# Patient Record
Sex: FEMALE | Race: WHITE | NOT HISPANIC OR LATINO | Employment: FULL TIME | URBAN - METROPOLITAN AREA
[De-identification: names, ages, dates, MRNs, and addresses within clinical notes are randomized per-mention and may not be internally consistent; named-entity substitution may affect disease eponyms.]

---

## 2017-01-26 ENCOUNTER — PRE VISIT (OUTPATIENT)
Dept: MATERNAL FETAL MEDICINE | Facility: CLINIC | Age: 37
End: 2017-01-26

## 2017-02-01 ENCOUNTER — OFFICE VISIT (OUTPATIENT)
Dept: MATERNAL FETAL MEDICINE | Facility: CLINIC | Age: 37
End: 2017-02-01
Attending: FAMILY MEDICINE
Payer: COMMERCIAL

## 2017-02-01 DIAGNOSIS — Z31.69 ENCOUNTER FOR PRECONCEPTION CONSULTATION: ICD-10-CM

## 2017-02-01 DIAGNOSIS — I63.50 CEREBRAL ARTERY OCCLUSION WITH CEREBRAL INFARCTION (H): Primary | ICD-10-CM

## 2017-02-01 NOTE — PROGRESS NOTES
Dear Dr. Chakraborty,  Thank you for your request for a Maternal Fetal Medicine consultation on your patient Ms. Foster regarding her history of a cerebrovascular accident.     HPI: Ms. Foster is a  36 year old  here for preconception counseling. She has a history notable for a history of CVA with patent foramen ovale and atrial septal aneurysm. At 35 weeks she began having intermittent severe headaches that were initially diagnosed as migraines. One headache brought on neurologic symptoms (weakness, numbness on left, and difficulty with speech). She was admitted to [facility] and diagnosed with an MCA CVA. She was placed on anticoagulation on unfractionated heparin, discharged, and again presented 2 weeks later with additional symptoms. MRI revealed new CVA suggesting a second event. She was delivered at that time via c/s due to concern for valsalva and anticoagulation planning. Workup revealed possible Protein S deficiency, MTHFR homozygous mutation, and PFO. Ultimately diagnosed with cryptogenic CVA. Bilateral LE dopplers were negative for DVT. She continued Lovenox for 12 weeks postpartum and then transitioned to ASA. Since then, she has continued follow up with neurology and cardiology. In 2016 she underwent PFO closure. An echo in 2016 showed complete closure of ASD, bubble study negative, LV EF nl 55-60% and right ventricle function and structure normal. She was continued on plavix for six months after PFO closure and then transitioned to ASA. Per cardiology note, Dr. Lu recommended full strength double strength aspirin however the patient is currently taking 81 mg daily. The patient and her  are here today to discuss if she is at high risk of repeat CVA or VTE, pregnancy and delivery management on anticoagulation, and anticoagulation indications in pregnancy.    OB History:  Obstetric History       T1      TAB1   SAB0   E0   M0   L1       # Outcome Date GA Lbr Angel/  "Weight Sex Delivery Anes PTL Lv   2 TAB      TAB      1 Term               Obstetric Comments   SAB at age 18    section  due to concern for valsalva. Underwent induction of labor due to second CVA. Initially planned for VAVD to limit valsalva. Anesthesia refused epidural placement given anticoagulation, plan was made for  section and IOL was then stopped.    GYN History: Denies abnormal PAPs or STDs  Past Medical History:   Patient Active Problem List    Diagnosis Date Noted     MTHFR mutation (H) 2016     Priority: Medium      cardiology consultation notes--  \"2.  Homozygous MTHFR.  If folate levels fall, this predisposes her to hypercoagulable state from an elevated homocysteine.  She remains on supplemental therapy ordered by Neurology\"       Social anxiety disorder 10/04/2016     Priority: Medium     Sx's started in ~-, propranolol used to help prior to speaking, interviews, stressful situations.  - worsening generalized anxiety with work, social situations, propranolol not helpful.   Starting zoloft 50mg/d.       Migraine with aura and without status migrainosus, not intractable 2015     Priority: Medium     HA's transitioned to migraines with aura/neuro sx's at beginning of pregnancy in .  Worsening migraines in 3rd trimester, with CVA at ~36 wks (seen at [facility]).  Return of sx's at ~38wks, so induced at [facility], c/sx.         Pre-conception counseling 2016     See 16 consultation from [facility].  Reviewed h/o CVA at 36 wks in setting of PFO and atrial septal aneurysm, with induction at ~38wks due to second CVA.  Reviewed hypercoagulation w/u- with no signs of thrombophilia.  Reviewed recent testing including positive homozygous MTHFR testing (from )- does not indicated high-risk thrombophilia.  Would leave decision to close PFO to cardiology and neurology.  Thought pregnancy is NOT contraindicated- thought risk of another CVA is " low.  Recommended repeat ECHO to evaluate for atrial enlargment, and recommended lovenox as soon as she is confirmed to have an IUP (confirm not ectopic).       Elevated lipoprotein A level 2016 cardiology consultation notes--  3.  Elevated Lp(a) which increases her cardiovascular risks over lifetime.  Currently, her LDL is controlled at 108, HDL 71.  She does have some heart disease in her family as I outlined above.  In the future, consideration could be given to statin therapy, but certainly not until she completes her pregnancies.  She hopes to have another child in the future post-closure.           PFO with atrial septal aneurysm 2015     Seen on 5/2/15 GODWIN bubble study ([facility]) s/p CVA during pregnancy, no thrombus.  First put on heparin, then switched to lovenox.  Lovenox stopped 13 wks post-partum- switched to asa.  Post PFO/ASD closure 16 with 30mm Amplatzer device  Was having palpitations s/p closure for first few weeks- toprol XL helped, took for about a month.         Protein S deficiency (H) 2015     Low protein S found s/p CVA during pregnancy       History of  section 2015     Problem list name updated by automated process. Provider to review       Cerebral artery occlusion with cerebral infarction 2015     CVA x 2 during pregnancy in ~5/15 (1st ~36 wks gestation), in setting of worsening migraines, PFO, and low protein S.    Initially inpt x 7 days at [facility], sent home on heparin, return of CVA sx's 7 days later, induced and delivered (at 38wks), on lovenox BID x 13 wks PP, then asa.    Thought to be cryptogenic CVA's.  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  8/13/15 [facility] consultation - rec further studies.  MRI/MRA and prolonged cardiac study.  If neg for a.fib, could make a case to close the PFO.    No arrhythmias identified on an event recorder.  A GODWIN showed a mobile septum measuring 2.3 x 2.3cm with left  to right shunting and a positive bubble study.    --PFO closure completed on 16.  PFO Closure: No complications.  30mm Amplatz Cribiform occluder device.  -Plavix 75 mg daily for at least 6 months  -Asprin 81 mg daily         Moderate recurrent major depression (H) 2013     Wellbutrin XL 150mg/d - very helpful.  Will likely stop in -.  In better space, planning on trying to get pregnant starting in 10/13.  Has stopped the Vit D in summer- would restart in .       IBS (irritable bowel syndrome) 2012     W/u through [facility], colonoscopy and endoscopy in .  Celiac labs negative.  Sx's better with very low dairy diet.       Lumbar herniated disc 2012     L4-L5, seeing work comp (injury 11).  Back pain and radiation to R buttock.  On restriction for work- 6hrs/day max, minimized lifting/carrying.  [facility] steroid injections at [facility].  Helping some.  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       Hirsutism 2012     Seeing Leonora Lilly.  Has taken spironolactone & OCP on/off, off for most of ', '.  Sx's worsened off med.  Will stop in anticipation of trying to get pregnant.       Insomnia 2012     On/off issues, uses trazodone periodically       CARDIOVASCULAR SCREENING; LDL GOAL LESS THAN 160 10/31/2010     Vitamin D deficiency 2009     Takes Vit D 7086-3387 units/day.        Past Surgical History:   - Nasal septum repair  - Knee arthroscopy  - PFO repair  -  section      Meds:   - B Comple  - 81 mg ASA  - Vit D  - Fish oil  - Magnesium      All:   NKDA    Family History: Denies family history of MI, Stroke or blood clots.    Impression:   At today s visit we discussed with Ms. Foster the implications of her prior history of a CVA in pregnancy. We discussed that this was a cryptogenic event, possibly due to the presence of the PFO, given workup for DVT was negative. The  recommendation for patients with a history of two or more episodes of VTE who are not receiving long term anticoagulation therapy is typically prophylactic OR therapeutic heparin. This recommendation is not dependent on the presence of a thrombophilia. It is very likely that the presence of the PFO was the nidus for the CVA. Now status post PFO closure, she is at extremely low risk for a repeat event. We discussed homozygosity for MTHFR gene is a common cause of hyperhomocysteinemia, which is a very weak risk factor of venous thromboembolism. However, MTHFR mutations by themselves do not appear to convey an increased risks for VTE in either pregnant or nonpregnant women. Although in the past Lovenox was used in patient with various coagulopathies in an effort to decrease pregnancy loss, it has since been disproven to be of benefit. Additionally, protein S deficiency workup during pregnancy and while on anticoagulants is not valid. We recommend repeat protein S testing and an an initial consult with hematologist Dr. Chow to address the issue of anticoagulation dosing during pregnancy.     We also discussed management of pregnancy while on anticoagulation, including timing of delivery via scheduled repeat  section or induction of labor to facilitate anesthesia at least 12 hours after anticoagulation. We also discussed fetal growth ultrasounds every four weeks after 20 weeks for patients on anticoagulation.    Recommendations:   1) Consult with Dr. Chow, hematology for anticoagulation planning, indications and dosing  2) Obtain repeat protein S levels prior to pregnancy  3) Consider obtaining hemocysteine level  4) If homocysteine level elevated, begin vitamin B6 25 mg TID and Vitamin B 12 100 mg daily.   5) Level 2 ultrasound at 18-20 weeks, growth scans every four weeks if on anticoagulation in pregnancy  6) Genetics consult recommended for AMA, patient will consider      Thank you very much for your  request for a Maternal Fetal Medicine consultation.  Please contact the [facility] for with any further questions or concerns.  An outpatient consultation was performed beyond the interpretation of the ultrasound.   A total of 15 minutes of face-to-face time was spent in counseling and coordination of care. A copy of this consultation will be sent to your office.    I served as a scribe for Dr. Julien for this encounter.    Maria C Tobar MD  OB GYN PGY-2    This note, as scribed, accurately reflects the examination, my impressions and plan as discussed with the patient.    Ramesh Julien MD  Maternal-Fetal Medicine

## 2017-02-10 ENCOUNTER — OFFICE VISIT (OUTPATIENT)
Dept: BEHAVIORAL HEALTH | Facility: CLINIC | Age: 37
End: 2017-02-10
Attending: FAMILY MEDICINE
Payer: COMMERCIAL

## 2017-02-10 ENCOUNTER — MYC MEDICAL ADVICE (OUTPATIENT)
Dept: FAMILY MEDICINE | Facility: CLINIC | Age: 37
End: 2017-02-10

## 2017-02-10 DIAGNOSIS — F41.1 ANXIETY STATE: Primary | ICD-10-CM

## 2017-02-10 DIAGNOSIS — F40.10 SOCIAL ANXIETY DISORDER: ICD-10-CM

## 2017-02-10 DIAGNOSIS — F41.0 PANIC ATTACK: ICD-10-CM

## 2017-02-10 DIAGNOSIS — F41.9 ANXIETY: Primary | ICD-10-CM

## 2017-02-10 PROCEDURE — 90791 PSYCH DIAGNOSTIC EVALUATION: CPT | Performed by: COUNSELOR

## 2017-02-10 RX ORDER — LORAZEPAM 1 MG/1
TABLET ORAL
Qty: 8 TABLET | Refills: 0 | Status: SHIPPED | OUTPATIENT
Start: 2017-02-10 | End: 2017-04-05

## 2017-02-10 RX ORDER — SERTRALINE HYDROCHLORIDE 100 MG/1
100 TABLET, FILM COATED ORAL DAILY
Qty: 30 TABLET | Refills: 1 | Status: SHIPPED | OUTPATIENT
Start: 2017-02-10 | End: 2017-04-05

## 2017-02-10 ASSESSMENT — ANXIETY QUESTIONNAIRES
7. FEELING AFRAID AS IF SOMETHING AWFUL MIGHT HAPPEN: NOT AT ALL
5. BEING SO RESTLESS THAT IT IS HARD TO SIT STILL: NOT AT ALL
1. FEELING NERVOUS, ANXIOUS, OR ON EDGE: MORE THAN HALF THE DAYS
6. BECOMING EASILY ANNOYED OR IRRITABLE: SEVERAL DAYS
GAD7 TOTAL SCORE: 8
2. NOT BEING ABLE TO STOP OR CONTROL WORRYING: MORE THAN HALF THE DAYS
3. WORRYING TOO MUCH ABOUT DIFFERENT THINGS: MORE THAN HALF THE DAYS

## 2017-02-10 ASSESSMENT — PATIENT HEALTH QUESTIONNAIRE - PHQ9: 5. POOR APPETITE OR OVEREATING: SEVERAL DAYS

## 2017-02-10 NOTE — TELEPHONE ENCOUNTER
[employer] message sent to pt since not reachable by phone.  Rx faxed to [facility].  Bernadine LOWRY RN

## 2017-02-10 NOTE — TELEPHONE ENCOUNTER
CW,  Please see below [employer] messages.  Pt is scheduled to see you 2/24/2017.  Looks like you last Rx'd Alprazolam in 2009?  Do you want to increase pt's Zoloft prior to visit?  Please advise.  Bernadine LOWRY RN

## 2017-02-10 NOTE — TELEPHONE ENCOUNTER
Tried to call pt but it went to v-mail.    Looks like AYLA was done at therapy visit today- still 8- same as at her 12/16 visit when zoloft 50mg/d was started.  Likely needs a higher dose of zoloft.  If no significant se's at the 50mg/d dose, would rec increasing to 100mg/day.      In terms of the ativan, that's a harder issue without an appt.  Wouldn't want to take it if she's going to be driving in a few hrs, and suspect that would be the case for meetings?  I can do a rx for ~#8 of the ativan for rare use, but no more unless we talk fully about it at her next appointment.  And would only take if she won't need to be driving.  Ativan is 1mg- so can start with 1/2 tab to see if that works.    Can you let her know?  Will send in the higher dose of zoloft and bring the ativan rx to triage.  Would have her upcoming appt.  Thanks!  CW

## 2017-02-10 NOTE — Clinical Note
Hi Dr. Reyes,  I started seeing Chloe for mental health counseling and have attached her diagnostic assessment. I have diagnosed her with Unspecified Anxiety Disorder. Thank you for the referral, and please feel free to contact me with any questions/concerns!  Penelope
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
## 2017-02-11 ASSESSMENT — PATIENT HEALTH QUESTIONNAIRE - PHQ9: SUM OF ALL RESPONSES TO PHQ QUESTIONS 1-9: 0

## 2017-02-11 ASSESSMENT — ANXIETY QUESTIONNAIRES: GAD7 TOTAL SCORE: 8

## 2017-03-07 ENCOUNTER — TELEPHONE (OUTPATIENT)
Dept: FAMILY MEDICINE | Facility: CLINIC | Age: 37
End: 2017-03-07

## 2017-03-07 NOTE — TELEPHONE ENCOUNTER
Reason for call:  Patient reporting a symptom    Symptom or request: rash and swelling on pt's face    Duration (how long have symptoms been present): this am    Have you been treated for this before? No    Additional comments: pt was having breakfast this am  (Nothing out of ordinary) and all of a sudden developed rash and swelling  On her face; this never happened and pt took Benedryl;  Pt wants to speak to a nurse    Phone Number patient can be reached at:  Home number on file [phone] (home)    Best Time:      Can we leave a detailed message on this number:  YES    Call taken on 3/7/2017 at 9:30 AM by Lara Campos

## 2017-03-07 NOTE — TELEPHONE ENCOUNTER
Her theory sounds possibly, especially if she usually takes her vitamins at night, and just took it this morning.   Agree with your counseling.  No other thoughts at this point, other than cont to just take it at night.  Thanks!  CW

## 2017-03-07 NOTE — TELEPHONE ENCOUNTER
"CW - FYI  Patient called.  States after she ate breakfast today her face started tingling, felt like it was burning and was bright red.  Took some Benadryl and it's gone now.  Denied any problems breathing, swallowing, swelling on tongue, lips    States she did some research.  Thinks she experienced a Niacin flush.  Took her Vitamins this am once of which has a lot of Niacin in it.  (usually takes at night) and then proceeded to have raines for breakfast.  \"I read online that this has happened to other people\"    She has an appointment with you next week and states she will talk to you about this then.  I did tell her to call back is this happens again to call clinic.   If swelling tongue, lips, problems breathing/swallowing call 911/go to ER.   Anything you want me to tell patient at this time.  Thanks, Ondina Parr RN    "

## 2017-03-10 ENCOUNTER — OFFICE VISIT (OUTPATIENT)
Dept: BEHAVIORAL HEALTH | Facility: CLINIC | Age: 37
End: 2017-03-10
Payer: COMMERCIAL

## 2017-03-10 DIAGNOSIS — F41.9 ANXIETY: Primary | ICD-10-CM

## 2017-03-10 PROCEDURE — 90834 PSYTX W PT 45 MINUTES: CPT | Performed by: COUNSELOR

## 2017-03-16 DIAGNOSIS — F40.10 SOCIAL ANXIETY DISORDER: ICD-10-CM

## 2017-03-16 DIAGNOSIS — F41.1 ANXIETY STATE: ICD-10-CM

## 2017-03-16 NOTE — TELEPHONE ENCOUNTER
Denied  Refill request too early; Rx sent 2/10/2017 for 2 month supply  Bernadine LOWRY RN    Pending Prescriptions:                       Disp   Refills    sertraline (ZOLOFT) 50 MG tablet [Pharmac*30 tab*0            Sig: TAKE ONE-HALF TABLET BY MOUTH DAILY FOR THE FIRST           WEEK THEN INCREASE TO ONE TABLET BY MOUTH DAILY.         Last Written Prescription Date: 2/10/2017  Last Fill Quantity: 30; # refills: 1  Last Office Visit with [facility], [facility] or [facility] prescribing provider:     Next 5 appointments (look out 90 days)     Mar 24, 2017  2:30 PM CDT   Return Visit with Carly Gershone, LPC   [facility] ([facility])    [facility] [address]   [phone]            Apr 04, 2017  4:00 PM CDT   Return Visit with Carly Gershone, LPC   [facility] ([facility])    [address] [place] [id number]   [phone]            Apr 05, 2017  7:45 AM CDT   MyChart Long with Izabel Reyes MD   [facility] ([facility])    [address] [place] [address] [facility] [address]   [phone]            Apr 18, 2017  4:00 PM CDT   Return Visit with Carly Gershone, LPC   [facility] ([facility])    [address] [place] [id number]   [phone]            May 08, 2017  3:30 PM CDT   Return Visit with Carly Gershone, LPC   [facility] ([facility])    [facility] [address]   [phone]                   Last PHQ-9 score on record=   PHQ-9 SCORE 2/10/2017   Total Score -   Total Score 0       Lab Results   Component Value Date    AST 17 12/29/2016     Lab Results   Component Value Date    ALT 25 12/29/2016

## 2017-03-24 ENCOUNTER — OFFICE VISIT (OUTPATIENT)
Dept: BEHAVIORAL HEALTH | Facility: CLINIC | Age: 37
End: 2017-03-24
Payer: COMMERCIAL

## 2017-03-24 DIAGNOSIS — F41.9 ANXIETY: Primary | ICD-10-CM

## 2017-03-24 PROCEDURE — 90834 PSYTX W PT 45 MINUTES: CPT | Performed by: COUNSELOR

## 2017-03-28 NOTE — PROGRESS NOTES
"                                             Progress Note    Client Name: Chloe Foster  Date: 3/10/17         Service Type: Individual      Session Start Time: 2:30pm  Session End Time: 3:20pm      Session Length: 50 min     Session #: 2     Attendees: Client attended alone    Treatment Plan Last Reviewed: preliminary at intake  PHQ-9 / AYLA-7 : See updates in EPIC     DATA      Progress Since Last Session (Related to Symptoms / Goals / Homework):   Symptoms: Stable    Homework: NA      Episode of Care Goals: Satisfactory progress - ACTION (Actively working towards change); Intervened by reinforcing change plan / affirming steps taken     Current / Ongoing Stressors and Concerns:   Client reported work-related stress, specifically in her relationship with her boss. 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 Reported she feels anxious when thinking about interactions with boss, and in meetings for work.     Treatment Objective(s) Addressed in This Session:   Problem-solve stressors effectively.  Learn coping strategies for anxiety.     Intervention:   Suggested client use sensory items to help calm her. Suggested putting together a \"kit\" she can keep with her in her purse, desk, etc.  Discussed with client the question of, \"What is the worst thing that could happen?\" regarding interactions with boss.  Validated concerns/feelings.        ASSESSMENT: Current Emotional / Mental Status (status of significant symptoms):   Risk status (Self / Other harm or suicidal ideation)   Client denies current fears or concerns for personal safety.   Client denies current or recent suicidal ideation or behaviors.   Client denies current or recent homicidal ideation or behaviors.   Client denies current or recent self injurious behavior or ideation.   Client denies other safety concerns.   A safety and risk management plan has not " been developed at this time, however client was given the after-hours number / 911 should there be a change in any of these risk factors.     Appearance:   Appropriate    Eye Contact:   Good    Psychomotor Behavior: Normal    Attitude:   Cooperative    Orientation:   All   Speech    Rate / Production: Normal     Volume:  Normal    Mood:    Anxious    Affect:    Appropriate    Thought Content:  Rumination    Thought Form:  Coherent  Logical    Insight:    Good      Medication Review:   No changes to current psychiatric medication(s)     Medication Compliance:   Yes     Changes in Health Issues:   None reported     Chemical Use Review:   Substance Use: Chemical use reviewed, no active concerns identified      Tobacco Use: No current tobacco use.       Collateral Reports Completed:   Not Applicable    PLAN: (Client Tasks / Therapist Tasks / Other)  Continue to meet with client every-other week. Follow-up appts scheduled. Continue discussion of stressors and coping. Continue to monitor mood and thought process.        Carly Gershone, LPC

## 2017-04-05 ENCOUNTER — OFFICE VISIT (OUTPATIENT)
Dept: FAMILY MEDICINE | Facility: CLINIC | Age: 37
End: 2017-04-05
Payer: COMMERCIAL

## 2017-04-05 ENCOUNTER — TELEPHONE (OUTPATIENT)
Dept: FAMILY MEDICINE | Facility: CLINIC | Age: 37
End: 2017-04-05

## 2017-04-05 VITALS
RESPIRATION RATE: 16 BRPM | TEMPERATURE: 97.1 F | SYSTOLIC BLOOD PRESSURE: 123 MMHG | OXYGEN SATURATION: 98 % | HEART RATE: 60 BPM | HEIGHT: 67 IN | DIASTOLIC BLOOD PRESSURE: 78 MMHG | WEIGHT: 159 LBS | BODY MASS INDEX: 24.96 KG/M2

## 2017-04-05 DIAGNOSIS — D68.59 PROTEIN S DEFICIENCY (H): ICD-10-CM

## 2017-04-05 DIAGNOSIS — F33.1 MODERATE RECURRENT MAJOR DEPRESSION (H): ICD-10-CM

## 2017-04-05 DIAGNOSIS — Z15.89 MTHFR MUTATION: ICD-10-CM

## 2017-04-05 DIAGNOSIS — F41.0 PANIC ATTACK: ICD-10-CM

## 2017-04-05 DIAGNOSIS — F41.1 ANXIETY STATE: ICD-10-CM

## 2017-04-05 DIAGNOSIS — I63.50 CEREBRAL ARTERY OCCLUSION WITH CEREBRAL INFARCTION (H): ICD-10-CM

## 2017-04-05 DIAGNOSIS — Z31.69 PRE-CONCEPTION COUNSELING: Primary | ICD-10-CM

## 2017-04-05 DIAGNOSIS — F40.10 SOCIAL ANXIETY DISORDER: Primary | ICD-10-CM

## 2017-04-05 DIAGNOSIS — Z31.69 PRE-CONCEPTION COUNSELING: ICD-10-CM

## 2017-04-05 LAB
HCYS SERPL-SCNC: ABNORMAL UMOL/L (ref 4–12)
Lab: NORMAL
PROT S FREE AG ACT/NOR PPP IA: 62 % (ref 55–125)
PROT S FREE AG ACT/NOR PPP IA: NORMAL % (ref 55–125)

## 2017-04-05 PROCEDURE — 83090 ASSAY OF HOMOCYSTEINE: CPT | Performed by: FAMILY MEDICINE

## 2017-04-05 PROCEDURE — 36415 COLL VENOUS BLD VENIPUNCTURE: CPT | Performed by: FAMILY MEDICINE

## 2017-04-05 PROCEDURE — 85306 CLOT INHIBIT PROT S FREE: CPT | Performed by: FAMILY MEDICINE

## 2017-04-05 PROCEDURE — 99214 OFFICE O/P EST MOD 30 MIN: CPT | Performed by: FAMILY MEDICINE

## 2017-04-05 RX ORDER — LORAZEPAM 1 MG/1
TABLET ORAL
Qty: 8 TABLET | Refills: 0 | Status: SHIPPED | OUTPATIENT
Start: 2017-04-05 | End: 2017-07-11

## 2017-04-05 RX ORDER — SERTRALINE HYDROCHLORIDE 100 MG/1
100 TABLET, FILM COATED ORAL DAILY
Qty: 30 TABLET | Refills: 1 | Status: SHIPPED | OUTPATIENT
Start: 2017-04-05 | End: 2017-06-19

## 2017-04-05 ASSESSMENT — ANXIETY QUESTIONNAIRES
5. BEING SO RESTLESS THAT IT IS HARD TO SIT STILL: NOT AT ALL
6. BECOMING EASILY ANNOYED OR IRRITABLE: SEVERAL DAYS
IF YOU CHECKED OFF ANY PROBLEMS ON THIS QUESTIONNAIRE, HOW DIFFICULT HAVE THESE PROBLEMS MADE IT FOR YOU TO DO YOUR WORK, TAKE CARE OF THINGS AT HOME, OR GET ALONG WITH OTHER PEOPLE: NOT DIFFICULT AT ALL
1. FEELING NERVOUS, ANXIOUS, OR ON EDGE: MORE THAN HALF THE DAYS
3. WORRYING TOO MUCH ABOUT DIFFERENT THINGS: MORE THAN HALF THE DAYS
GAD7 TOTAL SCORE: 7
2. NOT BEING ABLE TO STOP OR CONTROL WORRYING: SEVERAL DAYS
7. FEELING AFRAID AS IF SOMETHING AWFUL MIGHT HAPPEN: NOT AT ALL

## 2017-04-05 ASSESSMENT — PATIENT HEALTH QUESTIONNAIRE - PHQ9: 5. POOR APPETITE OR OVEREATING: SEVERAL DAYS

## 2017-04-05 NOTE — NURSING NOTE
"Chief Complaint   Patient presents with     Anxiety     /78  Pulse 60  Temp 97.1  F (36.2  C) (Oral)  Resp 16  Ht 5' 7\" (1.702 m)  Wt 159 lb (72.1 kg)  LMP 03/15/2017  SpO2 98%  BMI 24.9 kg/m2 Estimated body mass index is 24.9 kg/(m^2) as calculated from the following:    Height as of this encounter: 5' 7\" (1.702 m).    Weight as of this encounter: 159 lb (72.1 kg).  bp completed using cuff size: regular       Health Maintenance addressed:  NONE    n/a    Abigail Willson MA     "

## 2017-04-05 NOTE — TELEPHONE ENCOUNTER
Reason for Call: Request for an order or referral:    Order or referral being requested: lab    Date needed: as soon as possible    Has the patient been seen by the PCP for this problem? YES    Additional comments: sol needs to change lab from Protein S total free to Protein S free. Please advise          Phone number Patient can be reached at:  Other phone number:  [phone]    Best Time:  any    Can we leave a detailed message on this number?  YES    Call taken on 4/5/2017 at 3:00 PM by Chris Chan

## 2017-04-05 NOTE — MR AVS SNAPSHOT
After Visit Summary   4/5/2017    Chloe Foster    MRN: [id number]           Patient Information     Date Of Birth          1980        Visit Information        Provider Department      4/5/2017 7:45 AM Izabel Reyes MD [facility]        Today's Diagnoses     Social anxiety disorder    -  1    Pre-conception counseling        Cerebral artery occlusion with cerebral infarction        Moderate recurrent major depression (H)        Anxiety state        Panic attack           Follow-ups after your visit        Your next 10 appointments already scheduled     Apr 18, 2017  4:00 PM CDT   Return Visit with Carly Gershone, LPC   [facility] ([address] [id number] [phone]            May 08, 2017  3:30 PM CDT   Return Visit with Carly Gershone, LPC   [facility] ([facility])    [facility] [id number] [phone]            May 22, 2017  3:30 PM CDT   Return Visit with Carly Gershone, LPC   [facility] ([facility])    [facility] [id number] [phone]              Who to contact     If you have questions or need follow up information about today's clinic visit or your schedule please contact [facility] directly at [phone].  Normal or non-critical lab and imaging results will be communicated to you by MyChart, letter or phone within 4 business days after the clinic has received the results. If you do not hear from us within 7 days, please contact the clinic through MyChart or phone. If you have a critical or abnormal lab result, we will notify you by phone as soon as possible.  Submit refill requests through [employer] or call your pharmacy and they will forward the refill request to us. Please allow 3 business days for your refill to be completed.          Additional Information About Your Visit       "  MyChart Information     [facility] gives you secure access to your electronic health record. If you see a primary care provider, you can also send messages to your care team and make appointments. If you have questions, please call your primary care clinic.  If you do not have a primary care provider, please call [phone] and they will assist you.        Care EveryWhere ID     This is your Care EveryWhere ID. This could be used by other organizations to access your [place] medical records  [id number]        Your Vitals Were     Pulse Temperature Respirations Height Last Period Pulse Oximetry    60 97.1  F (36.2  C) (Oral) 16 5' 7\" (1.702 m) 03/15/2017 98%    BMI (Body Mass Index)                   24.9 kg/m2            Blood Pressure from Last 3 Encounters:   04/05/17 123/78   12/21/16 108/70   07/28/16 120/70    Weight from Last 3 Encounters:   04/05/17 159 lb (72.1 kg)   12/21/16 161 lb 9.6 oz (73.3 kg)   07/28/16 163 lb 6.4 oz (74.1 kg)              We Performed the Following     Homocysteine     Protein S Antigen Total and Free          Where to get your medicines      These medications were sent to [facility] [address] [facility] [address] [facility] [address], [facility] [address]     Phone:  [phone]     sertraline 100 MG tablet         Some of these will need a paper prescription and others can be bought over the counter.  Ask your nurse if you have questions.     Bring a paper prescription for each of these medications     LORazepam 1 MG tablet          Primary Care Provider Office Phone # Fax #    Izabel Reyes [phone]       [facility] [address] [address]        Thank you!     Thank you for choosing [facility]  for your care. Our goal is always to provide you with excellent care. Hearing back from our patients is one way we can continue to improve our services. " Please take a few minutes to complete the written survey that you may receive in the mail after your visit with us. Thank you!             Your Updated Medication List - Protect others around you: Learn how to safely use, store and throw away your medicines at www.disposemymeds.org.          This list is accurate as of: 4/5/17  8:34 AM.  Always use your most recent med list.                   Brand Name Dispense Instructions for use    aspirin EC 81 MG EC tablet      Take 81 mg by mouth       FISH OIL PO      Take 1 capsule by mouth daily       LORazepam 1 MG tablet    ATIVAN    8 tablet    Use 1/2-1 tab 30 minutes prior to procedure or for panic symptoms       magnesium oxide 400 MG tablet    MAG-OX     Take 400 mg by mouth every evening       MULTIVITAMIN GUMMIES WOMENS PO      Take 2 chew tab by mouth every evening       NONFORMULARY      Take 1 tablet by mouth every evening OTC: B-Right Vitamin (Optimized B Complex)       propranolol 10 MG tablet    INDERAL    20 tablet    Take 1 tablet by mouth. Take as needed prior to interviews, stressful events       sertraline 100 MG tablet    ZOLOFT    30 tablet    Take 1 tablet (100 mg) by mouth daily (take 1/2 tab the first week, then full tab)       STATIN NOT PRESCRIBED (INTENTIONAL)     0 each    Statin not prescribed intentionally due to Woman of childbearing age not actively taking birth control       VITAMIN D (CHOLECALCIFEROL) PO      Take 2,000 Units by mouth every evening (takes 2 x 100 unit capsule)

## 2017-04-05 NOTE — TELEPHONE ENCOUNTER
That sounds great- wanted to order the usual lab, so thanks for their feedback.  Ordered from her visit note so the lab results would be connected to that visit note.  Can you call and let them know as well?  Will cancel lab order from this encounter.  Thanks!  CW

## 2017-04-05 NOTE — PROGRESS NOTES
SUBJECTIVE:                                                    Chloe Foster is a 36 year old female who presents to clinic today for the following health issues:      Anxiety Follow-Up    Status since last visit: No change    Other associated symptoms:None    Complicating factors:   Significant life event: No   Current substance abuse: None  Depression symptoms: No  AYLA-7 SCORE 12/21/2016 2/10/2017 4/5/2017   Total Score 8 8 7        GAD7       Amount of exercise or physical activity: 2-3 days/week for an average of 30-45 minutes    Problems taking medications regularly: No    Medication side effects: none    Diet: regular (no restrictions)    Zoloft- increased to 100mg ~2/10/16.  Alternative between the 50mg/100mg for awhile.  At first at the 100mg, felt dizzy. Alternative between 50/100mg for a bit.  Now back at 100mg daily for a couple weeks, and doing well at the dose.    Anxiety sx's--    Alprazolam- took 1/2 tab the night before she has a big day of meetings (1-2x/wk, 1/2 tab or 1 at night- has 1 tab of the #8 from 2 days), and feels a lot better the next day.  Thinks she may sleep better or more relax more.  Has made it through 5-6 meetings that went pretty well.  Could be the zoloft or the xanax?  Has still had a couple episodes of shaking/flushing.  Trying to take care of herself before those days, the xanax, not having wine the evening.  Exercise- not as much as she needs.  Bought elliptical, but hits her head on ceiling.  May start biking her son to work in the summer- couple miles.  Active with son otherwise.  [employer] class 2x/wk.    Seeing Penelope, therapist.  Helping through mind-racing about work, good things there.  Helping here.  Rec EMDR- [place].    Headspace meditation.      Preconception counseling--  May start trying in late-summer, early fall.  Blood test- perinatologist rec hematologist to check her levels when she's not pregnant.      Patient Active Problem List   Diagnosis      Vitamin D deficiency     CARDIOVASCULAR SCREENING; LDL GOAL LESS THAN 160     Lumbar herniated disc     Hirsutism     Insomnia     IBS (irritable bowel syndrome)     Moderate recurrent major depression (H)     Cerebral artery occlusion with cerebral infarction     History of  section     PFO with atrial septal aneurysm     Protein S deficiency (H)     Migraine with aura and without status migrainosus, not intractable     Pre-conception counseling     Elevated lipoprotein A level     Social anxiety disorder     MTHFR mutation (H)      Current Outpatient Prescriptions   Medication Sig Dispense Refill     sertraline (ZOLOFT) 100 MG tablet Take 1 tablet (100 mg) by mouth daily (take 1/2 tab the first week, then full tab) 30 tablet 1     LORazepam (ATIVAN) 1 MG tablet Use 1/2-1 tab 30 minutes prior to procedure or for panic symptoms 8 tablet 0     aspirin EC 81 MG EC tablet Take 81 mg by mouth       STATIN NOT PRESCRIBED, INTENTIONAL, Statin not prescribed intentionally due to Woman of childbearing age not actively taking birth control 0 each 0     propranolol (INDERAL) 10 MG tablet Take 1 tablet by mouth. Take as needed prior to interviews, stressful events 20 tablet 1     VITAMIN D, CHOLECALCIFEROL, PO Take 2,000 Units by mouth every evening (takes 2 x 100 unit capsule)       Multiple Vitamins-Minerals (MULTIVITAMIN GUMMIES WOMENS PO) Take 2 chew tab by mouth every evening       NONFORMULARY Take 1 tablet by mouth every evening OTC: B-Right Vitamin (Optimized B Complex)       magnesium oxide (MAG-OX) 400 MG tablet Take 400 mg by mouth every evening        Omega-3 Fatty Acids (FISH OIL PO) Take 1 capsule by mouth daily        Allergies   Allergen Reactions     Nkda [No Known Drug Allergies]      Recent Labs   Lab Test  16   0924  16   1940   16   1110  11/23/15   0820 07/23/15   04/29/15   2300  13   1101   09   1232   LDL  126*   --    --    --   108*  126   < >   --    --    < >    "--    HDL  86   --    --    --   71  76   < >   --    --    < >   --    TRIG  61   --    --    --   53  85   < >   --    --    < >   --    ALT  25   --    --    --   23   --    --   26   --    --   29   CR  0.70   --    --   0.58  0.62   --    --   0.60   --    < >  0.83   GFRESTIMATED  >90  Non  GFR Calc     --    --   >90  Non  GFR Calc    >90  Non  GFR Calc     --    --   >90  Non  GFR Calc     --    < >  81   GFRESTBLACK  >90   GFR Calc     --    --   >90   GFR Calc    >90   GFR Calc     --    --   >90   GFR Calc     --    < >  >90   POTASSIUM  4.1  3.9   < >  3.9  4.0   --    --   3.5   --    < >  4.8   TSH   --    --    --    --    --    --    --    --   0.44   --   0.49    < > = values in this interval not displayed.      BP Readings from Last 3 Encounters:   04/05/17 123/78   12/21/16 108/70   07/28/16 120/70    Wt Readings from Last 3 Encounters:   04/05/17 159 lb (72.1 kg)   12/21/16 161 lb 9.6 oz (73.3 kg)   07/28/16 163 lb 6.4 oz (74.1 kg)                  Labs reviewed in EPIC    Reviewed and updated as needed this visit by clinical staff  Tobacco  Allergies  Meds  Problems  Med Hx  Surg Hx  Fam Hx  Soc Hx        Reviewed and updated as needed this visit by Provider  Allergies  Meds  Problems         ROS:  Constitutional, HEENT, cardiovascular, pulmonary, GI, , musculoskeletal, neuro, skin, endocrine and psych systems are negative, except as otherwise noted.    OBJECTIVE:                                                    /78  Pulse 60  Temp 97.1  F (36.2  C) (Oral)  Resp 16  Ht 5' 7\" (1.702 m)  Wt 159 lb (72.1 kg)  LMP 03/15/2017  SpO2 98%  BMI 24.9 kg/m2  Body mass index is 24.9 kg/(m^2).  GENERAL: healthy, alert and no distress  EYES: Eyes grossly normal to inspection, PERRL and conjunctivae and sclerae normal  HENT: ear canals and TM's normal, nose " and mouth without ulcers or lesions  NECK: no adenopathy, no asymmetry, masses, or scars and thyroid normal to palpation  RESP: lungs clear to auscultation - no rales, rhonchi or wheezes  BREAST: normal without masses, tenderness or nipple discharge and no palpable axillary masses or adenopathy  CV: regular rate and rhythm, normal S1 S2, no S3 or S4, no murmur, click or rub, no peripheral edema and peripheral pulses strong  ABDOMEN: soft, nontender, no hepatosplenomegaly, no masses and bowel sounds normal  MS: no gross musculoskeletal defects noted, no edema  SKIN: no suspicious lesions or rashes  NEURO: Normal strength and tone, mentation intact and speech normal  Psych: full range affect, normal speech and grooming, judgement and insight intact     Diagnostic Test Results:  Results for orders placed or performed in visit on 04/05/17   Protein S Antigen Total and Free   Result Value Ref Range    Protein S Antigen Total and Free       Canceled, Test credited  DR NJ PRADHAN,4/05/17,1530,BL,SEE FREE PROTEIN S RESULT     Homocysteine   Result Value Ref Range    Homocysteine umol/L (L) 4.0 - 12.0 umol/L     <4.0  A low plasma homocysteine level likely rules out genetic defects in homocysteine   metabolism.     Protein S Antigen Free   Result Value Ref Range    Protein S Free Canceled, Test credited   Duplicate request   55 - 125 %   Protein S Antigen Free   Result Value Ref Range    Protein S Free 62 55 - 125 %        ASSESSMENT/PLAN:                                                        ICD-10-CM    1. Social anxiety disorder F40.10 sertraline (ZOLOFT) 100 MG tablet     LORazepam (ATIVAN) 1 MG tablet     Protein S Antigen Free   2. Pre-conception counseling Z31.69 Protein S Antigen Total and Free     Homocysteine     Protein S Antigen Free   3. Cerebral artery occlusion with cerebral infarction I63.50 Protein S Antigen Total and Free     Homocysteine     Protein S Antigen Free   4. Moderate recurrent major  depression (H) F33.1    5. Anxiety state F41.1 sertraline (ZOLOFT) 100 MG tablet     LORazepam (ATIVAN) 1 MG tablet   6. Panic attack F41.0 LORazepam (ATIVAN) 1 MG tablet   7. Protein S deficiency (H) D68.59 Protein S Antigen Total and Free     Homocysteine     Protein S Antigen Free   8. MTHFR mutation (H) E72.12 Homocysteine     Anxiety- difficult to get handle on sx's, but sounds like she's doing better.  Zoloft just at 100mg/d (from 50mg/d, then alternating doses) consistently for a couple weeks, though, so may continue to improve for next month.  Feels like the ativan night prior to meetings is helpful.  Rec keeping it at 1/2 tab those nights, and seeing if it's needed after another month on the zoloft.  Cautioned of dependence on benzo's.  Risks and benefits of medication(s) including potential side effects reviewed with patient.  Questions answered.   RTC in 2 months for f/u.    Pre-conception counseling, h/o CVA with last pregnancy-   Reviewed [facility] recs.  Pt will f/u with hematology- Dr. Casarez.  Will get Protein S and homocysteine levels today to have prior to consultation.  Plans to go off zoloft for pregnancy, off ativan if trying.    Izabel Reyes MD  [facility]

## 2017-04-05 NOTE — TELEPHONE ENCOUNTER
CW,  [facility] lab calling.  Orders placed to day for this pt for Protein S total and free.  Per lab, this is very costly for patients  They typically don't do this lab unless specifically wanted/needed by provider  Sol at the lab said Protein S antigen free is the test they usually do  Is this ok with you instead?  Order pended if you approve  Bernadine LOWRY RN

## 2017-04-06 ASSESSMENT — PATIENT HEALTH QUESTIONNAIRE - PHQ9: SUM OF ALL RESPONSES TO PHQ QUESTIONS 1-9: 0

## 2017-04-06 ASSESSMENT — ANXIETY QUESTIONNAIRES: GAD7 TOTAL SCORE: 7

## 2017-04-12 NOTE — PROGRESS NOTES
"                                               Progress Note    Client Name: Chloe Foster  Date: 3/24/17         Service Type: Individual      Session Start Time: 2:30pm  Session End Time: 3:20pm      Session Length: 50 min     Session #: 2     Attendees: Client attended alone    Treatment Plan Last Reviewed: preliminary at intake  PHQ-9 / AYLA-7 : See updates in EPIC     DATA      Progress Since Last Session (Related to Symptoms / Goals / Homework):   Symptoms: Stable    Homework: NA      Episode of Care Goals: Satisfactory progress - ACTION (Actively working towards change); Intervened by reinforcing change plan / affirming steps taken     Current / Ongoing Stressors and Concerns:   Client reported it was helpful to think, \"What's the worst thing that could happen?\" in interactions with her boss. Reported some ongoing stress related to work, particularly when she is about to attend meetings. Reported that for the most part, if her  is with her, she does not feel anxious. Reported he was with her when she had her first stroke. Said she noticed more anxiety after the strokes.     Treatment Objective(s) Addressed in This Session:   Problem-solve stressors effectively.  Learn coping strategies for anxiety.     Intervention:   Reinforced working toward changing thought patterns.  Discussed that she might feel comforted when  is with her because he was there when she had a stroke.  Validated concerns/feelings.        ASSESSMENT: Current Emotional / Mental Status (status of significant symptoms):   Risk status (Self / Other harm or suicidal ideation)   Client denies current fears or concerns for personal safety.   Client denies current or recent suicidal ideation or behaviors.   Client denies current or recent homicidal ideation or behaviors.   Client denies current or recent self injurious behavior or ideation.   Client denies other safety concerns.   A safety and risk management plan has not been " developed at this time, however client was given the after-hours number / 911 should there be a change in any of these risk factors.     Appearance:   Appropriate    Eye Contact:   Good    Psychomotor Behavior: Normal    Attitude:   Cooperative    Orientation:   All   Speech    Rate / Production: Normal     Volume:  Normal    Mood:    Anxious    Affect:    Appropriate    Thought Content:  Rumination    Thought Form:  Coherent  Logical    Insight:    Good      Medication Review:   No changes to current psychiatric medication(s)     Medication Compliance:   Yes     Changes in Health Issues:   None reported     Chemical Use Review:   Substance Use: Chemical use reviewed, no active concerns identified      Tobacco Use: No current tobacco use.       Collateral Reports Completed:   Not Applicable    PLAN: (Client Tasks / Therapist Tasks / Other)  Continue to meet with client every-other week. Follow-up appts scheduled. Continue discussion of stressors and coping. Continue to monitor mood and thought process.        Carly Gershone, HARI

## 2017-04-12 NOTE — PROGRESS NOTES
Here are your lab results from your recent visit...  -Your Protein S levels are in the normal range.  -Your homocysteine level is a bit low.    Please bring these results in with you to the hematology appointment to discuss.    Ahmet Orta MD

## 2017-04-18 ENCOUNTER — OFFICE VISIT (OUTPATIENT)
Dept: BEHAVIORAL HEALTH | Facility: CLINIC | Age: 37
End: 2017-04-18
Payer: COMMERCIAL

## 2017-04-18 DIAGNOSIS — F41.9 ANXIETY: Primary | ICD-10-CM

## 2017-04-18 PROCEDURE — 90834 PSYTX W PT 45 MINUTES: CPT | Performed by: COUNSELOR

## 2017-05-04 NOTE — PROGRESS NOTES
Progress Note    Client Name: Chloe Foster  Date: 4/18/17         Service Type: Individual      Session Start Time: 4pm  Session End Time: 4:45pm      Session Length: 45 min     Session #: 3     Attendees: Client attended alone    Treatment Plan Last Reviewed: preliminary at intake  PHQ-9 / AYLA-7 : See updates in EPIC     DATA      Progress Since Last Session (Related to Symptoms / Goals / Homework):   Symptoms: Stable    Homework: NA      Episode of Care Goals: Satisfactory progress - ACTION (Actively working towards change); Intervened by reinforcing change plan / affirming steps taken     Current / Ongoing Stressors and Concerns:   Client reported she continues to feel stress that she thinks may be related to past events (social anxiety). Reported she and  have been going to couple's therapy, which has reportedly been positive. Reported ongoing work-related stress, particularly in relationship with boss.     Treatment Objective(s) Addressed in This Session:   Problem-solve stressors effectively.  Learn coping strategies for anxiety.     Intervention:   Recommended EMDR. Client said she will likely make an appointment for this.  Encouraged self-care behavioral strategies.  Validated concerns/feelings.        ASSESSMENT: Current Emotional / Mental Status (status of significant symptoms):   Risk status (Self / Other harm or suicidal ideation)   Client denies current fears or concerns for personal safety.   Client denies current or recent suicidal ideation or behaviors.   Client denies current or recent homicidal ideation or behaviors.   Client denies current or recent self injurious behavior or ideation.   Client denies other safety concerns.   A safety and risk management plan has not been developed at this time, however client was given the after-hours number / 911 should there be a change in any of these risk factors.     Appearance:   Appropriate     Eye Contact:   Good    Psychomotor Behavior: Normal    Attitude:   Cooperative    Orientation:   All   Speech    Rate / Production: Normal     Volume:  Normal    Mood:    Anxious    Affect:    Appropriate    Thought Content:  Rumination    Thought Form:  Coherent  Logical    Insight:    Good      Medication Review:   No changes to current psychiatric medication(s)     Medication Compliance:   Yes     Changes in Health Issues:   None reported     Chemical Use Review:   Substance Use: Chemical use reviewed, no active concerns identified      Tobacco Use: No current tobacco use.       Collateral Reports Completed:   Not Applicable    PLAN: (Client Tasks / Therapist Tasks / Other)  Continue to meet with client every-other week. Follow-up appts scheduled. Continue discussion of stressors and coping. Continue to monitor mood and thought process.        Carly Gershone, LPC

## 2017-06-15 ENCOUNTER — OFFICE VISIT (OUTPATIENT)
Dept: BEHAVIORAL HEALTH | Facility: CLINIC | Age: 37
End: 2017-06-15
Payer: COMMERCIAL

## 2017-06-15 DIAGNOSIS — F41.9 ANXIETY: Primary | ICD-10-CM

## 2017-06-15 PROCEDURE — 90834 PSYTX W PT 45 MINUTES: CPT | Performed by: COUNSELOR

## 2017-06-15 ASSESSMENT — ANXIETY QUESTIONNAIRES
GAD7 TOTAL SCORE: 5
2. NOT BEING ABLE TO STOP OR CONTROL WORRYING: NOT AT ALL
1. FEELING NERVOUS, ANXIOUS, OR ON EDGE: SEVERAL DAYS
7. FEELING AFRAID AS IF SOMETHING AWFUL MIGHT HAPPEN: SEVERAL DAYS
6. BECOMING EASILY ANNOYED OR IRRITABLE: SEVERAL DAYS
3. WORRYING TOO MUCH ABOUT DIFFERENT THINGS: SEVERAL DAYS
5. BEING SO RESTLESS THAT IT IS HARD TO SIT STILL: NOT AT ALL

## 2017-06-15 ASSESSMENT — PATIENT HEALTH QUESTIONNAIRE - PHQ9: 5. POOR APPETITE OR OVEREATING: SEVERAL DAYS

## 2017-06-16 ASSESSMENT — ANXIETY QUESTIONNAIRES: GAD7 TOTAL SCORE: 5

## 2017-06-16 ASSESSMENT — PATIENT HEALTH QUESTIONNAIRE - PHQ9: SUM OF ALL RESPONSES TO PHQ QUESTIONS 1-9: 1

## 2017-06-19 ENCOUNTER — MYC MEDICAL ADVICE (OUTPATIENT)
Dept: FAMILY MEDICINE | Facility: CLINIC | Age: 37
End: 2017-06-19

## 2017-06-19 DIAGNOSIS — F40.10 SOCIAL ANXIETY DISORDER: ICD-10-CM

## 2017-06-19 DIAGNOSIS — F41.1 ANXIETY STATE: ICD-10-CM

## 2017-06-19 RX ORDER — SERTRALINE HYDROCHLORIDE 100 MG/1
100 TABLET, FILM COATED ORAL DAILY
Qty: 30 TABLET | Refills: 0 | Status: SHIPPED | OUTPATIENT
Start: 2017-06-19 | End: 2017-07-11

## 2017-06-26 ENCOUNTER — OFFICE VISIT (OUTPATIENT)
Dept: HEMATOLOGY | Facility: CLINIC | Age: 37
End: 2017-06-26
Attending: GENETIC COUNSELOR, MS
Payer: COMMERCIAL

## 2017-06-26 ENCOUNTER — OFFICE VISIT (OUTPATIENT)
Dept: HEMATOLOGY | Facility: CLINIC | Age: 37
End: 2017-06-26
Attending: INTERNAL MEDICINE
Payer: COMMERCIAL

## 2017-06-26 VITALS
SYSTOLIC BLOOD PRESSURE: 129 MMHG | WEIGHT: 161.5 LBS | DIASTOLIC BLOOD PRESSURE: 85 MMHG | HEART RATE: 77 BPM | HEIGHT: 67 IN | BODY MASS INDEX: 25.35 KG/M2 | TEMPERATURE: 98.2 F | OXYGEN SATURATION: 97 %

## 2017-06-26 DIAGNOSIS — D68.59 PROTEIN S DEFICIENCY AFFECTING PREGNANCY (H): ICD-10-CM

## 2017-06-26 DIAGNOSIS — O99.119 PROTEIN S DEFICIENCY AFFECTING PREGNANCY (H): ICD-10-CM

## 2017-06-26 DIAGNOSIS — I63.50 CEREBRAL ARTERY OCCLUSION WITH CEREBRAL INFARCTION (H): Primary | ICD-10-CM

## 2017-06-26 DIAGNOSIS — Z86.73 HISTORY OF STROKE: Primary | ICD-10-CM

## 2017-06-26 DIAGNOSIS — Z87.74 STATUS POST PERCUTANEOUS PATENT FORAMEN OVALE CLOSURE: ICD-10-CM

## 2017-06-26 PROCEDURE — 99205 OFFICE O/P NEW HI 60 MIN: CPT | Performed by: INTERNAL MEDICINE

## 2017-06-26 PROCEDURE — 99212 OFFICE O/P EST SF 10 MIN: CPT

## 2017-06-26 ASSESSMENT — PAIN SCALES - GENERAL: PAINLEVEL: NO PAIN (0)

## 2017-06-26 NOTE — MR AVS SNAPSHOT
After Visit Summary   6/26/2017    Chloe Foster    MRN: [id number]           Patient Information     Date Of Birth          1980        Visit Information        Provider Department      6/26/2017 2:00 PM Latia Page GC  [facility]        Today's Diagnoses     Cerebral artery occlusion with cerebral infarction    -  1       Follow-ups after your visit        Your next 10 appointments already scheduled     Jul 11, 2017  8:15 AM CDT   MyChart Long with Izabel Reyes MD   [facility] ([facility])    [address] [place] [id number]   [phone]            Aug 08, 2017  3:00 PM CDT   Return Visit with Carly Gershone, LPC   [facility] ([address] [id number] [phone]            Aug 22, 2017  3:00 PM CDT   Return Visit with Carly Gershone, LPC   [facility] ([facility])    [address] [place] [id number] [phone]            Sep 07, 2017  3:30 PM CDT   Return Visit with Carly Gershone, LPC   [facility] ([address] [id number] [phone]              Who to contact     If you have questions or need follow up information about today's clinic visit or your schedule please contact [facility] directly at [phone].  Normal or non-critical lab and imaging results will be communicated to you by MyChart, letter or phone within 4 business days after the clinic has received the results. If you do not hear from us within 7 days, please contact the clinic through MyChart or phone. If you have a critical or abnormal lab result, we will notify you by phone as soon as possible.  Submit refill requests through [employer] or call your pharmacy and they will forward the refill request to us. Please allow 3 business days for your refill to be  completed.          Additional Information About Your Visit        [employer] Information     [employer] gives you secure access to your electronic health record. If you see a primary care provider, you can also send messages to your care team and make appointments. If you have questions, please call your primary care clinic.  If you do not have a primary care provider, please call [phone] and they will assist you.        Care EveryWhere ID     This is your Care EveryWhere ID. This could be used by other organizations to access your [place] medical records  [id number]         Blood Pressure from Last 3 Encounters:   06/26/17 129/85   04/05/17 123/78   12/21/16 108/70    Weight from Last 3 Encounters:   06/26/17 73.3 kg (161 lb 8 oz)   04/05/17 72.1 kg (159 lb)   12/21/16 73.3 kg (161 lb 9.6 oz)              Today, you had the following     No orders found for display       Primary Care Provider Office Phone # Fax #    Izabel Reyes [phone]       [facility] [address] [address]        Equal Access to Services     [facility]: Hadii aad ku hadasho Soomaali, waaxda luqadaha, qaybta kaalmada adeegyada, paulie yangin hayshannonn adeareli marquez . So [facility] [phone].    ATENCIÓN: Si habla español, tiene a rankin disposición servicios gratuitos de asistencia lingüística. Llame al [phone].    We comply with applicable federal civil rights laws and [place] laws. We do not discriminate on the basis of race, color, national origin, age, disability sex, sexual orientation or gender identity.            Thank you!     Thank you for choosing [facility]  for your care. Our goal is always to provide you with excellent care. Hearing back from our patients is one way we can continue to improve our services. Please take a few minutes to complete the written survey that you may receive in the mail after your visit with us. Thank you!              Your Updated Medication List - Protect others around you: Learn how to safely use, store and throw away your medicines at www.disposemymeds.org.          This list is accurate as of: 6/26/17  4:02 PM.  Always use your most recent med list.                   Brand Name Dispense Instructions for use Diagnosis    aspirin EC 81 MG EC tablet      Take 81 mg by mouth        FISH OIL PO      Take 1 capsule by mouth daily        LORazepam 1 MG tablet    ATIVAN    8 tablet    Use 1/2-1 tab 30 minutes prior to procedure or for panic symptoms    Anxiety state, Social anxiety disorder, Panic attack       magnesium oxide 400 MG tablet    MAG-OX     Take 400 mg by mouth every evening        MULTIVITAMIN GUMMIES WOMENS PO      Take 2 chew tab by mouth every evening        NONFORMULARY      Take 1 tablet by mouth every evening OTC: B-Right Vitamin (Optimized B Complex)        propranolol 10 MG tablet    INDERAL    20 tablet    Take 1 tablet by mouth. Take as needed prior to interviews, stressful events    Social anxiety disorder       sertraline 100 MG tablet    ZOLOFT    30 tablet    Take 1 tablet (100 mg) by mouth daily    Anxiety state, Social anxiety disorder       STATIN NOT PRESCRIBED (INTENTIONAL)     0 each    Statin not prescribed intentionally due to Woman of childbearing age not actively taking birth control    Cerebral artery occlusion with cerebral infarction (H)       VITAMIN D (CHOLECALCIFEROL) PO      Take 1,000 Units by mouth every evening (takes 2 x 100 unit capsule)

## 2017-06-26 NOTE — PATIENT INSTRUCTIONS
We want you to call us when you are pregnant.  We will prescribe your Lovenox shots - this will be at 40 mg once daily.    We would like to see you back when you are 30-32 weeks along in your pregnancy to discuss the detains around the delivery.Call [phone] to schedule this.    We anticipate that you will take your last dose of Lovenox 24 hours prior to your scheduled delivery.    We will recommend that you are restarted on your Lovenox the day after delivery and continue for 6 weeks after delivery.    Demetria Bill, RN - Nurse Clinician - [facility] - [phone]

## 2017-06-26 NOTE — LETTER
2017      RE: Chloe Foster  [address] [facility] [address]         [place] [facility] CLOTTING DISORDERS  Outpatient Clinic Visit    Chloe Mejia is a 36-year-old woman referred for consultation regarding a previous history of stroke during pregnancy and low protein S level.  Her history is outlined in detail in Dr. Devon Julien's Maternal Fetal Medicine consultation dated 2017.  In summary, in the spring of 2015 she presented at 36 weeks of gestation with headaches that were initially felt to be migranous.  One episode brought on neurologic symptoms including weakness, focal numbness and speech difficulty.  She was admitted to [facility] and diagnosed with a middle cerebral artery territory stroke.  She was place on unfractionated heparin and discharged to home.  She presented about 2 weeks later with additional symptoms and MRI scan revealed an area of new stroke.  At that time, she was delivered via  section.  Subsequent workup revealed a low protein S level.  This was drawn on 2015.  Her free protein S level was 29% (normal range ).  Protein S activity level was 22% (normal ).  Additional thrombophilia testing at that time included testing for antiphospholipid antibodies (including lupus anticoagulant, cardiolipin, and beta-2 glycoprotein antibodies, all of which were negative).  She was not found to have the factor V Leiden or prothrombin gene mutation.  I do not see documentation of protein C or antithrombin levels.  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  Echocardiogram revealed a patent foramen ovale.  Following delivery, she was treated with enoxaparin for 12 weeks postpartum and then placed on aspirin.  In 2016 after consultation with Cardiology and Neurology elsewhere, she underwent PFO closure.  A followup echo in 2016 showed  complete closure with a negative bubble study.  She was continued on Plavix for 6 months after PFO closure and then transitioned back to aspirin.  Other than these stroke events, she has no other personal history of venous or arterial thrombosis.  She is here today for consultation, as she and her  are contemplating another attempt at pregnancy in the near future.    She has had repeat protein S levels checked more recently and they have been in the low normal range.  Please see further details below.  In addition, the missing parts of her hypercoaguable workup have also been completed and are discussed further below.   Family history was obtained by our genetics counselor and the pedigree is scanned into the electronic medical record.  In summary, there is no family history of venous or arterial thrombosis.    PHYSICAL EXAMINATION:  She appears healthy.  A detailed exam was not performed today.   LABORATORY DATA:  No new labs were obtained here today.  We reviewed her previous testing which is referenced above.  In addition, followup testing performed in our system in 01/2016 showed normal antithrombin and protein C levels.  Her free protein S at that time was in the low normal range at 65% (normal ).  This was repeated again in 04/2017 and again a similar value was found at 62%.  Homocysteine level at that time was not elevated.  When previously tested in 2016, it was also normal.  Note that she was apparently not taking B vitamin supplements at the time that this testing was performed.     ASSESSMENT AND PLAN:   1.  History of stroke occurring during late third trimester of pregnancy in 05/2015.   2.  Patent foramen ovale, status post closure in 04/2016.   3.  Low protein S level documented in the third trimester of pregnancy in 05/2015.  Subsequent protein S levels are in the low normal range.   I spent a total of 60 minutes today with Chloe, all of which was in counseling and coordination of  care.  She has a history of stroke which occurred during the third trimester of pregnancy in 2015 and at that time she was found to have a patent foramen ovale.  Presumably this was a paradoxical embolus, although lower extremity Dopplers were negative for DVT.  I explained to her that this is not surprising, as these clots are typically very small and evidence of them would not typically be detected by standard ultrasound.  She does not have any risk factors for arterial stroke nor was she found to have any significant atherosclerosis and so it is much more likely that this was indeed a paradoxical embolus from a small venous clot.  In that regard, this occurred during pregnancy which is a time of increased risk for venous thrombosis.  In addition, at that time her protein S level was quite low.  While protein S levels are known to decrease during pregnancy, the magnitude of her decreased protein S level is a bit more than we would normally expect.  Now that we see that her baseline level is in the low normal range, this is perhaps not unexpected.  Thus, in summary, I believe her previous stroke can be attributed to a small venous clot with paradoxical embolus through the PFO.  This issue has been adequately addressed by closure of the PFO as outlined above.    The real question here is whether or not she is at enough increased risk of clotting during subsequent pregnancy to warrant some sort of anticoagulant therapy.  In that regard, we discussed that the presence of her MTHFR gene mutation is really insignificant.  Although this can predispose to elevated homocysteine levels which are a marker of increased risk for thrombosis, there is no proof that lowering homocysteine levels does anything to reduce the risk of clotting.  In addition, her homocysteine level does not appear to be elevated.  Thus, there is no clear indication for B vitamin supplementation from that perspective.    The primary issue is whether or  not her low normal protein S level at baseline, with subsequent documented decrease into the much lower range during pregnancy, would warrant prophylactic-intensity anticoagulation.  I explained that there are no good data in this regard.  Although it is expected that protein S levels decrease during pregnancy, there are no good or large studies defining the degree to which levels go down and at what level the risk of clotting increases above the average population risk.  Thus, there is no way to say for certain that she needs prophylactic-intensity anticoagulation to decrease her risk of clotting, but given that her protein S level at baseline is in the low normal range and she has been documented to have a level that is significantly in the deficient range during pregnancy, it is not unreasonable to offer her prophylactic-intensity anticoagulation during pregnancy.  I emphasized to her that we have no way of knowing for sure that she really needs this, but if she wants to do everything possible and reasonable to prevent venous thromboembolism during pregnancy, that could be achieved with prophylactic-intensity enoxaparin.  There is very little bleeding risk associated with this intensity of anticoagulation in the setting of pregnancy and the transient risk of heparin-induced thrombocytopenia in the OB population is also extremely small.  Thus, in the absence of any way to know for sure, I think it is reasonable to assume that her risk is at least above average and thus justification for recommending prophylactic-intensity anticoagulation during pregnancy can be made.    We discussed this in some detail and she appeared to have a very clear understanding of all the things we reviewed including the uncertainties here, and the inability to know for sure whether or not this recommended treatment is necessary.  She is in favor of proceeding in that direction and I would support that as well.  Thus, if she becomes  pregnant, once that is confirmed, we would recommend initiation of enoxaparin 40 mg subcutaneously once daily.  This should continue throughout pregnancy and for 6 weeks postpartum.  If she becomes pregnant again, she would plan for a repeat  delivery and so management of anticoagulation around that would be fairly straightforward.  We would, however, like to see her back at about 30-32 weeks of gestation to discuss that in more detail.     Ricco Casarez MD  Associate Professor of Medicine  Division of Hematology, Oncology, and Transplantation  Director, [facility]

## 2017-06-26 NOTE — NURSING NOTE
Chief Complaint   Patient presents with     Consult     Clotter   Pt roomed, vitals, meds, and allergies reviewed with pt. Pt ready for provider.  Wu Schmitz, CMA

## 2017-06-26 NOTE — PROGRESS NOTES
[facility]  Outpatient Clinic Visit    Chloe Mejia is a 36-year-old woman referred for consultation regarding a previous history of stroke during pregnancy and low protein S level.  Her history is outlined in detail in Dr. Devon Julien's Maternal Fetal Medicine consultation dated 2017.  In summary, in the spring of 2015 she presented at 36 weeks of gestation with headaches that were initially felt to be migranous.  One episode brought on neurologic symptoms including weakness, focal numbness and speech difficulty.  She was admitted to [facility] and diagnosed with a middle cerebral artery territory stroke.  She was place on unfractionated heparin and discharged to home.  She presented about 2 weeks later with additional symptoms and MRI scan revealed an area of new stroke.  At that time, she was delivered via  section.  Subsequent workup revealed a low protein S level.  This was drawn on 2015.  Her free protein S level was 29% (normal range ).  Protein S activity level was 22% (normal ).  Additional thrombophilia testing at that time included testing for antiphospholipid antibodies (including lupus anticoagulant, cardiolipin, and beta-2 glycoprotein antibodies, all of which were negative).  She was not found to have the factor V Leiden or prothrombin gene mutation.  I do not see documentation of protein C or antithrombin levels.  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  Echocardiogram revealed a patent foramen ovale.  Following delivery, she was treated with enoxaparin for 12 weeks postpartum and then placed on aspirin.  In 2016 after consultation with Cardiology and Neurology elsewhere, she underwent PFO closure.  A followup echo in 2016 showed complete closure with a negative bubble study.  She was continued on Plavix for 6 months after PFO  closure and then transitioned back to aspirin.  Other than these stroke events, she has no other personal history of venous or arterial thrombosis.  She is here today for consultation, as she and her  are contemplating another attempt at pregnancy in the near future.    She has had repeat protein S levels checked more recently and they have been in the low normal range.  Please see further details below.  In addition, the missing parts of her hypercoaguable workup have also been completed and are discussed further below.   Family history was obtained by our genetics counselor and the pedigree is scanned into the electronic medical record.  In summary, there is no family history of venous or arterial thrombosis.    PHYSICAL EXAMINATION:  She appears healthy.  A detailed exam was not performed today.   LABORATORY DATA:  No new labs were obtained here today.  We reviewed her previous testing which is referenced above.  In addition, followup testing performed in our system in 01/2016 showed normal antithrombin and protein C levels.  Her free protein S at that time was in the low normal range at 65% (normal ).  This was repeated again in 04/2017 and again a similar value was found at 62%.  Homocysteine level at that time was not elevated.  When previously tested in 2016, it was also normal.  Note that she was apparently not taking B vitamin supplements at the time that this testing was performed.     ASSESSMENT AND PLAN:   1.  History of stroke occurring during late third trimester of pregnancy in 05/2015.   2.  Patent foramen ovale, status post closure in 04/2016.   3.  Low protein S level documented in the third trimester of pregnancy in 05/2015.  Subsequent protein S levels are in the low normal range.   I spent a total of 60 minutes today with Chloe, all of which was in counseling and coordination of care.  She has a history of stroke which occurred during the third trimester of pregnancy in 2015 and  at that time she was found to have a patent foramen ovale.  Presumably this was a paradoxical embolus, although lower extremity Dopplers were negative for DVT.  I explained to her that this is not surprising, as these clots are typically very small and evidence of them would not typically be detected by standard ultrasound.  She does not have any risk factors for arterial stroke nor was she found to have any significant atherosclerosis and so it is much more likely that this was indeed a paradoxical embolus from a small venous clot.  In that regard, this occurred during pregnancy which is a time of increased risk for venous thrombosis.  In addition, at that time her protein S level was quite low.  While protein S levels are known to decrease during pregnancy, the magnitude of her decreased protein S level is a bit more than we would normally expect.  Now that we see that her baseline level is in the low normal range, this is perhaps not unexpected.  Thus, in summary, I believe her previous stroke can be attributed to a small venous clot with paradoxical embolus through the PFO.  This issue has been adequately addressed by closure of the PFO as outlined above.    The real question here is whether or not she is at enough increased risk of clotting during subsequent pregnancy to warrant some sort of anticoagulant therapy.  In that regard, we discussed that the presence of her MTHFR gene mutation is really insignificant.  Although this can predispose to elevated homocysteine levels which are a marker of increased risk for thrombosis, there is no proof that lowering homocysteine levels does anything to reduce the risk of clotting.  In addition, her homocysteine level does not appear to be elevated.  Thus, there is no clear indication for B vitamin supplementation from that perspective.    The primary issue is whether or not her low normal protein S level at baseline, with subsequent documented decrease into the much  lower range during pregnancy, would warrant prophylactic-intensity anticoagulation.  I explained that there are no good data in this regard.  Although it is expected that protein S levels decrease during pregnancy, there are no good or large studies defining the degree to which levels go down and at what level the risk of clotting increases above the average population risk.  Thus, there is no way to say for certain that she needs prophylactic-intensity anticoagulation to decrease her risk of clotting, but given that her protein S level at baseline is in the low normal range and she has been documented to have a level that is significantly in the deficient range during pregnancy, it is not unreasonable to offer her prophylactic-intensity anticoagulation during pregnancy.  I emphasized to her that we have no way of knowing for sure that she really needs this, but if she wants to do everything possible and reasonable to prevent venous thromboembolism during pregnancy, that could be achieved with prophylactic-intensity enoxaparin.  There is very little bleeding risk associated with this intensity of anticoagulation in the setting of pregnancy and the transient risk of heparin-induced thrombocytopenia in the OB population is also extremely small.  Thus, in the absence of any way to know for sure, I think it is reasonable to assume that her risk is at least above average and thus justification for recommending prophylactic-intensity anticoagulation during pregnancy can be made.    We discussed this in some detail and she appeared to have a very clear understanding of all the things we reviewed including the uncertainties here, and the inability to know for sure whether or not this recommended treatment is necessary.  She is in favor of proceeding in that direction and I would support that as well.  Thus, if she becomes pregnant, once that is confirmed, we would recommend initiation of enoxaparin 40 mg subcutaneously once  daily.  This should continue throughout pregnancy and for 6 weeks postpartum.  If she becomes pregnant again, she would plan for a repeat  delivery and so management of anticoagulation around that would be fairly straightforward.  We would, however, like to see her back at about 30-32 weeks of gestation to discuss that in more detail.     Ricco Casarez MD  Associate Professor of Medicine  Division of Hematology, Oncology, and Transplantation  Director, [facility]

## 2017-06-26 NOTE — PROGRESS NOTES
2017    Presenting Information: Chloe Foster was seen for an initial evaluation at [facility] today. I met with her per the request of Dr. Ricco Casarez to obtain a family history.    Personal History:   Briefly,  Chloe is a 36 year old woman with history of CVA during pregnancy at 36 weeks and again at 38 weeks.  She had a PFO during her pregnancy; PFO closure was completed in 2016. She also states that her doctors were concerned about Protein S deficiency, and is wondering if testing for this can be repeated. Please see Dr. Casarez's note for additional details regarding her personal history.     Family History: A three generation pedigree, specific to clotting was obtained today and scanned into the EMR. The following information is significant:     One half sister and one half brother who have not had clotting.    No clotting in either parent.     Paternal grandfather  in his 60s-70s from a brain aneurysm which Chloe remembers may have been alcohol-induced; he was an alcoholic.     Maternal aunt  of heart issues due to diabetes in her 40s.     The family history is otherwise negative for clotting and stroke.    Maternal ancestry is Puerto [place] and [place].  Paternal ancestry is Nepalese, Slovak, and .   There is no reported consanguinity.    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 She had previously discussed this with Dr. Julien. We reviewed that this is a very common polymorphism in the general population. Testing for MTHFR polymorphisms is no longer recommended by the [employer] (ACMG).  ACMG does recommend folic acid for all women of childbearing age, regardless of MTHFR status, to reduce the risk of neural tube defects. The appropriate amount of folic acid should be discussed with her physicians.  She is wondering if she should still be taking vitamin B12; we discussed that she should  ask Dr. Casarez about this. She had no further questions about MTHFR.     Plan:   1. A three generation pedigree was obtained and scanned into the EMR.  2. Chloe will meet with Dr. Casarez today.  Additional questions or concerns were denied.    Approximate Time Spent in Consultation: 10 minutes.     Latia Page MS, [facility]  Certified Genetic Counselor  P. [phone]  F. [phone]    CC: No Letter

## 2017-06-26 NOTE — LETTER
2017       RE: Chloe Foster  [address] [address]     Dear Colleague,    Thank you for referring your patient, Chloe Foster, to the [facility] at [facility]. Please see a copy of my visit note below.      [facility]  Outpatient Clinic Visit    Chloe Mejia is a 36-year-old woman referred for consultation regarding a previous history of stroke during pregnancy and low protein S level.  Her history is outlined in detail in Dr. Devon Julien's Maternal Fetal Medicine consultation dated 2017.  In summary, in the spring of 2015 she presented at 36 weeks of gestation with headaches that were initially felt to be migranous.  One episode brought on neurologic symptoms including weakness, focal numbness and speech difficulty.  She was admitted to [facility] and diagnosed with a middle cerebral artery territory stroke.  She was place on unfractionated heparin and discharged to home.  She presented about 2 weeks later with additional symptoms and MRI scan revealed an area of new stroke.  At that time, she was delivered via  section.  Subsequent workup revealed a low protein S level.  This was drawn on 2015.  Her free protein S level was 29% (normal range ).  Protein S activity level was 22% (normal ).  Additional thrombophilia testing at that time included testing for antiphospholipid antibodies (including lupus anticoagulant, cardiolipin, and beta-2 glycoprotein antibodies, all of which were negative).  She was not found to have the factor V Leiden or prothrombin gene mutation.  I do not see documentation of protein C or antithrombin levels.  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  Echocardiogram revealed a patent foramen ovale.  Following delivery, she was  treated with enoxaparin for 12 weeks postpartum and then placed on aspirin.  In 04/2016 after consultation with Cardiology and Neurology elsewhere, she underwent PFO closure.  A followup echo in 07/2016 showed complete closure with a negative bubble study.  She was continued on Plavix for 6 months after PFO closure and then transitioned back to aspirin.  Other than these stroke events, she has no other personal history of venous or arterial thrombosis.  She is here today for consultation, as she and her  are contemplating another attempt at pregnancy in the near future.    She has had repeat protein S levels checked more recently and they have been in the low normal range.  Please see further details below.  In addition, the missing parts of her hypercoaguable workup have also been completed and are discussed further below.   Family history was obtained by our genetics counselor and the pedigree is scanned into the electronic medical record.  In summary, there is no family history of venous or arterial thrombosis.    PHYSICAL EXAMINATION:  She appears healthy.  A detailed exam was not performed today.   LABORATORY DATA:  No new labs were obtained here today.  We reviewed her previous testing which is referenced above.  In addition, followup testing performed in our system in 01/2016 showed normal antithrombin and protein C levels.  Her free protein S at that time was in the low normal range at 65% (normal ).  This was repeated again in 04/2017 and again a similar value was found at 62%.  Homocysteine level at that time was not elevated.  When previously tested in 2016, it was also normal.  Note that she was apparently not taking B vitamin supplements at the time that this testing was performed.     ASSESSMENT AND PLAN:   1.  History of stroke occurring during late third trimester of pregnancy in 05/2015.   2.  Patent foramen ovale, status post closure in 04/2016.   3.  Low protein S level documented in  the third trimester of pregnancy in 05/2015.  Subsequent protein S levels are in the low normal range.   I spent a total of 60 minutes today with Chloe, all of which was in counseling and coordination of care.  She has a history of stroke which occurred during the third trimester of pregnancy in 2015 and at that time she was found to have a patent foramen ovale.  Presumably this was a paradoxical embolus, although lower extremity Dopplers were negative for DVT.  I explained to her that this is not surprising, as these clots are typically very small and evidence of them would not typically be detected by standard ultrasound.  She does not have any risk factors for arterial stroke nor was she found to have any significant atherosclerosis and so it is much more likely that this was indeed a paradoxical embolus from a small venous clot.  In that regard, this occurred during pregnancy which is a time of increased risk for venous thrombosis.  In addition, at that time her protein S level was quite low.  While protein S levels are known to decrease during pregnancy, the magnitude of her decreased protein S level is a bit more than we would normally expect.  Now that we see that her baseline level is in the low normal range, this is perhaps not unexpected.  Thus, in summary, I believe her previous stroke can be attributed to a small venous clot with paradoxical embolus through the PFO.  This issue has been adequately addressed by closure of the PFO as outlined above.    The real question here is whether or not she is at enough increased risk of clotting during subsequent pregnancy to warrant some sort of anticoagulant therapy.  In that regard, we discussed that the presence of her MTHFR gene mutation is really insignificant.  Although this can predispose to elevated homocysteine levels which are a marker of increased risk for thrombosis, there is no proof that lowering homocysteine levels does anything to reduce the risk  of clotting.  In addition, her homocysteine level does not appear to be elevated.  Thus, there is no clear indication for B vitamin supplementation from that perspective.    The primary issue is whether or not her low normal protein S level at baseline, with subsequent documented decrease into the much lower range during pregnancy, would warrant prophylactic-intensity anticoagulation.  I explained that there are no good data in this regard.  Although it is expected that protein S levels decrease during pregnancy, there are no good or large studies defining the degree to which levels go down and at what level the risk of clotting increases above the average population risk.  Thus, there is no way to say for certain that she needs prophylactic-intensity anticoagulation to decrease her risk of clotting, but given that her protein S level at baseline is in the low normal range and she has been documented to have a level that is significantly in the deficient range during pregnancy, it is not unreasonable to offer her prophylactic-intensity anticoagulation during pregnancy.  I emphasized to her that we have no way of knowing for sure that she really needs this, but if she wants to do everything possible and reasonable to prevent venous thromboembolism during pregnancy, that could be achieved with prophylactic-intensity enoxaparin.  There is very little bleeding risk associated with this intensity of anticoagulation in the setting of pregnancy and the transient risk of heparin-induced thrombocytopenia in the OB population is also extremely small.  Thus, in the absence of any way to know for sure, I think it is reasonable to assume that her risk is at least above average and thus justification for recommending prophylactic-intensity anticoagulation during pregnancy can be made.    We discussed this in some detail and she appeared to have a very clear understanding of all the things we reviewed including the uncertainties  here, and the inability to know for sure whether or not this recommended treatment is necessary.  She is in favor of proceeding in that direction and I would support that as well.  Thus, if she becomes pregnant, once that is confirmed, we would recommend initiation of enoxaparin 40 mg subcutaneously once daily.  This should continue throughout pregnancy and for 6 weeks postpartum.  If she becomes pregnant again, she would plan for a repeat  delivery and so management of anticoagulation around that would be fairly straightforward.  We would, however, like to see her back at about 30-32 weeks of gestation to discuss that in more detail.     Ricco Casarez MD  Associate Professor of Medicine  Division of Hematology, Oncology, and Transplantation  Director, [facility]

## 2017-06-26 NOTE — MR AVS SNAPSHOT
After Visit Summary   6/26/2017    Chloe Foster    MRN: [id number]           Patient Information     Date Of Birth          1980        Visit Information        Provider Department      6/26/2017 2:30 PM Ricco Casarez MD [facility]        Care Instructions    We want you to call us when you are pregnant.  We will prescribe your Lovenox shots - this will be at 40 mg once daily.    We would like to see you back when you are 30-32 weeks along in your pregnancy to discuss the detains around the delivery.Call [phone] to schedule this.    We anticipate that you will take your last dose of Lovenox 24 hours prior to your scheduled delivery.    We will recommend that you are restarted on your Lovenox the day after delivery and continue for 6 weeks after delivery.    Demetria Bill RN - Nurse Clinician - [facility] - [phone]            Follow-ups after your visit        Your next 10 appointments already scheduled     Jul 11, 2017  8:15 AM CDT   MyChart Long with Izabel Reyes MD   [facility] ([address] [address]   [phone]            Aug 08, 2017  3:00 PM CDT   Return Visit with Carly Gershone, LPC   [facility] ([address] [address] [phone]            Aug 22, 2017  3:00 PM CDT   Return Visit with Carly Gershone, LPC   [facility] ([address] [address] [phone]            Sep 07, 2017  3:30 PM CDT   Return Visit with Carly Gershone, LPC   [facility] ([address] [address] [phone]              Who to contact     If you have questions or need follow up information about today's clinic visit or your schedule please contact [facility]  "[facility] directly at [phone].  Normal or non-critical lab and imaging results will be communicated to you by MyChart, letter or phone within 4 business days after the clinic has received the results. If you do not hear from us within 7 days, please contact the clinic through [employer] or phone. If you have a critical or abnormal lab result, we will notify you by phone as soon as possible.  Submit refill requests through [employer] or call your pharmacy and they will forward the refill request to us. Please allow 3 business days for your refill to be completed.          Additional Information About Your Visit        [employer] Information     [employer] gives you secure access to your electronic health record. If you see a primary care provider, you can also send messages to your care team and make appointments. If you have questions, please call your primary care clinic.  If you do not have a primary care provider, please call [phone] and they will assist you.        Care EveryWhere ID     This is your Care EveryWhere ID. This could be used by other organizations to access your [facility] medical records  [id number]        Your Vitals Were     Pulse Temperature Height Pulse Oximetry BMI (Body Mass Index)       77 98.2  F (36.8  C) (Oral) 1.702 m (5' 7\") 97% 25.29 kg/m2        Blood Pressure from Last 3 Encounters:   06/26/17 129/85   04/05/17 123/78   12/21/16 108/70    Weight from Last 3 Encounters:   06/26/17 73.3 kg (161 lb 8 oz)   04/05/17 72.1 kg (159 lb)   12/21/16 73.3 kg (161 lb 9.6 oz)              Today, you had the following     No orders found for display       Primary Care Provider Office Phone # Fax #    Izabel Reyes [phone]       [facility] [address] [address]        Equal Access to Services     ANAIS ARORA : Heather leigh Soabhishek, waaxda luqadaha, qaybta kaalmada ezekiel, paulie louis. So [facility] " [phone].    ATENCIÓN: Si rohini pablo, tiene a rankin disposición servicios gratuitos de asistencia lingüística. Ayde barry [phone].    We comply with applicable federal civil rights laws and [place] laws. We do not discriminate on the basis of race, color, national origin, age, disability sex, sexual orientation or gender identity.            Thank you!     Thank you for choosing [facility]  for your care. Our goal is always to provide you with excellent care. Hearing back from our patients is one way we can continue to improve our services. Please take a few minutes to complete the written survey that you may receive in the mail after your visit with us. Thank you!             Your Updated Medication List - Protect others around you: Learn how to safely use, store and throw away your medicines at www.disposemymeds.org.          This list is accurate as of: 6/26/17  3:33 PM.  Always use your most recent med list.                   Brand Name Dispense Instructions for use Diagnosis    aspirin EC 81 MG EC tablet      Take 81 mg by mouth        FISH OIL PO      Take 1 capsule by mouth daily        LORazepam 1 MG tablet    ATIVAN    8 tablet    Use 1/2-1 tab 30 minutes prior to procedure or for panic symptoms    Anxiety state, Social anxiety disorder, Panic attack       magnesium oxide 400 MG tablet    MAG-OX     Take 400 mg by mouth every evening        MULTIVITAMIN GUMMIES WOMENS PO      Take 2 chew tab by mouth every evening        NONFORMULARY      Take 1 tablet by mouth every evening OTC: B-Right Vitamin (Optimized B Complex)        propranolol 10 MG tablet    INDERAL    20 tablet    Take 1 tablet by mouth. Take as needed prior to interviews, stressful events    Social anxiety disorder       sertraline 100 MG tablet    ZOLOFT    30 tablet    Take 1 tablet (100 mg) by mouth daily    Anxiety state, Social anxiety disorder       STATIN NOT PRESCRIBED (INTENTIONAL)     0 each    Statin not  prescribed intentionally due to Woman of childbearing age not actively taking birth control    Cerebral artery occlusion with cerebral infarction (H)       VITAMIN D (CHOLECALCIFEROL) PO      Take 1,000 Units by mouth every evening (takes 2 x 100 unit capsule)

## 2017-06-29 NOTE — NURSING NOTE
Teaching Flowsheet   Relevant Diagnosis: history of stroke x 2 during pregnancy in presence of PFO, PFO has since been fixed, contemplating another pregnancy  Teaching Topic: Basics of thrombosis, treatment options and introduction to the [facility]     Person(s) involved in teaching:   Patient     Motivation Level:  Asks Questions: Yes    Eager to Learn: Yes Cooperative: Yes  Receptive (willing/able to accept information): Yes     Patient demonstrates understanding of the following:  Reason for the appointment, diagnosis and treatment plan: Yes  Knowledge of proper use of medications and conditions for which they are ordered (with special attention to potential side effects or drug interactions): Yes  Which situations necessitate calling provider and whom to contact: Yes, she should call once she is pregnant and we will start her on Lovenox injections.  We will then plan to see herin clinic when she is 30-32 weeks along in her pregnancy.      Proper use and care of   (medical equip, care aids, etc.): NA  Nutritional needs and diet plan: NA  Pain management techniques: NA  Wound Care: NA  How and/when to access community resources: Yes    Educated patient about the signs, symptoms of and risk factors for venous thrombosis (VTE) and provided an educational  book mayo, which reviews these facts. And includes the following web links  for further information:  www.stoptheclot.org, www.clotconnect.org.    Patient educated about benefits and potential complications of anticoagulation.       Discussed the differences between arterial and venous thrombosis with the patient and the divergent medications used for each.    Patient verbalized understanding of the above information.  Reviewed and discussed the patient instructions point by point and patient verbalized understanding.They deny further questions at this time.    Copy of the After Visit Summary (AVS) given to patient for future  reference. Patient given the contact card for the [facility] and has the appropriate numbers to call with any questions.      Demetria Bill RN - Nurse Clinician - [facility] - [phone]

## 2017-06-30 NOTE — PROGRESS NOTES
"                                                   Progress Note    Client Name: Clhoe Foster  Date: 6/15/17         Service Type: Individual      Session Start Time: 3:35pm  Session End Time: 4:20pm      Session Length: 45 min     Session #: 4     Attendees: Client attended alone    Treatment Plan Last Reviewed: preliminary at intake  PHQ-9 / AYLA-7 : See updates in EPIC     DATA      Progress Since Last Session (Related to Symptoms / Goals / Homework):   Symptoms: Stable    Homework: NA      Episode of Care Goals: Satisfactory progress - ACTION (Actively working towards change); Intervened by reinforcing change plan / affirming steps taken     Current / Ongoing Stressors and Concerns:   Client reported her parents are getting a divorce, mostly due to father's drinking behavior. Discussed relationship dynamics between her parents, and between herself and her parents. Discussed that she feels \"worried\" about her father, but is trying to maintain healthy boundaries with him. Said she would consider going to [employer] meetings, and said her mother has been attending them \"for years\". Reported she is still interested in EMDR, but the recommended provider was not taking referrals when she called to schedule.     Treatment Objective(s) Addressed in This Session:   Problem-solve stressors effectively.  Learn coping strategies for anxiety.     Intervention:   Gave another EMDR resource.  Encouraged self-care behavioral strategies.  Suggested attending [employer] meetings.  Validated concerns/feelings.        ASSESSMENT: Current Emotional / Mental Status (status of significant symptoms):   Risk status (Self / Other harm or suicidal ideation)   Client denies current fears or concerns for personal safety.   Client denies current or recent suicidal ideation or behaviors.   Client denies current or recent homicidal ideation or behaviors.   Client denies current or recent self injurious behavior or ideation.   Client denies " other safety concerns.   A safety and risk management plan has not been developed at this time, however client was given the after-hours number / 911 should there be a change in any of these risk factors.     Appearance:   Appropriate    Eye Contact:   Good    Psychomotor Behavior: Normal    Attitude:   Cooperative    Orientation:   All   Speech    Rate / Production: Normal     Volume:  Normal    Mood:    Anxious    Affect:    Appropriate    Thought Content:  Rumination    Thought Form:  Coherent  Logical    Insight:    Good      Medication Review:   No changes to current psychiatric medication(s)     Medication Compliance:   Yes     Changes in Health Issues:   None reported     Chemical Use Review:   Substance Use: Chemical use reviewed, no active concerns identified      Tobacco Use: No current tobacco use.       Collateral Reports Completed:   Not Applicable    PLAN: (Client Tasks / Therapist Tasks / Other)  Continue to meet with client every-other week. Follow-up appts scheduled. Continue discussion of stressors and coping. Continue to monitor mood and thought process.        Carly Gershone, [facility]

## 2017-07-11 ENCOUNTER — OFFICE VISIT (OUTPATIENT)
Dept: FAMILY MEDICINE | Facility: CLINIC | Age: 37
End: 2017-07-11
Payer: COMMERCIAL

## 2017-07-11 VITALS
DIASTOLIC BLOOD PRESSURE: 83 MMHG | OXYGEN SATURATION: 97 % | SYSTOLIC BLOOD PRESSURE: 110 MMHG | HEIGHT: 67 IN | TEMPERATURE: 97.2 F | HEART RATE: 78 BPM | WEIGHT: 158.9 LBS | BODY MASS INDEX: 24.94 KG/M2

## 2017-07-11 DIAGNOSIS — I63.50 CEREBRAL ARTERY OCCLUSION WITH CEREBRAL INFARCTION (H): ICD-10-CM

## 2017-07-11 DIAGNOSIS — Q21.12 PFO WITH ATRIAL SEPTAL ANEURYSM: ICD-10-CM

## 2017-07-11 DIAGNOSIS — Z31.69 PRE-CONCEPTION COUNSELING: ICD-10-CM

## 2017-07-11 DIAGNOSIS — N93.8 DUB (DYSFUNCTIONAL UTERINE BLEEDING): ICD-10-CM

## 2017-07-11 DIAGNOSIS — L68.0 HIRSUTISM: ICD-10-CM

## 2017-07-11 DIAGNOSIS — F40.10 SOCIAL ANXIETY DISORDER: ICD-10-CM

## 2017-07-11 DIAGNOSIS — F41.1 ANXIETY STATE: Primary | ICD-10-CM

## 2017-07-11 DIAGNOSIS — F41.0 PANIC ATTACK: ICD-10-CM

## 2017-07-11 DIAGNOSIS — I25.3 PFO WITH ATRIAL SEPTAL ANEURYSM: ICD-10-CM

## 2017-07-11 DIAGNOSIS — Z15.89 MTHFR MUTATION: ICD-10-CM

## 2017-07-11 DIAGNOSIS — G43.109 MIGRAINE WITH AURA AND WITHOUT STATUS MIGRAINOSUS, NOT INTRACTABLE: ICD-10-CM

## 2017-07-11 DIAGNOSIS — F33.1 MODERATE RECURRENT MAJOR DEPRESSION (H): ICD-10-CM

## 2017-07-11 PROCEDURE — 99215 OFFICE O/P EST HI 40 MIN: CPT | Performed by: FAMILY MEDICINE

## 2017-07-11 RX ORDER — PROPRANOLOL HYDROCHLORIDE 10 MG/1
TABLET ORAL
Qty: 20 TABLET | Refills: 1 | Status: SHIPPED | OUTPATIENT
Start: 2017-07-11 | End: 2019-04-17

## 2017-07-11 RX ORDER — SERTRALINE HYDROCHLORIDE 100 MG/1
100 TABLET, FILM COATED ORAL DAILY
Qty: 90 TABLET | Refills: 1 | Status: SHIPPED | OUTPATIENT
Start: 2017-07-11 | End: 2017-10-27

## 2017-07-11 RX ORDER — LORAZEPAM 1 MG/1
TABLET ORAL
Qty: 8 TABLET | Refills: 0 | Status: SHIPPED | OUTPATIENT
Start: 2017-07-11 | End: 2017-10-27

## 2017-07-11 ASSESSMENT — ANXIETY QUESTIONNAIRES
6. BECOMING EASILY ANNOYED OR IRRITABLE: SEVERAL DAYS
IF YOU CHECKED OFF ANY PROBLEMS ON THIS QUESTIONNAIRE, HOW DIFFICULT HAVE THESE PROBLEMS MADE IT FOR YOU TO DO YOUR WORK, TAKE CARE OF THINGS AT HOME, OR GET ALONG WITH OTHER PEOPLE: SOMEWHAT DIFFICULT
5. BEING SO RESTLESS THAT IT IS HARD TO SIT STILL: NOT AT ALL
2. NOT BEING ABLE TO STOP OR CONTROL WORRYING: NOT AT ALL
1. FEELING NERVOUS, ANXIOUS, OR ON EDGE: SEVERAL DAYS
GAD7 TOTAL SCORE: 3
3. WORRYING TOO MUCH ABOUT DIFFERENT THINGS: NOT AT ALL
7. FEELING AFRAID AS IF SOMETHING AWFUL MIGHT HAPPEN: NOT AT ALL

## 2017-07-11 ASSESSMENT — PATIENT HEALTH QUESTIONNAIRE - PHQ9: 5. POOR APPETITE OR OVEREATING: SEVERAL DAYS

## 2017-07-11 NOTE — MR AVS SNAPSHOT
After Visit Summary   7/11/2017    Chloe Foster    MRN: [id number]           Patient Information     Date Of Birth          1980        Visit Information        Provider Department      7/11/2017 8:15 AM Izabel Reyes MD [facility]        Today's Diagnoses     DUB (dysfunctional uterine bleeding) - shortened periods    -  1    Anxiety state        Social anxiety disorder        Panic attack        PFO with atrial septal aneurysm        Migraine with aura and without status migrainosus, not intractable        Cerebral artery occlusion with cerebral infarction           Follow-ups after your visit        Additional Services     NEUROLOGY ADULT REFERRAL       Your provider has referred you for the following:   Consult at [facility]: Neurology Clinic - [place] [phone]   [email or web address]  - Migraines with stroke during last pregnancy (now with PFO)   FHN: [facility] - [place] [phone]   [email or web address]    Please be aware that coverage of these services is subject to the terms and limitations of your health insurance plan.  Call member services at your health plan with any benefit or coverage questions.      Please bring the following with you to your appointment:    (1) Any X-Rays, CTs or MRIs which have been performed.  Contact the facility where they were done to arrange for  prior to your scheduled appointment.    (2) List of current medications  (3) This referral request   (4) Any documents/labs given to you for this referral                  Your next 10 appointments already scheduled     Aug 08, 2017  3:00 PM CDT   Return Visit with Carly Gershone, LPC   [facility] ([facility]    [address] [id number] [phone]            Aug 22, 2017  3:00 PM CDT   Return Visit with Carly Gershone, LPC   [facility]  "[place] ([facility])    [address] [id number] [phone]            Sep 07, 2017  3:30 PM CDT   Return Visit with Carly Gershone, LPC   [facility]    [address] [id number] [phone]              Future tests that were ordered for you today     Open Future Orders        Priority Expected Expires Ordered    US Pelvic Complete w Transvaginal Routine  7/11/2018 7/11/2017            Who to contact     If you have questions or need follow up information about today's clinic visit or your schedule please contact [facility] directly at [phone].  Normal or non-critical lab and imaging results will be communicated to you by MyChart, letter or phone within 4 business days after the clinic has received the results. If you do not hear from us within 7 days, please contact the clinic through [employer] or phone. If you have a critical or abnormal lab result, we will notify you by phone as soon as possible.  Submit refill requests through [employer] or call your pharmacy and they will forward the refill request to us. Please allow 3 business days for your refill to be completed.          Additional Information About Your Visit        [employer] Information     [employer] gives you secure access to your electronic health record. If you see a primary care provider, you can also send messages to your care team and make appointments. If you have questions, please call your primary care clinic.  If you do not have a primary care provider, please call [phone] and they will assist you.        Care EveryWhere ID     This is your Care EveryWhere ID. This could be used by other organizations to access your [place] medical records  [id number]        Your Vitals Were     Pulse Temperature Height Last Period Pulse Oximetry BMI (Body Mass Index)    78 97.2  F (36.2  C) (Oral) 5' 7\" (1.702 m) 07/03/2017 97% 24.89 kg/m2       Blood Pressure " from Last 3 Encounters:   07/11/17 110/83   06/26/17 129/85   04/05/17 123/78    Weight from Last 3 Encounters:   07/11/17 158 lb 14.4 oz (72.1 kg)   06/26/17 161 lb 8 oz (73.3 kg)   04/05/17 159 lb (72.1 kg)              We Performed the Following     NEUROLOGY ADULT REFERRAL          Today's Medication Changes          These changes are accurate as of: 7/11/17  9:08 AM.  If you have any questions, ask your nurse or doctor.               These medicines have changed or have updated prescriptions.        Dose/Directions    LORazepam 1 MG tablet   Commonly known as:  ATIVAN   This may have changed:  additional instructions   Used for:  Anxiety state, Social anxiety disorder, Panic attack   Changed by:  Izabel Reyes MD        Use 1/2-1 tab at night prior to meetings or for panic symptoms   Quantity:  8 tablet   Refills:  0            Where to get your medicines      These medications were sent to [facility] [address] [place] [address] [address] [facility] [address], [facility] [address]     Phone:  [phone]     propranolol 10 MG tablet    sertraline 100 MG tablet         Some of these will need a paper prescription and others can be bought over the counter.  Ask your nurse if you have questions.     Bring a paper prescription for each of these medications     LORazepam 1 MG tablet                Primary Care Provider Office Phone # Fax #    Izabel Reyes [phone]       [facility] [address] [place] [address]        Equal Access to Services     [facility] : Hadii aad ku hadasho Soomaali, waaxda luqadaha, qaybta kaalmada adeegyada, paulie blunt haykiran marquez . So [facility] [phone].    ATENCIÓN: Si habla español, tiene a rankin disposición servicios gratuitos de asistencia lingüística. Llame al [phone].    We comply with applicable federal civil rights laws and [place] laws. We do not  discriminate on the basis of race, color, national origin, age, disability sex, sexual orientation or gender identity.            Thank you!     Thank you for choosing [facility]  for your care. Our goal is always to provide you with excellent care. Hearing back from our patients is one way we can continue to improve our services. Please take a few minutes to complete the written survey that you may receive in the mail after your visit with us. Thank you!             Your Updated Medication List - Protect others around you: Learn how to safely use, store and throw away your medicines at www.disposemymeds.org.          This list is accurate as of: 7/11/17  9:08 AM.  Always use your most recent med list.                   Brand Name Dispense Instructions for use Diagnosis    aspirin EC 81 MG EC tablet      Take 81 mg by mouth        FISH OIL PO      Take 1 capsule by mouth daily        LORazepam 1 MG tablet    ATIVAN    8 tablet    Use 1/2-1 tab at night prior to meetings or for panic symptoms    Anxiety state, Social anxiety disorder, Panic attack       magnesium oxide 400 MG tablet    MAG-OX     Take 400 mg by mouth every evening        MULTIVITAMIN GUMMIES WOMENS PO      Take 2 chew tab by mouth every evening        NONFORMULARY      Take 1 tablet by mouth every evening OTC: B-Right Vitamin (Optimized B Complex)        propranolol 10 MG tablet    INDERAL    20 tablet    Take 1 tablet by mouth. Take as needed prior to interviews, stressful events    Social anxiety disorder       sertraline 100 MG tablet    ZOLOFT    90 tablet    Take 1 tablet (100 mg) by mouth daily    Anxiety state, Social anxiety disorder       STATIN NOT PRESCRIBED (INTENTIONAL)     0 each    Statin not prescribed intentionally due to Woman of childbearing age not actively taking birth control    Cerebral artery occlusion with cerebral infarction (H)       VITAMIN D (CHOLECALCIFEROL) PO      Take 1,000 Units by mouth every evening  (takes 2 x 100 unit capsule)

## 2017-07-11 NOTE — PROGRESS NOTES
SUBJECTIVE:                                                    Chloe Foster is a 36 year old female who presents to clinic today for the following health issues:    Anxiety Follow-Up    Status since last visit: Improved mood has elevated due to medication     Other associated symptoms:still some issues here and there but more infrequent     Complicating factors:   Significant life event: No   Current substance abuse: None  Depression symptoms: No  AYLA-7 SCORE 4/5/2017 6/15/2017 7/11/2017   Total Score 7 5 3     GAD7      Amount of exercise or physical activity: 2-3 days/week for an average of 30-45 minutes    Problems taking medications regularly: No    Medication side effects: none    Diet: regular (no restrictions)    Anxiety- doing well on zoloft- 100mg/d. Increased dose to 100mg/d in ~3/17.     Thinks it's working well, though it's summer (when she usually feels better), has been more productive.  Had been a slump before taking higher dose.  Still has some return of sx's, like yesterday- was shaky when talking with her boss- her old sx's, not much sleep.    With the increased dose- feels more even, and mood is a little better.    Anxiety is better, but still there, 3-4/10 - down from 7/10.    Ativan- 1/2 of the tab the night prior, or panicking the night prior to a meeting, helps her sleep.  Max 1/2 tab - 2-5 times a month.  Still has ~3 tabs from the #8 in 4/17.  Otherwise worries too much about being tired- wants to be mentally clear.  If she would take a trazodone or tylenol PM- gets groggy the next day.      Periods- Periods shortening over the past year, son is now ~2 yrs.  Periods seem  to be getting shorter -from ~q28 day cycle- shortening over the past year to close to ~q23 day cycles.  Seems like the cycle is getting a day or two shorter - seems like most months.  Cramping has been worse, bleeding longer and heavier.  5 days when it used to be 3 days.   5/?/17  6/10/17, 7/3/16.  Also starting  spotting a couple days prior to first day of period.  Just on daily asa for anticoagulation now.    Pt is s/p CVA during last pregnancy (after atypical migraines), but she is now s/p PFO closure, so now just on asa for anticoagulation (until she gets pregnant).  She states 'they got the green light' to try trying- she's thinking maybe this fall.    Reviewed consult notes from [facility] pre-conception consultation in 2/17 with Dr. Julien and Hematology f/u 6/17 with Dr. Casarez.  [facility] recs- rec checking Protein S levels and homocysteine levels.  Rec f/u with hematology.  Rec level 2 u/s at 18-20 wks, and growth scans q4wks if on anticoagulation during pregnancy.  Rec genetics consultation for AMA pregnancy.  Heme recs- thought paradoxical clot through her PFO was most likely reason for her CVA.  Thought that the MTHFR gene mutation is insignificant.  Due to her low/normal homocysteine level, he did not think she needed to be on Vit B supplementation.  Noted very low Protein S during her pregnancy (more lowered than normal during pregnancy), and that she now has a low/normal Protein S, thought that it would not be unreasonable to offer her prophylactic intensity anticoagulation during pregnancy, though it is difficult to say it is indicated.  After discussion, rec prophylactic enoxaparin 40mg SQ once daily once she becomes pregnant, continuing throughout pregnancy and for 6 wks post-partum.  Rec rtc at ~30-32 wks of pregnancy to discuss planning around likely c/sx.      She notes that she still thinks the incident stemmed from her migraines-   Never had migraines with aura before... Started having those horrible sx's during her later pregnancy.  She [facility]- 'not a fan'.  Wanted her to do paleo diet, more concerned about 'geriatric pregnancy'.        Problem list and histories reviewed & adjusted, as indicated.  Additional history: as documented    Patient Active Problem List     "Diagnosis Date Noted     PFO with atrial septal aneurysm 05/26/2015     Priority: High     Seen on 5/2/15 GODWIN bubble study ([facility]) s/p CVA during pregnancy, no thrombus.  First put on heparin, then switched to lovenox.  Lovenox stopped 13 wks post-partum- switched to asa.  Post PFO/ASD closure 4/21/16 with 30mm Amplatzer device  Was having palpitations s/p closure for first few weeks- toprol XL helped, took for about a month.         Cerebral artery occlusion with cerebral infarction 05/03/2015     Priority: High     CVA x 2 during pregnancy in ~5/15 (1st ~36 wks gestation), in setting of worsening migraines, PFO, and low protein S.    Initially inpt x 7 days at [facility], sent home on heparin, return of CVA sx's 7 days later, induced and delivered (at 38wks), on lovenox BID x 13 wks PP, then asa.    Thought to be cryptogenic CVA's.  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  8/13/15 [facility] consultation - rec further studies.  MRI/MRA and prolonged cardiac study.  If neg for a.fib, could make a case to close the PFO.    No arrhythmias identified on an event recorder.  A GODWIN showed a mobile septum measuring 2.3 x 2.3cm with left to right shunting and a positive bubble study.    --PFO closure completed on 4/21/16.  PFO Closure: No complications.  30mm Amplatz Cribiform occluder device.  -Plavix 75 mg daily for at least 6 months  -Asprin 81 mg daily         MTHFR mutation (H) 11/30/2016     Priority: Medium     4/16 cardiology consultation notes--  \"2.  Homozygous MTHFR.  If folate levels fall, this predisposes her to hypercoagulable state from an elevated homocysteine.  She remains on supplemental therapy ordered by Neurology\"       Social anxiety disorder 10/04/2016     Priority: Medium     Sx's started in ~'05-06, propranolol used to help prior to speaking, interviews, stressful situations.  12/16- worsening generalized anxiety with work, social situations, propranolol not helpful. "   Starting zoloft 50mg/d.  Increased to 100mg in 3/16.  Xanax 1/2 dose prior to work meetings.       Pre-conception counseling 2016     Priority: Medium     See 16 consultation from [facility].  Reviewed h/o CVA at 36 wks in setting of PFO and atrial septal aneurysm, with induction at ~38wks due to second CVA.  Reviewed hypercoagulation w/u- with no signs of thrombophilia.  Reviewed recent testing including positive homozygous MTHFR testing (from )- does not indicated high-risk thrombophilia.  Would leave decision to close PFO to cardiology and neurology.  Thought pregnancy is NOT contraindicated- thought risk of another CVA is low.  Recommended repeat ECHO to evaluate for atrial enlargment, and recommended lovenox as soon as she is confirmed to have an IUP (confirm not ectopic).       Elevated lipoprotein A level 2016     Priority: Medium      cardiology consultation notes--  3.  Elevated Lp(a) which increases her cardiovascular risks over lifetime.  Currently, her LDL is controlled at 108, HDL 71.  She does have some heart disease in her family as I outlined above.  In the future, consideration could be given to statin therapy, but certainly not until she completes her pregnancies.  She hopes to have another child in the future post-closure.           Migraine with aura and without status migrainosus, not intractable 2015     Priority: Medium     HA's transitioned to migraines with aura/neuro sx's at beginning of pregnancy in '14.  Worsening migraines in 3rd trimester, with CVA at ~36 wks (seen at [facility]).  Return of sx's at ~38wks, so induced at [facility], c/sx.         Protein S deficiency (H) 2015     Priority: Medium     Low protein S found s/p CVA during pregnancy       History of  section 2015     Priority: Medium     Problem list name updated by automated process. Provider to review       Moderate recurrent major depression (H) 2013     Priority:  Medium     Wellbutrin XL 150mg/d - very helpful.  Will likely stop in -.  In better space, planning on trying to get pregnant starting in 10/13.  Has stopped the Vit D in summer- would restart in .       IBS (irritable bowel syndrome) 2012     Priority: Medium     W/u through [facility], colonoscopy and endoscopy in .  Celiac labs negative.  Sx's better with very low dairy diet.       Lumbar herniated disc 2012     Priority: Medium     L4-L5, seeing work comp (injury 11).  Back pain and radiation to R buttock.  On restriction for work- 6hrs/day max, minimized lifting/carrying.  Two steroid injections at [facility].  Helping some.  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       Hirsutism 2012     Priority: Medium     Seeing Leonora Lilly.  Has taken spironolactone & OCP on/off, off for most of , .  Sx's worsened off med.  Will stop in anticipation of trying to get pregnant.       Insomnia 2012     Priority: Medium     On/off issues, uses trazodone periodically       CARDIOVASCULAR SCREENING; LDL GOAL LESS THAN 160 10/31/2010     Priority: Medium     Vitamin D deficiency 2009     Priority: Medium     Takes Vit D 6049-8568 units/day.         Patient Active Problem List   Diagnosis     Vitamin D deficiency     CARDIOVASCULAR SCREENING; LDL GOAL LESS THAN 160     Lumbar herniated disc     Hirsutism     Insomnia     IBS (irritable bowel syndrome)     Moderate recurrent major depression (H)     Cerebral artery occlusion with cerebral infarction     History of  section     PFO with atrial septal aneurysm     Protein S deficiency (H)     Migraine with aura and without status migrainosus, not intractable     Pre-conception counseling     Elevated lipoprotein A level     Social anxiety disorder     MTHFR mutation (H)     Past Surgical History:   Procedure Laterality Date     ARTHROSCOPY KNEE RT/LT       Rt knee     HC REPAIR OF NASAL SEPTUM         Social History   Substance Use Topics     Smoking status: Former Smoker     Packs/day: 0.30     Years: 14.00     Quit date: 2014     Smokeless tobacco: Former User     Quit date: 2014     Alcohol use 0.0 oz/week     0 Standard drinks or equivalent per week      Comment: occ.     Family History   Problem Relation Age of Onset     CANCER Father      throat     Hypertension Father      Allergies Father      DIABETES Maternal Grandmother      on insulin     C.A.D. Maternal Grandmother      triple bypass in her 60s     DIABETES Paternal Grandmother      C.A.D. Maternal Grandfather      unsure type     Neurologic Disorder Paternal Grandfather       of brain aneurysm in early 60s     DIABETES Maternal Aunt       in her early 40s of DM complications, type 2         Current Outpatient Prescriptions   Medication Sig Dispense Refill     sertraline (ZOLOFT) 100 MG tablet Take 1 tablet (100 mg) by mouth daily 90 tablet 1     LORazepam (ATIVAN) 1 MG tablet Use 1/2-1 tab at night prior to meetings or for panic symptoms 8 tablet 0     propranolol (INDERAL) 10 MG tablet Take 1 tablet by mouth. Take as needed prior to interviews, stressful events 20 tablet 1     aspirin EC 81 MG EC tablet Take 81 mg by mouth       VITAMIN D, CHOLECALCIFEROL, PO Take 1,000 Units by mouth every evening (takes 2 x 100 unit capsule)        Multiple Vitamins-Minerals (MULTIVITAMIN GUMMIES WOMENS PO) Take 2 chew tab by mouth every evening       magnesium oxide (MAG-OX) 400 MG tablet Take 400 mg by mouth every evening        Omega-3 Fatty Acids (FISH OIL PO) Take 1 capsule by mouth daily        STATIN NOT PRESCRIBED, INTENTIONAL, Statin not prescribed intentionally due to Woman of childbearing age not actively taking birth control 0 each 0     NONFORMULARY Take 1 tablet by mouth every evening OTC: B-Right Vitamin (Optimized B Complex)       Allergies   Allergen Reactions     Nkda [No  "Known Drug Allergies]      Recent Labs   Lab Test  12/29/16   0924  04/21/16   1940   01/25/16   1110  11/23/15   0820 07/23/15   04/29/15   2300  03/29/13   1101   LDL  126*   --    --    --   108*  126   < >   --    --    HDL  86   --    --    --   71  76   < >   --    --    TRIG  61   --    --    --   53  85   < >   --    --    ALT  25   --    --    --   23   --    --   26   --    CR  0.70   --    --   0.58  0.62   --    --   0.60   --    GFRESTIMATED  >90  Non  GFR Calc     --    --   >90  Non  GFR Calc    >90  Non  GFR Calc     --    --   >90  Non  GFR Calc     --    GFRESTBLACK  >90   GFR Calc     --    --   >90   GFR Calc    >90   GFR Calc     --    --   >90   GFR Calc     --    POTASSIUM  4.1  3.9   < >  3.9  4.0   --    --   3.5   --    TSH   --    --    --    --    --    --    --    --   0.44    < > = values in this interval not displayed.      BP Readings from Last 3 Encounters:   07/11/17 110/83   06/26/17 129/85   04/05/17 123/78    Wt Readings from Last 3 Encounters:   07/11/17 158 lb 14.4 oz (72.1 kg)   06/26/17 161 lb 8 oz (73.3 kg)   04/05/17 159 lb (72.1 kg)                  Labs reviewed in EPIC    Reviewed and updated as needed this visit by clinical staff  Tobacco  Allergies  Meds  Problems  Med Hx  Surg Hx  Fam Hx  Soc Hx        Reviewed and updated as needed this visit by Provider  Allergies  Meds  Problems         ROS:  Constitutional, HEENT, cardiovascular, pulmonary, gi and gu systems are negative, except as otherwise noted.    OBJECTIVE:     /83  Pulse 78  Temp 97.2  F (36.2  C) (Oral)  Ht 5' 7\" (1.702 m)  Wt 158 lb 14.4 oz (72.1 kg)  LMP 07/03/2017  SpO2 97%  BMI 24.89 kg/m2  Body mass index is 24.89 kg/(m^2).  GENERAL APPEARANCE: healthy, alert and no distress     EYES: PERRL, sclera clear     HENT: nose and mouth without ulcers or " lesions     NECK: no adenopathy, no asymmetry, masses, or scars and thyroid normal to palpation     RESP: lungs clear to auscultation - no rales, rhonchi or wheezes     CV: regular rates and rhythm, normal S1 S2, no S3 or S4 and no murmur, click or rub      Abdomen: soft, nontender, no HSM or masses and bowel sounds normal     Ext: warm, dry, no edema      Psych: full range affect, normal speech and grooming, judgement and insight intact     Diagnostic Test Results:  none     ASSESSMENT/PLAN:       ICD-10-CM    1. Anxiety state F41.1 sertraline (ZOLOFT) 100 MG tablet     LORazepam (ATIVAN) 1 MG tablet   2. Social anxiety disorder F40.10 sertraline (ZOLOFT) 100 MG tablet     LORazepam (ATIVAN) 1 MG tablet     propranolol (INDERAL) 10 MG tablet   3. Panic attack F41.0 LORazepam (ATIVAN) 1 MG tablet   4. DUB (dysfunctional uterine bleeding) - shortened periods N93.8 US Pelvic Complete w Transvaginal   5. PFO with atrial septal aneurysm Q21.1 NEUROLOGY ADULT REFERRAL   6. Cerebral artery occlusion with cerebral infarction I63.50 NEUROLOGY ADULT REFERRAL   7. Migraine with aura and without status migrainosus, not intractable G43.109 NEUROLOGY ADULT REFERRAL     Anxiety/social anxiety/panic attacks-- doing pretty well on the zoloft 100mg/d (though partially better due to summertime effects).  No se's.  She does not want to be on it during pregnancy, at least the later part, but okay to wait until she gets pregnant to work on weaning down.  Risks and benefits of medication(s) including potential side effects reviewed with patient.  Questions answered.     Panic- taking 1/2 tab ativan usually the nights before a big meeting which really helps her settle down and allows her to get a decent night's sleep prior to these events.  Has used ~#5 since #8 given 3 months ago. Works better than tylenol-pm and trazodone because it doesn't give her am drowsiness like they did.  Okay to continue using these rarely, but should stop as  soon as she gets pregnant. Pt agrees with plan.  Risks and benefits of medication(s) including potential side effects reviewed with patient.  Questions answered.     Menstrual cycle changes-  Period cycles shortening, and flow/cramping increasing, worsening each month.  Will check u/s and have her f/u with ob/gyn afterwards.    H/o CVA during last pregnancy, and now s/p PFO closure.    Appreciate MFM and hematology consultation.  Pt has been given pt the green light to try and get pregnant again- likely will start this Fall.  Currently on asa, but will start on lovenox 40mg SQ daily once pregnant through hematology. Planning on c/sx, and close MFM f/u ultrasounds q4wks at and after 18-20 wk u/s as she'll be on anticoagulation.  Will need to follow with Ob/gyn during pregnancy- pt trying to decide whether to centralize care at [facility] or [place] location- [facility] is closer, but [place] has more intensive options (which she is hoping not to need this time).  May be a good question for MFM and/or at her upcoming OB appt.    H/o migraines- pt concerned that the new migraines with aura during last pregnancy were what set off the CVA.  She would like to see another neurologist to see if there are more things she can do to prevent migraines this time around.  Referral/recs given.     F/u in 6 months, or for pregnancy confirmation- sooner if any concerns.    Izabel Reyes MD  [facility]    Spent greater than 50% of 40 minutes in face to face time counseling patient regarding issues as noted above.

## 2017-07-11 NOTE — NURSING NOTE
"Chief Complaint   Patient presents with     Anxiety     follow up     /83  Pulse 78  Temp 97.2  F (36.2  C) (Oral)  Ht 5' 7\" (1.702 m)  Wt 158 lb 14.4 oz (72.1 kg)  LMP 07/03/2017  SpO2 97%  BMI 24.89 kg/m2 Estimated body mass index is 24.89 kg/(m^2) as calculated from the following:    Height as of this encounter: 5' 7\" (1.702 m).    Weight as of this encounter: 158 lb 14.4 oz (72.1 kg).  BP completed using cuff size: regular       Health Maintenance due pending provider review:  GAD7    PHQ/ACT/AYLA--Gave pt questionnare      Asya Evans CMA  "

## 2017-07-12 ASSESSMENT — ANXIETY QUESTIONNAIRES: GAD7 TOTAL SCORE: 3

## 2017-08-08 ENCOUNTER — OFFICE VISIT (OUTPATIENT)
Dept: BEHAVIORAL HEALTH | Facility: CLINIC | Age: 37
End: 2017-08-08
Payer: COMMERCIAL

## 2017-08-08 DIAGNOSIS — F41.9 ANXIETY: Primary | ICD-10-CM

## 2017-08-08 PROCEDURE — 90834 PSYTX W PT 45 MINUTES: CPT | Performed by: COUNSELOR

## 2017-08-08 ASSESSMENT — ANXIETY QUESTIONNAIRES
5. BEING SO RESTLESS THAT IT IS HARD TO SIT STILL: NOT AT ALL
GAD7 TOTAL SCORE: 5
2. NOT BEING ABLE TO STOP OR CONTROL WORRYING: NOT AT ALL
6. BECOMING EASILY ANNOYED OR IRRITABLE: SEVERAL DAYS
1. FEELING NERVOUS, ANXIOUS, OR ON EDGE: SEVERAL DAYS
7. FEELING AFRAID AS IF SOMETHING AWFUL MIGHT HAPPEN: SEVERAL DAYS
3. WORRYING TOO MUCH ABOUT DIFFERENT THINGS: SEVERAL DAYS

## 2017-08-08 ASSESSMENT — PATIENT HEALTH QUESTIONNAIRE - PHQ9
SUM OF ALL RESPONSES TO PHQ QUESTIONS 1-9: 0
5. POOR APPETITE OR OVEREATING: SEVERAL DAYS

## 2017-08-09 ASSESSMENT — ANXIETY QUESTIONNAIRES: GAD7 TOTAL SCORE: 5

## 2017-08-10 ENCOUNTER — RADIANT APPOINTMENT (OUTPATIENT)
Dept: ULTRASOUND IMAGING | Facility: CLINIC | Age: 37
End: 2017-08-10
Attending: FAMILY MEDICINE
Payer: COMMERCIAL

## 2017-08-10 DIAGNOSIS — N93.8 DUB (DYSFUNCTIONAL UTERINE BLEEDING): ICD-10-CM

## 2017-08-10 PROCEDURE — 76830 TRANSVAGINAL US NON-OB: CPT | Performed by: OBSTETRICS & GYNECOLOGY

## 2017-08-11 NOTE — PROGRESS NOTES
"                                                     Progress Note    Client Name: Chloe Foster  Date: 8/8/17         Service Type: Individual      Session Start Time: 3:45pm  Session End Time: 4:35pm      Session Length: 50 min     Session #: 5     Attendees: Client attended alone    Treatment Plan Last Reviewed: preliminary at intake  PHQ-9 / AYLA-7 : See updates in EPIC     DATA      Progress Since Last Session (Related to Symptoms / Goals / Homework):   Symptoms: Stable    Homework: NA      Episode of Care Goals: Satisfactory progress - ACTION (Actively working towards change); Intervened by reinforcing change plan / affirming steps taken     Current / Ongoing Stressors and Concerns:   Client reported she found out her mother had been having an affair, which is part of what prompted her parent's recent separation. Said she has been \"processing\" this, and discussed that it has \"made things kind of awkward\" between herself and her mother. Said she was able to have a conversation with her father regarding boundaries, as he was reportedly calling frequently to discuss his feelings and talk through details of the separation. Reported her father is in treatment for alcoholism currently. Reported some work-related stress, \"but it's mostly fine\".      Treatment Objective(s) Addressed in This Session:   Problem-solve stressors effectively.  Learn coping strategies for anxiety.     Intervention:   Encouraged ongoing self-care strategies.  Reinforced healthy boundaries with parents. Discussed possible way client might approach mother to discuss feelings.  Validated concerns/feelings.        ASSESSMENT: Current Emotional / Mental Status (status of significant symptoms):   Risk status (Self / Other harm or suicidal ideation)   Client denies current fears or concerns for personal safety.   Client denies current or recent suicidal ideation or behaviors.   Client denies current or recent homicidal ideation or " behaviors.   Client denies current or recent self injurious behavior or ideation.   Client denies other safety concerns.   A safety and risk management plan has not been developed at this time, however client was given the after-hours number / 911 should there be a change in any of these risk factors.     Appearance:   Appropriate    Eye Contact:   Good    Psychomotor Behavior: Normal    Attitude:   Cooperative    Orientation:   All   Speech    Rate / Production: Normal     Volume:  Normal    Mood:    Anxious    Affect:    Appropriate    Thought Content:  Rumination    Thought Form:  Coherent  Logical    Insight:    Good      Medication Review:   No changes to current psychiatric medication(s)     Medication Compliance:   Yes     Changes in Health Issues:   None reported     Chemical Use Review:   Substance Use: Chemical use reviewed, no active concerns identified      Tobacco Use: No current tobacco use.       Collateral Reports Completed:   Not Applicable    PLAN: (Client Tasks / Therapist Tasks / Other)  Continue to meet with client every-other week. Follow-up appts scheduled. Continue discussion of stressors and coping. Continue to monitor mood and thought process.        Carly Gershone, [facility]

## 2017-09-07 ENCOUNTER — OFFICE VISIT (OUTPATIENT)
Dept: BEHAVIORAL HEALTH | Facility: CLINIC | Age: 37
End: 2017-09-07
Payer: COMMERCIAL

## 2017-09-07 DIAGNOSIS — F41.9 ANXIETY: Primary | ICD-10-CM

## 2017-09-07 PROCEDURE — 90834 PSYTX W PT 45 MINUTES: CPT | Performed by: COUNSELOR

## 2017-09-07 ASSESSMENT — ANXIETY QUESTIONNAIRES
1. FEELING NERVOUS, ANXIOUS, OR ON EDGE: SEVERAL DAYS
3. WORRYING TOO MUCH ABOUT DIFFERENT THINGS: SEVERAL DAYS
2. NOT BEING ABLE TO STOP OR CONTROL WORRYING: NOT AT ALL
6. BECOMING EASILY ANNOYED OR IRRITABLE: NOT AT ALL
7. FEELING AFRAID AS IF SOMETHING AWFUL MIGHT HAPPEN: NOT AT ALL
GAD7 TOTAL SCORE: 3
5. BEING SO RESTLESS THAT IT IS HARD TO SIT STILL: NOT AT ALL

## 2017-09-07 ASSESSMENT — PATIENT HEALTH QUESTIONNAIRE - PHQ9
5. POOR APPETITE OR OVEREATING: SEVERAL DAYS
SUM OF ALL RESPONSES TO PHQ QUESTIONS 1-9: 1

## 2017-09-08 ASSESSMENT — ANXIETY QUESTIONNAIRES: GAD7 TOTAL SCORE: 3

## 2017-09-08 NOTE — PROGRESS NOTES
"                                                       Progress Note    Client Name: Chloe Foster  Date: 9/7/17         Service Type: Individual      Session Start Time: 3:30pm  Session End Time: 4:20pm      Session Length: 50 min     Session #: 6     Attendees: Client attended alone    Treatment Plan Last Reviewed: preliminary at intake  PHQ-9 / AYLA-7 : See updates in EPIC     DATA      Progress Since Last Session (Related to Symptoms / Goals / Homework):   Symptoms: Stable    Homework: NA      Episode of Care Goals: Satisfactory progress - ACTION (Actively working towards change); Intervened by reinforcing change plan / affirming steps taken     Current / Ongoing Stressors and Concerns:   Client reported that her father is drinking again. Expressed \"anger\" about this. Reported she \"should go to [place]\", but said she does not have time. Reported she has been able to set some boundaries with her parents. Reported work-related stress and feeling like she is \"always running around\". 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 Discussed some stress in her marriage, particularly around household tasks. Said she feels like she does \"the bulk of the work\" in the home.     Treatment Objective(s) Addressed in This Session:   Problem-solve stressors effectively.  Learn coping strategies for anxiety.     Intervention:   Encouraged ongoing self-care strategies.  Reinforced healthy boundaries with parents.  Discussed possible ways client might approach  with concerns.  Validated concerns/feelings.        ASSESSMENT: Current Emotional / Mental Status (status of significant symptoms):   Risk status (Self / Other harm or suicidal ideation)   Client denies current fears or concerns for personal safety.   Client denies current or recent suicidal ideation or behaviors.   Client denies current or recent homicidal ideation or behaviors.   Client denies current or recent self " injurious behavior or ideation.   Client denies other safety concerns.   A safety and risk management plan has not been developed at this time, however client was given the after-hours number / 911 should there be a change in any of these risk factors.     Appearance:   Appropriate    Eye Contact:   Good    Psychomotor Behavior: Normal    Attitude:   Cooperative    Orientation:   All   Speech    Rate / Production: Normal     Volume:  Normal    Mood:    Anxious    Affect:    Appropriate    Thought Content:  Rumination    Thought Form:  Coherent  Logical    Insight:    Good      Medication Review:   No changes to current psychiatric medication(s)     Medication Compliance:   Yes     Changes in Health Issues:   None reported     Chemical Use Review:   Substance Use: Chemical use reviewed, no active concerns identified      Tobacco Use: No current tobacco use.       Collateral Reports Completed:   Not Applicable    PLAN: (Client Tasks / Therapist Tasks / Other)  Continue to meet with client every-other week. Follow-up appts scheduled. Continue discussion of stressors and coping. Continue to monitor mood and thought process.        Carly Gershone, [facility]

## 2017-09-26 ENCOUNTER — OFFICE VISIT (OUTPATIENT)
Dept: OBGYN | Facility: CLINIC | Age: 37
End: 2017-09-26
Payer: COMMERCIAL

## 2017-09-26 VITALS — DIASTOLIC BLOOD PRESSURE: 79 MMHG | TEMPERATURE: 97.5 F | SYSTOLIC BLOOD PRESSURE: 118 MMHG | HEART RATE: 89 BPM

## 2017-09-26 DIAGNOSIS — Z31.69 PRE-CONCEPTION COUNSELING: ICD-10-CM

## 2017-09-26 DIAGNOSIS — L68.0 HIRSUTISM: ICD-10-CM

## 2017-09-26 DIAGNOSIS — I25.3 PFO WITH ATRIAL SEPTAL ANEURYSM: ICD-10-CM

## 2017-09-26 DIAGNOSIS — N92.6 IRREGULAR MENSES: Primary | ICD-10-CM

## 2017-09-26 DIAGNOSIS — Q21.12 PFO WITH ATRIAL SEPTAL ANEURYSM: ICD-10-CM

## 2017-09-26 DIAGNOSIS — I63.50 CEREBRAL ARTERY OCCLUSION WITH CEREBRAL INFARCTION (H): ICD-10-CM

## 2017-09-26 PROCEDURE — 99214 OFFICE O/P EST MOD 30 MIN: CPT | Performed by: OBSTETRICS & GYNECOLOGY

## 2017-09-26 NOTE — MR AVS SNAPSHOT
After Visit Summary   9/26/2017    Chloe Foster    MRN: [id number]           Patient Information     Date Of Birth          1980        Visit Information        Provider Department      9/26/2017 4:00 PM Leslye Schmitz MD [facility]        Today's Diagnoses     Irregular menses    -  1    Pre-conception counseling        Hirsutism        Cerebral artery occlusion with cerebral infarction        PFO with atrial septal aneurysm           Follow-ups after your visit        Your next 10 appointments already scheduled     Oct 19, 2017  3:30 PM CDT   Return Visit with Carly Gershone, [facility] ([facility])    [address] [facility] [address] [phone]            Oct 27, 2017 10:00 AM CDT   MyCbob Brooks with Izabel Reyes MD   [facility] ([facility])    [address]  [facility] [id number] [phone]            Nov 08, 2017  3:30 PM CST   Return Visit with Carly Gershone, [facility] ([facility])    [address]  [facility] [id number] [phone]              Who to contact     If you have questions or need follow up information about today's clinic visit or your schedule please contact [facility] directly at [phone].  Normal or non-critical lab and imaging results will be communicated to you by MyChart, letter or phone within 4 business days after the clinic has received the results. If you do not hear from us within 7 days, please contact the clinic through MyChart or phone. If you have a critical or abnormal lab result, we will notify you by phone as soon as possible.  Submit refill requests through [employer] or call your pharmacy and they will forward the refill request to us. Please allow 3 business days for your refill to be completed.          Additional Information About Your Visit        MyChart  Information     [employer] gives you secure access to your electronic health record. If you see a primary care provider, you can also send messages to your care team and make appointments. If you have questions, please call your primary care clinic.  If you do not have a primary care provider, please call [phone] and they will assist you.        Care EveryWhere ID     This is your Care EveryWhere ID. This could be used by other organizations to access your [place] medical records  [id number]        Your Vitals Were     Pulse Temperature                89 97.5  F (36.4  C) (Oral)           Blood Pressure from Last 3 Encounters:   09/26/17 118/79   08/10/17 123/79   07/11/17 110/83    Weight from Last 3 Encounters:   08/10/17 159 lb (72.1 kg)   07/11/17 158 lb 14.4 oz (72.1 kg)   06/26/17 161 lb 8 oz (73.3 kg)               Primary Care Provider Office Phone # Fax #    Izabel Reyes [phone] [address] [address]        Equal Access to Services     SYDNEE [facility] : Hadii aad ku hadasho Soomaali, waaxda luqadaha, qaybta kaalmada adeareliyada, paulie marquez . So [facility] [phone].    ATENCIÓN: Si habla español, tiene a rankin disposición servicios gratuitos de asistencia lingüística. [facility] [phone].    We comply with applicable federal civil rights laws and [place] laws. We do not discriminate on the basis of race, color, national origin, age, disability, sex, sexual orientation, or gender identity.            Thank you!     Thank you for choosing [facility]  for your care. Our goal is always to provide you with excellent care. Hearing back from our patients is one way we can continue to improve our services. Please take a few minutes to complete the written survey that you may receive in the mail after your visit with us. Thank you!             Your Updated Medication List - Protect others around you: Learn how  to safely use, store and throw away your medicines at www.disposemymeds.org.          This list is accurate as of: 9/26/17 11:59 PM.  Always use your most recent med list.                   Brand Name Dispense Instructions for use Diagnosis    aspirin EC 81 MG EC tablet      Take 81 mg by mouth        FISH OIL PO      Take 1 capsule by mouth daily        LORazepam 1 MG tablet    ATIVAN    8 tablet    Use 1/2-1 tab at night prior to meetings or for panic symptoms    Anxiety state, Social anxiety disorder, Panic attack       magnesium oxide 400 MG tablet    MAG-OX     Take 400 mg by mouth every evening        MULTIVITAMIN GUMMIES WOMENS PO      Take 2 chew tab by mouth every evening        NONFORMULARY      Take 1 tablet by mouth every evening OTC: B-Right Vitamin (Optimized B Complex)        propranolol 10 MG tablet    INDERAL    20 tablet    Take 1 tablet by mouth. Take as needed prior to interviews, stressful events    Social anxiety disorder       sertraline 100 MG tablet    ZOLOFT    90 tablet    Take 1 tablet (100 mg) by mouth daily    Anxiety state, Social anxiety disorder       STATIN NOT PRESCRIBED (INTENTIONAL)     0 each    Statin not prescribed intentionally due to Woman of childbearing age not actively taking birth control    Cerebral artery occlusion with cerebral infarction (H)       VITAMIN D (CHOLECALCIFEROL) PO      Take 1,000 Units by mouth every evening (takes 2 x 100 unit capsule)

## 2017-09-26 NOTE — Clinical Note
Richar Leo, I saw Chloe today and ordered a bunch of labs. Is there anything you want done at the same time? She'll probably come in for them soon because they're for day 3 of her period.  (I haven't written my note yet).  Leslye

## 2017-09-26 NOTE — NURSING NOTE
"Chief Complaint   Patient presents with     Fertility       Initial /79  Pulse 89  Temp 97.5  F (36.4  C) (Oral) Estimated body mass index is 24.9 kg/(m^2) as calculated from the following:    Height as of 17: 5' 7\" (1.702 m).    Weight as of 8/10/17: 159 lb (72.1 kg).  BP completed using cuff size: regular        The following HM Due: NONE      The following patient reported/Care Every where data was sent to:  P ABSTRACT QUALITY INITIATIVES [50439]  na     n/a             "

## 2017-09-26 NOTE — PROGRESS NOTES
"CC/HPI:  37 year old  presents for discussion of pre-conception consult.  Complicated pregnancy history in .   Previously healthy.   Atypical migraines in third trimester.   Led to diagnosis of MCA CVA x 2. Felt to be cryptogenic after positive bubble study revealing patent foramen ovale/atrial septal aneurysm. .  Was delivered by  out of concern for valsalva exacerbating clinical picture.   2016 had PFO closure and is felt to be \"low risk\" for recurrence of stroke.   2016 echo showed negative bubble study and normal EF.     Hematology (Dr. Casarez) in 2017 states ok for pregnancy. lovenox when pregnant and RTC at 30-32 weeks. Homozygote for MTHFR and Protein S levels unexpectedly low for pregnancy.     Periods getting shorter, down to 24-25 days. Now back to normal.  17   Proven paternity.   Anxiety well controlled on zoloft. Occasional ativan.     Review Of Systems  Skin: negative  Eyes: negative  Ears/Nose/Throat: negative  Respiratory: No shortness of breath, dyspnea on exertion, cough, or hemoptysis  Cardiovascular: negative  Gastrointestinal: negative  Genitourinary: negative  Musculoskeletal: negative  Neurologic: negative  Psychiatric: negative  Hematologic/Lymphatic/Immunologic: negative  Endocrine: negative    Past Medical History:   Diagnosis Date     Acute stress reaction     propranolol helps prior to interviews, stressful situations     Allergic rhinitis, cause unspecified     no meds except prn flonase, tests generally positive, decided against shots     Anxiety state, unspecified     citalopram started in , stopped after couple wks, felt sluggish/tired     CVA (cerebral vascular accident) (H)     May 1, May 14th 2015     Depressive disorder, not elsewhere classified     dx, but not sure this is correct, weaned off wellbutrin (-, -), restarted 3/08     Elevated lipoprotein A level      Hirsutism     saw endo, inconclusive, started on spironolactone " (helped a little), stopped in , elevated DHEA- sees new endo      PFO (patent foramen ovale)     Post PFO/ASD closure with 30mm Amplatzer device 16     Urinary tract infection, site not specified     recurrent, start nitrofur in , 6mo txt       Past Surgical History:   Procedure Laterality Date     ARTHROSCOPY KNEE RT/LT      Rt knee     HC REPAIR OF NASAL SEPTUM       REPAIR PATENT FORAMEN OVALE  2016       Family History   Problem Relation Age of Onset     CANCER Father      throat     Hypertension Father      Allergies Father      DIABETES Maternal Grandmother      on insulin     C.A.D. Maternal Grandmother      triple bypass in her 60s     DIABETES Paternal Grandmother      C.A.D. Maternal Grandfather      unsure type     Neurologic Disorder Paternal Grandfather       of brain aneurysm in early 60s     DIABETES Maternal Aunt       in her early 40s of DM complications, type 2       Social History     Social History     Marital status:      Spouse name: N/A     Number of children: 0     Years of education: 15     Occupational History           [employer]     Social History Main Topics     Smoking status: Former Smoker     Packs/day: 0.30     Years: 14.00     Quit date: 2014     Smokeless tobacco: Former User     Quit date: 2014     Alcohol use 0.0 oz/week     0 Standard drinks or equivalent per week      Comment: occ.     Drug use: No     Sexual activity: Yes     Partners: Male     Other Topics Concern     Caffeine Concern No     1 cup coffee per day     Sleep Concern No     Stress Concern No     Weight Concern No     Special Diet No     Exercise Yes     4-5 days week     Parent/Sibling W/ Cabg, Mi Or Angioplasty Before 65f 55m? No     Social History Narrative    Social Documentation:        Balanced Diet: YES    Calcium intake: 3 per day    Caffeine: 0 per day    Exercise:  type of activity ellipitcal, pilates;  5 times per week     Sunscreen: Yes    Seatbelts:  Yes    Self Breast Exam:  Yes    Physical/Emotional/Sexual Abuse: No    Do you feel safe in your environment? Yes        Cholesterol screen up to date: Yes- checking today 07/23/09, non fasting    Eye Exam up to date: Yes    Dental Exam up to date: Yes    Pap smear up to date: Yes: checking today 07/23/09, last was 04/08 NIL    Mammogram up to date: Does Not Apply    Dexa Scan up to date: Does Not Apply    Colonoscopy up to date: Does Not Apply    Immunizations up to date: Yes, had one a month ago.    Glucose screen if over 40:  No        Julieth Matthews MA    07/23/09         Current Outpatient Prescriptions:      sertraline (ZOLOFT) 100 MG tablet, Take 1 tablet (100 mg) by mouth daily, Disp: 90 tablet, Rfl: 1     LORazepam (ATIVAN) 1 MG tablet, Use 1/2-1 tab at night prior to meetings or for panic symptoms, Disp: 8 tablet, Rfl: 0     propranolol (INDERAL) 10 MG tablet, Take 1 tablet by mouth. Take as needed prior to interviews, stressful events, Disp: 20 tablet, Rfl: 1     aspirin EC 81 MG EC tablet, Take 81 mg by mouth, Disp: , Rfl:      STATIN NOT PRESCRIBED, INTENTIONAL,, Statin not prescribed intentionally due to Woman of childbearing age not actively taking birth control, Disp: 0 each, Rfl: 0     VITAMIN D, CHOLECALCIFEROL, PO, Take 1,000 Units by mouth every evening (takes 2 x 100 unit capsule) , Disp: , Rfl:      Multiple Vitamins-Minerals (MULTIVITAMIN GUMMIES WOMENS PO), Take 2 chew tab by mouth every evening, Disp: , Rfl:      NONFORMULARY, Take 1 tablet by mouth every evening OTC: B-Right Vitamin (Optimized B Complex), Disp: , Rfl:      magnesium oxide (MAG-OX) 400 MG tablet, Take 400 mg by mouth every evening , Disp: , Rfl:      Omega-3 Fatty Acids (FISH OIL PO), Take 1 capsule by mouth daily , Disp: , Rfl:     Allergies   Allergen Reactions     Nkda [No Known Drug Allergies]        Exam:  Vitals:    09/26/17 1552   BP: 118/79   Pulse: 89   Temp: 97.5  F (36.4  C)  "  TempSrc: Oral     Exam:  Constitutional: healthy, alert and no distress      David Luzmaria WIGGINS on 8/10/2017 12:57 PM       Gynecological Ultrasound Report  Pelvic U/S - Transvaginal  [facility]  Referring Provider: Izabel Reyes MD  Sonographer:  Luzmaria Sebastian RDMS  Indication: DUB  LMP: Patient's last menstrual period was 2017.  History:   Gynecological Ultrasonography:   Uterus: anteverted  Size: 7.1 x 4.9 x 3.7cm      Endometrium: Thickness total 9.0 mm      Right Ovary: 2.5 x 2.6 x 2.8 cm   Left Ovary: 2.0 x 2.3 x 3.1 cm  Cul de Sac/Pouch of Omkar: no free fluid       Impression:  Normal pelvic ultrasound.   Jazmín Villalpando MD                      A/P:  37 year old  with irregular menses.   Complicated pregnancy history.   Discussed recommendations from previous providers. See below. Updates in italics.  Discussed shortening periods. Could be incidental or could reflect diminished ovarian reserve. Discussed limitations of ovarian reserve testing.   H/o PCOS symptoms.   Discussed risks of pregnancy at \"advanced maternal age\" for pregnancy loss, aneuploidy, and comorbid conditions.   If normal lab testing, recommend trying to conceive for 6 months. Can have referral to [facility] at any time.       M recommendations:  1) Consult with Dr. Chow, hematology for anticoagulation planning, indications and dosing (prophylactic lovenox when pregnant, RTC at 30-32 weeks)  2) Obtain repeat protein S levels prior to pregnancy (normal)  3) Consider obtaining hemocysteine level (low)   4) If homocysteine level elevated, begin vitamin B6 25 mg TID and Vitamin B 12 100 mg daily. (NA)  5) Level 2 ultrasound at 18-20 weeks, growth scans every four weeks if on anticoagulation in pregnancy  6) Genetics consult recommended for AMA, patient will consider      Greater than 50% of this 25 minute appointment was spent in counseling on above issues.    "

## 2017-09-30 ENCOUNTER — APPOINTMENT (OUTPATIENT)
Dept: LAB | Facility: CLINIC | Age: 37
End: 2017-09-30
Attending: OBSTETRICS & GYNECOLOGY
Payer: COMMERCIAL

## 2017-09-30 DIAGNOSIS — N92.6 IRREGULAR MENSES: ICD-10-CM

## 2017-09-30 LAB
ESTRADIOL SERPL-MCNC: 36 PG/ML
FSH SERPL-ACNC: 5.2 IU/L
PROLACTIN SERPL-MCNC: 7 UG/L (ref 3–27)
TSH SERPL DL<=0.005 MIU/L-ACNC: 0.66 MU/L (ref 0.4–4)

## 2017-09-30 PROCEDURE — 83001 ASSAY OF GONADOTROPIN (FSH): CPT | Performed by: OBSTETRICS & GYNECOLOGY

## 2017-09-30 PROCEDURE — 82670 ASSAY OF TOTAL ESTRADIOL: CPT | Performed by: OBSTETRICS & GYNECOLOGY

## 2017-09-30 PROCEDURE — 84146 ASSAY OF PROLACTIN: CPT | Performed by: OBSTETRICS & GYNECOLOGY

## 2017-09-30 PROCEDURE — 84443 ASSAY THYROID STIM HORMONE: CPT | Performed by: OBSTETRICS & GYNECOLOGY

## 2017-09-30 PROCEDURE — 82627 DEHYDROEPIANDROSTERONE: CPT | Performed by: OBSTETRICS & GYNECOLOGY

## 2017-09-30 PROCEDURE — 84403 ASSAY OF TOTAL TESTOSTERONE: CPT | Performed by: OBSTETRICS & GYNECOLOGY

## 2017-09-30 PROCEDURE — 83520 IMMUNOASSAY QUANT NOS NONAB: CPT | Performed by: OBSTETRICS & GYNECOLOGY

## 2017-09-30 PROCEDURE — 36415 COLL VENOUS BLD VENIPUNCTURE: CPT | Performed by: OBSTETRICS & GYNECOLOGY

## 2017-09-30 PROCEDURE — 84270 ASSAY OF SEX HORMONE GLOBUL: CPT | Performed by: OBSTETRICS & GYNECOLOGY

## 2017-10-02 LAB — DHEA-S SERPL-MCNC: 126 UG/DL (ref 35–430)

## 2017-10-03 LAB
MIS SERPL-MCNC: 2.54 NG/ML (ref 0.18–11.71)
SHBG SERPL-SCNC: 81 NMOL/L (ref 30–135)
TESTOST FREE SERPL-MCNC: 0.09 NG/DL (ref 0.13–0.92)
TESTOST SERPL-MCNC: 11 NG/DL (ref 8–60)

## 2017-10-19 ENCOUNTER — OFFICE VISIT (OUTPATIENT)
Dept: BEHAVIORAL HEALTH | Facility: CLINIC | Age: 37
End: 2017-10-19
Payer: COMMERCIAL

## 2017-10-19 DIAGNOSIS — F41.9 ANXIETY: Primary | ICD-10-CM

## 2017-10-19 PROCEDURE — 90834 PSYTX W PT 45 MINUTES: CPT | Performed by: COUNSELOR

## 2017-10-25 NOTE — PROGRESS NOTES
"                                                         Progress Note    Client Name: Chloe Foster  Date: 10/19/17         Service Type: Individual      Session Start Time: 3:30pm  Session End Time: 4:20pm      Session Length: 50 min     Session #: 7     Attendees: Client attended alone    Treatment Plan Last Reviewed: preliminary at intake  PHQ-9 / AYLA-7 : See updates in EPIC     DATA      Progress Since Last Session (Related to Symptoms / Goals / Homework):   Symptoms: Stable    Homework: NA      Episode of Care Goals: Satisfactory progress - ACTION (Actively working towards change); Intervened by reinforcing change plan / affirming steps taken     Current / Ongoing Stressors and Concerns:   Client discussed that she thinks boundaries with her parents have been better-maintained lately. Reported she continues to feel anger/frustration toward them, but this is \"better\" than last session. Discussed work-related stress. Discussed stress because she and her  are doing some work on their home/yard. 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     Treatment Objective(s) Addressed in This Session:   Problem-solve stressors effectively.  Learn coping strategies for anxiety.     Intervention:   Encouraged ongoing self-care strategies.  Reinforced healthy boundaries with parents.  Validated concerns/feelings.        ASSESSMENT: Current Emotional / Mental Status (status of significant symptoms):   Risk status (Self / Other harm or suicidal ideation)   Client denies current fears or concerns for personal safety.   Client denies current or recent suicidal ideation or behaviors.   Client denies current or recent homicidal ideation or behaviors.   Client denies current or recent self injurious behavior or ideation.   Client denies other safety concerns.   A safety and risk management plan has not been developed at this time, however client was given the " "after-hours number / 911 should there be a change in any of these risk factors.     Appearance:   Appropriate    Eye Contact:   Good    Psychomotor Behavior: Normal    Attitude:   Cooperative    Orientation:   All   Speech    Rate / Production: Normal     Volume:  Normal    Mood:    \"pretty good\"   Affect:    Appropriate    Thought Content:  Rumination    Thought Form:  Coherent  Logical    Insight:    Good      Medication Review:   No changes to current psychiatric medication(s)     Medication Compliance:   Yes     Changes in Health Issues:   None reported     Chemical Use Review:   Substance Use: Chemical use reviewed, no active concerns identified      Tobacco Use: No current tobacco use.       Collateral Reports Completed:   Not Applicable    PLAN: (Client Tasks / Therapist Tasks / Other)  Continue to meet with client every-other week. Follow-up appts scheduled. Continue discussion of stressors and coping. Continue to monitor mood and thought process.        Carly Gershone, [facility]                                                      "

## 2017-10-27 ENCOUNTER — OFFICE VISIT (OUTPATIENT)
Dept: FAMILY MEDICINE | Facility: CLINIC | Age: 37
End: 2017-10-27
Payer: COMMERCIAL

## 2017-10-27 VITALS
HEIGHT: 67 IN | OXYGEN SATURATION: 100 % | SYSTOLIC BLOOD PRESSURE: 118 MMHG | RESPIRATION RATE: 17 BRPM | WEIGHT: 158 LBS | DIASTOLIC BLOOD PRESSURE: 68 MMHG | TEMPERATURE: 97.8 F | BODY MASS INDEX: 24.8 KG/M2 | HEART RATE: 90 BPM

## 2017-10-27 DIAGNOSIS — F41.0 PANIC ATTACK: ICD-10-CM

## 2017-10-27 DIAGNOSIS — F33.1 MODERATE RECURRENT MAJOR DEPRESSION (H): ICD-10-CM

## 2017-10-27 DIAGNOSIS — Z31.69 PRE-CONCEPTION COUNSELING: ICD-10-CM

## 2017-10-27 DIAGNOSIS — F40.10 SOCIAL ANXIETY DISORDER: ICD-10-CM

## 2017-10-27 DIAGNOSIS — F41.1 ANXIETY STATE: Primary | ICD-10-CM

## 2017-10-27 DIAGNOSIS — D68.59 PROTEIN S DEFICIENCY (H): ICD-10-CM

## 2017-10-27 DIAGNOSIS — Z15.89 MTHFR MUTATION: ICD-10-CM

## 2017-10-27 PROCEDURE — 99214 OFFICE O/P EST MOD 30 MIN: CPT | Performed by: FAMILY MEDICINE

## 2017-10-27 RX ORDER — LORAZEPAM 1 MG/1
TABLET ORAL
Qty: 8 TABLET | Refills: 0 | Status: SHIPPED | OUTPATIENT
Start: 2017-10-27 | End: 2017-12-22

## 2017-10-27 RX ORDER — SERTRALINE HYDROCHLORIDE 100 MG/1
100 TABLET, FILM COATED ORAL DAILY
Qty: 90 TABLET | Refills: 1 | Status: SHIPPED | OUTPATIENT
Start: 2017-10-27 | End: 2018-05-15

## 2017-10-27 ASSESSMENT — ANXIETY QUESTIONNAIRES
IF YOU CHECKED OFF ANY PROBLEMS ON THIS QUESTIONNAIRE, HOW DIFFICULT HAVE THESE PROBLEMS MADE IT FOR YOU TO DO YOUR WORK, TAKE CARE OF THINGS AT HOME, OR GET ALONG WITH OTHER PEOPLE: SOMEWHAT DIFFICULT
1. FEELING NERVOUS, ANXIOUS, OR ON EDGE: SEVERAL DAYS
6. BECOMING EASILY ANNOYED OR IRRITABLE: NOT AT ALL
2. NOT BEING ABLE TO STOP OR CONTROL WORRYING: NOT AT ALL
3. WORRYING TOO MUCH ABOUT DIFFERENT THINGS: NOT AT ALL
7. FEELING AFRAID AS IF SOMETHING AWFUL MIGHT HAPPEN: NOT AT ALL
GAD7 TOTAL SCORE: 2
5. BEING SO RESTLESS THAT IT IS HARD TO SIT STILL: NOT AT ALL

## 2017-10-27 ASSESSMENT — PATIENT HEALTH QUESTIONNAIRE - PHQ9
SUM OF ALL RESPONSES TO PHQ QUESTIONS 1-9: 3
5. POOR APPETITE OR OVEREATING: SEVERAL DAYS

## 2017-10-27 NOTE — MR AVS SNAPSHOT
After Visit Summary   10/27/2017    Chloe Foster    MRN: [id number]           Patient Information     Date Of Birth          1980        Visit Information        Provider Department      10/27/2017 10:00 AM Izabel Reyes MD [facility]        Today's Diagnoses     Pre-conception counseling    -  1    Anxiety state        Social anxiety disorder        Panic attack          Care Instructions    Insomnia-  Melatonin- 1mg to 10mg- usual dose 3-5mg at night.  -or-  Unisom tablets (1/4-1 tab at bedtime)- lower dose if groggy in am.          Follow-ups after your visit        Your next 10 appointments already scheduled     Nov 13, 2017  8:00 AM CST   Return Visit with Carly Gershone, [facility] ([address] [phone]            Dec 21, 2017  2:30 PM CST   Return Visit with Carly Gershone, [facility] ([address] [phone]              Who to contact     If you have questions or need follow up information about today's clinic visit or your schedule please contact [facility] directly at [phone].  Normal or non-critical lab and imaging results will be communicated to you by Claim[employer]thart, letter or phone within 4 business days after the clinic has received the results. If you do not hear from us within 7 days, please contact the clinic through [employer] or phone. If you have a critical or abnormal lab result, we will notify you by phone as soon as possible.  Submit refill requests through [employer] or call your pharmacy and they will forward the refill request to us. Please allow 3 business days for your refill to be completed.          Additional Information About Your Visit        [employer] Information     [employer] gives you secure access to your electronic health record. If you see a primary care  "provider, you can also send messages to your care team and make appointments. If you have questions, please call your primary care clinic.  If you do not have a primary care provider, please call [phone] and they will assist you.        Care EveryWhere ID     This is your Care EveryWhere ID. This could be used by other organizations to access your [place] medical records  [id number]        Your Vitals Were     Pulse Temperature Respirations Height Last Period Pulse Oximetry    90 97.8  F (36.6  C) (Oral) 17 5' 7\" (1.702 m) 10/25/2017 (Exact Date) 100%    Breastfeeding? BMI (Body Mass Index)                No 24.75 kg/m2           Blood Pressure from Last 3 Encounters:   10/27/17 118/68   09/26/17 118/79   08/10/17 123/79    Weight from Last 3 Encounters:   10/27/17 158 lb (71.7 kg)   08/10/17 159 lb (72.1 kg)   07/11/17 158 lb 14.4 oz (72.1 kg)              Today, you had the following     No orders found for display         Where to get your medicines      These medications were sent to [facility] [address] [place] [address] [address] AT [facility] [address], [facility] [address]     Phone:  [phone]     sertraline 100 MG tablet         Some of these will need a paper prescription and others can be bought over the counter.  Ask your nurse if you have questions.     Bring a paper prescription for each of these medications     LORazepam 1 MG tablet          Primary Care Provider Office Phone # Fax #    Izabel Reyes [phone] [address] [address]        Equal Access to Services     ANAIS ARORA AH: Heather Torres, elba leyva, [place] maciasaldillan falcon, paulie louis. So [facility] [phone].    ATENCIÓN: Si habla español, tiene a rankin disposición servicios gratuitos de asistencia lingüística. Llame al [phone].    We comply with applicable federal civil rights " laws and [place] laws. We do not discriminate on the basis of race, color, national origin, age, disability, sex, sexual orientation, or gender identity.            Thank you!     Thank you for choosing [facility]  for your care. Our goal is always to provide you with excellent care. Hearing back from our patients is one way we can continue to improve our services. Please take a few minutes to complete the written survey that you may receive in the mail after your visit with us. Thank you!             Your Updated Medication List - Protect others around you: Learn how to safely use, store and throw away your medicines at www.disposemymeds.org.          This list is accurate as of: 10/27/17 10:43 AM.  Always use your most recent med list.                   Brand Name Dispense Instructions for use Diagnosis    aspirin EC 81 MG EC tablet      Take 81 mg by mouth        FISH OIL PO      Take 1 capsule by mouth daily        LORazepam 1 MG tablet    ATIVAN    8 tablet    Use 1/2-1 tab at night prior to meetings or for panic symptoms    Anxiety state, Social anxiety disorder, Panic attack       magnesium oxide 400 MG tablet    MAG-OX     Take 400 mg by mouth every evening        MULTIVITAMIN GUMMIES WOMENS PO      Take 2 chew tab by mouth every evening        NONFORMULARY      Take 1 tablet by mouth every evening OTC: B-Right Vitamin (Optimized B Complex)        propranolol 10 MG tablet    INDERAL    20 tablet    Take 1 tablet by mouth. Take as needed prior to interviews, stressful events    Social anxiety disorder       sertraline 100 MG tablet    ZOLOFT    90 tablet    Take 1 tablet (100 mg) by mouth daily    Anxiety state, Social anxiety disorder       STATIN NOT PRESCRIBED (INTENTIONAL)     0 each    Statin not prescribed intentionally due to Woman of childbearing age not actively taking birth control    Cerebral artery occlusion with cerebral infarction (H)       VITAMIN D (CHOLECALCIFEROL) PO      Take  1,000 Units by mouth every evening (takes 2 x 100 unit capsule)

## 2017-10-27 NOTE — PATIENT INSTRUCTIONS
Insomnia-  Melatonin- 1mg to 10mg- usual dose 3-5mg at night.  -or-  Unisom tablets (1/4-1 tab at bedtime)- lower dose if groggy in am.

## 2017-10-27 NOTE — NURSING NOTE
"Chief Complaint   Patient presents with     RECHECK     Depression/Anxiety Follow Up       Initial /68  Pulse 90  Temp 97.8  F (36.6  C) (Oral)  Resp 17  Ht 5' 7\" (1.702 m)  Wt 158 lb (71.7 kg)  LMP 10/25/2017 (Exact Date)  SpO2 100%  Breastfeeding? No  BMI 24.75 kg/m2 Estimated body mass index is 24.75 kg/(m^2) as calculated from the following:    Height as of this encounter: 5' 7\" (1.702 m).    Weight as of this encounter: 158 lb (71.7 kg).  Medication Reconciliation: complete    Health Maintenance Due Pending Provider Review:  NONE    n/a    Gemini Encarnacion MA  [facility]  "

## 2017-10-27 NOTE — PROGRESS NOTES
SUBJECTIVE:   Chloe Foster is a 37 year old female who presents to clinic today for the following health issues:      Depression and Anxiety Follow-Up    Status since last visit: No change    Other associated symptoms:None    Complicating factors: work stressors    Significant life event: No     Current substance abuse: None    PHQ-9 Score and MyChart F/U Questions 8/8/2017 9/7/2017 10/27/2017   Total Score 0 1 3   Q9: Suicide Ideation Not at all Not at all Not at all     AYLA-7 SCORE 8/8/2017 9/7/2017 10/27/2017   Total Score 5 3 2       PHQ-9  English  PHQ-9   Any Language  GAD7  Suicide Assessment Five-step Evaluation and Treatment (SAFE-T)      Amount of exercise or physical activity: 2-3 days/week for an average of 45-60 minutes    Problems taking medications regularly: No    Medication side effects: none    Diet: regular (no restrictions)    Doing well with the zoloft 100mg/d and rare ativan.    Wondering about MDD/anxiety meds- worried about being on it for pregnancy.  Knows the sertraline helps- especially helps her sleep, though still not good.  Takes xanax night before meetings, and #8 lasts x 2-3 months.  Having problems sleeping, sleeps x 3 hrs, and then wakes almost hourly.  Feels she would not have these sx's if she didn't have her current job- very stressful. Not sure what she'll do with it in the long term, but continuing there for the short term.    F/u fertility/periods  Did see Dr. Schmitz (ob/gyn)- did pelvic u/s and blood work, and everything came back normal.  Basic fertility testing looked good.   Can start to try getting pregnant.  Not avoiding, but not trying hard.      Problem list and histories reviewed & adjusted, as indicated.  Additional history: as documented    Patient Active Problem List   Diagnosis     Vitamin D deficiency     CARDIOVASCULAR SCREENING; LDL GOAL LESS THAN 160     Lumbar herniated disc     Hirsutism     Insomnia     IBS (irritable bowel syndrome)     Moderate  recurrent major depression (H)     Cerebral artery occlusion with cerebral infarction     History of  section     PFO with atrial septal aneurysm     Protein S deficiency (H)     Migraine with aura and without status migrainosus, not intractable     Pre-conception counseling     Elevated lipoprotein A level     Social anxiety disorder     MTHFR mutation (H)      Current Outpatient Prescriptions   Medication Sig Dispense Refill     LORazepam (ATIVAN) 1 MG tablet Use 1/2-1 tab at night prior to meetings or for panic symptoms 8 tablet 0     sertraline (ZOLOFT) 100 MG tablet Take 1 tablet (100 mg) by mouth daily 90 tablet 1     propranolol (INDERAL) 10 MG tablet Take 1 tablet by mouth. Take as needed prior to interviews, stressful events 20 tablet 1     aspirin EC 81 MG EC tablet Take 81 mg by mouth       STATIN NOT PRESCRIBED, INTENTIONAL, Statin not prescribed intentionally due to Woman of childbearing age not actively taking birth control 0 each 0     VITAMIN D, CHOLECALCIFEROL, PO Take 1,000 Units by mouth every evening (takes 2 x 100 unit capsule)        Multiple Vitamins-Minerals (MULTIVITAMIN GUMMIES WOMENS PO) Take 2 chew tab by mouth every evening       NONFORMULARY Take 1 tablet by mouth every evening OTC: B-Right Vitamin (Optimized B Complex)       magnesium oxide (MAG-OX) 400 MG tablet Take 400 mg by mouth every evening        Omega-3 Fatty Acids (FISH OIL PO) Take 1 capsule by mouth daily        [DISCONTINUED] sertraline (ZOLOFT) 100 MG tablet Take 1 tablet (100 mg) by mouth daily 90 tablet 1     Allergies   Allergen Reactions     Nkda [No Known Drug Allergies]      Recent Labs   Lab Test  17   1643  16   0924  16   1940   16   1110  11/23/15   0820 07/23/15   04/29/15   2300  13   1101   LDL   --   126*   --    --    --   108*  126   < >   --    --    HDL   --   86   --    --    --   71  76   < >   --    --    TRIG   --   61   --    --    --   53  85   < >   --    --   "  ALT   --   25   --    --    --   23   --    --   26   --    CR   --   0.70   --    --   0.58  0.62   --    --   0.60   --    GFRESTIMATED   --   >90  Non  GFR Calc     --    --   >90  Non  GFR Calc    >90  Non  GFR Calc     --    --   >90  Non  GFR Calc     --    GFRESTBLACK   --   >90   GFR Calc     --    --   >90   GFR Calc    >90   GFR Calc     --    --   >90   GFR Calc     --    POTASSIUM   --   4.1  3.9   < >  3.9  4.0   --    --   3.5   --    TSH  0.66   --    --    --    --    --    --    --    --   0.44    < > = values in this interval not displayed.      BP Readings from Last 3 Encounters:   10/27/17 118/68   09/26/17 118/79   08/10/17 123/79    Wt Readings from Last 3 Encounters:   10/27/17 158 lb (71.7 kg)   08/10/17 159 lb (72.1 kg)   07/11/17 158 lb 14.4 oz (72.1 kg)            Labs reviewed in EPIC    Reviewed and updated as needed this visit by clinical staffTobacco  Allergies  Meds  Problems  Med Hx  Surg Hx  Fam Hx       Reviewed and updated as needed this visit by Provider  Allergies  Meds  Problems         ROS:  Constitutional, HEENT, cardiovascular, pulmonary, gi and gu systems are negative, except as otherwise noted.      OBJECTIVE:   /68  Pulse 90  Temp 97.8  F (36.6  C) (Oral)  Resp 17  Ht 5' 7\" (1.702 m)  Wt 158 lb (71.7 kg)  LMP 10/25/2017 (Exact Date)  SpO2 100%  Breastfeeding? No  BMI 24.75 kg/m2  Body mass index is 24.75 kg/(m^2).  GENERAL APPEARANCE: healthy, alert and no distress     EYES: sclera clear, EOMI     RESP: lungs clear to auscultation - no rales, rhonchi or wheezes     CV: regular rates and rhythm, normal S1 S2, no S3 or S4 and no murmur, click or rub      Ext: warm, dry, no edema       Psych: full range affect, normal speech and grooming, judgement and insight intact       ASSESSMENT/PLAN:       ICD-10-CM    1. Anxiety " state F41.1 LORazepam (ATIVAN) 1 MG tablet     sertraline (ZOLOFT) 100 MG tablet   2. Moderate recurrent major depression (H) F33.1    3. Social anxiety disorder F40.10 LORazepam (ATIVAN) 1 MG tablet     sertraline (ZOLOFT) 100 MG tablet   4. Panic attack F41.0 LORazepam (ATIVAN) 1 MG tablet   5. Pre-conception counseling Z31.69    6. Protein S deficiency (H) D68.59    7. MTHFR mutation (H) E72.12      MDD/Anxiety/Insomnia-  Sx's mostly related to work stressors- feels she would do fine without those stressors.  May look at change in future, but not in near future.  Will cont on zoloft for now, but she will likely want to try going off when she gets pregnant.  Worried about her anxiety if so, as she knows she can't use the ativan if she's pregnant, but committed to trying.  Feels most important thing is her sleep, which is not great now, and would likely worsen off zoloft. Discussed options during pregnancy.  Rec melatonin or unisom trials now to see if they help.  Wouldn't try trazodone leading into trying to get pregnant.  Risks and benefits of medication(s) including potential side effects reviewed with patient.  Questions answered.   If she gets pregnant, okay to to TE appt if she'd like to try weaning down on zoloft prior to confirmation appt.    Pregnancy complications-   Has had multiple specialists review her case, who feel it is safe for her to get pregnant again (s/p PFO closure, and is currently on asa).  Will continue until she gets pregnant and then call [facility] for direction.  If she has migraine/aura sx's while pregnant, will go to ER for rapid evaluation/consultation given CVA issues with last pregnancy.    Plans to come in with her son for flu shot.    F/u - will likely come in for physical in the next few months.  F/u q6mo for MDD/Anxiety.  F/u when pregnant.    Izabel Reyes MD  [facility]

## 2017-10-28 ASSESSMENT — ANXIETY QUESTIONNAIRES: GAD7 TOTAL SCORE: 2

## 2017-12-01 ENCOUNTER — APPOINTMENT (OUTPATIENT)
Dept: MRI IMAGING | Facility: CLINIC | Age: 37
End: 2017-12-01
Attending: PHYSICIAN ASSISTANT
Payer: COMMERCIAL

## 2017-12-01 ENCOUNTER — HOSPITAL ENCOUNTER (EMERGENCY)
Facility: CLINIC | Age: 37
Discharge: HOME | End: 2017-12-01
Attending: EMERGENCY MEDICINE | Admitting: EMERGENCY MEDICINE
Payer: COMMERCIAL

## 2017-12-01 VITALS
OXYGEN SATURATION: 95 % | HEIGHT: 67 IN | SYSTOLIC BLOOD PRESSURE: 129 MMHG | HEART RATE: 84 BPM | DIASTOLIC BLOOD PRESSURE: 85 MMHG | WEIGHT: 156 LBS | BODY MASS INDEX: 24.48 KG/M2 | TEMPERATURE: 98.2 F | RESPIRATION RATE: 18 BRPM

## 2017-12-01 DIAGNOSIS — R20.2 PARESTHESIA: ICD-10-CM

## 2017-12-01 DIAGNOSIS — R51.9 NONINTRACTABLE HEADACHE, UNSPECIFIED CHRONICITY PATTERN, UNSPECIFIED HEADACHE TYPE: ICD-10-CM

## 2017-12-01 LAB
ANION GAP SERPL CALCULATED.3IONS-SCNC: 5 MMOL/L (ref 3–14)
BASOPHILS # BLD AUTO: 0 10E9/L (ref 0–0.2)
BASOPHILS NFR BLD AUTO: 0.8 %
BUN SERPL-MCNC: 17 MG/DL (ref 7–30)
CALCIUM SERPL-MCNC: 9 MG/DL (ref 8.5–10.1)
CHLORIDE SERPL-SCNC: 106 MMOL/L (ref 94–109)
CO2 SERPL-SCNC: 29 MMOL/L (ref 20–32)
CREAT SERPL-MCNC: 0.73 MG/DL (ref 0.52–1.04)
DIFFERENTIAL METHOD BLD: NORMAL
EOSINOPHIL # BLD AUTO: 0 10E9/L (ref 0–0.7)
EOSINOPHIL NFR BLD AUTO: 0.8 %
ERYTHROCYTE [DISTWIDTH] IN BLOOD BY AUTOMATED COUNT: 13.5 % (ref 10–15)
GFR SERPL CREATININE-BSD FRML MDRD: 89 ML/MIN/1.7M2
GLUCOSE SERPL-MCNC: 95 MG/DL (ref 70–99)
HCG SERPL QL: NEGATIVE
HCT VFR BLD AUTO: 41 % (ref 35–47)
HGB BLD-MCNC: 13.6 G/DL (ref 11.7–15.7)
IMM GRANULOCYTES # BLD: 0 10E9/L (ref 0–0.4)
IMM GRANULOCYTES NFR BLD: 0 %
INTERPRETATION ECG - MUSE: NORMAL
LYMPHOCYTES # BLD AUTO: 2.3 10E9/L (ref 0.8–5.3)
LYMPHOCYTES NFR BLD AUTO: 45.7 %
MCH RBC QN AUTO: 29.7 PG (ref 26.5–33)
MCHC RBC AUTO-ENTMCNC: 33.2 G/DL (ref 31.5–36.5)
MCV RBC AUTO: 90 FL (ref 78–100)
MONOCYTES # BLD AUTO: 0.6 10E9/L (ref 0–1.3)
MONOCYTES NFR BLD AUTO: 11.9 %
NEUTROPHILS # BLD AUTO: 2.1 10E9/L (ref 1.6–8.3)
NEUTROPHILS NFR BLD AUTO: 40.8 %
NRBC # BLD AUTO: 0 10*3/UL
NRBC BLD AUTO-RTO: 0 /100
PLATELET # BLD AUTO: 293 10E9/L (ref 150–450)
POTASSIUM SERPL-SCNC: 4.2 MMOL/L (ref 3.4–5.3)
RBC # BLD AUTO: 4.58 10E12/L (ref 3.8–5.2)
SODIUM SERPL-SCNC: 140 MMOL/L (ref 133–144)
VIT B12 SERPL-MCNC: 868 PG/ML (ref 193–986)
WBC # BLD AUTO: 5.1 10E9/L (ref 4–11)

## 2017-12-01 PROCEDURE — 96376 TX/PRO/DX INJ SAME DRUG ADON: CPT

## 2017-12-01 PROCEDURE — 25000132 ZZH RX MED GY IP 250 OP 250 PS 637: Performed by: PHYSICIAN ASSISTANT

## 2017-12-01 PROCEDURE — 99285 EMERGENCY DEPT VISIT HI MDM: CPT | Mod: 25

## 2017-12-01 PROCEDURE — 25000128 H RX IP 250 OP 636: Performed by: PHYSICIAN ASSISTANT

## 2017-12-01 PROCEDURE — 84703 CHORIONIC GONADOTROPIN ASSAY: CPT | Performed by: PHYSICIAN ASSISTANT

## 2017-12-01 PROCEDURE — 25000128 H RX IP 250 OP 636: Performed by: EMERGENCY MEDICINE

## 2017-12-01 PROCEDURE — 80048 BASIC METABOLIC PNL TOTAL CA: CPT | Performed by: EMERGENCY MEDICINE

## 2017-12-01 PROCEDURE — 70553 MRI BRAIN STEM W/O & W/DYE: CPT

## 2017-12-01 PROCEDURE — 93005 ELECTROCARDIOGRAM TRACING: CPT

## 2017-12-01 PROCEDURE — 96361 HYDRATE IV INFUSION ADD-ON: CPT

## 2017-12-01 PROCEDURE — 82607 VITAMIN B-12: CPT | Performed by: EMERGENCY MEDICINE

## 2017-12-01 PROCEDURE — 96374 THER/PROPH/DIAG INJ IV PUSH: CPT | Mod: 59

## 2017-12-01 PROCEDURE — 84703 CHORIONIC GONADOTROPIN ASSAY: CPT | Performed by: EMERGENCY MEDICINE

## 2017-12-01 PROCEDURE — 85025 COMPLETE CBC W/AUTO DIFF WBC: CPT | Performed by: EMERGENCY MEDICINE

## 2017-12-01 PROCEDURE — 96375 TX/PRO/DX INJ NEW DRUG ADDON: CPT

## 2017-12-01 PROCEDURE — A9585 GADOBUTROL INJECTION: HCPCS | Performed by: EMERGENCY MEDICINE

## 2017-12-01 RX ORDER — GADOBUTROL 604.72 MG/ML
7 INJECTION INTRAVENOUS ONCE
Status: COMPLETED | OUTPATIENT
Start: 2017-12-01 | End: 2017-12-01

## 2017-12-01 RX ORDER — KETOROLAC TROMETHAMINE 30 MG/ML
30 INJECTION, SOLUTION INTRAMUSCULAR; INTRAVENOUS ONCE
Status: COMPLETED | OUTPATIENT
Start: 2017-12-01 | End: 2017-12-01

## 2017-12-01 RX ORDER — LORAZEPAM 2 MG/ML
0.5 INJECTION INTRAMUSCULAR ONCE
Status: COMPLETED | OUTPATIENT
Start: 2017-12-01 | End: 2017-12-01

## 2017-12-01 RX ORDER — ACETAMINOPHEN 500 MG
500 TABLET ORAL ONCE
Status: COMPLETED | OUTPATIENT
Start: 2017-12-01 | End: 2017-12-01

## 2017-12-01 RX ADMIN — LORAZEPAM 0.5 MG: 2 INJECTION INTRAMUSCULAR; INTRAVENOUS at 11:49

## 2017-12-01 RX ADMIN — SODIUM CHLORIDE 1000 ML: 9 INJECTION, SOLUTION INTRAVENOUS at 12:51

## 2017-12-01 RX ADMIN — GADOBUTROL 7 ML: 604.72 INJECTION INTRAVENOUS at 12:08

## 2017-12-01 RX ADMIN — KETOROLAC TROMETHAMINE 30 MG: 30 INJECTION, SOLUTION INTRAMUSCULAR at 12:51

## 2017-12-01 RX ADMIN — LORAZEPAM 0.5 MG: 2 INJECTION INTRAMUSCULAR at 11:04

## 2017-12-01 RX ADMIN — ACETAMINOPHEN 500 MG: 500 TABLET, FILM COATED ORAL at 11:04

## 2017-12-01 ASSESSMENT — ENCOUNTER SYMPTOMS
HEADACHES: 1
CHILLS: 0
NUMBNESS: 1
SHORTNESS OF BREATH: 0
FEVER: 0

## 2017-12-01 NOTE — ED AVS SNAPSHOT
Emergency Department    [address] [facility] [address]    Phone:  [phone]    Fax:  [phone]                                       Chloe Foster   MRN: [id number]    Department:   Emergency Department   Date of Visit:  12/1/2017           After Visit Summary Signature Page     I have received my discharge instructions, and my questions have been answered. I have discussed any challenges I see with this plan with the nurse or doctor.    ..........................................................................................................................................  Patient/Patient Representative Signature      ..........................................................................................................................................  Patient Representative Print Name and Relationship to Patient    ..................................................               ................................................  Date                                            Time    ..........................................................................................................................................  Reviewed by Signature/Title    ...................................................              ..............................................  Date                                                            Time

## 2017-12-01 NOTE — ED NOTES
Bed: ED13  Expected date:   Expected time:   Means of arrival:   Comments:  426 38 yo F Facial numbness

## 2017-12-01 NOTE — ED AVS SNAPSHOT
Emergency Department    [address] [facility] [address]    Phone:  [phone]    Fax:  [phone]                                       Chloe Foster   MRN: [id number]    Department:   Emergency Department   Date of Visit:  12/1/2017           Patient Information     Date Of Birth          1980        Your diagnoses for this visit were:     Nonintractable headache, unspecified chronicity pattern, unspecified headache type     Paresthesia        You were seen by Nicolas Gonzalez MD.      Follow-up Information     Follow up with Jennifer Gonzáles MD In 3 days.    Specialty:  Neurology    Why:  recheck    Contact information:    [facility]  [address] [address] [phone]          Follow up with  Emergency Department.    Specialty:  EMERGENCY MEDICINE    Why:  As needed, If symptoms worsen    Contact information:    [address] [facility] [address] [phone]        Discharge Instructions       Discharge Instructions  Headache    You were seen today for a headache. Headaches may be caused by many different things such as muscle tension, sinus inflammation, anxiety and stress, having too little sleep, too much alcohol, some medical conditions or injury. You may have a migraine, which is caused by changes in the blood vessels in your head.  At this time your provider does not find that your headache is a sign of anything dangerous or life-threatening.  However, sometimes the signs of serious illness do not show up right away.      Generally, every Emergency Department visit should have a follow-up clinic visit with either a primary or a specialty clinic/provider. Please follow-up as instructed by your emergency provider today.    Return to the Emergency Department if:    You get a new fever of 100.4 F or higher.    Your headache gets much worse.    You get a stiff neck with your headache.    You get a new headache  that is significantly different or worse than headaches you have had before.    You are vomiting (throwing up) and cannot keep food or water down.    You have blurry or double vision or other problems with your eyes.    You have a new weakness on one side of your body.    You have difficulty with balance which is new.    You or your family thinks you are confused.    You have a seizure.    What can I do to help myself?    Pain medications - You may take a pain medication such as Tylenol  (acetaminophen), Advil , Motrin  (ibuprofen) or Aleve  (naproxen).    Take a pain reliever as soon as you notice symptoms.  Starting medications as soon as you start to have symptoms may lessen the amount of pain you have.    Relaxing in a quiet, dark room may help.    Get enough sleep and eat meals regularly.    You may need to watch for certain foods or other things which may trigger your headaches.  Keeping a journal of your headaches and possible triggers may help you and your primary provider to identify things which you should avoid which may be causing your headaches.  If you were given a prescription for medicine here today, be sure to read all of the information (including the package insert) that comes with your prescription.  This will include important information about the medicine, its side effects, and any warnings that you need to know about.  The pharmacist who fills the prescription can provide more information and answer questions you may have about the medicine.  If you have questions or concerns that the pharmacist cannot address, please call or return to the Emergency Department.   Remember that you can always come back to the Emergency Department if you are not able to see your regular provider in the amount of time listed above, if you get any new symptoms, or if there is anything that worries you.      Future Appointments        Provider Department Dept Phone Center    12/5/2017 1:00 PM John Pérez  MD [facility] [phone] [facility]    12/21/2017 2:30 PM Carly Gershone, [facility] [phone] [facility]      24 Hour Appointment Hotline       To make an appointment at any [facility], call [phone] ([phone]). If you don't have a family doctor or clinic, we will help you find one. [facility] are conveniently located to serve the needs of you and your family.             Review of your medicines      Our records show that you are taking the medicines listed below. If these are incorrect, please call your family doctor or clinic.        Dose / Directions Last dose taken    aspirin EC 81 MG EC tablet   Dose:  81 mg        Take 81 mg by mouth   Refills:  0        FISH OIL PO   Dose:  1 capsule        Take 1 capsule by mouth daily   Refills:  0        LORazepam 1 MG tablet   Commonly known as:  ATIVAN   Quantity:  8 tablet        Use 1/2-1 tab at night prior to meetings or for panic symptoms   Refills:  0        magnesium oxide 400 MG tablet   Commonly known as:  MAG-OX   Dose:  400 mg        Take 400 mg by mouth every evening   Refills:  0        MULTIVITAMIN GUMMIES WOMENS PO   Dose:  2 chew tab        Take 2 chew tab by mouth every evening   Refills:  0        NONFORMULARY   Dose:  1 tablet        Take 1 tablet by mouth every evening OTC: B-Right Vitamin (Optimized B Complex)   Refills:  0        propranolol 10 MG tablet   Commonly known as:  INDERAL   Quantity:  20 tablet        Take 1 tablet by mouth. Take as needed prior to interviews, stressful events   Refills:  1        sertraline 100 MG tablet   Commonly known as:  ZOLOFT   Dose:  100 mg   Quantity:  90 tablet        Take 1 tablet (100 mg) by mouth daily   Refills:  1        STATIN NOT PRESCRIBED (INTENTIONAL)   Quantity:  0 each        Statin not prescribed intentionally due to Woman of childbearing age not actively taking birth control   Refills:  0        VITAMIN D (CHOLECALCIFEROL) PO    Dose:  1000 Units        Take 1,000 Units by mouth every evening (takes 2 x 100 unit capsule)   Refills:  0                Procedures and tests performed during your visit     Basic metabolic panel    CBC with platelets differential    EKG 12 lead    HCG qualitative    MR Brain w/o & w Contrast    Vitamin B12      Orders Needing Specimen Collection     None      Pending Results     Date and Time Order Name Status Description    12/1/2017 1023 MR Brain w/o & w Contrast Preliminary     12/1/2017 1000 Vitamin B12 In process             Pending Culture Results     No orders found from 11/29/2017 to 12/2/2017.            Pending Results Instructions     If you had any lab results that were not finalized at the time of your Discharge, you can call the ED Lab Result RN at [phone]. You will be contacted by this team for any positive Lab results or changes in treatment. The nurses are available 7 days a week from 10A to 6:30P.  You can leave a message 24 hours per day and they will return your call.        Test Results From Your Hospital Stay        12/1/2017 10:37 AM      Component Results     Component Value Ref Range & Units Status    WBC 5.1 4.0 - 11.0 10e9/L Final    RBC Count 4.58 3.8 - 5.2 10e12/L Final    Hemoglobin 13.6 11.7 - 15.7 g/dL Final    Hematocrit 41.0 35.0 - 47.0 % Final    MCV 90 78 - 100 fl Final    MCH 29.7 26.5 - 33.0 pg Final    MCHC 33.2 31.5 - 36.5 g/dL Final    RDW 13.5 10.0 - 15.0 % Final    Platelet Count 293 150 - 450 10e9/L Final    Diff Method Automated Method  Final    % Neutrophils 40.8 % Final    % Lymphocytes 45.7 % Final    % Monocytes 11.9 % Final    % Eosinophils 0.8 % Final    % Basophils 0.8 % Final    % Immature Granulocytes 0.0 % Final    Nucleated RBCs 0 0 /100 Final    Absolute Neutrophil 2.1 1.6 - 8.3 10e9/L Final    Absolute Lymphocytes 2.3 0.8 - 5.3 10e9/L Final    Absolute Monocytes 0.6 0.0 - 1.3 10e9/L Final    Absolute Eosinophils 0.0 0.0 - 0.7 10e9/L Final     Absolute Basophils 0.0 0.0 - 0.2 10e9/L Final    Abs Immature Granulocytes 0.0 0 - 0.4 10e9/L Final    Absolute Nucleated RBC 0.0  Final         12/1/2017 10:59 AM      Component Results     Component Value Ref Range & Units Status    Sodium 140 133 - 144 mmol/L Final    Potassium 4.2 3.4 - 5.3 mmol/L Final    Chloride 106 94 - 109 mmol/L Final    Carbon Dioxide 29 20 - 32 mmol/L Final    Anion Gap 5 3 - 14 mmol/L Final    Glucose 95 70 - 99 mg/dL Final    Urea Nitrogen 17 7 - 30 mg/dL Final    Creatinine 0.73 0.52 - 1.04 mg/dL Final    GFR Estimate 89 >60 mL/min/1.7m2 Final    Non  GFR Calc    GFR Estimate If Black >90 >60 mL/min/1.7m2 Final    African American GFR Calc    Calcium 9.0 8.5 - 10.1 mg/dL Final         12/1/2017 12:36 PM      Narrative     MRI BRAIN WITHOUT AND WITH CONTRAST  12/1/2017 12:07 PM    HISTORY: Left-sided headache and lip numbness, history of CVA x2.     TECHNIQUE: Multiplanar, multisequence MRI of the brain without and  with 7 mL Gadavist.    COMPARISON: 1/25/2016    FINDINGS: Diffusion-weighted images are normal. There is no evidence  for intracranial hemorrhage or acute infarct. Ventricles and  subarachnoid spaces are normal. There is a small area of cortical  gliosis in the anterior right insular cortex which is very subtle on  today's study but unchanged from the prior study. Brain parenchyma is  otherwise normal except for a few tiny nonspecific white matter  changes which are also stable. Postcontrast images do not show any  abnormal areas of enhancement or any focal mass lesions.        Impression     IMPRESSION: No change from prior exam. There is no evidence for  intracranial hemorrhage, acute infarct, or any focal mass lesions.         12/1/2017 10:53 AM      Component Results     Component Value Ref Range & Units Status    HCG Qualitative Serum Negative NEG^Negative Final    This test is for screening purposes.  Results should be interpreted along with   the  clinical picture.  Confirmation testing is available if warranted by   ordering [id number], HCG Quantitative Pregnancy.           12/1/2017 11:06 AM                Clinical Quality Measure: Blood Pressure Screening     Your blood pressure was checked while you were in the emergency department today. The last reading we obtained was  BP: 129/85 . Please read the guidelines below about what these numbers mean and what you should do about them.  If your systolic blood pressure (the top number) is less than 120 and your diastolic blood pressure (the bottom number) is less than 80, then your blood pressure is normal. There is nothing more that you need to do about it.  If your systolic blood pressure (the top number) is 120-139 or your diastolic blood pressure (the bottom number) is 80-89, your blood pressure may be higher than it should be. You should have your blood pressure rechecked within a year by a primary care provider.  If your systolic blood pressure (the top number) is 140 or greater or your diastolic blood pressure (the bottom number) is 90 or greater, you may have high blood pressure. High blood pressure is treatable, but if left untreated over time it can put you at risk for heart attack, stroke, or kidney failure. You should have your blood pressure rechecked by a primary care provider within the next 4 weeks.  If your provider in the emergency department today gave you specific instructions to follow-up with your doctor or provider even sooner than that, you should follow that instruction and not wait for up to 4 weeks for your follow-up visit.        Thank you for choosing [facility]       Thank you for choosing [facility] for your care. Our goal is always to provide you with excellent care. Hearing back from our patients is one way we can continue to improve our services. Please take a few minutes to complete the written survey that you may receive in the mail after you visit with us. Thank you!        [facility]  Information     myThings gives you secure access to your electronic health record. If you see a primary care provider, you can also send messages to your care team and make appointments. If you have questions, please call your primary care clinic.  If you do not have a primary care provider, please call [phone] and they will assist you.        Care EveryWhere ID     This is your Care EveryWhere ID. This could be used by other organizations to access your [facility] medical records  [id number]        Equal Access to Services     ANASI ARORA : Hadii eliane allredo Soabhishek, waaxda luqadaha, qaybta kaalmada adeegyalucio, paulie marquez . So [facility] [phone].    ATENCIÓN: Si habla español, tiene a rankin disposición servicios gratuitos de asistencia lingüística. Llame al [phone].    We comply with applicable federal civil rights laws and [place] laws. We do not discriminate on the basis of race, color, national origin, age, disability, sex, sexual orientation, or gender identity.            After Visit Summary       This is your record. Keep this with you and show to your community pharmacist(s) and doctor(s) at your next visit.

## 2017-12-01 NOTE — ED PROVIDER NOTES
Emergency Department Attending Supervision Note  12/1/2017  1:31 PM      I evaluated this patient in conjunction with Kasie Spears.      Briefly, patient presents with numbness and some lightheadedness.    Exam is normal without focal deficit. She does have a history of prior stroke with left sided symptoms at that time. Work-up ensued as above which is normal. Patient given reassurance and headache as she's starting to develop a headache. She's had imaging of her vessels in the past, I feel that we did not need to do a repeat today.     Diagnosis  Paresthesia     Nicolas Gonzalez MD   I, Dipika Lezama, am serving as a scribe at 1:33 PM on 12/1/2017 to document services personally performed by Nicolas Gonzalez MD based on my observations and the provider's statements to me.       Nicolas Gonzalez MD  12/07/17 0728

## 2017-12-01 NOTE — ED PROVIDER NOTES
"  History     Chief Complaint:  Numbness     HPI   Chloe Foster is a 37 year old female with a medical history of CVA and PFO who presents with numbness. Patient arrived via EMS. Patient has a history of CVA in 2015 when she was pregnant with her first born. At the time, she presented with migraine with aura, facial drooping, and numbness on the left side of her body. She also notes having a PF closure in 2016. Patient reports having some intermittent numbness for the last week. She noticed at a spot of numbness her lips this morning around 5:15 AM. A while later, the numbness spread throughout her entire mouth which raised concern and prompted EMS call . Patient also notes developing a mild left sided headache en route. Patient denies chest pain, shortness of breath, fevers, and chills.    Allergies:  No known drug allergies      Medications:    Zoloft  Aspirin  Multivitamin  Mag-ox  Fish oil    Past Medical History:    Allergic rhinitis  Anxiety  CVA  Depressive disorder  Hirsutism  PFO  Vitamin D deficiency     Past Surgical History:    Arthroscopy Knee  HC repair of nasal septum  Repair patent foramen ovale     Family History:    Allergies  Cancer  Hypertension     Social History:  Smoking status: Quit smoker 3 years ago.  Alcohol use: Yes    and toddler at bedside.    PCP: Izabel Reyes     Review of Systems   Constitutional: Negative for chills and fever.   Respiratory: Negative for shortness of breath.    Cardiovascular: Negative for chest pain.   Neurological: Positive for numbness and headaches.   All other systems reviewed and are negative.    Physical Exam     Patient Vitals for the past 24 hrs:   BP Temp Temp src Pulse Resp SpO2 Height Weight   12/01/17 0958 129/85 98.2  F (36.8  C) Oral 84 16 95 % 1.702 m (5' 7\") 70.8 kg (156 lb)        General: Alert, interactive in no distress.   Head:  Scalp is atraumatic.  Eyes:  EOM intact. The pupils are equal, round, and reactive to light. " No scleral icterus. No nystagmus.   ENT:                                      Ears:  The external ears are normal.  Nose:  The external nose is normal.  Throat:  The oropharynx is normal. Mucus membranes are moist.                 Neck:  Normal range of motion. There is no rigidity. Trachea midline.  CV:  Regular rate and rhythm. No murmur.   Resp:  Breath sounds are clear bilaterally. Non-labored, no retractions or accessory muscle use.  GI:  Abdomen is soft, no distension, no tenderness.   MS:  Normal strength in all 4 extremities,   Skin:  Warm and dry, No rash or lesions noted.  Neuro:  Strength 5/5 in upper and lower extremities. Normal finger-to-nose. Normal heel-to-shin. Sensation grossly intact. CN 3-12 intact. GCS: 15. Speech normal.   Psych:  Awake. Alert and orientedx3. Appropriate interactions.   Lymph: No anterior or posterior cervical lymphadenopathy noted.        Emergency Department Course   ECG (10:59:37):  Rate 76 bpm.   QT/QTc 380/427.   P-R-T axes 28 43 39.   Normal sinus rhythm. Normal ECG. Agree with computer interpretation. When compared with ECG dated 4/22/16. Interpreted at 1112 by Nicolas Gonzalez MD.     Imaging:  MR Brain w/o & w Contrast   Preliminary Result   IMPRESSION: No change from prior exam. There is no evidence for   intracranial hemorrhage, acute infarct, or any focal mass lesions.         All imaging results were discussed with the patient and spouse who voiced understanding of the findings.    Laboratory:  Labs Ordered and Resulted from Time of ED Arrival Up to the Time of Departure from the ED   CBC WITH PLATELETS DIFFERENTIAL   BASIC METABOLIC PANEL   HCG QUALITATIVE   VITAMIN B12   lab work overall unremarkable.      Interventions:  Medications   LORazepam (ATIVAN) injection 0.5 mg (0.5 mg Intravenous Given 12/1/17 1104)   acetaminophen (TYLENOL) tablet 500 mg (500 mg Oral Given 12/1/17 1104)   LORazepam (ATIVAN) injection 0.5 mg (0.5 mg Intravenous Given 12/1/17 1149)    gadobutrol (GADAVIST) injection 7 mL (7 mLs Intravenous Given 12/1/17 1208)   sodium chloride (PF) 0.9% PF flush 10 mL (10 mLs Intravenous Given 12/1/17 1208)   0.9% sodium chloride BOLUS (1,000 mLs Intravenous New Bag 12/1/17 1251)   ketorolac (TORADOL) injection 30 mg (30 mg Intravenous Given 12/1/17 1251)        Emergency Department Course:  Past medical records, nursing notes, and vitals reviewed.  I performed an exam of the patient and obtained history, as documented above.  Blood drawn and   IV inserted   The patient was sent for a head MRI while in the emergency department, findings above.   My supervising provider, Dr. Gonzalez, also interviewed and examined the patient at bedside.   12:40 PM: Rechecked the patient and updated her on findings thus far. Awaiting MRI results. Headache is 4/10. Will give Toradol and bolus of normal saline.     I rechecked the patient and updated her on MRI results. Headache has almost completely resolved. No further mouth tingling.  Findings and plan explained to the patient and spouse. Patient discharged home with instructions regarding supportive care, medications, and reasons to return. The importance of close follow-up was reviewed.        Impression & Plan      Medical Decision Making:  Past medical history of CVA ×2 during pregnancy approximately two years ago, presents with mouth numbness and mild left-sided headache.  The patient reports a week of intermittent left-sided lip numbness that spread to her entire mouth prompting her to call the ambulance.  Differential diagnosis was broad including intracranial bleed, ischemia, and mass.  Also included electrolyte abnormalities, Bell's palsy, migraine, medication side effect, and anxiety. With a history of CVA and this new symptom of numbness a MRI brain was completed and I was negative for acute abnormalities. There is a small area of cortical gloiosis in the anterior right insular cortex, which is unchanged from prior  study. She had an MRA in the past and I do not feel this is needed at this time, though neurology may feel this is warranted. There was no electrolyte derangement.  There was no evidence of facial droop to suggest Bell's palsy, although this cannot be completely ruled out at this time as it could be an early presentation.  I discussed this with the patient and the importance of close follow-up.  The patient's headache completely resolved after Toradol and normal saline. She has no further mouth paresthesias here in the ED. Patient wanted referral to a new neurologist, one provided and recommended close  follow up early next week. Continue daily baby aspirin. The patient and her  agreed with the plan and return precautions discussed including worsening headache, vision changes, or any new/concerning symptoms. Continue daily baby aspirin.       Diagnosis:    ICD-10-CM    1. Nonintractable headache, unspecified chronicity pattern, unspecified headache type R51    2. Paresthesia R20.2         Disposition:   Discharge to home    12/1/2017   Kasie Spears PA-C  Supervising provider, Nicolas Gonzalez MD Luell, Amy Jolene, PA-C  12/01/17 1417       Kasie Spears PA-C  12/01/17 1420

## 2017-12-22 ENCOUNTER — OFFICE VISIT (OUTPATIENT)
Dept: FAMILY MEDICINE | Facility: CLINIC | Age: 37
End: 2017-12-22
Payer: COMMERCIAL

## 2017-12-22 VITALS
BODY MASS INDEX: 25.11 KG/M2 | TEMPERATURE: 98 F | HEIGHT: 67 IN | HEART RATE: 80 BPM | WEIGHT: 160 LBS | DIASTOLIC BLOOD PRESSURE: 74 MMHG | RESPIRATION RATE: 14 BRPM | SYSTOLIC BLOOD PRESSURE: 120 MMHG | OXYGEN SATURATION: 97 %

## 2017-12-22 DIAGNOSIS — Z13.6 CARDIOVASCULAR SCREENING; LDL GOAL LESS THAN 160: ICD-10-CM

## 2017-12-22 DIAGNOSIS — I63.50 CEREBRAL ARTERY OCCLUSION WITH CEREBRAL INFARCTION (H): ICD-10-CM

## 2017-12-22 DIAGNOSIS — G43.109 MIGRAINE WITH AURA AND WITHOUT STATUS MIGRAINOSUS, NOT INTRACTABLE: Primary | ICD-10-CM

## 2017-12-22 DIAGNOSIS — F41.1 ANXIETY STATE: ICD-10-CM

## 2017-12-22 DIAGNOSIS — F41.0 PANIC ATTACK: ICD-10-CM

## 2017-12-22 DIAGNOSIS — F40.10 SOCIAL ANXIETY DISORDER: ICD-10-CM

## 2017-12-22 PROCEDURE — 99214 OFFICE O/P EST MOD 30 MIN: CPT | Performed by: FAMILY MEDICINE

## 2017-12-22 RX ORDER — LORAZEPAM 1 MG/1
TABLET ORAL
Qty: 12 TABLET | Refills: 0 | Status: SHIPPED | OUTPATIENT
Start: 2018-01-22 | End: 2018-05-01

## 2017-12-22 NOTE — PROGRESS NOTES
SUBJECTIVE:   Chloe Foster is a 37 year old female who presents to clinic today for the following health issues:      ED/UC Followup:    Facility:  [facility]  Date of visit: 12-1-2017  Reason for visit: headache-similar symptoms to 2 previous strokes  Current Status: feeling better- no symptoms since that day     Sx's on 12/1/17-   In morning, woke up, having some numbness in her left lip.  These sx's have happened occasionally since CVA's in '15, mostly happen if she's really tired.    Then sx's progressed- started getting numbness inside of left upper lip, mouth and tongue started getting numb.  She was working from home, alone with her son, so called to have  come home as she was afraid sx's might progress again.  Had feeling of rush, like she might pass out, so called 911.  She felt confused when they got there, bit of a hard time finding words.  Was able to get into her own clothes and get son ready to go to hospital.    At ER, numbness, confusion and lightheadedness had mostly resolved, just didn't feel as sharp as she usually does.  At ER, started getting a headache.  Headache sx's, 6/10, thinks bilateral, vice-like, no nausea, no light/sound sensitivity.    With previous CVA's, she had left side numbness (L arm/leg and face) and left drooping, hard time talking- happened with both CVAs.      Prior to the CVA's, she got an aura, then horrible migraine for a couple days (aura/migraines).  Few days after that was when she had the stroke (L side sx's)- few hrs after appt here.  Sx's only happened at the end of her pregnancy.  After pregnancy, intermittent rare, left face/fingers/arm, healing.  That resolved several months after delivery.    No sx's for ~1 1/2 yrs until now, except if really tired, twitch on left side.  Had one migraine 1/16, six months after delivery.  Since then, PFO was closed (4/21/16).  Has been following loosely with Neuro- Dr. Larkin, [facility].  Would be  interested in a second opinion.  Last appt was quite rushed.        Anxiety- using ativan ~#4 tabs/month.  Uses 1/2 tab, night before she has a lot of meetings/presentations.  Occasionally uses 1 tab.  Puts her mind at rest, able to sleep, wakes up relaxed and better able to get through the day.  Works really well for her. No se's.        Problem list and histories reviewed & adjusted, as indicated.  Additional history: as documented    Patient Active Problem List   Diagnosis     Vitamin D deficiency     CARDIOVASCULAR SCREENING; LDL GOAL LESS THAN 160     Lumbar herniated disc     Hirsutism     Insomnia     IBS (irritable bowel syndrome)     Moderate recurrent major depression (H)     Cerebral artery occlusion with cerebral infarction     History of  section     PFO with atrial septal aneurysm     Protein S deficiency (H)     Migraine with aura and without status migrainosus, not intractable     Pre-conception counseling     Elevated lipoprotein A level     Social anxiety disorder     MTHFR mutation (H)        Current Outpatient Prescriptions   Medication Sig Dispense Refill     [START ON 2018] LORazepam (ATIVAN) 1 MG tablet Use 1/2-1 tab at night prior to meetings or for panic symptoms 12 tablet 0     sertraline (ZOLOFT) 100 MG tablet Take 1 tablet (100 mg) by mouth daily 90 tablet 1     propranolol (INDERAL) 10 MG tablet Take 1 tablet by mouth. Take as needed prior to interviews, stressful events 20 tablet 1     aspirin EC 81 MG EC tablet Take 81 mg by mouth       STATIN NOT PRESCRIBED, INTENTIONAL, Statin not prescribed intentionally due to Woman of childbearing age not actively taking birth control 0 each 0     VITAMIN D, CHOLECALCIFEROL, PO Take 1,000 Units by mouth every evening (takes 2 x 100 unit capsule)        Multiple Vitamins-Minerals (MULTIVITAMIN GUMMIES WOMENS PO) Take 2 chew tab by mouth every evening       NONFORMULARY Take 1 tablet by mouth every evening OTC: B-Right Vitamin  "(Optimized B Complex)       magnesium oxide (MAG-OX) 400 MG tablet Take 400 mg by mouth every evening        Omega-3 Fatty Acids (FISH OIL PO) Take 1 capsule by mouth daily        Allergies   Allergen Reactions     Nkda [No Known Drug Allergies]      Recent Labs   Lab Test  12/01/17   1000  09/30/17   1643  12/29/16   0924   11/23/15   0820 07/23/15   04/29/15   2300  03/29/13   1101   LDL   --    --   126*   --   108*  126   < >   --    --    HDL   --    --   86   --   71  76   < >   --    --    TRIG   --    --   61   --   53  85   < >   --    --    ALT   --    --   25   --   23   --    --   26   --    CR  0.73   --   0.70   < >  0.62   --    --   0.60   --    GFRESTIMATED  89   --   >90  Non  GFR Calc     < >  >90  Non  GFR Calc     --    --   >90  Non  GFR Calc     --    GFRESTBLACK  >90   --   >90   GFR Calc     < >  >90   GFR Calc     --    --   >90   GFR Calc     --    POTASSIUM  4.2   --   4.1   < >  4.0   --    --   3.5   --    TSH   --   0.66   --    --    --    --    --    --   0.44    < > = values in this interval not displayed.      BP Readings from Last 3 Encounters:   12/22/17 120/74   12/01/17 129/85   10/27/17 118/68    Wt Readings from Last 3 Encounters:   12/22/17 160 lb (72.6 kg)   12/01/17 156 lb (70.8 kg)   10/27/17 158 lb (71.7 kg)              Labs reviewed in EPIC    Reviewed and updated as needed this visit by clinical staff  Tobacco  Allergies  Meds  Problems  Med Hx  Surg Hx  Fam Hx  Soc Hx        Reviewed and updated as needed this visit by Provider  Allergies  Meds  Problems          ROS:  Constitutional, HEENT, cardiovascular, pulmonary, gi and gu systems are negative, except as otherwise noted.      OBJECTIVE:   /74  Pulse 80  Temp 98  F (36.7  C) (Oral)  Resp 14  Ht 5' 7\" (1.702 m)  Wt 160 lb (72.6 kg)  SpO2 97%  Breastfeeding? No  BMI 25.06 kg/m2  Body mass index is " 25.06 kg/(m^2).  GENERAL APPEARANCE: healthy, alert and no distress     EYES: PERRL, sclera clear     HENT: nose and mouth without ulcers or lesions     NECK: no adenopathy, no asymmetry, masses, or scars and thyroid normal to palpation     RESP: lungs clear to auscultation - no rales, rhonchi or wheezes     CV: regular rates and rhythm, normal S1 S2, no S3 or S4 and no murmur, click or rub      Abdomen: soft, nontender, no HSM or masses and bowel sounds normal     Ext: warm, dry, no edema      Psych: full range affect, normal speech and grooming, judgement and insight intact      Neuro: CN 2-12 grossly nl, fundi WNL bilaterally, UE and LE strength 5/5, nl sensation and DTR's.  Normal gait.       ASSESSMENT/PLAN:       ICD-10-CM    1. Migraine with aura and without status migrainosus, not intractable G43.109 DEPRESSION ACTION PLAN (DAP)     MIGRAINE ACTION PLAN     NEUROLOGY ADULT REFERRAL   2. Cerebral artery occlusion with cerebral infarction I63.50 NEUROLOGY ADULT REFERRAL   3. CARDIOVASCULAR SCREENING; LDL GOAL LESS THAN 160 Z13.6 Lipid panel reflex to direct LDL Fasting   4. Anxiety state F41.1 LORazepam (ATIVAN) 1 MG tablet   5. Social anxiety disorder F40.10 LORazepam (ATIVAN) 1 MG tablet   6. Panic attack F41.0      Recent episode of L sided numbness/tingling, in setting of h/o CVA x 2 in '15-   Pt with another episode of L sided numbness/tingling which mostly resolved by the time she got to ER in the past week.  Did get headache afterwards, though sounded more tension vs migraine headache.  Pt had similar sx's prior to CVA's during last pregnancy in '15.  Now with closed PFO since those CVA's.  Pt has followed with cardiology and neurology since that time, and has consulted with M.  This is her first significant return of sx's since delivery in '15.  Pt is interested in new neurologist, and will send her to [facility] to allow collaboration between specialists (cardiology and MFM through [facility]  systems).  She is not currently trying to conceive, but is interested in trying in the semi-near future.    Anxiety- pt has continued to use ativan occasionally.  Works best to take night prior to stressful meetings/presentations- allows her to sleep and get up more rested and calm for the day.  Discussed will come up with plan of #4 tabs/mo max (#12 max for [id number]).  Pt agrees with plan, and she will stop ativan completely when trying to get pregnant.  Controlled medication agreement reviewed and signed today, problem list completed    Izabel Reyes MD  [facility]

## 2017-12-22 NOTE — MR AVS SNAPSHOT
After Visit Summary   12/22/2017    Chloe Foster    MRN: [id number]           Patient Information     Date Of Birth          1980        Visit Information        Provider Department      12/22/2017 10:00 AM Izabel Reyes MD [facility]        Today's Diagnoses     Cerebral artery occlusion with cerebral infarction    -  1    Migraine with aura and without status migrainosus, not intractable        CARDIOVASCULAR SCREENING; LDL GOAL LESS THAN 160        Anxiety state        Social anxiety disorder        Panic attack           Follow-ups after your visit        Additional Services     NEUROLOGY ADULT REFERRAL       Your provider has referred you for the following:   Consult at [facility]: Neurology Clinic - [place] [phone]   [email or web address]  Stroke/Endovascular    Please be aware that coverage of these services is subject to the terms and limitations of your health insurance plan.  Call member services at your health plan with any benefit or coverage questions.      Please bring the following with you to your appointment:    (1) Any X-Rays, CTs or MRIs which have been performed.  Contact the facility where they were done to arrange for  prior to your scheduled appointment.    (2) List of current medications  (3) This referral request   (4) Any documents/labs given to you for this referral                  Your next 10 appointments already scheduled     Feb 07, 2018  2:30 PM CST   Return Visit with Carly Gershone, [facility]    [address] [address] [phone]              Future tests that were ordered for you today     Open Future Orders        Priority Expected Expires Ordered    Lipid panel reflex to direct LDL Fasting Routine  12/22/2018 12/22/2017            Who to contact     If you have questions or need follow up information about today's  "clinic visit or your schedule please contact [facility] directly at [phone].  Normal or non-critical lab and imaging results will be communicated to you by MyChart, letter or phone within 4 business days after the clinic has received the results. If you do not hear from us within 7 days, please contact the clinic through [facility] [employer] or phone. If you have a critical or abnormal lab result, we will notify you by phone as soon as possible.  Submit refill requests through [employer] or call your pharmacy and they will forward the refill request to us. Please allow 3 business days for your refill to be completed.          Additional Information About Your Visit        [employer] Information     [employer] gives you secure access to your electronic health record. If you see a primary care provider, you can also send messages to your care team and make appointments. If you have questions, please call your primary care clinic.  If you do not have a primary care provider, please call [phone] and they will assist you.        Care EveryWhere ID     This is your Care EveryWhere ID. This could be used by other organizations to access your [facility] medical records  [id number]        Your Vitals Were     Pulse Temperature Respirations Height Pulse Oximetry Breastfeeding?    80 98  F (36.7  C) (Oral) 14 5' 7\" (1.702 m) 97% No    BMI (Body Mass Index)                   25.06 kg/m2            Blood Pressure from Last 3 Encounters:   12/22/17 120/74   12/01/17 129/85   10/27/17 118/68    Weight from Last 3 Encounters:   12/22/17 160 lb (72.6 kg)   12/01/17 156 lb (70.8 kg)   10/27/17 158 lb (71.7 kg)              We Performed the Following     DEPRESSION ACTION PLAN (DAP)     MIGRAINE ACTION PLAN     NEUROLOGY ADULT REFERRAL          Where to get your medicines      Some of these will need a paper prescription and others can be bought over the counter.  Ask your nurse if you have questions.     Bring a paper prescription " for each of these medications     LORazepam 1 MG tablet          Primary Care Provider Office Phone # Fax #    Izabel Reyes [phone] [address] [address]        Equal Access to Services     ANAIS ARORA : Hadjohn eliane garcía hadgiovannao Soomaali, waaxda luqadaha, qaybta kaalmada adeegyada, paulie celiain hayaan melissaareli reyna alma louis. So [facility] [phone].    ATENCIÓN: Si habla español, tiene a rankin disposición servicios gratuitos de asistencia lingüística. Llame al [phone].    We comply with applicable federal civil rights laws and [place] laws. We do not discriminate on the basis of race, color, national origin, age, disability, sex, sexual orientation, or gender identity.            Thank you!     Thank you for choosing [facility]  for your care. Our goal is always to provide you with excellent care. Hearing back from our patients is one way we can continue to improve our services. Please take a few minutes to complete the written survey that you may receive in the mail after your visit with us. Thank you!             Your Updated Medication List - Protect others around you: Learn how to safely use, store and throw away your medicines at www.disposemymeds.org.          This list is accurate as of: 12/22/17 11:09 AM.  Always use your most recent med list.                   Brand Name Dispense Instructions for use Diagnosis    aspirin EC 81 MG EC tablet      Take 81 mg by mouth        FISH OIL PO      Take 1 capsule by mouth daily        LORazepam 1 MG tablet   Start taking on:  1/22/2018    ATIVAN    12 tablet    Use 1/2-1 tab at night prior to meetings or for panic symptoms    Anxiety state, Social anxiety disorder, Panic attack       magnesium oxide 400 MG tablet    MAG-OX     Take 400 mg by mouth every evening        MULTIVITAMIN GUMMIES WOMENS PO      Take 2 chew tab by mouth every evening        NONFORMULARY      Take 1 tablet by mouth every  evening OTC: B-Right Vitamin (Optimized B Complex)        propranolol 10 MG tablet    INDERAL    20 tablet    Take 1 tablet by mouth. Take as needed prior to interviews, stressful events    Social anxiety disorder       sertraline 100 MG tablet    ZOLOFT    90 tablet    Take 1 tablet (100 mg) by mouth daily    Anxiety state, Social anxiety disorder       STATIN NOT PRESCRIBED (INTENTIONAL)     0 each    Statin not prescribed intentionally due to Woman of childbearing age not actively taking birth control    Cerebral artery occlusion with cerebral infarction (H)       VITAMIN D (CHOLECALCIFEROL) PO      Take 1,000 Units by mouth every evening (takes 2 x 100 unit capsule)

## 2017-12-22 NOTE — LETTER
[facility]    12/22/17    Patient: Chloe Foster  YOB: 1980  Medical Record Number: [id number]                                                                  Controlled Substance Agreement  I understand that my care provider has prescribed controlled substances (narcotics, tranquilizers, and/or stimulants) to help manage my condition(s).  I am taking this medicine to help me function or work.  I know that this is strong medicine.  It could have serious side effects and even cause a dependency on the drug.  If I stop these medicines suddenly, I could have severe withdrawal symptoms.    The risks, benefits, and side effects of these medication(s) were explained to me.  I agree that:  1. I will take part in other treatments as advised by my provider.  This may be psychiatry or counseling, physical therapy, behavioral therapy, group treatment, or a referral to a pain clinic.  I will reduce or stop my medicine when my provider tells me to do so.   2. I will take my medicines as prescribed.  I will not change the dose or schedule unless my provider tells me to.  There will be no refills if I  run out early.   I may be contacted at any time without warning and asked to complete a drug test or pill count.   3. I will keep all my appointments at the clinic.  If I miss appointments or fail to follow instructions, my provider may stop my medicine.  4. I will not ask other providers to prescribe controlled substances. And I will not accept controlled substances from other people. If I need another prescribed controlled substance for a new reason, I will notify my provider within one business day.  5. If I enroll in the [place] Medical Marijuana program, I will tell my provider.  I will also sign an agreement to share my medical records with my provider.  6. I will use one pharmacy to fill all of my controlled substance prescriptions.  If my prescription is mailed to my pharmacy, it may take  5 to 7 days for my medicine to be ready.  7. I understand that my provider, clinic care team, and pharmacy can track controlled substance prescriptions from other providers through a central database (prescription monitoring program).  8. I will bring in my list of medications (or my medicine bottles) each time I come to the clinic.  REV- 04/2016                                                                                                                                            Page 1 of 2      [facility] ([facility]3] [facility]    12/22/17    Patient: Chloe Foster  YOB: 1980  Medical Record Number: [id number]    9. Refills of controlled substances will be made only during office hours.  It is up to me to make sure that I do not run out of my medicines on weekends or holidays.    10. I am responsible for my prescriptions.  If the medicine is lost or stolen, it will not be replaced.   I also agree not to share these medicines with anyone.  11. I agree to not use ANY illegal or recreational drugs.  This includes marijuana, cocaine, bath salts or other drugs.  I agree not to use alcohol unless my provider says I may.  I agree to give urine samples whenever asked.  If I fail to give a urine sample, the provider may stop my medicine.     12. I will tell my nurse or provider right away if I become pregnant or have a new medical problem treated outside of [facility].  13. I understand that this medicine can affect my thinking and judgment.  It may be unsafe for me to drive, use machinery and do dangerous tasks.  I will not do any of these things until I know how the medicine affects me.  If my dose changes, I will wait to see how it affects me.  I will contact my provider if I have concerns about medicine side effects.  I understand that if I do not follow any of the conditions above, my prescriptions or treatment may be stopped.    I agree that my provider, clinic care team, and pharmacy may work with  any city, state or federal law enforcement agency that investigates the misuse, sale, or other diversion of my controlled medicine. I will allow my provider to discuss my care with or share a copy of this agreement with any other treating provider, pharmacy or emergency room where I receive care.  I agree to give up (waive) any right of privacy or confidentiality with respect to these authorizations.   I have read this agreement and have asked questions about anything I did not understand.   ___________________________________    ___________________________  Patient Signature                                                           Date and Time  ___________________________________     ____________________________  Witness                                                                            Date and Time  ___________________________________  Izabel Reyes MD  REV-  04/2016                                                                                                                                                                 Page 2 of 2

## 2017-12-22 NOTE — NURSING NOTE
"Chief Complaint   Patient presents with     Hospital F/U       Initial /74  Pulse 80  Temp 98  F (36.7  C) (Oral)  Resp 14  Ht 5' 7\" (1.702 m)  Wt 160 lb (72.6 kg)  SpO2 97%  Breastfeeding? No  BMI 25.06 kg/m2 Estimated body mass index is 25.06 kg/(m^2) as calculated from the following:    Height as of this encounter: 5' 7\" (1.702 m).    Weight as of this encounter: 160 lb (72.6 kg).  BP completed using cuff size: regular    Health Maintenance that is potentially due pending provider review:  Health Maintenance Due   Topic Date Due     MIGRAINE ACTION PLAN  07/13/1997     INFLUENZA VACCINE (SYSTEM ASSIGNED)  09/01/2017     DEPRESSION ACTION PLAN Q1 YR  12/21/2017         Ordered and flu vaccine done   "

## 2017-12-28 DIAGNOSIS — Z13.6 CARDIOVASCULAR SCREENING; LDL GOAL LESS THAN 160: ICD-10-CM

## 2017-12-28 LAB
CHOLEST SERPL-MCNC: 246 MG/DL
HDLC SERPL-MCNC: 106 MG/DL
LDLC SERPL CALC-MCNC: 129 MG/DL
NONHDLC SERPL-MCNC: 140 MG/DL
TRIGL SERPL-MCNC: 56 MG/DL

## 2017-12-28 PROCEDURE — 36415 COLL VENOUS BLD VENIPUNCTURE: CPT | Performed by: FAMILY MEDICINE

## 2017-12-28 PROCEDURE — 80061 LIPID PANEL: CPT | Performed by: FAMILY MEDICINE

## 2017-12-29 NOTE — PROGRESS NOTES
Here are your lab results from your recent visit...  -Your cholesterol panel looks pretty good with a fine LDL (the bad cholesterol) and a very good HDL (the good cholesterol).       Please let me know if you have any questions.  Best,   Ahmet Reyes MD

## 2018-02-07 ENCOUNTER — OFFICE VISIT (OUTPATIENT)
Dept: BEHAVIORAL HEALTH | Facility: CLINIC | Age: 38
End: 2018-02-07
Payer: COMMERCIAL

## 2018-02-07 DIAGNOSIS — F41.9 ANXIETY: Primary | ICD-10-CM

## 2018-02-07 PROCEDURE — 90834 PSYTX W PT 45 MINUTES: CPT | Performed by: COUNSELOR

## 2018-02-16 NOTE — PROGRESS NOTES
Progress Note    Client Name: Chloe Foster  Date: 2/7/18         Service Type: Individual      Session Start Time: 2:35pm  Session End Time: 3:20pm      Session Length: 45 min     Session #: 8     Attendees: Client attended alone    Treatment Plan Last Reviewed: preliminary at intake  PHQ-9 / AYLA-7 : See updates in EPIC     DATA      Progress Since Last Session (Related to Symptoms / Goals / Homework):   Symptoms: Stable    Homework: NA      Episode of Care Goals: Satisfactory progress - ACTION (Actively working towards change); Intervened by reinforcing change plan / affirming steps taken     Current / Ongoing Stressors and Concerns:   Client reported her father has been experiencing several medical issues. Said she has spent significant time with him at the hospital. Reported she and her  have been discussing whether or not to have another child. Reported she wants to do this, and  has reservations due to client's health. Client expressed frustration with this.     Treatment Objective(s) Addressed in This Session:   Problem-solve stressors effectively.  Learn coping strategies for anxiety.     Intervention:   Encouraged ongoing self-care strategies.  Processed feelings about dynamics with  and having another baby.  Processed feelings about father's health concerns.  Validated concerns/feelings.        ASSESSMENT: Current Emotional / Mental Status (status of significant symptoms):   Risk status (Self / Other harm or suicidal ideation)   Client denies current fears or concerns for personal safety.   Client denies current or recent suicidal ideation or behaviors.   Client denies current or recent homicidal ideation or behaviors.   Client denies current or recent self injurious behavior or ideation.   Client denies other safety concerns.   A safety and risk management plan has not been developed at this time, however client was  "given the after-hours number / 911 should there be a change in any of these risk factors.     Appearance:   Appropriate    Eye Contact:   Good    Psychomotor Behavior: Normal    Attitude:   Cooperative    Orientation:   All   Speech    Rate / Production: Normal     Volume:  Normal    Mood:    \"pretty good\"   Affect:    Appropriate    Thought Content:  Rumination    Thought Form:  Coherent  Logical    Insight:    Good      Medication Review:   No changes to current psychiatric medication(s)     Medication Compliance:   Yes     Changes in Health Issues:   None reported     Chemical Use Review:   Substance Use: Chemical use reviewed, no active concerns identified      Tobacco Use: No current tobacco use.       Collateral Reports Completed:   Not Applicable    PLAN: (Client Tasks / Therapist Tasks / Other)  Continue to meet with client every-other week. Follow-up appts scheduled. Continue discussion of stressors and coping. Continue to monitor mood and thought process.        Carly Gershone, LPCC                                                            "

## 2018-03-12 ENCOUNTER — OFFICE VISIT (OUTPATIENT)
Dept: FAMILY MEDICINE | Facility: CLINIC | Age: 38
End: 2018-03-12
Payer: COMMERCIAL

## 2018-03-12 VITALS
DIASTOLIC BLOOD PRESSURE: 72 MMHG | HEART RATE: 80 BPM | TEMPERATURE: 97.4 F | OXYGEN SATURATION: 99 % | BODY MASS INDEX: 26.53 KG/M2 | SYSTOLIC BLOOD PRESSURE: 114 MMHG | RESPIRATION RATE: 14 BRPM | HEIGHT: 67 IN | WEIGHT: 169 LBS

## 2018-03-12 DIAGNOSIS — J06.9 VIRAL URI WITH COUGH: Primary | ICD-10-CM

## 2018-03-12 PROCEDURE — 99213 OFFICE O/P EST LOW 20 MIN: CPT | Performed by: FAMILY MEDICINE

## 2018-03-12 RX ORDER — BENZONATATE 200 MG/1
200 CAPSULE ORAL 3 TIMES DAILY PRN
Qty: 21 CAPSULE | Refills: 0 | Status: SHIPPED | OUTPATIENT
Start: 2018-03-12 | End: 2018-08-15

## 2018-03-12 RX ORDER — AZITHROMYCIN 250 MG/1
TABLET, FILM COATED ORAL
Qty: 6 TABLET | Refills: 0 | Status: SHIPPED | OUTPATIENT
Start: 2018-03-12 | End: 2018-07-17

## 2018-03-12 NOTE — MR AVS SNAPSHOT
After Visit Summary   3/12/2018    Chloe Foster    MRN: [id number]           Patient Information     Date Of Birth          1980        Visit Information        Provider Department      3/12/2018 8:45 AM Tlyer Montes De Oca MD [facility]        Today's Diagnoses     Viral URI with cough    -  1      Care Instructions      Viral Upper Respiratory Illness (Adult)  You have a viral upper respiratory illness (URI), which is another term for the common cold. This illness is contagious during the first few days. It is spread through the air by coughing and sneezing. It may also be spread by direct contact (touching the sick person and then touching your own eyes, nose, or mouth). Frequent handwashing will decrease risk of spread. Most viral illnesses go away within 7 to 10 days with rest and simple home remedies. Sometimes the illness may last for several weeks. Antibiotics will not kill a virus, and they are generally not prescribed for this condition.    Home care    If symptoms are severe, rest at home for the first 2 to 3 days. When you resume activity, don't let yourself get too tired.    Avoid being exposed to cigarette smoke (yours or others ).    You may use acetaminophen or ibuprofen to control pain and fever, unless another medicine was prescribed. (Note: If you have chronic liver or kidney disease, have ever had a stomach ulcer or gastrointestinal bleeding, or are taking blood-thinning medicines, talk with your healthcare provider before using these medicines.) Aspirin should never be given to anyone under 18 years of age who is ill with a viral infection or fever. It may cause severe liver or brain damage.    Your appetite may be poor, so a light diet is fine. Avoid dehydration by drinking 6 to 8 glasses of fluids per day (water, soft drinks, juices, tea, or soup). Extra fluids will help loosen secretions in the nose and lungs.    Over-the-counter cold medicines will not  shorten the length of time you re sick, but they may be helpful for the following symptoms: cough, sore throat, and nasal and sinus congestion. (Note: Do not use decongestants if you have high blood pressure.)  Follow-up care  Follow up with your healthcare provider, or as advised.  When to seek medical advice  Call your healthcare provider right away if any of these occur:    Cough with lots of colored sputum (mucus)    Severe headache; face, neck, or ear pain    Difficulty swallowing due to throat pain    Fever of 100.4 F (38 C)  Call 911, or get immediate medical care  Call emergency services right away if any of these occur:    Chest pain, shortness of breath, wheezing, or difficulty breathing    Coughing up blood    Inability to swallow due to throat pain  Date Last Reviewed: 9/13/2015 2000-2017 [employer]. [address] [place] [address]. All rights reserved. This information is not intended as a substitute for professional medical care. Always follow your healthcare professional's instructions.                Follow-ups after your visit        Follow-up notes from your care team     Return if symptoms worsen or fail to improve.      Your next 10 appointments already scheduled     Apr 04, 2018  3:30 PM CDT   Return Visit with Carly Gershone, [facility] ([facility])    [address] [place] [address] [phone]            Jul 10, 2018 12:20 PM CDT   (Arrive by 12:05 PM)   New Stroke with Anirudh Ontiveros MD   [facility] ([facility])    [address]  3rd Floor  [facility] [address] [phone]              Who to contact     If you have questions or need follow up information about today's clinic visit or your schedule please contact [facility] directly at [phone].  Normal or non-critical lab and imaging results will be communicated to you by MyChart, letter or  "phone within 4 business days after the clinic has received the results. If you do not hear from us within 7 days, please contact the clinic through [employer] or phone. If you have a critical or abnormal lab result, we will notify you by phone as soon as possible.  Submit refill requests through [employer] or call your pharmacy and they will forward the refill request to us. Please allow 3 business days for your refill to be completed.          Additional Information About Your Visit        Local [facility] Information     [employer] gives you secure access to your electronic health record. If you see a primary care provider, you can also send messages to your care team and make appointments. If you have questions, please call your primary care clinic.  If you do not have a primary care provider, please call [phone] and they will assist you.        Care EveryWhere ID     This is your Care EveryWhere ID. This could be used by other organizations to access your [facility] medical records  [id number]        Your Vitals Were     Pulse Temperature Respirations Height Pulse Oximetry Breastfeeding?    80 97.4  F (36.3  C) (Tympanic) 14 5' 7\" (1.702 m) 99% No    BMI (Body Mass Index)                   26.47 kg/m2            Blood Pressure from Last 3 Encounters:   03/12/18 114/72   12/22/17 120/74   12/01/17 129/85    Weight from Last 3 Encounters:   03/12/18 169 lb (76.7 kg)   12/22/17 160 lb (72.6 kg)   12/01/17 156 lb (70.8 kg)              Today, you had the following     No orders found for display         Today's Medication Changes          These changes are accurate as of 3/12/18  9:09 AM.  If you have any questions, ask your nurse or doctor.               Start taking these medicines.        Dose/Directions    azithromycin 250 MG tablet   Commonly known as:  ZITHROMAX   Used for:  Viral URI with cough   Started by:  Tyler Montes De Oca MD        Two tablets first day, then one tablet daily for four days.   Quantity:  6 tablet "   Refills:  0       benzonatate 200 MG capsule   Commonly known as:  TESSALON   Used for:  Viral URI with cough   Started by:  Tyler Montes De Oca MD        Dose:  200 mg   Take 1 capsule (200 mg) by mouth 3 times daily as needed for cough   Quantity:  21 capsule   Refills:  0            Where to get your medicines      These medications were sent to [facility] [id number] [place] [address] [address] AT [facility] [address], [facility] [address]     Phone:  [phone]     azithromycin 250 MG tablet    benzonatate 200 MG capsule                Primary Care Provider Office Phone # Fax #    Izabel Reyes [phone] [address] [address]        Equal Access to Services     ANAIS ARORA : Hadii eliane garcía hadasho Soomaali, waaxda luqadaha, qaybta kaalmada adeegyada, paulie marquez . So [facility] [phone].    ATENCIÓN: Si habla español, tiene a rankin disposición servicios gratuitos de asistencia lingüística. [facility] [phone].    We comply with applicable federal civil rights laws and [place] laws. We do not discriminate on the basis of race, color, national origin, age, disability, sex, sexual orientation, or gender identity.            Thank you!     Thank you for choosing [facility]  for your care. Our goal is always to provide you with excellent care. Hearing back from our patients is one way we can continue to improve our services. Please take a few minutes to complete the written survey that you may receive in the mail after your visit with us. Thank you!             Your Updated Medication List - Protect others around you: Learn how to safely use, store and throw away your medicines at www.disposemymeds.org.          This list is accurate as of 3/12/18  9:09 AM.  Always use your most recent med list.                   Brand Name Dispense Instructions for use Diagnosis    aspirin  EC 81 MG EC tablet      Take 81 mg by mouth        azithromycin 250 MG tablet    ZITHROMAX    6 tablet    Two tablets first day, then one tablet daily for four days.    Viral URI with cough       benzonatate 200 MG capsule    TESSALON    21 capsule    Take 1 capsule (200 mg) by mouth 3 times daily as needed for cough    Viral URI with cough       FISH OIL PO      Take 1 capsule by mouth daily        LORazepam 1 MG tablet    ATIVAN    12 tablet    Use 1/2-1 tab at night prior to meetings or for panic symptoms    Anxiety state, Social anxiety disorder       magnesium oxide 400 MG tablet    MAG-OX     Take 400 mg by mouth every evening        MULTIVITAMIN GUMMIES WOMENS PO      Take 2 chew tab by mouth every evening        NONFORMULARY      Take 1 tablet by mouth every evening OTC: B-Right Vitamin (Optimized B Complex)        propranolol 10 MG tablet    INDERAL    20 tablet    Take 1 tablet by mouth. Take as needed prior to interviews, stressful events    Social anxiety disorder       sertraline 100 MG tablet    ZOLOFT    90 tablet    Take 1 tablet (100 mg) by mouth daily    Anxiety state, Social anxiety disorder       STATIN NOT PRESCRIBED (INTENTIONAL)     0 each    Statin not prescribed intentionally due to Woman of childbearing age not actively taking birth control    Cerebral artery occlusion with cerebral infarction (H)       VITAMIN D (CHOLECALCIFEROL) PO      Take 1,000 Units by mouth every evening (takes 2 x 100 unit capsule)

## 2018-03-12 NOTE — PROGRESS NOTES
SUBJECTIVE:   Chloe Foster is a 37 year old female who presents to clinic today for the following health issues:      RESPIRATORY SYMPTOMS      Duration: 3 weeks-cough worse in last week-worse at night    Description  nasal congestion, rhinorrhea, sore throat, cough, fatigue/malaise and hoarse voice    Severity: severe    Accompanying signs and symptoms: cough waking pt at night    History (predisposing factors):  None-toddler is in     Precipitating or alleviating factors: None    Therapies tried and outcome:  rest and fluids OTC NSAID guaifenesin OTC Delsym (not working)    37-year-old who presents with worsening upper respiratory tract symptoms for the past 3 weeks. She report productive cough that is worse at night.  She denies any chest pain, palpitations, shortness of breath, fevers or chills.     Problem list and histories reviewed & adjusted, as indicated.  Additional history: as documented    Patient Active Problem List   Diagnosis     Vitamin D deficiency     CARDIOVASCULAR SCREENING; LDL GOAL LESS THAN 160     Lumbar herniated disc     Hirsutism     Insomnia     IBS (irritable bowel syndrome)     Moderate recurrent major depression (H)     Cerebral artery occlusion with cerebral infarction     History of  section     PFO with atrial septal aneurysm     Protein S deficiency (H)     Migraine with aura and without status migrainosus, not intractable     Pre-conception counseling     Elevated lipoprotein A level     Social anxiety disorder     MTHFR mutation (H)     Past Surgical History:   Procedure Laterality Date     ARTHROSCOPY KNEE RT/LT      Rt knee     HC REPAIR OF NASAL SEPTUM       REPAIR PATENT FORAMEN OVALE  2016       Social History   Substance Use Topics     Smoking status: Former Smoker     Packs/day: 0.30     Years: 14.00     Quit date: 2014     Smokeless tobacco: Former User     Quit date: 2014     Alcohol use 0.0 oz/week     0 Standard drinks or  "equivalent per week      Comment: occ.     Family History   Problem Relation Age of Onset     CANCER Father      throat     Hypertension Father      Allergies Father      DIABETES Maternal Grandmother      on insulin     C.A.D. Maternal Grandmother      triple bypass in her 60s     DIABETES Paternal Grandmother      C.A.D. Maternal Grandfather      unsure type     Neurologic Disorder Paternal Grandfather       of brain aneurysm in early 60s     DIABETES Maternal Aunt       in her early 40s of DM complications, type 2           Reviewed and updated as needed this visit by clinical staff  Tobacco  Allergies  Meds  Med Hx  Surg Hx  Fam Hx  Soc Hx      Reviewed and updated as needed this visit by Provider         ROS:  Constitutional, HEENT, cardiovascular, pulmonary, gi and gu systems are negative, except as otherwise noted.    OBJECTIVE:     /72  Pulse 80  Temp 97.4  F (36.3  C) (Tympanic)  Resp 14  Ht 5' 7\" (1.702 m)  Wt 169 lb (76.7 kg)  SpO2 99%  Breastfeeding? No  BMI 26.47 kg/m2  Body mass index is 26.47 kg/(m^2).  GENERAL: healthy, alert and no distress  HENT: ear canals and TM's normal, nose and mouth without ulcers or lesions  RESP: lungs clear to auscultation - no rales, rhonchi or wheezes  CV: regular rate and rhythm, normal S1 S2, no S3 or S4, no murmur, click or rub, no peripheral edema and peripheral pulses strong    Diagnostic Test Results:  none     ASSESSMENT/PLAN:   1. Viral URI with cough  Assessment: Lung examination was unremarkable.  Patient was informed that symptoms are consistent with a viral upper respiratory tract infection. Cough can linger for up to a month after URI.  If symptoms persisted and the sputum becomes more productive or if she develops any fevers,she should start taking antibiotics.  Plan:  - azithromycin (ZITHROMAX) 250 MG tablet; Two tablets first day, then one tablet daily for four days.  Dispense: 6 tablet; Refill: 0  - benzonatate (TESSALON) 200 " MG capsule; Take 1 capsule (200 mg) by mouth 3 times daily as needed for cough  Dispense: 21 capsule; Refill: 0  - The patient was informed that if the parents and it does not work for her we will give her a prescription of Tylenol with Codeine.     Tyler Montes De Oca MD  [facility]  PAGER: [phone] or (W): [phone]

## 2018-03-12 NOTE — NURSING NOTE
"Chief Complaint   Patient presents with     URI       Initial /72  Pulse 80  Temp 97.4  F (36.3  C) (Tympanic)  Resp 14  Ht 5' 7\" (1.702 m)  Wt 169 lb (76.7 kg)  SpO2 99%  Breastfeeding? No  BMI 26.47 kg/m2 Estimated body mass index is 26.47 kg/(m^2) as calculated from the following:    Height as of this encounter: 5' 7\" (1.702 m).    Weight as of this encounter: 169 lb (76.7 kg).  BP completed using cuff size: regular    Health Maintenance that is potentially due pending provider review:  There are no preventive care reminders to display for this patient.      n/a  "

## 2018-03-12 NOTE — PATIENT INSTRUCTIONS

## 2018-03-13 ENCOUNTER — MYC MEDICAL ADVICE (OUTPATIENT)
Dept: FAMILY MEDICINE | Facility: CLINIC | Age: 38
End: 2018-03-13

## 2018-03-13 DIAGNOSIS — J20.9 ACUTE BRONCHITIS, UNSPECIFIED ORGANISM: Primary | ICD-10-CM

## 2018-03-13 DIAGNOSIS — J06.9 VIRAL URI WITH COUGH: ICD-10-CM

## 2018-03-13 NOTE — TELEPHONE ENCOUNTER
Rx for acetaminophen with codeine written.   hopefully this helps with the cough.  Obed Montes De Oca

## 2018-05-01 ENCOUNTER — MYC MEDICAL ADVICE (OUTPATIENT)
Dept: FAMILY MEDICINE | Facility: CLINIC | Age: 38
End: 2018-05-01

## 2018-05-01 DIAGNOSIS — F40.10 SOCIAL ANXIETY DISORDER: ICD-10-CM

## 2018-05-01 DIAGNOSIS — F41.1 ANXIETY STATE: ICD-10-CM

## 2018-05-01 RX ORDER — LORAZEPAM 1 MG/1
TABLET ORAL
Qty: 12 TABLET | Refills: 0 | Status: SHIPPED | OUTPATIENT
Start: 2018-05-01 | End: 2018-08-15

## 2018-05-01 RX ORDER — LORAZEPAM 1 MG/1
TABLET ORAL
Start: 2018-05-01

## 2018-05-01 NOTE — TELEPHONE ENCOUNTER
CW  Controlled Substance Refill Request for Lorazepam 1 mg    Last refill: 1/24/2018 - #12    Last clinic visit: 12/22/2017     Clinic visit frequency required: Q 6  months  Next appt: Not future appt. scheduled    Controlled substance agreement on file: Yes:  Date 12/22/2017.    Documentation in problem list reviewed:  Yes    Processing:  Fax Rx to [facility]   Thanks, Ondina Parr, RN      [place] ([place] reviewed:  reviewed- no concerns  [place]  [email or web address]

## 2018-05-08 ENCOUNTER — APPOINTMENT (OUTPATIENT)
Dept: MRI IMAGING | Facility: CLINIC | Age: 38
End: 2018-05-08
Attending: EMERGENCY MEDICINE
Payer: COMMERCIAL

## 2018-05-08 ENCOUNTER — HOSPITAL ENCOUNTER (EMERGENCY)
Facility: CLINIC | Age: 38
Discharge: HOME | End: 2018-05-08
Attending: EMERGENCY MEDICINE | Admitting: EMERGENCY MEDICINE
Payer: COMMERCIAL

## 2018-05-08 VITALS
RESPIRATION RATE: 16 BRPM | SYSTOLIC BLOOD PRESSURE: 114 MMHG | WEIGHT: 169.2 LBS | HEIGHT: 67 IN | TEMPERATURE: 98.1 F | DIASTOLIC BLOOD PRESSURE: 71 MMHG | BODY MASS INDEX: 26.56 KG/M2 | OXYGEN SATURATION: 99 %

## 2018-05-08 DIAGNOSIS — R20.2 PARESTHESIA: ICD-10-CM

## 2018-05-08 LAB
ANION GAP SERPL CALCULATED.3IONS-SCNC: 9 MMOL/L (ref 3–14)
BASOPHILS # BLD AUTO: 0 10E9/L (ref 0–0.2)
BASOPHILS NFR BLD AUTO: 0.4 %
BUN SERPL-MCNC: 20 MG/DL (ref 7–30)
CALCIUM SERPL-MCNC: 9.4 MG/DL (ref 8.5–10.1)
CHLORIDE SERPL-SCNC: 107 MMOL/L (ref 94–109)
CO2 SERPL-SCNC: 23 MMOL/L (ref 20–32)
CREAT SERPL-MCNC: 0.59 MG/DL (ref 0.52–1.04)
DIFFERENTIAL METHOD BLD: NORMAL
EOSINOPHIL # BLD AUTO: 0.1 10E9/L (ref 0–0.7)
EOSINOPHIL NFR BLD AUTO: 0.8 %
ERYTHROCYTE [DISTWIDTH] IN BLOOD BY AUTOMATED COUNT: 13.7 % (ref 10–15)
GFR SERPL CREATININE-BSD FRML MDRD: >90 ML/MIN/1.7M2
GLUCOSE SERPL-MCNC: 95 MG/DL (ref 70–99)
HCG SERPL QL: NEGATIVE
HCT VFR BLD AUTO: 38.2 % (ref 35–47)
HGB BLD-MCNC: 12.8 G/DL (ref 11.7–15.7)
IMM GRANULOCYTES # BLD: 0 10E9/L (ref 0–0.4)
IMM GRANULOCYTES NFR BLD: 0.1 %
LYMPHOCYTES # BLD AUTO: 2.3 10E9/L (ref 0.8–5.3)
LYMPHOCYTES NFR BLD AUTO: 32 %
MCH RBC QN AUTO: 29.8 PG (ref 26.5–33)
MCHC RBC AUTO-ENTMCNC: 33.5 G/DL (ref 31.5–36.5)
MCV RBC AUTO: 89 FL (ref 78–100)
MONOCYTES # BLD AUTO: 0.7 10E9/L (ref 0–1.3)
MONOCYTES NFR BLD AUTO: 9.2 %
NEUTROPHILS # BLD AUTO: 4.1 10E9/L (ref 1.6–8.3)
NEUTROPHILS NFR BLD AUTO: 57.5 %
NRBC # BLD AUTO: 0 10*3/UL
NRBC BLD AUTO-RTO: 0 /100
PLATELET # BLD AUTO: 265 10E9/L (ref 150–450)
POTASSIUM SERPL-SCNC: 3.6 MMOL/L (ref 3.4–5.3)
RBC # BLD AUTO: 4.3 10E12/L (ref 3.8–5.2)
SODIUM SERPL-SCNC: 139 MMOL/L (ref 133–144)
WBC # BLD AUTO: 7.1 10E9/L (ref 4–11)

## 2018-05-08 PROCEDURE — 96374 THER/PROPH/DIAG INJ IV PUSH: CPT | Mod: 59

## 2018-05-08 PROCEDURE — 70553 MRI BRAIN STEM W/O & W/DYE: CPT

## 2018-05-08 PROCEDURE — 25000128 H RX IP 250 OP 636: Performed by: EMERGENCY MEDICINE

## 2018-05-08 PROCEDURE — 27210995 ZZH RX 272: Performed by: EMERGENCY MEDICINE

## 2018-05-08 PROCEDURE — 96376 TX/PRO/DX INJ SAME DRUG ADON: CPT

## 2018-05-08 PROCEDURE — 99285 EMERGENCY DEPT VISIT HI MDM: CPT | Mod: 25

## 2018-05-08 PROCEDURE — 84703 CHORIONIC GONADOTROPIN ASSAY: CPT | Performed by: EMERGENCY MEDICINE

## 2018-05-08 PROCEDURE — A9585 GADOBUTROL INJECTION: HCPCS | Performed by: EMERGENCY MEDICINE

## 2018-05-08 PROCEDURE — 80048 BASIC METABOLIC PNL TOTAL CA: CPT | Performed by: EMERGENCY MEDICINE

## 2018-05-08 PROCEDURE — 70549 MR ANGIOGRAPH NECK W/O&W/DYE: CPT

## 2018-05-08 PROCEDURE — 85025 COMPLETE CBC W/AUTO DIFF WBC: CPT | Performed by: EMERGENCY MEDICINE

## 2018-05-08 PROCEDURE — 70544 MR ANGIOGRAPHY HEAD W/O DYE: CPT | Mod: XS

## 2018-05-08 RX ORDER — LORAZEPAM 2 MG/ML
0.5 INJECTION INTRAMUSCULAR ONCE
Status: COMPLETED | OUTPATIENT
Start: 2018-05-08 | End: 2018-05-08

## 2018-05-08 RX ORDER — ACYCLOVIR 200 MG/1
60 CAPSULE ORAL ONCE
Status: COMPLETED | OUTPATIENT
Start: 2018-05-08 | End: 2018-05-08

## 2018-05-08 RX ORDER — GADOBUTROL 604.72 MG/ML
10 INJECTION INTRAVENOUS ONCE
Status: COMPLETED | OUTPATIENT
Start: 2018-05-08 | End: 2018-05-08

## 2018-05-08 RX ORDER — IOPAMIDOL 755 MG/ML
120 INJECTION, SOLUTION INTRAVASCULAR ONCE
Status: DISCONTINUED | OUTPATIENT
Start: 2018-05-08 | End: 2018-05-08 | Stop reason: CLARIF

## 2018-05-08 RX ADMIN — LORAZEPAM 0.5 MG: 2 INJECTION INTRAMUSCULAR; INTRAVENOUS at 17:05

## 2018-05-08 RX ADMIN — LORAZEPAM 0.5 MG: 2 INJECTION INTRAMUSCULAR; INTRAVENOUS at 17:59

## 2018-05-08 RX ADMIN — GADOBUTROL 10 ML: 604.72 INJECTION INTRAVENOUS at 18:42

## 2018-05-08 RX ADMIN — SODIUM CHLORIDE 60 ML: 9 INJECTION, SOLUTION INTRAMUSCULAR; INTRAVENOUS; SUBCUTANEOUS at 18:42

## 2018-05-08 ASSESSMENT — ENCOUNTER SYMPTOMS
NUMBNESS: 1
HEADACHES: 1
FEVER: 0

## 2018-05-08 NOTE — CONSULTS
[facility]    Vascular Neurology / Stroke Consultation Note     Chloe Foster MRN# [id number]   YOB: 1980 Age: 37 year old    Code Status:Prior   Date of Admission: 5/8/2018  Date of Consult: 05/08/2018    _________________________________   Primary Care Physician   Izabel Reyes      ______________________________________________         Assessment & Plan     Reason for consult: Code stroke      Presentation: ED   Time called: 1627   Time patient seen by neurology: 1630, de-escalated: 1641   Time onset of stroke symptoms / witnessed onset: 1430   Time last known well: 1430   Time head CT read: Cancelled, go to MRI   Preliminary read on imaging: --   IV tPA No   IR procedure: No   Telestroke used: No   Type of event Possible migraine      Patient was not treated with rt-TP due to the following reason(s):  Rapidly improving symptoms         [employer] Stroke Scale  Exam Interval: Baseline   Score    Level of consciousness: (0)   Alert, keenly responsive    LOC questions: (0)   Answers both questions correctly    LOC commands: (0)   Performs both tasks correctly    Best gaze: (0)   Normal    Visual: (0)   No visual loss    Facial palsy: (0)   Normal symmetrical movements    Motor arm (left): (0)   No drift    Motor arm (right): (0)   No drift    Motor leg (left): (0)   No drift    Motor leg (right): (0)   No drift    Limb ataxia: (0)   Absent    Sensory: (0)   Normal- no sensory loss    Best language: (0)   Normal- no aphasia    Dysarthria: (0)   Normal    Extinction and inattention: (0)   No abnormality        Total Score:  0     ______________________________________________  #1. Transient neurological symptoms  --symptoms have resolved, not a candidate for IV tPA or IR due to resolution of symptoms  --will get MRI/MRA brain for further evaluation  --differential includes complicated migraine vs seizure with now repeated spells of same  symptoms  ------if MRI negative, can be DC with close follow up in clinic due to recurrent symptoms  ------if positive, admit for further evaluation  #2. History of stroke  --stroke approximately 3 years ago with left sided involvement which resolved  --extensive workup with negative hypercoag testing  --thought to be due to PFO, which has since been closed  --patient had returned to normal  #. DVT Prophylaxis  --Mechanical         #. Code Status: Full Code     TIME:   Neurocritical care time:  60 minutes for evaluation and management of stroke code. Patient is critically ill / has a very high risk of deterioration        Chief Complaint   ______________________________________________  Left arm numbness  History is obtained from the patient and electronic health record    History of Present Illness   ______________________________________________  Chloe Foster is a 37 year old female who presented with left upper arm numbness. Symptoms started around 1430 this afternoon. She notes she had a visit in the ED back in December 2017 with similar symptoms that resolved on their own at that time, as well. She had a stroke approximately 3 years ago with thorough workup, thought to be due to PFO, which has since been closed. She had extensive workup for hypercoagulability, which was negative. She did follow with Dr. Winter at [facility] for a time after, but has not seen him in a while. She was referred to a neurologist at the [facility] from her primary, whom she has not seen yet - has appointment set up in July (earliest available). Code stroke was called due to symptoms.    On exam, symptoms have essentially resolved. She has no focal neurological deficits at this time. Will de-escalate stroke code as she is not a candidate for IV tPA or IR treatment due to no neurological deficits at this time. Will get MRI/MRA and if negative, recommend ok to discharge and follow up with Dr. Winter in the next  couple weeks. Admit for further evaluation if positive.     Past Medical History    ______________________________________________  Past Medical History:   Diagnosis Date     Acute stress reaction     propranolol helps prior to interviews, stressful situations     Allergic rhinitis, cause unspecified     no meds except prn flonase, tests generally positive, decided against shots     Anxiety state, unspecified     citalopram started in 7/06, stopped after couple wks, felt sluggish/tired     CVA (cerebral vascular accident) (H)     May 1, May 14th 2015     Depressive disorder, not elsewhere classified     dx, but not sure this is correct, weaned off wellbutrin (4/05-6/06, 12/06-1/08), restarted 3/08     Elevated lipoprotein A level      Hirsutism     saw endo, inconclusive, started on spironolactone (helped a little), stopped in 6/06, elevated DHEA- sees new endo 4/08     PFO (patent foramen ovale)     Post PFO/ASD closure with 30mm Amplatzer device 4/21/16     Urinary tract infection, site not specified     recurrent, start nitrofur in 5/08, 6mo txt     Past Surgical History   ______________________________________________  Past Surgical History:   Procedure Laterality Date     ARTHROSCOPY KNEE RT/LT  1995    Rt knee     HC REPAIR OF NASAL SEPTUM  12/05     REPAIR PATENT FORAMEN OVALE  04/2016     Prior to Admission Medications   ______________________________________________  Prior to Admission Medications   Prescriptions Last Dose Informant Patient Reported? Taking?   LORazepam (ATIVAN) 1 MG tablet   No No   Sig: Use 1/2-1 tab at night prior to meetings or for panic symptoms   Multiple Vitamins-Minerals (MULTIVITAMIN GUMMIES WOMENS PO)  Self Yes No   Sig: Take 2 chew tab by mouth every evening   NONFORMULARY  Self Yes No   Sig: Take 1 tablet by mouth every evening OTC: B-Right Vitamin (Optimized B Complex)   Omega-3 Fatty Acids (FISH OIL PO)  Self Yes No   Sig: Take 1 capsule by mouth daily    STATIN NOT  PRESCRIBED, INTENTIONAL,   No No   Sig: Statin not prescribed intentionally due to Woman of childbearing age not actively taking birth control   VITAMIN D, CHOLECALCIFEROL, PO  Self Yes No   Sig: Take 1,000 Units by mouth every evening (takes 2 x 100 unit capsule)    acetaminophen-codeine (TYLENOL #3) 300-30 MG per tablet   No No   Sig: Take 1 tablet by mouth every 8 hours as needed (cough. DON'T Take it with Ativan.) maximum 3 tablet(s) per day   aspirin EC 81 MG EC tablet   Yes No   Sig: Take 81 mg by mouth   azithromycin (ZITHROMAX) 250 MG tablet   No No   Sig: Two tablets first day, then one tablet daily for four days.   benzonatate (TESSALON) 200 MG capsule   No No   Sig: Take 1 capsule (200 mg) by mouth 3 times daily as needed for cough   magnesium oxide (MAG-OX) 400 MG tablet  Self Yes No   Sig: Take 400 mg by mouth every evening    propranolol (INDERAL) 10 MG tablet   No No   Sig: Take 1 tablet by mouth. Take as needed prior to interviews, stressful events   sertraline (ZOLOFT) 100 MG tablet   No No   Sig: Take 1 tablet (100 mg) by mouth daily      Facility-Administered Medications: None     Allergies   Allergies   Allergen Reactions     Nkda [No Known Drug Allergies]        Social History   ______________________________________________  Social History     Social History     Marital status:      Spouse name: N/A     Number of children: 0     Years of education: 15     Occupational History           [employer]     Social History Main Topics     Smoking status: Former Smoker     Packs/day: 0.30     Years: 14.00     Quit date: 4/21/2014     Smokeless tobacco: Former User     Quit date: 9/2/2014     Alcohol use 0.0 oz/week     0 Standard drinks or equivalent per week      Comment: occ.     Drug use: No     Sexual activity: Yes     Partners: Male     Other Topics Concern     Caffeine Concern No     1 cup coffee per day     Sleep Concern No     Stress Concern No     Weight Concern No      Special Diet No     Exercise Yes     4-5 days week     Parent/Sibling W/ Cabg, Mi Or Angioplasty Before 65f 55m? No     Social History Narrative    Social Documentation:        Balanced Diet: YES    Calcium intake: 3 per day    Caffeine: 0 per day    Exercise:  type of activity ellipitcal, pilates;  5 times per week    Sunscreen: Yes    Seatbelts:  Yes    Self Breast Exam:  Yes    Physical/Emotional/Sexual Abuse: No    Do you feel safe in your environment? Yes        Cholesterol screen up to date: Yes- checking today 09, non fasting    Eye Exam up to date: Yes    Dental Exam up to date: Yes    Pap smear up to date: Yes: checking today 09, last was  NIL    Mammogram up to date: Does Not Apply    Dexa Scan up to date: Does Not Apply    Colonoscopy up to date: Does Not Apply    Immunizations up to date: Yes, had one a month ago.    Glucose screen if over 40:  No        Juliteh Matthews MA    09       Family History   ______________________________________________  Family History   Problem Relation Age of Onset     CANCER Father      throat     Hypertension Father      Allergies Father      DIABETES Maternal Grandmother      on insulin     C.A.D. Maternal Grandmother      triple bypass in her 60s     DIABETES Paternal Grandmother      C.A.D. Maternal Grandfather      unsure type     Neurologic Disorder Paternal Grandfather       of brain aneurysm in early 60s     DIABETES Maternal Aunt       in her early 40s of DM complications, type 2       Review of Systems   ______________________________________________  A comprehensive review of  10 systems was performed and found to be negative except as described in this note  CONSTITUTIONAL: negative for fever, chills, change in weight  INTEGUMENTARY/SKIN: no rash or obvious new lesions  ENT/MOUTH: no sore throat, new sinus pain or nasal drainage, no neck mass noted  RESP: No pleuretic pain, No cough, no hemoptysis, No SOB   CV: negative for  "chest pain, palpitations or peripheral edema  GI: no nausea, vomiting, change in stools  : no dysuria or hematuria  MUSCULOSKELETAL: no myalgias, arthralgias or join efffusion  ENDOCRINE: no history of polyuria, polydyspsia or symptoms of thyroid dysfunction  PSYCHIATRIC: no change in mood stable  LYMPHATIC: no new lymphadenopathy  HEME: no bleeding or easy bruisability  NEURO: see HPI    Physical Exam   ______________________________________________  Weight:169 lbs 3.2 oz; Height:5' 7\"  Temp: 98.1  F (36.7  C) Temp src: Oral BP: 151/90   Heart Rate: 80 Resp: 18 SpO2: 98 % O2 Device: None (Room air)    General Appearance:  No acute distress  Neuro:       Mental Status Exam:   Awake, alert, oriented X3. Speech and language are intact. Mental status is normal       Cranial Nerves:  Pupils 3 mm, reactive. EOMI. Face sensation is normal. Face is symmetric. Tongue and uvula are midline. Other CN are normal           Motor:  5/5 X 4. Tone and bulk are normal           Reflexes:  Normal DTR. Toes downgoing.        Sensory:  Normal to light touch               Coordination:   Intact finger-to-nose        Gait:  No significant difficulties  Neck: no nuchal rigidity, normal thyroid. No carotid bruits.    Cardiovascular: Regular rate and rhythm, no m/r/g  Lungs: Clear to auscultation  Abdomen: Soft, not tender, not distended  Extremities: No clubbing, no cyanosis, no edema    Data   ______________________________________________  All Data personally reviewed:       Labs:   CBC RESULTS:   No results for input(s): WBC, RBC, HGB, HCT, PLT in the last 168 hours.  Basic Metabolic Panel:   Recent Labs   Lab Test  12/01/17   1000  12/29/16   0924  04/21/16   1940  04/21/16   0710  01/25/16   1110   NA  140  140   --   141  140   POTASSIUM  4.2  4.1  3.9  4.3  3.9   CHLORIDE  106  107   --   110*  107   CO2  29  27   --   24  25   BUN  17  13   --    --   10   CR  0.73  0.70   --    --   0.58   GLC  95  92   --   96  94   STEPH  9.0  " 9.0   --    --   8.6     Liver panel:  Recent Labs   Lab Test  12/29/16   0924  11/23/15   0820  04/29/15   2300   PROTTOTAL  7.3  6.6*  6.7*   ALBUMIN  4.0  3.4  2.7*   BILITOTAL  0.5  0.3  0.1*   ALKPHOS  75  111  146   AST  17  14  18   ALT  25  23  26     INR:  Recent Labs   Lab Test  04/21/16   0710  04/29/15   2300   INR  0.90  0.87      Lipid Profile:  Recent Labs   Lab Test  12/28/17   0849  12/29/16   0924  11/23/15   0820 07/23/15 05/01/15  03/30/12   1130  03/15/11   1155   CHOL  246*  224*  190  219*  265*  239*  239*   HDL  106  86  71  76  68  110  156*   LDL  129*  126*  108*  126  163  116  106   TRIG  56  61  53  85  170  65  49   CHOLHDLRATIO   --    --    --    --    --   2.2  1.9     A1C: No lab results found.  Troponin I: No lab results found.  Ammonia: No lab results found.  CK: No lab results found.     CRP inflammation: No lab results found.  ESR: No lab results found.    KERLINE: No lab results found.    ANCA: No lab results found.   Drug Screen: No lab results found.  Alcohol level:No lab results found.  UA Results:  Recent Labs   Lab Test  04/29/15   2300  02/20/15   1449   COLOR  Light Yellow  Yellow   APPEARANCE  Clear  Clear   URINEGLC  Negative  Negative   URINEBILI  Negative  Negative   URINEKETONE  Negative  Negative   SG  1.006  1.020   UBLD  Negative  Negative   URINEPH  5.5  6.5   PROTEIN  Negative  Negative   UROBILINOGEN   --   0.2   NITRITE  Negative  Negative   LEUKEST  Large*  Small*   RBCU  2  O - 2   WBCU  6*  10-25*     Most Recent 6 Bacteria Isolates From Any Culture (See EPIC Reports for Culture Details):  Recent Labs   Lab Test  04/19/16   1633  04/29/15   2300  02/20/15   1449  11/05/14   0749   CULT  No Beta Streptococcus isolated  <10,000 colonies/mL mixed urogenital erica  No growth  <10,000 colonies/mL urogenital erica        Cardiac US:   --       Neurophysiology:   --       Imaging:   All imaging studies were reviewed personally  MRI brain:   --pending       Text  Tori Munguia, MSN, FNP-BC, RN CNRN

## 2018-05-08 NOTE — ED NOTES
Per Mary Ann NP stroke code de-escalated.  Patient will be going to MRI instead of CT scan.  NP in room at this time with patient.

## 2018-05-08 NOTE — ED AVS SNAPSHOT
Emergency Department    [address] [facility] [address]    Phone:  [phone]    Fax:  [phone]                                       Chloe Foster   MRN: [id number]    Department:   Emergency Department   Date of Visit:  5/8/2018           After Visit Summary Signature Page     I have received my discharge instructions, and my questions have been answered. I have discussed any challenges I see with this plan with the nurse or doctor.    ..........................................................................................................................................  Patient/Patient Representative Signature      ..........................................................................................................................................  Patient Representative Print Name and Relationship to Patient    ..................................................               ................................................  Date                                            Time    ..........................................................................................................................................  Reviewed by Signature/Title    ...................................................              ..............................................  Date                                                            Time

## 2018-05-08 NOTE — ED AVS SNAPSHOT
"  Emergency Department    [address] [facility] [address]    Phone:  [phone]    Fax:  [phone]                                       Chloe Foster   MRN: [id number]    Department:   Emergency Department   Date of Visit:  5/8/2018           Patient Information     Date Of Birth          1980        Your diagnoses for this visit were:     Paresthesia        You were seen by Valentino Richards MD.      Follow-up Information     Call Kushal Winter MD.    Specialty:  Neurology    Contact information:    [facility]  [address] [facility] [address]  [facility] [address] [phone]          Follow up with  Emergency Department.    Specialty:  EMERGENCY MEDICINE    Why:  As needed, If symptoms worsen    Contact information:    [address] [facility] [address] [phone]        Call Izabel Reyes MD.    Specialty:  Family Practice    Contact information:    [address]  [address]  [facility] [address] [phone]          Discharge Instructions         Paraesthesias  Paraesthesia is a burning or prickling sensation that is sometimes felt in the hands, arms, legs or feet. It can also occur in other parts of the body. It can also feel like tingling or numbness, skin crawling, or itching. The feeling is not comfortable, but it is not painful. (The \"pins and needles\" feeling that happens when a foot or hand \"falls asleep\" is a temporary paraesthesia.)  Paraesthesias that last or come and go may be caused by medical issues that need to be treated. These include stroke, a bulging disk pressing on a nerve, a trapped nerve, vitamin deficiencies, or even certain medicines.  Tests are often done. These tests may include blood tests, X-ray, CT (computerized tomography) scan, or a muscle test (electromyography). Depending on the cause, treatment may include physical therapy.  Home care    Tell the healthcare provider about all medicines you take. " This includes prescription and over-the-counter medicines, vitamins, and herbs. Ask if any of the medicines may be causing your problems. Do not make any changes to prescription medicines without talking to your healthcare provider first.    You may be prescribed medicines to help relieve the tingling feeling or for pain. Take all medicines as directed.    A numb hand or foot may be more prone to injury. To help protect it:  ? Always use oven mitts.  ? Test water with an unaffected hand or foot.  ? Use caution when trimming nails. File sharp areas.  ? Wear shoes that fit well to avoid pressure points, blisters, and ulcers.  ? Inspect your hands and feet carefully (including the soles of your feet and between your toes) at least once a week. If you see red areas, sores, or other problems, tell your healthcare provider.  Follow-up care  Follow up with your doctor or as advised by our staff. You may need further testing or evaluation.  When to seek medical advice  Call your healthcare provider right away if any of the following occur:    Numbness or weakness of the face, one arm, or one leg    Slurred speech, confusion, trouble speaking, walking, or seeing    Severe headache, fainting spell, dizziness, or seizure    Chest, arm, neck, or upper back pain    Loss of bladder or bowel control    Open wound with redness, swelling, or pus  Date Last Reviewed: 9/25/2015 2000-2017 [employer]. [address] [place] [address]. All rights reserved. This information is not intended as a substitute for professional medical care. Always follow your healthcare professional's instructions.          Your next 10 appointments already scheduled     May 15, 2018  8:45 AM CDT   MyChart Long with Izabel Reyes MD   [facility] ([facility])    [address]  [facility] [address]   [phone]            Jul 10, 2018 12:20 PM CDT   (Arrive by 12:05 PM)   New Stroke  with Anirudh Ontiveros MD   [facility] ([facility])    [address] [facility] [address]  3rd Floor  [facility] [address] [phone]              24 Hour Appointment Hotline       To make an appointment at any [facility], call [phone] ([phone]). If you don't have a family doctor or clinic, we will help you find one. [facility] are conveniently located to serve the needs of you and your family.             Review of your medicines      Our records show that you are taking the medicines listed below. If these are incorrect, please call your family doctor or clinic.        Dose / Directions Last dose taken    acetaminophen-codeine 300-30 MG per tablet   Commonly known as:  TYLENOL #3   Dose:  1 tablet   Quantity:  16 tablet        Take 1 tablet by mouth every 8 hours as needed (cough. DON'T Take it with Ativan.) maximum 3 tablet(s) per day   Refills:  0        aspirin EC 81 MG EC tablet   Dose:  81 mg        Take 81 mg by mouth   Refills:  0        azithromycin 250 MG tablet   Commonly known as:  ZITHROMAX   Quantity:  6 tablet        Two tablets first day, then one tablet daily for four days.   Refills:  0        benzonatate 200 MG capsule   Commonly known as:  TESSALON   Dose:  200 mg   Quantity:  21 capsule        Take 1 capsule (200 mg) by mouth 3 times daily as needed for cough   Refills:  0        FISH OIL PO   Dose:  1 capsule        Take 1 capsule by mouth daily   Refills:  0        LORazepam 1 MG tablet   Commonly known as:  ATIVAN   Quantity:  12 tablet        Use 1/2-1 tab at night prior to meetings or for panic symptoms   Refills:  0        magnesium oxide 400 MG tablet   Commonly known as:  MAG-OX   Dose:  400 mg        Take 400 mg by mouth every evening   Refills:  0        MULTIVITAMIN GUMMIES WOMENS PO   Dose:  2 chew tab        Take 2 chew tab by mouth every evening   Refills:  0        NONFORMULARY   Dose:  1 tablet        Take 1 tablet by  mouth every evening OTC: B-Right Vitamin (Optimized B Complex)   Refills:  0        propranolol 10 MG tablet   Commonly known as:  INDERAL   Quantity:  20 tablet        Take 1 tablet by mouth. Take as needed prior to interviews, stressful events   Refills:  1        sertraline 100 MG tablet   Commonly known as:  ZOLOFT   Dose:  100 mg   Quantity:  90 tablet        Take 1 tablet (100 mg) by mouth daily   Refills:  1        STATIN NOT PRESCRIBED (INTENTIONAL)   Quantity:  0 each        Statin not prescribed intentionally due to Woman of childbearing age not actively taking birth control   Refills:  0        VITAMIN D (CHOLECALCIFEROL) PO   Dose:  1000 Units        Take 1,000 Units by mouth every evening (takes 2 x 100 unit capsule)   Refills:  0                Procedures and tests performed during your visit     Basic metabolic panel    CBC with platelets + differential    HCG QUALitative pregnancy (blood)    Head MRA w/o contrast - STROKE PROTOCOL    MR Brain w/o & w Contrast    Neck MRA w & w/o contrast - STROKE PROTOCOL      Orders Needing Specimen Collection     None      Pending Results     No orders found from 5/6/2018 to 5/9/2018.            Pending Culture Results     No orders found from 5/6/2018 to 5/9/2018.            Pending Results Instructions     If you had any lab results that were not finalized at the time of your Discharge, you can call the ED Lab Result RN at [phone]. You will be contacted by this team for any positive Lab results or changes in treatment. The nurses are available 7 days a week from 10A to 6:30P.  You can leave a message 24 hours per day and they will return your call.        Test Results From Your Hospital Stay        5/8/2018  7:33 PM      Narrative     MRI BRAIN WITHOUT AND WITH CONTRAST  5/8/2018 6:15 PM    HISTORY:  left sided weakness;      TECHNIQUE:  Multiplanar, multisequence MRI of the brain without and  with 10mL Gadavist    COMPARISON: None.    FINDINGS:  There are 2  nonspecific, nonenhancing hyperintensities in  the white matter of the right frontal lobe. There is no evidence of  hemorrhage, mass, acute infarct, or anomaly.  There are no gadolinium  enhancing lesions.   The facial structures appear normal. The arteries  at the base of the brain and the dural venous sinuses appear patent.         Impression     IMPRESSION:   1. No acute pathology. No bleed, mass, or infarcts are seen.  2. There are a few, nonspecific nonenhancing hyperintensities in the  white matter. These could be due to gliosis secondary to previous  infectious or inflammatory process. Patient's with migraine-type  headaches can also have these type of hyperintensities.      PATRICIA FRIEDMAN MD         5/8/2018  6:05 PM      Narrative     MR ANGIOGRAM OF THE HEAD WITHOUT CONTRAST   5/8/2018 6:01 PM     HISTORY: left sided numbness;     TECHNIQUE:  3D time-of-flight MR angiogram of the head without  contrast.    COMPARISON: None.    FINDINGS:  The visualized portions of the distal internal carotid and  vertebral arteries, the basilar artery, [place], and the  proximal anterior, middle and posterior cerebral arteries all appear  normal.  There is no evidence of aneurysm or vascular stenosis or  occlusion.        Impression     IMPRESSION:  Normal MR angiogram of the head.      PATRICIA FRIEDMAN MD         5/8/2018  7:33 PM      Narrative     MRA ANGIOGRAM NECK W/O & W CONTRAST 5/8/2018 7:00 PM     HISTORY:  left sided numbess, h/o stroke;     TECHNIQUE:  Sequential axial images of the neck were obtained using  2-dimensional time-of-flight before contrast and 3-dimensional  time-of-flight after the uneventful administration of 10mL Gadavist iv  contrast.    COMPARISON:  None.    FINDINGS: Stenosis is relative to the distal internal carotid  diameter.    Right Carotid:  No significant stenosis is seen at the bifurcation  relative to the distal internal carotid diameter.    Left Carotid:  No significant stenosis is  seen at the bifurcation  relative to the distal internal carotid diameter.    Vertebrals: Antegrade flow is seen in both vertebral arteries.    No arterial dissection is identified.        Impression     IMPRESSION: Negative MR angiogram of the neck.    PATRICIA FRIEDMAN MD         5/8/2018  5:37 PM      Component Results     Component Value Ref Range & Units Status    WBC 7.1 4.0 - 11.0 10e9/L Final    RBC Count 4.30 3.8 - 5.2 10e12/L Final    Hemoglobin 12.8 11.7 - 15.7 g/dL Final    Hematocrit 38.2 35.0 - 47.0 % Final    MCV 89 78 - 100 fl Final    MCH 29.8 26.5 - 33.0 pg Final    MCHC 33.5 31.5 - 36.5 g/dL Final    RDW 13.7 10.0 - 15.0 % Final    Platelet Count 265 150 - 450 10e9/L Final    Diff Method Automated Method  Final    % Neutrophils 57.5 % Final    % Lymphocytes 32.0 % Final    % Monocytes 9.2 % Final    % Eosinophils 0.8 % Final    % Basophils 0.4 % Final    % Immature Granulocytes 0.1 % Final    Nucleated RBCs 0 0 /100 Final    Absolute Neutrophil 4.1 1.6 - 8.3 10e9/L Final    Absolute Lymphocytes 2.3 0.8 - 5.3 10e9/L Final    Absolute Monocytes 0.7 0.0 - 1.3 10e9/L Final    Absolute Eosinophils 0.1 0.0 - 0.7 10e9/L Final    Absolute Basophils 0.0 0.0 - 0.2 10e9/L Final    Abs Immature Granulocytes 0.0 0 - 0.4 10e9/L Final    Absolute Nucleated RBC 0.0  Final         5/8/2018  6:02 PM      Component Results     Component Value Ref Range & Units Status    Sodium 139 133 - 144 mmol/L Final    Potassium 3.6 3.4 - 5.3 mmol/L Final    Chloride 107 94 - 109 mmol/L Final    Carbon Dioxide 23 20 - 32 mmol/L Final    Anion Gap 9 3 - 14 mmol/L Final    Glucose 95 70 - 99 mg/dL Final    Urea Nitrogen 20 7 - 30 mg/dL Final    Creatinine 0.59 0.52 - 1.04 mg/dL Final    GFR Estimate >90 >60 mL/min/1.7m2 Final    Non  GFR Calc    GFR Estimate If Black >90 >60 mL/min/1.7m2 Final    African American GFR Calc    Calcium 9.4 8.5 - 10.1 mg/dL Final         5/8/2018  5:47 PM      Component Results     Component  Value Ref Range & Units Status    HCG Qualitative Serum Negative NEG^Negative Final    This test is for screening purposes.  Results should be interpreted along with   the clinical picture.  Confirmation testing is available if warranted by   ordering [id number], HCG Quantitative Pregnancy.                  Clinical Quality Measure: Blood Pressure Screening     Your blood pressure was checked while you were in the emergency department today. The last reading we obtained was  BP: 151/90 . Please read the guidelines below about what these numbers mean and what you should do about them.  If your systolic blood pressure (the top number) is less than 120 and your diastolic blood pressure (the bottom number) is less than 80, then your blood pressure is normal. There is nothing more that you need to do about it.  If your systolic blood pressure (the top number) is 120-139 or your diastolic blood pressure (the bottom number) is 80-89, your blood pressure may be higher than it should be. You should have your blood pressure rechecked within a year by a primary care provider.  If your systolic blood pressure (the top number) is 140 or greater or your diastolic blood pressure (the bottom number) is 90 or greater, you may have high blood pressure. High blood pressure is treatable, but if left untreated over time it can put you at risk for heart attack, stroke, or kidney failure. You should have your blood pressure rechecked by a primary care provider within the next 4 weeks.  If your provider in the emergency department today gave you specific instructions to follow-up with your doctor or provider even sooner than that, you should follow that instruction and not wait for up to 4 weeks for your follow-up visit.        Thank you for choosing [facility]       Thank you for choosing [facility] for your care. Our goal is always to provide you with excellent care. Hearing back from our patients is one way we can continue to improve our  services. Please take a few minutes to complete the written survey that you may receive in the mail after you visit with us. Thank you!        [employer] Information     [employer] gives you secure access to your electronic health record. If you see a primary care provider, you can also send messages to your care team and make appointments. If you have questions, please call your primary care clinic.  If you do not have a primary care provider, please call [phone] and they will assist you.        Care EveryWhere ID     This is your Care EveryWhere ID. This could be used by other organizations to access your [facility] medical records  [id number]        Equal Access to Services     ANAIS ARORA : Heather Torres, elba leyva, paulie cohen . So [facility] [phone].    ATENCIÓN: Si habla español, tiene a rankin disposición servicios gratuitos de asistencia lingüística. Llame al [phone].    We comply with applicable federal civil rights laws and [place] laws. We do not discriminate on the basis of race, color, national origin, age, disability, sex, sexual orientation, or gender identity.            After Visit Summary       This is your record. Keep this with you and show to your community pharmacist(s) and doctor(s) at your next visit.

## 2018-05-08 NOTE — ED PROVIDER NOTES
History     Chief Complaint:  Numbness     The history is provided by the patient.      Chloe Foster is an anti-coagulated (aspirin 81) 37 year old female with a history of CVA and PFO who presents to the emergency department today for evaluation of numbness. She has a history of CVA in 2015 when she was pregnant with her first born. In December of 2017, the patient was seen her in emergency department for similar symptoms as well. She reports that at 1430 today she began experiencing symptoms very similar to her CVA in 2015, and from her visit in December, 2017, with headaches, squeezing feeling and tingling in her left arm. Shortly after the onset, she felt as if she would pass out. That went away, but as she was getting in her car she felt like passing out again. She called her mom to  her and her son, and was brought here to the emergency department. She now reports that most of her symptoms have resolved, but she still feels off.     Allergies:  No Known Drug Allergies     Medications:    acetaminophen-codeine (TYLENOL #3) 300-30 MG per tablet  aspirin EC 81 MG EC tablet  azithromycin (ZITHROMAX) 250 MG tablet  benzonatate (TESSALON) 200 MG capsule  LORazepam (ATIVAN) 1 MG tablet  propranolol (INDERAL) 10 MG tablet  sertraline (ZOLOFT) 100 MG tablet    Problem List:    Elevated Lipoprotein alpha     Past Medical History:    Acute stress reaction   Allergic rhinitis, cause unspecified   Anxiety state, unspecified   CVA (cerebral vascular accident) (H)   Depressive disorder, not elsewhere classified   Elevated lipoprotein A level   Hirsutism   PFO (patent foramen ovale)   Urinary tract infection    Past Surgical History:    History reviewed. No pertinent surgical history.    Family History:    HTN   Father  CAD   Maternal Grandfather  Neuro disorder Maternal Grandmother  CAD   Maternal Grandmother    Social History:  The patient was accompanied to the ED by her mother.  Smoking Status: Former  "Smoker  Smokeless Tobacco: Former User  Alcohol Use: Positive   Marital Status:       Review of Systems   Constitutional: Negative for fever.   Neurological: Positive for numbness (left arm and face, mostly resolved by arrival to ed) and headaches. Negative for syncope.   All other systems reviewed and are negative.    Physical Exam     Patient Vitals for the past 24 hrs:   BP Temp Temp src Heart Rate Resp SpO2 Height Weight   05/08/18 2023 114/71 - - 88 16 99 % - -   05/08/18 1618 151/90 98.1  F (36.7  C) Oral 80 18 98 % 1.702 m (5' 7\") 76.7 kg (169 lb 3.2 oz)      Physical Exam  General: Appears well-developed and well-nourished.   Head: No signs of trauma.   Mouth/Throat: Oropharynx is clear and moist.   Eyes: Conjunctivae are normal. Pupils are equal, round, and reactive to light.   Neck: Normal range of motion. No nuchal rigidity. No cervical adenopathy  CV: Normal rate and regular rhythm.    Resp: Effort normal and breath sounds normal. No respiratory distress.   GI: Soft. There is no tenderness.  No rebound or guarding.  Normal bowel sounds.    MSK: Normal range of motion. no edema. No Calf tenderness.  Neuro: The patient is alert and oriented to person, place, and time.  PERRLA, EOMI, strength in upper/lower extremities normal and symmetrical.   Sensation normal. Speech normal.  CN2-12 intact.  No cerebellar deficits.  GCS 15  Skin: Skin is warm and dry. No rash noted.   Psych: normal mood and affect. behavior is normal.       Emergency Department Course     Imaging:  Radiology findings were communicated with the patient who voiced understanding of the findings.    MR Brain w/o & w Contrast  1. No acute pathology. No bleed, mass, or infarcts are seen.  2. There are a few, nonspecific nonenhancing hyperintensities in the  white matter. These could be due to gliosis secondary to previous  infectious or inflammatory process. Patient's with migraine-type  headaches can also have these type of " hyperintensities.  Reading per radiology    Neck MRA w & w/o contrast - STROKE PROTOCOL  Negative MR angiogram of the neck.  Reading per radiology    Head MRA w/o contrast - STROKE PROTOCOL  Normal MR angiogram of the head.  Reading per radiology     Laboratory:  Laboratory findings were communicated with the patient who voiced understanding of the findings.    CBC: WBC 7.1, HGB 12.8,   BMP: o/w WNL (Creatinine 0.59)  HCG Qual: Negative    Interventions:  1705 Ativan 0.5 mg IV  1759 Ativan 0.5 mg IV  1842 Gadavist 10 mL IV    Emergency Department Course:    1618 Nursing notes and vitals reviewed.    1623 I performed an exam of the patient as documented above.     1633 The patient was sent for a CT while in the emergency department, results above.      1649 The patient was sent for a MRI while in the emergency department, results above.      1742 IV was inserted and blood was drawn for laboratory testing, results above.     1950 I personally reviewed the lab and iamging results with the patient and answered all related questions prior to discharge.    Impression & Plan      Medical Decision Making:  Chloe Foster is a 37 year old female who presents to the emergency department today for evaluation of left arm numbness and tingling.  A couple of hours prior to arrival she had been getting her nails done when she felt a tightness in her left arm and some numbness.  She states it felt very similar to when she had a stroke a few years ago while pregnant.  She also had a similar episode approximately 6 months ago for which she had been seen and had a negative workup.  On my evaluation, she is actually fully neurologically intact and stated that she was feeling much better than when the symptoms had started.  I did call a code stroke given her history and the patient was evaluated by the stroke neurology team.  They decided to de-escalate upon evaluation and recommended MRIs.  These were obtained, which did  not show any signs of a stroke or other significant acute pathology.  There was some findings on the MRI that could be consistent with migraine, which may certainly be the etiology of her symptoms she was having a headache.  She is completely symptom-free on repeat evaluation.  Patient was discharged home with recognition to follow-up with her neurologist along with the primary care doctor and to return if she had new or worsening symptoms or further concerns.    Diagnosis:    ICD-10-CM    1. Paresthesia R20.2      Disposition:   Discharge    Scribe Disclosure:  Torsten TYLER, am serving as a scribe at 4:21 PM on 5/8/2018 to document services personally performed by Valentino Richards MD based on my observations and the provider's statements to me.       EMERGENCY DEPARTMENT       Valentino Richards MD  05/08/18 6807

## 2018-05-09 NOTE — DISCHARGE INSTRUCTIONS
"  Paraesthesias  Paraesthesia is a burning or prickling sensation that is sometimes felt in the hands, arms, legs or feet. It can also occur in other parts of the body. It can also feel like tingling or numbness, skin crawling, or itching. The feeling is not comfortable, but it is not painful. (The \"pins and needles\" feeling that happens when a foot or hand \"falls asleep\" is a temporary paraesthesia.)  Paraesthesias that last or come and go may be caused by medical issues that need to be treated. These include stroke, a bulging disk pressing on a nerve, a trapped nerve, vitamin deficiencies, or even certain medicines.  Tests are often done. These tests may include blood tests, X-ray, CT (computerized tomography) scan, or a muscle test (electromyography). Depending on the cause, treatment may include physical therapy.  Home care    Tell the healthcare provider about all medicines you take. This includes prescription and over-the-counter medicines, vitamins, and herbs. Ask if any of the medicines may be causing your problems. Do not make any changes to prescription medicines without talking to your healthcare provider first.    You may be prescribed medicines to help relieve the tingling feeling or for pain. Take all medicines as directed.    A numb hand or foot may be more prone to injury. To help protect it:  ? Always use oven mitts.  ? Test water with an unaffected hand or foot.  ? Use caution when trimming nails. File sharp areas.  ? Wear shoes that fit well to avoid pressure points, blisters, and ulcers.  ? Inspect your hands and feet carefully (including the soles of your feet and between your toes) at least once a week. If you see red areas, sores, or other problems, tell your healthcare provider.  Follow-up care  Follow up with your doctor or as advised by our staff. You may need further testing or evaluation.  When to seek medical advice  Call your healthcare provider right away if any of the following " occur:    Numbness or weakness of the face, one arm, or one leg    Slurred speech, confusion, trouble speaking, walking, or seeing    Severe headache, fainting spell, dizziness, or seizure    Chest, arm, neck, or upper back pain    Loss of bladder or bowel control    Open wound with redness, swelling, or pus  Date Last Reviewed: 9/25/2015 2000-2017 [employer] [address] [place] [address]. All rights reserved. This information is not intended as a substitute for professional medical care. Always follow your healthcare professional's instructions.

## 2018-05-15 ENCOUNTER — OFFICE VISIT (OUTPATIENT)
Dept: FAMILY MEDICINE | Facility: CLINIC | Age: 38
End: 2018-05-15
Payer: COMMERCIAL

## 2018-05-15 VITALS
WEIGHT: 165 LBS | SYSTOLIC BLOOD PRESSURE: 116 MMHG | RESPIRATION RATE: 16 BRPM | HEART RATE: 80 BPM | BODY MASS INDEX: 25.9 KG/M2 | HEIGHT: 67 IN | OXYGEN SATURATION: 96 % | TEMPERATURE: 97 F | DIASTOLIC BLOOD PRESSURE: 89 MMHG

## 2018-05-15 DIAGNOSIS — G43.109 MIGRAINE WITH AURA AND WITHOUT STATUS MIGRAINOSUS, NOT INTRACTABLE: ICD-10-CM

## 2018-05-15 DIAGNOSIS — F40.10 SOCIAL ANXIETY DISORDER: ICD-10-CM

## 2018-05-15 DIAGNOSIS — M25.512 LEFT SHOULDER PAIN, UNSPECIFIED CHRONICITY: ICD-10-CM

## 2018-05-15 DIAGNOSIS — F41.1 ANXIETY STATE: ICD-10-CM

## 2018-05-15 DIAGNOSIS — Q21.12 PFO WITH ATRIAL SEPTAL ANEURYSM: ICD-10-CM

## 2018-05-15 DIAGNOSIS — I25.3 PFO WITH ATRIAL SEPTAL ANEURYSM: ICD-10-CM

## 2018-05-15 DIAGNOSIS — I63.50 CEREBRAL ARTERY OCCLUSION WITH CEREBRAL INFARCTION (H): Primary | ICD-10-CM

## 2018-05-15 DIAGNOSIS — D68.59 PROTEIN S DEFICIENCY (H): ICD-10-CM

## 2018-05-15 PROCEDURE — 99215 OFFICE O/P EST HI 40 MIN: CPT | Performed by: FAMILY MEDICINE

## 2018-05-15 RX ORDER — SERTRALINE HYDROCHLORIDE 100 MG/1
100 TABLET, FILM COATED ORAL DAILY
Qty: 90 TABLET | Refills: 1 | Status: SHIPPED | OUTPATIENT
Start: 2018-05-15 | End: 2018-08-15

## 2018-05-15 NOTE — PROGRESS NOTES
"  SUBJECTIVE:   Chloe Foster is a 37 year old female who presents to clinic today for the following health issues:      ED/UC Followup:    Facility:  [facility] ER   Date of visit: 05/08/2018  Reason for visit: Stroke related   Current Status: \"good back to normal\"      Pt had another episode similar to sx's prior to her CVA during previous pregnancy a few yrs ago.  While she was getting her nails done, she felt her left arm getting numb, squeezing sensation- like sx's prior to her stroke.  She also had tingling in her whole body, and felt like she was going to faint.  She got through the appointment and got to her car, and did a stroke test in the car- strength was okay in arm/face, speech was normal.  Didn't think she needed to call 911, but didn't feel safe to drive.    Her parents came to get her, got her son from , and drove to hospital.    She did have a headache that day, but no migraine, no aura.  Prior to strokes, was having a lot of migraines with aura.  Since pregnancy/strokes, she's only had 2 migraines with aura since pregnancy and surgery.    At the ER, sx's had resolved.  They got scan (MR Brain w/o and w contrast and Neck MRA), which looked okay.    Few hyperdensities- possibly from infection, inflammation, possibly migraines.    Did make appt with neurology- but it's not until 7/18.      Anxiety/social anxiety-  Taking Zoloft 100mg/d, and rare ativan at night (1/2 tab)- if event coming up the next day.  She mad the appointment thinking she'd like to come off it.  States she's in a different place, though still has significant work stressors.  Unsure how much the zoloft has helped.  Still has moments of social anxiety, and those are much less.  She thinks the 1/2 tab of ativan she takes the night prior to social events/presentations is very helpful.  It helps her not stress out about the next day and allows her to get good sleep, and then she does much better the next day.  Still " only taking the 1/2 ativan ~2x/month on average- sometimes more, sometimes less.        Problem list and histories reviewed & adjusted, as indicated.  Additional history: as documented    Patient Active Problem List   Diagnosis     Vitamin D deficiency     CARDIOVASCULAR SCREENING; LDL GOAL LESS THAN 160     Lumbar herniated disc     Hirsutism     Insomnia     IBS (irritable bowel syndrome)     Moderate recurrent major depression (H)     Cerebral artery occlusion with cerebral infarction     History of  section     PFO with atrial septal aneurysm     Protein S deficiency (H)     Migraine with aura and without status migrainosus, not intractable     Pre-conception counseling     Elevated lipoprotein A level     Social anxiety disorder     MTHFR mutation (H)     Past Surgical History:   Procedure Laterality Date     ARTHROSCOPY KNEE RT/LT      Rt knee     HC REPAIR OF NASAL SEPTUM       REPAIR PATENT FORAMEN OVALE  2016       Social History   Substance Use Topics     Smoking status: Former Smoker     Packs/day: 0.30     Years: 14.00     Quit date: 2014     Smokeless tobacco: Former User     Quit date: 2014     Alcohol use 0.0 oz/week     0 Standard drinks or equivalent per week      Comment: occ.     Family History   Problem Relation Age of Onset     CANCER Father      throat     Hypertension Father      Allergies Father      DIABETES Maternal Grandmother      on insulin     C.A.D. Maternal Grandmother      triple bypass in her 60s     DIABETES Paternal Grandmother      C.A.D. Maternal Grandfather      unsure type     Neurologic Disorder Paternal Grandfather       of brain aneurysm in early 60s     DIABETES Maternal Aunt       in her early 40s of DM complications, type 2         Current Outpatient Prescriptions   Medication Sig Dispense Refill     acetaminophen-codeine (TYLENOL #3) 300-30 MG per tablet Take 1 tablet by mouth every 8 hours as needed (cough. DON'T Take it with  Ativan.) maximum 3 tablet(s) per day 16 tablet 0     aspirin EC 81 MG EC tablet Take 81 mg by mouth       azithromycin (ZITHROMAX) 250 MG tablet Two tablets first day, then one tablet daily for four days. 6 tablet 0     benzonatate (TESSALON) 200 MG capsule Take 1 capsule (200 mg) by mouth 3 times daily as needed for cough 21 capsule 0     LORazepam (ATIVAN) 1 MG tablet Use 1/2-1 tab at night prior to meetings or for panic symptoms 12 tablet 0     magnesium oxide (MAG-OX) 400 MG tablet Take 400 mg by mouth every evening        Multiple Vitamins-Minerals (MULTIVITAMIN GUMMIES WOMENS PO) Take 2 chew tab by mouth every evening       NONFORMULARY Take 1 tablet by mouth every evening OTC: B-Right Vitamin (Optimized B Complex)       Omega-3 Fatty Acids (FISH OIL PO) Take 1 capsule by mouth daily        propranolol (INDERAL) 10 MG tablet Take 1 tablet by mouth. Take as needed prior to interviews, stressful events 20 tablet 1     sertraline (ZOLOFT) 100 MG tablet Take 1 tablet (100 mg) by mouth daily 90 tablet 1     STATIN NOT PRESCRIBED, INTENTIONAL, Statin not prescribed intentionally due to Woman of childbearing age not actively taking birth control 0 each 0     VITAMIN D, CHOLECALCIFEROL, PO Take 1,000 Units by mouth every evening (takes 2 x 100 unit capsule)        Allergies   Allergen Reactions     Nkda [No Known Drug Allergies]      Recent Labs   Lab Test  05/08/18   1630  12/28/17   0849  12/01/17   1000  09/30/17   1643  12/29/16   0924   11/23/15   0820   04/29/15   2300  03/29/13   1101   LDL   --   129*   --    --   126*   --   108*   < >   --    --    HDL   --   106   --    --   86   --   71   < >   --    --    TRIG   --   56   --    --   61   --   53   < >   --    --    ALT   --    --    --    --   25   --   23   --   26   --    CR  0.59   --   0.73   --   0.70   < >  0.62   --   0.60   --    GFRESTIMATED  >90   --   89   --   >90  Non  GFR Calc     < >  >90  Non  GFR Calc      "--   >90  Non  GFR Calc     --    GFRESTBLACK  >90   --   >90   --   >90   GFR Calc     < >  >90   GFR Calc     --   >90   GFR Calc     --    POTASSIUM  3.6   --   4.2   --   4.1   < >  4.0   --   3.5   --    TSH   --    --    --   0.66   --    --    --    --    --   0.44    < > = values in this interval not displayed.      BP Readings from Last 3 Encounters:   05/15/18 116/89   05/08/18 114/71   03/12/18 114/72    Wt Readings from Last 3 Encounters:   05/15/18 165 lb (74.8 kg)   05/08/18 169 lb 3.2 oz (76.7 kg)   03/12/18 169 lb (76.7 kg)              Labs reviewed in EPIC    Reviewed and updated as needed this visit by clinical staff  Tobacco  Allergies  Meds  Problems  Med Hx  Surg Hx  Fam Hx  Soc Hx        Reviewed and updated as needed this visit by Provider  Allergies  Meds  Problems         ROS:  Constitutional, HEENT, cardiovascular, pulmonary, gi and gu systems are negative, except as otherwise noted.    OBJECTIVE:     /89  Pulse 80  Temp 97  F (36.1  C) (Oral)  Resp 16  Ht 5' 7\" (1.702 m)  Wt 165 lb (74.8 kg)  LMP 05/11/2018  SpO2 96%  BMI 25.84 kg/m2  Body mass index is 25.84 kg/(m^2).  GENERAL APPEARANCE: healthy, alert and no distress     EYES: PERRL, sclera clear     HENT: nose and mouth without ulcers or lesions     NECK: no adenopathy, no asymmetry, masses, or scars and thyroid normal to palpation     RESP: lungs clear to auscultation - no rales, rhonchi or wheezes     CV: regular rates and rhythm, normal S1 S2, no S3 or S4 and no murmur, click or rub      Abdomen: soft, nontender, no HSM or masses and bowel sounds normal     Ext: warm, dry, no edema     Neuro: CN 2-12 grossly nl, fundi WNL bilaterally, UE and LE strength 5/5, nl sensation and DTR's.  Normal gait.      Psych: full range affect, normal speech and grooming, judgement and insight intact     Diagnostic Test Results:  none     ASSESSMENT/PLAN:       " ICD-10-CM    1. Cerebral artery occlusion with cerebral infarction I63.50    2. PFO with atrial septal aneurysm Q21.1    3. Protein S deficiency (H) D68.59    4. Migraine with aura and without status migrainosus, not intractable G43.109    5. Anxiety state F41.1 sertraline (ZOLOFT) 100 MG tablet   6. Social anxiety disorder F40.10 sertraline (ZOLOFT) 100 MG tablet   7. Left shoulder pain, unspecified chronicity M25.512 SPORTS MEDICINE REFERRAL     H/o CVA in previous pregnancy, s/p PFO closure afterwards.  Now with return of pre-stroke symptoms recently.  She did have a headache that day, but no migraine sx's like she did around the time of the stroke.  ER w/u with MRI and MRA was reassuring.  Agree with f/u with neurology.  She continues to have a significant amount of work stressors, and feels like those worsening stressors sometimes bring on these symptoms.  She has been thinking about changing jobs, but would rather try working reduced hours first.  Letter written to support her efforts to reduce her stress by less work hours to her employer.    She has also wanted to have another child, and was given the 'go-ahead' by multiple specialists to do so, but her  has been very nervous, and these recent symptoms have concerned him (and her) even more.  At this point, she thinks they will not try again to conceive, though isn't sure.    Anxiety/Social anxiety- given her thoughts that the stressors are worsening her stroke symptoms, discussed that reducing/stopping her anxiety medications now is likely not the best idea.  Suspect she doesn't feel the lowered baseline of her anxiety on the meds as much due to the time she's been on the medication.  Current ativan use is rare and fine (~2x/month), but would like to see her at a more stable place before going down on the zoloft as it may increase her need/use of the ativan if not.      At end of visit, she discussed left shoulder pain- unable to address today, but  will give referral for sports medicine to eval/treat.    Izabel Reyes MD  [facility]      Spent greater than 50% of 40 minutes in face to face time counseling patient and coordination of care regarding neurological symptoms, work stressors, pregnancy, and anxiety treatment issues and timing.

## 2018-05-15 NOTE — MR AVS SNAPSHOT
After Visit Summary   5/15/2018    Chloe Foster    MRN: [id number]           Patient Information     Date Of Birth          1980        Visit Information        Provider Department      5/15/2018 8:00 AM Izabel Reyes MD [facility]        Today's Diagnoses     Cerebral artery occlusion with cerebral infarction    -  1    PFO with atrial septal aneurysm        Protein S deficiency (H)        Migraine with aura and without status migrainosus, not intractable        Anxiety state        Social anxiety disorder        Left shoulder pain, unspecified chronicity           Follow-ups after your visit        Additional Services     SPORTS MEDICINE REFERRAL       Your provider has referred you to:  [facility]: Sports Medicine Clinic [facility] [phone]   [email or web address]    Please be aware that coverage of these services is subject to the terms and limitations of your health insurance plan.  Call member services at your health plan with any benefit or coverage questions.      Please bring the following to your appointment:    >>   Any x-rays, CTs or MRIs which have been performed.  Contact the facility where they were done to arrange for  prior to your scheduled appointment.    >>   List of current medications   >>   This referral request   >>   Any documents/labs given to you for this referral                  Your next 10 appointments already scheduled     Jul 10, 2018 12:20 PM CDT   (Arrive by 12:05 PM)   New Stroke with Anirudh Ontiveros MD   [facility] ([facility])    [address] [place] [id number] [phone]              Who to contact     If you have questions or need follow up information about today's clinic visit or your schedule please contact [facility] directly at [phone].  Normal or non-critical lab and imaging results will  "be communicated to you by MyChart, letter or phone within 4 business days after the clinic has received the results. If you do not hear from us within 7 days, please contact the clinic through [email or web address] or phone. If you have a critical or abnormal lab result, we will notify you by phone as soon as possible.  Submit refill requests through [email or web address] or call your pharmacy and they will forward the refill request to us. Please allow 3 business days for your refill to be completed.          Additional Information About Your Visit        [email or web address] Information     [email or web address] gives you secure access to your electronic health record. If you see a primary care provider, you can also send messages to your care team and make appointments. If you have questions, please call your primary care clinic.  If you do not have a primary care provider, please call [phone] and they will assist you.        Care EveryWhere ID     This is your Care EveryWhere ID. This could be used by other organizations to access your [place] medical records  [id number]        Your Vitals Were     Pulse Temperature Respirations Height Last Period Pulse Oximetry    80 97  F (36.1  C) (Oral) 16 5' 7\" (1.702 m) 05/11/2018 96%    BMI (Body Mass Index)                   25.84 kg/m2            Blood Pressure from Last 3 Encounters:   05/15/18 116/89   05/08/18 114/71   03/12/18 114/72    Weight from Last 3 Encounters:   05/15/18 165 lb (74.8 kg)   05/08/18 169 lb 3.2 oz (76.7 kg)   03/12/18 169 lb (76.7 kg)              We Performed the Following     SPORTS MEDICINE REFERRAL          Where to get your medicines      These medications were sent to [facility] [address] [place], [place] - [address] AT [facility] [address], [facility] [address]     Phone:  [phone]     sertraline 100 MG tablet          Primary Care Provider Office Phone # Fax #    Izabel Reyes [phone]       [address] [facility]  [address]  [facility] [address]        Equal Access to Services     [facility] ULYSSES : Hadii eliane garcía brigidaeva Keaganali, wagraciada luqadaha, qaybta gilbertoifeomapaulie marrero. So [facility] [phone].    ATENCIÓN: Si habla español, tiene a rankin disposición servicios gratuitos de asistencia lingüística. Ayde al [phone].    We comply with applicable federal civil rights laws and [place] laws. We do not discriminate on the basis of race, color, national origin, age, disability, sex, sexual orientation, or gender identity.            Thank you!     Thank you for choosing [facility]  for your care. Our goal is always to provide you with excellent care. Hearing back from our patients is one way we can continue to improve our services. Please take a few minutes to complete the written survey that you may receive in the mail after your visit with us. Thank you!             Your Updated Medication List - Protect others around you: Learn how to safely use, store and throw away your medicines at www.disposemymeds.org.          This list is accurate as of 5/15/18  8:53 AM.  Always use your most recent med list.                   Brand Name Dispense Instructions for use Diagnosis    acetaminophen-codeine 300-30 MG per tablet    TYLENOL #3    16 tablet    Take 1 tablet by mouth every 8 hours as needed (cough. DON'T Take it with Ativan.) maximum 3 tablet(s) per day    Viral URI with cough       aspirin 81 MG EC tablet      Take 81 mg by mouth        azithromycin 250 MG tablet    ZITHROMAX    6 tablet    Two tablets first day, then one tablet daily for four days.    Viral URI with cough       benzonatate 200 MG capsule    TESSALON    21 capsule    Take 1 capsule (200 mg) by mouth 3 times daily as needed for cough    Viral URI with cough       FISH OIL PO      Take 1 capsule by mouth daily        LORazepam 1 MG tablet    ATIVAN    12 tablet    Use 1/2-1 tab at night prior to meetings or for  panic symptoms    Anxiety state, Social anxiety disorder       magnesium oxide 400 MG tablet    MAG-OX     Take 400 mg by mouth every evening        MULTIVITAMIN GUMMIES WOMENS PO      Take 2 chew tab by mouth every evening        NONFORMULARY      Take 1 tablet by mouth every evening OTC: B-Right Vitamin (Optimized B Complex)        propranolol 10 MG tablet    INDERAL    20 tablet    Take 1 tablet by mouth. Take as needed prior to interviews, stressful events    Social anxiety disorder       sertraline 100 MG tablet    ZOLOFT    90 tablet    Take 1 tablet (100 mg) by mouth daily    Anxiety state, Social anxiety disorder       STATIN NOT PRESCRIBED (INTENTIONAL)     0 each    Statin not prescribed intentionally due to Woman of childbearing age not actively taking birth control    Cerebral artery occlusion with cerebral infarction (H)       VITAMIN D (CHOLECALCIFEROL) PO      Take 1,000 Units by mouth every evening (takes 2 x 100 unit capsule)

## 2018-05-15 NOTE — LETTER
[facility]  [address]  [address]  [place], [place] [address]  [phone]    May 15, 2018    RE:  Chloe Foster                                                                                                                                  [address] [facility] [address]            To whom it may concern:    Chloe Foster is under my professional care for    Cerebral artery occlusion with cerebral infarction (H)  PFO with atrial septal aneurysm  Protein S deficiency (H)  Migraine with aura and without status migrainosus, not intractable.       After Chloe's recent ER visit, we discussed the role of work stress in regards her current symptoms.  The work stressors likely play a role in her symptoms, which in the past lead to the stroke she had in 2015.  We are hopeful that her heart surgery lessens her risk, but it is still concerning that she is having similar symptoms that she had prior to her stroke.  We discussed looking into lessening her work hours as a way to work on her stress, and thought that working 15-30 hours per week would be a good way to address her stress levels.      Sincerely,        Izabel Reyes MD      Clinic hours:  Monday 7:30 AM - 5:00 PM    Tuesday  7:00 AM - 7:00 PM    Wednesday  7:00 AM - 5:00 PM    Thursday  7:30 AM -  7:00 PM    Friday   7:30 AM -  5:00 PM

## 2018-05-16 ASSESSMENT — PATIENT HEALTH QUESTIONNAIRE - PHQ9: SUM OF ALL RESPONSES TO PHQ QUESTIONS 1-9: 3

## 2018-06-05 NOTE — TELEPHONE ENCOUNTER
FUTURE VISIT INFORMATION      FUTURE VISIT INFORMATION:    Date: 6/07/18    Time: 3:30    Location:   REFERRAL INFORMATION:    Referring provider: Izabel Reyes    Referring providers clinic:   Sports [facility] Orth[facility]    Reason for visit/diagnosis: L Shoulder pain      RECORDS STATUS      All Records Internal

## 2018-06-07 ENCOUNTER — PRE VISIT (OUTPATIENT)
Dept: ORTHOPEDICS | Facility: CLINIC | Age: 38
End: 2018-06-07

## 2018-06-25 NOTE — TELEPHONE ENCOUNTER
FUTURE VISIT INFORMATION      FUTURE VISIT INFORMATION:    Date: 6/27/18    Time: 3:30    Location:   REFERRAL INFORMATION:    Referring provider: Izabel Reyes     Referring providers clinic:  [facility]    Reason for visit/diagnosis: Left shoulder pain, unspecified chronicity        RECORDS STATUS    ALL RECORDS INTERNAL

## 2018-06-27 ENCOUNTER — PRE VISIT (OUTPATIENT)
Dept: ORTHOPEDICS | Facility: CLINIC | Age: 38
End: 2018-06-27

## 2018-07-08 ENCOUNTER — RADIANT APPOINTMENT (OUTPATIENT)
Dept: GENERAL RADIOLOGY | Facility: CLINIC | Age: 38
End: 2018-07-08
Attending: FAMILY MEDICINE
Payer: COMMERCIAL

## 2018-07-08 ENCOUNTER — OFFICE VISIT (OUTPATIENT)
Dept: URGENT CARE | Facility: URGENT CARE | Age: 38
End: 2018-07-08
Payer: COMMERCIAL

## 2018-07-08 VITALS
TEMPERATURE: 98 F | HEART RATE: 72 BPM | BODY MASS INDEX: 26.35 KG/M2 | RESPIRATION RATE: 16 BRPM | SYSTOLIC BLOOD PRESSURE: 120 MMHG | DIASTOLIC BLOOD PRESSURE: 80 MMHG | WEIGHT: 168.25 LBS

## 2018-07-08 DIAGNOSIS — S99.922A FOOT INJURY, LEFT, INITIAL ENCOUNTER: Primary | ICD-10-CM

## 2018-07-08 DIAGNOSIS — S99.922A FOOT INJURY, LEFT, INITIAL ENCOUNTER: ICD-10-CM

## 2018-07-08 DIAGNOSIS — S90.415A ABRASION OF TOE OF LEFT FOOT, INITIAL ENCOUNTER: ICD-10-CM

## 2018-07-08 DIAGNOSIS — L08.9 LEFT FOOT INFECTION: ICD-10-CM

## 2018-07-08 PROCEDURE — 73630 X-RAY EXAM OF FOOT: CPT | Mod: LT

## 2018-07-08 PROCEDURE — 99214 OFFICE O/P EST MOD 30 MIN: CPT | Performed by: FAMILY MEDICINE

## 2018-07-08 RX ORDER — CIPROFLOXACIN 500 MG/1
500 TABLET, FILM COATED ORAL 2 TIMES DAILY
Qty: 20 TABLET | Refills: 0 | Status: SHIPPED | OUTPATIENT
Start: 2018-07-08 | End: 2018-08-15

## 2018-07-08 NOTE — PATIENT INSTRUCTIONS
Hold multivitamin gummy and the magnesium while on antibiotic    Take probiotics for 30 days    Start ciprofloxcin 500 mg 2 times a day for 10 days    Take tylenol #3 at bedtime    Wrap and wear velcro shoe, keep elevated    If redness increases, fever to ER    Appointment with regular provider on Wed                   Wound (Skin) Infection  What is a wound infection?   When your child has a skin injury, watch for signs of infection. Signs that the wound is infected include:   Pus or cloudy fluid draining from the wound.   A pimple or yellow crust forming on the wound.   The scab is increasing in size.   Increasing redness occurs around the wound (cellulitis).   A red streak is spreading from the wound toward the heart.   The wound has become extremely tender.   Pain or swelling has increased 48 hours after the wound occurred.   The lymph node draining that area of skin may become large and tender.   Your child may develop a fever over 100?F (37.8?C).   The wound hasn't healed within 10?days after the injury.   What is the cause?   Most skin infections follow breaks in the skin (for example, from cuts, puncture wounds, animal bites, splinters, thorns, or burns). Bacteria (especially staphylococcus or streptococcus) then invade the wound and cause the infection. Some infections start with a closed rash (that is, the skin is not broken). Examples are insect bites, chickenpox, scabies, or acne. If a child picks at these rashes, the skin can become broken and then infected.   Deeper wounds (for example, puncture wounds) are much more likely to become infected than superficial wounds (for example, scrapes). The hands are at increased risk for infection from puncture wounds. The penetrating claws or teeth of cats pose a major risk for infection.   Cellulitis (skin infection) can sometimes start without any recent wound infection. This type of cellulitis is spread from the bloodstream and can be serious it is not  treated.   How long does it last?   With appropriate antibiotics and warm soaks, the wound infection should improve within 24 to 48?hours. By that time, your child should stop having any fever caused by the infection. Any red streaking or red patches should stop spreading. The area of the wound should also be much less tender within 48?hours. Within one week after your child starts taking antibiotics, all signs of active infection should be completely gone.   What is the treatment?   Antibiotics Your child needs the antibiotic prescribed by your healthcare provider. This medicine will kill the germs that are causing the wound infection. Try not to forget any of the doses. If your child goes to school or stays with a , arrange for someone to give the afternoon dose. Even though your child will feel better in a few days, give the antibiotic until it is completely gone to keep the infection from flaring up again.   Warm soaks or local heat For open wounds that are infected, proper cleaning is important for healing. Soak the wounded area in warm water or put a warm, wet cloth on the wound for 20?minutes three times a day. Use a warm saltwater solution containing 2 teaspoons of table salt per quart of water. Use this solution to remove all the pus and loose scabs. (Don't use hydrogen peroxide because it is a weak germ-killer.) Your healthcare provider may give you a syringe to help wash out the wound. Continue soaking the wound 3 times a day until it looks clear of infection. Then cleanse the wound and change the dressing once a day until the wound has healed. For closed wounds that are infected, apply a heating pad or warm, moist washcloth to the reddened area for 20 minutes 3 times a day. This will help deliver the antibiotic to the infection. For sutured wounds, avoid any moisture for the first 24 hours. Then wash the surface with soap and water once per day. Avoid soaking the wound until all sutures are  removed.   Fever and pain relief Give your child acetaminophen or ibuprofen if he develops a fever over 102?F (39?C) or the wound is painful.   Contagiousness The pus from wound infections is somewhat contagious. It can cause skin infections in other people if the pus accidentally gets on other people's skin or on an open cut. Be certain that other people in the family do not use your child's towel or washcloth. Encourage your child not to touch the wound because it puts germs on his fingers. Also, ask him to wash his hands more often than usual, or wash his hands for him. Cut his fingernails short. Keep your child out of school until he has been treated with antibiotics for 24 hours and is free of fever.   How can I help prevent infections?   Wash all new wounds vigorously with soap and water for 5 to 10 minutes to remove dirt and bacteria. Soak puncture wounds in warm, soapy water for 15 minutes. Do this as soon as possible after the injury occurs, because the longer you wait, the less the benefit.   Encourage your child not to pick at insect bites, scabs, or other areas of irritated skin.   Teach your children that kissing an open wound is dangerous because the wound will become contaminated by the many germs in the mouth.   Applying an antibiotic ointment after cleaning might be helpful.   When should I call my child's healthcare provider?   Call IMMEDIATELY if:   The redness keeps spreading.   The wound becomes extremely painful.   Your child starts acting very sick.   Call during office hours if:   The fever is not gone 48 hours after your child starts taking an antibiotic.   The wound infection does not look better 3 days after your child starts taking an antibiotic.   The wound isn't completely healed within 10 days.   You have other questions or concerns.     Published by [employer].  This content is reviewed periodically and is subject to change as new health information becomes available. The information  "is intended to inform and educate and is not a replacement for medical evaluation, advice, diagnosis or treatment by a healthcare professional.   Written by SHYLA Platt MD, author of \"Your Child's Health,\" [employer].   ? 2010 [facility] and/or its affiliates. All Rights Reserved.   Copyright   Clinical Reference Systems 2011      "

## 2018-07-08 NOTE — MR AVS SNAPSHOT
After Visit Summary   7/8/2018    Chloe Foster    MRN: [id number]           Patient Information     Date Of Birth          1980        Visit Information        Provider Department      7/8/2018 9:05 AM Stefanie Gil MD [facility]        Today's Diagnoses     Foot injury, left, initial encounter    -  1    Puncture wound        Left foot infection          Care Instructions      Hold multivitamin gummy and the magnesium while on antibiotic    Take probiotics for 30 days    Start ciprofloxcin 500 mg 2 times a day for 10 days    Take tylenol #3 at bedtime    Wrap and wear velcro shoe, keep elevated    If redness increases, fever to ER    Appointment with regular provider on Wed                   Wound (Skin) Infection  What is a wound infection?   When your child has a skin injury, watch for signs of infection. Signs that the wound is infected include:   Pus or cloudy fluid draining from the wound.   A pimple or yellow crust forming on the wound.   The scab is increasing in size.   Increasing redness occurs around the wound (cellulitis).   A red streak is spreading from the wound toward the heart.   The wound has become extremely tender.   Pain or swelling has increased 48 hours after the wound occurred.   The lymph node draining that area of skin may become large and tender.   Your child may develop a fever over 100?F (37.8?C).   The wound hasn't healed within 10?days after the injury.   What is the cause?   Most skin infections follow breaks in the skin (for example, from cuts, puncture wounds, animal bites, splinters, thorns, or burns). Bacteria (especially staphylococcus or streptococcus) then invade the wound and cause the infection. Some infections start with a closed rash (that is, the skin is not broken). Examples are insect bites, chickenpox, scabies, or acne. If a child picks at these rashes, the skin can become broken and then infected.   Deeper  wounds (for example, puncture wounds) are much more likely to become infected than superficial wounds (for example, scrapes). The hands are at increased risk for infection from puncture wounds. The penetrating claws or teeth of cats pose a major risk for infection.   Cellulitis (skin infection) can sometimes start without any recent wound infection. This type of cellulitis is spread from the bloodstream and can be serious it is not treated.   How long does it last?   With appropriate antibiotics and warm soaks, the wound infection should improve within 24 to 48?hours. By that time, your child should stop having any fever caused by the infection. Any red streaking or red patches should stop spreading. The area of the wound should also be much less tender within 48?hours. Within one week after your child starts taking antibiotics, all signs of active infection should be completely gone.   What is the treatment?   Antibiotics Your child needs the antibiotic prescribed by your healthcare provider. This medicine will kill the germs that are causing the wound infection. Try not to forget any of the doses. If your child goes to school or stays with a , arrange for someone to give the afternoon dose. Even though your child will feel better in a few days, give the antibiotic until it is completely gone to keep the infection from flaring up again.   Warm soaks or local heat For open wounds that are infected, proper cleaning is important for healing. Soak the wounded area in warm water or put a warm, wet cloth on the wound for 20?minutes three times a day. Use a warm saltwater solution containing 2 teaspoons of table salt per quart of water. Use this solution to remove all the pus and loose scabs. (Don't use hydrogen peroxide because it is a weak germ-killer.) Your healthcare provider may give you a syringe to help wash out the wound. Continue soaking the wound 3 times a day until it looks clear of infection. Then  cleanse the wound and change the dressing once a day until the wound has healed. For closed wounds that are infected, apply a heating pad or warm, moist washcloth to the reddened area for 20 minutes 3 times a day. This will help deliver the antibiotic to the infection. For sutured wounds, avoid any moisture for the first 24 hours. Then wash the surface with soap and water once per day. Avoid soaking the wound until all sutures are removed.   Fever and pain relief Give your child acetaminophen or ibuprofen if he develops a fever over 102?F (39?C) or the wound is painful.   Contagiousness The pus from wound infections is somewhat contagious. It can cause skin infections in other people if the pus accidentally gets on other people's skin or on an open cut. Be certain that other people in the family do not use your child's towel or washcloth. Encourage your child not to touch the wound because it puts germs on his fingers. Also, ask him to wash his hands more often than usual, or wash his hands for him. Cut his fingernails short. Keep your child out of school until he has been treated with antibiotics for 24 hours and is free of fever.   How can I help prevent infections?   Wash all new wounds vigorously with soap and water for 5 to 10 minutes to remove dirt and bacteria. Soak puncture wounds in warm, soapy water for 15 minutes. Do this as soon as possible after the injury occurs, because the longer you wait, the less the benefit.   Encourage your child not to pick at insect bites, scabs, or other areas of irritated skin.   Teach your children that kissing an open wound is dangerous because the wound will become contaminated by the many germs in the mouth.   Applying an antibiotic ointment after cleaning might be helpful.   When should I call my child's healthcare provider?   Call IMMEDIATELY if:   The redness keeps spreading.   The wound becomes extremely painful.   Your child starts acting very sick.   Call during  "office hours if:   The fever is not gone 48 hours after your child starts taking an antibiotic.   The wound infection does not look better 3 days after your child starts taking an antibiotic.   The wound isn't completely healed within 10 days.   You have other questions or concerns.     Published by [employer].  This content is reviewed periodically and is subject to change as new health information becomes available. The information is intended to inform and educate and is not a replacement for medical evaluation, advice, diagnosis or treatment by a healthcare professional.   Written by SHYLA Platt MD, author of \"Your Child's Health,\" [employer].   ? 2010 [facility] and/or its affiliates. All Rights Reserved.   Copyright   Clinical Reference Systems 2011              Follow-ups after your visit        Your next 10 appointments already scheduled     Jul 12, 2018  9:45 AM CDT   (Arrive by 9:30 AM)   New Patient Visit with Wes Luis MD   [facility] ([facility])    [address] [place] [address]  5th Floor  [facility] [address] [phone]            Jul 17, 2018  2:20 PM CDT   (Arrive by 2:05 PM)   New Stroke with Anirudh Ontiveros MD   [facility] ([facility])    [address] [place] [address]  3rd Floor  [facility] [address] [phone]              Who to contact     If you have questions or need follow up information about today's clinic visit or your schedule please contact [facility] directly at [phone].  Normal or non-critical lab and imaging results will be communicated to you by MyChart, letter or phone within 4 business days after the clinic has received the results. If you do not hear from us within 7 days, please contact the clinic through MyChart or phone. If you have a critical or abnormal lab result, we will notify you by phone as soon as possible.  Submit refill requests through " [employer] or call your pharmacy and they will forward the refill request to us. Please allow 3 business days for your refill to be completed.          Additional Information About Your Visit        [employer] Information     [employer] gives you secure access to your electronic health record. If you see a primary care provider, you can also send messages to your care team and make appointments. If you have questions, please call your primary care clinic.  If you do not have a primary care provider, please call [phone] and they will assist you.        Care EveryWhere ID     This is your Care EveryWhere ID. This could be used by other organizations to access your [place] medical records  [id number]        Your Vitals Were     Pulse Temperature Respirations BMI (Body Mass Index)          72 98  F (36.7  C) (Oral) 16 26.35 kg/m2         Blood Pressure from Last 3 Encounters:   07/08/18 120/80   05/15/18 116/89   05/08/18 114/71    Weight from Last 3 Encounters:   07/08/18 168 lb 4 oz (76.3 kg)   05/15/18 165 lb (74.8 kg)   05/08/18 169 lb 3.2 oz (76.7 kg)                 Today's Medication Changes          These changes are accurate as of 7/8/18 11:19 AM.  If you have any questions, ask your nurse or doctor.               Start taking these medicines.        Dose/Directions    ciprofloxacin 500 MG tablet   Commonly known as:  CIPRO   Used for:  Left foot infection   Started by:  Stefanie Gil MD        Dose:  500 mg   Take 1 tablet (500 mg) by mouth 2 times daily   Quantity:  20 tablet   Refills:  0         These medicines have changed or have updated prescriptions.        Dose/Directions    * acetaminophen-codeine 300-30 MG per tablet   Commonly known as:  TYLENOL #3   This may have changed:  Another medication with the same name was added. Make sure you understand how and when to take each.   Used for:  Viral URI with cough   Changed by:  Stefanie Gil MD        Dose:  1 tablet   Take 1 tablet by mouth every 8  hours as needed (cough. DON'T Take it with Ativan.) maximum 3 tablet(s) per day   Quantity:  16 tablet   Refills:  0       * acetaminophen-codeine 300-30 MG per tablet   Commonly known as:  TYLENOL #3   This may have changed:  You were already taking a medication with the same name, and this prescription was added. Make sure you understand how and when to take each.   Used for:  Foot injury, left, initial encounter, Puncture wound   Changed by:  Stefanie Gil MD        Dose:  1 tablet   Take 1 tablet by mouth nightly as needed for severe pain   Quantity:  5 tablet   Refills:  0       * Notice:  This list has 2 medication(s) that are the same as other medications prescribed for you. Read the directions carefully, and ask your doctor or other care provider to review them with you.         Where to get your medicines      These medications were sent to [facility] [id number] [place] [address] [address] [facility] [address] [facility] [address]     Phone:  [phone]     ciprofloxacin 500 MG tablet         Some of these will need a paper prescription and others can be bought over the counter.  Ask your nurse if you have questions.     Bring a paper prescription for each of these medications     acetaminophen-codeine 300-30 MG per tablet               Information about OPIOIDS     PRESCRIPTION OPIOIDS: WHAT YOU NEED TO KNOW   We gave you an opioid (narcotic) pain medicine. It is important to manage your pain, but opioids are not always the best choice. You should first try all the other options your care team gave you. Take this medicine for as short a time (and as few doses) as possible.     These medicines have risks:    DO NOT drive when on new or higher doses of pain medicine. These medicines can affect your alertness and reaction times, and you could be arrested for driving under the influence (DUI). If you need to use opioids long-term, talk to your care team about  driving.    DO NOT operate heave machinery    DO NOT do any other dangerous activities while taking these medicines.     DO NOT drink any alcohol while taking these medicines.      If the opioid prescribed includes acetaminophen, DO NOT take with any other medicines that contain acetaminophen. Read all labels carefully. Look for the word  acetaminophen  or  Tylenol.  Ask your pharmacist if you have questions or are unsure.    You can get addicted to pain medicines, especially if you have a history of addiction (chemical, alcohol or substance dependence). Talk to your care team about ways to reduce this risk.    Store your pills in a secure place, locked if possible. We will not replace any lost or stolen medicine. If you don t finish your medicine, please throw away (dispose) as directed by your pharmacist. The [place] Pollution Control Agency has more information about safe disposal: [email or web address]://[email or web address]-green/[email or web address]-unwanted-medications.     All opioids tend to cause constipation. Drink plenty of water and eat foods that have a lot of fiber, such as fruits, vegetables, prune juice, apple juice and high-fiber cereal. Take a laxative (Miralax, milk of magnesia, Colace, Senna) if you don t move your bowels at least every other day.          Primary Care Provider Office Phone # Fax #    Izabel Reyes [phone] [address] [address]        Equal Access to Services     ANAIS ARORA : Hadii eliane garcía hadasho Soabhishek, waaxda luqadaha, qaybta kaalmada adesandhya, paulie louis. So [facility] [phone].    ATENCIÓN: Si habla español, tiene a rankin disposición servicios gratuitos de asistencia lingüística. Llpenny al [phone].    We comply with applicable federal civil rights laws and [place] laws. We do not discriminate on the basis of race, color, national origin, age, disability, sex, sexual orientation, or gender  identity.            Thank you!     Thank you for choosing [facility]  for your care. Our goal is always to provide you with excellent care. Hearing back from our patients is one way we can continue to improve our services. Please take a few minutes to complete the written survey that you may receive in the mail after your visit with us. Thank you!             Your Updated Medication List - Protect others around you: Learn how to safely use, store and throw away your medicines at www.disposemymeds.org.          This list is accurate as of 7/8/18 11:19 AM.  Always use your most recent med list.                   Brand Name Dispense Instructions for use Diagnosis    * acetaminophen-codeine 300-30 MG per tablet    TYLENOL #3    16 tablet    Take 1 tablet by mouth every 8 hours as needed (cough. DON'T Take it with Ativan.) maximum 3 tablet(s) per day    Viral URI with cough       * acetaminophen-codeine 300-30 MG per tablet    TYLENOL #3    5 tablet    Take 1 tablet by mouth nightly as needed for severe pain    Foot injury, left, initial encounter, Puncture wound       aspirin 81 MG EC tablet      Take 81 mg by mouth        azithromycin 250 MG tablet    ZITHROMAX    6 tablet    Two tablets first day, then one tablet daily for four days.    Viral URI with cough       benzonatate 200 MG capsule    TESSALON    21 capsule    Take 1 capsule (200 mg) by mouth 3 times daily as needed for cough    Viral URI with cough       ciprofloxacin 500 MG tablet    CIPRO    20 tablet    Take 1 tablet (500 mg) by mouth 2 times daily    Left foot infection       FISH OIL PO      Take 1 capsule by mouth daily        LORazepam 1 MG tablet    ATIVAN    12 tablet    Use 1/2-1 tab at night prior to meetings or for panic symptoms    Anxiety state, Social anxiety disorder       magnesium oxide 400 MG tablet    MAG-OX     Take 400 mg by mouth every evening        MULTIVITAMIN GUMMIES WOMENS PO      Take 2 chew tab by  mouth every evening        NONFORMULARY      Take 1 tablet by mouth every evening OTC: B-Right Vitamin (Optimized B Complex)        propranolol 10 MG tablet    INDERAL    20 tablet    Take 1 tablet by mouth. Take as needed prior to interviews, stressful events    Social anxiety disorder       sertraline 100 MG tablet    ZOLOFT    90 tablet    Take 1 tablet (100 mg) by mouth daily    Anxiety state, Social anxiety disorder       STATIN NOT PRESCRIBED (INTENTIONAL)     0 each    Statin not prescribed intentionally due to Woman of childbearing age not actively taking birth control    Cerebral artery occlusion with cerebral infarction (H)       VITAMIN D (CHOLECALCIFEROL) PO      Take 1,000 Units by mouth every evening (takes 2 x 100 unit capsule)        * Notice:  This list has 2 medication(s) that are the same as other medications prescribed for you. Read the directions carefully, and ask your doctor or other care provider to review them with you.

## 2018-07-08 NOTE — PROGRESS NOTES
SUBJECTIVE:   Chloe Foster is a 37 year old female who presents to clinic today for the following health issues:      Musculoskeletal problem/pain-left foot injury      Duration: 2 days    Description  Location: left foot great toe  Patient was wearing flip flops and working outside and an old large piece of wood with a nail protruding from the wood slammed down on her foot with the nail grazing the top of her right toe, nail did not puncture into the toe,     Intensity:  moderate, severe 7-8/10, with movement 10/10    Accompanying signs and symptoms: warmth, swelling and redness    History  Previous similar problem: no   Previous evaluation:  none    Precipitating or alleviating factors:  Trauma or overuse: YES- as noted  Aggravating factors include: standing, walking, climbing stairs, exercise and overuse    Therapies tried and outcome: rest/inactivity, heat, ice, acetaminophen       Problem list and histories reviewed & adjusted, as indicated.  Additional history: as documented    Patient Active Problem List   Diagnosis     Vitamin D deficiency     CARDIOVASCULAR SCREENING; LDL GOAL LESS THAN 160     Lumbar herniated disc     Hirsutism     Insomnia     IBS (irritable bowel syndrome)     Moderate recurrent major depression (H)     Cerebral artery occlusion with cerebral infarction     History of  section     PFO with atrial septal aneurysm     Protein S deficiency (H)     Migraine with aura and without status migrainosus, not intractable     Pre-conception counseling     Elevated lipoprotein A level     Social anxiety disorder     MTHFR mutation (H)     Past Surgical History:   Procedure Laterality Date     ARTHROSCOPY KNEE RT/LT      Rt knee     HC REPAIR OF NASAL SEPTUM       REPAIR PATENT FORAMEN OVALE  2016       Social History   Substance Use Topics     Smoking status: Former Smoker     Packs/day: 0.30     Years: 14.00     Quit date: 2014     Smokeless tobacco: Former User      Quit date: 2014     Alcohol use 0.0 oz/week     0 Standard drinks or equivalent per week      Comment: occ.     Family History   Problem Relation Age of Onset     Cancer Father      throat     Hypertension Father      Allergies Father      Diabetes Maternal Grandmother      on insulin     C.A.D. Maternal Grandmother      triple bypass in her 60s     Diabetes Paternal Grandmother      C.A.D. Maternal Grandfather      unsure type     Neurologic Disorder Paternal Grandfather       of brain aneurysm in early 60s     Diabetes Maternal Aunt       in her early 40s of DM complications, type 2         Current Outpatient Prescriptions   Medication Sig Dispense Refill     acetaminophen-codeine (TYLENOL #3) 300-30 MG per tablet Take 1 tablet by mouth nightly as needed for severe pain 5 tablet 0     aspirin EC 81 MG EC tablet Take 81 mg by mouth       ciprofloxacin (CIPRO) 500 MG tablet Take 1 tablet (500 mg) by mouth 2 times daily 20 tablet 0     LORazepam (ATIVAN) 1 MG tablet Use 1/2-1 tab at night prior to meetings or for panic symptoms 12 tablet 0     magnesium oxide (MAG-OX) 400 MG tablet Take 400 mg by mouth every evening        Multiple Vitamins-Minerals (MULTIVITAMIN GUMMIES WOMENS PO) Take 2 chew tab by mouth every evening       NONFORMULARY Take 1 tablet by mouth every evening OTC: B-Right Vitamin (Optimized B Complex)       Omega-3 Fatty Acids (FISH OIL PO) Take 1 capsule by mouth daily        order for DME Equipment being ordered: post op shoe 1 Device 0     sertraline (ZOLOFT) 100 MG tablet Take 1 tablet (100 mg) by mouth daily 90 tablet 1     VITAMIN D, CHOLECALCIFEROL, PO Take 1,000 Units by mouth every evening (takes 2 x 100 unit capsule)        acetaminophen-codeine (TYLENOL #3) 300-30 MG per tablet Take 1 tablet by mouth every 8 hours as needed (cough. DON'T Take it with Ativan.) maximum 3 tablet(s) per day 16 tablet 0     azithromycin (ZITHROMAX) 250 MG tablet Two tablets first day, then one  tablet daily for four days. 6 tablet 0     benzonatate (TESSALON) 200 MG capsule Take 1 capsule (200 mg) by mouth 3 times daily as needed for cough 21 capsule 0     propranolol (INDERAL) 10 MG tablet Take 1 tablet by mouth. Take as needed prior to interviews, stressful events (Patient not taking: Reported on 7/8/2018) 20 tablet 1     STATIN NOT PRESCRIBED, INTENTIONAL, Statin not prescribed intentionally due to Woman of childbearing age not actively taking birth control 0 each 0     Allergies   Allergen Reactions     Nkda [No Known Drug Allergies]      Recent Labs   Lab Test  05/08/18   1630  12/28/17   0849  12/01/17   1000  09/30/17   1643  12/29/16   0924   11/23/15   0820   04/29/15   2300  03/29/13   1101   LDL   --   129*   --    --   126*   --   108*   < >   --    --    HDL   --   106   --    --   86   --   71   < >   --    --    TRIG   --   56   --    --   61   --   53   < >   --    --    ALT   --    --    --    --   25   --   23   --   26   --    CR  0.59   --   0.73   --   0.70   < >  0.62   --   0.60   --    GFRESTIMATED  >90   --   89   --   >90  Non  GFR Calc     < >  >90  Non  GFR Calc     --   >90  Non  GFR Calc     --    GFRESTBLACK  >90   --   >90   --   >90   GFR Calc     < >  >90   GFR Calc     --   >90   GFR Calc     --    POTASSIUM  3.6   --   4.2   --   4.1   < >  4.0   --   3.5   --    TSH   --    --    --   0.66   --    --    --    --    --   0.44    < > = values in this interval not displayed.      BP Readings from Last 3 Encounters:   07/08/18 120/80   05/15/18 116/89   05/08/18 114/71    Wt Readings from Last 3 Encounters:   07/08/18 168 lb 4 oz (76.3 kg)   05/15/18 165 lb (74.8 kg)   05/08/18 169 lb 3.2 oz (76.7 kg)                  Labs reviewed in EPIC    Reviewed and updated as needed this visit by clinical staff  Tobacco  Allergies  Meds       Reviewed and updated as needed this visit by  Provider         ROS:  Constitutional, HEENT, cardiovascular, pulmonary, gi and gu systems are negative, except as otherwise noted.    OBJECTIVE:     /80 (Cuff Size: Adult Regular)  Pulse 72  Temp 98  F (36.7  C) (Oral)  Resp 16  Wt 168 lb 4 oz (76.3 kg)  BMI 26.35 kg/m2  Body mass index is 26.35 kg/(m^2).   GENERAL: healthy, alert and no distress  MS: no pretibial edema bilateral  MS: r foot-swelling of dorsum of foot, tender over  first and second metatarsals, mild ecchymosis of the upper portion of the foot just distal to the mtp joints  R great toe-crusting abrasion across the dorsum of the  MTP joint no fluctuance, mild induration, no drainage, entire toe red, swollen, 10/10 pain with passive range of motion of the MTP joint, erythema ends about 2 cmdistal to the mtp joint into the soft tissue of the dorsum of the foot  NEURO: Normal strength and tone, mentation intact and speech normal    Diagnostic Test Results:  Xray -XR FOOT LT G/E 3 VW   7/8/2018 10:19 AM      HISTORY: ; Foot injury, left, initial encounter     COMPARISON: None.         IMPRESSION: No evidence of acute fracture or subluxation. Negative.     MARIMAR VILLATORO MD    ASSESSMENT/PLAN:     Problem List Items Addressed This Visit     None      Visit Diagnoses     Foot injury, left, initial encounter    -  Primary    Relevant Medications    acetaminophen-codeine (TYLENOL #3) 300-30 MG per tablet    order for DME    Other Relevant Orders    XR Foot Left G/E 3 Views (Completed)    Abrasion of toe of left foot, initial encounter        Relevant Medications    acetaminophen-codeine (TYLENOL #3) 300-30 MG per tablet    Left foot infection        Relevant Medications    ciprofloxacin (CIPRO) 500 MG tablet    acetaminophen-codeine (TYLENOL #3) 300-30 MG per tablet    order for DME             ASSESSMENT/PLAN:      ICD-10-CM    1. Foot injury, left, initial encounter S99.922A XR Foot Left G/E 3 Views     acetaminophen-codeine (TYLENOL #3) 300-30 MG  per tablet     order for DME   2. Abrasion of toe of left foot, initial encounter S90.415A    3. Left foot infection L08.9 ciprofloxacin (CIPRO) 500 MG tablet     order for DME       Patient Instructions     Hold multivitamin gummy and the magnesium while on antibiotic    Take probiotics for 30 days    Start ciprofloxcin 500 mg 2 times a day for 10 days    Take tylenol #3 at bedtime    Wrap and wear velcro shoe, keep elevated    If redness increases, fever to ER    Appointment with regular provider on Wed                   Wound (Skin) Infection  What is a wound infection?   When your child has a skin injury, watch for signs of infection. Signs that the wound is infected include:   Pus or cloudy fluid draining from the wound.   A pimple or yellow crust forming on the wound.   The scab is increasing in size.   Increasing redness occurs around the wound (cellulitis).   A red streak is spreading from the wound toward the heart.   The wound has become extremely tender.   Pain or swelling has increased 48 hours after the wound occurred.   The lymph node draining that area of skin may become large and tender.   Your child may develop a fever over 100?F (37.8?C).   The wound hasn't healed within 10?days after the injury.   What is the cause?   Most skin infections follow breaks in the skin (for example, from cuts, puncture wounds, animal bites, splinters, thorns, or burns). Bacteria (especially staphylococcus or streptococcus) then invade the wound and cause the infection. Some infections start with a closed rash (that is, the skin is not broken). Examples are insect bites, chickenpox, scabies, or acne. If a child picks at these rashes, the skin can become broken and then infected.   Deeper wounds (for example, puncture wounds) are much more likely to become infected than superficial wounds (for example, scrapes). The hands are at increased risk for infection from puncture wounds. The penetrating claws or teeth of cats  pose a major risk for infection.   Cellulitis (skin infection) can sometimes start without any recent wound infection. This type of cellulitis is spread from the bloodstream and can be serious it is not treated.   How long does it last?   With appropriate antibiotics and warm soaks, the wound infection should improve within 24 to 48?hours. By that time, your child should stop having any fever caused by the infection. Any red streaking or red patches should stop spreading. The area of the wound should also be much less tender within 48?hours. Within one week after your child starts taking antibiotics, all signs of active infection should be completely gone.   What is the treatment?   Antibiotics Your child needs the antibiotic prescribed by your healthcare provider. This medicine will kill the germs that are causing the wound infection. Try not to forget any of the doses. If your child goes to school or stays with a , arrange for someone to give the afternoon dose. Even though your child will feel better in a few days, give the antibiotic until it is completely gone to keep the infection from flaring up again.   Warm soaks or local heat For open wounds that are infected, proper cleaning is important for healing. Soak the wounded area in warm water or put a warm, wet cloth on the wound for 20?minutes three times a day. Use a warm saltwater solution containing 2 teaspoons of table salt per quart of water. Use this solution to remove all the pus and loose scabs. (Don't use hydrogen peroxide because it is a weak germ-killer.) Your healthcare provider may give you a syringe to help wash out the wound. Continue soaking the wound 3 times a day until it looks clear of infection. Then cleanse the wound and change the dressing once a day until the wound has healed. For closed wounds that are infected, apply a heating pad or warm, moist washcloth to the reddened area for 20 minutes 3 times a day. This will help  deliver the antibiotic to the infection. For sutured wounds, avoid any moisture for the first 24 hours. Then wash the surface with soap and water once per day. Avoid soaking the wound until all sutures are removed.   Fever and pain relief Give your child acetaminophen or ibuprofen if he develops a fever over 102?F (39?C) or the wound is painful.   Contagiousness The pus from wound infections is somewhat contagious. It can cause skin infections in other people if the pus accidentally gets on other people's skin or on an open cut. Be certain that other people in the family do not use your child's towel or washcloth. Encourage your child not to touch the wound because it puts germs on his fingers. Also, ask him to wash his hands more often than usual, or wash his hands for him. Cut his fingernails short. Keep your child out of school until he has been treated with antibiotics for 24 hours and is free of fever.   How can I help prevent infections?   Wash all new wounds vigorously with soap and water for 5 to 10 minutes to remove dirt and bacteria. Soak puncture wounds in warm, soapy water for 15 minutes. Do this as soon as possible after the injury occurs, because the longer you wait, the less the benefit.   Encourage your child not to pick at insect bites, scabs, or other areas of irritated skin.   Teach your children that kissing an open wound is dangerous because the wound will become contaminated by the many germs in the mouth.   Applying an antibiotic ointment after cleaning might be helpful.   When should I call my child's healthcare provider?   Call IMMEDIATELY if:   The redness keeps spreading.   The wound becomes extremely painful.   Your child starts acting very sick.   Call during office hours if:   The fever is not gone 48 hours after your child starts taking an antibiotic.   The wound infection does not look better 3 days after your child starts taking an antibiotic.   The wound isn't completely healed  "within 10 days.   You have other questions or concerns.     Published by [employer].  This content is reviewed periodically and is subject to change as new health information becomes available. The information is intended to inform and educate and is not a replacement for medical evaluation, advice, diagnosis or treatment by a healthcare professional.   Written by SHYLA Platt MD, author of \"Your Child's Health,\" [employer].   ? 2010 [employer] [facility] and/or its affiliates. All Rights Reserved.   Copyright   [employer] 2011              Stefanie Gil MD  [facility]    "

## 2018-07-12 ENCOUNTER — OFFICE VISIT (OUTPATIENT)
Dept: ORTHOPEDICS | Facility: CLINIC | Age: 38
End: 2018-07-12
Payer: COMMERCIAL

## 2018-07-12 VITALS — WEIGHT: 168 LBS | BODY MASS INDEX: 26.37 KG/M2 | RESPIRATION RATE: 16 BRPM | HEIGHT: 67 IN

## 2018-07-12 DIAGNOSIS — M75.42 IMPINGEMENT SYNDROME OF LEFT SHOULDER: Primary | ICD-10-CM

## 2018-07-12 NOTE — Clinical Note
Thank you for allowing me to see your patient in [facility].  Please see the attached copy of our visit.  Sincerely,  Wes Luis MD

## 2018-07-12 NOTE — MR AVS SNAPSHOT
After Visit Summary   7/12/2018    Chloe Foster    MRN: [id number]           Patient Information     Date Of Birth          1980        Visit Information        Provider Department      7/12/2018 9:45 AM Wes Luis MD [facility] Sports Medicine        Today's Diagnoses     Impingement syndrome of left shoulder    -  1       Follow-ups after your visit        Additional Services     [facility] PT, HAND, AND CHIROPRACTIC REFERRAL       === This order will print in the [facility] Scheduling  Office ===    Physical therapy, hand therapy and chiropractic care are available through:    [facility]  [facility]    Call one easy number to schedule at any of the above locations:  [phone].    Your provider has referred you to Physical Therapy at [facility] or Adriana PATTON [facility]    Indication/Reason for Referral: Shoulder Pain  Onset of Illness:  Left shoulder impingemetn x 2 months, suspect RC tendinitis d/t scapular dysfunction  Therapy Orders:  Evaluate and Treat  Special Programs:  None  Special Request:  None    Additional Comments for the therapist or chiropractor:  High rep low wt scap stab, post farideh, RC, postural cues.   6-8 vsitis    Please be aware that coverage of these services is subject to the terms and limitations of your health insurance plan.  Call member services at your health plan with any benefit or coverage questions.      Please bring the following to your appointment:    >>   Your personal calendar for scheduling future appointments  >>   Comfortable clothing                  Your next 10 appointments already scheduled     Jul 17, 2018  2:20 PM CDT   (Arrive by 2:05 PM)   New Stroke with Anirudh Ontiveros MD   [facility] Neurology ([email or web address])    [address] [place] [id number] [phone]              Who to contact     Please call your clinic at [phone]  "to:    Ask questions about your health    Make or cancel appointments    Discuss your medicines    Learn about your test results    Speak to your doctor            Additional Information About Your Visit        [employer] Information     [employer] gives you secure access to your electronic health record. If you see a primary care provider, you can also send messages to your care team and make appointments. If you have questions, please call your primary care clinic.  If you do not have a primary care provider, please call [phone] and they will assist you.      [employer] is an electronic gateway that provides easy, online access to your medical records. With [employer], you can request a clinic appointment, read your test results, renew a prescription or communicate with your care team.     To access your existing account, please contact your [facility] or call [phone] for assistance.        Care EveryWhere ID     This is your Care EveryWhere ID. This could be used by other organizations to access your [facility] medical records  [id number]        Your Vitals Were     Respirations Height BMI (Body Mass Index)             16 5' 7\" (1.702 m) 26.31 kg/m2          Blood Pressure from Last 3 Encounters:   07/08/18 120/80   05/15/18 116/89   05/08/18 114/71    Weight from Last 3 Encounters:   07/12/18 168 lb (76.2 kg)   07/08/18 168 lb 4 oz (76.3 kg)   05/15/18 165 lb (74.8 kg)              We Performed the Following     [facility] PT, HAND, AND CHIROPRACTIC REFERRAL        Primary Care Provider Office Phone # Fax #    Izabel Reyes [phone] [address] [address]        Equal Access to Services     [facility]: Hadii eliane Torres, elba leyva, paulie cohen. So [facility] [phone].    ATENCIÓN: Si habla español, tiene a rankin disposición servicios gratuitos de asistencia " lingüísticaCammie Messer al [phone].    We comply with applicable federal civil rights laws and [place] laws. We do not discriminate on the basis of race, color, national origin, age, disability, sex, sexual orientation, or gender identity.            Thank you!     Thank you for choosing [facility]  for your care. Our goal is always to provide you with excellent care. Hearing back from our patients is one way we can continue to improve our services. Please take a few minutes to complete the written survey that you may receive in the mail after your visit with us. Thank you!             Your Updated Medication List - Protect others around you: Learn how to safely use, store and throw away your medicines at www.disposemymeds.org.          This list is accurate as of 7/12/18  1:03 PM.  Always use your most recent med list.                   Brand Name Dispense Instructions for use Diagnosis    * acetaminophen-codeine 300-30 MG per tablet    TYLENOL #3    16 tablet    Take 1 tablet by mouth every 8 hours as needed (cough. DON'T Take it with Ativan.) maximum 3 tablet(s) per day    Viral URI with cough       * acetaminophen-codeine 300-30 MG per tablet    TYLENOL #3    5 tablet    Take 1 tablet by mouth nightly as needed for severe pain    Foot injury, left, initial encounter       aspirin 81 MG EC tablet      Take 81 mg by mouth        azithromycin 250 MG tablet    ZITHROMAX    6 tablet    Two tablets first day, then one tablet daily for four days.    Viral URI with cough       benzonatate 200 MG capsule    TESSALON    21 capsule    Take 1 capsule (200 mg) by mouth 3 times daily as needed for cough    Viral URI with cough       ciprofloxacin 500 MG tablet    CIPRO    20 tablet    Take 1 tablet (500 mg) by mouth 2 times daily    Left foot infection       FISH OIL PO      Take 1 capsule by mouth daily        LORazepam 1 MG tablet    ATIVAN    12 tablet    Use 1/2-1 tab at night prior to meetings or for  panic symptoms    Anxiety state, Social anxiety disorder       magnesium oxide 400 MG tablet    MAG-OX     Take 400 mg by mouth every evening        MULTIVITAMIN GUMMIES WOMENS PO      Take 2 chew tab by mouth every evening        NONFORMULARY      Take 1 tablet by mouth every evening OTC: B-Right Vitamin (Optimized B Complex)        order for DME     1 Device    Equipment being ordered: post op shoe    Left foot infection, Foot injury, left, initial encounter       propranolol 10 MG tablet    INDERAL    20 tablet    Take 1 tablet by mouth. Take as needed prior to interviews, stressful events    Social anxiety disorder       sertraline 100 MG tablet    ZOLOFT    90 tablet    Take 1 tablet (100 mg) by mouth daily    Anxiety state, Social anxiety disorder       STATIN NOT PRESCRIBED (INTENTIONAL)     0 each    Statin not prescribed intentionally due to Woman of childbearing age not actively taking birth control    Cerebral artery occlusion with cerebral infarction (H)       VITAMIN D (CHOLECALCIFEROL) PO      Take 1,000 Units by mouth every evening (takes 2 x 100 unit capsule)        * Notice:  This list has 2 medication(s) that are the same as other medications prescribed for you. Read the directions carefully, and ask your doctor or other care provider to review them with you.

## 2018-07-12 NOTE — PROGRESS NOTES
"Sports Medicine Clinic Visit    PCP: Izabel Reyes    Chloe Foster is a 37 year old female who is seen  in consultation at the request of Dr. Reyes presenting with left shoulder pain.     Injury: None    Location of Pain: left anterior shoulder   Duration of Pain: 2-3 month(s)  Rating of Pain: 7/10 at worst  Pain is better with: No Treatment tried to date  Pain is worse with: No aggravating factors  Additional Features: Numbness or arm in the morning  Treatment so far consists of: No Treatment tried to date  Prior History of related problems: None    Resp 16  Ht 5' 7\" (1.702 m)  Wt 168 lb (76.2 kg)  BMI 26.31 kg/m2         PMH:  Past Medical History:   Diagnosis Date     Acute stress reaction     propranolol helps prior to interviews, stressful situations     Allergic rhinitis, cause unspecified     no meds except prn flonase, tests generally positive, decided against shots     Anxiety state, unspecified     citalopram started in , stopped after couple wks, felt sluggish/tired     CVA (cerebral vascular accident) (H)     May 1, May 14th 2015     Depressive disorder, not elsewhere classified     dx, but not sure this is correct, weaned off wellbutrin (-, -), restarted 3/08     Elevated lipoprotein A level      Hirsutism     saw endo, inconclusive, started on spironolactone (helped a little), stopped in , elevated DHEA- sees new endo      PFO (patent foramen ovale)     Post PFO/ASD closure with 30mm Amplatzer device 16     Urinary tract infection, site not specified     recurrent, start nitrofur in , 6mo txt       Active problem list:  Patient Active Problem List   Diagnosis     Vitamin D deficiency     CARDIOVASCULAR SCREENING; LDL GOAL LESS THAN 160     Lumbar herniated disc     Hirsutism     Insomnia     IBS (irritable bowel syndrome)     Moderate recurrent major depression (H)     Cerebral artery occlusion with cerebral infarction     History of  " section     PFO with atrial septal aneurysm     Protein S deficiency (H)     Migraine with aura and without status migrainosus, not intractable     Pre-conception counseling     Elevated lipoprotein A level     Social anxiety disorder     MTHFR mutation (H)       FH:  Family History   Problem Relation Age of Onset     Cancer Father      throat     Hypertension Father      Allergies Father      Diabetes Maternal Grandmother      on insulin     C.A.D. Maternal Grandmother      triple bypass in her 60s     Diabetes Paternal Grandmother      C.A.D. Maternal Grandfather      unsure type     Neurologic Disorder Paternal Grandfather       of brain aneurysm in early 60s     Diabetes Maternal Aunt       in her early 40s of DM complications, type 2       SH:  Social History     Social History     Marital status:      Spouse name: N/A     Number of children: 0     Years of education: 15     Occupational History           [employer]     Social History Main Topics     Smoking status: Former Smoker     Packs/day: 0.30     Years: 14.00     Quit date: 2014     Smokeless tobacco: Former User     Quit date: 2014     Alcohol use 0.0 oz/week     0 Standard drinks or equivalent per week      Comment: occ.     Drug use: No     Sexual activity: Yes     Partners: Male     Other Topics Concern     Caffeine Concern No     1 cup coffee per day     Sleep Concern No     Stress Concern No     Weight Concern No     Special Diet No     Exercise Yes     4-5 days week     Parent/Sibling W/ Cabg, Mi Or Angioplasty Before 65f 55m? No     Social History Narrative    Social Documentation:        Balanced Diet: YES    Calcium intake: 3 per day    Caffeine: 0 per day    Exercise:  type of activity ellipitcal, pilates;  5 times per week    Sunscreen: Yes    Seatbelts:  Yes    Self Breast Exam:  Yes    Physical/Emotional/Sexual Abuse: No    Do you feel safe in your environment? Yes        Cholesterol screen up to  date: Yes- checking today 07/23/09, non fasting    Eye Exam up to date: Yes    Dental Exam up to date: Yes    Pap smear up to date: Yes: checking today 07/23/09, last was 04/08 NIL    Mammogram up to date: Does Not Apply    Dexa Scan up to date: Does Not Apply    Colonoscopy up to date: Does Not Apply    Immunizations up to date: Yes, had one a month ago.    Glucose screen if over 40:  No        Julieth Matthews MA    07/23/09       MEDS:  See EMR, reviewed  ALL:  See EMR, reviewed    REVIEW OF SYSTEMS:  CONSTITUTIONAL:NEGATIVE for fever, chills, change in weight  INTEGUMENTARY/SKIN: NEGATIVE for worrisome rashes, moles or lesions  EYES: NEGATIVE for vision changes or irritation  ENT/MOUTH: NEGATIVE for ear, mouth and throat problems  RESP:NEGATIVE for significant cough or SOB  BREAST: NEGATIVE for masses, tenderness or discharge  CV: NEGATIVE for chest pain, palpitations or peripheral edema  GI: NEGATIVE for nausea, abdominal pain, heartburn, or change in bowel habits  :NEGATIVE for frequency, dysuria, or hematuria  :NEGATIVE for frequency, dysuria, or hematuria  NEURO: NEGATIVE for weakness, dizziness or paresthesias  ENDOCRINE: NEGATIVE for temperature intolerance, skin/hair changes  HEME/ALLERGY/IMMUNE: NEGATIVE for bleeding problems  PSYCHIATRIC: NEGATIVE for changes in mood or affect            Palpation:    Tender:    Nontender:  SC joint, clavicle, AC joint, acromion, anterior rotator cuff, proximal bicep tendon    Range of Motion:        Active:all normal        Passive: all normal    Strength: rotator cuff--deltoid strength full; supraspinatus strength full; infraspinatus strength full; subscapularis strength full    Special tests: Negative Neer's test, mildly posHawkins, Negative Cross-Arm adduction, Negative Anterior Apprehension, Negative Posterior Apprehension, pos (bialteral) Sulcus Sign, Negative Huang's, Negative supine SLAP lesion signs    Sulcus sign negative.  Load and shift maneuver  negative    Scapular symmetry:  Improvements to be made in symmetry of stabilized movement    Head-fwd, shoulder-fwd posture:  Positive tendency     Distal pulses intact.  Sensation intact.  Skin intact.    A/ox 3      ASSESSMENT:  Left-sided shoulder impingement syndrome x2 months, suspect rotator cuff tendinitis and bursitis.      PLAN:  She will start with a physical therapy protocol for the posterior shoulder that she can do at home.  She knows if she is not improving over the next 4-8 weeks that an imaging study such as an x-ray or even MRI could be considered.  She will return if not improved.

## 2018-07-12 NOTE — LETTER
"  7/12/2018      RE: Chloe Foster  [address] [facility] [address]       Sports Medicine Clinic Visit    PCP: Izabel Reyes    Chloe Foster is a 37 year old female who is seen  in consultation at the request of Dr. Reyes presenting with left shoulder pain.     Injury: None    Location of Pain: left anterior shoulder   Duration of Pain: 2-3 month(s)  Rating of Pain: 7/10 at worst  Pain is better with: No Treatment tried to date  Pain is worse with: No aggravating factors  Additional Features: Numbness or arm in the morning  Treatment so far consists of: No Treatment tried to date  Prior History of related problems: None    Resp 16  Ht 5' 7\" (1.702 m)  Wt 168 lb (76.2 kg)  BMI 26.31 kg/m2         PMH:  Past Medical History:   Diagnosis Date     Acute stress reaction     propranolol helps prior to interviews, stressful situations     Allergic rhinitis, cause unspecified     no meds except prn flonase, tests generally positive, decided against shots     Anxiety state, unspecified     citalopram started in 7/06, stopped after couple wks, felt sluggish/tired     CVA (cerebral vascular accident) (H)     May 1, May 14th 2015     Depressive disorder, not elsewhere classified     dx, but not sure this is correct, weaned off wellbutrin (4/05-6/06, 12/06-1/08), restarted 3/08     Elevated lipoprotein A level      Hirsutism     saw endo, inconclusive, started on spironolactone (helped a little), stopped in 6/06, elevated DHEA- sees new endo 4/08     PFO (patent foramen ovale)     Post PFO/ASD closure with 30mm Amplatzer device 4/21/16     Urinary tract infection, site not specified     recurrent, start nitrofur in 5/08, 6mo txt       Active problem list:  Patient Active Problem List   Diagnosis     Vitamin D deficiency     CARDIOVASCULAR SCREENING; LDL GOAL LESS THAN 160     Lumbar herniated disc     Hirsutism     Insomnia     IBS (irritable bowel syndrome)     Moderate recurrent " major depression (H)     Cerebral artery occlusion with cerebral infarction     History of  section     PFO with atrial septal aneurysm     Protein S deficiency (H)     Migraine with aura and without status migrainosus, not intractable     Pre-conception counseling     Elevated lipoprotein A level     Social anxiety disorder     MTHFR mutation (H)       FH:  Family History   Problem Relation Age of Onset     Cancer Father      throat     Hypertension Father      Allergies Father      Diabetes Maternal Grandmother      on insulin     C.A.D. Maternal Grandmother      triple bypass in her 60s     Diabetes Paternal Grandmother      C.A.D. Maternal Grandfather      unsure type     Neurologic Disorder Paternal Grandfather       of brain aneurysm in early 60s     Diabetes Maternal Aunt       in her early 40s of DM complications, type 2       SH:  Social History     Social History     Marital status:      Spouse name: N/A     Number of children: 0     Years of education: 15     Occupational History           [employer]     Social History Main Topics     Smoking status: Former Smoker     Packs/day: 0.30     Years: 14.00     Quit date: 2014     Smokeless tobacco: Former User     Quit date: 2014     Alcohol use 0.0 oz/week     0 Standard drinks or equivalent per week      Comment: occ.     Drug use: No     Sexual activity: Yes     Partners: Male     Other Topics Concern     Caffeine Concern No     1 cup coffee per day     Sleep Concern No     Stress Concern No     Weight Concern No     Special Diet No     Exercise Yes     4-5 days week     Parent/Sibling W/ Cabg, Mi Or Angioplasty Before 65f 55m? No     Social History Narrative    Social Documentation:        Balanced Diet: YES    Calcium intake: 3 per day    Caffeine: 0 per day    Exercise:  type of activity ellipitcal, pilates;  5 times per week    Sunscreen: Yes    Seatbelts:  Yes    Self Breast Exam:  Yes     Physical/Emotional/Sexual Abuse: No    Do you feel safe in your environment? Yes        Cholesterol screen up to date: Yes- checking today 07/23/09, non fasting    Eye Exam up to date: Yes    Dental Exam up to date: Yes    Pap smear up to date: Yes: checking today 07/23/09, last was 04/08 NIL    Mammogram up to date: Does Not Apply    Dexa Scan up to date: Does Not Apply    Colonoscopy up to date: Does Not Apply    Immunizations up to date: Yes, had one a month ago.    Glucose screen if over 40:  No        Julieth Matthews MA    07/23/09       MEDS:  See EMR, reviewed  ALL:  See EMR, reviewed    REVIEW OF SYSTEMS:  CONSTITUTIONAL:NEGATIVE for fever, chills, change in weight  INTEGUMENTARY/SKIN: NEGATIVE for worrisome rashes, moles or lesions  EYES: NEGATIVE for vision changes or irritation  ENT/MOUTH: NEGATIVE for ear, mouth and throat problems  RESP:NEGATIVE for significant cough or SOB  BREAST: NEGATIVE for masses, tenderness or discharge  CV: NEGATIVE for chest pain, palpitations or peripheral edema  GI: NEGATIVE for nausea, abdominal pain, heartburn, or change in bowel habits  :NEGATIVE for frequency, dysuria, or hematuria  :NEGATIVE for frequency, dysuria, or hematuria  NEURO: NEGATIVE for weakness, dizziness or paresthesias  ENDOCRINE: NEGATIVE for temperature intolerance, skin/hair changes  HEME/ALLERGY/IMMUNE: NEGATIVE for bleeding problems  PSYCHIATRIC: NEGATIVE for changes in mood or affect            Palpation:    Tender:    Nontender:  SC joint, clavicle, AC joint, acromion, anterior rotator cuff, proximal bicep tendon    Range of Motion:        Active:all normal        Passive: all normal    Strength: rotator cuff--deltoid strength full; supraspinatus strength full; infraspinatus strength full; subscapularis strength full    Special tests: Negative Neer's test, mildly posHawkins, Negative Cross-Arm adduction, Negative Anterior Apprehension, Negative Posterior Apprehension, pos (bialteral) Sulcus  Sign, Negative Huang's, Negative supine SLAP lesion signs    Sulcus sign negative.  Load and shift maneuver negative    Scapular symmetry:  Improvements to be made in symmetry of stabilized movement    Head-fwd, shoulder-fwd posture:  Positive tendency     Distal pulses intact.  Sensation intact.  Skin intact.    A/ox 3      ASSESSMENT:  Left-sided shoulder impingement syndrome x2 months, suspect rotator cuff tendinitis and bursitis.      PLAN:  She will start with a physical therapy protocol for the posterior shoulder that she can do at home.  She knows if she is not improving over the next 4-8 weeks that an imaging study such as an x-ray or even MRI could be considered.  She will return if not improved.       Wes Luis MD

## 2018-07-13 NOTE — TELEPHONE ENCOUNTER
FUTURE VISIT INFORMATION      FUTURE VISIT INFORMATION:    Date: 07/17/2018    Time:     Location:   REFERRAL INFORMATION:    Referring provider:  [facility] [facility]    Referring providers clinic:      Reason for visit/diagnosis          RECORDS STATUS      Internal Records Epic, Care Everywhere and PACs

## 2018-07-16 ENCOUNTER — MYC MEDICAL ADVICE (OUTPATIENT)
Dept: FAMILY MEDICINE | Facility: CLINIC | Age: 38
End: 2018-07-16

## 2018-07-16 DIAGNOSIS — F40.10 SOCIAL ANXIETY DISORDER: ICD-10-CM

## 2018-07-16 DIAGNOSIS — F41.1 ANXIETY STATE: ICD-10-CM

## 2018-07-16 RX ORDER — LORAZEPAM 1 MG/1
TABLET ORAL
Qty: 12 TABLET | Refills: 0 | Status: CANCELLED | OUTPATIENT
Start: 2018-07-16

## 2018-07-17 ENCOUNTER — OFFICE VISIT (OUTPATIENT)
Dept: NEUROLOGY | Facility: CLINIC | Age: 38
End: 2018-07-17
Attending: FAMILY MEDICINE
Payer: COMMERCIAL

## 2018-07-17 ENCOUNTER — PRE VISIT (OUTPATIENT)
Dept: NEUROLOGY | Facility: CLINIC | Age: 38
End: 2018-07-17

## 2018-07-17 VITALS
RESPIRATION RATE: 20 BRPM | SYSTOLIC BLOOD PRESSURE: 108 MMHG | HEIGHT: 67 IN | DIASTOLIC BLOOD PRESSURE: 80 MMHG | HEART RATE: 86 BPM | WEIGHT: 168.1 LBS | BODY MASS INDEX: 26.38 KG/M2 | OXYGEN SATURATION: 96 %

## 2018-07-17 DIAGNOSIS — Z87.74 STATUS POST PATENT FORAMEN OVALE CLOSURE: Primary | ICD-10-CM

## 2018-07-17 ASSESSMENT — PAIN SCALES - GENERAL: PAINLEVEL: NO PAIN (0)

## 2018-07-17 NOTE — LETTER
"2018       RE: Chloe Foster  [address] [facility] [address]     Dear Colleague,    Thank you for referring your patient, Chloe Foster, to the [facility] NEUROLOGY at [facility]. Please see a copy of my visit note below.    [facility]  Stroke Neurology Clinic  2018     CC: History of stroke, migraines, and L sided parasthesias     HPI: Chloe Foster is a 38 year old female presenting to Vascular Neurology Clinic with the above concerns. she has a pertinent medical history of ischemic stroke during pregnancy and subsequent episodes of L sided numbness with migraine.      While in her 3rd trimester of pregnancy she began having a severe headache that had lasted a few days already. Then on May 1st 2015 she had left facial droop, left arm weakness, and numbness in those areas which lasted about 5 minutes, but brought her to the emergency room. An MRI at that time revealed restricted diffusion in the R insula and insular cortex, c/w acute ischemia. There was a \"punctate focus of acute ischemia at the right temporal parietal junction.\" (this is all per radiologist report). She was hospitalized for about a week, and workup did not reveal any DVTs, but she was found to have a PFO. A follow up MRI on  also showed a \"a 3rd punctate focus of restricted diffusion involving the subcortical white matter of th e right frontal lobe superior to the primary region of ischemia compatible with a tiny region of acute ischemic change.\" She was discharged on heparin injections. Then 2 weeks later she had the same symptoms again of headache and left-sided symptoms. MRI from 2015 does report \"new recent focus of ischemia/infarction affecting the posterior portion right centrum semiovale.\" She was then induced and had a . She was started on aspirin.    At the time of her strokes in , her course was aura --> migraine --> " stroke.     In the summer of 2016 she had PFO closure surgery. She has not had any follow up GODWIN since then.     She had not had migraines prior to her pregnancy, but now she does get migraine headaches perhaps 3-4 times a year. These are preceded by visual aura of squigly lines moving across her visual field. There are times when the migraines are associated with left face and patchy left arm tingling sensation. This happened in Dec 2017 and May 2018, prompting her to visit the ER. MRIs at those times did not reveal new infarct. She has never had any other stroke-like symptoms. She has not had the tingling/numbness outside of the context of migraine.     She has been seen by hematology and extensive workup including genetics for factor 5 leiden and prothrombin gene mutation were negative. Hematologist felt it was much more likely she had a paradoxical embolism from a small venous clot. Hematologist noted as well she has a MTHFR gene mutation which was considered insignificant, and that she has a low-normal level of protein S at baseline. He did give her the option of being on prophylactic anticoagulation during a future pregnancy.     Past Medical History:   Diagnosis Date     Acute stress reaction     propranolol helps prior to interviews, stressful situations     Allergic rhinitis, cause unspecified     no meds except prn flonase, tests generally positive, decided against shots     Anxiety state, unspecified     citalopram started in 7/06, stopped after couple wks, felt sluggish/tired     CVA (cerebral vascular accident) (H)     May 1, May 14th 2015     Depressive disorder, not elsewhere classified     dx, but not sure this is correct, weaned off wellbutrin (4/05-6/06, 12/06-1/08), restarted 3/08     Elevated lipoprotein A level      Hirsutism     saw endo, inconclusive, started on spironolactone (helped a little), stopped in 6/06, elevated DHEA- sees new endo 4/08     History of stroke with residual effects       "PFO (patent foramen ovale)     Post PFO/ASD closure with 30mm Amplatzer device 16     Urinary tract infection, site not specified     recurrent, start nitrofur in , 6mo txt       Current Outpatient Prescriptions   Medication     acetaminophen-codeine (TYLENOL #3) 300-30 MG per tablet     acetaminophen-codeine (TYLENOL #3) 300-30 MG per tablet     aspirin EC 81 MG EC tablet     benzonatate (TESSALON) 200 MG capsule     ciprofloxacin (CIPRO) 500 MG tablet     LORazepam (ATIVAN) 1 MG tablet     magnesium oxide (MAG-OX) 400 MG tablet     Multiple Vitamins-Minerals (MULTIVITAMIN GUMMIES WOMENS PO)     NONFORMULARY     Omega-3 Fatty Acids (FISH OIL PO)     order for DME     propranolol (INDERAL) 10 MG tablet     sertraline (ZOLOFT) 100 MG tablet     STATIN NOT PRESCRIBED, INTENTIONAL,     VITAMIN D, CHOLECALCIFEROL, PO     No current facility-administered medications for this visit.    She is not on oral contraceptives    Social History:  Former smoker, per chart review smoked 1/2 PPD, quit in . No marijuana, crack/cocaine, or other illegal drug use. No over-use of sudafed. Works in [employer] for a [employer] of [employer]. Stressful job. , has 1 son Speedy born in .     Family History   Problem Relation Age of Onset     Cancer Father      throat     Hypertension Father      Allergies Father      Diabetes Maternal Grandmother      on insulin     C.A.D. Maternal Grandmother      triple bypass in her 60s     Diabetes Paternal Grandmother      C.A.D. Maternal Grandfather      unsure type     Neurologic Disorder Paternal Grandfather       of brain aneurysm in early 60s     Diabetes Maternal Aunt       in her early 40s of DM complications, type 2     Diabetes Maternal Aunt        ROS: 10 point relevant ROS neg other than the symptoms noted above in the HPI.    /80 (BP Location: Right arm, Patient Position: Sitting, Cuff Size: Adult Regular)  Pulse 86  Resp 20  Ht 1.702 m (5' 7\")  Wt " 76.2 kg (168 lb 1.6 oz)  SpO2 96%  BMI 26.33 kg/m2    General: Alert, cooperative  Lungs: non labored breathing on room air  Cardiac: regular rate  Abdomen: non distended  Extremities: no lower extremity edema    MENTAL STATUS:  Alert, oriented.  Speech fluent with normal naming, comprehension.  No neglect. Good historian.  Quick and accurate at serial 7's  CRANIAL NERVES:  Pupils are equal, round, reactive.  Extraocular movements full.  Visual fields full.  Facial sensation, movement normal.  No dysarthria. Normal phonation.  Tongue midline.  Normal shoulder shrug.  Motor: No pronator drift. Strength is 5/5 in the upper and lower extremities, proximally and distally. No abnormal movements. Tone and bulk are normal.   Sensory: intact to light touch in all extremities, no extinction.   Reflexes 3+ biceps, brachioradialis, patellar, achilles. Symmetric. No clonus  Coordination: finger-nose-finger accurate and without tremors or dysmetria. Foot tapping and finger tapping rapid.  Gait:  Normal casual gait.    Neuro Imaging: as per HPI    Laboratory:  No results found for: A1C    Cholesterol   Date Value Ref Range Status   12/28/2017 246 (H) <200 mg/dL Final     Comment:     Desirable:       <200 mg/dl   12/29/2016 224 (H) <200 mg/dL Final     Comment:     Desirable:       <200 mg/dl     HDL Cholesterol   Date Value Ref Range Status   12/28/2017 106 >49 mg/dL Final   12/29/2016 86 >49 mg/dL Final     LDL Cholesterol Calculated   Date Value Ref Range Status   12/28/2017 129 (H) <100 mg/dL Final     Comment:     Above desirable:  100-129 mg/dl  Borderline High:  130-159 mg/dL  High:             160-189 mg/dL  Very high:       >189 mg/dl     12/29/2016 126 (H) <100 mg/dL Final     Comment:     Above desirable:  100-129 mg/dl   Borderline High:  130-159 mg/dL   High:             160-189 mg/dL   Very high:       >189 mg/dl       Triglycerides   Date Value Ref Range Status   12/28/2017 56 <150 mg/dL Final   12/29/2016 61  <150 mg/dL Final     Cholesterol/HDL Ratio   Date Value Ref Range Status   03/30/2012 2.2 0.0 - 5.0 Final   03/15/2011 1.9 0.0 - 5.0 Final       Impression:  History of acute ischemic stroke during pregnancy.   Migraine with aura  Status post PFO closure    This 37 yo woman may be at increased risk of stroke because of her migraines. Pregnancy is a pro-thrombotic state and it is certainly possible she had a paradoxical embolism, but she may have had arterial infarct due to the migraine. She had the migraine first, followed by the stroke. PFO closure in 2016 did not really change the frequency of her migraines, which is 2-4 times a year. We would like her to avoid having severe migraines. She does not have them very frequently, so we encouraged her to try magnesium for migraine prophylaxis, and she was also interested in acupuncture. If this is not successful, she should be seen by a neurologist for migraine management. She should avoid triptans.    She inquired about future pregnancy. This is her choice. She may consider being on prophylactic anticoagulation during the pregnancy.     Recommendations:  - Repeat GODWIN to assess function of PFO closure  - Migraine management through magnesium, acupuncture, or neurology referral if refractory  - Continue aspirin  - avoid triptans and avoid sudafed  - Follow up stroke clinic in 4 weeks.      Cristy Shah MD, MSc  Vascular Neurology Fellow     ATTENDING NOTE    Patient was seen and examined with shannon . I agree with the note above except as noted.      Again, thank you for allowing me to participate in the care of your patient.      Sincerely,    Anirudh Ontiveros MD

## 2018-07-17 NOTE — MR AVS SNAPSHOT
"              After Visit Summary   7/17/2018    Chloe Foster    MRN: [id number]           Patient Information     Date Of Birth          1980        Visit Information        Provider Department      7/17/2018 2:20 PM Anirudh Ontiveros MD [facility] Neurology        Today's Diagnoses     Status post patent foramen ovale closure    -  1      Care Instructions    Please follow up after your GODWIN. Thank you.          Follow-ups after your visit        Follow-up notes from your care team     Return in about 4 weeks (around 8/14/2018).      Who to contact     Please call your clinic at [phone] to:    Ask questions about your health    Make or cancel appointments    Discuss your medicines    Learn about your test results    Speak to your doctor            Additional Information About Your Visit        [employer] Information     [employer] gives you secure access to your electronic health record. If you see a primary care provider, you can also send messages to your care team and make appointments. If you have questions, please call your primary care clinic.  If you do not have a primary care provider, please call [phone] and they will assist you.      [employer] is an electronic gateway that provides easy, online access to your medical records. With [employer], you can request a clinic appointment, read your test results, renew a prescription or communicate with your care team.     To access your existing account, please contact your [facility] or call [phone] for assistance.        Care EveryWhere ID     This is your Care EveryWhere ID. This could be used by other organizations to access your [facility] medical records  [id number]        Your Vitals Were     Pulse Respirations Height Pulse Oximetry BMI (Body Mass Index)       86 20 1.702 m (5' 7\") 96% 26.33 kg/m2        Blood Pressure from Last 3 Encounters:   07/17/18 108/80   07/08/18 120/80   05/15/18 116/89    " Weight from Last 3 Encounters:   07/17/18 76.2 kg (168 lb 1.6 oz)   07/12/18 76.2 kg (168 lb)   07/08/18 76.3 kg (168 lb 4 oz)               Primary Care Provider Office Phone # Fax #    Izabel Reyes [phone] [address] [address]        Equal Access to Services     ANAIS ARORA : Hadii aad ku hadasho Soomaali, waaxda luqadaha, qaybta kaalmada adeegyada, waxay idiin hayaan adeeg khpraveensh lavinod . So [facility] [phone].    ATENCIÓN: Si habla español, tiene a rankin disposición servicios gratuitos de asistencia lingüística. Eltonpenny al [phone].    We comply with applicable federal civil rights laws and [place] laws. We do not discriminate on the basis of race, color, national origin, age, disability, sex, sexual orientation, or gender identity.            Thank you!     Thank you for choosing [facility]  for your care. Our goal is always to provide you with excellent care. Hearing back from our patients is one way we can continue to improve our services. Please take a few minutes to complete the written survey that you may receive in the mail after your visit with us. Thank you!             Your Updated Medication List - Protect others around you: Learn how to safely use, store and throw away your medicines at www.disposemymeds.org.          This list is accurate as of 7/17/18 11:59 PM.  Always use your most recent med list.                   Brand Name Dispense Instructions for use Diagnosis    * acetaminophen-codeine 300-30 MG per tablet    TYLENOL #3    16 tablet    Take 1 tablet by mouth every 8 hours as needed (cough. DON'T Take it with Ativan.) maximum 3 tablet(s) per day    Viral URI with cough       * acetaminophen-codeine 300-30 MG per tablet    TYLENOL #3    5 tablet    Take 1 tablet by mouth nightly as needed for severe pain    Foot injury, left, initial encounter       aspirin 81 MG EC tablet      Take 81 mg by mouth        benzonatate 200  MG capsule    TESSALON    21 capsule    Take 1 capsule (200 mg) by mouth 3 times daily as needed for cough    Viral URI with cough       ciprofloxacin 500 MG tablet    CIPRO    20 tablet    Take 1 tablet (500 mg) by mouth 2 times daily    Left foot infection       FISH OIL PO      Take 1 capsule by mouth daily        LORazepam 1 MG tablet    ATIVAN    12 tablet    Use 1/2-1 tab at night prior to meetings or for panic symptoms    Anxiety state, Social anxiety disorder       magnesium oxide 400 MG tablet    MAG-OX     Take 400 mg by mouth every evening        MULTIVITAMIN GUMMIES WOMENS PO      Take 2 chew tab by mouth every evening        NONFORMULARY      Take 1 tablet by mouth every evening OTC: B-Right Vitamin (Optimized B Complex)        order for DME     1 Device    Equipment being ordered: post op shoe    Left foot infection, Foot injury, left, initial encounter       propranolol 10 MG tablet    INDERAL    20 tablet    Take 1 tablet by mouth. Take as needed prior to interviews, stressful events    Social anxiety disorder       sertraline 100 MG tablet    ZOLOFT    90 tablet    Take 1 tablet (100 mg) by mouth daily    Anxiety state, Social anxiety disorder       STATIN NOT PRESCRIBED (INTENTIONAL)     0 each    Statin not prescribed intentionally due to Woman of childbearing age not actively taking birth control    Cerebral artery occlusion with cerebral infarction (H)       VITAMIN D (CHOLECALCIFEROL) PO      Take 1,000 Units by mouth every evening (takes 2 x 100 unit capsule)        * Notice:  This list has 2 medication(s) that are the same as other medications prescribed for you. Read the directions carefully, and ask your doctor or other care provider to review them with you.

## 2018-07-18 NOTE — PROGRESS NOTES
"[facility]  Stroke Neurology Clinic  2018     CC: History of stroke, migraines, and L sided parasthesias     HPI: Chloe Foster is a 38 year old female presenting to Vascular Neurology Clinic with the above concerns. she has a pertinent medical history of ischemic stroke during pregnancy and subsequent episodes of L sided numbness with migraine.      While in her 3rd trimester of pregnancy she began having a severe headache that had lasted a few days already. Then on May 1st 2015 she had left facial droop, left arm weakness, and numbness in those areas which lasted about 5 minutes, but brought her to the emergency room. An MRI at that time revealed restricted diffusion in the R insula and insular cortex, c/w acute ischemia. There was a \"punctate focus of acute ischemia at the right temporal parietal junction.\" (this is all per radiologist report). She was hospitalized for about a week, and workup did not reveal any DVTs, but she was found to have a PFO. A follow up MRI on  also showed a \"a 3rd punctate focus of restricted diffusion involving the subcortical white matter of th e right frontal lobe superior to the primary region of ischemia compatible with a tiny region of acute ischemic change.\" She was discharged on heparin injections. Then 2 weeks later she had the same symptoms again of headache and left-sided symptoms. MRI from 2015 does report \"new recent focus of ischemia/infarction affecting the posterior portion right centrum semiovale.\" She was then induced and had a . She was started on aspirin.    At the time of her strokes in , her course was aura --> migraine --> stroke.     In the summer of  she had PFO closure surgery. She has not had any follow up GODWIN since then.     She had not had migraines prior to her pregnancy, but now she does get migraine headaches perhaps 3-4 times a year. These are preceded by visual aura of squigly lines moving across her " visual field. There are times when the migraines are associated with left face and patchy left arm tingling sensation. This happened in Dec 2017 and May 2018, prompting her to visit the ER. MRIs at those times did not reveal new infarct. She has never had any other stroke-like symptoms. She has not had the tingling/numbness outside of the context of migraine.     She has been seen by hematology and extensive workup including genetics for factor 5 leiden and prothrombin gene mutation were negative. Hematologist felt it was much more likely she had a paradoxical embolism from a small venous clot. Hematologist noted as well she has a MTHFR gene mutation which was considered insignificant, and that she has a low-normal level of protein S at baseline. He did give her the option of being on prophylactic anticoagulation during a future pregnancy.     Past Medical History:   Diagnosis Date     Acute stress reaction     propranolol helps prior to interviews, stressful situations     Allergic rhinitis, cause unspecified     no meds except prn flonase, tests generally positive, decided against shots     Anxiety state, unspecified     citalopram started in 7/06, stopped after couple wks, felt sluggish/tired     CVA (cerebral vascular accident) (H)     May 1, May 14th 2015     Depressive disorder, not elsewhere classified     dx, but not sure this is correct, weaned off wellbutrin (4/05-6/06, 12/06-1/08), restarted 3/08     Elevated lipoprotein A level      Hirsutism     saw endo, inconclusive, started on spironolactone (helped a little), stopped in 6/06, elevated DHEA- sees new endo 4/08     History of stroke with residual effects      PFO (patent foramen ovale)     Post PFO/ASD closure with 30mm Amplatzer device 4/21/16     Urinary tract infection, site not specified     recurrent, start nitrofur in 5/08, 6mo txt       Current Outpatient Prescriptions   Medication     acetaminophen-codeine (TYLENOL #3) 300-30 MG per tablet      "acetaminophen-codeine (TYLENOL #3) 300-30 MG per tablet     aspirin EC 81 MG EC tablet     benzonatate (TESSALON) 200 MG capsule     ciprofloxacin (CIPRO) 500 MG tablet     LORazepam (ATIVAN) 1 MG tablet     magnesium oxide (MAG-OX) 400 MG tablet     Multiple Vitamins-Minerals (MULTIVITAMIN GUMMIES WOMENS PO)     NONFORMULARY     Omega-3 Fatty Acids (FISH OIL PO)     order for DME     propranolol (INDERAL) 10 MG tablet     sertraline (ZOLOFT) 100 MG tablet     STATIN NOT PRESCRIBED, INTENTIONAL,     VITAMIN D, CHOLECALCIFEROL, PO     No current facility-administered medications for this visit.    She is not on oral contraceptives    Social History:  Former smoker, per chart review smoked 1/2 PPD, quit in . No marijuana, crack/cocaine, or other illegal drug use. No over-use of sudafed. Works in [employer] for a chain of [employer]. Stressful job. , has 1 son Speedy born in .     Family History   Problem Relation Age of Onset     Cancer Father      throat     Hypertension Father      Allergies Father      Diabetes Maternal Grandmother      on insulin     C.A.D. Maternal Grandmother      triple bypass in her 60s     Diabetes Paternal Grandmother      C.A.D. Maternal Grandfather      unsure type     Neurologic Disorder Paternal Grandfather       of brain aneurysm in early 60s     Diabetes Maternal Aunt       in her early 40s of DM complications, type 2     Diabetes Maternal Aunt        ROS: 10 point relevant ROS neg other than the symptoms noted above in the HPI.    /80 (BP Location: Right arm, Patient Position: Sitting, Cuff Size: Adult Regular)  Pulse 86  Resp 20  Ht 1.702 m (5' 7\")  Wt 76.2 kg (168 lb 1.6 oz)  SpO2 96%  BMI 26.33 kg/m2    General: Alert, cooperative  Lungs: non labored breathing on room air  Cardiac: regular rate  Abdomen: non distended  Extremities: no lower extremity edema    MENTAL STATUS:  Alert, oriented.  Speech fluent with normal naming, comprehension.  " No neglect. Good historian.  Quick and accurate at serial 7's  CRANIAL NERVES:  Pupils are equal, round, reactive.  Extraocular movements full.  Visual fields full.  Facial sensation, movement normal.  No dysarthria. Normal phonation.  Tongue midline.  Normal shoulder shrug.  Motor: No pronator drift. Strength is 5/5 in the upper and lower extremities, proximally and distally. No abnormal movements. Tone and bulk are normal.   Sensory: intact to light touch in all extremities, no extinction.   Reflexes 3+ biceps, brachioradialis, patellar, achilles. Symmetric. No clonus  Coordination: finger-nose-finger accurate and without tremors or dysmetria. Foot tapping and finger tapping rapid.  Gait:  Normal casual gait.    Neuro Imaging: as per HPI    Laboratory:  No results found for: A1C    Cholesterol   Date Value Ref Range Status   12/28/2017 246 (H) <200 mg/dL Final     Comment:     Desirable:       <200 mg/dl   12/29/2016 224 (H) <200 mg/dL Final     Comment:     Desirable:       <200 mg/dl     HDL Cholesterol   Date Value Ref Range Status   12/28/2017 106 >49 mg/dL Final   12/29/2016 86 >49 mg/dL Final     LDL Cholesterol Calculated   Date Value Ref Range Status   12/28/2017 129 (H) <100 mg/dL Final     Comment:     Above desirable:  100-129 mg/dl  Borderline High:  130-159 mg/dL  High:             160-189 mg/dL  Very high:       >189 mg/dl     12/29/2016 126 (H) <100 mg/dL Final     Comment:     Above desirable:  100-129 mg/dl   Borderline High:  130-159 mg/dL   High:             160-189 mg/dL   Very high:       >189 mg/dl       Triglycerides   Date Value Ref Range Status   12/28/2017 56 <150 mg/dL Final   12/29/2016 61 <150 mg/dL Final     Cholesterol/HDL Ratio   Date Value Ref Range Status   03/30/2012 2.2 0.0 - 5.0 Final   03/15/2011 1.9 0.0 - 5.0 Final       Impression:  History of acute ischemic stroke during pregnancy.   Migraine with aura  Status post PFO closure    This 39 yo woman may be at increased risk of  stroke because of her migraines. Pregnancy is a pro-thrombotic state and it is certainly possible she had a paradoxical embolism, but she may have had arterial infarct due to the migraine. She had the migraine first, followed by the stroke. PFO closure in 2016 did not really change the frequency of her migraines, which is 2-4 times a year. We would like her to avoid having severe migraines. She does not have them very frequently, so we encouraged her to try magnesium for migraine prophylaxis, and she was also interested in acupuncture. If this is not successful, she should be seen by a neurologist for migraine management. She should avoid triptans.    She inquired about future pregnancy. This is her choice. She may consider being on prophylactic anticoagulation during the pregnancy.     Recommendations:  - Repeat GODWIN to assess function of PFO closure  - Migraine management through magnesium, acupuncture, or neurology referral if refractory  - Continue aspirin  - avoid triptans and avoid sudafed  - Follow up stroke clinic in 4 weeks.      Cristy Shah MD, MSc  Vascular Neurology Fellow     ATTENDING NOTE    Patient was seen and examined with fwtristian . I agree with the note above except as noted.      Answers for HPI/ROS submitted by the patient on 7/17/2018   General Symptoms: No  Skin Symptoms: No  HENT Symptoms: No  EYE SYMPTOMS: No  HEART SYMPTOMS: No  LUNG SYMPTOMS: No  INTESTINAL SYMPTOMS: No  URINARY SYMPTOMS: No  GYNECOLOGIC SYMPTOMS: No  BREAST SYMPTOMS: No  SKELETAL SYMPTOMS: No  BLOOD SYMPTOMS: No  NERVOUS SYSTEM SYMPTOMS: No  MENTAL HEALTH SYMPTOMS: No

## 2018-07-25 NOTE — TELEPHONE ENCOUNTER
CW  Controlled Substance Refill Request for Lorazepam 1 mg    Last refill: 5/1/2018 - #12    Last clinic visit: 5/15/2018     Clinic visit frequency required: Q 6  months  Next appt: No future OV scheduled    Controlled substance agreement on file: Yes:  Date 12/22/2017.    Documentation in problem list reviewed:  Yes    Processing:  Fax Rx to [address] pharmacy    [place] reviewed:  reviewed- no concerns  [place]  [email or web address]  Thanks, Ondina Parr RN

## 2018-07-27 NOTE — TELEPHONE ENCOUNTER
CW - FYI  Patient didn't respond to Mychart message sent yesterday  Left message for patient to call clinic this am and also sent another Mychart.    No response from patient.  Ondina Parr RN

## 2018-07-27 NOTE — TELEPHONE ENCOUNTER
CW out of office today.  Wants to do telephone visit.  [employer] message sent to patient.  VM left asking patient to call clinic.  Ondina Parr RN

## 2018-07-31 NOTE — TELEPHONE ENCOUNTER
I can do a TE visit or office appt on Friday- though likely be better if she could make it in as I'm assuming sx's are worsening if she's using more medication.  Thanks!  CW

## 2018-07-31 NOTE — TELEPHONE ENCOUNTER
CW  Please see [employer] message below.  Do you still want to do telephone visit or would you like to see patient?  You do have openings on Friday.  Ondina Parr RN

## 2018-08-02 ENCOUNTER — HOSPITAL ENCOUNTER (OUTPATIENT)
Dept: CARDIOLOGY | Facility: CLINIC | Age: 38
Discharge: HOME | End: 2018-08-02
Attending: PSYCHIATRY & NEUROLOGY | Admitting: PSYCHIATRY & NEUROLOGY
Payer: COMMERCIAL

## 2018-08-02 VITALS
HEART RATE: 77 BPM | OXYGEN SATURATION: 97 % | SYSTOLIC BLOOD PRESSURE: 112 MMHG | RESPIRATION RATE: 18 BRPM | DIASTOLIC BLOOD PRESSURE: 68 MMHG

## 2018-08-02 DIAGNOSIS — Z87.74 STATUS POST PATENT FORAMEN OVALE CLOSURE: ICD-10-CM

## 2018-08-02 PROCEDURE — 27210995 ZZH RX 272: Performed by: INTERNAL MEDICINE

## 2018-08-02 PROCEDURE — 93325 DOPPLER ECHO COLOR FLOW MAPG: CPT | Mod: 26 | Performed by: INTERNAL MEDICINE

## 2018-08-02 PROCEDURE — 93312 ECHO TRANSESOPHAGEAL: CPT

## 2018-08-02 PROCEDURE — 99152 MOD SED SAME PHYS/QHP 5/>YRS: CPT

## 2018-08-02 PROCEDURE — 93312 ECHO TRANSESOPHAGEAL: CPT | Mod: 26 | Performed by: INTERNAL MEDICINE

## 2018-08-02 PROCEDURE — 93320 DOPPLER ECHO COMPLETE: CPT | Mod: 26 | Performed by: INTERNAL MEDICINE

## 2018-08-02 PROCEDURE — 25000125 ZZHC RX 250: Performed by: INTERNAL MEDICINE

## 2018-08-02 PROCEDURE — 25000128 H RX IP 250 OP 636: Performed by: INTERNAL MEDICINE

## 2018-08-02 RX ORDER — ACYCLOVIR 200 MG/1
3 CAPSULE ORAL ONCE
Status: COMPLETED | OUTPATIENT
Start: 2018-08-02 | End: 2018-08-02

## 2018-08-02 RX ORDER — FLUMAZENIL 0.1 MG/ML
0.2 INJECTION, SOLUTION INTRAVENOUS
Status: DISCONTINUED | OUTPATIENT
Start: 2018-08-02 | End: 2018-08-03 | Stop reason: HOSPADM

## 2018-08-02 RX ORDER — NALOXONE HYDROCHLORIDE 0.4 MG/ML
.1-.4 INJECTION, SOLUTION INTRAMUSCULAR; INTRAVENOUS; SUBCUTANEOUS
Status: DISCONTINUED | OUTPATIENT
Start: 2018-08-02 | End: 2018-08-03 | Stop reason: HOSPADM

## 2018-08-02 RX ORDER — FENTANYL CITRATE 50 UG/ML
25-50 INJECTION, SOLUTION INTRAMUSCULAR; INTRAVENOUS
Status: DISCONTINUED | OUTPATIENT
Start: 2018-08-02 | End: 2018-08-03 | Stop reason: HOSPADM

## 2018-08-02 RX ORDER — LIDOCAINE 40 MG/G
CREAM TOPICAL
Status: DISCONTINUED | OUTPATIENT
Start: 2018-08-02 | End: 2018-08-03 | Stop reason: HOSPADM

## 2018-08-02 RX ORDER — FENTANYL CITRATE 50 UG/ML
50-100 INJECTION, SOLUTION INTRAMUSCULAR; INTRAVENOUS
Status: DISCONTINUED | OUTPATIENT
Start: 2018-08-02 | End: 2018-08-03 | Stop reason: HOSPADM

## 2018-08-02 RX ORDER — SODIUM CHLORIDE 9 MG/ML
INJECTION, SOLUTION INTRAVENOUS CONTINUOUS PRN
Status: DISCONTINUED | OUTPATIENT
Start: 2018-08-02 | End: 2018-08-03 | Stop reason: HOSPADM

## 2018-08-02 RX ADMIN — SODIUM CHLORIDE 10 ML: 9 INJECTION, SOLUTION INTRAMUSCULAR; INTRAVENOUS; SUBCUTANEOUS at 09:12

## 2018-08-02 RX ADMIN — LIDOCAINE HYDROCHLORIDE 30 ML: 20 SOLUTION ORAL; TOPICAL at 08:41

## 2018-08-02 RX ADMIN — FENTANYL CITRATE 100 MCG: 50 INJECTION, SOLUTION INTRAMUSCULAR; INTRAVENOUS at 08:55

## 2018-08-02 RX ADMIN — MIDAZOLAM 2.5 MG: 1 INJECTION INTRAMUSCULAR; INTRAVENOUS at 08:59

## 2018-08-02 RX ADMIN — SODIUM CHLORIDE 200 ML: 9 INJECTION, SOLUTION INTRAVENOUS at 09:11

## 2018-08-02 RX ADMIN — TOPICAL ANESTHETIC 0.5 ML: 200 SPRAY DENTAL; PERIODONTAL at 08:41

## 2018-08-02 NOTE — PROGRESS NOTES
Pt arrived in ECHO lab for scheduled GODWIN.     Procedure explained, consent form signed by pt and MD, and discharge instructions discussed with patient.     Throat numbed at 0841, pt informed to stay NPO until 4 hours after     Pt given 2.5 mg IV versed and 100 mcg IV fentanyl for conscious sedation.     Probe number 52 used for procedure    Pt denied chest pain and throat pain after procedure completed. Post monitoring completed and vitals stable throughout.     Pt discharged to [facility] to meet ride home.

## 2018-08-03 NOTE — TELEPHONE ENCOUNTER
Okay- I can see if I can do a TE in a DB slot if needed if she calls back, with caveat that she come in later if we decide she needs more of an in-office appt.  Would still tell her it would be better to have an in-office appt if able as her use of the ativan is likely going up.  Thanks! CW

## 2018-08-07 ENCOUNTER — OFFICE VISIT (OUTPATIENT)
Dept: NEUROLOGY | Facility: CLINIC | Age: 38
End: 2018-08-07
Payer: COMMERCIAL

## 2018-08-07 VITALS
HEART RATE: 73 BPM | WEIGHT: 158 LBS | DIASTOLIC BLOOD PRESSURE: 82 MMHG | OXYGEN SATURATION: 98 % | SYSTOLIC BLOOD PRESSURE: 137 MMHG | RESPIRATION RATE: 17 BRPM | TEMPERATURE: 98.1 F | BODY MASS INDEX: 24.8 KG/M2 | HEIGHT: 67 IN

## 2018-08-07 DIAGNOSIS — I63.50 CEREBRAL ARTERY OCCLUSION WITH CEREBRAL INFARCTION (H): ICD-10-CM

## 2018-08-07 DIAGNOSIS — I63.411 CEREBROVASCULAR ACCIDENT (CVA) DUE TO EMBOLISM OF RIGHT MIDDLE CEREBRAL ARTERY (H): Primary | ICD-10-CM

## 2018-08-07 DIAGNOSIS — G43.109 MIGRAINE WITH AURA AND WITHOUT STATUS MIGRAINOSUS, NOT INTRACTABLE: ICD-10-CM

## 2018-08-07 ASSESSMENT — PAIN SCALES - GENERAL: PAINLEVEL: NO PAIN (0)

## 2018-08-07 NOTE — MR AVS SNAPSHOT
After Visit Summary   8/7/2018    Chloe Foster    MRN: [id number]           Patient Information     Date Of Birth          1980        Visit Information        Provider Department      8/7/2018 2:50 PM Anirudh Ontiveros MD [facility] Neurology        Today's Diagnoses     Cerebrovascular accident (CVA) due to embolism of right middle cerebral artery (H)    -  1    Cerebral artery occlusion with cerebral infarction        Migraine with aura and without status migrainosus, not intractable          Care Instructions    1. Follow up with Adult Neurology for headaches          Follow-ups after your visit        Additional Services     NEUROLOGY ADULT REFERRAL       Your provider has referred you for the following:   Consult at PREFERRED PROVIDERS: Latia Tian  FOR headaches    Please be aware that coverage of these services is subject to the terms and limitations of your health insurance plan.  Call member services at your health plan with any benefit or coverage questions.      Please bring the following with you to your appointment:    (1) Any X-Rays, CTs or MRIs which have been performed.  Contact the facility where they were done to arrange for  prior to your scheduled appointment.    (2) List of current medications  (3) This referral request   (4) Any documents/labs given to you for this referral                  Follow-up notes from your care team     Return if symptoms worsen or fail to improve.      Your next 10 appointments already scheduled     Aug 15, 2018 11:00 AM CDT   MyChart Long with Izabel Reyes MD   [facility] ([facility])    [address] [address] [phone]            Aug 16, 2018  4:30 PM CDT   (Arrive by 4:15 PM)   BRYN Extremity with Bandar Wells PT   [facility] ([facility] [place]  )    [address]  [place] [id number] [phone]  "             Who to contact     Please call your clinic at [phone] to:    Ask questions about your health    Make or cancel appointments    Discuss your medicines    Learn about your test results    Speak to your doctor            Additional Information About Your Visit        [employer] Information     [employer] gives you secure access to your electronic health record. If you see a primary care provider, you can also send messages to your care team and make appointments. If you have questions, please call your primary care clinic.  If you do not have a primary care provider, please call [phone] and they will assist you.      [employer] is an electronic gateway that provides easy, online access to your medical records. With [employer], you can request a clinic appointment, read your test results, renew a prescription or communicate with your care team.     To access your existing account, please contact your [facility] or call [phone] for assistance.        Care EveryWhere ID     This is your Care EveryWhere ID. This could be used by other organizations to access your [facility] medical records  [id number]        Your Vitals Were     Pulse Temperature Respirations Height Pulse Oximetry Breastfeeding?    73 98.1  F (36.7  C) (Oral) 17 1.702 m (5' 7\") 98% No    BMI (Body Mass Index)                   24.75 kg/m2            Blood Pressure from Last 3 Encounters:   08/07/18 137/82   08/02/18 112/68   07/17/18 108/80    Weight from Last 3 Encounters:   08/07/18 71.7 kg (158 lb)   07/17/18 76.2 kg (168 lb 1.6 oz)   07/12/18 76.2 kg (168 lb)              We Performed the Following     NEUROLOGY ADULT REFERRAL        Primary Care Provider Office Phone # Fax #    Izabel Reyes [phone] [address] [address]        Equal Access to Services     ANAIS ARORA : elba Martinez, [place], " paulie chaveztomas abraham'aan ah. [facility] [phone].    ATENCIÓN: Si rohini pablo, tiene a rankin disposición servicios gratuitos de asistencia lingüística. Ayde barry [phone].    We comply with applicable federal civil rights laws and [place] laws. We do not discriminate on the basis of race, color, national origin, age, disability, sex, sexual orientation, or gender identity.            Thank you!     Thank you for choosing [facility] NEUROLOGY  for your care. Our goal is always to provide you with excellent care. Hearing back from our patients is one way we can continue to improve our services. Please take a few minutes to complete the written survey that you may receive in the mail after your visit with us. Thank you!             Your Updated Medication List - Protect others around you: Learn how to safely use, store and throw away your medicines at www.disposemymeds.org.          This list is accurate as of 8/7/18 11:59 PM.  Always use your most recent med list.                   Brand Name Dispense Instructions for use Diagnosis    * acetaminophen-codeine 300-30 MG per tablet    TYLENOL #3    16 tablet    Take 1 tablet by mouth every 8 hours as needed (cough. DON'T Take it with Ativan.) maximum 3 tablet(s) per day    Viral URI with cough       * acetaminophen-codeine 300-30 MG per tablet    TYLENOL #3    5 tablet    Take 1 tablet by mouth nightly as needed for severe pain    Foot injury, left, initial encounter       aspirin 81 MG EC tablet      Take 81 mg by mouth        benzonatate 200 MG capsule    TESSALON    21 capsule    Take 1 capsule (200 mg) by mouth 3 times daily as needed for cough    Viral URI with cough       ciprofloxacin 500 MG tablet    CIPRO    20 tablet    Take 1 tablet (500 mg) by mouth 2 times daily    Left foot infection       FISH OIL PO      Take 1 capsule by mouth daily        LORazepam 1 MG tablet    ATIVAN    12 tablet    Use 1/2-1 tab at night prior to meetings or for  panic symptoms    Anxiety state, Social anxiety disorder       magnesium oxide 400 MG tablet    MAG-OX     Take 400 mg by mouth every evening        MULTIVITAMIN GUMMIES WOMENS PO      Take 2 chew tab by mouth every evening        NONFORMULARY      Take 1 tablet by mouth every evening OTC: B-Right Vitamin (Optimized B Complex)        order for DME     1 Device    Equipment being ordered: post op shoe    Left foot infection, Foot injury, left, initial encounter       propranolol 10 MG tablet    INDERAL    20 tablet    Take 1 tablet by mouth. Take as needed prior to interviews, stressful events    Social anxiety disorder       sertraline 100 MG tablet    ZOLOFT    90 tablet    Take 1 tablet (100 mg) by mouth daily    Anxiety state, Social anxiety disorder       STATIN NOT PRESCRIBED (INTENTIONAL)     0 each    Statin not prescribed intentionally due to Woman of childbearing age not actively taking birth control    Cerebral artery occlusion with cerebral infarction (H)       VITAMIN D (CHOLECALCIFEROL) PO      Take 1,000 Units by mouth every evening (takes 2 x 100 unit capsule)        * Notice:  This list has 2 medication(s) that are the same as other medications prescribed for you. Read the directions carefully, and ask your doctor or other care provider to review them with you.

## 2018-08-07 NOTE — NURSING NOTE
Chief Complaint   Patient presents with     Stroke     [facility] RETURN, STROKE F/U AFTER GODWIN     Clif De La Garza, EMT

## 2018-08-07 NOTE — PROGRESS NOTES
[facility] NEUROLOGY  [address]  3rd [address]  [place] [address]          August 7, 2018    Chloe Foster                                                                                                                     [address] [address]      Ms. Foster is a very pleasant 38 year old lady with multiple small ischemic infarcts involving the R MCA territory in May of 2015 while she was pregnant. There were at least 4 lesions with restricted diffusion some of them punctate. She was induced due to these recurrent events and has remained on aspirin since. She had a PFO but no DVT and the PFO has been closed in Summer 2016. She did not have a history of migraine prior to pregnancy but she does get them now perhaps 3-4 times / year preceded by a visual aura of squiggly lines. She has had patchy left arm numbness and tingling in Dec 2017 and May 2018 and hence came to the ER. MRI did nto show any acute infarct. After our last visit, I had recommended a GODWIN to ensure that the PFO remained completely closed. Of note, she has seen a hematologist in the past and has had a hypercoagulable work up. No large vessel stenosis on MRA.    GODWIN report as below showed device was well-positioned and PFO was still closed.     ASSESSMENT / PLAN    This lady had acute ischemic strokes in May 2015 in the context of a migraine headache, PFO and pregnancy (which can be considered a pro-thrombotic state). She has since then developed migraine headaches (no migraine prior to pregnancy)   And has had 2 episodes of left sided numbness without any DWI abnormality. She is andree spirin 81 mg and has had her PFO closed and has been evaluated by hematology. At this time, I have given her information on migraine  preventive therapies and she will try those. I have referred her to see Dr. Tian. No further follow up with me is needed but I am always available for further  questions.      Encounter Diagnoses   Name Primary?     Cerebrovascular accident (CVA) due to embolism of right middle cerebral artery (H) Yes     Cerebral artery occlusion with cerebral infarction      Migraine with aura and without status migrainosus, not intractable      Orders Placed This Encounter   Procedures     NEUROLOGY ADULT REFERRAL         GODWIN    Interpretation Summary  An ASD closure device was seen in the expected anatomic position without any  associated thrombus.The device was well seated and without residual shunt by  color Doppler or agitated saline.  The left atrial appendage is normal. It is free of spontaneous echo contrast  and thrombus.  Global right ventricular function is normal.  Left ventricular function, chamber size, wall motion, and wall thickness are  normal.The EF is 55-60%.     No change from prior.    Current Outpatient Prescriptions   Medication     acetaminophen-codeine (TYLENOL #3) 300-30 MG per tablet     acetaminophen-codeine (TYLENOL #3) 300-30 MG per tablet     aspirin EC 81 MG EC tablet     benzonatate (TESSALON) 200 MG capsule     ciprofloxacin (CIPRO) 500 MG tablet     LORazepam (ATIVAN) 1 MG tablet     magnesium oxide (MAG-OX) 400 MG tablet     Multiple Vitamins-Minerals (MULTIVITAMIN GUMMIES WOMENS PO)     NONFORMULARY     Omega-3 Fatty Acids (FISH OIL PO)     order for DME     propranolol (INDERAL) 10 MG tablet     sertraline (ZOLOFT) 100 MG tablet     STATIN NOT PRESCRIBED, INTENTIONAL,     VITAMIN D, CHOLECALCIFEROL, PO     No current facility-administered medications for this visit.        Past Medical History:   Diagnosis Date     Acute stress reaction     propranolol helps prior to interviews, stressful situations     Allergic rhinitis, cause unspecified     no meds except prn flonase, tests generally positive, decided against shots     Anxiety state, unspecified     citalopram started in 7/06, stopped after couple wks, felt sluggish/tired     CVA (cerebral  "vascular accident) (H)     May 1, May 14th 2015     Depressive disorder, not elsewhere classified     dx, but not sure this is correct, weaned off wellbutrin (4/05-6/06, 12/06-1/08), restarted 3/08     Elevated lipoprotein A level      Hirsutism     saw endo, inconclusive, started on spironolactone (helped a little), stopped in 6/06, elevated DHEA- sees new endo 4/08     History of stroke with residual effects      PFO (patent foramen ovale)     Post PFO/ASD closure with 30mm Amplatzer device 4/21/16     Urinary tract infection, site not specified     recurrent, start nitrofur in 5/08, 6mo txt         EXAM    /82  Pulse 73  Temp 98.1  F (36.7  C) (Oral)  Resp 17  Ht 1.702 m (5' 7\")  Wt 71.7 kg (158 lb)  SpO2 98%  Breastfeeding? No  BMI 24.75 kg/m2      Anirudh Ontiveros MD    "

## 2018-08-07 NOTE — LETTER
8/7/2018       RE: Chloe Foster  [address] [facility] [address]     Dear Colleague,    Thank you for referring your patient, Chloe Foster, to the [facility] NEUROLOGY at [facility]. Please see a copy of my visit note below.          [facility] NEUROLOGY  [address]  3rd Floor  [place] [facility] [address]          August 7, 2018    Chloe Foster                                                                                                                     [address] [facility] [address]      Ms. Foster is a very pleasant 38 year old lady with multiple small ischemic infarcts involving the R MCA territory in May of 2015 while she was pregnant. There were at least 4 lesions with restricted diffusion some of them punctate. She was induced due to these recurrent events and has remained on aspirin since. She had a PFO but no DVT and the PFO has been closed in Summer 2016. She did not have a history of migraine prior to pregnancy but she does get them now perhaps 3-4 times / year preceded by a visual aura of squiggly lines. She has had patchy left arm numbness and tingling in Dec 2017 and May 2018 and hence came to the ER. MRI did nto show any acute infarct. After our last visit, I had recommended a GODWIN to ensure that the PFO remained completely closed. Of note, she has seen a hematologist in the past and has had a hypercoagulable work up. No large vessel stenosis on MRA.    GODWIN report as below showed device was well-positioned and PFO was still closed.     ASSESSMENT / PLAN    This lady had acute ischemic strokes in May 2015 in the context of a migraine headache, PFO and pregnancy (which can be considered a pro-thrombotic state). She has since then developed migraine headaches (no migraine prior to pregnancy)   And has had 2 episodes of left sided numbness without any DWI abnormality. She is andree spirin 81 mg and has  had her PFO closed and has been evaluated by hematology. At this time, I have given her information on migraine  preventive therapies and she will try those. I have referred her to see Dr. Tian. No further follow up with me is needed but I am always available for further questions.      Encounter Diagnoses   Name Primary?     Cerebrovascular accident (CVA) due to embolism of right middle cerebral artery (H) Yes     Cerebral artery occlusion with cerebral infarction      Migraine with aura and without status migrainosus, not intractable      Orders Placed This Encounter   Procedures     NEUROLOGY ADULT REFERRAL         GODWIN    Interpretation Summary  An ASD closure device was seen in the expected anatomic position without any  associated thrombus.The device was well seated and without residual shunt by  color Doppler or agitated saline.  The left atrial appendage is normal. It is free of spontaneous echo contrast  and thrombus.  Global right ventricular function is normal.  Left ventricular function, chamber size, wall motion, and wall thickness are  normal.The EF is 55-60%.     No change from prior.    Current Outpatient Prescriptions   Medication     acetaminophen-codeine (TYLENOL #3) 300-30 MG per tablet     acetaminophen-codeine (TYLENOL #3) 300-30 MG per tablet     aspirin EC 81 MG EC tablet     benzonatate (TESSALON) 200 MG capsule     ciprofloxacin (CIPRO) 500 MG tablet     LORazepam (ATIVAN) 1 MG tablet     magnesium oxide (MAG-OX) 400 MG tablet     Multiple Vitamins-Minerals (MULTIVITAMIN GUMMIES WOMENS PO)     NONFORMULARY     Omega-3 Fatty Acids (FISH OIL PO)     order for DME     propranolol (INDERAL) 10 MG tablet     sertraline (ZOLOFT) 100 MG tablet     STATIN NOT PRESCRIBED, INTENTIONAL,     VITAMIN D, CHOLECALCIFEROL, PO     No current facility-administered medications for this visit.        Past Medical History:   Diagnosis Date     Acute stress reaction     propranolol helps prior to interviews,  "stressful situations     Allergic rhinitis, cause unspecified     no meds except prn flonase, tests generally positive, decided against shots     Anxiety state, unspecified     citalopram started in 7/06, stopped after couple wks, felt sluggish/tired     CVA (cerebral vascular accident) (H)     May 1, May 14th 2015     Depressive disorder, not elsewhere classified     dx, but not sure this is correct, weaned off wellbutrin (4/05-6/06, 12/06-1/08), restarted 3/08     Elevated lipoprotein A level      Hirsutism     saw endo, inconclusive, started on spironolactone (helped a little), stopped in 6/06, elevated DHEA- sees new endo 4/08     History of stroke with residual effects      PFO (patent foramen ovale)     Post PFO/ASD closure with 30mm Amplatzer device 4/21/16     Urinary tract infection, site not specified     recurrent, start nitrofur in 5/08, 6mo txt         EXAM    /82  Pulse 73  Temp 98.1  F (36.7  C) (Oral)  Resp 17  Ht 1.702 m (5' 7\")  Wt 71.7 kg (158 lb)  SpO2 98%  Breastfeeding? No  BMI 24.75 kg/m2      Again, thank you for allowing me to participate in the care of your patient.      Sincerely,    Anirudh Ontiveros MD      "

## 2018-08-12 PROBLEM — I63.411 CEREBROVASCULAR ACCIDENT (CVA) DUE TO EMBOLISM OF RIGHT MIDDLE CEREBRAL ARTERY (H): Status: ACTIVE | Noted: 2018-08-12

## 2018-08-15 ENCOUNTER — OFFICE VISIT (OUTPATIENT)
Dept: FAMILY MEDICINE | Facility: CLINIC | Age: 38
End: 2018-08-15
Payer: COMMERCIAL

## 2018-08-15 VITALS
BODY MASS INDEX: 24.8 KG/M2 | TEMPERATURE: 98.4 F | WEIGHT: 158 LBS | HEIGHT: 67 IN | SYSTOLIC BLOOD PRESSURE: 120 MMHG | OXYGEN SATURATION: 96 % | HEART RATE: 84 BPM | DIASTOLIC BLOOD PRESSURE: 77 MMHG

## 2018-08-15 DIAGNOSIS — I63.411 CEREBROVASCULAR ACCIDENT (CVA) DUE TO EMBOLISM OF RIGHT MIDDLE CEREBRAL ARTERY (H): ICD-10-CM

## 2018-08-15 DIAGNOSIS — F33.1 MODERATE RECURRENT MAJOR DEPRESSION (H): ICD-10-CM

## 2018-08-15 DIAGNOSIS — F40.10 SOCIAL ANXIETY DISORDER: ICD-10-CM

## 2018-08-15 DIAGNOSIS — Z15.89 MTHFR MUTATION: ICD-10-CM

## 2018-08-15 DIAGNOSIS — G43.109 MIGRAINE WITH AURA AND WITHOUT STATUS MIGRAINOSUS, NOT INTRACTABLE: ICD-10-CM

## 2018-08-15 DIAGNOSIS — F41.1 ANXIETY STATE: Primary | ICD-10-CM

## 2018-08-15 DIAGNOSIS — Z87.74 STATUS POST PATENT FORAMEN OVALE CLOSURE: ICD-10-CM

## 2018-08-15 PROCEDURE — 99214 OFFICE O/P EST MOD 30 MIN: CPT | Performed by: FAMILY MEDICINE

## 2018-08-15 RX ORDER — LORAZEPAM 1 MG/1
TABLET ORAL
Qty: 12 TABLET | Refills: 0 | Status: SHIPPED | OUTPATIENT
Start: 2018-08-15 | End: 2019-01-08

## 2018-08-15 ASSESSMENT — ANXIETY QUESTIONNAIRES
7. FEELING AFRAID AS IF SOMETHING AWFUL MIGHT HAPPEN: NOT AT ALL
IF YOU CHECKED OFF ANY PROBLEMS ON THIS QUESTIONNAIRE, HOW DIFFICULT HAVE THESE PROBLEMS MADE IT FOR YOU TO DO YOUR WORK, TAKE CARE OF THINGS AT HOME, OR GET ALONG WITH OTHER PEOPLE: SOMEWHAT DIFFICULT
GAD7 TOTAL SCORE: 2
5. BEING SO RESTLESS THAT IT IS HARD TO SIT STILL: NOT AT ALL
3. WORRYING TOO MUCH ABOUT DIFFERENT THINGS: NOT AT ALL
2. NOT BEING ABLE TO STOP OR CONTROL WORRYING: NOT AT ALL
6. BECOMING EASILY ANNOYED OR IRRITABLE: SEVERAL DAYS
1. FEELING NERVOUS, ANXIOUS, OR ON EDGE: SEVERAL DAYS

## 2018-08-15 ASSESSMENT — PATIENT HEALTH QUESTIONNAIRE - PHQ9: 5. POOR APPETITE OR OVEREATING: NOT AT ALL

## 2018-08-15 NOTE — MR AVS SNAPSHOT
After Visit Summary   8/15/2018    Chloe Foster    MRN: [id number]           Patient Information     Date Of Birth          1980        Visit Information        Provider Department      8/15/2018 11:00 AM Izabel Reyes MD [facility]        Today's Diagnoses     Anxiety state    -  1    Moderate recurrent major depression (H)        Social anxiety disorder        Migraine with aura and without status migrainosus, not intractable        h/o Cerebrovascular accident (CVA) due to embolism of right middle cerebral artery (H)        MTHFR mutation (H)        Status post patent foramen ovale closure           Follow-ups after your visit        Your next 10 appointments already scheduled     Aug 27, 2018  3:20 PM CDT   MyChart New Injury with Bonnie Escoto MD   [facility] ([facility])    [address] [address] [phone]            Aug 29, 2018  3:30 PM CDT   MyChart Physical Therapy Extremity Follow Up Tx with Bandar Wells PT   [facility] ([facility]  )    [address] [place] [address] [address] [facility] [address] [phone]           If you have your physician's order in hand, please bring it with you to your appointment            Sep 04, 2018  3:10 PM CDT   MyChart Physical Therapy Extremity Follow Up Tx with Bandar Wells PT   [facility] ([facility]  )    [address] [place], [place] [id number]  [facility] [address] [phone]           If you have your physician's order in hand, please bring it with you to your appointment            Sep 24, 2018  3:30 PM CDT   MyChart Physical Therapy Extremity Follow Up Tx with Bandar Wells PT   [facility] ([facility]  )    [address] [place] [address] [address]  [facility] [address] [phone]           If you have your physician's order in hand,  "please bring it with you to your appointment              Who to contact     If you have questions or need follow up information about today's clinic visit or your schedule please contact [facility] directly at [phone].  Normal or non-critical lab and imaging results will be communicated to you by MyChart, letter or phone within 4 business days after the clinic has received the results. If you do not hear from us within 7 days, please contact the clinic through [employer] or phone. If you have a critical or abnormal lab result, we will notify you by phone as soon as possible.  Submit refill requests through [employer] or call your pharmacy and they will forward the refill request to us. Please allow 3 business days for your refill to be completed.          Additional Information About Your Visit        [employer] Information     [employer] gives you secure access to your electronic health record. If you see a primary care provider, you can also send messages to your care team and make appointments. If you have questions, please call your primary care clinic.  If you do not have a primary care provider, please call [phone] and they will assist you.        Care EveryWhere ID     This is your Care EveryWhere ID. This could be used by other organizations to access your [place] medical records  [id number]        Your Vitals Were     Pulse Temperature Height Last Period Pulse Oximetry BMI (Body Mass Index)    84 98.4  F (36.9  C) (Oral) 5' 7\" (1.702 m) 08/07/2018 96% 24.75 kg/m2       Blood Pressure from Last 3 Encounters:   08/15/18 120/77   08/07/18 137/82   08/02/18 112/68    Weight from Last 3 Encounters:   08/15/18 158 lb (71.7 kg)   08/07/18 158 lb (71.7 kg)   07/17/18 168 lb 1.6 oz (76.2 kg)              Today, you had the following     No orders found for display         Today's Medication Changes          These changes are accurate as of 8/15/18 11:59 PM.  If you have any questions, ask your nurse or " doctor.               These medicines have changed or have updated prescriptions.        Dose/Directions    sertraline 50 MG tablet   Commonly known as:  ZOLOFT   This may have changed:    - medication strength  - how much to take   Used for:  Anxiety state, Social anxiety disorder   Changed by:  Izabel Reyes MD        Dose:  50 mg   Take 1 tablet (50 mg) by mouth daily   Quantity:  90 tablet   Refills:  0         Stop taking these medicines if you haven't already. Please contact your care team if you have questions.     order for DME   Stopped by:  Izabel Reyes MD                Where to get your medicines      These medications were sent to [facility] [address] [facility] [address] AT [facility] [address], [facility] [address]     Phone:  [phone]     sertraline 50 MG tablet         Some of these will need a paper prescription and others can be bought over the counter.  Ask your nurse if you have questions.     Bring a paper prescription for each of these medications     LORazepam 1 MG tablet                Primary Care Provider Office Phone # Fax #    Izabel Reyes [phone] [address] [address]        Equal Access to Services     ANAIS ARORA AH: Hadii eliane garcía hadasho Soomaali, waaxda luqadaha, qaybta kaalmada adeegyada, paulie louis. So [facility] [phone].    ATENCIÓN: Si habla español, tiene a rankin disposición servicios gratuitos de asistencia lingüística. Llame al [phone].    We comply with applicable federal civil rights laws and [place] laws. We do not discriminate on the basis of race, color, national origin, age, disability, sex, sexual orientation, or gender identity.            Thank you!     Thank you for choosing [facility]  for your care. Our goal is always to provide you with excellent care. Hearing back from our patients is  one way we can continue to improve our services. Please take a few minutes to complete the written survey that you may receive in the mail after your visit with us. Thank you!             Your Updated Medication List - Protect others around you: Learn how to safely use, store and throw away your medicines at www.disposemymeds.org.          This list is accurate as of 8/15/18 11:59 PM.  Always use your most recent med list.                   Brand Name Dispense Instructions for use Diagnosis    acetaminophen-codeine 300-30 MG per tablet    TYLENOL #3    5 tablet    Take 1 tablet by mouth nightly as needed for severe pain    Foot injury, left, initial encounter       aspirin 81 MG EC tablet      Take 81 mg by mouth        FISH OIL PO      Take 1 capsule by mouth daily        LORazepam 1 MG tablet    ATIVAN    12 tablet    Use 1/2-1 tab at night prior to meetings or for panic symptoms    Anxiety state, Social anxiety disorder       magnesium oxide 400 MG tablet    MAG-OX     Take 400 mg by mouth every evening        MULTIVITAMIN GUMMIES WOMENS PO      Take 2 chew tab by mouth every evening        NONFORMULARY      Take 1 tablet by mouth every evening OTC: B-Right Vitamin (Optimized B Complex)        propranolol 10 MG tablet    INDERAL    20 tablet    Take 1 tablet by mouth. Take as needed prior to interviews, stressful events    Social anxiety disorder       sertraline 50 MG tablet    ZOLOFT    90 tablet    Take 1 tablet (50 mg) by mouth daily    Anxiety state, Social anxiety disorder       STATIN NOT PRESCRIBED (INTENTIONAL)     0 each    Statin not prescribed intentionally due to Woman of childbearing age not actively taking birth control    Cerebral artery occlusion with cerebral infarction (H)       VITAMIN D (CHOLECALCIFEROL) PO      Take 1,000 Units by mouth every evening (takes 2 x 100 unit capsule)

## 2018-08-15 NOTE — PROGRESS NOTES
SUBJECTIVE:   Chloe Foster is a 38 year old female who presents to clinic today for the following health issues:    Depression Followup    Status since last visit: Improved     See PHQ-9 for current symptoms.  Other associated symptoms: None    Complicating factors:   Significant life event:  No   Current substance abuse:  None  Anxiety or Panic symptoms:  Yes-  Some anxiety    PHQ-9 9/7/2017 10/27/2017 5/15/2018   Total Score 1 3 3   Q9: Suicide Ideation Not at all Not at all Not at all       Amount of exercise or physical activity: 5 days weeks    Problems taking medications regularly: No    Medication side effects: none    Diet: regular (no restrictions)    Job stressors f/u-   After last visit, talked to her boss, said they could work on it, but didn't.  Now working from home.  Time to focus on her health.  Currently working from home to wrap up projects.  Then will plan to take time off, use PTO/leave, take a month off.  When coming back, plan is to do 20-30 hrs/wk with a 3mo review.  Currently 40-50 hrs and a lot of stress.  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  Meetings will still happen- was trying the propranolol prior to meetings.     Sleep prior to meetings- ativan shuts off her mind so she can sleep.  Tried unisom, benadryl - felt groggy the next morning.  Commonly wakes at 3-4am.  Tried melatonin, unsure dose, groggy in morning.    Ativan- helps the most, not groggy in am, able to get and stay to sleep. Started taking 1/2 tab, Mon/Tues eves, prior to Tues/Wed work meetings.    Did see therapist - did some meditation, but finds it better to do am exercise.  Friend did hypnotist, which helped, thinking about trying.  Will also look into acupuncture.      [place] x 2 wks, with child, , AYAH.  He'll start at [facility] - early 9/18.  Mornings- 7:30-1:30 at first, option to go until 4:30pm.    Thinking she'd like  to try going off the zoloft with a bit less of    concerned about libido.         s/p CVA/migraines-  Recent stroke specialist consultation, very helpful.  Rec f/u with Dr. Longoria - migraine specialist.  To avoid triptans/sudafed, work on prevention- magnesium, acupuncture.  Okay with going off sertraline.      Problem list and histories reviewed & adjusted, as indicated.  Additional history: as documented    Patient Active Problem List   Diagnosis     Vitamin D deficiency     CARDIOVASCULAR SCREENING; LDL GOAL LESS THAN 160     Lumbar herniated disc     Hirsutism     Insomnia     IBS (irritable bowel syndrome)     Moderate recurrent major depression (H)     Cerebral artery occlusion with cerebral infarction     History of  section     PFO with atrial septal aneurysm     Protein S deficiency (H)     Migraine with aura and without status migrainosus, not intractable     Pre-conception counseling     Elevated lipoprotein A level     Social anxiety disorder     MTHFR mutation (H)     Cerebrovascular accident (CVA) due to embolism of right middle cerebral artery (H)     Acute pain of left shoulder      Current Outpatient Prescriptions   Medication Sig Dispense Refill     acetaminophen-codeine (TYLENOL #3) 300-30 MG per tablet Take 1 tablet by mouth nightly as needed for severe pain 5 tablet 0     aspirin EC 81 MG EC tablet Take 81 mg by mouth       LORazepam (ATIVAN) 1 MG tablet Use 1/2-1 tab at night prior to meetings or for panic symptoms 12 tablet 0     magnesium oxide (MAG-OX) 400 MG tablet Take 400 mg by mouth every evening        Multiple Vitamins-Minerals (MULTIVITAMIN GUMMIES WOMENS PO) Take 2 chew tab by mouth every evening       NONFORMULARY Take 1 tablet by mouth every evening OTC: B-Right Vitamin (Optimized B Complex)       Omega-3 Fatty Acids (FISH OIL PO) Take 1 capsule by mouth daily        propranolol (INDERAL) 10 MG tablet Take 1 tablet by mouth. Take as needed prior to interviews,  "stressful events 20 tablet 1     sertraline (ZOLOFT) 50 MG tablet Take 1 tablet (50 mg) by mouth daily 90 tablet 0     STATIN NOT PRESCRIBED, INTENTIONAL, Statin not prescribed intentionally due to Woman of childbearing age not actively taking birth control 0 each 0     VITAMIN D, CHOLECALCIFEROL, PO Take 1,000 Units by mouth every evening (takes 2 x 100 unit capsule)        Allergies   Allergen Reactions     Nkda [No Known Drug Allergies]      BP Readings from Last 3 Encounters:   08/15/18 120/77   08/07/18 137/82   08/02/18 112/68    Wt Readings from Last 3 Encounters:   08/15/18 158 lb (71.7 kg)   08/07/18 158 lb (71.7 kg)   07/17/18 168 lb 1.6 oz (76.2 kg)            Labs reviewed in EPIC    Reviewed and updated as needed this visit by clinical staff  Allergies       Reviewed and updated as needed this visit by Provider         ROS:  Constitutional, HEENT, cardiovascular, pulmonary, gi and gu systems are negative, except as otherwise noted.    OBJECTIVE:     /77  Pulse 84  Temp 98.4  F (36.9  C) (Oral)  Ht 5' 7\" (1.702 m)  Wt 158 lb (71.7 kg)  LMP 08/07/2018  SpO2 96%  BMI 24.75 kg/m2  Body mass index is 24.75 kg/(m^2).  GENERAL APPEARANCE: healthy, alert and no distress     EYES: PERRL, sclera clear     HENT: nose and mouth without ulcers or lesions     NECK: no adenopathy, no asymmetry, masses, or scars and thyroid normal to palpation     RESP: lungs clear to auscultation - no rales, rhonchi or wheezes     CV: regular rates and rhythm, normal S1 S2, no S3 or S4 and no murmur, click or rub      Abdomen: soft, nontender, no HSM or masses and bowel sounds normal     Ext: warm, dry, no edema      Psych: full range affect, normal speech and grooming, judgement and insight intact     Diagnostic Test Results:  Results for orders placed or performed during the hospital encounter of 08/02/18   Transesophageal Echocardiogram    Narrative    [id number]  [id number]  [id number]  [id number]^CHONGVERONICAWERNER^ABELARDO^      "      [facility]  Echocardiography [facility]  [address] [place] [address]        Name: CARMEN OLSEN  MRN: [id number]  : 1980  Study Date: 2018 08:50 AM  Age: 38 yrs  Gender: Female  Patient Location: [facility]  Reason For Study: , Status post patent foramen ovale closure, Status post  patent f  History: assess atrial septum, hx of PFO device closure  Ordering Physician: ABELARDO DELGADO  Referring Physician: CECIL MINOR  Performed By: Bhupendra Olguin MD     BSA: 1.8 m2  Height: 67 in  Weight: 160 lb  HR: 75  BP: 124/88 mmHg  Attestation  I was present during GODWIN probe placement by the fellow, Bhupendra Olguin. I  personally viewed the imaging and agree with the interpretation and report as  documented by the fellow.  _____________________________________________________________________________  __     Interpretation Summary  An ASD closure device was seen in the expected anatomic position without any  associated thrombus.The device was well seated and without residual shunt by  color Doppler or agitated saline.  The left atrial appendage is normal. It is free of spontaneous echo contrast  and thrombus.  Global right ventricular function is normal.  Left ventricular function, chamber size, wall motion, and wall thickness are  normal.The EF is 55-60%.     No change from prior.  _____________________________________________________________________________  __        Procedure  The procedure was performed in the Echo Lab. Informed consent for  Transesophegeal echo obtained. GODWIN Probe #52 was used during the procedure.  Patient was sedated using Fentanyl 100 mcg. Patient was sedated using Versed  2.5 mg. I determined this patient to be an appropriate candidate for the  planned sedation and procedure and have reassessed the patient immediately  prior to sedation and procedure. Total sedation time: 20 minutes of continuous  bedside 1:1 monitoring.  The Transducer was inserted without difficulty . The  patient tolerated the procedure well. Complications None.     Left Ventricle  Left ventricular function, chamber size, wall motion, and wall thickness are  normal.The EF is 55-60%.     Right Ventricle  The right ventricle is normal size. Global right ventricular function is  normal.     Atria  The left atrial appendage is normal. It is free of spontaneous echo contrast  and thrombus. Both atria appear normal. The atrial septum is intact as  assessed by color Doppler and agitated saline bubble study . An ASD closure  device was seen in the expected anatomic position.The device was well seated  and without residual shunt by color Doppler mapping.        Mitral Valve  The mitral valve is normal. Trace mitral insufficiency is present.     Aortic Valve  Aortic valve is normal in structure and function. The aortic valve is  tricuspid.     Tricuspid Valve  The tricuspid valve is normal. The peak velocity of the tricuspid regurgitant  jet is not obtainable.     Pulmonic Valve  The pulmonic valve is normal. Trace pulmonic insufficiency is present.     Vessels  The aorta root is normal. Proximal ascending aorta is normal. The inferior  vena cava cannot be assessed. No atherosclerotic plaque in proximal descending  aorta. Normal pulmonary arteries.     Pericardium  No pericardial effusion is present.        Compared to Previous Study  This study was compared with the study from 4/21/16 . There has been no  change.     _____________________________________________________________________________  __                       Report approved by: Steph Correia 08/02/2018 10:07 AM           _____________________________________________________________________________  __          ASSESSMENT/PLAN:       ICD-10-CM    1. Anxiety state F41.1 sertraline (ZOLOFT) 50 MG tablet     LORazepam (ATIVAN) 1 MG tablet   2. Moderate recurrent major depression (H) F33.1    3. Social  anxiety disorder F40.10 sertraline (ZOLOFT) 50 MG tablet     LORazepam (ATIVAN) 1 MG tablet   4. Migraine with aura and without status migrainosus, not intractable G43.109    5. h/o Cerebrovascular accident (CVA) due to embolism of right middle cerebral artery (H) I63.411    6. MTHFR mutation (H) E72.12    7. Status post patent foramen ovale closure Z98.890     Z87.74      Anxiety/social anxiety/MDD- pt with continued stressors with work, but thinks it will be better with kelsea agreeing to have her work less hours, more work from home, less assistant responsibilities.  Boss has tendency to say pt can work less, though, and then goes back to old habits and pt is working many hours and very stressed, also doesn't have replacement assistant yet.      Pt motivated to try lowering zoloft dose now, though, partly due to se's of libido/orgasm issues.  Discussed concerns, especially with increased ativan use recently (was ~2-3x/mo, now ~2x/wk - on nights prior to stressful am work meetings which will continue).  Rec lowering dose of zoloft first to 50mg/d and see how she does with that dose.  Ativan refilled, but she should continue at using 2x/wk or less ideally.  Cont work on self-cares and working less, which hopefully will help.  Also rec melatonin at lower doses for sleep.  Risks and benefits of medication(s) including potential side effects reviewed with patient.  Questions answered.      Migraines/s/p CVA/ s/p PFO closure- Appreciate consultation with Dr. Ontiveros (f/u prn or if questions), who rec further f/u with Dr. Post for migraines and numbness sx's.      RTC here in 2-3 months for f/u anxiety, insomnia.     Izabel Reyes MD  [facility]

## 2018-08-15 NOTE — NURSING NOTE
"Chief Complaint   Patient presents with     Recheck Medication     lorazepam, weaning off sertraline     /77  Pulse 84  Temp 98.4  F (36.9  C) (Oral)  Ht 5' 7\" (1.702 m)  Wt 158 lb (71.7 kg)  LMP 08/07/2018  SpO2 96%  BMI 24.75 kg/m2 Estimated body mass index is 24.75 kg/(m^2) as calculated from the following:    Height as of this encounter: 5' 7\" (1.702 m).    Weight as of this encounter: 158 lb (71.7 kg).        Health Maintenance due pending provider review:  NONE    n/a    Cait Kimble CMA  "

## 2018-08-16 ENCOUNTER — THERAPY VISIT (OUTPATIENT)
Dept: PHYSICAL THERAPY | Facility: CLINIC | Age: 38
End: 2018-08-16
Payer: COMMERCIAL

## 2018-08-16 DIAGNOSIS — M25.512 ACUTE PAIN OF LEFT SHOULDER: ICD-10-CM

## 2018-08-16 PROCEDURE — 97110 THERAPEUTIC EXERCISES: CPT | Mod: GP | Performed by: PHYSICAL THERAPIST

## 2018-08-16 PROCEDURE — 97161 PT EVAL LOW COMPLEX 20 MIN: CPT | Mod: GP | Performed by: PHYSICAL THERAPIST

## 2018-08-16 ASSESSMENT — ANXIETY QUESTIONNAIRES: GAD7 TOTAL SCORE: 2

## 2018-08-16 ASSESSMENT — PATIENT HEALTH QUESTIONNAIRE - PHQ9: SUM OF ALL RESPONSES TO PHQ QUESTIONS 1-9: 3

## 2018-08-16 NOTE — PROGRESS NOTES
[facility] Initial Evaluation  Subjective:  Chloe Foster is a 38 year old female.    Patient s chief complaints: L shoulder pain.    Condition occurred due to no specific cause.  Date of Onset: since about 2/16/18  Location of symptoms is anterior shoulder and deep inside GH joint area .  Symptoms other than pain include: stiffness in neck   Quality of pain is aching and frequency is intermittent.    Pain dependence on time of day is: non dependent on time of day.   Pain rating is: 0-6/10.    Symptoms are exacerbated by: laying on it, lifting/carrying (3 year old son, 37lb), reaching up, carrying laptop bag.    Symptoms are relieved by:  Not dependent on time of day.    Progression of symptoms is that symptoms are:  Overall worse when symptoms come on.  Imaging/Special tests include: none   Previous treatments include: chiro 1x/month, more for neck.   Patient reports that general health is: excellent.   Pertinent medical history includes:  2 strokes during pregnancy 2015-no lingering effects, PFO closure in heart.    Medical allergies includes: none.   Surgical history includes: heart and knee.  Current medications include: none  Current occupation is: beverage director  Work status is: working normal job  Primary job tasks include: sitting, lifting, carrying, computer work  Barriers include: none  Red flags include: none    Patient's expectations for therapy include: be able to lift without pain, be able to return to body pump    HPI                    Objective:  SHOULDER:    Cervical Screen: all motions produce pain in neck, extension and L rotation produce pain in both neck and L shoulder    Shoulder:   PROM L PROM R AROM L AROM R MMT L MMT R   Flex   Pain 120 WNL 5 pain 5   Abd   No pain WNL 5 5   Full Can     5 pain 5   Empty Can     5 pain 5   IR   No pain WNL 5 5   ER   End range pain WNL 4 pain 4   Ext/IR   T4 no pain T4       Scapulothoraic Rhythm: medial and inferior border  winging L>R     Palpation: not tender to palpation    Special tests:   L R   Impingement     Neer's positive    Hawkin's-Rene negative    Coracoid Impingement negative    Internal impingement     Labral     Anterior Slide     Breckinridge's Positive no click    Crank negative    Instability     Apprehension (anterior)     Relocation (anterior)     Anterior Load & Shift     Posterior Load & Shift     Posterior instability (with 90 degrees flex)     Multi-Directional Instability      Sulcus     Biceps      Speed's     Rotator Cuff Tear     Drop Arm     Belly Press     Lift off          System    Physical Exam    General     ROS    Assessment/Plan:    Patient is a 38 year old female with left side shoulder complaints.    Patient has the following significant findings with corresponding treatment plan.                Diagnosis 1:  L rotator cuff tendinopathy secondary to scapular dyskinesis   (neck stiff/painful as well, but without suggested radicular symptoms)    Pain -  hot/cold therapy, manual therapy and directional preference exercise  Decreased ROM/flexibility - manual therapy and therapeutic exercise  Decreased strength - therapeutic exercise and therapeutic activities  Decreased proprioception - neuro re-education and therapeutic activities  Decreased function - therapeutic activities  Impaired posture - neuro re-education    Therapy Evaluation Codes:   1) History comprised of:   Personal factors that impact the plan of care:      None.    Comorbidity factors that impact the plan of care are:      None.     Medications impacting care: None.  2) Examination of Body Systems comprised of:   Body structures and functions that impact the plan of care:      Shoulder.   Activity limitations that impact the plan of care are:      Dressing, Lifting, Sleeping, Laying down and reaching.  3) Clinical presentation characteristics are:   Stable/Uncomplicated.  4) Decision-Making    Low complexity using standardized patient  assessment instrument and/or measureable assessment of functional outcome.  Cumulative Therapy Evaluation is: Low complexity.    Previous and current functional limitations:  (See Goal Flow Sheet for this information)    Short term and Long term goals: (See Goal Flow Sheet for this information)     Communication ability:  Patient appears to be able to clearly communicate and understand verbal and written communication and follow directions correctly.  Treatment Explanation - The following has been discussed with the patient:   RX ordered/plan of care  Anticipated outcomes  Possible risks and side effects  This patient would benefit from PT intervention to resume normal activities.   Rehab potential is good.    Frequency:  1 X week, once daily  Duration:  for 8 weeks  Discharge Plan:  Achieve all LTG.  Independent in home treatment program.  Reach maximal therapeutic benefit.    Please refer to the daily flowsheet for treatment today, total treatment time and time spent performing 1:1 timed codes.

## 2018-08-27 ENCOUNTER — OFFICE VISIT (OUTPATIENT)
Dept: FAMILY MEDICINE | Facility: CLINIC | Age: 38
End: 2018-08-27
Payer: COMMERCIAL

## 2018-08-27 ENCOUNTER — RADIANT APPOINTMENT (OUTPATIENT)
Dept: GENERAL RADIOLOGY | Facility: CLINIC | Age: 38
End: 2018-08-27
Attending: FAMILY MEDICINE
Payer: COMMERCIAL

## 2018-08-27 VITALS
TEMPERATURE: 98.6 F | SYSTOLIC BLOOD PRESSURE: 115 MMHG | HEIGHT: 67 IN | OXYGEN SATURATION: 97 % | DIASTOLIC BLOOD PRESSURE: 80 MMHG | HEART RATE: 83 BPM

## 2018-08-27 DIAGNOSIS — S99.911A ANKLE INJURY, RIGHT, INITIAL ENCOUNTER: Primary | ICD-10-CM

## 2018-08-27 PROCEDURE — 73610 X-RAY EXAM OF ANKLE: CPT | Mod: RT

## 2018-08-27 PROCEDURE — 99213 OFFICE O/P EST LOW 20 MIN: CPT | Performed by: FAMILY MEDICINE

## 2018-08-27 NOTE — PATIENT INSTRUCTIONS
relative rest  Using walking cane to help with ambulation  Ice the ankle as needed   Elevate when non ambulant  Wear orthotic for comfort  Ok to resume normal activities as soon as ankle pain and swelling is better

## 2018-08-27 NOTE — NURSING NOTE
"Chief Complaint   Patient presents with     Musculoskeletal Problem     /80  Pulse 83  Temp 98.6  F (37  C) (Oral)  Ht 5' 7\" (1.702 m)  LMP 08/07/2018  SpO2 97% Estimated body mass index is 24.75 kg/(m^2) as calculated from the following:    Height as of 8/15/18: 5' 7\" (1.702 m).    Weight as of 8/15/18: 158 lb (71.7 kg).  Medication Reconciliation: complete      Health Maintenance that is potentially due pending provider review:  NONE    n/a    STACI Jones  "

## 2018-08-27 NOTE — PROGRESS NOTES
SUBJECTIVE:   Chloe Foster is a 38 year old female who presents to clinic today for the following health issues:      Musculoskeletal problem/pain      Duration: x1 day    Description  Location: RT ankle     Intensity:  Mild to severe     Accompanying signs and symptoms: swelling and redness    History  Previous similar problem: no   Previous evaluation:  none    Precipitating or alleviating factors:  Trauma or overuse: YES- fall   Aggravating factors include: standing, walking and moving ankle a certain way     Therapies tried and outcome: rest/inactivity, elevation, ice and acetaminophen - not helping with sx       PROBLEMS TO ADD ON...    Problem list and histories reviewed & adjusted, as indicated.  Additional history: as documented    Patient Active Problem List   Diagnosis     Vitamin D deficiency     CARDIOVASCULAR SCREENING; LDL GOAL LESS THAN 160     Lumbar herniated disc     Hirsutism     Insomnia     IBS (irritable bowel syndrome)     Moderate recurrent major depression (H)     Cerebral artery occlusion with cerebral infarction     History of  section     PFO with atrial septal aneurysm     Protein S deficiency (H)     Migraine with aura and without status migrainosus, not intractable     Pre-conception counseling     Elevated lipoprotein A level     Social anxiety disorder     MTHFR mutation (H)     Cerebrovascular accident (CVA) due to embolism of right middle cerebral artery (H)     Acute pain of left shoulder     Past Surgical History:   Procedure Laterality Date     ARTHROSCOPY KNEE RT/LT      Rt knee     HC REPAIR OF NASAL SEPTUM       REPAIR PATENT FORAMEN OVALE  2016       Social History   Substance Use Topics     Smoking status: Former Smoker     Packs/day: 0.50     Years: 5.00     Types: Cigarettes     Quit date: 2014     Smokeless tobacco: Never Used     Alcohol use 0.0 oz/week      Comment: occ.     Family History   Problem Relation Age of Onset     Cancer  "Father      throat     Hypertension Father      Allergies Father      Diabetes Maternal Grandmother      on insulin     C.A.D. Maternal Grandmother      triple bypass in her 60s     Diabetes Paternal Grandmother      C.A.D. Maternal Grandfather      unsure type     Neurologic Disorder Paternal Grandfather       of brain aneurysm in early 60s     Diabetes Maternal Aunt       in her early 40s of DM complications, type 2     Diabetes Maternal Aunt            Reviewed and updated as needed this visit by clinical staff       Reviewed and updated as needed this visit by Provider         ROS:  Constitutional, HEENT, cardiovascular, pulmonary, gi and gu systems are negative, except as otherwise noted.    OBJECTIVE:     /80  Pulse 83  Temp 98.6  F (37  C) (Oral)  Ht 5' 7\" (1.702 m)  LMP 2018  SpO2 97%  There is no height or weight on file to calculate BMI.  GENERAL: healthy, alert and no distress  Ankle: bilateral no obvious swelling  Tenderness over lateral malleolar on right. ROM is decreased because of on going pain  RESP: lungs clear to auscultation - no rales, rhonchi or wheezes  CV: regular rate and rhythm, normal S1 S2, no S3 or S4, no murmur, click or rub, no peripheral edema and peripheral pulses strong  ABDOMEN: soft, nontender, no hepatosplenomegaly, no masses and bowel sounds normal      Diagnostic Test Results:  Xray- no acute fracture/ official report is pending    ASSESSMENT/PLAN:   1. Ankle injury, right, initial encounter  - XR Ankle Right G/E 3 Views  - order for DME; Equipment being ordered: ankle support orthotic or short camcast- whichever is more comfortable for the patient  Dispense: 1 Units; Refill: 0    relative rest  Using walking cane to help with ambulation  Ice the ankle as needed   Elevate when non ambulant  Wear orthotic for comfort  Ok to resume normal activities as soon as ankle pain and swelling is better        Bonnie Escoto MD  [facility]  "

## 2018-08-27 NOTE — MR AVS SNAPSHOT
After Visit Summary   8/27/2018    Chloe Foster    MRN: [id number]           Patient Information     Date Of Birth          1980        Visit Information        Provider Department      8/27/2018 3:20 PM Bonnie Escoto MD [facility]        Today's Diagnoses     Ankle injury, right, initial encounter    -  1      Care Instructions    relative rest  Using walking cane to help with ambulation  Ice the ankle as needed   Elevate when non ambulant  Wear orthotic for comfort  Ok to resume normal activities as soon as ankle pain and swelling is better              Follow-ups after your visit        Your next 10 appointments already scheduled     Sep 04, 2018  3:10 PM CDT   [facility] Physical Therapy Extremity Follow Up Tx with Bandar Wells PT   [facility] ([facility]  )    [address] [place] [address] [address] [address] [phone]           If you have your physician's order in hand, please bring it with you to your appointment            Sep 24, 2018  3:30 PM CDT   [facility] Physical Therapy Extremity Follow Up Tx with Bandar Wells PT   [facility] ([facility]  )    [address] [place] [address] [id number]  [facility] [address] [phone]           If you have your physician's order in hand, please bring it with you to your appointment              Who to contact     If you have questions or need follow up information about today's clinic visit or your schedule please contact [facility] directly at [phone].  Normal or non-critical lab and imaging results will be communicated to you by MyChart, letter or phone within 4 business days after the clinic has received the results. If you do not hear from us within 7 days, please contact the clinic through MyChart or phone. If you have a critical or abnormal lab result, we will notify you by phone as soon as possible.  Submit refill  "requests through [employer] or call your pharmacy and they will forward the refill request to us. Please allow 3 business days for your refill to be completed.          Additional Information About Your Visit        digiSchoolhart Information     [employer] Environment gives you secure access to your electronic health record. If you see a primary care provider, you can also send messages to your care team and make appointments. If you have questions, please call your primary care clinic.  If you do not have a primary care provider, please call [phone] and they will assist you.        Care EveryWhere ID     This is your Care EveryWhere ID. This could be used by other organizations to access your [facility] medical records  [id number]        Your Vitals Were     Pulse Temperature Height Last Period Pulse Oximetry       83 98.6  F (37  C) (Oral) 5' 7\" (1.702 m) 08/07/2018 97%        Blood Pressure from Last 3 Encounters:   08/27/18 115/80   08/15/18 120/77   08/07/18 137/82    Weight from Last 3 Encounters:   08/15/18 158 lb (71.7 kg)   08/07/18 158 lb (71.7 kg)   07/17/18 168 lb 1.6 oz (76.2 kg)              We Performed the Following     XR Ankle Right G/E 3 Views          Today's Medication Changes          These changes are accurate as of 8/27/18 11:59 PM.  If you have any questions, ask your nurse or doctor.               Start taking these medicines.        Dose/Directions    order for DME   Used for:  Ankle injury, right, initial encounter   Started by:  Bonnie Escoto MD        Equipment being ordered: ankle support orthotic or short camcast- whichever is more comfortable for the patient   Quantity:  1 Units   Refills:  0            Where to get your medicines      Some of these will need a paper prescription and others can be bought over the counter.  Ask your nurse if you have questions.     Bring a paper prescription for each of these medications     order for DME                Primary Care Provider Office Phone # Fax # "    Izabel Reyes [phone]       [address] [address]        Equal Access to Services     ROBERTSYDNEE ULYSSES : Hadjohn eliane garcía reese Torres, wavidhya jenniferqdaina, [place] kaifeomada ezekiel, paulie reyna laalextomas missy. So [facility] [phone].    ATENCIÓN: Si habla español, tiene a rankin disposición servicios gratuitos de asistencia lingüística. Llame al [phone].    We comply with applicable federal civil rights laws and [place] laws. We do not discriminate on the basis of race, color, national origin, age, disability, sex, sexual orientation, or gender identity.            Thank you!     Thank you for choosing [facility]  for your care. Our goal is always to provide you with excellent care. Hearing back from our patients is one way we can continue to improve our services. Please take a few minutes to complete the written survey that you may receive in the mail after your visit with us. Thank you!             Your Updated Medication List - Protect others around you: Learn how to safely use, store and throw away your medicines at www.disposemymeds.org.          This list is accurate as of 8/27/18 11:59 PM.  Always use your most recent med list.                   Brand Name Dispense Instructions for use Diagnosis    aspirin 81 MG EC tablet      Take 81 mg by mouth        FISH OIL PO      Take 1 capsule by mouth daily        LORazepam 1 MG tablet    ATIVAN    12 tablet    Use 1/2-1 tab at night prior to meetings or for panic symptoms    Anxiety state, Social anxiety disorder       magnesium oxide 400 MG tablet    MAG-OX     Take 400 mg by mouth every evening        MULTIVITAMIN GUMMIES WOMENS PO      Take 2 chew tab by mouth every evening        NONFORMULARY      Take 1 tablet by mouth every evening OTC: B-Right Vitamin (Optimized B Complex)        order for DME     1 Units    Equipment being ordered: ankle support orthotic or short camcast-  whichever is more comfortable for the patient    Ankle injury, right, initial encounter       propranolol 10 MG tablet    INDERAL    20 tablet    Take 1 tablet by mouth. Take as needed prior to interviews, stressful events    Social anxiety disorder       sertraline 50 MG tablet    ZOLOFT    90 tablet    Take 1 tablet (50 mg) by mouth daily    Anxiety state, Social anxiety disorder       STATIN NOT PRESCRIBED (INTENTIONAL)     0 each    Statin not prescribed intentionally due to Woman of childbearing age not actively taking birth control    Cerebral artery occlusion with cerebral infarction (H)       VITAMIN D (CHOLECALCIFEROL) PO      Take 1,000 Units by mouth every evening (takes 2 x 100 unit capsule)

## 2018-08-28 NOTE — PROGRESS NOTES
No fractures - as discussed in appointment    Please keep us posted with questions or concerns .      Best Regards,    Bonnie Escoto MD  [facility]  [phone]

## 2018-09-04 ENCOUNTER — THERAPY VISIT (OUTPATIENT)
Dept: PHYSICAL THERAPY | Facility: CLINIC | Age: 38
End: 2018-09-04
Payer: COMMERCIAL

## 2018-09-04 DIAGNOSIS — M25.512 ACUTE PAIN OF LEFT SHOULDER: ICD-10-CM

## 2018-09-04 PROCEDURE — 97140 MANUAL THERAPY 1/> REGIONS: CPT | Mod: GP | Performed by: PHYSICAL THERAPIST

## 2018-09-04 PROCEDURE — 97112 NEUROMUSCULAR REEDUCATION: CPT | Mod: GP | Performed by: PHYSICAL THERAPIST

## 2018-09-04 PROCEDURE — 97110 THERAPEUTIC EXERCISES: CPT | Mod: GP | Performed by: PHYSICAL THERAPIST

## 2018-09-28 ENCOUNTER — OFFICE VISIT (OUTPATIENT)
Dept: FAMILY MEDICINE | Facility: CLINIC | Age: 38
End: 2018-09-28
Payer: COMMERCIAL

## 2018-09-28 VITALS
OXYGEN SATURATION: 99 % | DIASTOLIC BLOOD PRESSURE: 78 MMHG | WEIGHT: 158 LBS | TEMPERATURE: 97.4 F | SYSTOLIC BLOOD PRESSURE: 111 MMHG | HEART RATE: 89 BPM | HEIGHT: 67 IN | BODY MASS INDEX: 24.8 KG/M2

## 2018-09-28 DIAGNOSIS — G43.109 MIGRAINE WITH AURA AND WITHOUT STATUS MIGRAINOSUS, NOT INTRACTABLE: ICD-10-CM

## 2018-09-28 DIAGNOSIS — G47.00 INSOMNIA, UNSPECIFIED TYPE: ICD-10-CM

## 2018-09-28 DIAGNOSIS — F43.0 ACUTE REACTION TO STRESS: ICD-10-CM

## 2018-09-28 DIAGNOSIS — I63.411 CEREBROVASCULAR ACCIDENT (CVA) DUE TO EMBOLISM OF RIGHT MIDDLE CEREBRAL ARTERY (H): Primary | ICD-10-CM

## 2018-09-28 PROCEDURE — 99214 OFFICE O/P EST MOD 30 MIN: CPT | Performed by: FAMILY MEDICINE

## 2018-09-28 NOTE — MR AVS SNAPSHOT
After Visit Summary   9/28/2018    Chloe Foster    MRN: [id number]           Patient Information     Date Of Birth          1980        Visit Information        Provider Department      9/28/2018 11:00 AM Izabel Reyes MD [facility]        Today's Diagnoses     Cerebrovascular accident (CVA) due to embolism of right middle cerebral artery (H)    -  1    Migraine with aura and without status migrainosus, not intractable        Insomnia, unspecified type        Acute reaction to stress           Follow-ups after your visit        Additional Services     MENTAL HEALTH REFERRAL  - Adult; Outpatient Treatment; Individual/Couples/Family/Group Therapy/Health Psychology; FMG: [facility] [phone]; We will contact you to schedule the appointment or please call with any questions       All scheduling is subject to the client's specific insurance plan & benefits, provider/location availability, and provider clinical specialities.  Please arrive 15 minutes early for your first appointment and bring your completed paperwork.    Please be aware that coverage of these services is subject to the terms and limitations of your health insurance plan.  Call member services at your health plan with any benefit or coverage questions.                            Your next 10 appointments already scheduled     Oct 02, 2018 10:00 AM CDT   [facility] Extremity Follow Up Tx with Bandar Wells PT   [facility] ([facility]  )    [address] [place] [address] [id number]  [facility] [address] [phone]           If you have your physician's order in hand, please bring it with you to your appointment            Oct 23, 2018 10:00 AM CDT   (Arrive by 9:45 AM)   New Patient Visit with Latia Post MD   [facility] ([facility])    [address] [facility]  [address] [facility]  "[address] [phone]              Who to contact     If you have questions or need follow up information about today's clinic visit or your schedule please contact [facility] directly at [phone].  Normal or non-critical lab and imaging results will be communicated to you by MyChart, letter or phone within 4 business days after the clinic has received the results. If you do not hear from us within 7 days, please contact the clinic through Gainsighthart or phone. If you have a critical or abnormal lab result, we will notify you by phone as soon as possible.  Submit refill requests through AiMeiWei or call your pharmacy and they will forward the refill request to us. Please allow 3 business days for your refill to be completed.          Additional Information About Your Visit        Gainsighthart Information     AiMeiWei gives you secure access to your electronic health record. If you see a primary care provider, you can also send messages to your care team and make appointments. If you have questions, please call your primary care clinic.  If you do not have a primary care provider, please call [phone] and they will assist you.        Care EveryWhere ID     This is your Care EveryWhere ID. This could be used by other organizations to access your [facility] medical records  [id number]        Your Vitals Were     Pulse Temperature Height Last Period Pulse Oximetry Breastfeeding?    89 97.4  F (36.3  C) (Oral) 5' 7\" (1.702 m) 09/07/2018 (Exact Date) 99% No    BMI (Body Mass Index)                   24.75 kg/m2            Blood Pressure from Last 3 Encounters:   09/28/18 111/78   08/27/18 115/80   08/15/18 120/77    Weight from Last 3 Encounters:   09/28/18 158 lb (71.7 kg)   08/15/18 158 lb (71.7 kg)   08/07/18 158 lb (71.7 kg)              We Performed the Following     MENTAL HEALTH REFERRAL  - Adult; Outpatient Treatment; Individual/Couples/Family/Group Therapy/Health Psychology; FMG: [facility] " [facility] [phone]; We will contact you to schedule the appointment or please call with any questions        Primary Care Provider Office Phone # Fax #    Izabel Reyes [phone] [address] [address]        Equal Access to Services     ANAIS ARORA : Hadii aad ku hadasho Soomaali, waaxda luqadaha, qaybta kaalmada adeegyada, waxazucena blunt hayshannonn adeareli luzpraveentroy marquez . So [facility] [phone].    ATENCIÓN: Si habla español, tiene a rankin disposición servicios gratuitos de asistencia lingüística. [facility] [phone].    We comply with applicable federal civil rights laws and [place] laws. We do not discriminate on the basis of race, color, national origin, age, disability, sex, sexual orientation, or gender identity.            Thank you!     Thank you for choosing [facility]  for your care. Our goal is always to provide you with excellent care. Hearing back from our patients is one way we can continue to improve our services. Please take a few minutes to complete the written survey that you may receive in the mail after your visit with us. Thank you!             Your Updated Medication List - Protect others around you: Learn how to safely use, store and throw away your medicines at www.disposemymeds.org.          This list is accurate as of 9/28/18 11:59 AM.  Always use your most recent med list.                   Brand Name Dispense Instructions for use Diagnosis    aspirin 81 MG EC tablet      Take 81 mg by mouth        FISH OIL PO      Take 1 capsule by mouth daily        LORazepam 1 MG tablet    ATIVAN    12 tablet    Use 1/2-1 tab at night prior to meetings or for panic symptoms    Anxiety state, Social anxiety disorder       magnesium oxide 400 MG tablet    MAG-OX     Take 400 mg by mouth every evening        MULTIVITAMIN GUMMIES WOMENS PO      Take 2 chew tab by mouth every evening        NONFORMULARY      Take 1 tablet by mouth every  evening OTC: B-Right Vitamin (Optimized B Complex)        order for DME     1 Units    Equipment being ordered: ankle support orthotic or short camcast- whichever is more comfortable for the patient    Ankle injury, right, initial encounter       propranolol 10 MG tablet    INDERAL    20 tablet    Take 1 tablet by mouth. Take as needed prior to interviews, stressful events    Social anxiety disorder       sertraline 50 MG tablet    ZOLOFT    90 tablet    Take 1 tablet (50 mg) by mouth daily    Anxiety state, Social anxiety disorder       STATIN NOT PRESCRIBED (INTENTIONAL)     0 each    Statin not prescribed intentionally due to Woman of childbearing age not actively taking birth control    Cerebral artery occlusion with cerebral infarction (H)       VITAMIN D (CHOLECALCIFEROL) PO      Take 1,000 Units by mouth every evening (takes 2 x 100 unit capsule)

## 2018-09-28 NOTE — PROGRESS NOTES
SUBJECTIVE:   Chloe Foster is a 38 year old female who presents to clinic today for the following health issues:  Follow up work restrictions- pt has forms needed to be filled out for work.    Conversation back in 5/18 (after ER for parathesia, 5/7/18), discussed job, overworked.  Letter written to get to 15-30 hrs/wk.  Took it to her boss, said she would try and get a new job description. 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  Pt asked if she could do just beverage program, which she estimated would take ~25 hrs/wk.  Her boss asked for report/proposal and how it would be cost effective, which pt completed.  Pt then heard nothing, so submitted resignation on 6/29/18, and gave 5 wks notice, so last day 8/3/18, which caught her boss' attention.  Asked for JUAN MANUEL, as she had a ton of sick other time to use.  During that time, expected an offer.  During that time, still worked from home until the last week of August.  She stopped getting pd- they didn't use any of her sick time, just her PTO.  After months, they finally sent her an offer on 9/12/18, when she was out of the country.  Job offer was horrible- still included most of her duties, plus other duties, but at 25 hrs of pay, and less benefits.  They are now not answering her calls, and still owe her for expenses ($500).  She declined the offer, and filed for unemployment.  She can get that based on medical recommendations.  This is giving her more stress- she came home from 2wk trip to no viable job, and less pay for the last couple months than she thought she'd get.    Stressors from this giving sx's-  Waking up at 3am, not getting much sleep.  When wanting to cut down on hours, stressful all the time, not getting enough sleep, making her more susceptible to migraines.  Spending so much time feeling stressed out and the unknowns, was preventing her from taking better care of herself.     Migraines- aura sx's  with migraine 6 wks ago, with losing vision in left eye for 45 minutes and then had a migraine.  Has had the vision loss - 3-4 episodes.  Last one on 18, lost vision in left eye for ~40 min.  Zig-zags at first, then goes to both eyes, then resolves, and didn't get a migraine that time.  Those have had a direct correlation of lack of sleep.  Has migraines twice.  With her h/o stroke and a few ER visits in the past from these, she tries not to go to the ER each time, but they are extremely stressful for her, and make her concerned about caring for her child.    Acute stress reaction-   Few wks off zoloft- forgot to take to [place], so off x 3 wks.  Hasn't taken any of the lorazepam- because she hasn't had to go into meetings.  Had just taken them the night before presentations (had been on /Wed at work), and the ativan worked great in allowing her to get good sleep and not be groggy in the morning.  Even though being out of a job is very stressful, since she does not have to deal with boss or being overworked at her recent job any longer, she is thinking she won't need the zoloft (or ativan), so would like to try staying off the zoloft and see how she does.  Would be interested in therapy referral however.    Problem list and histories reviewed & adjusted, as indicated.  Additional history: as documented     Patient Active Problem List   Diagnosis     Vitamin D deficiency     CARDIOVASCULAR SCREENING; LDL GOAL LESS THAN 160     Lumbar herniated disc     Hirsutism     Insomnia     IBS (irritable bowel syndrome)     Moderate recurrent major depression (H)     Cerebral artery occlusion with cerebral infarction     History of  section     PFO with atrial septal aneurysm     Protein S deficiency (H)     Migraine with aura and without status migrainosus, not intractable     Pre-conception counseling     Elevated lipoprotein A level     Social anxiety disorder     MTHFR mutation (H)     Cerebrovascular accident  (CVA) due to embolism of right middle cerebral artery (H)     Acute pain of left shoulder     Past Surgical History:   Procedure Laterality Date     ARTHROSCOPY KNEE RT/LT      Rt knee     HC REPAIR OF NASAL SEPTUM       REPAIR PATENT FORAMEN OVALE  2016       Social History   Substance Use Topics     Smoking status: Former Smoker     Packs/day: 0.50     Years: 5.00     Types: Cigarettes     Quit date: 2014     Smokeless tobacco: Never Used     Alcohol use 0.0 oz/week      Comment: occ.     Family History   Problem Relation Age of Onset     Cancer Father      throat     Hypertension Father      Allergies Father      Diabetes Maternal Grandmother      on insulin     C.A.D. Maternal Grandmother      triple bypass in her 60s     Diabetes Paternal Grandmother      C.A.D. Maternal Grandfather      unsure type     Neurologic Disorder Paternal Grandfather       of brain aneurysm in early 60s     Diabetes Maternal Aunt       in her early 40s of DM complications, type 2     Diabetes Maternal Aunt          Current Outpatient Prescriptions   Medication Sig Dispense Refill     aspirin EC 81 MG EC tablet Take 81 mg by mouth       LORazepam (ATIVAN) 1 MG tablet Use 1/2-1 tab at night prior to meetings or for panic symptoms 12 tablet 0     magnesium oxide (MAG-OX) 400 MG tablet Take 400 mg by mouth every evening        Multiple Vitamins-Minerals (MULTIVITAMIN GUMMIES WOMENS PO) Take 2 chew tab by mouth every evening       NONFORMULARY Take 1 tablet by mouth every evening OTC: B-Right Vitamin (Optimized B Complex)       Omega-3 Fatty Acids (FISH OIL PO) Take 1 capsule by mouth daily        propranolol (INDERAL) 10 MG tablet Take 1 tablet by mouth. Take as needed prior to interviews, stressful events 20 tablet 1     VITAMIN D, CHOLECALCIFEROL, PO Take 1,000 Units by mouth every evening (takes 2 x 100 unit capsule)        order for DME Equipment being ordered: ankle support orthotic or short camcast-  whichever is more comfortable for the patient 1 Units 0     sertraline (ZOLOFT) 50 MG tablet Take 1 tablet (50 mg) by mouth daily 90 tablet 0     STATIN NOT PRESCRIBED, INTENTIONAL, Statin not prescribed intentionally due to Woman of childbearing age not actively taking birth control 0 each 0     Allergies   Allergen Reactions     Nkda [No Known Drug Allergies]      Recent Labs   Lab Test  05/08/18   1630  12/28/17   0849  12/01/17   1000  09/30/17   1643  12/29/16   0924   11/23/15   0820   04/29/15   2300  03/29/13   1101   LDL   --   129*   --    --   126*   --   108*   < >   --    --    HDL   --   106   --    --   86   --   71   < >   --    --    TRIG   --   56   --    --   61   --   53   < >   --    --    ALT   --    --    --    --   25   --   23   --   26   --    CR  0.59   --   0.73   --   0.70   < >  0.62   --   0.60   --    GFRESTIMATED  >90   --   89   --   >90  Non  GFR Calc     < >  >90  Non  GFR Calc     --   >90  Non  GFR Calc     --    GFRESTBLACK  >90   --   >90   --   >90   GFR Calc     < >  >90   GFR Calc     --   >90   GFR Calc     --    POTASSIUM  3.6   --   4.2   --   4.1   < >  4.0   --   3.5   --    TSH   --    --    --   0.66   --    --    --    --    --   0.44    < > = values in this interval not displayed.      BP Readings from Last 3 Encounters:   09/28/18 111/78   08/27/18 115/80   08/15/18 120/77    Wt Readings from Last 3 Encounters:   09/28/18 158 lb (71.7 kg)   08/15/18 158 lb (71.7 kg)   08/07/18 158 lb (71.7 kg)            Labs reviewed in EPIC    Reviewed and updated as needed this visit by clinical staff  Tobacco  Allergies  Meds  Problems       Reviewed and updated as needed this visit by Provider  Allergies  Meds  Problems         ROS:  Constitutional, HEENT, cardiovascular, pulmonary, gi and gu systems are negative, except as otherwise noted.    OBJECTIVE:     /78   "Pulse 89  Temp 97.4  F (36.3  C) (Oral)  Ht 5' 7\" (1.702 m)  Wt 158 lb (71.7 kg)  LMP 09/07/2018 (Exact Date)  SpO2 99%  Breastfeeding? No  BMI 24.75 kg/m2  Body mass index is 24.75 kg/(m^2).  GENERAL APPEARANCE: healthy, alert and no distress     EYES: PERRL, sclera clear     HENT: nose and mouth without ulcers or lesions     NECK: no adenopathy, no asymmetry, masses, or scars and thyroid normal to palpation     RESP: lungs clear to auscultation - no rales, rhonchi or wheezes     CV: regular rates and rhythm, normal S1 S2, no S3 or S4 and no murmur, click or rub      Abdomen: soft, nontender, no HSM or masses and bowel sounds normal     Ext: warm, dry, no edema      Psych: full range affect, normal speech and grooming, judgement and insight intact      Neuro: CN 2-12 grossly nl, fundi WNL bilaterally, UE and LE strength 5/5, nl sensation and DTR's.  Normal gait.       ASSESSMENT/PLAN:       ICD-10-CM    1. Cerebrovascular accident (CVA) due to embolism of right middle cerebral artery (H) I63.411    2. Migraine with aura and without status migrainosus, not intractable G43.109    3. Insomnia, unspecified type G47.00 MENTAL HEALTH REFERRAL  - Adult; Outpatient Treatment; Individual/Couples/Family/Group Therapy/Health Psychology; [facility]: [place] [facility] [phone]; We will contact you to schedule the appointment or please call with any questions   4. Acute reaction to stress F43.0 MENTAL HEALTH REFERRAL  - Adult; Outpatient Treatment; Individual/Couples/Family/Group Therapy/Health Psychology; [facility]: [place] [facility] [phone]; We will contact you to schedule the appointment or please call with any questions     37yo woman with ongoing stressors at work causing acute stress, insomnia, which has been a big cause of her atypical migraines.  The migraines lead to significant stressors due to her h/o stroke a few years ago related to migraines and pregnancy.  Since then, she has worked with " many specialists to reduce her risk, and has worked on self-care measures to reduce her stress and frequency of migraines.  She had been taking ativan the night prior to presentations at work (T/W's) as she wasn't sleeping well otherwise. She has attempted to reduce her hours at work (with okay from her boss) to lessen her stressors, but the work-load has not been reduced.  Pt has worked from home for the most part recently, so hasn't needed to take the ativan, but is still working at all hours to make sure the work gets done for clients, and has still been very stressed.  She recently finally got a revised job offer from them, but was surprised and saddened to note that it is still most of the work, for much less pay, and she will likely not take them up on it due to it not addressing her need for less hours/stress, in the best interest of her health.  I am in agreement with her- it is in the best interest of her health to not take the new offer, nor continue her old job.  She is now filing for unemployment, and needs paperwork filled out, and this was done today.  Will also give her a referral for therapy.    Migraines w/ aura and h/o CVA- Aura's and migraines are still very stressful for her due to the vision loss and neurological symptoms and her h/o CVA, but the hope is that they will be much less frequent when she is able to get out of her current/recent very stressful job situation.    Insomnia- mostly resolved already as she hasn't had to go in for the T/W morning presentations at work the last couple months.    Acute stress reaction- pt has been off the zoloft for ~3 weeks (didn't take on her 2 week trip), and feels her stress was really mostly job related, so since she will not be returning, she'd like to try staying off the zoloft and see how she does.  Will refer her for therapy to help with processing everything, and the continued stress that will likely come from financial issues with not continuing  this job.    Pt agrees with plan, rec f/u in 2-3 months, rtc sooner if any worsening sx's or concerns.    Izabel Reyes MD  [facility]

## 2018-10-03 ENCOUNTER — MYC MEDICAL ADVICE (OUTPATIENT)
Dept: FAMILY MEDICINE | Facility: CLINIC | Age: 38
End: 2018-10-03

## 2018-10-03 ENCOUNTER — TELEPHONE (OUTPATIENT)
Dept: FAMILY MEDICINE | Facility: CLINIC | Age: 38
End: 2018-10-03

## 2018-10-03 NOTE — TELEPHONE ENCOUNTER
Spoke with pt, reviewed note from CW, pt acknowledged understanding and agrees with plan.    Noelle Chavez RN

## 2018-10-03 NOTE — TELEPHONE ENCOUNTER
"CW  Patient sent a [employer] message asking someone to call her regarding headache.    Called patient.  Woke up at 2:30 am today with migraine. Took Acetaminophen 500 mg two tablets. Which helped a bit up to one hour after taking.    5:00 am - took 2 Acetaminophen again  Shortly after that took another one.    Rated migraine as 9 (on scale 1 to 10) when first woke up.    Has been an 8 most of the day.    Some nausea. \"I've been drinking a lot of water and not sure if I'm making myself nauseated\"    Denies vomiting, dizziness, blurred/loss of vision, slurring of speech, numbness/tingling in her extremities.    Please advise.  Thanks, Ondina Parr RN    "

## 2018-10-03 NOTE — TELEPHONE ENCOUNTER
Called, unable to reach her, did not leave ms.  Reviewed neurology recs- they did not want her to use triptans with her h/o CVA, but she is on asa, so could likely try an NSAID for these sx's.  Reviewed with triage NSAID recs, and would still send to ER if she develops any neurological sx's that are NEW/different/worse for her.  Thanks!  CW

## 2018-10-11 ENCOUNTER — TELEPHONE (OUTPATIENT)
Dept: LAB | Facility: CLINIC | Age: 38
End: 2018-10-11

## 2018-10-11 NOTE — TELEPHONE ENCOUNTER
Tried to call pt- no answer, did not leave msg.  Triage, can you call pt?  I'm not sure how this came about?  We don't do lab testing without an appt/TE appt discussing the labs, and lyme is not a 'screening' test - multiple false positives, so need a detailed discussion before considering ordering.  Thanks,  CW

## 2018-10-11 NOTE — TELEPHONE ENCOUNTER
Saqib Reyes,    The patient is coming tomorrow for lab appoint, and there is order in her chart. She is coming for lyme disease screening. Would you please review her chart and place the orders in advance.    Thanks,    Lab

## 2018-10-11 NOTE — TELEPHONE ENCOUNTER
LDVM with patient (per pt demographics, ok to do so) Needs OV or phone visit to screen for Lyme's Reviewed CW's note. Explained we will cancel her lab appointment for tomorrow and will also send a [employer] message with the same information.    Looks as if lab appointment was made via [employer]    If she would like to discuss further she can call back and ask for triage.     Noelle Chavez RN

## 2018-10-23 ENCOUNTER — OFFICE VISIT (OUTPATIENT)
Dept: NEUROLOGY | Facility: CLINIC | Age: 38
End: 2018-10-23
Payer: COMMERCIAL

## 2018-10-23 VITALS
DIASTOLIC BLOOD PRESSURE: 81 MMHG | RESPIRATION RATE: 16 BRPM | WEIGHT: 172 LBS | SYSTOLIC BLOOD PRESSURE: 121 MMHG | HEART RATE: 93 BPM | TEMPERATURE: 97.9 F | HEIGHT: 67 IN | BODY MASS INDEX: 27 KG/M2 | OXYGEN SATURATION: 98 %

## 2018-10-23 DIAGNOSIS — G43.109 MIGRAINE WITH AURA AND WITHOUT STATUS MIGRAINOSUS, NOT INTRACTABLE: Primary | ICD-10-CM

## 2018-10-23 ASSESSMENT — ENCOUNTER SYMPTOMS
SEIZURES: 0
FEVER: 0
NECK PAIN: 1
TROUBLE SWALLOWING: 0
HALLUCINATIONS: 0
HOARSE VOICE: 0
FATIGUE: 1
JOINT SWELLING: 0
SMELL DISTURBANCE: 0
MEMORY LOSS: 0
TREMORS: 0
MYALGIAS: 0
NECK MASS: 0
WEIGHT LOSS: 0
NUMBNESS: 0
EYE WATERING: 0
SINUS CONGESTION: 0
NIGHT SWEATS: 0
DECREASED APPETITE: 0
EYE REDNESS: 0
TASTE DISTURBANCE: 0
DISTURBANCES IN COORDINATION: 0
ARTHRALGIAS: 0
ALTERED TEMPERATURE REGULATION: 0
SINUS PAIN: 0
SPEECH CHANGE: 0
CHILLS: 0
INCREASED ENERGY: 0
MUSCLE WEAKNESS: 1
POLYDIPSIA: 0
LOSS OF CONSCIOUSNESS: 0
DOUBLE VISION: 0
TINGLING: 1
POLYPHAGIA: 0
BACK PAIN: 1
DIZZINESS: 1
EYE PAIN: 0
HEADACHES: 1
WEAKNESS: 0
EYE IRRITATION: 0
WEIGHT GAIN: 1
SORE THROAT: 0
STIFFNESS: 0
PARALYSIS: 0
MUSCLE CRAMPS: 0

## 2018-10-23 ASSESSMENT — PAIN SCALES - GENERAL: PAINLEVEL: NO PAIN (0)

## 2018-10-23 NOTE — LETTER
"10/23/2018       RE: Chloe Foster  [address] [facility] [address]     Dear Colleague,    Thank you for referring your patient, Chloe Foster, to the [facility] NEUROLOGY at [facility]. Please see a copy of my visit note below.        [facility]    Chloe Foster MRN# [id number]   Age: 38 year old YOB: 1980     Requesting physician: Izabel Schaefer     Chief Complaint: Headaches      History of Present Illness:  Chloe Foster is a pleasant 38 year old right handed woman with a past medical histort significant for ischemic stroke in the setting of hypercoagulability of pregnancy, protein S deficiency (possibly a contributant), and PFO (s/p PFO closure in 4/2016) who presents today for intermittent migraines. She is on aspirin daily for stroke prevention and has has PFO closure. Hematology work up was negative. A MTHFR gene mutation was found but they felt if was insignificant. No family history of clotting disorders. Protein S level was low normal at baseline. If she gets pregnant again she could chose to be anticoagulate through the pregnancy again.    Chloe endorses two kinds of headache. She has tension headaches about once per month which are one hour in duration and are resolved with tylenol. Chloe endorses that since her stroke in 2015, she has also had 2-3 migraines per year. She describes them as unilateral and throbbing. She says these headaches are preceded by a visual aura which she describes as \"wavy lines\" in her vision. She sometimes experiences these vision changes without associated headache. During these headaches, she experiences arm and hand tingling which can be unilateral or bilateral, dizziness, difficulty with word finding, heaviness in her limbs, and nausea. These have woken her up in the middle of the night once before. She takes tylenol which minimally " relieves her symptoms. Increased external light and sound stimulation makes her headaches worse.  She reports an allergy to ibuprofen so she does not use this for pain relief. She feels there may be some association with menstruation as the last few migraines have occurred around the time of her menses. She has presented to the ER for these symptoms and had repeat imaging with no evidence of stroke having been found. Dr Ontiveros has seen the patient and feels the symptoms are migraine with aura not ischemia.    She is on magnesium for prophylaxis.    HPI scribed by Yoon Milan PGY4. I have reviewed and edited for details. I confirmed all historical details. Latia Post MD FAAN    Past Medical History:   Diagnosis Date     Acute stress reaction     propranolol helps prior to interviews, stressful situations     Allergic rhinitis, cause unspecified     no meds except prn flonase, tests generally positive, decided against shots     Anxiety state, unspecified     citalopram started in 7/06, stopped after couple wks, felt sluggish/tired     CVA (cerebral vascular accident) (H)     May 1, May 14th 2015     Depressive disorder, not elsewhere classified     dx, but not sure this is correct, weaned off wellbutrin (4/05-6/06, 12/06-1/08), restarted 3/08     Elevated lipoprotein A level      Hirsutism     saw endo, inconclusive, started on spironolactone (helped a little), stopped in 6/06, elevated DHEA- sees new endo 4/08     History of stroke with residual effects      PFO (patent foramen ovale)     Post PFO/ASD closure with 30mm Amplatzer device 4/21/16     Urinary tract infection, site not specified     recurrent, start nitrofur in 5/08, 6mo txt       Patient Active Problem List   Diagnosis     Vitamin D deficiency     CARDIOVASCULAR SCREENING; LDL GOAL LESS THAN 160     Lumbar herniated disc     Hirsutism     Insomnia     IBS (irritable bowel syndrome)     Moderate recurrent major depression (H)     Cerebral  artery occlusion with cerebral infarction     History of  section     PFO with atrial septal aneurysm     Protein S deficiency (H)     Migraine with aura and without status migrainosus, not intractable     Pre-conception counseling     Elevated lipoprotein A level     Social anxiety disorder     MTHFR mutation (H)     Cerebrovascular accident (CVA) due to embolism of right middle cerebral artery (H)     Acute pain of left shoulder       Past Surgical History:   Procedure Laterality Date     ARTHROSCOPY KNEE RT/LT      Rt knee     HC REPAIR OF NASAL SEPTUM       REPAIR PATENT FORAMEN OVALE  2016       Social History     Social History     Marital status:      Spouse name: N/A     Number of children: 0     Years of education: 15     Occupational History           [employer]     Social History Main Topics     Smoking status: Former Smoker     Packs/day: 0.50     Years: 5.00     Types: Cigarettes     Quit date: 2014     Smokeless tobacco: Never Used     Alcohol use 0.0 oz/week      Comment: SOCIAL     Drug use: No     Sexual activity: Yes     Partners: Male     Birth control/ protection: Pull-out method     Other Topics Concern     Caffeine Concern No     1 cup coffee per day     Sleep Concern No     Stress Concern No     Weight Concern No     Special Diet No     Exercise Yes     4-5 days week     Parent/Sibling W/ Cabg, Mi Or Angioplasty Before 65f 55m? No     Social History Narrative    Social Documentation:        Balanced Diet: YES    Calcium intake: 3 per day    Caffeine: 0 per day    Exercise:  type of activity ellipitcal, pilates;  5 times per week    Sunscreen: Yes    Seatbelts:  Yes    Self Breast Exam:  Yes    Physical/Emotional/Sexual Abuse: No    Do you feel safe in your environment? Yes        Cholesterol screen up to date: Yes- checking today 09, non fasting    Eye Exam up to date: Yes    Dental Exam up to date: Yes    Pap smear up to date: Yes:  checking today 09, last was  NIL    Mammogram up to date: Does Not Apply    Dexa Scan up to date: Does Not Apply    Colonoscopy up to date: Does Not Apply    Immunizations up to date: Yes, had one a month ago.    Glucose screen if over 40:  No        Julieth Matthews MA    09       Family History   Problem Relation Age of Onset     Cancer Father      throat     Hypertension Father      Allergies Father      Diabetes Maternal Grandmother      on insulin     C.A.D. Maternal Grandmother      triple bypass in her 60s     Diabetes Paternal Grandmother      C.A.D. Maternal Grandfather      unsure type     Neurologic Disorder Paternal Grandfather       of brain aneurysm in early 60s     Diabetes Maternal Aunt       in her early 40s of DM complications, type 2     Diabetes Maternal Aunt        Current Outpatient Prescriptions   Medication Sig     aspirin EC 81 MG EC tablet Take 81 mg by mouth     LORazepam (ATIVAN) 1 MG tablet Use 1/2-1 tab at night prior to meetings or for panic symptoms     magnesium oxide (MAG-OX) 400 MG tablet Take 400 mg by mouth every evening      Multiple Vitamins-Minerals (MULTIVITAMIN GUMMIES WOMENS PO) Take 2 chew tab by mouth every evening     NONFORMULARY Take 1 tablet by mouth every evening OTC: B-Right Vitamin (Optimized B Complex)     Omega-3 Fatty Acids (FISH OIL PO) Take 1 capsule by mouth daily      VITAMIN D, CHOLECALCIFEROL, PO Take 1,000 Units by mouth every evening (takes 2 x 100 unit capsule)      order for DME Equipment being ordered: ankle support orthotic or short camcast- whichever is more comfortable for the patient (Patient not taking: Reported on 10/23/2018)     propranolol (INDERAL) 10 MG tablet Take 1 tablet by mouth. Take as needed prior to interviews, stressful events (Patient not taking: Reported on 10/23/2018)     sertraline (ZOLOFT) 50 MG tablet Take 1 tablet (50 mg) by mouth daily (Patient not taking: Reported on 10/23/2018)     STATIN NOT  "PRESCRIBED, INTENTIONAL, Statin not prescribed intentionally due to Woman of childbearing age not actively taking birth control (Patient not taking: Reported on 10/23/2018)     No current facility-administered medications for this visit.           Allergies   Allergen Reactions     Nkda [No Known Drug Allergies]        ROS: Please see HPI all other systems review and negative.    Physical Examination:  /81  Pulse 93  Temp 97.9  F (36.6  C) (Oral)  Resp 16  Ht 1.702 m (5' 7\")  Wt 78 kg (172 lb)  SpO2 98%  Breastfeeding? No  BMI 26.94 kg/m2  General Appearance:  The patient is well groomed and cooperative with examination  Neurological Examination  Cognition: oriented x3, attention and recall intact. No aphasia or dysarthria  Cranial Nerves: 2-12 intact. Funduscopic examination is normal with sharp disc margins bilaterally.   Gait: Normal gait     Cardiovascular Examination:  Heart is regular in rate and rhythm to auscultation. No significant murmurs. No carotid bruits. No significant peripheral edema. Pedal pulses are palpable bilaterally.           Impression/Recommendations:    1. History of small ischemic stroke felt to be due to paradoxical emoboli through PFO. Now with PFO closure. Continue Aspirin 81 mg daily. Risk factor modifcation with PCP.    2. Migraine with aura  Discussed prophylaxis with patient she is hesitant to commit to daily medicines but I would suggest if she continues to have these severe complicated auras she start on a beta blocker which could be continued through pregnancy.    Considering PFO now closed and no atherosclerotic disease seen I think she could use a triptan sparingly but she is very hesitant. Instead she can try to treat with Excedrin migraine 2 tablets at onset of symptoms. Next step would typically be Reglan but there is some question of a dystonic reaction to an anti-emetic in her 20s at [facility]. Interestingly it sounds like she presented with a migraine so it seems " like she did have pre-existing migraine difficulties prior to pregnancy.    We will try to get outside records to confirm. If dystonic reaction to Compazine or Reglan we will need to re-address possible low dose triptan use and prophylaxis.    At this time she does not plan further pregnancies but will let me know if this changes.    Latia Post MD FAAN.

## 2018-10-23 NOTE — MR AVS SNAPSHOT
After Visit Summary   10/23/2018    Chloe Foster    MRN: [id number]           Patient Information     Date Of Birth          1980        Visit Information        Provider Department      10/23/2018 10:00 AM Latia Post MD [facility]        Today's Diagnoses     Migraine with aura and without status migrainosus, not intractable    -  1      Care Instructions    Keep headache journal of your symptoms. Try to track how often you have a headache, how long it lasts and what medicines you used. You can also track severity of headache    If you get a migraine try taking 2 tablets of Excedrin migraine (or generic) at onset of migraine.    Investigate what medicine caused the allergic reaction of muscle spasm at [facility]. Send my chart if you need it. compazine or Reglan (metoclopramide)      Lifestyle changes you can make to help your headaches:  1. Try to drink enough water each day (48-70 fluid ounces a day)  2. Limit caffeine intact-one caffeinated beverage a day  3. Eat regular meals  4. Try to get cardiovascular exercise (walking, swimming, biking) 4-5 times a week  5. Try to have a routine sleep schedule going to bed at the same time each night and getting up the same time each morning with enough time to sleep in between  6. Sometimes foods can trigger or make migraines worse. You can try to eliminate them if you think they make symptoms worse: dairy, gluten, nuts (cashews, almonds), artificial sweeteners, artificial colors, high sugar content foods, certain alcohol, chocolate.    To learn more about headaches you can go to the following websites:  https://americanmigrainefoundation.org/  http://www.headaches.org/            Follow-ups after your visit        Follow-up notes from your care team     Return if symptoms worsen or fail to improve.      Your next 10 appointments already scheduled     Oct 30, 2018  9:20 AM CDT   MyChart Physical Therapy Extremity Follow Up Tx  with Bandar Wells PT   [facility] [place] [facility] ([facility]  )    [address]  [place] [address] [phone]           If you have your physician's order in hand, please bring it with you to your appointment            Nov 07, 2018  8:20 AM CST   [facility] Extremity Follow Up Tx with Bandar Wells PT   [facility] [place] [facility] ([facility]  )    [address] [place] [address] [address]  [place] [address] [phone]           If you have your physician's order in hand, please bring it with you to your appointment            Nov 28, 2018  2:00 PM CST   (Arrive by 1:30 PM)   New Visit with Tamanna Lim [facility] [place] ([facility] [place])    [address]  [address]  [place] [address] [phone]            Dec 05, 2018 10:00 AM CST   Return Visit with Tamanna Lim [facility] [place] ([facility] [place])    [address]  [address]  [place] [address] [phone]              Who to contact     Please call your clinic at [phone] to:    Ask questions about your health    Make or cancel appointments    Discuss your medicines    Learn about your test results    Speak to your doctor            Additional Information About Your Visit        MyChart Information     [employer] gives you secure access to your electronic health record. If you see a primary care provider, you can also send messages to your care team and make appointments. If you have questions, please call your primary care clinic.  If you do not have a primary care provider, please call [phone] and they will assist you.      [employer] is an electronic gateway that provides easy, online access to your medical records. With [employer], you can request a clinic appointment, read your test results, renew a prescription or communicate with your care team.     To access your existing account, please contact your  "[facility] or call [phone] for assistance.        Care EveryWhere ID     This is your Care EveryWhere ID. This could be used by other organizations to access your [place] medical records  [id number]        Your Vitals Were     Pulse Temperature Respirations Height Pulse Oximetry Breastfeeding?    93 97.9  F (36.6  C) (Oral) 16 1.702 m (5' 7\") 98% No    BMI (Body Mass Index)                   26.94 kg/m2            Blood Pressure from Last 3 Encounters:   10/23/18 121/81   09/28/18 111/78   08/27/18 115/80    Weight from Last 3 Encounters:   10/23/18 78 kg (172 lb)   09/28/18 71.7 kg (158 lb)   08/15/18 71.7 kg (158 lb)              Today, you had the following     No orders found for display       Primary Care Provider Office Phone # Fax #    Izabel Reyes [phone] [address] [address]        Equal Access to Services     [facility]EE [facility] : Hadii aad ku hadasho Soomaali, waaxda luqadaha, qaybta kaalmada adeegyada, waxay idiin haykiran marquez . So [facility] [phone].    ATENCIÓN: Si habla español, tiene a rankin disposición servicios gratuitos de asistencia lingüística. Llame al [phone].    We comply with applicable federal civil rights laws and [place] laws. We do not discriminate on the basis of race, color, national origin, age, disability, sex, sexual orientation, or gender identity.            Thank you!     Thank you for choosing [facility] NEUROLOGY  for your care. Our goal is always to provide you with excellent care. Hearing back from our patients is one way we can continue to improve our services. Please take a few minutes to complete the written survey that you may receive in the mail after your visit with us. Thank you!             Your Updated Medication List - Protect others around you: Learn how to safely use, store and throw away your medicines at www.disposemymeds.org.          This list is " accurate as of 10/23/18 11:08 AM.  Always use your most recent med list.                   Brand Name Dispense Instructions for use Diagnosis    aspirin 81 MG EC tablet      Take 81 mg by mouth        FISH OIL PO      Take 1 capsule by mouth daily        LORazepam 1 MG tablet    ATIVAN    12 tablet    Use 1/2-1 tab at night prior to meetings or for panic symptoms    Anxiety state, Social anxiety disorder       magnesium oxide 400 MG tablet    MAG-OX     Take 400 mg by mouth every evening        MULTIVITAMIN GUMMIES WOMENS PO      Take 2 chew tab by mouth every evening        NONFORMULARY      Take 1 tablet by mouth every evening OTC: B-Right Vitamin (Optimized B Complex)        order for DME     1 Units    Equipment being ordered: ankle support orthotic or short camcast- whichever is more comfortable for the patient    Ankle injury, right, initial encounter       propranolol 10 MG tablet    INDERAL    20 tablet    Take 1 tablet by mouth. Take as needed prior to interviews, stressful events    Social anxiety disorder       sertraline 50 MG tablet    ZOLOFT    90 tablet    Take 1 tablet (50 mg) by mouth daily    Anxiety state, Social anxiety disorder       STATIN NOT PRESCRIBED (INTENTIONAL)     0 each    Statin not prescribed intentionally due to Woman of childbearing age not actively taking birth control    Cerebral artery occlusion with cerebral infarction (H)       VITAMIN D (CHOLECALCIFEROL) PO      Take 1,000 Units by mouth every evening (takes 2 x 100 unit capsule)

## 2018-10-23 NOTE — PROGRESS NOTES
"    [facility]    Chloe Foster MRN# [id number]   Age: 38 year old YOB: 1980     Requesting physician: Izabel Schaefer     Chief Complaint: Headaches      History of Present Illness:  Chloe Foster is a pleasant 38 year old right handed woman with a past medical histort significant for ischemic stroke in the setting of hypercoagulability of pregnancy, protein S deficiency (possibly a contributant), and PFO (s/p PFO closure in 4/2016) who presents today for intermittent migraines. She is on aspirin daily for stroke prevention and has has PFO closure. Hematology work up was negative. A MTHFR gene mutation was found but they felt if was insignificant. No family history of clotting disorders. Protein S level was low normal at baseline. If she gets pregnant again she could chose to be anticoagulate through the pregnancy again.    Chloe endorses two kinds of headache. She has tension headaches about once per month which are one hour in duration and are resolved with tylenol. Chloe endorses that since her stroke in 2015, she has also had 2-3 migraines per year. She describes them as unilateral and throbbing. She says these headaches are preceded by a visual aura which she describes as \"wavy lines\" in her vision. She sometimes experiences these vision changes without associated headache. During these headaches, she experiences arm and hand tingling which can be unilateral or bilateral, dizziness, difficulty with word finding, heaviness in her limbs, and nausea. These have woken her up in the middle of the night once before. She takes tylenol which minimally relieves her symptoms. Increased external light and sound stimulation makes her headaches worse.  She reports an allergy to ibuprofen so she does not use this for pain relief. She feels there may be some association with menstruation as the last few migraines have occurred around the time of her " menses. She has presented to the ER for these symptoms and had repeat imaging with no evidence of stroke having been found. Dr Ontiveros has seen the patient and feels the symptoms are migraine with aura not ischemia.    She is on magnesium for prophylaxis.    HPI scribed by Yoon Milan PGY4. I have reviewed and edited for details. I confirmed all historical details. Latia Post MD FAAN    Past Medical History:   Diagnosis Date     Acute stress reaction     propranolol helps prior to interviews, stressful situations     Allergic rhinitis, cause unspecified     no meds except prn flonase, tests generally positive, decided against shots     Anxiety state, unspecified     citalopram started in , stopped after couple wks, felt sluggish/tired     CVA (cerebral vascular accident) (H)     May 1, May 14th 2015     Depressive disorder, not elsewhere classified     dx, but not sure this is correct, weaned off wellbutrin (-, -), restarted 3/08     Elevated lipoprotein A level      Hirsutism     saw endo, inconclusive, started on spironolactone (helped a little), stopped in , elevated DHEA- sees new endo      History of stroke with residual effects      PFO (patent foramen ovale)     Post PFO/ASD closure with 30mm Amplatzer device 16     Urinary tract infection, site not specified     recurrent, start nitrofur in , 6mo txt       Patient Active Problem List   Diagnosis     Vitamin D deficiency     CARDIOVASCULAR SCREENING; LDL GOAL LESS THAN 160     Lumbar herniated disc     Hirsutism     Insomnia     IBS (irritable bowel syndrome)     Moderate recurrent major depression (H)     Cerebral artery occlusion with cerebral infarction     History of  section     PFO with atrial septal aneurysm     Protein S deficiency (H)     Migraine with aura and without status migrainosus, not intractable     Pre-conception counseling     Elevated lipoprotein A level     Social anxiety  disorder     MTHFR mutation (H)     Cerebrovascular accident (CVA) due to embolism of right middle cerebral artery (H)     Acute pain of left shoulder       Past Surgical History:   Procedure Laterality Date     ARTHROSCOPY KNEE RT/LT  1995    Rt knee     HC REPAIR OF NASAL SEPTUM  12/05     REPAIR PATENT FORAMEN OVALE  04/2016       Social History     Social History     Marital status:      Spouse name: N/A     Number of children: 0     Years of education: 15     Occupational History           [employer]     Social History Main Topics     Smoking status: Former Smoker     Packs/day: 0.50     Years: 5.00     Types: Cigarettes     Quit date: 4/21/2014     Smokeless tobacco: Never Used     Alcohol use 0.0 oz/week      Comment: SOCIAL     Drug use: No     Sexual activity: Yes     Partners: Male     Birth control/ protection: Pull-out method     Other Topics Concern     Caffeine Concern No     1 cup coffee per day     Sleep Concern No     Stress Concern No     Weight Concern No     Special Diet No     Exercise Yes     4-5 days week     Parent/Sibling W/ Cabg, Mi Or Angioplasty Before 65f 55m? No     Social History Narrative    Social Documentation:        Balanced Diet: YES    Calcium intake: 3 per day    Caffeine: 0 per day    Exercise:  type of activity ellipitcal, pilates;  5 times per week    Sunscreen: Yes    Seatbelts:  Yes    Self Breast Exam:  Yes    Physical/Emotional/Sexual Abuse: No    Do you feel safe in your environment? Yes        Cholesterol screen up to date: Yes- checking today 07/23/09, non fasting    Eye Exam up to date: Yes    Dental Exam up to date: Yes    Pap smear up to date: Yes: checking today 07/23/09, last was 04/08 NIL    Mammogram up to date: Does Not Apply    Dexa Scan up to date: Does Not Apply    Colonoscopy up to date: Does Not Apply    Immunizations up to date: Yes, had one a month ago.    Glucose screen if over 40:  No        Julieth Matthews MA    07/23/09        Family History   Problem Relation Age of Onset     Cancer Father      throat     Hypertension Father      Allergies Father      Diabetes Maternal Grandmother      on insulin     C.A.D. Maternal Grandmother      triple bypass in her 60s     Diabetes Paternal Grandmother      C.A.D. Maternal Grandfather      unsure type     Neurologic Disorder Paternal Grandfather       of brain aneurysm in early 60s     Diabetes Maternal Aunt       in her early 40s of DM complications, type 2     Diabetes Maternal Aunt        Current Outpatient Prescriptions   Medication Sig     aspirin EC 81 MG EC tablet Take 81 mg by mouth     LORazepam (ATIVAN) 1 MG tablet Use 1/2-1 tab at night prior to meetings or for panic symptoms     magnesium oxide (MAG-OX) 400 MG tablet Take 400 mg by mouth every evening      Multiple Vitamins-Minerals (MULTIVITAMIN GUMMIES WOMENS PO) Take 2 chew tab by mouth every evening     NONFORMULARY Take 1 tablet by mouth every evening OTC: B-Right Vitamin (Optimized B Complex)     Omega-3 Fatty Acids (FISH OIL PO) Take 1 capsule by mouth daily      VITAMIN D, CHOLECALCIFEROL, PO Take 1,000 Units by mouth every evening (takes 2 x 100 unit capsule)      order for DME Equipment being ordered: ankle support orthotic or short camcast- whichever is more comfortable for the patient (Patient not taking: Reported on 10/23/2018)     propranolol (INDERAL) 10 MG tablet Take 1 tablet by mouth. Take as needed prior to interviews, stressful events (Patient not taking: Reported on 10/23/2018)     sertraline (ZOLOFT) 50 MG tablet Take 1 tablet (50 mg) by mouth daily (Patient not taking: Reported on 10/23/2018)     STATIN NOT PRESCRIBED, INTENTIONAL, Statin not prescribed intentionally due to Woman of childbearing age not actively taking birth control (Patient not taking: Reported on 10/23/2018)     No current facility-administered medications for this visit.           Allergies   Allergen Reactions     Nkda [No Known  "Drug Allergies]        ROS: Please see HPI all other systems review and negative.    Physical Examination:  /81  Pulse 93  Temp 97.9  F (36.6  C) (Oral)  Resp 16  Ht 1.702 m (5' 7\")  Wt 78 kg (172 lb)  SpO2 98%  Breastfeeding? No  BMI 26.94 kg/m2  General Appearance:  The patient is well groomed and cooperative with examination  Neurological Examination  Cognition: oriented x3, attention and recall intact. No aphasia or dysarthria  Cranial Nerves: 2-12 intact. Funduscopic examination is normal with sharp disc margins bilaterally.   Gait: Normal gait     Cardiovascular Examination:  Heart is regular in rate and rhythm to auscultation. No significant murmurs. No carotid bruits. No significant peripheral edema. Pedal pulses are palpable bilaterally.           Impression/Recommendations:    1. History of small ischemic stroke felt to be due to paradoxical emoboli through PFO. Now with PFO closure. Continue Aspirin 81 mg daily. Risk factor modifcation with PCP.    2. Migraine with aura  Discussed prophylaxis with patient she is hesitant to commit to daily medicines but I would suggest if she continues to have these severe complicated auras she start on a beta blocker which could be continued through pregnancy.    Considering PFO now closed and no atherosclerotic disease seen I think she could use a triptan sparingly but she is very hesitant. Instead she can try to treat with Excedrin migraine 2 tablets at onset of symptoms. Next step would typically be Reglan but there is some question of a dystonic reaction to an anti-emetic in her 20s at [facility]. Interestingly it sounds like she presented with a migraine so it seems like she did have pre-existing migraine difficulties prior to pregnancy.    We will try to get outside records to confirm. If dystonic reaction to Compazine or Reglan we will need to re-address possible low dose triptan use and prophylaxis.    At this time she does not plan further pregnancies " but will let me know if this changes.    Latia Post MD FAAN.      Answers for HPI/ROS submitted by the patient on 10/23/2018   General Symptoms: Yes  Skin Symptoms: No  HENT Symptoms: Yes  EYE SYMPTOMS: Yes  HEART SYMPTOMS: No  LUNG SYMPTOMS: No  INTESTINAL SYMPTOMS: No  URINARY SYMPTOMS: No  GYNECOLOGIC SYMPTOMS: No  BREAST SYMPTOMS: No  SKELETAL SYMPTOMS: Yes  BLOOD SYMPTOMS: No  NERVOUS SYSTEM SYMPTOMS: Yes  MENTAL HEALTH SYMPTOMS: No  Fever: No  Loss of appetite: No  Weight loss: No  Weight gain: Yes  Fatigue: Yes  Night sweats: No  Chills: No  Increased stress: Yes  Excessive hunger: No  Excessive thirst: No  Feeling hot or cold when others believe the temperature is normal: No  Loss of height: No  Post-operative complications: No  Surgical site pain: No  Hallucinations: No  Change in or Loss of Energy: No  Hyperactivity: No  Confusion: No  Ear pain: No  Ear discharge: No  Hearing loss: No  Tinnitus: Yes  Nosebleeds: No  Congestion: No  Sinus pain: No  Trouble swallowing: No   Voice hoarseness: No  Mouth sores: No  Sore throat: No  Tooth pain: No  Gum tenderness: No  Bleeding gums: No  Change in taste: No  Change in sense of smell: No  Dry mouth: No  Hearing aid used: No  Neck lump: No  Eye pain: No  Vision loss: No  Dry eyes: No  Watery eyes: No  Eye bulging: No  Double vision: No  Flashing of lights: No  Spots: Yes  Floaters: Yes  Redness: No  Crossed eyes: No  Tunnel Vision: No  Yellowing of eyes: No  Eye irritation: No  Back pain: Yes  Muscle aches: No  Neck pain: Yes  Swollen joints: No  Joint pain: No  Bone pain: No  Muscle cramps: No  Muscle weakness: Yes  Joint stiffness: No  Bone fracture: No  Trouble with coordination: No  Dizziness or trouble with balance: Yes  Fainting or black-out spells: No  Memory loss: No  Headache: Yes  Seizures: No  Speech problems: No  Tingling: Yes  Tremor: No  Weakness: No  Difficulty walking: No  Paralysis: No  Numbness: No

## 2018-10-23 NOTE — NURSING NOTE
Chief Complaint   Patient presents with     Headache     P NEW HEADACHE CONSULTATION     Clif De La Garza, EMT

## 2018-10-30 ENCOUNTER — THERAPY VISIT (OUTPATIENT)
Dept: PHYSICAL THERAPY | Facility: CLINIC | Age: 38
End: 2018-10-30
Payer: COMMERCIAL

## 2018-10-30 DIAGNOSIS — M25.512 ACUTE PAIN OF LEFT SHOULDER: ICD-10-CM

## 2018-10-30 PROCEDURE — 97110 THERAPEUTIC EXERCISES: CPT | Mod: GP | Performed by: PHYSICAL THERAPIST

## 2018-10-30 PROCEDURE — 97140 MANUAL THERAPY 1/> REGIONS: CPT | Mod: GP | Performed by: PHYSICAL THERAPIST

## 2018-11-07 ENCOUNTER — THERAPY VISIT (OUTPATIENT)
Dept: PHYSICAL THERAPY | Facility: CLINIC | Age: 38
End: 2018-11-07
Payer: COMMERCIAL

## 2018-11-07 DIAGNOSIS — M25.512 ACUTE PAIN OF LEFT SHOULDER: ICD-10-CM

## 2018-11-07 PROCEDURE — 97110 THERAPEUTIC EXERCISES: CPT | Mod: GP | Performed by: PHYSICAL THERAPIST

## 2018-11-07 PROCEDURE — 97140 MANUAL THERAPY 1/> REGIONS: CPT | Mod: GP | Performed by: PHYSICAL THERAPIST

## 2018-11-07 NOTE — PROGRESS NOTES
Subjective:  HPI                    Objective:  System    Physical Exam    General     ROS    Assessment/Plan:    DISCHARGE REPORT    Progress reporting period is from 8/16/2018 to 11/7/2018.       SUBJECTIVE  Subjective: Reports that she is feeling good, symptoms are mild.  Improving the past week.  HEP is going well, as well as her regular routine.  Neck is feeling good, no issues at all the past week.      Current Pain level: 1/10.     Initial Pain level: 6/10.   Changes in function:  Yes (See Goal flowsheet attached for changes in current functional level)  Adverse reaction to treatment or activity: None    OBJECTIVE  Objective: Cervical AROM: normal in all directions, no discomfort at all.  Shoulder AROM: WNL all directions, no pain.  Slight anterior shoulder pain with resisted flexion.  Very slight tenderness and hypertonicity to thoracic paraspinals, but significantly improved since last visit.     ASSESSMENT/PLAN  Updated problem list and treatment plan: Diagnosis 1:  Left Shoulder Pain    Pain -  hot/cold therapy, self management, education, directional preference exercise and home program  Decreased strength - therapeutic exercise and therapeutic activities  Decreased proprioception - neuro re-education and therapeutic activities  Decreased function - therapeutic activities    STG/LTGs have been met or progress has been made towards goals:  Yes (See Goal flow sheet completed today.)  Assessment of Progress: The patient's condition is improving.  Self Management Plans:  Patient has been instructed in a home treatment program.  I have re-evaluated this patient and find that the nature, scope, duration and intensity of the therapy is appropriate for the medical condition of the patient.  Chloe continues to require the following intervention to meet STG and LTG's:  PT intervention is no longer required to meet STG/LTG.    Recommendations:  This patient is ready to be discharged from therapy and continue  their home treatment program.  She will check back in if symptoms worsen or if she does not make progress over the next 2-3 weeks.    Please refer to the daily flowsheet for treatment today, total treatment time and time spent performing 1:1 timed codes.

## 2018-11-28 ENCOUNTER — OFFICE VISIT (OUTPATIENT)
Dept: PSYCHOLOGY | Facility: CLINIC | Age: 38
End: 2018-11-28
Attending: FAMILY MEDICINE
Payer: COMMERCIAL

## 2018-11-28 DIAGNOSIS — F41.9 ANXIETY: Primary | ICD-10-CM

## 2018-11-28 PROCEDURE — 90791 PSYCH DIAGNOSTIC EVALUATION: CPT | Performed by: SOCIAL WORKER

## 2018-11-28 NOTE — MR AVS SNAPSHOT
MRN:[id number]                      After Visit Summary   11/28/2018    Chloe Foster    MRN: [id number]           Visit Information        Provider Department      11/28/2018 2:00 PM Eduardolauren Tamanna, [facility] [place] [facility] Generic      Your next 10 appointments already scheduled     Dec 05, 2018 10:00 AM CST   Return Visit with Tamanna Lim [facility] [place] ([facility] [place])    [address]  [place] [address]   [phone]            Jan 16, 2019  5:00 PM CST   Return Visit with Tamanna Lim [facility] [place] ([facility] [place])    [address]  [place] [address]   [phone]            Jan 24, 2019 11:00 AM CST   Return Visit with Tamanna Lim [facility] [place] ([facility] [place])    [address]  [place] [address]   [phone]              MyChart Information     [facility] gives you secure access to your electronic health record. If you see a primary care provider, you can also send messages to your care team and make appointments. If you have questions, please call your primary care clinic.  If you do not have a primary care provider, please call [phone] and they will assist you.        Care EveryWhere ID     This is your Care EveryWhere ID. This could be used by other organizations to access your [facility] medical records  [id number]        Equal Access to Services     ANAIS ARORA : Hadii aad ku hadasho Soomaali, waaxda luqadaha, qaybta kaalmada adeegyada, paulie marquez . So [facility] [phone].    ATENCIÓN: Si habla español, tiene a rankin disposición servicios gratuitos de asistencia lingüística. Llame al [phone].    We comply with applicable federal civil rights laws and [place] laws. We do not discriminate on the basis of race, color, national origin, age, disability, sex, sexual orientation, or gender  identity.

## 2018-11-30 ASSESSMENT — ANXIETY QUESTIONNAIRES
GAD7 TOTAL SCORE: 7
5. BEING SO RESTLESS THAT IT IS HARD TO SIT STILL: SEVERAL DAYS
3. WORRYING TOO MUCH ABOUT DIFFERENT THINGS: NOT AT ALL
2. NOT BEING ABLE TO STOP OR CONTROL WORRYING: SEVERAL DAYS
7. FEELING AFRAID AS IF SOMETHING AWFUL MIGHT HAPPEN: SEVERAL DAYS
6. BECOMING EASILY ANNOYED OR IRRITABLE: NEARLY EVERY DAY
IF YOU CHECKED OFF ANY PROBLEMS ON THIS QUESTIONNAIRE, HOW DIFFICULT HAVE THESE PROBLEMS MADE IT FOR YOU TO DO YOUR WORK, TAKE CARE OF THINGS AT HOME, OR GET ALONG WITH OTHER PEOPLE: VERY DIFFICULT
1. FEELING NERVOUS, ANXIOUS, OR ON EDGE: SEVERAL DAYS

## 2018-11-30 ASSESSMENT — PATIENT HEALTH QUESTIONNAIRE - PHQ9: 5. POOR APPETITE OR OVEREATING: NOT AT ALL

## 2018-11-30 NOTE — PROGRESS NOTES
Progress Note - Initial Session    Client Name:  Chloe Foster Date: 11/28/18         Service Type: Individual      Session Start Time: 2pm  Session End Time: 250      Session Length: 38 - 52      Session #: 1     Attendees: Client attended alone         Diagnostic Assessment in progress.  Unable to complete documentation at the conclusion of the first session due to time limits.      Mental Status Assessment:  Appearance:   Appropriate   Eye Contact:   Good   Psychomotor Behavior: Normal   Attitude:   Cooperative   Orientation:   All  Speech   Rate / Production: Normal    Volume:  Normal   Mood:    Anxious  Irritable   Affect:    Appropriate   Thought Content:  Clear  Rumination   Thought Form:  Coherent  Logical   Insight:    Good       Safety Issues and Plan for Safety and Risk Management:  Client denies current fears or concerns for personal safety.  Client denies current or recent suicidal ideation or behaviors.  Client denies current or recent homicidal ideation or behaviors.  Client denies current or recent self injurious behavior or ideation.  Client denies other safety concerns.  A safety and risk management plan has not been developed at this time, however client was given the after-hours number / 911 should there be a change in any of these risk factors.  Client reports there are no firearms in the house.      Diagnostic Criteria:   - Excessive anxiety and worry about a number of events or activities (such as work or school performance).    - The person finds it difficult to control the worry.   - Restlessness or feeling keyed up or on edge.    - Being easily fatigued.    - Difficulty concentrating or mind going blank.    - Irritability.    - Muscle tension.    - Sleep disturbance (difficulty falling or staying asleep, or restless unsatisfying sleep).    - The focus of the anxiety and worry is not confined to features of an Axis I disorder.   - The anxiety, worry, or physical  symptoms cause clinically significant distress or impairment in social, occupational, or other important areas of functioning.    - The disturbance is not due to the direct physiological effects of a substance (e.g., a drug of abuse, a medication) or a general medical condition (e.g., hyperthyroidism) and does not occur exclusively during a Mood Disorder, a Psychotic Disorder, or a Pervasive Developmental Disorder.        DSM5 Diagnoses: (Sustained by DSM5 Criteria Listed Above)  Diagnoses: 300.00 (F41.9) Unspecified Anxiety Disorder  Psychosocial & Contextual Factors: occupational stressors; hx of anxiety and health issues.  WHODAS 2.0 (12 item)            This questionnaire asks about difficulties due to health conditions. Health conditions  include  disease or illnesses, other health problems that may be short or long lasting,  injuries, mental health or emotional problems, and problems with alcohol or drugs.                     Think back over the past 30 days and answer these questions, thinking about how much  difficulty you had doing the following activities. For each question, please [place] only  one response.    S1 Standing for long periods such as 30 minutes? None =         1   S2 Taking care of household responsibilities? None =         1   S3 Learning a new task, for example, learning how to get to a new place? None =         1   S4 How much of a problem do you have joining community activities (for example, festivals, Yarsani or other activities) in the same way as anyone else can? None =         1   S5 How much have you been emotionally affected by your health problems? Moderate =   3     In the past 30 days, how much difficulty did you have in:   S6 Concentrating on doing something for ten minutes? None =         1   S7 Walking a long distance such as a kilometer (or equivalent)? None =         1   S8 Washing your whole body? None =         1   S9 Getting dressed? None =         1   S10 Dealing with  people you do not know? None =         1   S11 Maintaining a friendship? None =         1   S12 Your day to day work? None =         1     H1 Overall, in the past 30 days, how many days were these difficulties present? Record number of days 0   H2 In the past 30 days, for how many days were you totally unable to carry out your usual activities or work because of any health condition? Record number of days  0   H3 In the past 30 days, not counting the days that you were totally unable, for how many days did you cut back or reduce your usual activities or work because of any health condition? Record number of days 0       Collateral Reports Completed:  Routed note to PCP      PLAN: (Homework, other):  Client stated that she may follow up for ongoing services with [facility].  Scheduling for future appnts; returns in 1-2 weeks for tx goal planning. Full DA to follow.      Tamanna Lim, [facility]28/18

## 2018-12-05 ENCOUNTER — OFFICE VISIT (OUTPATIENT)
Dept: PSYCHOLOGY | Facility: CLINIC | Age: 38
End: 2018-12-05
Attending: FAMILY MEDICINE
Payer: COMMERCIAL

## 2018-12-05 DIAGNOSIS — F41.9 ANXIETY: Primary | ICD-10-CM

## 2018-12-05 PROCEDURE — 90834 PSYTX W PT 45 MINUTES: CPT | Performed by: SOCIAL WORKER

## 2018-12-05 ASSESSMENT — ANXIETY QUESTIONNAIRES
GAD7 TOTAL SCORE: 7
IF YOU CHECKED OFF ANY PROBLEMS ON THIS QUESTIONNAIRE, HOW DIFFICULT HAVE THESE PROBLEMS MADE IT FOR YOU TO DO YOUR WORK, TAKE CARE OF THINGS AT HOME, OR GET ALONG WITH OTHER PEOPLE: SOMEWHAT DIFFICULT
5. BEING SO RESTLESS THAT IT IS HARD TO SIT STILL: NOT AT ALL
1. FEELING NERVOUS, ANXIOUS, OR ON EDGE: MORE THAN HALF THE DAYS
2. NOT BEING ABLE TO STOP OR CONTROL WORRYING: SEVERAL DAYS
6. BECOMING EASILY ANNOYED OR IRRITABLE: MORE THAN HALF THE DAYS
7. FEELING AFRAID AS IF SOMETHING AWFUL MIGHT HAPPEN: NOT AT ALL
3. WORRYING TOO MUCH ABOUT DIFFERENT THINGS: SEVERAL DAYS

## 2018-12-05 ASSESSMENT — PATIENT HEALTH QUESTIONNAIRE - PHQ9
5. POOR APPETITE OR OVEREATING: SEVERAL DAYS
SUM OF ALL RESPONSES TO PHQ QUESTIONS 1-9: 4

## 2018-12-05 NOTE — MR AVS SNAPSHOT
MRN:[id number]                      After Visit Summary   12/5/2018    Chloe Foster    MRN: [id number]           Visit Information        Provider Department      12/5/2018 10:00 AM Eduardolauren Tamanna, [facility] [place] [facility] Generic      Your next 10 appointments already scheduled     Dec 12, 2018  2:00 PM CST   Return Visit with Tamanna Lim [facility] [place] ([facility] [place])    [address]  [place] [address]   [phone]            Jan 16, 2019  5:00 PM CST   Return Visit with Tamanna Lim [facility] [place] ([facility] [place])    [address]  [place] [address]   [phone]            Jan 24, 2019 11:00 AM CST   Return Visit with Tamanna Lim [facility] [place] ([facility] [place])    [address]  [place] [address]   [phone]              MyChart Information     [employer] gives you secure access to your electronic health record. If you see a primary care provider, you can also send messages to your care team and make appointments. If you have questions, please call your primary care clinic.  If you do not have a primary care provider, please call [phone] and they will assist you.        Care EveryWhere ID     This is your Care EveryWhere ID. This could be used by other organizations to access your [facility] medical records  [id number]        Equal Access to Services     ANAIS ARORA : Hadii aad ku hadasho Soomaali, waaxda luqadaha, qaybta kaalmada adeegyada, paulie marquez . So [facility] [phone].    ATENCIÓN: Si habla español, tiene a rankin disposición servicios gratuitos de asistencia lingüística. Llame al [phone].    We comply with applicable federal civil rights laws and [place] laws. We do not discriminate on the basis of race, color, national origin, age, disability, sex, sexual orientation, or gender identity.

## 2018-12-05 NOTE — PROGRESS NOTES
Progress Note    Client Name: Chloe Foster  Date: 12/5/2018           Service Type: Individual      Session Start Time: 10am  Session End Time: 1045      Session Length: 45     Session #: 2     Attendees: Client attended alone    Treatment Plan Last Reviewed: today  PHQ-9 / AYLA-7 : 4;7     DATA      Progress Since Last Session (Related to Symptoms / Goals / Homework):   Symptoms: No change stable    Homework: Completed in session      Episode of Care Goals: Minimal progress - PREPARATION (Decided to change - considering how); Intervened by negotiating a change plan and determining options / strategies for behavior change, identifying triggers, exploring social supports, and working towards setting a date to begin behavior change     Current / Ongoing Stressors and Concerns:   Discussing areas for treatment goal planning.     Treatment Objective(s) Addressed in This Session:   Improve management of anxiety and triggers.  Maintain good self care.     Intervention:   CBT with mindfulness based components; relational/solution oriented.        ASSESSMENT: Current Emotional / Mental Status (status of significant symptoms):   Risk status (Self / Other harm or suicidal ideation)   Client denies current fears or concerns for personal safety.   Client denies current or recent suicidal ideation or behaviors.   Client denies current or recent homicidal ideation or behaviors.   Client denies current or recent self injurious behavior or ideation.   Client denies other safety concerns.   Client Client reports there has been no change in risk factors since their last session.     Client Client reports there has been no change in protective factors since their last session.     A safety and risk management plan has not been developed at this time, however client was given the after-hours number / 911 should there be a change in any of these risk  factors.     Appearance:   Appropriate    Eye Contact:   Good    Psychomotor Behavior: Normal    Attitude:   Cooperative    Orientation:   All   Speech    Rate / Production: Normal     Volume:  Normal    Mood:    Anxious  Irritable    Affect:    Appropriate    Thought Content:  Clear  Rumination    Thought Form:  Coherent  Logical    Insight:    Good      Medication Review:   No current psychiatric medications prescribed     Medication Compliance:   NA     Changes in Health Issues:   Yes: no changes but has a history of stroke and visual migraines.     Chemical Use Review:   Substance Use: Chemical use reviewed, no active concerns identified      Tobacco Use: No current tobacco use.       Collateral Reports Completed:   Not Applicable    PLAN: (Client Tasks / Therapist Tasks / Other)  Returns in one week; complete tx plan.        Tamanna Lim, [facility]12/5/2018                                                           ________________________________________________________________________    Treatment Plan    Client's Name: Chloe Foster  YOB: 1980    Date: 12/5/2018      DSM-V Diagnoses: 300.00 (F41.9) Unspecified Anxiety Disorder  Psychosocial / Contextual Factors: marital stress; occupational stress and health issues.  WHODAS: see intake    Referral / Collaboration:  Referral to another professional/service is not indicated at this time..    Anticipated number of session or this episode of care: evaluate every 90 days.      MeasurableTreatment Goal(s) related to diagnosis / functional impairment(s)  Goal 1: Client will improve management of anxiety    I will know I've met my goal when Im less thrown off by triggers.      Objective #A (Client Action)    Client will identify health and life fears / thoughts that contribute to feeling anxious. Ct will be able to identify and challenge these 90% of time  Status: new     Intervention(s)  Therapist will teach CBT/mindfulness skills.    Objective  #B  Client will Identify negative self-talk and behaviors: challenge core beliefs, myths, and actions  Improve concentration, focus, and mindfulness in daily activities .  Status: new     Intervention(s)  Therapist will CBT and neuroscience based interventions to managemood regulation..    Objective #C  Client will identify and engage in 2 self care behaviors a week.  Status: new     Intervention(s)  Therapist will encourage and support efforts.        Client has reviewed and agreed to the above plan.      Tamanna Lim, BETO  December 5, 2018

## 2018-12-07 ASSESSMENT — ANXIETY QUESTIONNAIRES: GAD7 TOTAL SCORE: 7

## 2018-12-12 ENCOUNTER — OFFICE VISIT (OUTPATIENT)
Dept: PSYCHOLOGY | Facility: CLINIC | Age: 38
End: 2018-12-12
Payer: COMMERCIAL

## 2018-12-12 DIAGNOSIS — F41.9 ANXIETY: Primary | ICD-10-CM

## 2018-12-12 PROCEDURE — 90834 PSYTX W PT 45 MINUTES: CPT | Performed by: SOCIAL WORKER
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
## 2018-12-13 ENCOUNTER — MYC MEDICAL ADVICE (OUTPATIENT)
Dept: FAMILY MEDICINE | Facility: CLINIC | Age: 38
End: 2018-12-13

## 2018-12-13 DIAGNOSIS — H93.13 TINNITUS, BILATERAL: Primary | ICD-10-CM

## 2018-12-13 ASSESSMENT — PATIENT HEALTH QUESTIONNAIRE - PHQ9
5. POOR APPETITE OR OVEREATING: SEVERAL DAYS
SUM OF ALL RESPONSES TO PHQ QUESTIONS 1-9: 5

## 2018-12-13 ASSESSMENT — ANXIETY QUESTIONNAIRES
7. FEELING AFRAID AS IF SOMETHING AWFUL MIGHT HAPPEN: NOT AT ALL
6. BECOMING EASILY ANNOYED OR IRRITABLE: SEVERAL DAYS
3. WORRYING TOO MUCH ABOUT DIFFERENT THINGS: SEVERAL DAYS
1. FEELING NERVOUS, ANXIOUS, OR ON EDGE: SEVERAL DAYS
GAD7 TOTAL SCORE: 4
5. BEING SO RESTLESS THAT IT IS HARD TO SIT STILL: NOT AT ALL
2. NOT BEING ABLE TO STOP OR CONTROL WORRYING: NOT AT ALL
IF YOU CHECKED OFF ANY PROBLEMS ON THIS QUESTIONNAIRE, HOW DIFFICULT HAVE THESE PROBLEMS MADE IT FOR YOU TO DO YOUR WORK, TAKE CARE OF THINGS AT HOME, OR GET ALONG WITH OTHER PEOPLE: NOT DIFFICULT AT ALL

## 2018-12-13 NOTE — TELEPHONE ENCOUNTER
CW  Please see [facility] message below.  Last OV 9/28/18  Thanks, Ondina Parr RN

## 2018-12-13 NOTE — TELEPHONE ENCOUNTER
Records received and labs and pt history entered into [facility].     Copy made of records and sent to scanning, other copy will be used for visit.   Sent MyChart to pt  Bernadine LOWRY RN

## 2018-12-13 NOTE — PROGRESS NOTES
Progress Note    Client Name: Chloe Foster  Date: 12/12/18         Service Type: Individual      Session Start Time: 2pm  Session End Time: 245      Session Length: 45     Session #: 3     Attendees: Client attended alone    Treatment Plan Last Reviewed: today  PHQ-9 / AYLA-7 : 5;4     DATA      Progress Since Last Session (Related to Symptoms / Goals / Homework):   Symptoms: No change stable    Homework: Completed in session      Episode of Care Goals: Minimal progress - PREPARATION (Decided to change - considering how); Intervened by negotiating a change plan and determining options / strategies for behavior change, identifying triggers, exploring social supports, and working towards setting a date to begin behavior change     Current / Ongoing Stressors and Concerns:   Spouse and ct continue in couples therapy outside of clinic. Spent wkend in [place] with family with positive benefit.    Treatment Objective(s) Addressed in This Session:   Improve management of anxiety and triggers.  Encouraging use of awareness wheel to slow down automatic process and uncover themes in her narratives.    Intervention:   CBT with mindfulness based components; relational/solution oriented.   Also introduced some neuroscience brain/social/emotional development information as an aid to help explore her family of origin.     ASSESSMENT: Current Emotional / Mental Status (status of significant symptoms):   Risk status (Self / Other harm or suicidal ideation)   Client denies current fears or concerns for personal safety.   Client denies current or recent suicidal ideation or behaviors.   Client denies current or recent homicidal ideation or behaviors.   Client denies current or recent self injurious behavior or ideation.   Client denies other safety concerns.   Client Client reports there has been no change in risk factors since their last session.     Client Client reports there has  been no change in protective factors since their last session.     A safety and risk management plan has not been developed at this time, however client was given the after-hours number / 911 should there be a change in any of these risk factors.     Appearance:   Appropriate    Eye Contact:   Good    Psychomotor Behavior: Normal    Attitude:   Cooperative    Orientation:   All   Speech    Rate / Production: Normal     Volume:  Normal    Mood:    Anxious    Affect:    Appropriate    Thought Content:  Clear  Rumination    Thought Form:  Coherent  Logical    Insight:    Good      Medication Review:   No current psychiatric medications prescribed     Medication Compliance:   NA     Changes in Health Issues:   Yes: no changes but has a history of stroke and visual migraines.     Chemical Use Review:   Substance Use: Chemical use reviewed, no active concerns identified      Tobacco Use: No current tobacco use.       Collateral Reports Completed:   Not Applicable    PLAN: (Client Tasks / Therapist Tasks / Other)  Returns in 2-3 weeks. Continue as planned below.      Tamanna Lim, [facility]12/12/2018                                                           ________________________________________________________________________    Treatment Plan    Client's Name: Chloe Foster  YOB: 1980    Date: 12/5/2018      DSM-V Diagnoses: 300.00 (F41.9) Unspecified Anxiety Disorder  Psychosocial / Contextual Factors: marital stress; occupational stress and health issues.  WHODAS: see intake    Referral / Collaboration:  Referral to another professional/service is not indicated at this time..    Anticipated number of session or this episode of care: evaluate every 90 days.      MeasurableTreatment Goal(s) related to diagnosis / functional impairment(s)  Goal 1: Client will improve management of anxiety    I will know I've met my goal when Im less thrown off by triggers.      Objective #A (Client Action)    Client  will identify health and life fears / thoughts that contribute to feeling anxious. Ct will be able to identify and challenge these 90% of time  Status: new     Intervention(s)  Therapist will teach CBT/mindfulness skills.    Objective #B  Client will Identify negative self-talk and behaviors: challenge core beliefs, myths, and actions  Improve concentration, focus, and mindfulness in daily activities .  Status: new     Intervention(s)  Therapist will CBT and neuroscience based interventions to managemood regulation..    Objective #C  Client will identify and engage in 2 self care behaviors a week.  Status: new     Intervention(s)  Therapist will encourage and support efforts.        Client has reviewed and agreed to the above plan.      Tamanna Lim, BETO  December 5, 2018

## 2018-12-14 ASSESSMENT — ANXIETY QUESTIONNAIRES: GAD7 TOTAL SCORE: 4

## 2019-01-07 ENCOUNTER — TELEPHONE (OUTPATIENT)
Dept: FAMILY MEDICINE | Facility: CLINIC | Age: 39
End: 2019-01-07

## 2019-01-07 NOTE — TELEPHONE ENCOUNTER
Message     Triage,  Please call patient and Triage symptoms is scheduled for 1/8/19 with Davina Spence [facility] Unit Coordinator      ----- Message -----   From: Chloe Foster   Sent: 1/6/2019   3:50 PM   To: Up Reception   Subject: Appointment scheduled from [facility]                 Appointment For: Chloe Foster ([id number])   Visit Type: [facility] OFFICE VISIT SHORT (910)      1/8/2019     3:00 PM  15 mins.  Izabel Reyes MD [facility]      Patient Comments:   Had some episodes of sudden racing heart and blurred vision

## 2019-01-07 NOTE — TELEPHONE ENCOUNTER
CW,   FYI:  Pt has had heart racing symptoms for past 6 weeks very intermittently  Happens not even once weekly - on average once every other week    States during these episodes, heart races and vision becomes blurry   Will have hard time reading phone/getting focus on stuff    One day in November HR was 202 at rest  Looked at heart rate on watch yesterday -  at rest   Was in the car headed home with  at the time  When works out, heart rate 150    States she has PFO closure device so wants to make sure nothing serious going on   Dad has Afib issues     Denies chest pain and SOB  Strictly having heart racing and blurry vision  Advised pt keep appt with you for tomorrow to further discuss    Next 5 appointments (look out 90 days)    Jan 08, 2019  3:00 PM CST  Kwesi Jung with Izabel Reyes MD  [facility] [id number] [id number] [phone]        Bernadine LOWRY, RN

## 2019-01-08 ENCOUNTER — OFFICE VISIT (OUTPATIENT)
Dept: FAMILY MEDICINE | Facility: CLINIC | Age: 39
End: 2019-01-08
Payer: COMMERCIAL

## 2019-01-08 VITALS
SYSTOLIC BLOOD PRESSURE: 121 MMHG | WEIGHT: 172 LBS | DIASTOLIC BLOOD PRESSURE: 83 MMHG | BODY MASS INDEX: 27 KG/M2 | OXYGEN SATURATION: 99 % | HEART RATE: 99 BPM | TEMPERATURE: 98.1 F | HEIGHT: 67 IN | RESPIRATION RATE: 16 BRPM

## 2019-01-08 DIAGNOSIS — I63.411 CEREBROVASCULAR ACCIDENT (CVA) DUE TO EMBOLISM OF RIGHT MIDDLE CEREBRAL ARTERY (H): ICD-10-CM

## 2019-01-08 DIAGNOSIS — Q21.12 PFO WITH ATRIAL SEPTAL ANEURYSM: ICD-10-CM

## 2019-01-08 DIAGNOSIS — I25.3 PFO WITH ATRIAL SEPTAL ANEURYSM: ICD-10-CM

## 2019-01-08 DIAGNOSIS — R00.0 TACHYCARDIA: ICD-10-CM

## 2019-01-08 DIAGNOSIS — D68.59 PROTEIN S DEFICIENCY (H): ICD-10-CM

## 2019-01-08 DIAGNOSIS — F41.1 ANXIETY STATE: ICD-10-CM

## 2019-01-08 DIAGNOSIS — R42 DIZZINESS: Primary | ICD-10-CM

## 2019-01-08 DIAGNOSIS — Z15.89 MTHFR MUTATION: ICD-10-CM

## 2019-01-08 DIAGNOSIS — F33.1 MODERATE RECURRENT MAJOR DEPRESSION (H): ICD-10-CM

## 2019-01-08 DIAGNOSIS — F40.10 SOCIAL ANXIETY DISORDER: ICD-10-CM

## 2019-01-08 PROCEDURE — 99214 OFFICE O/P EST MOD 30 MIN: CPT | Performed by: FAMILY MEDICINE

## 2019-01-08 RX ORDER — LORAZEPAM 1 MG/1
TABLET ORAL
Qty: 12 TABLET | Refills: 0 | Status: SHIPPED | OUTPATIENT
Start: 2019-01-08 | End: 2019-04-17

## 2019-01-08 ASSESSMENT — MIFFLIN-ST. JEOR: SCORE: 1487.82

## 2019-01-08 NOTE — NURSING NOTE
"Chief Complaint   Patient presents with     Dizziness     /83   Pulse 99   Temp 98.1  F (36.7  C) (Oral)   Resp 16   Ht 1.702 m (5' 7\")   Wt 78 kg (172 lb)   LMP 12/26/2018   SpO2 99%   BMI 26.94 kg/m   Estimated body mass index is 26.94 kg/m  as calculated from the following:    Height as of this encounter: 1.702 m (5' 7\").    Weight as of this encounter: 78 kg (172 lb).  bp completed using cuff size: regular       Health Maintenance addressed:  NONE    n/a    Abigail Willson MA     "

## 2019-01-08 NOTE — PROGRESS NOTES
SUBJECTIVE:   Chloe Foster is a 38 year old female who presents to clinic today for the following health issues:    Dizziness  Onset: a few months every two to three weeks     Description:   Do you feel faint:  YES  Does it feel like the surroundings (bed, room) are moving: Not sure   Unsteady/off balance: Not sure   Have you passed out or fallen: no     Intensity: moderate    Progression of Symptoms:  same    Accompanying Signs & Symptoms:  Heart palpitations: no   Nausea, vomiting: no   Weakness in arms or legs: no   Fatigue: YES  Vision or speech changes: Yes, has been having blurred vision with episodes  Ringing in ears (Tinnitus): YES  Hearing Loss: no     History:   Head trauma/concussion hx: no   Previous similar symptoms: no   Recent bleeding history: no     Precipitating factors:   Worse with activity or head movement: no   Any new medications (BP?): no   Alcohol/drug abuse/withdrawal: no     Alleviating factors:   Does staying in a fixed position give relief:  no     Therapies Tried and outcome: tried cutting back on caffeine, down to 1/2 cup in morning, no notable change in sx's      Sx's started a few months ago, seem to come every 2-3 weeks, pretty similar sx's each time.  Most recent one was this past Sunday (4 days ago).  They were doing normal morning activities/errands, and while on a ~15 min car ride ( driving), she started feeling lightheaded, like she could faint.  She had been looking at her phone, and during this time, she couldn't read anything on it.  Worst sx's better within a few minutes, then tired, and then felt back to normal within the hour.  She was able to do more errands prior to going out.  Looked at her pulse from her watch, and it shot up to 194 during that time.  Usually 74-98 resting or walking.      No chest pain or SOB with the sx's.  No associated heart flutters with it, but will get that intermittently for yrs, no other sx's.  Doesn't feel like her heart is  racing with the episodes, but sometimes at night when she lies down she can feel like her heart is beating a little harder.    Wasn't feeling anxious prior to the sx's for the most part.  Not feeling panic or doom sx's with them.      Working out every morning- max -160 BPM.    Happened a couple months ago coming back home on a flight.    Father dx with a.fib - dx recently, when he was ~73 yrs.  Happened after a major surgery, while in hospital.    Exercising- ellipitical every morning, paired with some strengthening.  Starting new [employer] class.  No sx's with those activities.      She has a h/o CVA during pregnancy, positive protein S deficiency, MTHFR mutation, and s/p PFO closure since that time.  She had a recent GODWIN (in ) which was normal, though EKG/rhythm testing was not noted in the report.    She was 'cleared for pregnancy' from the specialists, but notes that she is still nervous to get pregnant, and they may start to look at surrogacy options.    MDD- did not discuss in any depth today, but she is not on zoloft any longer and is not interested in going back on.  She would like to continue to be able to use ativan rarely- sometimes uses it for presentations, or to sleep the night prior to presentations.  Social anxiety d/o- #4 tabs/months.  Last rx was from  for #12, so fine for refill.    Problem list and histories reviewed & adjusted, as indicated.  Additional history: as documented      Patient Active Problem List   Diagnosis     Vitamin D deficiency     CARDIOVASCULAR SCREENING; LDL GOAL LESS THAN 160     Lumbar herniated disc     Hirsutism     Insomnia     IBS (irritable bowel syndrome)     Moderate recurrent major depression (H)     Cerebral artery occlusion with cerebral infarction     History of  section     PFO with atrial septal aneurysm     Protein S deficiency (H)     Migraine with aura and without status migrainosus, not intractable     Pre-conception counseling      Elevated lipoprotein A level     Social anxiety disorder     MTHFR mutation (H)     Cerebrovascular accident (CVA) due to embolism of right middle cerebral artery (H)     Past Surgical History:   Procedure Laterality Date     ARTHROSCOPY KNEE RT/LT      Rt knee     HC REPAIR OF NASAL SEPTUM       REPAIR PATENT FORAMEN OVALE  2016       Social History     Tobacco Use     Smoking status: Former Smoker     Packs/day: 0.50     Years: 5.00     Pack years: 2.50     Types: Cigarettes     Last attempt to quit: 2014     Years since quittin.7     Smokeless tobacco: Never Used   Substance Use Topics     Alcohol use: Yes     Alcohol/week: 0.0 oz     Comment: SOCIAL     Family History   Problem Relation Age of Onset     Cancer Father         throat     Hypertension Father      Allergies Father      Diabetes Maternal Grandmother         on insulin     C.A.D. Maternal Grandmother         triple bypass in her 60s     Diabetes Paternal Grandmother      C.A.D. Maternal Grandfather         unsure type     Neurologic Disorder Paternal Grandfather          of brain aneurysm in early 60s     Diabetes Maternal Aunt          in her early 40s of DM complications, type 2     Diabetes Maternal Aunt          Current Outpatient Medications   Medication Sig Dispense Refill     aspirin EC 81 MG EC tablet Take 81 mg by mouth       LORazepam (ATIVAN) 1 MG tablet Use 1/2-1 tab at night prior to meetings or for panic symptoms 12 tablet 0     magnesium oxide (MAG-OX) 400 MG tablet Take 400 mg by mouth every evening        Multiple Vitamins-Minerals (MULTIVITAMIN GUMMIES WOMENS PO) Take 2 chew tab by mouth every evening       NONFORMULARY Take 1 tablet by mouth every evening OTC: B-Right Vitamin (Optimized B Complex)       Omega-3 Fatty Acids (FISH OIL PO) Take 1 capsule by mouth daily        order for DME Equipment being ordered: ankle support orthotic or short camcast- whichever is more comfortable for the patient 1 Units  "0     propranolol (INDERAL) 10 MG tablet Take 1 tablet by mouth. Take as needed prior to interviews, stressful events 20 tablet 1     STATIN NOT PRESCRIBED, INTENTIONAL, Statin not prescribed intentionally due to Woman of childbearing age not actively taking birth control 0 each 0     VITAMIN D, CHOLECALCIFEROL, PO Take 1,000 Units by mouth every evening (takes 2 x 100 unit capsule)        Allergies   Allergen Reactions     Nkda [No Known Drug Allergies]      Recent Labs   Lab Test 05/08/18  1630 12/28/17  0849 12/01/17  1000 09/30/17  1643 12/29/16  0924  11/23/15  0820  04/29/15  2300 03/29/13  1101   LDL  --  129*  --   --  126*  --  108*   < >  --   --    HDL  --  106  --   --  86  --  71   < >  --   --    TRIG  --  56  --   --  61  --  53   < >  --   --    ALT  --   --   --   --  25  --  23  --  26  --    CR 0.59  --  0.73  --  0.70   < > 0.62  --  0.60  --    GFRESTIMATED >90  --  89  --  >90  Non  GFR Calc     < > >90  Non  GFR Calc    --  >90  Non  GFR Calc    --    GFRESTBLACK >90  --  >90  --  >90  African American GFR Calc     < > >90   GFR Calc    --  >90   GFR Calc    --    POTASSIUM 3.6  --  4.2  --  4.1   < > 4.0  --  3.5  --    TSH  --   --   --  0.66  --   --   --   --   --  0.44    < > = values in this interval not displayed.      BP Readings from Last 3 Encounters:   01/08/19 121/83   10/23/18 121/81   09/28/18 111/78    Wt Readings from Last 3 Encounters:   01/08/19 78 kg (172 lb)   10/23/18 78 kg (172 lb)   09/28/18 71.7 kg (158 lb)                  Labs reviewed in EPIC    Reviewed and updated as needed this visit by clinical staff       Reviewed and updated as needed this visit by Provider         ROS:  Constitutional, HEENT, cardiovascular, pulmonary, gi and gu systems are negative, except as otherwise noted.    OBJECTIVE:     /83   Pulse 99   Temp 98.1  F (36.7  C) (Oral)   Resp 16   Ht 1.702 m (5' 7\")  "  Wt 78 kg (172 lb)   LMP 12/26/2018   SpO2 99%   BMI 26.94 kg/m    Body mass index is 26.94 kg/m .  GENERAL APPEARANCE: healthy, alert and no distress     EYES: PERRL, sclera clear     HENT: nose and mouth without ulcers or lesions     NECK: no adenopathy, no asymmetry, masses, or scars and thyroid normal to palpation     RESP: lungs clear to auscultation - no rales, rhonchi or wheezes     CV: regular rates and rhythm, normal S1 S2, no S3 or S4 and no murmur, click or rub      Abdomen: soft, nontender, no HSM or masses and bowel sounds normal     Ext: warm, dry, no edema      Psych: full range affect, normal speech and grooming, judgement and insight intact      Neuro: CN 2-12 grossly nl, fundi WNL bilaterally, UE and LE strength 5/5, nl sensation and DTR's.  Normal gait.       ASSESSMENT/PLAN:       ICD-10-CM    1. Dizziness R42 Zio Patch Holter Adult Pediatric Greater than 48 hrs     CARDIO  ADULT REFERRAL   2. Tachycardia R00.0 Zio Patch Holter Adult Pediatric Greater than 48 hrs     CARDIO  ADULT REFERRAL   3. Anxiety state F41.1 LORazepam (ATIVAN) 1 MG tablet   4. Social anxiety disorder F40.10 LORazepam (ATIVAN) 1 MG tablet   5. Moderate recurrent major depression (H) F33.1    6. PFO with atrial septal aneurysm Q21.1    7. Protein S deficiency (H) D68.59    8. MTHFR mutation (H) E72.12    9. Cerebrovascular accident (CVA) due to embolism of right middle cerebral artery (H) I63.411      Lightheadedness/Tachycardia- episodes q2-3 weeks, lasting for couple minutes (with residual fatigue) for about an hour.  No syncope.  Apple watch shows HR going up to 190-200 during some of these episodes.  No palpitations, CP, SOB during the episodes, no anxiety/panic sx's during these.  Exercising regularly with no cardiac/pulmonary sx's.  H/o PFO, just had normal GODWIN in 8/18.    Rec ziopatch monitor for 3-4 weeks to try and catch an episode, and f/u with cardiology to review results and discuss further  eval/treatment.  In meantime, cut back on caffeine further.  Okay to continue exercise as she has not had sx's with exercise.    Anxiety/MDD- pt has been off zoloft and didn't discuss in any depth, but is not interested in going back on it.  She would like to continue ability to take ativan rarely- takes #4max/month, usually the night prior to presentations.  Cont q6mo f/u for these issues/rx.    H/o CVA/PFO closure/Prot S/MTHFR- cont f/u with specialists.  She discussed interest in surrogate pregnancy as she and her  are nervous for her to get pregnant again with h/o CVA during last pregnancy, and continued odd episodes of vision issues and lightheadedness as above.      Izabel Reyes MD  [facility]

## 2019-01-10 ENCOUNTER — MYC MEDICAL ADVICE (OUTPATIENT)
Dept: FAMILY MEDICINE | Facility: CLINIC | Age: 39
End: 2019-01-10

## 2019-01-10 DIAGNOSIS — R42 DIZZINESS: Primary | ICD-10-CM

## 2019-01-10 DIAGNOSIS — R00.0 TACHYCARDIA: ICD-10-CM

## 2019-01-16 ENCOUNTER — OFFICE VISIT (OUTPATIENT)
Dept: PSYCHOLOGY | Facility: CLINIC | Age: 39
End: 2019-01-16
Payer: COMMERCIAL

## 2019-01-16 DIAGNOSIS — F41.9 ANXIETY: Primary | ICD-10-CM

## 2019-01-16 PROCEDURE — 90834 PSYTX W PT 45 MINUTES: CPT | Performed by: SOCIAL WORKER

## 2019-01-17 ENCOUNTER — HOSPITAL ENCOUNTER (OUTPATIENT)
Dept: CARDIOLOGY | Facility: CLINIC | Age: 39
Discharge: HOME | End: 2019-01-17
Attending: FAMILY MEDICINE | Admitting: FAMILY MEDICINE
Payer: COMMERCIAL

## 2019-01-17 DIAGNOSIS — R00.0 TACHYCARDIA: ICD-10-CM

## 2019-01-17 DIAGNOSIS — R42 DIZZINESS: ICD-10-CM

## 2019-01-17 PROCEDURE — 0296T ZIO PATCH HOLTER ADULT PEDIATRIC GREATER THAN 48 HRS: CPT

## 2019-01-17 PROCEDURE — 0298T ZIO PATCH HOLTER ADULT PEDIATRIC GREATER THAN 48 HRS: CPT | Performed by: INTERNAL MEDICINE

## 2019-01-17 ASSESSMENT — ANXIETY QUESTIONNAIRES
6. BECOMING EASILY ANNOYED OR IRRITABLE: SEVERAL DAYS
3. WORRYING TOO MUCH ABOUT DIFFERENT THINGS: MORE THAN HALF THE DAYS
1. FEELING NERVOUS, ANXIOUS, OR ON EDGE: MORE THAN HALF THE DAYS
7. FEELING AFRAID AS IF SOMETHING AWFUL MIGHT HAPPEN: SEVERAL DAYS
2. NOT BEING ABLE TO STOP OR CONTROL WORRYING: MORE THAN HALF THE DAYS
IF YOU CHECKED OFF ANY PROBLEMS ON THIS QUESTIONNAIRE, HOW DIFFICULT HAVE THESE PROBLEMS MADE IT FOR YOU TO DO YOUR WORK, TAKE CARE OF THINGS AT HOME, OR GET ALONG WITH OTHER PEOPLE: VERY DIFFICULT
GAD7 TOTAL SCORE: 10
5. BEING SO RESTLESS THAT IT IS HARD TO SIT STILL: NOT AT ALL

## 2019-01-17 ASSESSMENT — PATIENT HEALTH QUESTIONNAIRE - PHQ9
5. POOR APPETITE OR OVEREATING: MORE THAN HALF THE DAYS
SUM OF ALL RESPONSES TO PHQ QUESTIONS 1-9: 11

## 2019-01-17 NOTE — PROGRESS NOTES
"                                             Progress Note    Client Name: Chloe Fostre  Date: 1/16/19         Service Type: Individual      Session Start Time: 5pm  Session End Time: 545      Session Length: 45     Session #: 4     Attendees: Client attended alone    Treatment Plan Last Reviewed: today  PHQ-9 / AYLA-7 : 11;10     DATA      Progress Since Last Session (Related to Symptoms / Goals / Homework):   Symptoms: Worsening due to her report of mood connected to concerns about her health outcomes.    Homework: Completed in session      Episode of Care Goals: Minimal progress - PREPARATION (Decided to change - considering how); Intervened by negotiating a change plan and determining options / strategies for behavior change, identifying triggers, exploring social supports, and working towards setting a date to begin behavior change. Reporting having had a \"rough month\" with holiday stress as well as increasing concerns about her health. She will begin wearing an event monitor to track her heart function for the next 30 days. States she has been doing her self care, meditating daily, working out daily and she has stopped drinking etoh.  She experiences sudden increases in her heart rate with light headedness. States all this has increased her anxiety and depression.     Current / Ongoing Stressors and Concerns:   Spouse and ct continue in couples therapy outside of clinic. Spending more time with son which feels good.   Treatment Objective(s) Addressed in This Session:   Improve management of anxiety and triggers.  Reviewing narratives around her health issues. She is slowing beginning to trust her drs again. They missed her 2nd stroke. Worked today on developing an alternative narrative to help her reassure herself as she continues to engage dr's/medical team around sx's and evaluation.    Intervention:   CBT with mindfulness based components; relational/solution oriented.   Also introduced 3 DBT skills " related to anxiety management: TIP skill, Self soothing and body scanning.     ASSESSMENT: Current Emotional / Mental Status (status of significant symptoms):   Risk status (Self / Other harm or suicidal ideation)   Client denies current fears or concerns for personal safety.   Client denies current or recent suicidal ideation or behaviors.   Client denies current or recent homicidal ideation or behaviors.   Client denies current or recent self injurious behavior or ideation.   Client denies other safety concerns.   Client Client reports there has been no change in risk factors since their last session.     Client Client reports there has been no change in protective factors since their last session.     A safety and risk management plan has not been developed at this time, however client was given the after-hours number / 911 should there be a change in any of these risk factors.     Appearance:   Appropriate    Eye Contact:   Good    Psychomotor Behavior: Normal    Attitude:   Cooperative    Orientation:   All   Speech    Rate / Production: Normal     Volume:  Normal    Mood:    Anxious down   Affect:    Appropriate    Thought Content:  Clear  Rumination    Thought Form:  Coherent  Logical    Insight:    Good      Medication Review:   No current psychiatric medications prescribed     Medication Compliance:   NA     Changes in Health Issues:   Yes: no changes but has a history of stroke and visual migraines.     Chemical Use Review:   Substance Use: Chemical use reviewed, no active concerns identified      Tobacco Use: No current tobacco use.       Collateral Reports Completed:   Not Applicable    PLAN: (Client Tasks / Therapist Tasks / Other)  Returns in 2-3 weeks. Continue as planned below.      Tamanna Lim, LICSW 1/16/19                                                           ________________________________________________________________________    Treatment Plan    Client's Name: Chloe ROCKY  Cristian  YOB: 1980    Date: 12/5/2018      DSM-V Diagnoses: 300.00 (F41.9) Unspecified Anxiety Disorder  Psychosocial / Contextual Factors: marital stress; occupational stress and health issues.  WHODAS: see intake    Referral / Collaboration:  Referral to another professional/service is not indicated at this time..    Anticipated number of session or this episode of care: evaluate every 90 days.      MeasurableTreatment Goal(s) related to diagnosis / functional impairment(s)  Goal 1: Client will improve management of anxiety    I will know I've met my goal when Im less thrown off by triggers.      Objective #A (Client Action)    Client will identify health and life fears / thoughts that contribute to feeling anxious. Ct will be able to identify and challenge these 90% of time  Status: new     Intervention(s)  Therapist will teach CBT/mindfulness skills.    Objective #B  Client will Identify negative self-talk and behaviors: challenge core beliefs, myths, and actions  Improve concentration, focus, and mindfulness in daily activities .  Status: new     Intervention(s)  Therapist will CBT and neuroscience based interventions to managemood regulation..    Objective #C  Client will identify and engage in 2 self care behaviors a week.  Status: new     Intervention(s)  Therapist will encourage and support efforts.        Client has reviewed and agreed to the above plan.      Tamanna Lim, BETO  December 5, 2018

## 2019-01-18 ASSESSMENT — ANXIETY QUESTIONNAIRES: GAD7 TOTAL SCORE: 10

## 2019-02-05 ENCOUNTER — OFFICE VISIT (OUTPATIENT)
Dept: FAMILY MEDICINE | Facility: CLINIC | Age: 39
End: 2019-02-05
Payer: COMMERCIAL

## 2019-02-05 VITALS
TEMPERATURE: 97.7 F | HEIGHT: 67 IN | BODY MASS INDEX: 26.54 KG/M2 | OXYGEN SATURATION: 99 % | SYSTOLIC BLOOD PRESSURE: 121 MMHG | WEIGHT: 169.1 LBS | HEART RATE: 89 BPM | DIASTOLIC BLOOD PRESSURE: 84 MMHG

## 2019-02-05 DIAGNOSIS — F41.9 ANXIETY: ICD-10-CM

## 2019-02-05 DIAGNOSIS — R00.2 PALPITATIONS: Primary | ICD-10-CM

## 2019-02-05 DIAGNOSIS — F33.1 MODERATE RECURRENT MAJOR DEPRESSION (H): ICD-10-CM

## 2019-02-05 DIAGNOSIS — F40.10 SOCIAL ANXIETY DISORDER: ICD-10-CM

## 2019-02-05 DIAGNOSIS — G47.00 INSOMNIA, UNSPECIFIED TYPE: ICD-10-CM

## 2019-02-05 PROCEDURE — 99214 OFFICE O/P EST MOD 30 MIN: CPT | Performed by: FAMILY MEDICINE

## 2019-02-05 RX ORDER — TRAZODONE HYDROCHLORIDE 50 MG/1
25 TABLET, FILM COATED ORAL
Qty: 30 TABLET | Refills: 1 | Status: SHIPPED | OUTPATIENT
Start: 2019-02-05 | End: 2019-11-21

## 2019-02-05 ASSESSMENT — ANXIETY QUESTIONNAIRES
2. NOT BEING ABLE TO STOP OR CONTROL WORRYING: SEVERAL DAYS
1. FEELING NERVOUS, ANXIOUS, OR ON EDGE: SEVERAL DAYS
7. FEELING AFRAID AS IF SOMETHING AWFUL MIGHT HAPPEN: SEVERAL DAYS
GAD7 TOTAL SCORE: 6
3. WORRYING TOO MUCH ABOUT DIFFERENT THINGS: SEVERAL DAYS
6. BECOMING EASILY ANNOYED OR IRRITABLE: SEVERAL DAYS
IF YOU CHECKED OFF ANY PROBLEMS ON THIS QUESTIONNAIRE, HOW DIFFICULT HAVE THESE PROBLEMS MADE IT FOR YOU TO DO YOUR WORK, TAKE CARE OF THINGS AT HOME, OR GET ALONG WITH OTHER PEOPLE: SOMEWHAT DIFFICULT
5. BEING SO RESTLESS THAT IT IS HARD TO SIT STILL: NOT AT ALL

## 2019-02-05 ASSESSMENT — MIFFLIN-ST. JEOR: SCORE: 1474.66

## 2019-02-05 ASSESSMENT — PATIENT HEALTH QUESTIONNAIRE - PHQ9
5. POOR APPETITE OR OVEREATING: SEVERAL DAYS
SUM OF ALL RESPONSES TO PHQ QUESTIONS 1-9: 10

## 2019-02-05 NOTE — PROGRESS NOTES
SUBJECTIVE:   Chloe Foster is a 38 year old female who presents to clinic today for the following health issues:    Abnormal Mood Symptoms    Duration: x 6-8 weeks     Description:  Depression: YES  Anxiety: YES- occassionally   Panic attacks: YES- being managed      Accompanying signs and symptoms: see PHQ-9 and AYLA scores    History (similar episodes/previous evaluation): hx of depression/anxiety in the past     Precipitating or alleviating factors: None    Therapies tried and outcome: Ativan (Lorezepam) - currently, Wellbutrin (Bupropion) and Zoloft (Sertraline) - past     Anxiety/palpitations - waking up at 2am, heart racing, heart going up to 140s.  Can't get back to sleep, worried about it.  Not sure how much is anxiety vs heart issues, how much should she be worried.  Has had significant issues with CVA and PFO repair in the past, so hard time discounting them, but also knows it could be her anxiety.  Never used to wake up in the night with sx's.  Has happened a few times in the last few weeks.      Palpitation f/u-  She has done the heart monitor (turned it in last week), and she had a couple episodes or palpitations while it was on, though none of them were as bad as her most concerning episode/s (see last visit notes-- see description from 1/8/19 appt, where she feels faint and dizzy.      Anxiety- she gets something in her head, and can't get rid of the worry.  Thinks overall her health is what is making her anxious.  Not sure if she can trust her body, jeff since the stroke.    Saw therapist 5-6 times, very nice, but she thinks needs different methods.  Maybe EMDR- Penelope had mentioned it as a good option previously.  Looking into other options.    Mood-   Just doesn't have motivation, more than half the days.  May be seasonal, stuck at home more with winter.  Tried new work-out, mult times a week.  Tried stopping wine (stopped completely during the week, and all of Jan), tried different  diet.  Nothing works.    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  Other times, though, she can't muster the energy to work on things.  Motivation is down much more than her usual- concerning her.    Feels like she needs to try something.      Ativan- used 1/2 and another 1/2 and slept for 11 hrs, but only slept ~4 hrs the night prior.  Usually ~1/2 tab in a week.  Sleeps so well when she takes it.  Tried unisom- felt groggy in am (even w/ partial tab dose), or benadryl- sometimes helps her get to sleep.    Daily med trials in past-   Wellbutrin and trazodone worked well for her in the past.  Feels like if she needs a reset in the next couple months.      Problem list and histories reviewed & adjusted, as indicated.  Additional history: as documented        Patient Active Problem List   Diagnosis     Vitamin D deficiency     CARDIOVASCULAR SCREENING; LDL GOAL LESS THAN 160     Lumbar herniated disc     Hirsutism     Insomnia     IBS (irritable bowel syndrome)     Moderate recurrent major depression (H)     Cerebral artery occlusion with cerebral infarction     History of  section     PFO with atrial septal aneurysm     Protein S deficiency (H)     Migraine with aura and without status migrainosus, not intractable     Pre-conception counseling     Elevated lipoprotein A level     Social anxiety disorder     MTHFR mutation (H)     Cerebrovascular accident (CVA) due to embolism of right middle cerebral artery (H)     Past Surgical History:   Procedure Laterality Date     ARTHROSCOPY KNEE RT/LT      Rt knee     HC REPAIR OF NASAL SEPTUM       REPAIR PATENT FORAMEN OVALE  2016       Social History     Tobacco Use     Smoking status: Former Smoker     Packs/day: 0.50     Years: 5.00     Pack years: 2.50     Types: Cigarettes     Last attempt to quit: 2014     Years since quittin.8     Smokeless tobacco: Never Used   Substance Use Topics     Alcohol use: Yes      Alcohol/week: 0.0 oz     Comment: SOCIAL     Family History   Problem Relation Age of Onset     Cancer Father         throat     Hypertension Father      Allergies Father      Diabetes Maternal Grandmother         on insulin     C.A.D. Maternal Grandmother         triple bypass in her 60s     Diabetes Paternal Grandmother      C.A.D. Maternal Grandfather         unsure type     Neurologic Disorder Paternal Grandfather          of brain aneurysm in early 60s     Diabetes Maternal Aunt          in her early 40s of DM complications, type 2     Diabetes Maternal Aunt          Current Outpatient Medications   Medication Sig Dispense Refill     aspirin EC 81 MG EC tablet Take 81 mg by mouth       LORazepam (ATIVAN) 1 MG tablet Use 1/2-1 tab at night prior to meetings or for panic symptoms 12 tablet 0     magnesium oxide (MAG-OX) 400 MG tablet Take 400 mg by mouth every evening        Multiple Vitamins-Minerals (MULTIVITAMIN GUMMIES WOMENS PO) Take 2 chew tab by mouth every evening       NONFORMULARY Take 1 tablet by mouth every evening OTC: B-Right Vitamin (Optimized B Complex)       Omega-3 Fatty Acids (FISH OIL PO) Take 1 capsule by mouth daily        propranolol (INDERAL) 10 MG tablet Take 1 tablet by mouth. Take as needed prior to interviews, stressful events 20 tablet 1     STATIN NOT PRESCRIBED, INTENTIONAL, Statin not prescribed intentionally due to Woman of childbearing age not actively taking birth control 0 each 0     traZODone (DESYREL) 50 MG tablet Take 0.5 tablets (25 mg) by mouth nightly as needed for sleep Can increase to 100mg/night if needed for insomnia. 30 tablet 1     VITAMIN D, CHOLECALCIFEROL, PO Take 1,000 Units by mouth every evening (takes 2 x 100 unit capsule)        order for DME Equipment being ordered: ankle support orthotic or short camcast- whichever is more comfortable for the patient 1 Units 0     Allergies   Allergen Reactions     Nkda [No Known Drug Allergies]      Recent Labs  "  Lab Test 05/08/18  1630 12/28/17  0849 12/01/17  1000 09/30/17  1643 12/29/16  0924  11/23/15  0820  04/29/15  2300 03/29/13  1101   LDL  --  129*  --   --  126*  --  108*   < >  --   --    HDL  --  106  --   --  86  --  71   < >  --   --    TRIG  --  56  --   --  61  --  53   < >  --   --    ALT  --   --   --   --  25  --  23  --  26  --    CR 0.59  --  0.73  --  0.70   < > 0.62  --  0.60  --    GFRESTIMATED >90  --  89  --  >90  Non  GFR Calc     < > >90  Non  GFR Calc    --  >90  Non  GFR Calc    --    GFRESTBLACK >90  --  >90  --  >90  African American GFR Calc     < > >90   GFR Calc    --  >90   GFR Calc    --    POTASSIUM 3.6  --  4.2  --  4.1   < > 4.0  --  3.5  --    TSH  --   --   --  0.66  --   --   --   --   --  0.44    < > = values in this interval not displayed.      BP Readings from Last 3 Encounters:   02/05/19 121/84   01/08/19 121/83   10/23/18 121/81    Wt Readings from Last 3 Encounters:   02/05/19 76.7 kg (169 lb 1.6 oz)   01/08/19 78 kg (172 lb)   10/23/18 78 kg (172 lb)                  Labs reviewed in EPIC    Reviewed and updated as needed this visit by clinical staff  Tobacco  Allergies  Meds  Problems  Med Hx  Surg Hx  Fam Hx       Reviewed and updated as needed this visit by Provider  Tobacco  Allergies  Meds  Problems  Med Hx  Surg Hx  Fam Hx         ROS:  Constitutional, HEENT, cardiovascular, pulmonary, gi and gu systems are negative, except as otherwise noted.    OBJECTIVE:     /84   Pulse 89   Temp 97.7  F (36.5  C) (Oral)   Ht 1.702 m (5' 7\")   Wt 76.7 kg (169 lb 1.6 oz)   LMP 01/23/2019 (Approximate)   SpO2 99%   BMI 26.48 kg/m    Body mass index is 26.48 kg/m .  GENERAL APPEARANCE: healthy, alert and no distress     EYES: PERRL, sclera clear     HENT: nose and mouth without ulcers or lesions     NECK: no adenopathy, no asymmetry, masses, or scars and thyroid normal to " palpation     RESP: lungs clear to auscultation - no rales, rhonchi or wheezes     CV: regular rates and rhythm, normal S1 S2, no S3 or S4 and no murmur, click or rub      Abdomen: soft, nontender, no HSM or masses and bowel sounds normal     Ext: warm, dry, no edema       Psych: full range affect, normal speech and grooming, judgement and insight intact       ASSESSMENT/PLAN:         ICD-10-CM    1. Palpitations R00.2    2. Anxiety F41.9 MENTAL HEALTH REFERRAL  - Adult; Outpatient Treatment; Dialectical Behavior Therapy; [facility] [phone]; We will contact you to schedule the appointment or please call with any questions   3. Insomnia, unspecified type G47.00 traZODone (DESYREL) 50 MG tablet     MENTAL HEALTH REFERRAL  - Adult; Outpatient Treatment; Dialectical Behavior Therapy; [facility] [phone]; We will contact you to schedule the appointment or please call with any questions   4. Moderate recurrent major depression (H) F33.1 MENTAL HEALTH REFERRAL  - Adult; Outpatient Treatment; Dialectical Behavior Therapy; [facility] [phone]; We will contact you to schedule the appointment or please call with any questions   5. Social anxiety disorder F40.10 MENTAL HEALTH REFERRAL  - Adult; Outpatient Treatment; Dialectical Behavior Therapy; [facility] [phone]; We will contact you to schedule the appointment or please call with any questions     Palpitations/Anxiety- worsening palpitations and anxiety lately, and she is not sure how to piece them apart.  If she should be worried about the palpitations or not?  Or if her anxiety is heightening them?  She's having trouble sleeping with the combination, and she's also been feeling more depressed.  She's tried therapy, but it hasn't been a good fit and is wondering about EMDR options.  She's worn the event monitor for a couple weeks and turned it in recently, but it  only caught two of the more minor palpitation episodes, none of the dizzy/faint episodes.  She has f/u with cardiology coming up in a few weeks.      Discussed treatment options for anxiety/depression/insomnia sx's- would be wary of starting wellbutrin now, even though this worked really well for her sx's in the past, as it can have se's of insomnia and palpitations.  Zoloft didn't work as well for her.  Discussed option of trying prozac which is more motivating (one of her more concerning sx's), but we'll wait on that until she talks to the cardiologist.  In the meantime, will have her start trazodone at night, which worked well for her in the past for insomnia.  Will see where getting better sleep and taking to the cardiologist leaves her sx's, and f/u from there (rec appt for f/u if able, can to TE if issues, but would rather in office appt with her multiple issues).  Risks and benefits of medication(s) including potential side effects reviewed with patient.  Questions answered.   Pt agrees with plan and had no further questions.        Izabel Reyes MD  [facility]

## 2019-02-05 NOTE — NURSING NOTE
"Chief Complaint   Patient presents with     MOOD CHANGES     /84   Pulse 89   Temp 97.7  F (36.5  C) (Oral)   Ht 1.702 m (5' 7\")   Wt 76.7 kg (169 lb 1.6 oz)   LMP 01/23/2019 (Approximate)   SpO2 99%   BMI 26.48 kg/m   Estimated body mass index is 26.48 kg/m  as calculated from the following:    Height as of this encounter: 1.702 m (5' 7\").    Weight as of this encounter: 76.7 kg (169 lb 1.6 oz).  Medication Reconciliation: complete      Health Maintenance that is potentially due pending provider review:  NONE    n/a    STACI Jones  "

## 2019-02-06 ASSESSMENT — ANXIETY QUESTIONNAIRES: GAD7 TOTAL SCORE: 6

## 2019-02-14 ASSESSMENT — ANXIETY QUESTIONNAIRES
2. NOT BEING ABLE TO STOP OR CONTROL WORRYING: NOT AT ALL
IF YOU CHECKED OFF ANY PROBLEMS ON THIS QUESTIONNAIRE, HOW DIFFICULT HAVE THESE PROBLEMS MADE IT FOR YOU TO DO YOUR WORK, TAKE CARE OF THINGS AT HOME, OR GET ALONG WITH OTHER PEOPLE: SOMEWHAT DIFFICULT
1. FEELING NERVOUS, ANXIOUS, OR ON EDGE: SEVERAL DAYS
6. BECOMING EASILY ANNOYED OR IRRITABLE: SEVERAL DAYS
3. WORRYING TOO MUCH ABOUT DIFFERENT THINGS: SEVERAL DAYS
7. FEELING AFRAID AS IF SOMETHING AWFUL MIGHT HAPPEN: NOT AT ALL
GAD7 TOTAL SCORE: 4
5. BEING SO RESTLESS THAT IT IS HARD TO SIT STILL: NOT AT ALL

## 2019-02-14 ASSESSMENT — PATIENT HEALTH QUESTIONNAIRE - PHQ9: 5. POOR APPETITE OR OVEREATING: SEVERAL DAYS

## 2019-02-15 ENCOUNTER — OFFICE VISIT (OUTPATIENT)
Dept: PLASTIC SURGERY | Facility: CLINIC | Age: 39
End: 2019-02-15
Payer: COMMERCIAL

## 2019-02-15 DIAGNOSIS — J34.2 DEVIATED NASAL SEPTUM: ICD-10-CM

## 2019-02-15 DIAGNOSIS — J34.89 NASAL OBSTRUCTION: Primary | ICD-10-CM

## 2019-02-15 DIAGNOSIS — J34.829 NASAL VALVE COLLAPSE: ICD-10-CM

## 2019-02-15 ASSESSMENT — ANXIETY QUESTIONNAIRES: GAD7 TOTAL SCORE: 4

## 2019-02-15 ASSESSMENT — PATIENT HEALTH QUESTIONNAIRE - PHQ9: SUM OF ALL RESPONSES TO PHQ QUESTIONS 1-9: 5

## 2019-02-15 NOTE — LETTER
February 15, 2019  Re: Chloe Foster  1980    Dear Dr. Bermeo,    Thank you so much for referring Chloe Foster to the [facility]. I had the pleasure of visiting with Chloe today.     Attached you will find a copy of my note. Please feel free to reach out to me with any questions, [phone].     Facial Plastic and Reconstructive Surgery Consultation    Referring Provider:   Epifanio Bermeo MD  [facility]  [id number]  [place] [address]    HPI:   I had the pleasure of seeing Chloe Foster today in clinic for consultation for nasal obstruction. Chloe Foster is a 38 year old female with a history of persistent nasal obstruction. She had surgery on her nose for breathing 15 years ago, and states no changes were made to her appearance but she doesn't recall the actual surgery. Believes that she had a septoplasty. She recalls having packing taken out of her nose.    She feels limited functionally due to her inability to breathe well through her nose. She often wakes up with a dry mouth and has trouble breathing through her nose at night.   She has to be conscientious about how she positions herself in order to optimize her nasal breathing.     She feels that her nose is getting more crooked each day. She also feels that it has a spade appearance to it;s point when she smiles. She feels that the tip is quite pointy.     Of note, when she was pregnant with her son, she had two strokes within two weeks. She was found to have a PFO and had that closed after delivery. The thought initially was that this was an embolic stroke. She was not placed on anticoagulation and since has been evaluated by neurology with a new possible source of her symptoms being migranous infarction.  She had anesthesia for closing her PFO and had no trouble.        Review Of Systems  ROS: 10 point ROS neg other than the symptoms noted above in the HPI.    Patient  Active Problem List   Diagnosis     Vitamin D deficiency     CARDIOVASCULAR SCREENING; LDL GOAL LESS THAN 160     Lumbar herniated disc     Hirsutism     Insomnia     IBS (irritable bowel syndrome)     Moderate recurrent major depression (H)     Cerebral artery occlusion with cerebral infarction     History of  section     PFO with atrial septal aneurysm     Protein S deficiency (H)     Migraine with aura and without status migrainosus, not intractable     Pre-conception counseling     Elevated lipoprotein A level     Social anxiety disorder     MTHFR mutation (H)     Cerebrovascular accident (CVA) due to embolism of right middle cerebral artery (H)     Past Surgical History:   Procedure Laterality Date     ARTHROSCOPY KNEE RT/LT      Rt knee     HC REPAIR OF NASAL SEPTUM       REPAIR PATENT FORAMEN OVALE  2016     Current Outpatient Medications   Medication Sig Dispense Refill     aspirin EC 81 MG EC tablet Take 81 mg by mouth       LORazepam (ATIVAN) 1 MG tablet Use 1/2-1 tab at night prior to meetings or for panic symptoms 12 tablet 0     magnesium oxide (MAG-OX) 400 MG tablet Take 400 mg by mouth every evening        Multiple Vitamins-Minerals (MULTIVITAMIN GUMMIES WOMENS PO) Take 2 chew tab by mouth every evening       NONFORMULARY Take 1 tablet by mouth every evening OTC: B-Right Vitamin (Optimized B Complex)       Omega-3 Fatty Acids (FISH OIL PO) Take 1 capsule by mouth daily        order for DME Equipment being ordered: ankle support orthotic or short camcast- whichever is more comfortable for the patient 1 Units 0     propranolol (INDERAL) 10 MG tablet Take 1 tablet by mouth. Take as needed prior to interviews, stressful events 20 tablet 1     STATIN NOT PRESCRIBED, INTENTIONAL, Statin not prescribed intentionally due to Woman of childbearing age not actively taking birth control 0 each 0     traZODone (DESYREL) 50 MG tablet Take 0.5 tablets (25 mg) by mouth nightly as needed for sleep  Can increase to 100mg/night if needed for insomnia. 30 tablet 1     VITAMIN D, CHOLECALCIFEROL, PO Take 1,000 Units by mouth every evening (takes 2 x 100 unit capsule)        Nkda [no known drug allergies]  Social History     Socioeconomic History     Marital status:      Spouse name: Not on file     Number of children: 0     Years of education: 15     Highest education level: Not on file   Social Needs     Financial resource strain: Not on file     Food insecurity - worry: Not on file     Food insecurity - inability: Not on file     Transportation needs - medical: Not on file     Transportation needs - non-medical: Not on file   Occupational History     Occupation:      Comment: [employer]   Tobacco Use     Smoking status: Former Smoker     Packs/day: 0.50     Years: 5.00     Pack years: 2.50     Types: Cigarettes     Last attempt to quit: 2014     Years since quittin.8     Smokeless tobacco: Never Used   Substance and Sexual Activity     Alcohol use: Yes     Alcohol/week: 0.0 oz     Comment: SOCIAL     Drug use: No     Sexual activity: Yes     Partners: Male     Birth control/protection: Pull-out method   Other Topics Concern      Service Not Asked     Blood Transfusions Not Asked     Caffeine Concern No     Comment: 1 cup coffee per day     Occupational Exposure Not Asked     Hobby Hazards Not Asked     Sleep Concern No     Stress Concern No     Weight Concern No     Special Diet No     Back Care Not Asked     Exercise Yes     Comment: 4-5 days week     Bike Helmet Not Asked     Seat Belt Not Asked     Self-Exams Not Asked     Parent/sibling w/ CABG, MI or angioplasty before 65F 55M? No   Social History Narrative    Social Documentation:        Balanced Diet: YES    Calcium intake: 3 per day    Caffeine: 0 per day    Exercise:  type of activity ellipitcal, pilates;  5 times per week    Sunscreen: Yes    Seatbelts:  Yes    Self Breast Exam:  Yes     Physical/Emotional/Sexual Abuse: No    Do you feel safe in your environment? Yes        Cholesterol screen up to date: Yes- checking today 09, non fasting    Eye Exam up to date: Yes    Dental Exam up to date: Yes    Pap smear up to date: Yes: checking today 09, last was  NIL    Mammogram up to date: Does Not Apply    Dexa Scan up to date: Does Not Apply    Colonoscopy up to date: Does Not Apply    Immunizations up to date: Yes, had one a month ago.    Glucose screen if over 40:  No        Julieth Matthews MA    09     Family History   Problem Relation Age of Onset     Cancer Father         throat     Hypertension Father      Allergies Father      Diabetes Maternal Grandmother         on insulin     C.A.D. Maternal Grandmother         triple bypass in her 60s     Diabetes Paternal Grandmother      C.A.D. Maternal Grandfather         unsure type     Neurologic Disorder Paternal Grandfather          of brain aneurysm in early 60s     Diabetes Maternal Aunt          in her early 40s of DM complications, type 2     Diabetes Maternal Aunt        PE:  Alert and Oriented, Answering Questions Appropriately  Atraumatic, Normocephalic, Face Symmetric  Skin: Monae 4  Facial Nerve Intact and facial movement symmetric  EOM's, PEERL  Nasal Exam: lower 2/3 of the face with rightward deviation, left nasal vault collapse and right midvault bowing. Lack of tip support with tip collapsible completely, thin skin with some base line bossing at the tip, asymmetric tip points with a lower right lower lateral cartilage intermediate richie, short medial crura with left projecting into the nostril, base view with deviation to the right, anterior rhinoscopy with a right internal nasal valve stenosis from a dorsal septal deviation, the left has internal nasal valve collapse that is corrected with modified armani and leads to great improvement in nasal breathing, hard to tell if there is any septal cartilage  present, no external nasal scar noted  Chin: Normal   Lips/Teeth/Toungue/Gums: Lips intact, Normal Dentition, Occlusion intact, Oral mucosa intact, no lesions/ulcerations/masses, Tongue mobile  Neck: No lymphadenopathy, no thyromegaly, trachea midline  Chest: No wheezing, cyanosis, or stridor  Card: Normal upper extremity pulses and capillary refill, not diaphoretic  Neuro/Psych: CN's 2-12 intact, Moves all extremities, ambulation in intact, positive affect, no notable muscle weakness            IMPRESSION:    Nasal obstruction  Left nasal valve collapse  Right nasal valve stenosis  Deviated nasal septum      PLAN:    Chloe Foster presents today for consultation for nasal obstruction. She is significantly debilitated by the inability to effectively move air through her nose. There are visible structural deficits that would not be improved with medical therapy. The noted deformities on exam require surgical correction. These would not be addressed or corrected with a septoplasty alone, which she already has and continues to be symptomatic. The structural deficits arise in the dorsal L strut supportive aspect of the septum.     She will require straightening of her nose to breathe, in addition to bilateral  grafts, caudal septal extension graft, and due to there thin skin, tip cammouflage graft. Because she has had a septoplasty, we would need to use rib cartilage and her preference if homograft rib. We discussed the risks and benefits associated with surgery.    She does have asymmetry nostril elevation with the levator labii aleque nasi and nasalis muscles. This can be treated with botox. She is quite perceptive about the spade appearance of her tip, part of this is due to the muscle function, but also because she as a bit of alar notching.    I also discussed the challenges of surgery with her thin nasal skin. We discussed the cosmetic component of a tip rhinoplasty to improve the symmetry of the  tip and to improve the tip defining points. She likes her profile and feels that the size of her nose fits her face.       Photodocumentation was obtained.     I spent a total of 45 minutes face-to-face with Chloe Foster during today's office visit.  Over 50% of this time was spent counseling the patient and/or coordinating care regarding nasal obstruction and it's treatment.  See note for details.        Your trust in our practice and care is much appreciated.    Sincerely,  Nikki Schwartz MD

## 2019-02-15 NOTE — LETTER
2/15/2019       RE: Chloe Foster  [address] [facility] [id number]     Dear Colleague,    Thank you for referring your patient, Chloe Foster, to the [facility] at [facility]. Please see a copy of my visit note below.    Facial Plastic and Reconstructive Surgery Consultation    Referring Provider:   Epifanio Bermeo MD  FACIAL PLASTIC SURGERY SPEC  [address]  [place] [address]    HPI:   I had the pleasure of seeing Chloe Foster today in clinic for consultation for nasal obstruction. Chloe Foster is a 38 year old female with a history of persistent nasal obstruction. She had surgery on her nose for breathing 15 years ago, and states no changes were made to her appearance but she doesn't recall the actual surgery. Believes that she had a septoplasty. She recalls having packing taken out of her nose.    She feels limited functionally due to her inability to breathe well through her nose. She often wakes up with a dry mouth and has trouble breathing through her nose at night.   She has to be conscientious about how she positions herself in order to optimize her nasal breathing.     She feels that her nose is getting more crooked each day. She also feels that it has a spade appearance to it;s point when she smiles. She feels that the tip is quite pointy.     Of note, when she was pregnant with her son, she had two strokes within two weeks. She was found to have a PFO and had that closed after delivery. The thought initially was that this was an embolic stroke. She was not placed on anticoagulation and since has been evaluated by neurology with a new possible source of her symptoms being migranous infarction.  She had anesthesia for closing her PFO and had no trouble.        Review Of Systems  ROS: 10 point ROS neg other than the symptoms noted above in the HPI.    Patient Active Problem List   Diagnosis      Vitamin D deficiency     CARDIOVASCULAR SCREENING; LDL GOAL LESS THAN 160     Lumbar herniated disc     Hirsutism     Insomnia     IBS (irritable bowel syndrome)     Moderate recurrent major depression (H)     Cerebral artery occlusion with cerebral infarction     History of  section     PFO with atrial septal aneurysm     Protein S deficiency (H)     Migraine with aura and without status migrainosus, not intractable     Pre-conception counseling     Elevated lipoprotein A level     Social anxiety disorder     MTHFR mutation (H)     Cerebrovascular accident (CVA) due to embolism of right middle cerebral artery (H)     Past Surgical History:   Procedure Laterality Date     ARTHROSCOPY KNEE RT/LT      Rt knee     HC REPAIR OF NASAL SEPTUM       REPAIR PATENT FORAMEN OVALE  2016     Current Outpatient Medications   Medication Sig Dispense Refill     aspirin EC 81 MG EC tablet Take 81 mg by mouth       LORazepam (ATIVAN) 1 MG tablet Use 1/2-1 tab at night prior to meetings or for panic symptoms 12 tablet 0     magnesium oxide (MAG-OX) 400 MG tablet Take 400 mg by mouth every evening        Multiple Vitamins-Minerals (MULTIVITAMIN GUMMIES WOMENS PO) Take 2 chew tab by mouth every evening       NONFORMULARY Take 1 tablet by mouth every evening OTC: B-Right Vitamin (Optimized B Complex)       Omega-3 Fatty Acids (FISH OIL PO) Take 1 capsule by mouth daily        order for DME Equipment being ordered: ankle support orthotic or short camcast- whichever is more comfortable for the patient 1 Units 0     propranolol (INDERAL) 10 MG tablet Take 1 tablet by mouth. Take as needed prior to interviews, stressful events 20 tablet 1     STATIN NOT PRESCRIBED, INTENTIONAL, Statin not prescribed intentionally due to Woman of childbearing age not actively taking birth control 0 each 0     traZODone (DESYREL) 50 MG tablet Take 0.5 tablets (25 mg) by mouth nightly as needed for sleep Can increase to 100mg/night if  needed for insomnia. 30 tablet 1     VITAMIN D, CHOLECALCIFEROL, PO Take 1,000 Units by mouth every evening (takes 2 x 100 unit capsule)        Nkda [no known drug allergies]  Social History     Socioeconomic History     Marital status:      Spouse name: Not on file     Number of children: 0     Years of education: 15     Highest education level: Not on file   Social Needs     Financial resource strain: Not on file     Food insecurity - worry: Not on file     Food insecurity - inability: Not on file     Transportation needs - medical: Not on file     Transportation needs - non-medical: Not on file   Occupational History     Occupation:      Comment: [employer]   Tobacco Use     Smoking status: Former Smoker     Packs/day: 0.50     Years: 5.00     Pack years: 2.50     Types: Cigarettes     Last attempt to quit: 2014     Years since quittin.8     Smokeless tobacco: Never Used   Substance and Sexual Activity     Alcohol use: Yes     Alcohol/week: 0.0 oz     Comment: SOCIAL     Drug use: No     Sexual activity: Yes     Partners: Male     Birth control/protection: Pull-out method   Other Topics Concern      Service Not Asked     Blood Transfusions Not Asked     Caffeine Concern No     Comment: 1 cup coffee per day     Occupational Exposure Not Asked     Hobby Hazards Not Asked     Sleep Concern No     Stress Concern No     Weight Concern No     Special Diet No     Back Care Not Asked     Exercise Yes     Comment: 4-5 days week     Bike Helmet Not Asked     Seat Belt Not Asked     Self-Exams Not Asked     Parent/sibling w/ CABG, MI or angioplasty before 65F 55M? No   Social History Narrative    Social Documentation:        Balanced Diet: YES    Calcium intake: 3 per day    Caffeine: 0 per day    Exercise:  type of activity ellipitcal, pilates;  5 times per week    Sunscreen: Yes    Seatbelts:  Yes    Self Breast Exam:  Yes    Physical/Emotional/Sexual Abuse: No    Do you feel  safe in your environment? Yes        Cholesterol screen up to date: Yes- checking today 09, non fasting    Eye Exam up to date: Yes    Dental Exam up to date: Yes    Pap smear up to date: Yes: checking today 09, last was  NIL    Mammogram up to date: Does Not Apply    Dexa Scan up to date: Does Not Apply    Colonoscopy up to date: Does Not Apply    Immunizations up to date: Yes, had one a month ago.    Glucose screen if over 40:  No        Julieth Matthews MA    09     Family History   Problem Relation Age of Onset     Cancer Father         throat     Hypertension Father      Allergies Father      Diabetes Maternal Grandmother         on insulin     C.A.D. Maternal Grandmother         triple bypass in her 60s     Diabetes Paternal Grandmother      C.A.D. Maternal Grandfather         unsure type     Neurologic Disorder Paternal Grandfather          of brain aneurysm in early 60s     Diabetes Maternal Aunt          in her early 40s of DM complications, type 2     Diabetes Maternal Aunt        PE:  Alert and Oriented, Answering Questions Appropriately  Atraumatic, Normocephalic, Face Symmetric  Skin: Monae 4  Facial Nerve Intact and facial movement symmetric  EOM's, PEERL  Nasal Exam: lower 2/3 of the face with rightward deviation, left nasal vault collapse and right midvault bowing. Lack of tip support with tip collapsible completely, thin skin with some base line bossing at the tip, asymmetric tip points with a lower right lower lateral cartilage intermediate richie, short medial crura with left projecting into the nostril, base view with deviation to the right, anterior rhinoscopy with a right internal nasal valve stenosis from a dorsal septal deviation, the left has internal nasal valve collapse that is corrected with modified armani and leads to great improvement in nasal breathing, hard to tell if there is any septal cartilage present, no external nasal scar noted  Chin: Normal    Lips/Teeth/Toungue/Gums: Lips intact, Normal Dentition, Occlusion intact, Oral mucosa intact, no lesions/ulcerations/masses, Tongue mobile  Neck: No lymphadenopathy, no thyromegaly, trachea midline  Chest: No wheezing, cyanosis, or stridor  Card: Normal upper extremity pulses and capillary refill, not diaphoretic  Neuro/Psych: CN's 2-12 intact, Moves all extremities, ambulation in intact, positive affect, no notable muscle weakness            IMPRESSION:    Nasal obstruction  Left nasal valve collapse  Right nasal valve stenosis  Deviated nasal septum      PLAN:    Chloe Foster presents today for consultation for nasal obstruction. She is significantly debilitated by the inability to effectively move air through her nose. There are visible structural deficits that would not be improved with medical therapy. The noted deformities on exam require surgical correction. These would not be addressed or corrected with a septoplasty alone, which she already has and continues to be symptomatic. The structural deficits arise in the dorsal L strut supportive aspect of the septum.     She will require straightening of her nose to breathe, in addition to bilateral  grafts, caudal septal extension graft, and due to there thin skin, tip cammouflage graft. Because she has had a septoplasty, we would need to use rib cartilage and her preference if homograft rib. We discussed the risks and benefits associated with surgery.    She does have asymmetry nostril elevation with the levator labii aleque nasi and nasalis muscles. This can be treated with botox. She is quite perceptive about the spade appearance of her tip, part of this is due to the muscle function, but also because she as a bit of alar notching.    I also discussed the challenges of surgery with her thin nasal skin. We discussed the cosmetic component of a tip rhinoplasty to improve the symmetry of the tip and to improve the tip defining points. She likes  her profile and feels that the size of her nose fits her face.       Photodocumentation was obtained.     I spent a total of 45 minutes face-to-face with Chloe Foster during today's office visit.  Over 50% of this time was spent counseling the patient and/or coordinating care regarding nasal obstruction and it's treatment.  See note for details.      Again, thank you for allowing me to participate in the care of your patient.      Sincerely,    Nikki Schwartz MD

## 2019-02-15 NOTE — PROGRESS NOTES
Facial Plastic and Reconstructive Surgery Consultation    Referring Provider:   Epifanio Bermeo MD  FACIAL PLASTIC SURGERY SPEC  [address]  [place] [address]    HPI:   I had the pleasure of seeing Chloe Foster today in clinic for consultation for nasal obstruction. Chloe Foster is a 38 year old female with a history of persistent nasal obstruction. She had surgery on her nose for breathing 15 years ago, and states no changes were made to her appearance but she doesn't recall the actual surgery. Believes that she had a septoplasty. She recalls having packing taken out of her nose.    She feels limited functionally due to her inability to breathe well through her nose. She often wakes up with a dry mouth and has trouble breathing through her nose at night.   She has to be conscientious about how she positions herself in order to optimize her nasal breathing.     She feels that her nose is getting more crooked each day. She also feels that it has a spade appearance to it;s point when she smiles. She feels that the tip is quite pointy.     Of note, when she was pregnant with her son, she had two strokes within two weeks. She was found to have a PFO and had that closed after delivery. The thought initially was that this was an embolic stroke. She was not placed on anticoagulation and since has been evaluated by neurology with a new possible source of her symptoms being migranous infarction.  She had anesthesia for closing her PFO and had no trouble.        Review Of Systems  ROS: 10 point ROS neg other than the symptoms noted above in the HPI.    Patient Active Problem List   Diagnosis     Vitamin D deficiency     CARDIOVASCULAR SCREENING; LDL GOAL LESS THAN 160     Lumbar herniated disc     Hirsutism     Insomnia     IBS (irritable bowel syndrome)     Moderate recurrent major depression (H)     Cerebral artery occlusion with cerebral infarction     History of  section      PFO with atrial septal aneurysm     Protein S deficiency (H)     Migraine with aura and without status migrainosus, not intractable     Pre-conception counseling     Elevated lipoprotein A level     Social anxiety disorder     MTHFR mutation (H)     Cerebrovascular accident (CVA) due to embolism of right middle cerebral artery (H)     Past Surgical History:   Procedure Laterality Date     ARTHROSCOPY KNEE RT/LT  1995    Rt knee     HC REPAIR OF NASAL SEPTUM  12/05     REPAIR PATENT FORAMEN OVALE  04/2016     Current Outpatient Medications   Medication Sig Dispense Refill     aspirin EC 81 MG EC tablet Take 81 mg by mouth       LORazepam (ATIVAN) 1 MG tablet Use 1/2-1 tab at night prior to meetings or for panic symptoms 12 tablet 0     magnesium oxide (MAG-OX) 400 MG tablet Take 400 mg by mouth every evening        Multiple Vitamins-Minerals (MULTIVITAMIN GUMMIES WOMENS PO) Take 2 chew tab by mouth every evening       NONFORMULARY Take 1 tablet by mouth every evening OTC: B-Right Vitamin (Optimized B Complex)       Omega-3 Fatty Acids (FISH OIL PO) Take 1 capsule by mouth daily        order for DME Equipment being ordered: ankle support orthotic or short camcast- whichever is more comfortable for the patient 1 Units 0     propranolol (INDERAL) 10 MG tablet Take 1 tablet by mouth. Take as needed prior to interviews, stressful events 20 tablet 1     STATIN NOT PRESCRIBED, INTENTIONAL, Statin not prescribed intentionally due to Woman of childbearing age not actively taking birth control 0 each 0     traZODone (DESYREL) 50 MG tablet Take 0.5 tablets (25 mg) by mouth nightly as needed for sleep Can increase to 100mg/night if needed for insomnia. 30 tablet 1     VITAMIN D, CHOLECALCIFEROL, PO Take 1,000 Units by mouth every evening (takes 2 x 100 unit capsule)        Nkda [no known drug allergies]  Social History     Socioeconomic History     Marital status:      Spouse name: Not on file     Number of children: 0      Years of education: 15     Highest education level: Not on file   Social Needs     Financial resource strain: Not on file     Food insecurity - worry: Not on file     Food insecurity - inability: Not on file     Transportation needs - medical: Not on file     Transportation needs - non-medical: Not on file   Occupational History     Occupation:      Comment: [employer]   Tobacco Use     Smoking status: Former Smoker     Packs/day: 0.50     Years: 5.00     Pack years: 2.50     Types: Cigarettes     Last attempt to quit: 2014     Years since quittin.8     Smokeless tobacco: Never Used   Substance and Sexual Activity     Alcohol use: Yes     Alcohol/week: 0.0 oz     Comment: SOCIAL     Drug use: No     Sexual activity: Yes     Partners: Male     Birth control/protection: Pull-out method   Other Topics Concern      Service Not Asked     Blood Transfusions Not Asked     Caffeine Concern No     Comment: 1 cup coffee per day     Occupational Exposure Not Asked     Hobby Hazards Not Asked     Sleep Concern No     Stress Concern No     Weight Concern No     Special Diet No     Back Care Not Asked     Exercise Yes     Comment: 4-5 days week     Bike Helmet Not Asked     Seat Belt Not Asked     Self-Exams Not Asked     Parent/sibling w/ CABG, MI or angioplasty before 65F 55M? No   Social History Narrative    Social Documentation:        Balanced Diet: YES    Calcium intake: 3 per day    Caffeine: 0 per day    Exercise:  type of activity ellipitcal, pilates;  5 times per week    Sunscreen: Yes    Seatbelts:  Yes    Self Breast Exam:  Yes    Physical/Emotional/Sexual Abuse: No    Do you feel safe in your environment? Yes        Cholesterol screen up to date: Yes- checking today 09, non fasting    Eye Exam up to date: Yes    Dental Exam up to date: Yes    Pap smear up to date: Yes: checking today 09, last was  NIL    Mammogram up to date: Does Not Apply    Dexa Scan up to  date: Does Not Apply    Colonoscopy up to date: Does Not Apply    Immunizations up to date: Yes, had one a month ago.    Glucose screen if over 40:  No        Julieth Matthews MA    09     Family History   Problem Relation Age of Onset     Cancer Father         throat     Hypertension Father      Allergies Father      Diabetes Maternal Grandmother         on insulin     C.A.D. Maternal Grandmother         triple bypass in her 60s     Diabetes Paternal Grandmother      C.A.D. Maternal Grandfather         unsure type     Neurologic Disorder Paternal Grandfather          of brain aneurysm in early 60s     Diabetes Maternal Aunt          in her early 40s of DM complications, type 2     Diabetes Maternal Aunt        PE:  Alert and Oriented, Answering Questions Appropriately  Atraumatic, Normocephalic, Face Symmetric  Skin: Monae 4  Facial Nerve Intact and facial movement symmetric  EOM's, PEERL  Nasal Exam: lower 2/3 of the face with rightward deviation, left nasal vault collapse and right midvault bowing. Lack of tip support with tip collapsible completely, thin skin with some base line bossing at the tip, asymmetric tip points with a lower right lower lateral cartilage intermediate richie, short medial crura with left projecting into the nostril, base view with deviation to the right, anterior rhinoscopy with a right internal nasal valve stenosis from a dorsal septal deviation, the left has internal nasal valve collapse that is corrected with modified armani and leads to great improvement in nasal breathing, hard to tell if there is any septal cartilage present, no external nasal scar noted  Chin: Normal   Lips/Teeth/Toungue/Gums: Lips intact, Normal Dentition, Occlusion intact, Oral mucosa intact, no lesions/ulcerations/masses, Tongue mobile  Neck: No lymphadenopathy, no thyromegaly, trachea midline  Chest: No wheezing, cyanosis, or stridor  Card: Normal upper extremity pulses and capillary refill,  not diaphoretic  Neuro/Psych: CN's 2-12 intact, Moves all extremities, ambulation in intact, positive affect, no notable muscle weakness            IMPRESSION:    Nasal obstruction  Left nasal valve collapse  Right nasal valve stenosis  Deviated nasal septum      PLAN:    Chloe Foster presents today for consultation for nasal obstruction. She is significantly debilitated by the inability to effectively move air through her nose. There are visible structural deficits that would not be improved with medical therapy. The noted deformities on exam require surgical correction. These would not be addressed or corrected with a septoplasty alone, which she already has and continues to be symptomatic. The structural deficits arise in the dorsal L strut supportive aspect of the septum.     She will require straightening of her nose to breathe, in addition to bilateral  grafts, caudal septal extension graft, and due to there thin skin, tip cammouflage graft. Because she has had a septoplasty, we would need to use rib cartilage and her preference if homograft rib. We discussed the risks and benefits associated with surgery.    She does have asymmetry nostril elevation with the levator labii aleque nasi and nasalis muscles. This can be treated with botox. She is quite perceptive about the spade appearance of her tip, part of this is due to the muscle function, but also because she as a bit of alar notching.    I also discussed the challenges of surgery with her thin nasal skin. We discussed the cosmetic component of a tip rhinoplasty to improve the symmetry of the tip and to improve the tip defining points. She likes her profile and feels that the size of her nose fits her face.       Photodocumentation was obtained.     I spent a total of 45 minutes face-to-face with Chloe Foster during today's office visit.  Over 50% of this time was spent counseling the patient and/or coordinating care regarding nasal  obstruction and it's treatment.  See note for details.

## 2019-02-15 NOTE — NURSING NOTE
2D and 3D documentation obtained.      Will obtain PA and then work to schedule surgery.  Pre Op teaching discussed.    Gave cosmetic quote - see media tab.  Quote reviewed in great detail with patient.  Reviewed that the anesthesia and facility fees are based off of time, and in rare cases, there could potentially be additional fees owed if the time exceeds the estimated time discussed.     Angela Watson RN  2/15/2019 3:59 PM

## 2019-02-18 DIAGNOSIS — J34.89 NASAL OBSTRUCTION: ICD-10-CM

## 2019-02-18 DIAGNOSIS — J34.829 NASAL VALVE COLLAPSE: Primary | ICD-10-CM

## 2019-02-18 DIAGNOSIS — J34.2 DEVIATED NASAL SEPTUM: ICD-10-CM

## 2019-02-18 RX ORDER — CEFAZOLIN SODIUM 1 G/50ML
1 INJECTION, SOLUTION INTRAVENOUS SEE ADMIN INSTRUCTIONS
Status: CANCELLED | OUTPATIENT
Start: 2019-02-18

## 2019-02-18 RX ORDER — DEXAMETHASONE SODIUM PHOSPHATE 4 MG/ML
10 INJECTION, SOLUTION INTRA-ARTICULAR; INTRALESIONAL; INTRAMUSCULAR; INTRAVENOUS; SOFT TISSUE ONCE
Status: CANCELLED | OUTPATIENT
Start: 2019-02-18 | End: 2019-02-18

## 2019-02-18 RX ORDER — CEFAZOLIN SODIUM 2 G/50ML
2 SOLUTION INTRAVENOUS
Status: CANCELLED | OUTPATIENT
Start: 2019-02-18

## 2019-02-21 ENCOUNTER — OFFICE VISIT (OUTPATIENT)
Dept: CARDIOLOGY | Facility: CLINIC | Age: 39
End: 2019-02-21
Attending: FAMILY MEDICINE
Payer: COMMERCIAL

## 2019-02-21 VITALS
HEIGHT: 67 IN | SYSTOLIC BLOOD PRESSURE: 110 MMHG | HEART RATE: 86 BPM | BODY MASS INDEX: 26.68 KG/M2 | DIASTOLIC BLOOD PRESSURE: 72 MMHG | WEIGHT: 170 LBS

## 2019-02-21 DIAGNOSIS — R00.2 PALPITATIONS: Primary | ICD-10-CM

## 2019-02-21 PROCEDURE — 99205 OFFICE O/P NEW HI 60 MIN: CPT | Performed by: INTERNAL MEDICINE

## 2019-02-21 PROCEDURE — 93000 ELECTROCARDIOGRAM COMPLETE: CPT | Performed by: INTERNAL MEDICINE

## 2019-02-21 ASSESSMENT — MIFFLIN-ST. JEOR: SCORE: 1478.74

## 2019-02-21 NOTE — LETTER
2/21/2019    Izabel Reyes MD  [address] [facility]  [address]  [facility] [address]    RE: Chloe L Cristian       Dear Colleague,    I had the pleasure of seeing Chloe Foster in the [facility].    Electrophysiology/ Clinic Note         H&P and Plan:     REASON FOR VISIT:  Evaluation of atrial fibrillation, palpitation, syncope or device care.       HISTORY OF PRESENT ILLNESS: Ms. Foster is a pleasant 38-year-old lady with a history of anxiety, protein S deficiency, previous stroke (x2 in May of 2015, during pregnancy) and PFO/ASD closure (April 21, 2016), who presents with palpitation and was referred here for evaluation.    Patient informs that she start having intermittent episodes of palpitations in December.  Describes the episodes as a sensation of racing heartbeats, associated with intermittent lightheadedness which lasts for couple minutes.  She informs that she has one episode every 4-6 weeks and based on her I watch monitor, heart rate can be elevated ranging from 191-202 bpm at times.    She wore a  Ziopatch monitor for 12 days, and unfortunately we were unable to capture the episodes.  Monitor only revealed a short run of SVT (4 beats).     EKG today shows sinus rythm without signs of preexcitation.  GODWIN done July 17, 2018 showed normal LV function (EF around 55-60%) and a well seated ASD closure device without residual shunt.    ASSESSMENT AND PLAN:   1.     Palpitation.  Symptoms are very suggestive of SVT or paroxysmal A. fib.  At this time we have no diagnosis.  We discussed management of atrial arrhythmias.  At this time I recommend a loop recorder implantation for closer monitoring.  Plan to her if we do find A. fib she may need blood thinners for life.  Option would be having iPhone related EKG Jesus, however at this option is unreliable.    Discussion, she would like to go home and discuss with her .  We will finalize plan over the  "phone.    Regarding symptoms control, I favor conservative approach at this time as we do not have a clear diagnosis.    5.  Followup care.  Follow up in clinic with me in 6 months.       Vinny Almeida MD    Physical Exam:  Vitals: /72   Ht 1.702 m (5' 7\")   Wt 77.1 kg (170 lb)   LMP 01/23/2019 (Approximate)   BMI 26.63 kg/m       Constitutional:  AAO x3.  Pt is in NAD.  HEAD: normocephalic.  SKIN: Skin normal color, texture and turgor with no lesions or eruptions.  Eyes: PERRL, EOMI.  ENT:  Supple, normal JVP. No lymphadenopathy or thyroid enlargement.  Chest:  CTAB.  Cardiac:   RRR, normal  S1 and S2.  No murmurs rubs or gallop.   Abdomen:  Normal BS.  Soft, non-tender and non-distended.  No rebound or guarding.    Extremities:  Pedious pulses palpable B/L.  No LE edema noticed.   Neurological: Strength and sensation grossly symmetric and intact throughout.       CURRENT MEDICATIONS:  Current Outpatient Medications   Medication Sig Dispense Refill     aspirin EC 81 MG EC tablet Take 81 mg by mouth       LORazepam (ATIVAN) 1 MG tablet Use 1/2-1 tab at night prior to meetings or for panic symptoms 12 tablet 0     magnesium oxide (MAG-OX) 400 MG tablet Take 400 mg by mouth every evening        Multiple Vitamins-Minerals (MULTIVITAMIN GUMMIES WOMENS PO) Take 2 chew tab by mouth every evening       NONFORMULARY Take 1 tablet by mouth every evening OTC: B-Right Vitamin (Optimized B Complex)       Omega-3 Fatty Acids (FISH OIL PO) Take 1 capsule by mouth daily        order for DME Equipment being ordered: ankle support orthotic or short camcast- whichever is more comfortable for the patient 1 Units 0     propranolol (INDERAL) 10 MG tablet Take 1 tablet by mouth. Take as needed prior to interviews, stressful events 20 tablet 1     STATIN NOT PRESCRIBED, INTENTIONAL, Statin not prescribed intentionally due to Woman of childbearing age not actively taking birth control 0 each 0     traZODone (DESYREL) 50 MG " tablet Take 0.5 tablets (25 mg) by mouth nightly as needed for sleep Can increase to 100mg/night if needed for insomnia. 30 tablet 1     VITAMIN D, CHOLECALCIFEROL, PO Take 1,000 Units by mouth every evening (takes 2 x 100 unit capsule)          ALLERGIES     Allergies   Allergen Reactions     Nkda [No Known Drug Allergies]        PAST MEDICAL HISTORY:  Past Medical History:   Diagnosis Date     Acute stress reaction     propranolol helps prior to interviews, stressful situations     Allergic rhinitis, cause unspecified     no meds except prn flonase, tests generally positive, decided against shots     Anxiety state, unspecified     citalopram started in 7/06, stopped after couple wks, felt sluggish/tired     CVA (cerebral vascular accident) (H)     May 1, May 14th 2015     Depressive disorder, not elsewhere classified     dx, but not sure this is correct, weaned off wellbutrin (4/05-6/06, 12/06-1/08), restarted 3/08     Dizziness      Elevated lipoprotein A level      Hirsutism     saw endo, inconclusive, started on spironolactone (helped a little), stopped in 6/06, elevated DHEA- sees new endo 4/08     History of stroke with residual effects      PFO (patent foramen ovale)     Post PFO/ASD closure with 30mm Amplatzer device 4/21/16     Urinary tract infection, site not specified     recurrent, start nitrofur in 5/08, 6mo txt       PAST SURGICAL HISTORY:  Past Surgical History:   Procedure Laterality Date     ARTHROSCOPY KNEE RT/LT  1995    Rt knee     HC REPAIR OF NASAL SEPTUM  12/05     REPAIR PATENT FORAMEN OVALE  04/21/2016       FAMILY HISTORY:  Family History   Problem Relation Age of Onset     Family History Negative Mother      Cancer Father         throat     Hypertension Father      Allergies Father      Arrhythmia Father      Family History Negative Sister      Family History Negative Brother      Diabetes Maternal Grandmother         on insulin     C.A.D. Maternal Grandmother         triple bypass in  her 60s     Diabetes Paternal Grandmother      SAM. Maternal Grandfather         unsure type     Neurologic Disorder Paternal Grandfather          of brain aneurysm in early 60s     Diabetes Maternal Aunt          in her early 40s of DM complications, type 2     Diabetes Maternal Aunt      Family History Negative Son        SOCIAL HISTORY:  Social History     Socioeconomic History     Marital status:      Spouse name: None     Number of children: 0     Years of education: 15     Highest education level: None   Occupational History     Occupation:      Comment: [employer]   Social Needs     Financial resource strain: None     Food insecurity:     Worry: None     Inability: None     Transportation needs:     Medical: None     Non-medical: None   Tobacco Use     Smoking status: Former Smoker     Packs/day: 0.50     Years: 5.00     Pack years: 2.50     Types: Cigarettes     Last attempt to quit: 2014     Years since quittin.8     Smokeless tobacco: Never Used   Substance and Sexual Activity     Alcohol use: Yes     Alcohol/week: 0.0 oz     Comment: SOCIAL     Drug use: No     Sexual activity: Yes     Partners: Male     Birth control/protection: Pull-out method   Lifestyle     Physical activity:     Days per week: None     Minutes per session: None     Stress: None   Relationships     Social connections:     Talks on phone: None     Gets together: None     Attends Buddhist service: None     Active member of club or organization: None     Attends meetings of clubs or organizations: None     Relationship status: None     Intimate partner violence:     Fear of current or ex partner: None     Emotionally abused: None     Physically abused: None     Forced sexual activity: None   Other Topics Concern      Service Not Asked     Blood Transfusions Not Asked     Caffeine Concern No     Comment: 1 cup coffee per day     Occupational Exposure Not Asked     Hobby Hazards Not  Asked     Sleep Concern No     Stress Concern No     Weight Concern No     Special Diet No     Back Care Not Asked     Exercise Yes     Comment: 4-5 days week     Bike Helmet Not Asked     Seat Belt Not Asked     Self-Exams Not Asked     Parent/sibling w/ CABG, MI or angioplasty before 65F 55M? No   Social History Narrative    Social Documentation:        Balanced Diet: YES    Calcium intake: 3 per day    Caffeine: 0 per day    Exercise:  type of activity ellipitcal, pilates;  5 times per week    Sunscreen: Yes    Seatbelts:  Yes    Self Breast Exam:  Yes    Physical/Emotional/Sexual Abuse: No    Do you feel safe in your environment? Yes        Cholesterol screen up to date: Yes- checking today 07/23/09, non fasting    Eye Exam up to date: Yes    Dental Exam up to date: Yes    Pap smear up to date: Yes: checking today 07/23/09, last was 04/08 NIL    Mammogram up to date: Does Not Apply    Dexa Scan up to date: Does Not Apply    Colonoscopy up to date: Does Not Apply    Immunizations up to date: Yes, had one a month ago.    Glucose screen if over 40:  No        Julieth Matthews MA    07/23/09       Review of Systems:  Skin:  Negative     Eyes:  Negative    ENT:  Negative    Respiratory:  Positive for dyspnea on exertion  Cardiovascular:    palpitations;Positive for;lightheadedness;dizziness;syncope or near-syncope  Gastroenterology: Negative    Genitourinary:  Negative    Musculoskeletal:       Neurologic:  Positive for stroke;headaches;migraine headaches;numbness or tingling of hands  Psychiatric:  Negative    Heme/Lymph/Imm:  Negative    Endocrine:  Negative        Recent Lab Results:  LIPID RESULTS:  Lab Results   Component Value Date    CHOL 246 (H) 12/28/2017     12/28/2017     (H) 12/28/2017    TRIG 56 12/28/2017    CHOLHDLRATIO 2.2 03/30/2012       LIVER ENZYME RESULTS:  Lab Results   Component Value Date    AST 17 12/29/2016    ALT 25 12/29/2016       CBC RESULTS:  Lab Results   Component Value  Date    WBC 7.1 05/08/2018    RBC 4.30 05/08/2018    HGB 12.8 05/08/2018    HCT 38.2 05/08/2018    MCV 89 05/08/2018    MCH 29.8 05/08/2018    MCHC 33.5 05/08/2018    RDW 13.7 05/08/2018     05/08/2018       BMP RESULTS:  Lab Results   Component Value Date     05/08/2018    POTASSIUM 3.6 05/08/2018    CHLORIDE 107 05/08/2018    CO2 23 05/08/2018    ANIONGAP 9 05/08/2018    GLC 95 05/08/2018    BUN 20 05/08/2018    CR 0.59 05/08/2018    GFRESTIMATED >90 05/08/2018    GFRESTBLACK >90 05/08/2018    STEPH 9.4 05/08/2018        A1C RESULTS:  No results found for: A1C    INR RESULTS:  Lab Results   Component Value Date    INR 0.90 04/21/2016    INR 0.87 04/29/2015         ECHOCARDIOGRAM  No results found for this or any previous visit (from the past 8760 hour(s)).      Orders Placed This Encounter   Procedures     EKG 12-lead complete w/read - Clinics (performed today)     No orders of the defined types were placed in this encounter.    There are no discontinued medications.      Encounter Diagnosis   Name Primary?     Palpitations Yes         Thank you for allowing me to participate in the care of your patient.    Sincerely,     Vinny Almeida MD     [facility]

## 2019-02-21 NOTE — PROGRESS NOTES
Electrophysiology/ Clinic Note         H&P and Plan:     REASON FOR VISIT:  Evaluation of atrial fibrillation, palpitation, syncope or device care.       HISTORY OF PRESENT ILLNESS: Ms. Foster is a pleasant 38-year-old lady with a history of anxiety, protein S deficiency, previous stroke (x2 in May of 2015, during pregnancy) and PFO/ASD closure (April 21, 2016), who presents with palpitation and was referred here for evaluation.    Patient informs that she start having intermittent episodes of palpitations in December.  Describes the episodes as a sensation of racing heartbeats, associated with intermittent lightheadedness which lasts for couple minutes.  She informs that she has one episode every 4-6 weeks and based on her I watch monitor, heart rate can be elevated ranging from 191-202 bpm at times.    She wore a  Ziopatch monitor for 12 days, and unfortunately we were unable to capture the episodes.  Monitor only revealed a short run of SVT (4 beats).     EKG today shows sinus rythm without signs of preexcitation.  GODWIN done July 17, 2018 showed normal LV function (EF around 55-60%) and a well seated ASD closure device without residual shunt.    ASSESSMENT AND PLAN:   1.    Palpitation.  Symptoms are very suggestive of SVT or paroxysmal A. fib.  At this time we have no diagnosis.  We discussed management of atrial arrhythmias.  At this time I recommend a loop recorder implantation for closer monitoring.  Plan to her if we do find A. fib she may need blood thinners for life.  Option would be having iPhone related EKG Jesus, however at this option is unreliable.    Discussion, she would like to go home and discuss with her .  We will finalize plan over the phone.    Regarding symptoms control, I favor conservative approach at this time as we do not have a clear diagnosis.    5.  Followup care.  Follow up in clinic with me in 6 months.       Vinny Almeida MD    Physical Exam:  Vitals: /72   Ht 1.702  "m (5' 7\")   Wt 77.1 kg (170 lb)   LMP 01/23/2019 (Approximate)   BMI 26.63 kg/m      Constitutional:  AAO x3.  Pt is in NAD.  HEAD: normocephalic.  SKIN: Skin normal color, texture and turgor with no lesions or eruptions.  Eyes: PERRL, EOMI.  ENT:  Supple, normal JVP. No lymphadenopathy or thyroid enlargement.  Chest:  CTAB.  Cardiac:   RRR, normal  S1 and S2.  No murmurs rubs or gallop.   Abdomen:  Normal BS.  Soft, non-tender and non-distended.  No rebound or guarding.    Extremities:  Pedious pulses palpable B/L.  No LE edema noticed.   Neurological: Strength and sensation grossly symmetric and intact throughout.       CURRENT MEDICATIONS:  Current Outpatient Medications   Medication Sig Dispense Refill     aspirin EC 81 MG EC tablet Take 81 mg by mouth       LORazepam (ATIVAN) 1 MG tablet Use 1/2-1 tab at night prior to meetings or for panic symptoms 12 tablet 0     magnesium oxide (MAG-OX) 400 MG tablet Take 400 mg by mouth every evening        Multiple Vitamins-Minerals (MULTIVITAMIN GUMMIES WOMENS PO) Take 2 chew tab by mouth every evening       NONFORMULARY Take 1 tablet by mouth every evening OTC: B-Right Vitamin (Optimized B Complex)       Omega-3 Fatty Acids (FISH OIL PO) Take 1 capsule by mouth daily        order for DME Equipment being ordered: ankle support orthotic or short camcast- whichever is more comfortable for the patient 1 Units 0     propranolol (INDERAL) 10 MG tablet Take 1 tablet by mouth. Take as needed prior to interviews, stressful events 20 tablet 1     STATIN NOT PRESCRIBED, INTENTIONAL, Statin not prescribed intentionally due to Woman of childbearing age not actively taking birth control 0 each 0     traZODone (DESYREL) 50 MG tablet Take 0.5 tablets (25 mg) by mouth nightly as needed for sleep Can increase to 100mg/night if needed for insomnia. 30 tablet 1     VITAMIN D, CHOLECALCIFEROL, PO Take 1,000 Units by mouth every evening (takes 2 x 100 unit capsule)          ALLERGIES   "   Allergies   Allergen Reactions     Nkda [No Known Drug Allergies]        PAST MEDICAL HISTORY:  Past Medical History:   Diagnosis Date     Acute stress reaction     propranolol helps prior to interviews, stressful situations     Allergic rhinitis, cause unspecified     no meds except prn flonase, tests generally positive, decided against shots     Anxiety state, unspecified     citalopram started in , stopped after couple wks, felt sluggish/tired     CVA (cerebral vascular accident) (H)     May 1, May 14th 2015     Depressive disorder, not elsewhere classified     dx, but not sure this is correct, weaned off wellbutrin (-, -), restarted 3/08     Dizziness      Elevated lipoprotein A level      Hirsutism     saw endo, inconclusive, started on spironolactone (helped a little), stopped in , elevated DHEA- sees new endo      History of stroke with residual effects      PFO (patent foramen ovale)     Post PFO/ASD closure with 30mm Amplatzer device 15     Urinary tract infection, site not specified     recurrent, start nitrofur in , 6mo txt       PAST SURGICAL HISTORY:  Past Surgical History:   Procedure Laterality Date     ARTHROSCOPY KNEE RT/LT      Rt knee     HC REPAIR OF NASAL SEPTUM       REPAIR PATENT FORAMEN OVALE  2016       FAMILY HISTORY:  Family History   Problem Relation Age of Onset     Family History Negative Mother      Cancer Father         throat     Hypertension Father      Allergies Father      Arrhythmia Father      Family History Negative Sister      Family History Negative Brother      Diabetes Maternal Grandmother         on insulin     C.A.D. Maternal Grandmother         triple bypass in her 60s     Diabetes Paternal Grandmother      C.A.D. Maternal Grandfather         unsure type     Neurologic Disorder Paternal Grandfather          of brain aneurysm in early 60s     Diabetes Maternal Aunt          in her early 40s of DM  complications, type 2     Diabetes Maternal Aunt      Family History Negative Son        SOCIAL HISTORY:  Social History     Socioeconomic History     Marital status:      Spouse name: None     Number of children: 0     Years of education: 15     Highest education level: None   Occupational History     Occupation:      Comment: [employer]   Social Needs     Financial resource strain: None     Food insecurity:     Worry: None     Inability: None     Transportation needs:     Medical: None     Non-medical: None   Tobacco Use     Smoking status: Former Smoker     Packs/day: 0.50     Years: 5.00     Pack years: 2.50     Types: Cigarettes     Last attempt to quit: 2014     Years since quittin.8     Smokeless tobacco: Never Used   Substance and Sexual Activity     Alcohol use: Yes     Alcohol/week: 0.0 oz     Comment: SOCIAL     Drug use: No     Sexual activity: Yes     Partners: Male     Birth control/protection: Pull-out method   Lifestyle     Physical activity:     Days per week: None     Minutes per session: None     Stress: None   Relationships     Social connections:     Talks on phone: None     Gets together: None     Attends Mu-ism service: None     Active member of club or organization: None     Attends meetings of clubs or organizations: None     Relationship status: None     Intimate partner violence:     Fear of current or ex partner: None     Emotionally abused: None     Physically abused: None     Forced sexual activity: None   Other Topics Concern      Service Not Asked     Blood Transfusions Not Asked     Caffeine Concern No     Comment: 1 cup coffee per day     Occupational Exposure Not Asked     Hobby Hazards Not Asked     Sleep Concern No     Stress Concern No     Weight Concern No     Special Diet No     Back Care Not Asked     Exercise Yes     Comment: 4-5 days week     Bike Helmet Not Asked     Seat Belt Not Asked     Self-Exams Not Asked      Parent/sibling w/ CABG, MI or angioplasty before 65F 55M? No   Social History Narrative    Social Documentation:        Balanced Diet: YES    Calcium intake: 3 per day    Caffeine: 0 per day    Exercise:  type of activity ellipitcal, pilates;  5 times per week    Sunscreen: Yes    Seatbelts:  Yes    Self Breast Exam:  Yes    Physical/Emotional/Sexual Abuse: No    Do you feel safe in your environment? Yes        Cholesterol screen up to date: Yes- checking today 07/23/09, non fasting    Eye Exam up to date: Yes    Dental Exam up to date: Yes    Pap smear up to date: Yes: checking today 07/23/09, last was 04/08 NIL    Mammogram up to date: Does Not Apply    Dexa Scan up to date: Does Not Apply    Colonoscopy up to date: Does Not Apply    Immunizations up to date: Yes, had one a month ago.    Glucose screen if over 40:  No        Julieth Matthews MA    07/23/09       Review of Systems:  Skin:  Negative     Eyes:  Negative    ENT:  Negative    Respiratory:  Positive for dyspnea on exertion  Cardiovascular:    palpitations;Positive for;lightheadedness;dizziness;syncope or near-syncope  Gastroenterology: Negative    Genitourinary:  Negative    Musculoskeletal:       Neurologic:  Positive for stroke;headaches;migraine headaches;numbness or tingling of hands  Psychiatric:  Negative    Heme/Lymph/Imm:  Negative    Endocrine:  Negative        Recent Lab Results:  LIPID RESULTS:  Lab Results   Component Value Date    CHOL 246 (H) 12/28/2017     12/28/2017     (H) 12/28/2017    TRIG 56 12/28/2017    CHOLHDLRATIO 2.2 03/30/2012       LIVER ENZYME RESULTS:  Lab Results   Component Value Date    AST 17 12/29/2016    ALT 25 12/29/2016       CBC RESULTS:  Lab Results   Component Value Date    WBC 7.1 05/08/2018    RBC 4.30 05/08/2018    HGB 12.8 05/08/2018    HCT 38.2 05/08/2018    MCV 89 05/08/2018    MCH 29.8 05/08/2018    MCHC 33.5 05/08/2018    RDW 13.7 05/08/2018     05/08/2018       BMP RESULTS:  Lab  Results   Component Value Date     05/08/2018    POTASSIUM 3.6 05/08/2018    CHLORIDE 107 05/08/2018    CO2 23 05/08/2018    ANIONGAP 9 05/08/2018    GLC 95 05/08/2018    BUN 20 05/08/2018    CR 0.59 05/08/2018    GFRESTIMATED >90 05/08/2018    GFRESTBLACK >90 05/08/2018    STEPH 9.4 05/08/2018        A1C RESULTS:  No results found for: A1C    INR RESULTS:  Lab Results   Component Value Date    INR 0.90 04/21/2016    INR 0.87 04/29/2015         ECHOCARDIOGRAM  No results found for this or any previous visit (from the past 8760 hour(s)).      Orders Placed This Encounter   Procedures     EKG 12-lead complete w/read - Clinics (performed today)     No orders of the defined types were placed in this encounter.    There are no discontinued medications.      Encounter Diagnosis   Name Primary?     Palpitations Yes         CC  Izabel Reyes MD  [address] [facility]  [address]  [place] [address]

## 2019-02-21 NOTE — LETTER
2/21/2019    Izabel Reyes MD  [address] [facility]  [address]  [facility] [address]    RE: Chloe L Cristian       Dear Colleague,    I had the pleasure of seeing Chloe Foster in the [facility].    Electrophysiology/ Clinic Note         H&P and Plan:     REASON FOR VISIT:  Evaluation of atrial fibrillation, palpitation, syncope or device care.       HISTORY OF PRESENT ILLNESS: Ms. Foster is a pleasant 38-year-old lady with a history of anxiety, protein S deficiency, previous stroke (x2 in May of 2015, during pregnancy) and PFO/ASD closure (April 21, 2016), who presents with palpitation and was referred here for evaluation.    Patient informs that she start having intermittent episodes of palpitations in December.  Describes the episodes as a sensation of racing heartbeats, associated with intermittent lightheadedness which lasts for couple minutes.  She informs that she has one episode every 4-6 weeks and based on her I watch monitor, heart rate can be elevated ranging from 191-202 bpm at times.    She wore a  Ziopatch monitor for 12 days, and unfortunately we were unable to capture the episodes.  Monitor only revealed a short run of SVT (4 beats).     EKG today shows sinus rythm without signs of preexcitation.  GODWIN done July 17, 2018 showed normal LV function (EF around 55-60%) and a well seated ASD closure device without residual shunt.    ASSESSMENT AND PLAN:   1.     Palpitation.  Symptoms are very suggestive of SVT or paroxysmal A. fib.  At this time we have no diagnosis.  We discussed management of atrial arrhythmias.  At this time I recommend a loop recorder implantation for closer monitoring.  Plan to her if we do find A. fib she may need blood thinners for life.  Option would be having iPhone related EKG Jesus, however at this option is unreliable.    Discussion, she would like to go home and discuss with her .  We will finalize plan over the  "phone.    Regarding symptoms control, I favor conservative approach at this time as we do not have a clear diagnosis.    5.  Followup care.  Follow up in clinic with me in 6 months.       Vinny Almeida MD    Physical Exam:  Vitals: /72   Ht 1.702 m (5' 7\")   Wt 77.1 kg (170 lb)   LMP 01/23/2019 (Approximate)   BMI 26.63 kg/m       Constitutional:  AAO x3.  Pt is in NAD.  HEAD: normocephalic.  SKIN: Skin normal color, texture and turgor with no lesions or eruptions.  Eyes: PERRL, EOMI.  ENT:  Supple, normal JVP. No lymphadenopathy or thyroid enlargement.  Chest:  CTAB.  Cardiac:   RRR, normal  S1 and S2.  No murmurs rubs or gallop.   Abdomen:  Normal BS.  Soft, non-tender and non-distended.  No rebound or guarding.    Extremities:  Pedious pulses palpable B/L.  No LE edema noticed.   Neurological: Strength and sensation grossly symmetric and intact throughout.       CURRENT MEDICATIONS:  Current Outpatient Medications   Medication Sig Dispense Refill     aspirin EC 81 MG EC tablet Take 81 mg by mouth       LORazepam (ATIVAN) 1 MG tablet Use 1/2-1 tab at night prior to meetings or for panic symptoms 12 tablet 0     magnesium oxide (MAG-OX) 400 MG tablet Take 400 mg by mouth every evening        Multiple Vitamins-Minerals (MULTIVITAMIN GUMMIES WOMENS PO) Take 2 chew tab by mouth every evening       NONFORMULARY Take 1 tablet by mouth every evening OTC: B-Right Vitamin (Optimized B Complex)       Omega-3 Fatty Acids (FISH OIL PO) Take 1 capsule by mouth daily        order for DME Equipment being ordered: ankle support orthotic or short camcast- whichever is more comfortable for the patient 1 Units 0     propranolol (INDERAL) 10 MG tablet Take 1 tablet by mouth. Take as needed prior to interviews, stressful events 20 tablet 1     STATIN NOT PRESCRIBED, INTENTIONAL, Statin not prescribed intentionally due to Woman of childbearing age not actively taking birth control 0 each 0     traZODone (DESYREL) 50 MG " tablet Take 0.5 tablets (25 mg) by mouth nightly as needed for sleep Can increase to 100mg/night if needed for insomnia. 30 tablet 1     VITAMIN D, CHOLECALCIFEROL, PO Take 1,000 Units by mouth every evening (takes 2 x 100 unit capsule)          ALLERGIES     Allergies   Allergen Reactions     Nkda [No Known Drug Allergies]        PAST MEDICAL HISTORY:  Past Medical History:   Diagnosis Date     Acute stress reaction     propranolol helps prior to interviews, stressful situations     Allergic rhinitis, cause unspecified     no meds except prn flonase, tests generally positive, decided against shots     Anxiety state, unspecified     citalopram started in 7/06, stopped after couple wks, felt sluggish/tired     CVA (cerebral vascular accident) (H)     May 1, May 14th 2015     Depressive disorder, not elsewhere classified     dx, but not sure this is correct, weaned off wellbutrin (4/05-6/06, 12/06-1/08), restarted 3/08     Dizziness      Elevated lipoprotein A level      Hirsutism     saw endo, inconclusive, started on spironolactone (helped a little), stopped in 6/06, elevated DHEA- sees new endo 4/08     History of stroke with residual effects      PFO (patent foramen ovale)     Post PFO/ASD closure with 30mm Amplatzer device 4/21/16     Urinary tract infection, site not specified     recurrent, start nitrofur in 5/08, 6mo txt       PAST SURGICAL HISTORY:  Past Surgical History:   Procedure Laterality Date     ARTHROSCOPY KNEE RT/LT  1995    Rt knee     HC REPAIR OF NASAL SEPTUM  12/05     REPAIR PATENT FORAMEN OVALE  04/21/2016       FAMILY HISTORY:  Family History   Problem Relation Age of Onset     Family History Negative Mother      Cancer Father         throat     Hypertension Father      Allergies Father      Arrhythmia Father      Family History Negative Sister      Family History Negative Brother      Diabetes Maternal Grandmother         on insulin     C.A.D. Maternal Grandmother         triple bypass in  her 60s     Diabetes Paternal Grandmother      ASM. Maternal Grandfather         unsure type     Neurologic Disorder Paternal Grandfather          of brain aneurysm in early 60s     Diabetes Maternal Aunt          in her early 40s of DM complications, type 2     Diabetes Maternal Aunt      Family History Negative Son        SOCIAL HISTORY:  Social History     Socioeconomic History     Marital status:      Spouse name: None     Number of children: 0     Years of education: 15     Highest education level: None   Occupational History     Occupation:      Comment: [employer]   Social Needs     Financial resource strain: None     Food insecurity:     Worry: None     Inability: None     Transportation needs:     Medical: None     Non-medical: None   Tobacco Use     Smoking status: Former Smoker     Packs/day: 0.50     Years: 5.00     Pack years: 2.50     Types: Cigarettes     Last attempt to quit: 2014     Years since quittin.8     Smokeless tobacco: Never Used   Substance and Sexual Activity     Alcohol use: Yes     Alcohol/week: 0.0 oz     Comment: SOCIAL     Drug use: No     Sexual activity: Yes     Partners: Male     Birth control/protection: Pull-out method   Lifestyle     Physical activity:     Days per week: None     Minutes per session: None     Stress: None   Relationships     Social connections:     Talks on phone: None     Gets together: None     Attends Tenriism service: None     Active member of club or organization: None     Attends meetings of clubs or organizations: None     Relationship status: None     Intimate partner violence:     Fear of current or ex partner: None     Emotionally abused: None     Physically abused: None     Forced sexual activity: None   Other Topics Concern      Service Not Asked     Blood Transfusions Not Asked     Caffeine Concern No     Comment: 1 cup coffee per day     Occupational Exposure Not Asked     Hobby Hazards Not  Asked     Sleep Concern No     Stress Concern No     Weight Concern No     Special Diet No     Back Care Not Asked     Exercise Yes     Comment: 4-5 days week     Bike Helmet Not Asked     Seat Belt Not Asked     Self-Exams Not Asked     Parent/sibling w/ CABG, MI or angioplasty before 65F 55M? No   Social History Narrative    Social Documentation:        Balanced Diet: YES    Calcium intake: 3 per day    Caffeine: 0 per day    Exercise:  type of activity ellipitcal, pilates;  5 times per week    Sunscreen: Yes    Seatbelts:  Yes    Self Breast Exam:  Yes    Physical/Emotional/Sexual Abuse: No    Do you feel safe in your environment? Yes        Cholesterol screen up to date: Yes- checking today 07/23/09, non fasting    Eye Exam up to date: Yes    Dental Exam up to date: Yes    Pap smear up to date: Yes: checking today 07/23/09, last was 04/08 NIL    Mammogram up to date: Does Not Apply    Dexa Scan up to date: Does Not Apply    Colonoscopy up to date: Does Not Apply    Immunizations up to date: Yes, had one a month ago.    Glucose screen if over 40:  No        Julieth Matthews MA    07/23/09       Review of Systems:  Skin:  Negative     Eyes:  Negative    ENT:  Negative    Respiratory:  Positive for dyspnea on exertion  Cardiovascular:    palpitations;Positive for;lightheadedness;dizziness;syncope or near-syncope  Gastroenterology: Negative    Genitourinary:  Negative    Musculoskeletal:       Neurologic:  Positive for stroke;headaches;migraine headaches;numbness or tingling of hands  Psychiatric:  Negative    Heme/Lymph/Imm:  Negative    Endocrine:  Negative        Recent Lab Results:  LIPID RESULTS:  Lab Results   Component Value Date    CHOL 246 (H) 12/28/2017     12/28/2017     (H) 12/28/2017    TRIG 56 12/28/2017    CHOLHDLRATIO 2.2 03/30/2012       LIVER ENZYME RESULTS:  Lab Results   Component Value Date    AST 17 12/29/2016    ALT 25 12/29/2016       CBC RESULTS:  Lab Results   Component Value  Date    WBC 7.1 05/08/2018    RBC 4.30 05/08/2018    HGB 12.8 05/08/2018    HCT 38.2 05/08/2018    MCV 89 05/08/2018    MCH 29.8 05/08/2018    MCHC 33.5 05/08/2018    RDW 13.7 05/08/2018     05/08/2018       BMP RESULTS:  Lab Results   Component Value Date     05/08/2018    POTASSIUM 3.6 05/08/2018    CHLORIDE 107 05/08/2018    CO2 23 05/08/2018    ANIONGAP 9 05/08/2018    GLC 95 05/08/2018    BUN 20 05/08/2018    CR 0.59 05/08/2018    GFRESTIMATED >90 05/08/2018    GFRESTBLACK >90 05/08/2018    STEPH 9.4 05/08/2018        A1C RESULTS:  No results found for: A1C    INR RESULTS:  Lab Results   Component Value Date    INR 0.90 04/21/2016    INR 0.87 04/29/2015         ECHOCARDIOGRAM  No results found for this or any previous visit (from the past 8760 hour(s)).      Orders Placed This Encounter   Procedures     EKG 12-lead complete w/read - Clinics (performed today)     No orders of the defined types were placed in this encounter.    There are no discontinued medications.      Encounter Diagnosis   Name Primary?     Palpitations Yes         CC  Izabel Reyes MD  [address] [place], [place] [id number]                Thank you for allowing me to participate in the care of your patient.      Sincerely,     Vinny Almeida MD     [facility]    cc:   Izabel Reyes MD  [address] [place], [place] [id number]

## 2019-02-25 NOTE — RESULT ENCOUNTER NOTE
Only mild events caught per pt- she didn't have the more concerning sx's while wearing it for a couple weeks.  Referred to cardiology- see 2/21/19 note, loop recorder recommended.    CW

## 2019-02-27 ENCOUNTER — OFFICE VISIT (OUTPATIENT)
Dept: FAMILY MEDICINE | Facility: CLINIC | Age: 39
End: 2019-02-27
Payer: COMMERCIAL

## 2019-02-27 VITALS
HEIGHT: 67 IN | HEART RATE: 56 BPM | OXYGEN SATURATION: 99 % | SYSTOLIC BLOOD PRESSURE: 115 MMHG | WEIGHT: 169 LBS | TEMPERATURE: 97.7 F | BODY MASS INDEX: 26.53 KG/M2 | RESPIRATION RATE: 16 BRPM | DIASTOLIC BLOOD PRESSURE: 75 MMHG

## 2019-02-27 DIAGNOSIS — G47.00 INSOMNIA, UNSPECIFIED TYPE: ICD-10-CM

## 2019-02-27 DIAGNOSIS — F33.1 MODERATE RECURRENT MAJOR DEPRESSION (H): Primary | ICD-10-CM

## 2019-02-27 DIAGNOSIS — E55.9 VITAMIN D DEFICIENCY: ICD-10-CM

## 2019-02-27 DIAGNOSIS — R00.2 PALPITATIONS: ICD-10-CM

## 2019-02-27 DIAGNOSIS — Z13.6 CARDIOVASCULAR SCREENING; LDL GOAL LESS THAN 160: ICD-10-CM

## 2019-02-27 DIAGNOSIS — F41.9 ANXIETY: ICD-10-CM

## 2019-02-27 PROCEDURE — 80053 COMPREHEN METABOLIC PANEL: CPT | Performed by: FAMILY MEDICINE

## 2019-02-27 PROCEDURE — 99214 OFFICE O/P EST MOD 30 MIN: CPT | Performed by: FAMILY MEDICINE

## 2019-02-27 PROCEDURE — 84443 ASSAY THYROID STIM HORMONE: CPT | Performed by: FAMILY MEDICINE

## 2019-02-27 PROCEDURE — 80061 LIPID PANEL: CPT | Performed by: FAMILY MEDICINE

## 2019-02-27 PROCEDURE — 36415 COLL VENOUS BLD VENIPUNCTURE: CPT | Performed by: FAMILY MEDICINE

## 2019-02-27 PROCEDURE — 82306 VITAMIN D 25 HYDROXY: CPT | Performed by: FAMILY MEDICINE

## 2019-02-27 RX ORDER — BUPROPION HYDROCHLORIDE 150 MG/1
150 TABLET ORAL EVERY MORNING
Qty: 30 TABLET | Refills: 1 | Status: SHIPPED | OUTPATIENT
Start: 2019-02-27 | End: 2019-07-03 | Stop reason: ALTCHOICE

## 2019-02-27 ASSESSMENT — MIFFLIN-ST. JEOR: SCORE: 1474.21

## 2019-02-27 NOTE — PROGRESS NOTES
SUBJECTIVE:   Chloe Foster is a 38 year old female who presents to clinic today for the following health issues:      Depression Followup    Status since last visit: Same     See PHQ-9 for current symptoms.  Other associated symptoms: None    Complicating factors:   Significant life event:  No   Current substance abuse:  None  Anxiety or Panic symptoms:  Yes-  anxiety    PHQ 1/17/2019 2/5/2019 2/14/2019   PHQ-9 Total Score 11 10 5   Q9: Suicide Ideation Not at all Not at all Not at all       Amount of exercise or physical activity: 7 days/week for an average of 15-30 minutes    Problems taking medications regularly: No    Medication side effects: none    Diet: regular (no restrictions)    Continued persistent worsened anxiety/depression- see last visit notes as well.  Last visit, avoided wellbutrin due to palpitations.    For insomnia, added trazodone 25mg as needed.    She has used it, maybe 2-3 nights/wk.  It knocks her out, and she feels just a little groggy in the morning, but it helps.  Uses it if she's lying there and can't get to sleep.  Still using ativan on nights prior to big meetings (has ~#6 left).  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      Mood/anxiety- mood is the same, not getting better, not moving forward.  Low motivation.  Feels like it's seasonal.  Can't be in this spot.  Thinks the mood is what is most worse, and worsening her more chronic anxiety.  Anxiety- still significant.  Getting better sleep may have helped a bit.  Not waking up at 2am, so easier to get through the day.  Therapy- found EMDR option, will start next week.    Used wellbutrin in the past, no se's that she recalls.  Doesn't recall any withdrawal sx's.  Feels like it worked better than the serotonin specific reuptake inhibitor's she tried.  She did discuss it at her recent cardiology consultation, and he felt  it was okay for her to go on these meds.      Met with cardiology for her worsening palpitations....   Ziopatch fine, just saw short run of SVT, but she only had mild sx's when wearing it, no bad episodes.  She hasn't had any bad episodes since taking it off as well.  Has been drinking less coffee- watering it down, and nursing it over the day.  Cardiology offered implantable loop recorder, or said she could use an upgraded apple watch (but noted it was unreliable in his note).  She'd like to wait on the implantable recorder for now, but will strongly consider, especially if she has another episode.    Talked with her mother- there was more heart issues in the family than she realized...  Jyoti  of MI at 41, had DM.  MGM had quad bypass.  GGM had pig valve replacement and CHF.  MGF had MI in his 50s.  Mother is mdahu.  Father developed a.fib in his 70s.      Problem list and histories reviewed & adjusted, as indicated.  Additional history: as documented      Patient Active Problem List   Diagnosis     Vitamin D deficiency     CARDIOVASCULAR SCREENING; LDL GOAL LESS THAN 160     Lumbar herniated disc     Hirsutism     Insomnia     IBS (irritable bowel syndrome)     Moderate recurrent major depression (H)     Cerebral artery occlusion with cerebral infarction     History of  section     PFO with atrial septal aneurysm     Protein S deficiency (H)     Migraine with aura and without status migrainosus, not intractable     Pre-conception counseling     Elevated lipoprotein A level     Social anxiety disorder     MTHFR mutation (H)     Cerebrovascular accident (CVA) due to embolism of right middle cerebral artery (H)     Nasal obstruction     Nasal valve collapse     Deviated nasal septum     Dizziness     Past Surgical History:   Procedure Laterality Date     ARTHROSCOPY KNEE RT/LT      Rt knee     HC REPAIR OF NASAL SEPTUM       REPAIR PATENT FORAMEN OVALE  2016       Social History     Tobacco  Use     Smoking status: Former Smoker     Packs/day: 0.50     Years: 5.00     Pack years: 2.50     Types: Cigarettes     Last attempt to quit: 2014     Years since quittin.8     Smokeless tobacco: Never Used   Substance Use Topics     Alcohol use: Yes     Alcohol/week: 0.0 oz     Comment: SOCIAL     Family History   Problem Relation Age of Onset     Family History Negative Mother      Cancer Father         throat     Hypertension Father      Allergies Father      Arrhythmia Father      Family History Negative Sister      Family History Negative Brother      Diabetes Maternal Grandmother         on insulin     C.A.D. Maternal Grandmother         triple bypass in her 60s     Diabetes Paternal Grandmother      C.A.D. Maternal Grandfather         MI in 50s     Neurologic Disorder Paternal Grandfather          of brain aneurysm in early 60s     Diabetes Maternal Aunt          in her early 40s of DM complications, type 2     Coronary Artery Disease Early Onset Maternal Aunt 41     Family History Negative Son          Current Outpatient Medications   Medication Sig Dispense Refill     aspirin EC 81 MG EC tablet Take 81 mg by mouth       buPROPion (WELLBUTRIN XL) 150 MG 24 hr tablet Take 1 tablet (150 mg) by mouth every morning 30 tablet 1     LORazepam (ATIVAN) 1 MG tablet Use 1/2-1 tab at night prior to meetings or for panic symptoms 12 tablet 0     magnesium oxide (MAG-OX) 400 MG tablet Take 400 mg by mouth every evening        Multiple Vitamins-Minerals (MULTIVITAMIN GUMMIES WOMENS PO) Take 2 chew tab by mouth every evening       NONFORMULARY Take 1 tablet by mouth every evening OTC: B-Right Vitamin (Optimized B Complex)       Omega-3 Fatty Acids (FISH OIL PO) Take 1 capsule by mouth daily        order for DME Equipment being ordered: ankle support orthotic or short camcast- whichever is more comfortable for the patient 1 Units 0     propranolol (INDERAL) 10 MG tablet Take 1 tablet by mouth. Take as  needed prior to interviews, stressful events 20 tablet 1     STATIN NOT PRESCRIBED, INTENTIONAL, Statin not prescribed intentionally due to Woman of childbearing age not actively taking birth control 0 each 0     traZODone (DESYREL) 50 MG tablet Take 0.5 tablets (25 mg) by mouth nightly as needed for sleep Can increase to 100mg/night if needed for insomnia. 30 tablet 1     VITAMIN D, CHOLECALCIFEROL, PO Take 1,000 Units by mouth every evening (takes 2 x 100 unit capsule)        Allergies   Allergen Reactions     Nkda [No Known Drug Allergies]      Recent Labs   Lab Test 02/27/19  0759 05/08/18  1630 12/28/17  0849  09/30/17  1643 12/29/16  0924  11/23/15  0820   *  --  129*  --   --  126*  --  108*   HDL 87  --  106  --   --  86  --  71   TRIG 70  --  56  --   --  61  --  53   ALT 22  --   --   --   --  25  --  23   CR 0.72 0.59  --    < >  --  0.70   < > 0.62   GFRESTIMATED >90 >90  --    < >  --  >90  Non  GFR Calc     < > >90  Non  GFR Calc     GFRESTBLACK >90 >90  --    < >  --  >90   GFR Calc     < > >90   GFR Calc     POTASSIUM 4.0 3.6  --    < >  --  4.1   < > 4.0   TSH 0.92  --   --   --  0.66  --   --   --     < > = values in this interval not displayed.      BP Readings from Last 3 Encounters:   02/27/19 115/75   02/21/19 110/72   02/05/19 121/84    Wt Readings from Last 3 Encounters:   02/27/19 76.7 kg (169 lb)   02/21/19 77.1 kg (170 lb)   02/05/19 76.7 kg (169 lb 1.6 oz)           Labs reviewed in EPIC    Reviewed and updated as needed this visit by clinical staff  Tobacco  Allergies  Meds  Problems  Med Hx  Surg Hx  Fam Hx       Reviewed and updated as needed this visit by Provider  Tobacco  Allergies  Meds  Problems  Med Hx  Surg Hx  Fam Hx         ROS:  Constitutional, HEENT, cardiovascular, pulmonary, gi and gu systems are negative, except as otherwise noted.    OBJECTIVE:     /75   Pulse 56   Temp 97.7  F  "(36.5  C) (Oral)   Resp 16   Ht 1.702 m (5' 7\")   Wt 76.7 kg (169 lb)   SpO2 99%   BMI 26.47 kg/m    Body mass index is 26.47 kg/m .  GENERAL APPEARANCE: healthy, alert and no distress     EYES: PERRL, sclera clear     HENT: nose and mouth without ulcers or lesions     NECK: no adenopathy, no asymmetry, masses, or scars and thyroid normal to palpation     RESP: lungs clear to auscultation - no rales, rhonchi or wheezes     CV: regular rates and rhythm, normal S1 S2, no S3 or S4 and no murmur, click or rub      Abdomen: soft, nontender, no HSM or masses and bowel sounds normal     Ext: warm, dry, no edema      Psych: full range affect, normal speech and grooming, judgement and insight intact     Diagnostic Test Results:  Results for orders placed or performed in visit on 02/27/19   Lipid panel reflex to direct LDL Fasting   Result Value Ref Range    Cholesterol 257 (H) <200 mg/dL    Triglycerides 70 <150 mg/dL    HDL Cholesterol 87 >49 mg/dL    LDL Cholesterol Calculated 156 (H) <100 mg/dL    Non HDL Cholesterol 170 (H) <130 mg/dL   Comprehensive metabolic panel   Result Value Ref Range    Sodium 138 133 - 144 mmol/L    Potassium 4.0 3.4 - 5.3 mmol/L    Chloride 104 94 - 109 mmol/L    Carbon Dioxide 29 20 - 32 mmol/L    Anion Gap 5 3 - 14 mmol/L    Glucose 93 70 - 99 mg/dL    Urea Nitrogen 13 7 - 30 mg/dL    Creatinine 0.72 0.52 - 1.04 mg/dL    GFR Estimate >90 >60 mL/min/[1.73_m2]    GFR Estimate If Black >90 >60 mL/min/[1.73_m2]    Calcium 9.4 8.5 - 10.1 mg/dL    Bilirubin Total 0.5 0.2 - 1.3 mg/dL    Albumin 4.2 3.4 - 5.0 g/dL    Protein Total 7.5 6.8 - 8.8 g/dL    Alkaline Phosphatase 67 40 - 150 U/L    ALT 22 0 - 50 U/L    AST 19 0 - 45 U/L   TSH with free T4 reflex   Result Value Ref Range    TSH 0.92 0.40 - 4.00 mU/L   Vitamin D Deficiency   Result Value Ref Range    Vitamin D Deficiency screening 42 20 - 75 ug/L       ASSESSMENT/PLAN:       ICD-10-CM    1. Moderate recurrent major depression (H) F33.1 " TSH with free T4 reflex     buPROPion (WELLBUTRIN XL) 150 MG 24 hr tablet   2. Vitamin D deficiency E55.9 Vitamin D Deficiency   3. Anxiety F41.9 Comprehensive metabolic panel     TSH with free T4 reflex     buPROPion (WELLBUTRIN XL) 150 MG 24 hr tablet   4. CARDIOVASCULAR SCREENING; LDL GOAL LESS THAN 160 Z13.6 Lipid panel reflex to direct LDL Fasting     Comprehensive metabolic panel   5. Palpitations R00.2 TSH with free T4 reflex   6. Insomnia, unspecified type G47.00 TSH with free T4 reflex     MDD/Anxiety/Insomnia- worsening sx's for last several months.  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  With her worsening palpitations at last visit, had wanted to hold off on mood/anxiety meds in case anxiety was more related to the palpitations, possibly more concerning issue.  She's now seen cardiology, and ziomonitor was reassuring (though no bad episodes while on it).  She states cardiology was okay with her starting anxiety meds.  Given persistent sx's despite better sleep, more reassurance regarding palpitations and cardiology okay, will start wellbutrin XL 150mg/d as she's done quite well on it in the past.  Risks and benefits of medication(s) including potential side effects reviewed with patient.  Questions answered.  RTC in ~6 wks for recheck, sooner if concerns.    Insomnia- cont prn trazodone 25mg/d as needed.  Okay to use ativan on nights prior to big meetings, but try and limit, especially if going on daily med which should help baseline anxiety sx's.    Palpitations - See recent cardiology consultation notes- ziopatch monitor done x 2 wks, but did not catch any of the significant episodes, where she would get very dizzy, out of it, and HR up to ~190s (none since taking it off as well, though).  It did catch some of the minor sx's, though, and most significant finding was 4 beat SVT which was okay per  cardiology.  Cardiology rec implantable loop recorder, which pt will likely do if she has another significant episode (may hold off if they do not return, however).  She may also upgrade to the newer apple watch, though cardiology concerned about the reliability.  Rec f/u with cardiology in 6 months as recommended regardless.      Izabel Reyes MD  [facility]

## 2019-02-28 LAB
ALBUMIN SERPL-MCNC: 4.2 G/DL (ref 3.4–5)
ALP SERPL-CCNC: 67 U/L (ref 40–150)
ALT SERPL W P-5'-P-CCNC: 22 U/L (ref 0–50)
ANION GAP SERPL CALCULATED.3IONS-SCNC: 5 MMOL/L (ref 3–14)
AST SERPL W P-5'-P-CCNC: 19 U/L (ref 0–45)
BILIRUB SERPL-MCNC: 0.5 MG/DL (ref 0.2–1.3)
BUN SERPL-MCNC: 13 MG/DL (ref 7–30)
CALCIUM SERPL-MCNC: 9.4 MG/DL (ref 8.5–10.1)
CHLORIDE SERPL-SCNC: 104 MMOL/L (ref 94–109)
CHOLEST SERPL-MCNC: 257 MG/DL
CO2 SERPL-SCNC: 29 MMOL/L (ref 20–32)
CREAT SERPL-MCNC: 0.72 MG/DL (ref 0.52–1.04)
DEPRECATED CALCIDIOL+CALCIFEROL SERPL-MC: 42 UG/L (ref 20–75)
GFR SERPL CREATININE-BSD FRML MDRD: >90 ML/MIN/{1.73_M2}
GLUCOSE SERPL-MCNC: 93 MG/DL (ref 70–99)
HDLC SERPL-MCNC: 87 MG/DL
LDLC SERPL CALC-MCNC: 156 MG/DL
NONHDLC SERPL-MCNC: 170 MG/DL
POTASSIUM SERPL-SCNC: 4 MMOL/L (ref 3.4–5.3)
PROT SERPL-MCNC: 7.5 G/DL (ref 6.8–8.8)
SODIUM SERPL-SCNC: 138 MMOL/L (ref 133–144)
TRIGL SERPL-MCNC: 70 MG/DL
TSH SERPL DL<=0.005 MIU/L-ACNC: 0.92 MU/L (ref 0.4–4)

## 2019-03-01 ENCOUNTER — OFFICE VISIT (OUTPATIENT)
Dept: PSYCHOLOGY | Facility: CLINIC | Age: 39
End: 2019-03-01
Payer: COMMERCIAL

## 2019-03-01 DIAGNOSIS — F41.9 ANXIETY: Primary | ICD-10-CM

## 2019-03-01 PROCEDURE — 90834 PSYTX W PT 45 MINUTES: CPT | Performed by: SOCIAL WORKER

## 2019-03-01 ASSESSMENT — ANXIETY QUESTIONNAIRES
3. WORRYING TOO MUCH ABOUT DIFFERENT THINGS: MORE THAN HALF THE DAYS
2. NOT BEING ABLE TO STOP OR CONTROL WORRYING: SEVERAL DAYS
6. BECOMING EASILY ANNOYED OR IRRITABLE: SEVERAL DAYS
1. FEELING NERVOUS, ANXIOUS, OR ON EDGE: SEVERAL DAYS
7. FEELING AFRAID AS IF SOMETHING AWFUL MIGHT HAPPEN: SEVERAL DAYS
IF YOU CHECKED OFF ANY PROBLEMS ON THIS QUESTIONNAIRE, HOW DIFFICULT HAVE THESE PROBLEMS MADE IT FOR YOU TO DO YOUR WORK, TAKE CARE OF THINGS AT HOME, OR GET ALONG WITH OTHER PEOPLE: SOMEWHAT DIFFICULT
GAD7 TOTAL SCORE: 7
5. BEING SO RESTLESS THAT IT IS HARD TO SIT STILL: NOT AT ALL

## 2019-03-01 ASSESSMENT — PATIENT HEALTH QUESTIONNAIRE - PHQ9
5. POOR APPETITE OR OVEREATING: SEVERAL DAYS
SUM OF ALL RESPONSES TO PHQ QUESTIONS 1-9: 8

## 2019-03-01 NOTE — PROGRESS NOTES
Progress Note     Client Name: Chloe Foster  Date: 3/1/19         Service Type: Individual  Video Visit: No     Session Start Time: 230  Session End Time: 320     Session Length: 50    Session #: 5    Attendees: Client attended alone     Treatment Plan Last Reviewed: today  PHQ-9 / AYLA-7 : 8;7    DATA  Interactive Complexity: No  Crisis: No       Progress Since Last Session (Related to Symptoms / Goals / Homework):   Symptoms: stable    Homework: Completed in session      Episode of Care Goals: Minimal progress - ACTION (Actively working towards change); Intervened by reinforcing change plan / affirming steps taken     Current / Ongoing Stressors and Concerns:   Health and occupational issues. Doing somewhat better given her career is taking off. Marriage also doing better due to couples therapy.     Treatment Objective(s) Addressed in This Session:   Reviewed neuroscience hw related to surfacing core beliefs that feed anxiety.       Intervention:   genogrammed family and relational hx. USed flight as primary source of coping in chaotic family stress. Will continue this hw at next session.        ASSESSMENT: Current Emotional / Mental Status (status of significant symptoms):   Risk status (Self / Other harm or suicidal ideation)   Client denies current fears or concerns for personal safety.   Client denies current or recent suicidal ideation or behaviors.   Client denies current or recent homicidal ideation or behaviors.   Client denies current or recent self injurious behavior or ideation.   Client denies other safety concerns.   Client Client reports there has been no change in risk factors since their last session.     Client Client reports there has been no change in protective factors since their last session.     A safety and risk management plan has not been developed at this time, however client was given the after-hours number / 911 should there be a change  in any of these risk factors.     Appearance:   Appropriate    Eye Contact:   Good    Psychomotor Behavior: Normal    Attitude:   Cooperative    Orientation:   All   Speech    Rate / Production: Normal     Volume:  Normal    Mood:    Anxious  Sad    Affect:    Appropriate    Thought Content:  Clear  Rumination    Thought Form:  Coherent  Logical    Insight:    Good      Medication Review:   Changes to psychiatric medications, see updated Medication List in EPIC. Began taking Wellbutrin today, managed by her GP.     Medication Compliance:   Yes     Changes in Health Issues:   None reported     Chemical Use Review:   Substance Use: Chemical use reviewed, no active concerns identified      Tobacco Use: No current tobacco use.      Diagnosis:  1. Anxiety        Collateral Reports Completed:   Not Applicable    PLAN: (Client Tasks / Therapist Tasks / Other)  Returns on a cancel for this month; otherwise will be regularly scheduled starting in April. Will map out father at next visit.        Tamanna Lim, [facility]                                                         ______________________________________________________________________    Treatment Plan    Client's Name: Chloe Foster  YOB: 1980    Date: 12/5/2018      DSM-V Diagnoses: 300.00 (F41.9) Unspecified Anxiety Disorder  Psychosocial / Contextual Factors: marital stress; occupational stress and health issues.  WHODAS: see intake    Referral / Collaboration:  Referral to another professional/service is not indicated at this time..    Anticipated number of session or this episode of care: evaluate every 90 days.      MeasurableTreatment Goal(s) related to diagnosis / functional impairment(s)  Goal 1: Client will improve management of anxiety    I will know I've met my goal when Im less thrown off by triggers.      Objective #A (Client Action)    Client will identify health and life fears / thoughts that contribute to feeling anxious.  Ct will be able to identify and challenge these 90% of time  Status: new     Intervention(s)  Therapist will teach CBT/mindfulness skills.    Objective #B  Client will Identify negative self-talk and behaviors: challenge core beliefs, myths, and actions  Improve concentration, focus, and mindfulness in daily activities .  Status: new     Intervention(s)  Therapist will CBT and neuroscience based interventions to managemood regulation..    Objective #C  Client will identify and engage in 2 self care behaviors a week.  Status: new     Intervention(s)  Therapist will encourage and support efforts.        Client has reviewed and agreed to the above plan.      Tamanna Lim, [facility]  December 5, 2018

## 2019-03-02 ASSESSMENT — ANXIETY QUESTIONNAIRES: GAD7 TOTAL SCORE: 7

## 2019-03-08 ENCOUNTER — TELEPHONE (OUTPATIENT)
Dept: CARDIOLOGY | Facility: CLINIC | Age: 39
End: 2019-03-08

## 2019-03-08 NOTE — TELEPHONE ENCOUNTER
"Pt had OV with Dr. Almeida on 2/21/2019: \"Palpitation.  Symptoms are very suggestive of SVT or paroxysmal A. fib.  At this time we have no diagnosis.  We discussed management of atrial arrhythmias.  At this time I recommend a loop recorder implantation for closer monitoring.  Plan to her if we do find A. fib she may need blood thinners for life.  Option would be having iPhone related EKG Jesus, however at this option is unreliable.Discussion, she would like to go home and discuss with her .  We will finalize plan over the phone.\"    Called pt, no answer, left brief message asking if she's made a decision about loop recorder or has any questions. Asked pt to call back to Device RN.   "

## 2019-03-11 NOTE — RESULT ENCOUNTER NOTE
Here are your lab results from your recent visit...  -Your TSH (thyroid stimulating hormone, which is elevated in hypothyroidism, and lowered in hyperthyroidism) looks normal.  -Your Vitamin D levels are in a good range at '42'.   I would continue on your current supplementation.  -Your CMP (which includes electrolyte levels, blood sugar levels, and kidney and liver function tests) looks normal as well.  -Your cholesterol panel looks a bit worse with a higher LDL (the bad cholesterol, which went up from the 120s in the last couple years to 156), though your HDL (the good cholesterol) is still very good.  I would take a look at how your diet may have been different this past year and see if you can get back to your previous diet (if there was a change...).  We can talk about this further at upcoming appointments as well.       Please let me know if you have any questions.  Ahmet Orta MD

## 2019-03-14 NOTE — TELEPHONE ENCOUNTER
Second attempt to call pt to see if she has decided about ILR implant or if she has any questions. Left voicemail to call [facility] RN either way. Awaiting return call.

## 2019-03-19 NOTE — TELEPHONE ENCOUNTER
Patient called back with further questions about the loop recorder and options of other testing. Questions were answered. Patient has decided to post pone the implant. States has not had any tachycardia recently.

## 2019-04-04 ENCOUNTER — MYC MEDICAL ADVICE (OUTPATIENT)
Dept: FAMILY MEDICINE | Facility: CLINIC | Age: 39
End: 2019-04-04

## 2019-04-04 DIAGNOSIS — F33.1 MODERATE RECURRENT MAJOR DEPRESSION (H): Primary | ICD-10-CM

## 2019-04-04 RX ORDER — BUPROPION HYDROCHLORIDE 150 MG/1
150 TABLET, EXTENDED RELEASE ORAL DAILY
Qty: 30 TABLET | Refills: 0 | Status: SHIPPED | OUTPATIENT
Start: 2019-04-04 | End: 2019-04-17

## 2019-04-04 NOTE — TELEPHONE ENCOUNTER
CW,   See pt's MyChart response  Pended SR 150mg daily Rx for 30 days if you approve  Thank you,  Bernadine LOWRY RN

## 2019-04-17 ENCOUNTER — OFFICE VISIT (OUTPATIENT)
Dept: FAMILY MEDICINE | Facility: CLINIC | Age: 39
End: 2019-04-17
Payer: COMMERCIAL

## 2019-04-17 VITALS
HEART RATE: 88 BPM | TEMPERATURE: 98 F | SYSTOLIC BLOOD PRESSURE: 109 MMHG | BODY MASS INDEX: 26.06 KG/M2 | OXYGEN SATURATION: 96 % | WEIGHT: 166 LBS | HEIGHT: 67 IN | DIASTOLIC BLOOD PRESSURE: 77 MMHG

## 2019-04-17 DIAGNOSIS — G47.00 INSOMNIA, UNSPECIFIED TYPE: ICD-10-CM

## 2019-04-17 DIAGNOSIS — F33.1 MODERATE RECURRENT MAJOR DEPRESSION (H): ICD-10-CM

## 2019-04-17 DIAGNOSIS — F40.10 SOCIAL ANXIETY DISORDER: ICD-10-CM

## 2019-04-17 DIAGNOSIS — F41.1 ANXIETY STATE: Primary | ICD-10-CM

## 2019-04-17 PROCEDURE — 99214 OFFICE O/P EST MOD 30 MIN: CPT | Performed by: FAMILY MEDICINE

## 2019-04-17 RX ORDER — PROPRANOLOL HYDROCHLORIDE 10 MG/1
TABLET ORAL
Qty: 20 TABLET | Refills: 1 | Status: SHIPPED | OUTPATIENT
Start: 2019-04-17 | End: 2019-11-21

## 2019-04-17 RX ORDER — LORAZEPAM 1 MG/1
TABLET ORAL
Qty: 12 TABLET | Refills: 0 | Status: SHIPPED | OUTPATIENT
Start: 2019-04-17 | End: 2019-07-03

## 2019-04-17 RX ORDER — BUPROPION HYDROCHLORIDE 150 MG/1
150 TABLET, EXTENDED RELEASE ORAL DAILY
Qty: 30 TABLET | Refills: 1 | Status: SHIPPED | OUTPATIENT
Start: 2019-04-17 | End: 2019-07-03 | Stop reason: ALTCHOICE

## 2019-04-17 ASSESSMENT — ANXIETY QUESTIONNAIRES
5. BEING SO RESTLESS THAT IT IS HARD TO SIT STILL: NOT AT ALL
2. NOT BEING ABLE TO STOP OR CONTROL WORRYING: SEVERAL DAYS
6. BECOMING EASILY ANNOYED OR IRRITABLE: SEVERAL DAYS
IF YOU CHECKED OFF ANY PROBLEMS ON THIS QUESTIONNAIRE, HOW DIFFICULT HAVE THESE PROBLEMS MADE IT FOR YOU TO DO YOUR WORK, TAKE CARE OF THINGS AT HOME, OR GET ALONG WITH OTHER PEOPLE: SOMEWHAT DIFFICULT
1. FEELING NERVOUS, ANXIOUS, OR ON EDGE: SEVERAL DAYS
GAD7 TOTAL SCORE: 5
7. FEELING AFRAID AS IF SOMETHING AWFUL MIGHT HAPPEN: NOT AT ALL
3. WORRYING TOO MUCH ABOUT DIFFERENT THINGS: SEVERAL DAYS

## 2019-04-17 ASSESSMENT — PATIENT HEALTH QUESTIONNAIRE - PHQ9
SUM OF ALL RESPONSES TO PHQ QUESTIONS 1-9: 4
5. POOR APPETITE OR OVEREATING: SEVERAL DAYS

## 2019-04-17 ASSESSMENT — MIFFLIN-ST. JEOR: SCORE: 1460.6

## 2019-04-17 NOTE — PROGRESS NOTES
SUBJECTIVE:   Chloe Foster is a 38 year old female who presents to clinic today for the following   health issues:    Depression Followup    Status since last visit: Improved recently switched from XL to SR wellbutrin    See PHQ-9 for current symptoms.  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, renovation   Current substance abuse:  None  Anxiety or Panic symptoms:  Yes-  sometimes    PHQ 2/14/2019 3/1/2019 4/17/2019   PHQ-9 Total Score 5 8 4   Q9: Thoughts of better off dead/self-harm past 2 weeks Not at all Not at all Not at all     AYLA-7 SCORE 2/14/2019 3/1/2019 4/17/2019   Total Score 4 7 5     PHQ-9 SCORE 2/14/2019 3/1/2019 4/17/2019   PHQ-9 Total Score - - -   PHQ-9 Total Score 5 8 4     AYLA-7 SCORE 2/14/2019 3/1/2019 4/17/2019   Total Score 4 7 5       Amount of exercise or physical activity: 3 x week    Problems taking medications regularly: No    Medication side effects: rapid heart beat, insomnia    Diet: regular (no restrictions)      On the wellbutrin- XL started at last visit, which she thought she had been on before and did well on it.  After starting it, though, she found it was keeping her up, only sleeping ~3 hrs/nt.  Didn't want to take trazodone, didn't want to be more tired the next day.  She also had a couple episode on the XL when her heart when fast, at airport, pretty intense, could be panic attack.  After a few wks, her anxiety did start to improve on it, but se's not good.    After mychart (4/4/19, see notes), decided to change to wellbutrin SR 150mg once daily.  After that, she actually didn't take any wellbutrin for about a week, and then started on the SR about a wk ago.  On it for about a week, getting to sleep better, but waking up early.  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  So many things in motion, everything is converging.    Mood/anxieyt sx's-   Anxiety- more anxiety, but then sometimes says  less anxiety- so hard to tell.  So busy recently, that she really hasn't been able to check in on her mood.  Motivation is a better, though almost doesn't have a choice in the matter so much is going on.    Insomnia-   She was using trazodone in the past when she had a hard time getting to sleep,   25mg at night, but she doesn't want to take it due to am grogginess.  Now if she wakes up, she'll take a melatonin in the night- sometimes help.  Does sometimes get am grogginess with it as well.      Additional history: as documented    Reviewed  and updated as needed this visit by clinical staff  Tobacco  Allergies  Meds  Problems  Med Hx  Surg Hx  Fam Hx  Soc Hx          Reviewed and updated as needed this visit by Provider  Tobacco  Allergies  Meds  Problems  Med Hx  Surg Hx  Fam Hx           Patient Active Problem List   Diagnosis     Vitamin D deficiency     CARDIOVASCULAR SCREENING; LDL GOAL LESS THAN 160     Lumbar herniated disc     Hirsutism     Insomnia     IBS (irritable bowel syndrome)     Moderate recurrent major depression (H)     Cerebral artery occlusion with cerebral infarction     History of  section     PFO with atrial septal aneurysm     Protein S deficiency (H)     Migraine with aura and without status migrainosus, not intractable     Pre-conception counseling     Elevated lipoprotein A level     Social anxiety disorder     MTHFR mutation (H)     Cerebrovascular accident (CVA) due to embolism of right middle cerebral artery (H)     Nasal obstruction     Nasal valve collapse     Deviated nasal septum     Dizziness     Past Surgical History:   Procedure Laterality Date     ARTHROSCOPY KNEE RT/LT      Rt knee     HC REPAIR OF NASAL SEPTUM       REPAIR PATENT FORAMEN OVALE  2016       Social History     Tobacco Use     Smoking status: Former Smoker     Packs/day: 0.50     Years: 5.00     Pack years: 2.50     Types: Cigarettes     Last attempt to quit: 2014      Years since quittin.9     Smokeless tobacco: Never Used   Substance Use Topics     Alcohol use: Yes     Alcohol/week: 0.0 oz     Comment: SOCIAL     Family History   Problem Relation Age of Onset     Family History Negative Mother      Cancer Father         throat     Hypertension Father      Allergies Father      Arrhythmia Father      Family History Negative Sister      Family History Negative Brother      Diabetes Maternal Grandmother         on insulin     C.A.D. Maternal Grandmother         triple bypass in her 60s     Diabetes Paternal Grandmother      C.A.D. Maternal Grandfather         MI in 50s     Neurologic Disorder Paternal Grandfather          of brain aneurysm in early 60s     Diabetes Maternal Aunt          in her early 40s of DM complications, type 2     Coronary Artery Disease Early Onset Maternal Aunt 41     Family History Negative Son          Current Outpatient Medications   Medication Sig Dispense Refill     aspirin EC 81 MG EC tablet Take 81 mg by mouth       buPROPion (WELLBUTRIN SR) 150 MG 12 hr tablet Take 1 tablet (150 mg) by mouth daily 30 tablet 1     LORazepam (ATIVAN) 1 MG tablet Use 1/2-1 tab at night prior to meetings or for panic symptoms 12 tablet 0     magnesium oxide (MAG-OX) 400 MG tablet Take 400 mg by mouth every evening        Multiple Vitamins-Minerals (MULTIVITAMIN GUMMIES WOMENS PO) Take 2 chew tab by mouth every evening       NONFORMULARY Take 1 tablet by mouth every evening OTC: B-Right Vitamin (Optimized B Complex)       Omega-3 Fatty Acids (FISH OIL PO) Take 1 capsule by mouth daily        propranolol (INDERAL) 10 MG tablet Take 1 tablet by mouth. Take as needed prior to interviews, stressful events 20 tablet 1     VITAMIN D, CHOLECALCIFEROL, PO Take 1,000 Units by mouth every evening (takes 2 x 100 unit capsule)        buPROPion (WELLBUTRIN XL) 150 MG 24 hr tablet Take 1 tablet (150 mg) by mouth every morning (Patient not taking: Reported on 2019) 30  "tablet 1     STATIN NOT PRESCRIBED, INTENTIONAL, Statin not prescribed intentionally due to Woman of childbearing age not actively taking birth control 0 each 0     traZODone (DESYREL) 50 MG tablet Take 0.5 tablets (25 mg) by mouth nightly as needed for sleep Can increase to 100mg/night if needed for insomnia. (Patient not taking: Reported on 4/17/2019) 30 tablet 1     Allergies   Allergen Reactions     Nkda [No Known Drug Allergies]      Recent Labs   Lab Test 02/27/19  0759 05/08/18  1630 12/28/17  0849  09/30/17  1643 12/29/16  0924  11/23/15  0820   *  --  129*  --   --  126*  --  108*   HDL 87  --  106  --   --  86  --  71   TRIG 70  --  56  --   --  61  --  53   ALT 22  --   --   --   --  25  --  23   CR 0.72 0.59  --    < >  --  0.70   < > 0.62   GFRESTIMATED >90 >90  --    < >  --  >90  Non  GFR Calc     < > >90  Non  GFR Calc     GFRESTBLACK >90 >90  --    < >  --  >90   GFR Calc     < > >90   GFR Calc     POTASSIUM 4.0 3.6  --    < >  --  4.1   < > 4.0   TSH 0.92  --   --   --  0.66  --   --   --     < > = values in this interval not displayed.      BP Readings from Last 3 Encounters:   04/17/19 109/77   02/27/19 115/75   02/21/19 110/72    Wt Readings from Last 3 Encounters:   04/17/19 75.3 kg (166 lb)   02/27/19 76.7 kg (169 lb)   02/21/19 77.1 kg (170 lb)            Labs reviewed in EPIC    ROS:  Constitutional, HEENT, cardiovascular, pulmonary, gi and gu systems are negative, except as otherwise noted.    OBJECTIVE:     /77   Pulse 88   Temp 98  F (36.7  C) (Oral)   Ht 1.702 m (5' 7\")   Wt 75.3 kg (166 lb)   LMP 03/19/2019   SpO2 96%   BMI 26.00 kg/m    Body mass index is 26 kg/m .  GENERAL APPEARANCE: healthy, alert and no distress     EYES: sclera clear, EOMI     RESP: lungs clear to auscultation - no rales, rhonchi or wheezes     CV: regular rates and rhythm, normal S1 S2, no S3 or S4 and no murmur, click or rub      " Ext: warm, dry, no edema       Psych: full range affect, normal speech and grooming, judgement and insight intact     Diagnostic Test Results:  Results for orders placed or performed in visit on 02/27/19   Lipid panel reflex to direct LDL Fasting   Result Value Ref Range    Cholesterol 257 (H) <200 mg/dL    Triglycerides 70 <150 mg/dL    HDL Cholesterol 87 >49 mg/dL    LDL Cholesterol Calculated 156 (H) <100 mg/dL    Non HDL Cholesterol 170 (H) <130 mg/dL   Comprehensive metabolic panel   Result Value Ref Range    Sodium 138 133 - 144 mmol/L    Potassium 4.0 3.4 - 5.3 mmol/L    Chloride 104 94 - 109 mmol/L    Carbon Dioxide 29 20 - 32 mmol/L    Anion Gap 5 3 - 14 mmol/L    Glucose 93 70 - 99 mg/dL    Urea Nitrogen 13 7 - 30 mg/dL    Creatinine 0.72 0.52 - 1.04 mg/dL    GFR Estimate >90 >60 mL/min/[1.73_m2]    GFR Estimate If Black >90 >60 mL/min/[1.73_m2]    Calcium 9.4 8.5 - 10.1 mg/dL    Bilirubin Total 0.5 0.2 - 1.3 mg/dL    Albumin 4.2 3.4 - 5.0 g/dL    Protein Total 7.5 6.8 - 8.8 g/dL    Alkaline Phosphatase 67 40 - 150 U/L    ALT 22 0 - 50 U/L    AST 19 0 - 45 U/L   TSH with free T4 reflex   Result Value Ref Range    TSH 0.92 0.40 - 4.00 mU/L   Vitamin D Deficiency   Result Value Ref Range    Vitamin D Deficiency screening 42 20 - 75 ug/L       ASSESSMENT/PLAN:       ICD-10-CM    1. Anxiety state F41.1 LORazepam (ATIVAN) 1 MG tablet   2. Moderate recurrent major depression (H) F33.1 buPROPion (WELLBUTRIN SR) 150 MG 12 hr tablet   3. Social anxiety disorder F40.10 LORazepam (ATIVAN) 1 MG tablet     propranolol (INDERAL) 10 MG tablet   4. 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  Switched from XL to SR due to insomnia sx's on the XL, and possible heart racing on the XL, but wasn't sure if it was panic attack related to travel.  She stopped everything for a week, and then started  on the wellbutrin SR 150mg/d about a week ago.  Se's less, but still waking up too early.  She has avoided taking sleep meds due to concern about am grogginess, but may try if insomnia does not improve.  Discussed options.  Plan- Will cont on wellbutrin SR 150mg/d.  Recommended taking trazodone 1/4-1/2 tab until insomnia sx's improve, melatonin at bedtime if  is away on work.    Also sent in more of the ativan, takes sina prior to big meetings the next day which seems to work great for her sleep and settles her for the meetings.  No se's on it.  Understands risks of ativan.    RTC in ~2 months for recheck.  We should review her recent lipids (feom 2/19) at her next appt as well.    Izabel Reyes MD  [facility]

## 2019-04-17 NOTE — NURSING NOTE
"Chief Complaint   Patient presents with     Recheck Medication     /77   Pulse 88   Temp 98  F (36.7  C) (Oral)   Ht 1.702 m (5' 7\")   Wt 75.3 kg (166 lb)   LMP 03/19/2019   SpO2 96%   BMI 26.00 kg/m   Estimated body mass index is 26 kg/m  as calculated from the following:    Height as of this encounter: 1.702 m (5' 7\").    Weight as of this encounter: 75.3 kg (166 lb).        Health Maintenance due pending provider review:  NONE    n/a    Cait Kimble CMA  "

## 2019-04-18 ASSESSMENT — ANXIETY QUESTIONNAIRES: GAD7 TOTAL SCORE: 5

## 2019-05-13 ENCOUNTER — HOSPITAL ENCOUNTER (EMERGENCY)
Facility: CLINIC | Age: 39
Discharge: HOME | End: 2019-05-13
Attending: EMERGENCY MEDICINE | Admitting: EMERGENCY MEDICINE
Payer: COMMERCIAL

## 2019-05-13 ENCOUNTER — APPOINTMENT (OUTPATIENT)
Dept: GENERAL RADIOLOGY | Facility: CLINIC | Age: 39
End: 2019-05-13
Attending: EMERGENCY MEDICINE
Payer: COMMERCIAL

## 2019-05-13 VITALS
HEIGHT: 67 IN | TEMPERATURE: 98.3 F | OXYGEN SATURATION: 96 % | RESPIRATION RATE: 20 BRPM | WEIGHT: 160 LBS | DIASTOLIC BLOOD PRESSURE: 77 MMHG | BODY MASS INDEX: 25.11 KG/M2 | SYSTOLIC BLOOD PRESSURE: 116 MMHG

## 2019-05-13 DIAGNOSIS — R00.0 SINUS TACHYCARDIA: ICD-10-CM

## 2019-05-13 DIAGNOSIS — R07.9 CHEST PAIN, UNSPECIFIED TYPE: ICD-10-CM

## 2019-05-13 LAB
ALBUMIN SERPL-MCNC: 3.4 G/DL (ref 3.4–5)
ALBUMIN UR-MCNC: NEGATIVE MG/DL
ALP SERPL-CCNC: 76 U/L (ref 40–150)
ALT SERPL W P-5'-P-CCNC: 19 U/L (ref 0–50)
ANION GAP SERPL CALCULATED.3IONS-SCNC: 2 MMOL/L (ref 3–14)
APPEARANCE UR: ABNORMAL
AST SERPL W P-5'-P-CCNC: 13 U/L (ref 0–45)
BASOPHILS # BLD AUTO: 0 10E9/L (ref 0–0.2)
BASOPHILS NFR BLD AUTO: 0.2 %
BILIRUB SERPL-MCNC: 0.3 MG/DL (ref 0.2–1.3)
BILIRUB UR QL STRIP: NEGATIVE
BUN SERPL-MCNC: 8 MG/DL (ref 7–30)
CALCIUM SERPL-MCNC: 8.4 MG/DL (ref 8.5–10.1)
CHLORIDE SERPL-SCNC: 110 MMOL/L (ref 94–109)
CO2 SERPL-SCNC: 29 MMOL/L (ref 20–32)
COLOR UR AUTO: YELLOW
CREAT SERPL-MCNC: 0.66 MG/DL (ref 0.52–1.04)
D DIMER PPP FEU-MCNC: 0.3 UG/ML FEU (ref 0–0.5)
DIFFERENTIAL METHOD BLD: NORMAL
EOSINOPHIL # BLD AUTO: 0 10E9/L (ref 0–0.7)
EOSINOPHIL NFR BLD AUTO: 0 %
ERYTHROCYTE [DISTWIDTH] IN BLOOD BY AUTOMATED COUNT: 13.2 % (ref 10–15)
GFR SERPL CREATININE-BSD FRML MDRD: >90 ML/MIN/{1.73_M2}
GLUCOSE SERPL-MCNC: 160 MG/DL (ref 70–99)
GLUCOSE UR STRIP-MCNC: NEGATIVE MG/DL
HCG SERPL QL: NEGATIVE
HCG UR QL: NEGATIVE
HCT VFR BLD AUTO: 40 % (ref 35–47)
HGB BLD-MCNC: 13.5 G/DL (ref 11.7–15.7)
HGB UR QL STRIP: ABNORMAL
IMM GRANULOCYTES # BLD: 0 10E9/L (ref 0–0.4)
IMM GRANULOCYTES NFR BLD: 0.2 %
INTERPRETATION ECG - MUSE: NORMAL
KETONES UR STRIP-MCNC: 5 MG/DL
LEUKOCYTE ESTERASE UR QL STRIP: ABNORMAL
LIPASE SERPL-CCNC: 174 U/L (ref 73–393)
LYMPHOCYTES # BLD AUTO: 1 10E9/L (ref 0.8–5.3)
LYMPHOCYTES NFR BLD AUTO: 22.5 %
MCH RBC QN AUTO: 29.6 PG (ref 26.5–33)
MCHC RBC AUTO-ENTMCNC: 33.8 G/DL (ref 31.5–36.5)
MCV RBC AUTO: 88 FL (ref 78–100)
MONOCYTES # BLD AUTO: 0.5 10E9/L (ref 0–1.3)
MONOCYTES NFR BLD AUTO: 10.5 %
NEUTROPHILS # BLD AUTO: 3 10E9/L (ref 1.6–8.3)
NEUTROPHILS NFR BLD AUTO: 66.6 %
NITRATE UR QL: NEGATIVE
NRBC # BLD AUTO: 0 10*3/UL
NRBC BLD AUTO-RTO: 0 /100
PH UR STRIP: 5.5 PH (ref 5–7)
PLATELET # BLD AUTO: 256 10E9/L (ref 150–450)
POTASSIUM SERPL-SCNC: 3.2 MMOL/L (ref 3.4–5.3)
PROT SERPL-MCNC: 6.6 G/DL (ref 6.8–8.8)
RBC # BLD AUTO: 4.56 10E12/L (ref 3.8–5.2)
RBC #/AREA URNS AUTO: 2 /HPF (ref 0–2)
SODIUM SERPL-SCNC: 141 MMOL/L (ref 133–144)
SOURCE: ABNORMAL
SP GR UR STRIP: 1.01 (ref 1–1.03)
SQUAMOUS #/AREA URNS AUTO: 6 /HPF (ref 0–1)
TROPONIN I SERPL-MCNC: <0.015 UG/L (ref 0–0.04)
UROBILINOGEN UR STRIP-MCNC: NORMAL MG/DL (ref 0–2)
WBC # BLD AUTO: 4.5 10E9/L (ref 4–11)
WBC #/AREA URNS AUTO: 3 /HPF (ref 0–5)
YEAST #/AREA URNS HPF: ABNORMAL /HPF

## 2019-05-13 PROCEDURE — 84484 ASSAY OF TROPONIN QUANT: CPT | Performed by: EMERGENCY MEDICINE

## 2019-05-13 PROCEDURE — 84703 CHORIONIC GONADOTROPIN ASSAY: CPT | Performed by: EMERGENCY MEDICINE

## 2019-05-13 PROCEDURE — 96361 HYDRATE IV INFUSION ADD-ON: CPT

## 2019-05-13 PROCEDURE — 85379 FIBRIN DEGRADATION QUANT: CPT | Performed by: EMERGENCY MEDICINE

## 2019-05-13 PROCEDURE — 83690 ASSAY OF LIPASE: CPT | Performed by: EMERGENCY MEDICINE

## 2019-05-13 PROCEDURE — 93005 ELECTROCARDIOGRAM TRACING: CPT

## 2019-05-13 PROCEDURE — 80053 COMPREHEN METABOLIC PANEL: CPT | Performed by: EMERGENCY MEDICINE

## 2019-05-13 PROCEDURE — 25000128 H RX IP 250 OP 636: Performed by: EMERGENCY MEDICINE

## 2019-05-13 PROCEDURE — 85025 COMPLETE CBC W/AUTO DIFF WBC: CPT | Performed by: EMERGENCY MEDICINE

## 2019-05-13 PROCEDURE — 87086 URINE CULTURE/COLONY COUNT: CPT | Performed by: EMERGENCY MEDICINE

## 2019-05-13 PROCEDURE — 99285 EMERGENCY DEPT VISIT HI MDM: CPT | Mod: 25

## 2019-05-13 PROCEDURE — 81025 URINE PREGNANCY TEST: CPT | Performed by: EMERGENCY MEDICINE

## 2019-05-13 PROCEDURE — 81001 URINALYSIS AUTO W/SCOPE: CPT | Performed by: EMERGENCY MEDICINE

## 2019-05-13 PROCEDURE — 96374 THER/PROPH/DIAG INJ IV PUSH: CPT

## 2019-05-13 PROCEDURE — 71046 X-RAY EXAM CHEST 2 VIEWS: CPT

## 2019-05-13 RX ORDER — KETOROLAC TROMETHAMINE 15 MG/ML
15 INJECTION, SOLUTION INTRAMUSCULAR; INTRAVENOUS ONCE
Status: COMPLETED | OUTPATIENT
Start: 2019-05-13 | End: 2019-05-13

## 2019-05-13 RX ORDER — SODIUM CHLORIDE 9 MG/ML
1000 INJECTION, SOLUTION INTRAVENOUS CONTINUOUS
Status: DISCONTINUED | OUTPATIENT
Start: 2019-05-13 | End: 2019-05-13 | Stop reason: HOSPADM

## 2019-05-13 RX ADMIN — SODIUM CHLORIDE 1000 ML: 9 INJECTION, SOLUTION INTRAVENOUS at 17:07

## 2019-05-13 RX ADMIN — KETOROLAC TROMETHAMINE 15 MG: 15 INJECTION, SOLUTION INTRAMUSCULAR; INTRAVENOUS at 17:11

## 2019-05-13 ASSESSMENT — ENCOUNTER SYMPTOMS
LIGHT-HEADEDNESS: 1
SHORTNESS OF BREATH: 0
FEVER: 0

## 2019-05-13 ASSESSMENT — MIFFLIN-ST. JEOR: SCORE: 1433.39

## 2019-05-13 NOTE — DISCHARGE INSTRUCTIONS
Discharge Instructions  Chest Pain    You have been seen today for chest pain or discomfort.  At this time, your provider has found no signs that your chest pain is due to a serious or life-threatening condition, (or you have declined more testing and/or admission to the hospital). However, sometimes there is a serious problem that does not show up right away. Your evaluation today may not be complete and you may need further testing and evaluation.     Generally, every Emergency Department visit should have a follow-up clinic visit with either a primary or a specialty clinic/provider. Please follow-up as instructed by your emergency provider today.  Return to the Emergency Department if:  Your chest pain changes, gets worse, starts to happen more often, or comes with less activity.  You are newly short of breath.  You get very weak or tired.  You pass out or faint.  You have any new symptoms, like fever, cough, numb legs, or you cough up blood.  You have anything else that worries you.    Until you follow-up with your regular provider, please do the following:  Take one aspirin daily unless you have an allergy or are told not to by your provider.  If a stress test appointment has been made, go to the appointment.  If you have questions, contact your regular provider.  Follow-up with your regular provider/clinic as directed; this is very important.    If you were given a prescription for medicine here today, be sure to read all of the information (including the package insert) that comes with your prescription.  This will include important information about the medicine, its side effects, and any warnings that you need to know about.  The pharmacist who fills the prescription can provide more information and answer questions you may have about the medicine.  If you have questions or concerns that the pharmacist cannot address, please call or return to the Emergency Department.       Remember that you can always come  back to the Emergency Department if you are not able to see your regular provider in the amount of time listed above, if you get any new symptoms, or if there is anything that worries you.

## 2019-05-13 NOTE — ED PROVIDER NOTES
History     Chief Complaint:  Chest Pain    The history is provided by the patient.      Chloe Foster is a 38 year old female who presents with chest pain. The patient was sitting in a car at around 1300 when she states it felt like there was a rubber band around her heart. She states that it is not really painful but it is uncomfortable. The discomfort has been intermittent most of today. She denies leg pain, shortness of breath or pain with deep breaths. She additionally reports that her heart rate has been increased. She had some light-headedness when she first had the chest discomfort. The patient denies any family history of blood clots.  She apparently has had some of this previously as well.  She just returned from a long distance trip.  She also indicates that yesterday she was nauseous most of the day and vomited once yesterday.  She no longer feels nauseated and denies any abdominal pain.  She denies any further symptoms.    Allergies:  No known drug allergies.     Medications:    Aspirin ec 81 mg ec tablet  Bupropion (wellbutrin sr) 150 mg 12 hr tablet  Bupropion (wellbutrin xl) 150 mg 24 hr tablet  Lorazepam (ativan) 1 mg tablet  Magnesium oxide (mag-ox) 400 mg tablet  Omega-3 fatty acids (fish oil po)  Propranolol (inderal) 10 mg tablet  Statin not prescribed, intentional,  Trazodone (desyrel) 50 mg tablet    Past Medical History:    Acute stress reaction   Allergic rhinitis, cause unspecified   Anxiety state, unspecified   CVA (cerebral vascular accident)   Depressive disorder, not elsewhere classified   Dizziness   Elevated lipoprotein A level   Hirsutism   History of stroke with residual effects   PFO (patent foramen ovale)   Urinary tract infection, site not specified  Vitamin D deficiency  Lumbar herniated disc  Insomnia  IBS (irritable bowel syndrome)  Protein S deficiency   Migraine with aura and without status migrainosus, not intractable  Elevated lipoprotein A level  Social anxiety  "disorder  MTHFR mutation (H)  Nasal obstruction  Nasal valve collapse  Deviated nasal septum    Past Surgical History:    Arthroscopy knee right  Repair nasal septum  Repair patent foramen ovale    Family History:    Cancer  Hypertension  Allergies  Arrhythmia  Diabetes  C.A.D.    Social History:  Patient is   Tobacco Use: Former smoker  Alcohol Use: Yes  PCP: Izabel Reyes     Review of Systems   Constitutional: Negative for fever.   Respiratory: Negative for shortness of breath.    Cardiovascular: Positive for chest pain. Negative for leg swelling.        Increased heart rate   Neurological: Positive for light-headedness.   All other systems reviewed and are negative.    Physical Exam   First Vitals:  Patient Vitals for the past 24 hrs:   BP Temp Temp src Heart Rate Resp SpO2 Height Weight   05/13/19 1750 -- -- -- 100 -- -- -- --   05/13/19 1740 -- -- -- 96 -- -- -- --   05/13/19 1720 -- -- -- 98 20 -- -- --   05/13/19 1650 -- -- -- 102 -- -- -- --   05/13/19 1645 -- -- -- 102 -- -- -- --   05/13/19 1630 -- -- -- 104 -- -- -- --   05/13/19 1517 116/77 98.3  F (36.8  C) Oral 120 16 96 % 1.702 m (5' 7\") 72.6 kg (160 lb)     Physical Exam  Nursing note and vitals reviewed.  Constitutional:  Oriented to person, place, and time. Cooperative.   HENT:   Nose:    Nose normal.   Mouth/Throat:   Mucous membranes are normal.   Eyes:    Conjunctivae normal and EOM are normal.      Pupils are equal, round, and reactive to light.   Neck:    Trachea normal.   Cardiovascular:  Tachycardic rate, regular rhythm, normal heart sounds and normal pulses. No murmur heard.  Pulmonary/Chest:  Effort normal and breath sounds normal.   Abdominal:   Soft. Normal appearance and bowel sounds are normal.      There is no tenderness.      There is no rebound and no CVA tenderness.   Musculoskeletal:  Extremities atraumatic x 4.   Lymphadenopathy:  No cervical adenopathy.   Neurological:   Alert and oriented to person, place, and " time. Normal strength.      No cranial nerve deficit or sensory deficit. GCS eye subscore is 4. GCS verbal subscore is 5. GCS motor subscore is 6.   Skin:    Skin is intact. No rash noted.   Psychiatric:   Normal mood and affect.    Emergency Department Course   ECG:  @ 1523  Indication: Chest pain  Vent. Rate 114 bpm. RI interval 154 ms. QRS duration 76 ms. QT/QTc 336/463 ms. P-R-T axis 61 8 59.   Sinus tachycardia. Otherwise normal ECG.     Read @ 1622 by Dr. Nice.    Imaging:  Radiographic findings were communicated with the patient who voiced understanding of the findings.  XR chest 2 views:  Cardiac silhouette and pulmonary vasculature are within  normal limits. No focal airspace disease, pleural effusion or  pneumothorax. Septal closure device is seen. Per radiology read.    Laboratory:  UA: Ketone 5 (A), Blood moderate (A), Leukocyte Esterase moderate (A), Yeast few (A), Squamous Epithelial 6 high, o/w WNL  Urine Culture: Pending  HCG: Negative   CBC:  WBC 4.5, HGB 13.5,   CMP: Potassium 3.2 low, Chloride 110 high, Anion Gap 2 low, Glucose 160 high, Calcium 8.4 low, Protein Total 6.6 low, o/w WNL. (Creatinine 0.66)  Lipase: 174  Troponin: <0.015  D-Dimer: 0.3    Interventions:  1707: NS 1L IV  1711: Toradol 15mg IV    Emergency Department Course:  4:13 PM Nursing notes and vitals reviewed.  I performed an exam of the patient as documented above.     6:30 PM Findings and plan explained to the patient. Patient discharged home with instructions regarding supportive care, medications, and reasons to return. The importance of close follow-up was reviewed.     Impression & Plan      Medical Decision Making:  This is a 38-year-old female came in with chest pain and tachycardia.  I proceeded with the above work-up, and with a negative d-dimer, I feel that pulmonary embolism has been sufficiently ruled out.  Her EKG is unremarkable other than showing sinus tachycardia.  The rest of her blood work and  chest x-ray are all unremarkable as well.  At this point I feel that she is safe for discharge, as I do not feel that this is something worrisome.  She has no risk factors for coronary artery disease.  She should follow-up with her physician and certainly return though with any concerns or worsening symptoms.    Diagnosis:    ICD-10-CM    1. Chest pain, unspecified type R07.9    2. Sinus tachycardia R00.0        Disposition:  discharged to home    I, Bradley Aasen, am serving as a scribe on 5/13/2019 at 4:13 PM to personally document services performed by Aidan Nice MD based on my observations and the provider's statements to me.        Aidan Nice MD  05/13/19 9611

## 2019-05-13 NOTE — ED TRIAGE NOTES
Patient was riding in the car and had a feeling like a rubber band was wrapped around her chest. She doesn't describe it as painful but it is noticeable.

## 2019-05-14 LAB
BACTERIA SPEC CULT: NORMAL
Lab: NORMAL
SPECIMEN SOURCE: NORMAL

## 2019-05-22 ENCOUNTER — PSYCHE (OUTPATIENT)
Dept: PSYCHOLOGY | Facility: CLINIC | Age: 39
End: 2019-05-22
Payer: COMMERCIAL

## 2019-05-22 DIAGNOSIS — F41.9 ANXIETY: Primary | ICD-10-CM

## 2019-05-22 PROCEDURE — 90834 PSYTX W PT 45 MINUTES: CPT | Performed by: SOCIAL WORKER

## 2019-05-22 ASSESSMENT — ANXIETY QUESTIONNAIRES
3. WORRYING TOO MUCH ABOUT DIFFERENT THINGS: NEARLY EVERY DAY
1. FEELING NERVOUS, ANXIOUS, OR ON EDGE: MORE THAN HALF THE DAYS
IF YOU CHECKED OFF ANY PROBLEMS ON THIS QUESTIONNAIRE, HOW DIFFICULT HAVE THESE PROBLEMS MADE IT FOR YOU TO DO YOUR WORK, TAKE CARE OF THINGS AT HOME, OR GET ALONG WITH OTHER PEOPLE: VERY DIFFICULT
7. FEELING AFRAID AS IF SOMETHING AWFUL MIGHT HAPPEN: MORE THAN HALF THE DAYS
GAD7 TOTAL SCORE: 13
6. BECOMING EASILY ANNOYED OR IRRITABLE: MORE THAN HALF THE DAYS
2. NOT BEING ABLE TO STOP OR CONTROL WORRYING: SEVERAL DAYS
5. BEING SO RESTLESS THAT IT IS HARD TO SIT STILL: SEVERAL DAYS

## 2019-05-22 ASSESSMENT — PATIENT HEALTH QUESTIONNAIRE - PHQ9
SUM OF ALL RESPONSES TO PHQ QUESTIONS 1-9: 3
5. POOR APPETITE OR OVEREATING: MORE THAN HALF THE DAYS

## 2019-05-22 NOTE — PROGRESS NOTES
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    Homework: Completed in session      Episode of Care Goals: Minimal progress - ACTION (Actively working towards change); Intervened by reinforcing change plan / affirming steps taken  Processing stressors and looking at ways to cope better.   Current / Ongoing Stressors and Concerns:   Health and occupational issues. Continues in couple therapy occasionally.      Treatment Objective(s) Addressed in This Session:   Some discussion on ways relationship with mother and between mo and father impacted her view of marriage, attachment. Realizing she tends to accommodate and overfunction and looking at ways this contributes to stress overload.       Intervention:    Used flight as primary source of coping in chaotic family stress.  Today: Looking more deeply at ways to self attune and begin to set different limits. Recommended she look at [facility] [employer] work with couples navigating conflict.        ASSESSMENT: Current Emotional / Mental Status (status of significant symptoms):   Risk status (Self / Other harm or suicidal ideation)   Client denies current fears or concerns for personal safety.   Client denies current or recent suicidal ideation or behaviors.   Client denies current or recent homicidal ideation or behaviors.   Client denies current or recent self injurious behavior or ideation.   Client  denies other safety concerns.   Client Client reports there has been no change in risk factors since their last session.     Client Client reports there has been no change in protective factors since their last session.     A safety and risk management plan has not been developed at this time, however client was given the after-hours number / 911 should there be a change in any of these risk factors.     Appearance:   Appropriate    Eye Contact:   Good    Psychomotor Behavior: Normal    Attitude:   Cooperative    Orientation:   All   Speech    Rate / Production: Normal     Volume:  Normal    Mood:    Anxious    Affect:    Appropriate    Thought Content:  Clear  Rumination    Thought Form:  Coherent  Logical    Insight:    Good      Medication Review:   Changes to psychiatric medications, see updated Medication List in EPIC. Began taking Wellbutrin today, managed by her GP.     Medication Compliance:   Yes     Changes in Health Issues:   None reported     Chemical Use Review:   Substance Use: Chemical use reviewed, no active concerns identified      Tobacco Use: No current tobacco use.      Diagnosis:  1. Anxiety        Collateral Reports Completed:   Not Applicable    PLAN: (Client Tasks / Therapist Tasks / Other)  Returns in 2-3 weeks  Will review [employer] work on line  Will work on self attunement with regards to noticing where and when to set limits to decrease stress.      Tamanna Lim, [facility] 5/22/19                                                       ______________________________________________________________________    Treatment Plan    Client's Name: Chloe Foster  YOB: 1980    Date: 12/5/2018;5/19 ( ct comes in infrequently)      DSM-V Diagnoses: 300.00 (F41.9) Unspecified Anxiety Disorder  Psychosocial / Contextual Factors: marital stress; occupational stress and health issues.  WHODAS: see intake    Referral / Collaboration:  Referral to another professional/service is not  indicated at this time..    Anticipated number of session or this episode of care: evaluate every 90 days.      MeasurableTreatment Goal(s) related to diagnosis / functional impairment(s)  Goal 1: Client will improve management of anxiety    I will know I've met my goal when Im less thrown off by triggers.      Objective #A (Client Action)    Client will identify health and life fears / thoughts that contribute to feeling anxious. Ct will be able to identify and challenge these 90% of time  Status: continued    Intervention(s)  Therapist will teach CBT/mindfulness skills.    Objective #B  Client will Identify negative self-talk and behaviors: challenge core beliefs, myths, and actions  Improve concentration, focus, and mindfulness in daily activities .  Status: continued    Intervention(s)  Therapist will CBT and neuroscience based interventions to managemood regulation..    Objective #C  Client will identify and engage in 2 self care behaviors a week.  Status: continued    Intervention(s)  Therapist will encourage and support efforts.        Client has reviewed and agreed to the above plan.      BETO Nieto  December 5, 2018; 5/19

## 2019-05-23 ENCOUNTER — MYC MEDICAL ADVICE (OUTPATIENT)
Dept: FAMILY MEDICINE | Facility: CLINIC | Age: 39
End: 2019-05-23

## 2019-05-23 DIAGNOSIS — J34.89 NASAL OBSTRUCTION: Primary | ICD-10-CM

## 2019-05-23 ASSESSMENT — ANXIETY QUESTIONNAIRES: GAD7 TOTAL SCORE: 13

## 2019-05-23 NOTE — TELEPHONE ENCOUNTER
CW  Please see MyChart  Pended new referral for nasal obstruction  Please approve if appropriate  Noelle Chavez RN

## 2019-05-27 ENCOUNTER — OFFICE VISIT (OUTPATIENT)
Dept: URGENT CARE | Facility: URGENT CARE | Age: 39
End: 2019-05-27
Payer: COMMERCIAL

## 2019-05-27 VITALS
SYSTOLIC BLOOD PRESSURE: 110 MMHG | HEART RATE: 83 BPM | HEIGHT: 67 IN | OXYGEN SATURATION: 96 % | TEMPERATURE: 97.9 F | WEIGHT: 167 LBS | BODY MASS INDEX: 26.21 KG/M2 | DIASTOLIC BLOOD PRESSURE: 70 MMHG

## 2019-05-27 DIAGNOSIS — R05.9 COUGH: ICD-10-CM

## 2019-05-27 DIAGNOSIS — J01.90 ACUTE SINUSITIS WITH SYMPTOMS > 10 DAYS: Primary | ICD-10-CM

## 2019-05-27 PROCEDURE — 99214 OFFICE O/P EST MOD 30 MIN: CPT | Performed by: FAMILY MEDICINE

## 2019-05-27 RX ORDER — CODEINE PHOSPHATE AND GUAIFENESIN 10; 100 MG/5ML; MG/5ML
2 SOLUTION ORAL EVERY 4 HOURS PRN
Qty: 120 ML | Refills: 0 | Status: SHIPPED | OUTPATIENT
Start: 2019-05-27 | End: 2019-07-03

## 2019-05-27 RX ORDER — IBUPROFEN 200 MG
400 TABLET ORAL EVERY 4 HOURS PRN
COMMUNITY
End: 2020-11-04

## 2019-05-27 ASSESSMENT — MIFFLIN-ST. JEOR: SCORE: 1465.14

## 2019-05-27 NOTE — PATIENT INSTRUCTIONS
Okay to take ibuprofen 200 mg - 4 tablets (800 mg) every 8 hours as needed.  Okay to take tylenol 500 mg - 2 tablets (1000 mg) every 6-8 hours as needed, do not exceed 3000 mg in 24 hours.  Take full course of Augmentin for sinus infection.  Use flonase to help with sinus congestion.  Okay to take robitussin with codeine to help with cough, best at bedtime due to drowsiness.      Patient Education     Sinusitis (Antibiotic Treatment)    The sinuses are air-filled spaces within the bones of the face. They connect to the inside of the nose. Sinusitis is an inflammation of the tissue that lines the sinuses. Sinusitis can occur during a cold. It can also happen due to allergies to pollens and other particles in the air. Sinusitis can cause symptoms of sinus congestion and a feeling of fullness. A sinus infection causes fever, headache, and facial pain. There is often green or yellow fluid draining from the nose or into the back of the throat (post-nasal drip). You have been given antibiotics to treat this condition.  Home care    Take the full course of antibiotics as instructed. Do not stop taking them, even when you feel better.    Drink plenty of water, hot tea, and other liquids. This may help thin nasal mucus. It also may help your sinuses drain fluids.    Heat may help soothe painful areas of your face. Use a towel soaked in hot water. Or,  the shower and direct the warm spray onto your face. Using a vaporizer along with a menthol rub at night may also help soothe symptoms.     An expectorant with guaifenesin may help thin nasal mucus and help your sinuses drain fluids.    You can use an over-the-counter decongestant, unless a similar medicine was prescribed to you. Nasal sprays work the fastest. Use one that contains phenylephrine or oxymetazoline. First blow your nose gently. Then use the spray. Do not use these medicines more often than directed on the label. If you do, your symptoms may get worse. You  may also take pills that contain pseudoephedrine. Don t use products that combine multiple medicines. This is because side effects may be increased. Read labels. You can also ask the pharmacist for help. (People with high blood pressure should not use decongestants. They can raise blood pressure.)    Over-the-counter antihistamines may help if allergies contributed to your sinusitis.      Do not use nasal rinses or irrigation during an acute sinus infection, unless your healthcare provider tells you to. Rinsing may spread the infection to other areas in your sinuses.    Use acetaminophen or ibuprofen to control pain, unless another pain medicine was prescribed to you. If you have chronic liver or kidney disease or ever had a stomach ulcer, talk with your healthcare provider before using these medicines. (Aspirin should never be taken by anyone under age 18 who is ill with a fever. It may cause severe liver damage.)    Don't smoke. This can make symptoms worse.  Follow-up care  Follow up with your healthcare provider or our staff if you are not better in 1 week.  When to seek medical advice  Call your healthcare provider if any of these occur:    Facial pain or headache that gets worse    Stiff neck    Unusual drowsiness or confusion    Swelling of your forehead or eyelids    Vision problems, such as blurred or double vision    Fever of 100.4 F (38 C) or higher, or as directed by your healthcare provider    Seizure    Breathing problems    Symptoms don't go away in 10 days  Prevention  Here are steps you can take to help prevent an infection:    Keep good hand washing habits.    Don t have close contact with people who have sore throats, colds, or other upper respiratory infections.    Don t smoke, and stay away from secondhand smoke.    Stay up to date with of your vaccines.  Date Last Reviewed: 11/1/2017 2000-2018 [employer]. [address] [place] [address], [place] [address]. All rights reserved. This  information is not intended as a substitute for professional medical care. Always follow your healthcare professional's instructions.

## 2019-05-27 NOTE — PROGRESS NOTES
SUBJECTIVE:   Chloe Foster is a 38 year old female presenting with a chief complaint of URI symptoms.  Cough, sore throat, headaches.  Getting auras similar to migraine HA.  Patient   Onset of symptoms was 10 day(s) ago.  Course of illness is waxing and waning.    Severity moderate  Current and Associated symptoms: sinus pressure, poor sleep  Treatment measures tried include Excedrin migraine, jefferson DM  Predisposing factors include hx seasonal allergies, migraine HA, h/o CVA.    Patient states that had CVA when pregnant, found out had PFO which has now been repaired.  Only has aura/migraine 4 times over the past 5 years and getting migraine auras several days in a row was disconcerting for her.  Did improve with excedrin.  Has Neurology and will be contacting specialist tomorrow.    Will be going out of town this Thursday    Past Medical History:   Diagnosis Date     Acute stress reaction     propranolol helps prior to interviews, stressful situations     Allergic rhinitis, cause unspecified     no meds except prn flonase, tests generally positive, decided against shots     Anxiety state, unspecified     citalopram started in 7/06, stopped after couple wks, felt sluggish/tired     CVA (cerebral vascular accident) (H)     May 1, May 14th 2015     Depressive disorder, not elsewhere classified     dx, but not sure this is correct, weaned off wellbutrin (4/05-6/06, 12/06-1/08), restarted 3/08     Dizziness      Elevated lipoprotein A level      Hirsutism     saw endo, inconclusive, started on spironolactone (helped a little), stopped in 6/06, elevated DHEA- sees new endo 4/08     History of stroke with residual effects      PFO (patent foramen ovale)     Post PFO/ASD closure with 30mm Amplatzer device 4/21/16     Urinary tract infection, site not specified     recurrent, start nitrofur in 5/08, 6mo txt     Current Outpatient Medications   Medication Sig Dispense Refill     aspirin EC 81 MG EC tablet Take 81 mg by  "mouth       buPROPion (WELLBUTRIN SR) 150 MG 12 hr tablet Take 1 tablet (150 mg) by mouth daily 30 tablet 1     buPROPion (WELLBUTRIN XL) 150 MG 24 hr tablet Take 1 tablet (150 mg) by mouth every morning 30 tablet 1     ibuprofen (ADVIL/MOTRIN) 200 MG tablet Take 400 mg by mouth every 4 hours as needed for mild pain       LORazepam (ATIVAN) 1 MG tablet Use 1/2-1 tab at night prior to meetings or for panic symptoms 12 tablet 0     magnesium oxide (MAG-OX) 400 MG tablet Take 400 mg by mouth every evening        Multiple Vitamins-Minerals (MULTIVITAMIN GUMMIES WOMENS PO) Take 2 chew tab by mouth every evening       NONFORMULARY Take 1 tablet by mouth every evening OTC: B-Right Vitamin (Optimized B Complex)       Omega-3 Fatty Acids (FISH OIL PO) Take 1 capsule by mouth daily        propranolol (INDERAL) 10 MG tablet Take 1 tablet by mouth. Take as needed prior to interviews, stressful events 20 tablet 1     traZODone (DESYREL) 50 MG tablet Take 0.5 tablets (25 mg) by mouth nightly as needed for sleep Can increase to 100mg/night if needed for insomnia. 30 tablet 1     VITAMIN D, CHOLECALCIFEROL, PO Take 1,000 Units by mouth every evening (takes 2 x 100 unit capsule)        STATIN NOT PRESCRIBED, INTENTIONAL, Statin not prescribed intentionally due to Woman of childbearing age not actively taking birth control 0 each 0     Social History     Tobacco Use     Smoking status: Former Smoker     Packs/day: 0.50     Years: 5.00     Pack years: 2.50     Types: Cigarettes     Last attempt to quit: 2014     Years since quittin.1     Smokeless tobacco: Never Used   Substance Use Topics     Alcohol use: Yes     Alcohol/week: 0.0 oz     Comment: SOCIAL       ROS:  Review of systems negative except as stated above.    OBJECTIVE:  /70 (BP Location: Right arm, Patient Position: Sitting, Cuff Size: Adult Regular)   Pulse 83   Temp 97.9  F (36.6  C) (Tympanic)   Ht 1.702 m (5' 7\")   Wt 75.8 kg (167 lb)   LMP  (LMP " Unknown)   SpO2 96%   Breastfeeding? No   BMI 26.16 kg/m    GENERAL APPEARANCE: healthy, alert and no distress  EYES: EOMI,  PERRL, conjunctiva clear  HENT: ear canals and TM's normal.  Nose and mouth without ulcers, erythema or lesions  NECK: supple, nontender, no lymphadenopathy  RESP: lungs clear to auscultation - no rales, rhonchi or wheezes  CV: regular rates and rhythm, normal S1 S2, no murmur noted  Extremities: no peripheral edema or tenderness, peripheral pulses normal  SKIN: no suspicious lesions or rashes  PSYCH: mentation appears normal and affect normal/bright    ASSESSMENT/PLAN:  (J01.90) Acute sinusitis with symptoms > 10 days  (primary encounter diagnosis)  Plan: amoxicillin-clavulanate (AUGMENTIN) 875-125 MG         tablet            (R05) Cough  Plan: guaiFENesin-codeine (ROBITUSSIN AC) 100-10         MG/5ML solution            Reassurance given, reviewed symptomatic treatment with tylenol, ibuprofen, plenty of fluids and rest.  RX Augmentin given for sinus infection due to double sickening and co-morbid medical problems.  Encourage to use flonase.  Reviewed that migraines can occur due to poor sleep and being sick.  Reviewed cough symptoms, RX jefferson AC given to help with cough, caution given in regards to narcotic and benzo use together and patient verbalized understanding to not take these medication together.    Follow up with primary provider if no improvement within 1 week    Gianfranco Han MD  May 27, 2019 10:11 AM

## 2019-05-28 NOTE — TELEPHONE ENCOUNTER
FUTURE VISIT INFORMATION      FUTURE VISIT INFORMATION:    Date: 6/26/19    Time: 11AM    Location: [facility]  REFERRAL INFORMATION:    Referring provider:  Izabel Reyes MD    Referring providers clinic:  [facility]     Reason for visit/diagnosis: Nasal obstruction     RECORDS REQUESTED FROM:       Clinic name Comments Records Status Imaging Status    [facility]  5/23/19 referral [facility]  5/27/19 notes with Dr Gianfranco Han [facility] ED 5/13/19 ED notes with Dr Aidan Nice [facility]    [facility] 5/8/18 MR Brain and MRA Head   8/31/15 CT Angio Head Neck  [facility] PACS   [facility]   12/09/2005 SEPTOPLASTY with Jayjay Cruz MD   Care Everywhere

## 2019-06-04 ENCOUNTER — OFFICE VISIT (OUTPATIENT)
Dept: NEUROLOGY | Facility: CLINIC | Age: 39
End: 2019-06-04
Payer: COMMERCIAL

## 2019-06-04 VITALS
RESPIRATION RATE: 18 BRPM | HEIGHT: 67 IN | BODY MASS INDEX: 25.91 KG/M2 | OXYGEN SATURATION: 96 % | SYSTOLIC BLOOD PRESSURE: 118 MMHG | TEMPERATURE: 98 F | WEIGHT: 165.1 LBS | DIASTOLIC BLOOD PRESSURE: 77 MMHG | HEART RATE: 88 BPM

## 2019-06-04 DIAGNOSIS — Z86.73 HISTORY OF TIA (TRANSIENT ISCHEMIC ATTACK) AND STROKE: Primary | ICD-10-CM

## 2019-06-04 DIAGNOSIS — H53.9 VISION CHANGES: ICD-10-CM

## 2019-06-04 RX ORDER — DIAZEPAM 5 MG
5 TABLET ORAL
Qty: 1 TABLET | Refills: 0 | Status: SHIPPED | OUTPATIENT
Start: 2019-06-04 | End: 2019-07-03

## 2019-06-04 ASSESSMENT — PAIN SCALES - GENERAL: PAINLEVEL: NO PAIN (0)

## 2019-06-04 ASSESSMENT — MIFFLIN-ST. JEOR: SCORE: 1456.52

## 2019-06-04 NOTE — PROGRESS NOTES
[facility]    Chloe Foster MRN# [id number]   Age: 38 year old YOB: 1980     Requesting physician: Izabel Schaefer            Assessment and Plan:   Assessment:  1.  Visual disturbance  2.  Migraine aura     Plan:  The patient has had a significant increase in her visual disturbance or visual aura symptoms in the past 2 weeks without any clear precipitating event.  Certainly it could be related to some sort of sinus congestion and allergy reaction but considering her history of stroke in the past I have recommended she go for an MRI scan of the brain with and without gadolinium to further evaluate.  I have also asked her to get a dilated eye examination with her neuro-ophthalmologist to make sure were not missing any branch occlusions considering her continued visual disturbance.  I will call the patient with her MRI test results.    Latia Post MD [facility]  Department of Neurology  Pager [phone]             History of Present Illness:   CC: Recheck for migraine aura symptoms    Chloe is a 38-year-old woman who has not been seen since October 2018.  She has a past medical history significant for a protein S deficiency and a PFO status post closure in April 2016.  She had an ischemic stroke in the setting of hypercoagulability of pregnancy prior to the closure.  In that setting she has had migraine auras since 2015.  Typical frequency is 2-3 times a year maximum.  She reports that about a week ago she had 4 days in a row of migraine visual aura symptoms.  Prior to that time she had a more significant visual aura symptom upon awakening which is never happened to her.  The symptoms last 60 minutes at a time but in between she does not feel as though the symptoms have completely gone away and she is having just slightly more difficulty focusing her vision.  When the aura is present she describes a waviness of the visual lines in both visual fields.   "Currently she is not having those symptoms but when she tries to focus on reading things she just cannot quite clearly see.  It does not prove if she closes one eye or the other and she has no diplopia.    After the visual aura she will have some light sensitivity.  She has a mild headache.  She cannot think of a clear trigger of these 4 days of symptoms in a row but does report recently some sinus congestion from allergies.             Physical Exam:   /77 (BP Location: Left arm, Patient Position: Sitting, Cuff Size: Adult Regular)   Pulse 88   Temp 98  F (36.7  C) (Oral)   Resp 18   Ht 1.702 m (5' 7\")   Wt 74.9 kg (165 lb 1.6 oz)   LMP  (LMP Unknown)   SpO2 96%   BMI 25.86 kg/m    General appearance: The patient is well-groomed and cooperative with examination.  She is in no acute distress  Neurological examination: The patient is awake and alert.  She is oriented.  There is no aphasia or dysarthria. Pupils are equal round and reactive to light.  Funduscopic examination appears normal although pupils are quite small and full visualization of the retina is not available at this time.  Visual field testing to confrontation is normal.  There is no evidence of extraocular movement abnormalities and the patient does not endorse diplopia.  Remainder of cranial nerves II through XII are intact.  Reflexes are normal in the upper and lower extremities with plantar responses being flexor         Data:   All laboratory data reviewed  All imaging studies reviewed by me             DATA for DOCUMENTATION:         Past Medical History:     Patient Active Problem List   Diagnosis     Vitamin D deficiency     CARDIOVASCULAR SCREENING; LDL GOAL LESS THAN 160     Lumbar herniated disc     Hirsutism     Insomnia     IBS (irritable bowel syndrome)     Moderate recurrent major depression (H)     Cerebral artery occlusion with cerebral infarction     History of  section     PFO with atrial septal aneurysm     " Protein S deficiency (H)     Migraine with aura and without status migrainosus, not intractable     Pre-conception counseling     Elevated lipoprotein A level     Social anxiety disorder     MTHFR mutation (H)     Cerebrovascular accident (CVA) due to embolism of right middle cerebral artery (H)     Nasal obstruction     Nasal valve collapse     Deviated nasal septum     Dizziness     Past Medical History:   Diagnosis Date     Acute stress reaction     propranolol helps prior to interviews, stressful situations     Allergic rhinitis, cause unspecified     no meds except prn flonase, tests generally positive, decided against shots     Anxiety state, unspecified     citalopram started in 7/06, stopped after couple wks, felt sluggish/tired     CVA (cerebral vascular accident) (H)     May 1, May 14th 2015     Depressive disorder, not elsewhere classified     dx, but not sure this is correct, weaned off wellbutrin (4/05-6/06, 12/06-1/08), restarted 3/08     Dizziness      Elevated lipoprotein A level      Hirsutism     saw endo, inconclusive, started on spironolactone (helped a little), stopped in 6/06, elevated DHEA- sees new endo 4/08     History of stroke with residual effects      PFO (patent foramen ovale)     Post PFO/ASD closure with 30mm Amplatzer device 4/21/16     Urinary tract infection, site not specified     recurrent, start nitrofur in 5/08, 6mo txt       Also see scanned health assessment forms.       Past Surgical History:     Past Surgical History:   Procedure Laterality Date     ARTHROSCOPY KNEE RT/LT  1995    Rt knee     HC REPAIR OF NASAL SEPTUM  12/05     REPAIR PATENT FORAMEN OVALE  04/21/2016            Social History:     Social History     Socioeconomic History     Marital status:      Spouse name: Not on file     Number of children: 0     Years of education: 15     Highest education level: Not on file   Occupational History     Occupation:      Comment: [employer]    Social Needs     Financial resource strain: Not on file     Food insecurity:     Worry: Not on file     Inability: Not on file     Transportation needs:     Medical: Not on file     Non-medical: Not on file   Tobacco Use     Smoking status: Former Smoker     Packs/day: 0.50     Years: 5.00     Pack years: 2.50     Types: Cigarettes     Last attempt to quit: 2014     Years since quittin.1     Smokeless tobacco: Never Used   Substance and Sexual Activity     Alcohol use: Yes     Alcohol/week: 0.0 oz     Comment: SOCIAL     Drug use: No     Sexual activity: Yes     Partners: Male     Birth control/protection: Pull-out method   Lifestyle     Physical activity:     Days per week: Not on file     Minutes per session: Not on file     Stress: Not on file   Relationships     Social connections:     Talks on phone: Not on file     Gets together: Not on file     Attends Jewish service: Not on file     Active member of club or organization: Not on file     Attends meetings of clubs or organizations: Not on file     Relationship status: Not on file     Intimate partner violence:     Fear of current or ex partner: Not on file     Emotionally abused: Not on file     Physically abused: Not on file     Forced sexual activity: Not on file   Other Topics Concern      Service Not Asked     Blood Transfusions Not Asked     Caffeine Concern No     Comment: 1 cup coffee per day     Occupational Exposure Not Asked     Hobby Hazards Not Asked     Sleep Concern No     Stress Concern No     Weight Concern No     Special Diet No     Back Care Not Asked     Exercise Yes     Comment: 4-5 days week     Bike Helmet Not Asked     Seat Belt Not Asked     Self-Exams Not Asked     Parent/sibling w/ CABG, MI or angioplasty before 65F 55M? No   Social History Narrative    Social Documentation:        Balanced Diet: YES    Calcium intake: 3 per day    Caffeine: 0 per day    Exercise:  type of activity ellipitcal, pilates;  5 times  per week    Sunscreen: Yes    Seatbelts:  Yes    Self Breast Exam:  Yes    Physical/Emotional/Sexual Abuse: No    Do you feel safe in your environment? Yes        Cholesterol screen up to date: Yes- checking today 09, non fasting    Eye Exam up to date: Yes    Dental Exam up to date: Yes    Pap smear up to date: Yes: checking today 09, last was  NIL    Mammogram up to date: Does Not Apply    Dexa Scan up to date: Does Not Apply    Colonoscopy up to date: Does Not Apply    Immunizations up to date: Yes, had one a month ago.    Glucose screen if over 40:  No        Julieth Matthews MA    09              Family History:     Family History   Problem Relation Age of Onset     Family History Negative Mother      Cancer Father         throat     Hypertension Father      Allergies Father      Arrhythmia Father      Family History Negative Sister      Family History Negative Brother      Diabetes Maternal Grandmother         on insulin     C.A.D. Maternal Grandmother         triple bypass in her 60s     Diabetes Paternal Grandmother      C.A.D. Maternal Grandfather         MI in 50s     Neurologic Disorder Paternal Grandfather          of brain aneurysm in early 60s     Diabetes Maternal Aunt          in her early 40s of DM complications, type 2     Coronary Artery Disease Early Onset Maternal Aunt 41     Family History Negative Son             Medications:     Current Outpatient Medications   Medication Sig     amoxicillin-clavulanate (AUGMENTIN) 875-125 MG tablet Take 1 tablet by mouth 2 times daily for 10 days     aspirin EC 81 MG EC tablet Take 81 mg by mouth     guaiFENesin-codeine (ROBITUSSIN AC) 100-10 MG/5ML solution Take 10 mLs by mouth every 4 hours as needed for cough     ibuprofen (ADVIL/MOTRIN) 200 MG tablet Take 400 mg by mouth every 4 hours as needed for mild pain     LORazepam (ATIVAN) 1 MG tablet Use 1/2-1 tab at night prior to meetings or for panic symptoms     magnesium  oxide (MAG-OX) 400 MG tablet Take 400 mg by mouth every evening      Multiple Vitamins-Minerals (MULTIVITAMIN GUMMIES WOMENS PO) Take 2 chew tab by mouth every evening     NONFORMULARY Take 1 tablet by mouth every evening OTC: B-Right Vitamin (Optimized B Complex)     Omega-3 Fatty Acids (FISH OIL PO) Take 1 capsule by mouth daily      propranolol (INDERAL) 10 MG tablet Take 1 tablet by mouth. Take as needed prior to interviews, stressful events     STATIN NOT PRESCRIBED, INTENTIONAL, Statin not prescribed intentionally due to Woman of childbearing age not actively taking birth control     traZODone (DESYREL) 50 MG tablet Take 0.5 tablets (25 mg) by mouth nightly as needed for sleep Can increase to 100mg/night if needed for insomnia.     VITAMIN D, CHOLECALCIFEROL, PO Take 1,000 Units by mouth every evening (takes 2 x 100 unit capsule)      buPROPion (WELLBUTRIN SR) 150 MG 12 hr tablet Take 1 tablet (150 mg) by mouth daily (Patient not taking: Reported on 6/4/2019)     buPROPion (WELLBUTRIN XL) 150 MG 24 hr tablet Take 1 tablet (150 mg) by mouth every morning (Patient not taking: Reported on 6/4/2019)     No current facility-administered medications for this visit.               Review of Systems:   A comprehensive 10 point review of systems (constitutional, ENT, cardiac, peripheral vascular, lymphatic, respiratory, GI, , Musculoskeletal, skin, Neurological) was performed and found to be negative except as described in this note.     See intake form completed by patient

## 2019-06-04 NOTE — LETTER
6/4/2019       RE: Chloe Foster  [address] ([place]) [id number]     Dear Colleague,    Thank you for referring your patient, Chloe Foster, to the [facility] NEUR[facility] at [facility]. Please see a copy of my visit note below.      [facility]    Chloe Foster MRN# [id number]   Age: 38 year old YOB: 1980     Requesting physician: Izabel Schaefer            Assessment and Plan:   Assessment:  1.  Visual disturbance  2.  Migraine aura     Plan:  The patient has had a significant increase in her visual disturbance or visual aura symptoms in the past 2 weeks without any clear precipitating event.  Certainly it could be related to some sort of sinus congestion and allergy reaction but considering her history of stroke in the past I have recommended she go for an MRI scan of the brain with and without gadolinium to further evaluate.  I have also asked her to get a dilated eye examination with her neuro-ophthalmologist to make sure were not missing any branch occlusions considering her continued visual disturbance.  I will call the patient with her MRI test results.    Latia Post MD [facility]  Department of Neurology  Pager [phone]             History of Present Illness:   CC: Recheck for migraine aura symptoms    Chloe is a 38-year-old woman who has not been seen since October 2018.  She has a past medical history significant for a protein S deficiency and a PFO status post closure in April 2016.  She had an ischemic stroke in the setting of hypercoagulability of pregnancy prior to the closure.  In that setting she has had migraine auras since 2015.  Typical frequency is 2-3 times a year maximum.  She reports that about a week ago she had 4 days in a row of migraine visual aura symptoms.  Prior to that time she had a more significant visual aura symptom upon awakening which is never happened to  "her.  The symptoms last 60 minutes at a time but in between she does not feel as though the symptoms have completely gone away and she is having just slightly more difficulty focusing her vision.  When the aura is present she describes a waviness of the visual lines in both visual fields.  Currently she is not having those symptoms but when she tries to focus on reading things she just cannot quite clearly see.  It does not prove if she closes one eye or the other and she has no diplopia.    After the visual aura she will have some light sensitivity.  She has a mild headache.  She cannot think of a clear trigger of these 4 days of symptoms in a row but does report recently some sinus congestion from allergies.             Physical Exam:   /77 (BP Location: Left arm, Patient Position: Sitting, Cuff Size: Adult Regular)   Pulse 88   Temp 98  F (36.7  C) (Oral)   Resp 18   Ht 1.702 m (5' 7\")   Wt 74.9 kg (165 lb 1.6 oz)   LMP  (LMP Unknown)   SpO2 96%   BMI 25.86 kg/m     General appearance: The patient is well-groomed and cooperative with examination.  She is in no acute distress  Neurological examination: The patient is awake and alert.  She is oriented.  There is no aphasia or dysarthria. Pupils are equal round and reactive to light.  Funduscopic examination appears normal although pupils are quite small and full visualization of the retina is not available at this time.  Visual field testing to confrontation is normal.  There is no evidence of extraocular movement abnormalities and the patient does not endorse diplopia.  Remainder of cranial nerves II through XII are intact.  Reflexes are normal in the upper and lower extremities with plantar responses being flexor         Data:   All laboratory data reviewed  All imaging studies reviewed by me             DATA for DOCUMENTATION:         Past Medical History:     Patient Active Problem List   Diagnosis     Vitamin D deficiency     CARDIOVASCULAR " SCREENING; LDL GOAL LESS THAN 160     Lumbar herniated disc     Hirsutism     Insomnia     IBS (irritable bowel syndrome)     Moderate recurrent major depression (H)     Cerebral artery occlusion with cerebral infarction     History of  section     PFO with atrial septal aneurysm     Protein S deficiency (H)     Migraine with aura and without status migrainosus, not intractable     Pre-conception counseling     Elevated lipoprotein A level     Social anxiety disorder     MTHFR mutation (H)     Cerebrovascular accident (CVA) due to embolism of right middle cerebral artery (H)     Nasal obstruction     Nasal valve collapse     Deviated nasal septum     Dizziness     Past Medical History:   Diagnosis Date     Acute stress reaction     propranolol helps prior to interviews, stressful situations     Allergic rhinitis, cause unspecified     no meds except prn flonase, tests generally positive, decided against shots     Anxiety state, unspecified     citalopram started in , stopped after couple wks, felt sluggish/tired     CVA (cerebral vascular accident) (H)     May 1, May 14th 2015     Depressive disorder, not elsewhere classified     dx, but not sure this is correct, weaned off wellbutrin (-, -), restarted 3/08     Dizziness      Elevated lipoprotein A level      Hirsutism     saw endo, inconclusive, started on spironolactone (helped a little), stopped in , elevated DHEA- sees new endo      History of stroke with residual effects      PFO (patent foramen ovale)     Post PFO/ASD closure with 30mm Amplatzer device 16     Urinary tract infection, site not specified     recurrent, start nitrofur in , 6mo txt       Also see scanned health assessment forms.       Past Surgical History:     Past Surgical History:   Procedure Laterality Date     ARTHROSCOPY KNEE RT/LT      Rt knee     HC REPAIR OF NASAL SEPTUM       REPAIR PATENT FORAMEN OVALE  2016            Social  History:     Social History     Socioeconomic History     Marital status:      Spouse name: Not on file     Number of children: 0     Years of education: 15     Highest education level: Not on file   Occupational History     Occupation:      Comment: [employer]   Social Needs     Financial resource strain: Not on file     Food insecurity:     Worry: Not on file     Inability: Not on file     Transportation needs:     Medical: Not on file     Non-medical: Not on file   Tobacco Use     Smoking status: Former Smoker     Packs/day: 0.50     Years: 5.00     Pack years: 2.50     Types: Cigarettes     Last attempt to quit: 2014     Years since quittin.1     Smokeless tobacco: Never Used   Substance and Sexual Activity     Alcohol use: Yes     Alcohol/week: 0.0 oz     Comment: SOCIAL     Drug use: No     Sexual activity: Yes     Partners: Male     Birth control/protection: Pull-out method   Lifestyle     Physical activity:     Days per week: Not on file     Minutes per session: Not on file     Stress: Not on file   Relationships     Social connections:     Talks on phone: Not on file     Gets together: Not on file     Attends Anglican service: Not on file     Active member of club or organization: Not on file     Attends meetings of clubs or organizations: Not on file     Relationship status: Not on file     Intimate partner violence:     Fear of current or ex partner: Not on file     Emotionally abused: Not on file     Physically abused: Not on file     Forced sexual activity: Not on file   Other Topics Concern      Service Not Asked     Blood Transfusions Not Asked     Caffeine Concern No     Comment: 1 cup coffee per day     Occupational Exposure Not Asked     Hobby Hazards Not Asked     Sleep Concern No     Stress Concern No     Weight Concern No     Special Diet No     Back Care Not Asked     Exercise Yes     Comment: 4-5 days week     Bike Helmet Not Asked     Seat Belt Not  Asked     Self-Exams Not Asked     Parent/sibling w/ CABG, MI or angioplasty before 65F 55M? No   Social History Narrative    Social Documentation:        Balanced Diet: YES    Calcium intake: 3 per day    Caffeine: 0 per day    Exercise:  type of activity ellipitcal, pilates;  5 times per week    Sunscreen: Yes    Seatbelts:  Yes    Self Breast Exam:  Yes    Physical/Emotional/Sexual Abuse: No    Do you feel safe in your environment? Yes        Cholesterol screen up to date: Yes- checking today 09, non fasting    Eye Exam up to date: Yes    Dental Exam up to date: Yes    Pap smear up to date: Yes: checking today 09, last was  NIL    Mammogram up to date: Does Not Apply    Dexa Scan up to date: Does Not Apply    Colonoscopy up to date: Does Not Apply    Immunizations up to date: Yes, had one a month ago.    Glucose screen if over 40:  No        Julieth Matthews MA    09              Family History:     Family History   Problem Relation Age of Onset     Family History Negative Mother      Cancer Father         throat     Hypertension Father      Allergies Father      Arrhythmia Father      Family History Negative Sister      Family History Negative Brother      Diabetes Maternal Grandmother         on insulin     C.A.D. Maternal Grandmother         triple bypass in her 60s     Diabetes Paternal Grandmother      C.A.D. Maternal Grandfather         MI in 50s     Neurologic Disorder Paternal Grandfather          of brain aneurysm in early 60s     Diabetes Maternal Aunt          in her early 40s of DM complications, type 2     Coronary Artery Disease Early Onset Maternal Aunt 41     Family History Negative Son             Medications:     Current Outpatient Medications   Medication Sig     amoxicillin-clavulanate (AUGMENTIN) 875-125 MG tablet Take 1 tablet by mouth 2 times daily for 10 days     aspirin EC 81 MG EC tablet Take 81 mg by mouth     guaiFENesin-codeine (ROBITUSSIN AC) 100-10  MG/5ML solution Take 10 mLs by mouth every 4 hours as needed for cough     ibuprofen (ADVIL/MOTRIN) 200 MG tablet Take 400 mg by mouth every 4 hours as needed for mild pain     LORazepam (ATIVAN) 1 MG tablet Use 1/2-1 tab at night prior to meetings or for panic symptoms     magnesium oxide (MAG-OX) 400 MG tablet Take 400 mg by mouth every evening      Multiple Vitamins-Minerals (MULTIVITAMIN GUMMIES WOMENS PO) Take 2 chew tab by mouth every evening     NONFORMULARY Take 1 tablet by mouth every evening OTC: B-Right Vitamin (Optimized B Complex)     Omega-3 Fatty Acids (FISH OIL PO) Take 1 capsule by mouth daily      propranolol (INDERAL) 10 MG tablet Take 1 tablet by mouth. Take as needed prior to interviews, stressful events     STATIN NOT PRESCRIBED, INTENTIONAL, Statin not prescribed intentionally due to Woman of childbearing age not actively taking birth control     traZODone (DESYREL) 50 MG tablet Take 0.5 tablets (25 mg) by mouth nightly as needed for sleep Can increase to 100mg/night if needed for insomnia.     VITAMIN D, CHOLECALCIFEROL, PO Take 1,000 Units by mouth every evening (takes 2 x 100 unit capsule)      buPROPion (WELLBUTRIN SR) 150 MG 12 hr tablet Take 1 tablet (150 mg) by mouth daily (Patient not taking: Reported on 6/4/2019)     buPROPion (WELLBUTRIN XL) 150 MG 24 hr tablet Take 1 tablet (150 mg) by mouth every morning (Patient not taking: Reported on 6/4/2019)     No current facility-administered medications for this visit.               Review of Systems:   A comprehensive 10 point review of systems (constitutional, ENT, cardiac, peripheral vascular, lymphatic, respiratory, GI, , Musculoskeletal, skin, Neurological) was performed and found to be negative except as described in this note.     See intake form completed by patient    Again, thank you for allowing me to participate in the care of your patient.      Sincerely,    Latia Post MD

## 2019-06-05 ENCOUNTER — HOSPITAL ENCOUNTER (OUTPATIENT)
Dept: MRI IMAGING | Facility: CLINIC | Age: 39
Discharge: HOME | End: 2019-06-05
Attending: PSYCHIATRY & NEUROLOGY | Admitting: PSYCHIATRY & NEUROLOGY
Payer: COMMERCIAL

## 2019-06-05 DIAGNOSIS — G43.101 MIGRAINE WITH AURA AND WITH STATUS MIGRAINOSUS, NOT INTRACTABLE: Primary | ICD-10-CM

## 2019-06-05 DIAGNOSIS — Z86.73 HISTORY OF TIA (TRANSIENT ISCHEMIC ATTACK) AND STROKE: ICD-10-CM

## 2019-06-05 DIAGNOSIS — H53.9 VISION CHANGES: ICD-10-CM

## 2019-06-05 PROCEDURE — 70553 MRI BRAIN STEM W/O & W/DYE: CPT

## 2019-06-05 PROCEDURE — 25500064 ZZH RX 255 OP 636: Performed by: PSYCHIATRY & NEUROLOGY

## 2019-06-05 PROCEDURE — A9585 GADOBUTROL INJECTION: HCPCS | Performed by: PSYCHIATRY & NEUROLOGY

## 2019-06-05 RX ORDER — GADOBUTROL 604.72 MG/ML
0.1 INJECTION INTRAVENOUS ONCE
Status: COMPLETED | OUTPATIENT
Start: 2019-06-05 | End: 2019-06-05

## 2019-06-05 RX ORDER — METHYLPREDNISOLONE 4 MG
TABLET, DOSE PACK ORAL
Qty: 21 TABLET | Refills: 0 | Status: SHIPPED | OUTPATIENT
Start: 2019-06-05 | End: 2019-11-21

## 2019-06-05 RX ADMIN — GADOBUTROL 6 ML: 604.72 INJECTION INTRAVENOUS at 09:26

## 2019-06-13 ENCOUNTER — MYC MEDICAL ADVICE (OUTPATIENT)
Dept: FAMILY MEDICINE | Facility: CLINIC | Age: 39
End: 2019-06-13

## 2019-06-25 ENCOUNTER — PSYCHE (OUTPATIENT)
Dept: PSYCHOLOGY | Facility: CLINIC | Age: 39
End: 2019-06-25
Payer: COMMERCIAL

## 2019-06-25 DIAGNOSIS — F41.9 ANXIETY: Primary | ICD-10-CM

## 2019-06-25 PROCEDURE — 90834 PSYTX W PT 45 MINUTES: CPT | Performed by: SOCIAL WORKER

## 2019-06-25 ASSESSMENT — ANXIETY QUESTIONNAIRES
5. BEING SO RESTLESS THAT IT IS HARD TO SIT STILL: SEVERAL DAYS
IF YOU CHECKED OFF ANY PROBLEMS ON THIS QUESTIONNAIRE, HOW DIFFICULT HAVE THESE PROBLEMS MADE IT FOR YOU TO DO YOUR WORK, TAKE CARE OF THINGS AT HOME, OR GET ALONG WITH OTHER PEOPLE: EXTREMELY DIFFICULT
7. FEELING AFRAID AS IF SOMETHING AWFUL MIGHT HAPPEN: MORE THAN HALF THE DAYS
2. NOT BEING ABLE TO STOP OR CONTROL WORRYING: MORE THAN HALF THE DAYS
1. FEELING NERVOUS, ANXIOUS, OR ON EDGE: NEARLY EVERY DAY
3. WORRYING TOO MUCH ABOUT DIFFERENT THINGS: NEARLY EVERY DAY
GAD7 TOTAL SCORE: 16
6. BECOMING EASILY ANNOYED OR IRRITABLE: MORE THAN HALF THE DAYS

## 2019-06-25 ASSESSMENT — PATIENT HEALTH QUESTIONNAIRE - PHQ9
5. POOR APPETITE OR OVEREATING: NEARLY EVERY DAY
SUM OF ALL RESPONSES TO PHQ QUESTIONS 1-9: 10

## 2019-06-25 NOTE — PROGRESS NOTES
Progress Note     Client Name: Chloe Foster  Date: 6/25/19       Service Type: Individual  Video Visit: No     Session Start Time: 9am  Session End Time: 950     Session Length: 50    Session #: 7    Attendees: Client attended alone; not seen in 1 month     Treatment Plan Last Reviewed: today updated plan and entered CGI; will update again in 8 /19  PHQ-9 / AYLA-7 : 10;16    DATA  Interactive Complexity: No  Crisis: No       Progress Since Last Session (Related to Symptoms / Goals / Homework):   Symptoms: Worsening anxiety and depression    Homework: Completed in session      Episode of Care Goals: Minimal progress - ACTION (Actively working towards change); Intervened by reinforcing change plan / affirming steps taken.  Went off Wellbutrin about one month ago as she wasn't feeling benefit and thought she felt more activated on it making her anxiety worse. Has appnt with GP next week to discuss; wants to try another serotonin specific reuptake inhibitor. HAs had a demanding month with home repairs, gfather's death and work related stress as well as having had health sx's that required an MRI.  Processing stressors and looking at ways to cope better.   Current / Ongoing Stressors and Concerns:   Health and occupational issues. Continues in couple therapy occasionally.      Treatment Objective(s) Addressed in This Session:   Generally feeling overwhelmed and tends to overfunction.  HAs little support on hand.  Worked today on reviewing strategies to decrease/manage anxiety.       Intervention:    Looked at bottom up approaches to calming nervous system. Also looked at storyline/narratives she engages in that coerce her into overfunctioning. Agreeing to try grounding skills and meditation apps., and deep breathing.  Planning on going to [place] for 39th bday.     ASSESSMENT: Current Emotional / Mental Status (status of significant symptoms):   Risk status (Self / Other  harm or suicidal ideation)   Client denies current fears or concerns for personal safety.   Client denies current or recent suicidal ideation or behaviors.   Client denies current or recent homicidal ideation or behaviors.   Client denies current or recent self injurious behavior or ideation.   Client denies other safety concerns.   Client Client reports there has been no change in risk factors since their last session.     Client Client reports there has been no change in protective factors since their last session.     A safety and risk management plan has not been developed at this time, however client was given the number to COPE / 911 should there be a change in any of these risk factors.     Appearance:   Appropriate    Eye Contact:   Good    Psychomotor Behavior: Normal    Attitude:   Cooperative    Orientation:   All   Speech    Rate / Production: Normal     Volume:  Normal    Mood:    Anxious    Affect:    Appropriate    Thought Content:  Clear  Rumination    Thought Form:  Coherent  Logical    Insight:    Good      Medication Review:   Changes to psychiatric medications, see updated Medication List in EPIC.  DC'd welbutrin on her won.   Medication Compliance:   No     Changes in Health Issues:   None reported     Chemical Use Review:   Substance Use: Chemical use reviewed, no active concerns identified      Tobacco Use: No current tobacco use.      Diagnosis:  1. Anxiety        Collateral Reports Completed:   Not Applicable    PLAN: (Client Tasks / Therapist Tasks / Other)  Returns in 4 weeks  Will  Work with DBT grounding skills  Will work on self attunement with regards to noticing where and when to set limits to decrease stress.  Will work on challenging internal messages related to overfunctioning    Tamanna Lim, [facility] CenterSW 6/25/19                                                     ______________________________________________________________________    Treatment Plan    Client's Name: Chloe ROCKY  Cristian  YOB: 1980    Date: 12/5/2018;5/19 ( ct comes in infrequently)      DSM-V Diagnoses: 300.00 (F41.9) Unspecified Anxiety Disorder  Psychosocial / Contextual Factors: marital stress; occupational stress and health issues.  WHODAS: see intake    Referral / Collaboration:  Referral to another professional/service is not indicated at this time..    Anticipated number of session or this episode of care: evaluate every 90 days.      MeasurableTreatment Goal(s) related to diagnosis / functional impairment(s)  Goal 1: Client will improve management of anxiety    I will know I've met my goal when Im less thrown off by triggers.      Objective #A (Client Action)    Client will identify health and life fears / thoughts that contribute to feeling anxious. Ct will be able to identify and challenge these 90% of time  Status: continued    Intervention(s)  Therapist will teach CBT/mindfulness skills.    Objective #B  Client will Identify negative self-talk and behaviors: challenge core beliefs, myths, and actions  Improve concentration, focus, and mindfulness in daily activities .  Status: continued    Intervention(s)  Therapist will CBT and neuroscience based interventions to managemood regulation..    Objective #C  Client will identify and engage in 2 self care behaviors a week.  Status: continued    Intervention(s)  Therapist will encourage and support efforts.        Client has reviewed and agreed to the above plan.      BETO Nieto  December 5, 2018; 5/19

## 2019-06-26 ENCOUNTER — PRE VISIT (OUTPATIENT)
Dept: OTOLARYNGOLOGY | Facility: CLINIC | Age: 39
End: 2019-06-26

## 2019-06-26 ASSESSMENT — ANXIETY QUESTIONNAIRES: GAD7 TOTAL SCORE: 16

## 2019-07-02 ENCOUNTER — OFFICE VISIT (OUTPATIENT)
Dept: OPHTHALMOLOGY | Facility: CLINIC | Age: 39
End: 2019-07-02
Attending: PSYCHIATRY & NEUROLOGY
Payer: COMMERCIAL

## 2019-07-02 DIAGNOSIS — H04.123 DRY EYE SYNDROME OF BOTH EYES: Primary | ICD-10-CM

## 2019-07-02 DIAGNOSIS — G43.109 MIGRAINE WITH AURA AND WITHOUT STATUS MIGRAINOSUS, NOT INTRACTABLE: ICD-10-CM

## 2019-07-02 DIAGNOSIS — H53.40 VISUAL FIELD DEFECT: ICD-10-CM

## 2019-07-02 DIAGNOSIS — H53.10 SUBJECTIVE VISUAL DISTURBANCE: Primary | ICD-10-CM

## 2019-07-02 DIAGNOSIS — H53.10 SUBJECTIVE VISUAL DISTURBANCE: ICD-10-CM

## 2019-07-02 PROCEDURE — 92133 CPTRZD OPH DX IMG PST SGM ON: CPT | Mod: ZF | Performed by: OPHTHALMOLOGY

## 2019-07-02 PROCEDURE — G0463 HOSPITAL OUTPT CLINIC VISIT: HCPCS | Mod: ZF

## 2019-07-02 PROCEDURE — 92083 EXTENDED VISUAL FIELD XM: CPT | Mod: ZF | Performed by: OPHTHALMOLOGY

## 2019-07-02 ASSESSMENT — CUP TO DISC RATIO
OS_RATIO: 0.15
OD_RATIO: 0.1

## 2019-07-02 ASSESSMENT — SLIT LAMP EXAM - LIDS
COMMENTS: NORMAL
COMMENTS: NORMAL

## 2019-07-02 ASSESSMENT — CONF VISUAL FIELD
METHOD: COUNTING FINGERS
OS_NORMAL: 1
OD_NORMAL: 1

## 2019-07-02 ASSESSMENT — VISUAL ACUITY
METHOD: SNELLEN - LINEAR
OD_SC: 20/20
OS_SC: 20/20
OD_SC: J1+
OS_SC: J1+

## 2019-07-02 ASSESSMENT — TONOMETRY
OS_IOP_MMHG: X
OS_IOP_MMHG: 17
OD_IOP_MMHG: 22
OD_IOP_MMHG: 22
IOP_METHOD: TONOPEN
IOP_METHOD: TONOPEN

## 2019-07-02 NOTE — PATIENT INSTRUCTIONS
You have been diagnosed with Dry eye syndrome.  Symptoms of Dry eye syndrome can include double vision, eye pain, blurry vision, stinging, burning, tearing, eye redness.      Some useful instructions are listed below:     1.  Use artificial tears on a regular basis.  If you use artificial tear drops with preservative, you can use them up to 4-6 times per day. If you use artificial tear drops without preservatives, then you can use them more often.      2.  Wash your eyelashes with hot water.  Do not make the water so hot that you burn yourself, but the water should be more than just warm.  Often patients will wash their eyelashes in the shower under the hot water.  You should rub gently along the base of your eyelashes.      3.  Taking fish oil capsules twice a day for a month and then once a day after that can help improve Dry eye symptoms.      4.  Drink a lot of water.  Hydration can be helpful.      5.  Humidify your environment.      6.  If this is not beneficial, other treatments can include punctal plugs or cautery, corticosteroid eye drops, Restasis, Lipiflow, or autologous serum.  If you are still struggling with dry eye symptoms, call Dr. Oshea's office for other therapies or a referral to a dry eye specialist.

## 2019-07-02 NOTE — LETTER
2019         RE:  :  MRN: Chloe Foster  1980  [id number]     Dear Dr. Latia Shukla*,    Thank you for asking me to see your very pleasant patient, Chloe Foster, in neuro-ophthalmic consultation.  I would like to thank you for sending your records and I have summarized them in the history of present illness.   My assessment and plan are below.  For further details, please see my attached clinic note.           Assessment & Plan     Chloe Foster is a 38 year old female with the following diagnoses:   1. Dry eye syndrome of both eyes    2. Subjective visual disturbance    3. Migraine with aura and without status migrainosus, not intractable         Patient was sent for consultation at the request of Dr. Post for transient vision loss and migraine aura. She has a past medical history significant for a protein S deficiency and a PFO status post closure in 2016.  She had an ischemic stroke in the setting of hypercoagulability of pregnancy prior to the closure.  In that setting she has had migraine auras since .  Typical frequency is 2-3 times a year maximum. Has had increase in auras with 7 in the last couple months. The symptoms last 60 minutes at a time but in between she does not feel as though the symptoms have completely gone away and she is having just slightly more difficulty focusing her vision.  When the aura is present she describes a waviness of the visual lines in both visual fields. This last one she states was different in that she woke up with it and moved to the other day.  Currently she is not having those symptoms but when she tries to focus on reading things she just cannot quite clearly see and also noticing in distance, but mostly with near.    It does not improve with monocular occlusion.  Seems to wax and wane and there are times where her vision is normal.  Denies palinopsia.      PMH: anxiety    Medications: wellbutrin, valium, lorazepam,  propranolol, trazodone,     MRI 6/5/19:   Impression:  1. No acute intracranial pathology.   2. Two faint areas of signal changes in the right frontal and parietal  white matter. These are likely gliosis from prior ischemic or  inflammatory etiologies.     Visual acuity 20/20 distance and J1+ near both eyes.  Pupils normal with no afferent pupillary defect.   Color vision normal both eyes.  Anterior segment normal both eyes. Fundus exam normal both eyes.  Tear break up time 6 seconds RIGHT eye and 4 seconds left eye.      It is my impression that patient has intermittent blurred vision.  We discussed that she is developing presbyopia and will become more and more dependant on reading glasses.  She also has dry eyes that is made worse with reading.  I asked the patient to use artificial tears as well as warm compresses to the eyelid margin  on a regular basis.  I asked the patient to take fish oil capsules 2x/day for a month and then 1x/d thereafter.   If that is not beneficial we could consider punctal plugs, corticosteroid eye drops and Restasis.     Patient has been having increase in frequency of migraines with auras.  I do not think this is related to her other symptoms of blurred vision.  Discussed avoidance of triggers.  If she feels photophobia is a trigger could consider FL-41 tint.            Again, thank you for allowing me to participate in the care of your patient.      Sincerely,    Heriberto Oshea MD  Professor  Ophthalmology Residency   Director of Neuro-Ophthalmology  [employer] [facility] [employer] Endowed Chair  Departments of Ophthalmology, Neurology, [place] [address]  T - [phone]  F - [phone]  [email or web address]      CC: Latia Post MD  [address] [facility] [address]  VIA In Basket     Nikki Schwartz MD  [address] [address]  VIA In Basket      Anirudh Ontiveros MD  [address] [facility] [id number]  [facility] [address]  VIA In Basket     Wes Luis MD  [address] [facility] [address]  VIA In Basket     Izabel Reyes MD  [address]  [facility] [address]  VIA In Basket     Julio Linton MD  [address] [facility] [id number]  [facility] [address]  VIA In Basket       Addendum: The [facility] is now enrolling patients in the Surgical IIHTT which randomizes patients with Idiopathic Intracranial Hypertension and moderate to severe vision loss to one of the following regimens:    1.  Acetazolamide + diet   2.  Ventriculoperitoneal shunt     3.  Optic nerve sheath fenestration     We would appreciate referral of any newly diagnosed case of bilateral papilledema, ideally before any laboratory evaluation.  Please contact our  at [email or web address] or [phone].  Thank you!

## 2019-07-02 NOTE — NURSING NOTE
Chief Complaints and History of Present Illnesses   Patient presents with     Distored Vision     Chief Complaint(s) and History of Present Illness(es)     Distored Vision     Laterality: both eyes    Onset: sudden    Onset: 7    Quality: distorted vision and hazy    Frequency: intermittently    Timing: throughout the day    Course: gradually worsening    Associated symptoms: photophobia.  Negative for eye pain, double vision, pain with eye movement, fatigue, weight loss, flashes, floaters, redness and tearing    Pain scale: 0/10              Comments     History of 'auras' since 2015, lately progressing with transient VA changes.States VA has not returned to normal since last several visual disturbances within last 6 to 7 weeks.  Hx: stroke May 1st and May 15th, 2015. Hx: migraines.   Abril JOSEPH 8:36 AM 07/02/2019

## 2019-07-02 NOTE — PROGRESS NOTES
Assessment & Plan     Chloe Foster is a 38 year old female with the following diagnoses:   1. Dry eye syndrome of both eyes    2. Subjective visual disturbance    3. Migraine with aura and without status migrainosus, not intractable         Patient was sent for consultation at the request of Dr. Post for transient vision loss and migraine aura. She has a past medical history significant for a protein S deficiency and a PFO status post closure in April 2016.  She had an ischemic stroke in the setting of hypercoagulability of pregnancy prior to the closure.  In that setting she has had migraine auras since 2015.  Typical frequency is 2-3 times a year maximum. Has had increase in auras with 7 in the last couple months. The symptoms last 60 minutes at a time but in between she does not feel as though the symptoms have completely gone away and she is having just slightly more difficulty focusing her vision.  When the aura is present she describes a waviness of the visual lines in both visual fields. This last one she states was different in that she woke up with it and moved to the other day.  Currently she is not having those symptoms but when she tries to focus on reading things she just cannot quite clearly see and also noticing in distance, but mostly with near.    It does not improve with monocular occlusion.  Seems to wax and wane and there are times where her vision is normal.  Denies palinopsia.      PMH: anxiety    Medications: wellbutrin, valium, lorazepam, propranolol, trazodone,     MRI 6/5/19:   Impression:  1. No acute intracranial pathology.   2. Two faint areas of signal changes in the right frontal and parietal  white matter. These are likely gliosis from prior ischemic or  inflammatory etiologies.     Visual acuity 20/20 distance and J1+ near both eyes.  Pupils normal with no afferent pupillary defect.   Color vision normal both eyes.  Anterior segment normal both eyes. Fundus exam normal  both eyes.  Tear break up time 6 seconds RIGHT eye and 4 seconds left eye.      It is my impression that patient has intermittent blurred vision.  We discussed that she is developing presbyopia and will become more and more dependant on reading glasses.  She also has dry eyes that is made worse with reading.  I asked the patient to use artificial tears as well as warm compresses to the eyelid margin  on a regular basis.  I asked the patient to take fish oil capsules 2x/day for a month and then 1x/d thereafter.   If that is not beneficial we could consider punctal plugs, corticosteroid eye drops and Restasis.     Patient has been having increase in frequency of migraines with auras.  I do not think this is related to her other symptoms of blurred vision.  Discussed avoidance of triggers.  If she feels photophobia is a trigger could consider FL-41 tint.             Attending Physician Attestation:  Complete documentation of historical and exam elements from today's encounter can be found in the full encounter summary report (not reduplicated in this progress note).  I personally obtained the chief complaint(s) and history of present illness.  I confirmed and edited as necessary the review of systems, past medical/surgical history, family history, social history, and examination findings as documented by others; and I examined the patient myself.  I personally reviewed the relevant tests, images, and reports as documented above.  I formulated and edited as necessary the assessment and plan and discussed the findings and management plan with the patient and family. - Heriberto Oshea MD

## 2019-07-03 ENCOUNTER — OFFICE VISIT (OUTPATIENT)
Dept: OTOLARYNGOLOGY | Facility: CLINIC | Age: 39
End: 2019-07-03
Attending: FAMILY MEDICINE
Payer: COMMERCIAL

## 2019-07-03 ENCOUNTER — VIRTUAL VISIT (OUTPATIENT)
Dept: FAMILY MEDICINE | Facility: CLINIC | Age: 39
End: 2019-07-03
Payer: COMMERCIAL

## 2019-07-03 VITALS
WEIGHT: 165 LBS | HEIGHT: 67 IN | BODY MASS INDEX: 25.9 KG/M2 | DIASTOLIC BLOOD PRESSURE: 72 MMHG | SYSTOLIC BLOOD PRESSURE: 122 MMHG

## 2019-07-03 DIAGNOSIS — F41.1 ANXIETY STATE: ICD-10-CM

## 2019-07-03 DIAGNOSIS — J34.2 DEVIATED NASAL SEPTUM: ICD-10-CM

## 2019-07-03 DIAGNOSIS — F40.10 SOCIAL ANXIETY DISORDER: ICD-10-CM

## 2019-07-03 DIAGNOSIS — J34.829 NASAL VALVE COLLAPSE: ICD-10-CM

## 2019-07-03 DIAGNOSIS — J34.89 NASAL OBSTRUCTION: Primary | ICD-10-CM

## 2019-07-03 PROCEDURE — 99441 ZZC PHYSICIAN TELEPHONE EVALUATION 5-10 MIN: CPT | Performed by: FAMILY MEDICINE

## 2019-07-03 RX ORDER — FLUTICASONE PROPIONATE 50 MCG
2 SPRAY, SUSPENSION (ML) NASAL DAILY
Qty: 9.9 ML | Refills: 1 | Status: SHIPPED | OUTPATIENT
Start: 2019-07-03 | End: 2019-12-19

## 2019-07-03 RX ORDER — LORAZEPAM 1 MG/1
TABLET ORAL
Qty: 12 TABLET | Refills: 0 | Status: SHIPPED | OUTPATIENT
Start: 2019-07-03 | End: 2019-09-20

## 2019-07-03 RX ORDER — BUSPIRONE HYDROCHLORIDE 10 MG/1
10 TABLET ORAL 2 TIMES DAILY
Qty: 60 TABLET | Refills: 1 | Status: SHIPPED | OUTPATIENT
Start: 2019-07-03 | End: 2019-11-21

## 2019-07-03 ASSESSMENT — ANXIETY QUESTIONNAIRES
IF YOU CHECKED OFF ANY PROBLEMS ON THIS QUESTIONNAIRE, HOW DIFFICULT HAVE THESE PROBLEMS MADE IT FOR YOU TO DO YOUR WORK, TAKE CARE OF THINGS AT HOME, OR GET ALONG WITH OTHER PEOPLE: EXTREMELY DIFFICULT
1. FEELING NERVOUS, ANXIOUS, OR ON EDGE: NEARLY EVERY DAY
GAD7 TOTAL SCORE: 16
3. WORRYING TOO MUCH ABOUT DIFFERENT THINGS: NEARLY EVERY DAY
5. BEING SO RESTLESS THAT IT IS HARD TO SIT STILL: SEVERAL DAYS
6. BECOMING EASILY ANNOYED OR IRRITABLE: MORE THAN HALF THE DAYS
2. NOT BEING ABLE TO STOP OR CONTROL WORRYING: MORE THAN HALF THE DAYS
7. FEELING AFRAID AS IF SOMETHING AWFUL MIGHT HAPPEN: MORE THAN HALF THE DAYS

## 2019-07-03 ASSESSMENT — MIFFLIN-ST. JEOR: SCORE: 1456.07

## 2019-07-03 ASSESSMENT — PATIENT HEALTH QUESTIONNAIRE - PHQ9
SUM OF ALL RESPONSES TO PHQ QUESTIONS 1-9: 10
5. POOR APPETITE OR OVEREATING: NEARLY EVERY DAY
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
## 2019-07-03 NOTE — NURSING NOTE
Flonase ordered by Dr. Schwartz.    Pt. To f/u in month with Dr. Schwartz to see how/if the Flonase is helping pt.'s breathing.    Lou Julian RN  7/3/2019 2:23 PM

## 2019-07-03 NOTE — Clinical Note
Hello! Could you cancel pt's appt for next week? She's going to reschedule for 6 wks out via mychart on her own.Thanks!CW

## 2019-07-04 ASSESSMENT — ANXIETY QUESTIONNAIRES: GAD7 TOTAL SCORE: 16
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
## 2019-07-31 ENCOUNTER — OFFICE VISIT (OUTPATIENT)
Dept: OTOLARYNGOLOGY | Facility: CLINIC | Age: 39
End: 2019-07-31
Payer: COMMERCIAL

## 2019-07-31 VITALS
SYSTOLIC BLOOD PRESSURE: 126 MMHG | WEIGHT: 165 LBS | DIASTOLIC BLOOD PRESSURE: 90 MMHG | HEART RATE: 70 BPM | BODY MASS INDEX: 25.84 KG/M2

## 2019-07-31 DIAGNOSIS — J34.89 NASAL OBSTRUCTION: Primary | ICD-10-CM

## 2019-07-31 DIAGNOSIS — J34.2 DEVIATED NASAL SEPTUM: ICD-10-CM

## 2019-07-31 DIAGNOSIS — J34.829 NASAL VALVE COLLAPSE: ICD-10-CM

## 2019-07-31 ASSESSMENT — PAIN SCALES - GENERAL: PAINLEVEL: NO PAIN (0)

## 2019-07-31 NOTE — NURSING NOTE
Chief Complaint   Patient presents with     RECHECK     One month follow-up for flonase     BP (!) 126/90 (BP Location: Left arm, Patient Position: Chair, Cuff Size: Adult Regular)   Pulse 70   Wt 74.8 kg (165 lb)   BMI 25.84 kg/m      Taylor Wynn, EMT

## 2019-07-31 NOTE — PROGRESS NOTES
Facial Plastic and Reconstructive Surgery      Chloe Foster comes in for follow up.  She continues to have nasal obstruction and has not noted any improvement in breathing with the flonase.   She continues to have static consistent nasal obstruction.     On exam she continues to have external and internal nasal deformity. The lower 2/3 of the nose with rightward deviation, left nasal vault collapse and right midvault bowing. Lack of tip support with tip collapsible completely, thin skin with some base line bossing at the tip, asymmetric tip points with a lower right lower lateral cartilage intermediate richie, short medial crura with left projecting into the nostril, base view with deviation to the right, anterior rhinoscopy with a right internal nasal valve stenosis from a dorsal septal deviation, the left has internal nasal valve collapse that is corrected with modified armani and leads to great improvement in nasal breathing, hard to tell if there is any septal cartilage present, no external nasal scar noted    She will need autologous cartilage graft or possible conchal cartilage graft. She requires surgical correction in order to improve her breathing and will need  grafts and septal reconstruction.  She was again given the quote for the cosmetic nasal tip changes she would like. She understands that this would be in addition to the functional needs she requires.     We will submit for prior authorization once again. She will need a PAC appointment to assess her risk due to her past history of stroke.

## 2019-07-31 NOTE — NURSING NOTE
Updated photodocumentation obtained.    Will appeal denial for septorhino.    Reviewed pre op teaching.     Patient will return for third consult once we get the PA.    Angela Watson RN  7/31/2019 1:51 PM

## 2019-07-31 NOTE — LETTER
7/31/2019       RE: Chloe Foster  [address] [facility] [address]     Dear Colleague,    Thank you for referring your patient, Chloe Foster, to the [facility] EAR [facility] AND THROAT at [facility]. Please see a copy of my visit note below.    Facial Plastic and Reconstructive Surgery      Chloe Foster comes in for follow up.  She continues to have nasal obstruction and has not noted any improvement in breathing with the flonase.   She continues to have static consistent nasal obstruction.     On exam she continues to have external and internal nasal deformity. The lower 2/3 of the nose with rightward deviation, left nasal vault collapse and right midvault bowing. Lack of tip support with tip collapsible completely, thin skin with some base line bossing at the tip, asymmetric tip points with a lower right lower lateral cartilage intermediate richie, short medial crura with left projecting into the nostril, base view with deviation to the right, anterior rhinoscopy with a right internal nasal valve stenosis from a dorsal septal deviation, the left has internal nasal valve collapse that is corrected with modified armani and leads to great improvement in nasal breathing, hard to tell if there is any septal cartilage present, no external nasal scar noted    She will need autologous cartilage graft or possible conchal cartilage graft. She requires surgical correction in order to improve her breathing and will need  grafts and septal reconstruction.  She was again given the quote for the cosmetic nasal tip changes she would like. She understands that this would be in addition to the functional needs she requires.     We will submit for prior authorization once again. She will need a PAC appointment to assess her risk due to her past history of stroke.       Again, thank you for allowing me to participate in the care of your patient.       Sincerely,    Nikki Schwartz MD

## 2019-09-16 ENCOUNTER — TELEPHONE (OUTPATIENT)
Dept: OTOLARYNGOLOGY | Facility: CLINIC | Age: 39
End: 2019-09-16

## 2019-09-18 ENCOUNTER — MYC MEDICAL ADVICE (OUTPATIENT)
Dept: FAMILY MEDICINE | Facility: CLINIC | Age: 39
End: 2019-09-18

## 2019-09-18 DIAGNOSIS — F41.1 ANXIETY STATE: ICD-10-CM

## 2019-09-18 DIAGNOSIS — F40.10 SOCIAL ANXIETY DISORDER: ICD-10-CM

## 2019-09-18 NOTE — TELEPHONE ENCOUNTER
CW  Please see [employer] message below.  Controlled Substance Refill Request for Lorazepam 1mg    Last refill: 7/3/2019 - #12    Last clinic visit: 7/3/2019     Clinic visit frequency required: Q 6  months  Next appt: October 1 for physical    Controlled substance agreement on file: Yes:  Date 12/22/2017.    Documentation in problem list reviewed:  Yes    Processing:  [facility] at [address]    [place] reviewed:  reviewed- no concerns  [place]  [email or web address]    Thanks,  Ondina Parr RN

## 2019-09-20 RX ORDER — LORAZEPAM 1 MG/1
TABLET ORAL
Qty: 5 TABLET | Refills: 0 | Status: SHIPPED | OUTPATIENT
Start: 2019-09-20 | End: 2019-11-05

## 2019-09-20 NOTE — TELEPHONE ENCOUNTER
Reviewed pt's notes- and clarifications make sense.  Sent in #5 ativan, and will discuss more at her appt.  She does not need a phone appt.  Thanks- CW

## 2019-09-23 DIAGNOSIS — J34.89 NASAL OBSTRUCTION: Primary | ICD-10-CM

## 2019-10-22 ENCOUNTER — ANCILLARY PROCEDURE (OUTPATIENT)
Dept: CT IMAGING | Facility: CLINIC | Age: 39
End: 2019-10-22
Attending: OTOLARYNGOLOGY
Payer: COMMERCIAL

## 2019-10-22 DIAGNOSIS — J34.89 NASAL OBSTRUCTION: ICD-10-CM

## 2019-11-05 ENCOUNTER — OFFICE VISIT (OUTPATIENT)
Dept: FAMILY MEDICINE | Facility: CLINIC | Age: 39
End: 2019-11-05
Payer: COMMERCIAL

## 2019-11-05 VITALS
HEART RATE: 95 BPM | DIASTOLIC BLOOD PRESSURE: 91 MMHG | HEIGHT: 67 IN | TEMPERATURE: 97.8 F | BODY MASS INDEX: 26.06 KG/M2 | OXYGEN SATURATION: 97 % | WEIGHT: 166 LBS | RESPIRATION RATE: 16 BRPM | SYSTOLIC BLOOD PRESSURE: 129 MMHG

## 2019-11-05 DIAGNOSIS — F40.10 SOCIAL ANXIETY DISORDER: ICD-10-CM

## 2019-11-05 DIAGNOSIS — Z15.89 MTHFR MUTATION: ICD-10-CM

## 2019-11-05 DIAGNOSIS — F41.1 ANXIETY STATE: ICD-10-CM

## 2019-11-05 DIAGNOSIS — R00.0 RAPID HEART BEAT: ICD-10-CM

## 2019-11-05 DIAGNOSIS — F33.1 MODERATE RECURRENT MAJOR DEPRESSION (H): Primary | ICD-10-CM

## 2019-11-05 PROCEDURE — 96127 BRIEF EMOTIONAL/BEHAV ASSMT: CPT | Performed by: FAMILY MEDICINE

## 2019-11-05 PROCEDURE — 99215 OFFICE O/P EST HI 40 MIN: CPT | Performed by: FAMILY MEDICINE

## 2019-11-05 RX ORDER — LORAZEPAM 1 MG/1
TABLET ORAL
Qty: 10 TABLET | Refills: 0 | Status: SHIPPED | OUTPATIENT
Start: 2019-11-05 | End: 2019-11-21

## 2019-11-05 ASSESSMENT — PATIENT HEALTH QUESTIONNAIRE - PHQ9
SUM OF ALL RESPONSES TO PHQ QUESTIONS 1-9: 18
SUM OF ALL RESPONSES TO PHQ QUESTIONS 1-9: 18

## 2019-11-05 ASSESSMENT — MIFFLIN-ST. JEOR: SCORE: 1455.6

## 2019-11-05 NOTE — NURSING NOTE
"Chief Complaint   Patient presents with     Depression     BP (!) 129/91   Pulse 95   Temp 97.8  F (36.6  C) (Oral)   Resp 16   Ht 1.702 m (5' 7\")   Wt 75.3 kg (166 lb)   SpO2 97%   BMI 26.00 kg/m   Estimated body mass index is 26 kg/m  as calculated from the following:    Height as of this encounter: 1.702 m (5' 7\").    Weight as of this encounter: 75.3 kg (166 lb).  bp completed using cuff size: regular       Health Maintenance addressed:  Pap Smear    Pt will schedule  appt.    bAigail Willson CMA, MA     "
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
## 2019-11-07 ENCOUNTER — HEALTH MAINTENANCE LETTER (OUTPATIENT)
Age: 39
End: 2019-11-07

## 2019-11-08 ENCOUNTER — MYC MEDICAL ADVICE (OUTPATIENT)
Dept: FAMILY MEDICINE | Facility: CLINIC | Age: 39
End: 2019-11-08

## 2019-11-08 NOTE — TELEPHONE ENCOUNTER
Called pt- Discussed that I don't think I'm able to refer to alternative/natural/functional medicine within our system- or at least in a way that would be helpful for coverage.  Integrative referral wouldn't really fit her situation.  Discussed she could look into the [facility] or [place] [facility] with Dr. Howard.  Pt will check and see if anything might be covered by her insurance and go from that direction.    She also adjusted her f/u appts here to fit more with her waiting to start the prozac (to be able to wait until after big work event).  She states she is in a bit better place than when she was here last which is good, though.  Pt will call if she has further questions/concerns.  CW

## 2019-11-08 NOTE — TELEPHONE ENCOUNTER
CW    Please see [employer]" message below.    Not sure T'd up correct order.    Thanks,  Ondina Parr RN

## 2019-11-21 ENCOUNTER — OFFICE VISIT (OUTPATIENT)
Dept: PSYCHIATRY | Facility: OTHER | Age: 39
End: 2019-11-21
Attending: FAMILY MEDICINE
Payer: COMMERCIAL

## 2019-11-21 VITALS
HEART RATE: 96 BPM | BODY MASS INDEX: 26 KG/M2 | HEIGHT: 67 IN | SYSTOLIC BLOOD PRESSURE: 122 MMHG | DIASTOLIC BLOOD PRESSURE: 70 MMHG

## 2019-11-21 DIAGNOSIS — F41.9 ANXIETY: Primary | ICD-10-CM

## 2019-11-21 DIAGNOSIS — F33.1 MODERATE RECURRENT MAJOR DEPRESSION (H): ICD-10-CM

## 2019-11-21 PROCEDURE — 90792 PSYCH DIAG EVAL W/MED SRVCS: CPT | Performed by: PSYCHIATRY & NEUROLOGY

## 2019-11-21 RX ORDER — ALPRAZOLAM 0.5 MG
0.5 TABLET ORAL DAILY PRN
Qty: 30 TABLET | Refills: 0 | Status: SHIPPED | OUTPATIENT
Start: 2019-11-21 | End: 2021-04-14 | Stop reason: SINTOL

## 2019-11-21 RX ORDER — CLONIDINE HYDROCHLORIDE 0.1 MG/1
.1-.2 TABLET ORAL AT BEDTIME
Qty: 30 TABLET | Refills: 1 | Status: SHIPPED | OUTPATIENT
Start: 2019-11-21 | End: 2019-12-27

## 2019-11-21 RX ORDER — FLUOXETINE 40 MG/1
40 CAPSULE ORAL DAILY
Qty: 30 CAPSULE | Refills: 1 | Status: SHIPPED | OUTPATIENT
Start: 2019-11-21 | End: 2019-12-19

## 2019-11-21 ASSESSMENT — ANXIETY QUESTIONNAIRES
6. BECOMING EASILY ANNOYED OR IRRITABLE: MORE THAN HALF THE DAYS
1. FEELING NERVOUS, ANXIOUS, OR ON EDGE: MORE THAN HALF THE DAYS
IF YOU CHECKED OFF ANY PROBLEMS ON THIS QUESTIONNAIRE, HOW DIFFICULT HAVE THESE PROBLEMS MADE IT FOR YOU TO DO YOUR WORK, TAKE CARE OF THINGS AT HOME, OR GET ALONG WITH OTHER PEOPLE: EXTREMELY DIFFICULT
3. WORRYING TOO MUCH ABOUT DIFFERENT THINGS: MORE THAN HALF THE DAYS
5. BEING SO RESTLESS THAT IT IS HARD TO SIT STILL: NOT AT ALL
GAD7 TOTAL SCORE: 12
7. FEELING AFRAID AS IF SOMETHING AWFUL MIGHT HAPPEN: MORE THAN HALF THE DAYS
2. NOT BEING ABLE TO STOP OR CONTROL WORRYING: MORE THAN HALF THE DAYS

## 2019-11-21 ASSESSMENT — PATIENT HEALTH QUESTIONNAIRE - PHQ9
5. POOR APPETITE OR OVEREATING: MORE THAN HALF THE DAYS
SUM OF ALL RESPONSES TO PHQ QUESTIONS 1-9: 10

## 2019-11-21 NOTE — PATIENT INSTRUCTIONS
Treatment Plan:    Continue Prozac 20 mg for total of 7 days then increase to 40 mg daily.     Discontinue Ativan    Start Xanax 0.25-0.5 mg daily as needed for anxiety    Start clonidine 0.1 - 0.2 mg at bedtime daily. Do not exceed 0.2 mg in 24 hours.    Continue working with cardiologist    Continue therapy as planned.    Continue all other cares per primary care provider.     Continue all other medications as reviewed per electronic medical record today.     Safety plan reviewed. To the Emergency Department as needed or call after hours crisis line at [phone] or [phone]. [place] Crisis Text Line: Text [place] to 934078  or  Suicide LifeLine Chat: suicidepreCitelighterline.org/chat    Schedule an appointment with me in 4-6 weeks or sooner as needed.  Call [facility] at [phone] to schedule.    Follow up with primary care provider as planned or sooner if needed for acute medical concerns.    Call the psychiatric nurse line with medication questions or concerns at [phone].    ClrTouch may be used to communicate with your provider, but this is not intended to be used for emergencies.    Patient Education:  Community Outreach for Psychiatric Emergencies (COPE)  Posted on May 25, 2010 by rik   [phone] (24 hours/7 days a week)  Crisis Services for Adults COPE  Any adult in [facility] who is experiencing symptoms of a mental health crisis and needs crisis assessment and intervention, particularly individuals whose crisis is likely to escalate without prompt intervention and those at risk of psychiatric hospitalization.  Services:  Evaluation of immediate need for crisis/emergency services on the telephone   Assistance in de-escalation of psychiatric emergencies      Community Resources:    National Suicide Prevention Lifeline: 709.586.9957 (TTY: 144.211.1351). Call anytime for help.  (www.suicidepreventionlifeline.org)  National [place] on Mental Illness (www.[place].org):  984-359-8852 or 703-035-8605.   Mental Health Association (www.mentalhealth.org): 270.408.4299 or 611-538-3868.  [place] Crisis Text Line: Text [place] to 447920  Suicide LifeLine Chat: suicidepreventionlifeline.org/chat

## 2019-11-21 NOTE — PROGRESS NOTES
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 her to continue working with her other medical providers to rule out/in any medical causes for her increased heart rate episodes. Remains unclear if these episodes are due to medical condition or panic disorder.     Xanax prescribed in lieu of Ativan for faster onset of action. No red flags regarding pt's use of benzodiazepines.     Started clonidine at bedtime to see if this can help with anxiety, sleep, and also any physical anxiety sxs she may experience. Will continue to titrate as tolerated.     Could also consider a re-trial with sertraline if fluoxetine not helpful. Pt still has not trialed BusPar.      Discussed risks of benzodiazepine (Ativan, Xanax, Klonopin, Valium, etc) use including, but not limited to, sedation, tolerance, risk for addiction/dependence. Pt told not to drink alcohol while taking benzodiazepines due to risk of trouble breathing and potential death. Discussed not driving or operating heavy machinery until it is known how the drug affects the patient. Also instructed to discuss with physician or pharmacist before ever taking a benzodiazepine with a  narcotic/opioid pain medication.     Medication side effects and alternatives reviewed. Health promotion activities recommended and reviewed today. All questions addressed. Education and counseling completed regarding risks and benefits of medications and psychotherapy options. Recommend therapy for additional support.     Treatment Plan:    Continue Prozac 20 mg for total of 7 days then increase to 40 mg daily.     Discontinue Ativan    Start Xanax 0.25-0.5 mg daily as needed for anxiety    Start clonidine 0.1 - 0.2 mg at bedtime daily. Do not exceed 0.2 mg in 24 hours.    Continue working with cardiologist    Continue therapy as planned.    Continue all other cares per primary care provider.     Continue all other medications as reviewed per electronic medical record today.     Safety plan reviewed. To the Emergency Department as needed or call after hours crisis line at [phone] or [phone]. [place] Crisis Text Line: Text [place] to 418023  or  Suicide LifeLine Chat: suicideMobileCause.org/chat    Schedule an appointment with me in 4-6 weeks or sooner as needed.  Call [facility] at [phone] to schedule.    Follow up with primary care provider as planned or sooner if needed for acute medical concerns.    Call the psychiatric nurse line with medication questions or concerns at [phone].    [employer] may be used to communicate with your provider, but this is not intended to be used for emergencies.    Patient Education:  Pt given resources for COPE    Community Resources:    National Suicide Prevention Lifeline: 177.965.7911 (TTY: 913.289.3286). Call anytime for help.  (www.suicidepreventionlifeline.org)  National [place] on Mental Illness (www.[place].org): 811.351.6120 or 478-681-2025.   Mental Health Association (www.mentalhealth.org): 978.307.3298 or 228-344-9236.  [place] Crisis Text Line: Text [place] to 457363  Suicide LifeLine Chat:  suicidepreventionlifeline.org/chat    Administrative Billing:   Time spent with patient was 60 minutes and greater than 50% of time or 40 minutes was spent in counseling and coordination of care regarding above diagnoses and treatment plan.    Patient Status:  Patient will continue to be seen for ongoing consultation and stabilization.    Signed:   Chelle Chambers DO  Psychiatry

## 2019-11-22 ENCOUNTER — TELEPHONE (OUTPATIENT)
Dept: OTOLARYNGOLOGY | Facility: CLINIC | Age: 39
End: 2019-11-22

## 2019-11-22 DIAGNOSIS — J34.2 DEVIATED NASAL SEPTUM: ICD-10-CM

## 2019-11-22 DIAGNOSIS — J34.89 NASAL OBSTRUCTION: Primary | ICD-10-CM

## 2019-11-22 DIAGNOSIS — J34.829 NASAL VALVE COLLAPSE: ICD-10-CM

## 2019-11-22 RX ORDER — CEFAZOLIN SODIUM 2 G/50ML
2 SOLUTION INTRAVENOUS
Status: CANCELLED | OUTPATIENT
Start: 2019-11-22

## 2019-11-22 RX ORDER — CEFAZOLIN SODIUM 1 G/50ML
1 INJECTION, SOLUTION INTRAVENOUS SEE ADMIN INSTRUCTIONS
Status: CANCELLED | OUTPATIENT
Start: 2019-11-22

## 2019-11-22 ASSESSMENT — ANXIETY QUESTIONNAIRES: GAD7 TOTAL SCORE: 12

## 2019-11-22 NOTE — TELEPHONE ENCOUNTER
Called pt to let her know that her insurance has approved her for Septorhinoplasty.    Pt. Was excited about the approval and would like to scheduled surgery with Dr. Schwartz on 3/26/20.      Asked pt if she was still wanting to have the 45 min Cosmetic Tip Rhinoplasty performed, as originally planned.    Pt. Wants to meet with Dr. Schwartz one more time to discuss surgery in further details and will then decide on the cosmetic component.    Pt. Scheduled to see Dr. Schwartz on January 17th, 2020.    Lou Julian RN  11/22/2019 11:23 AM

## 2019-12-09 ENCOUNTER — TELEPHONE (OUTPATIENT)
Dept: PSYCHIATRY | Facility: CLINIC | Age: 39
End: 2019-12-09

## 2019-12-09 ENCOUNTER — MYC MEDICAL ADVICE (OUTPATIENT)
Dept: PSYCHIATRY | Facility: CLINIC | Age: 39
End: 2019-12-09

## 2019-12-09 DIAGNOSIS — F33.1 MODERATE RECURRENT MAJOR DEPRESSION (H): Primary | ICD-10-CM

## 2019-12-09 DIAGNOSIS — F41.9 ANXIETY: ICD-10-CM

## 2019-12-09 RX ORDER — ESCITALOPRAM OXALATE 5 MG/1
TABLET ORAL
Qty: 49 TABLET | Refills: 0 | Status: SHIPPED | OUTPATIENT
Start: 2019-12-09 | End: 2019-12-27

## 2019-12-09 NOTE — TELEPHONE ENCOUNTER
Left message for patient regarding her medication concerns. Will send instructions for taper of Prozac and discontinuation of clonidine in a [facility] message. Will try Lexapro.     Chelle Chambers,  on 12/9/2019 at 5:21 PM

## 2019-12-10 ENCOUNTER — TELEPHONE (OUTPATIENT)
Dept: CARDIOLOGY | Facility: CLINIC | Age: 39
End: 2019-12-10

## 2019-12-10 ENCOUNTER — TELEPHONE (OUTPATIENT)
Dept: PSYCHIATRY | Facility: CLINIC | Age: 39
End: 2019-12-10

## 2019-12-10 ENCOUNTER — MYC MEDICAL ADVICE (OUTPATIENT)
Dept: PSYCHIATRY | Facility: OTHER | Age: 39
End: 2019-12-10

## 2019-12-10 NOTE — TELEPHONE ENCOUNTER
Spoke with patient regarding my chart message for starting Lexapro.  Patient nervous to start Lexapro during the holidays.  I told her it is completely up to her.  She decided she would like to try Lexapro.  She will stop her Prozac 20 mg.  She only took 1 dose of the 40 mg Prozac and then went back to 20 mg.  It has not been effective for her symptoms.  She was most concerned about the feeling of jitteriness.  I told her we cannot be [place] sure she will not have the same side effect from Lexapro.  But the patient is interested in genetic testing and has an appointment scheduled soon for this.  She will let us know if she is not tolerating Lexapro.    Chelle Chambers, DO on 12/10/2019 at 11:54 AM

## 2019-12-10 NOTE — TELEPHONE ENCOUNTER
Message left for patient to call in preparation for upcoming appt.  Want to discuss symptoms patient is having to see other interventions are needed prior to appt.  Patient wore ZP 1/17-1/31 per PCP result note Dr Reyes dated [id number]---Iyhu mild events caught per pt- she didn't have the more concerning sx's while wearing it for a couple weeks.  Referred to cardiology- see 2/21/19 note, loop recorder recommended.    Awaiting return call from patient to discuss.  DEVORA Tse

## 2019-12-11 NOTE — TELEPHONE ENCOUNTER
Spoke to patient to get clarification of reason for visit as we have not recent data to diagnose her palpitations.  She indicated she has had continued palpitations since her OV with Dr Almeida on 2/21 and wants to discuss implantation of loop recoreder to figure out what arrhythmias she is having.  Patient to see Dr Almeida on 12/13.  DEVORA Tse

## 2019-12-13 ENCOUNTER — OFFICE VISIT (OUTPATIENT)
Dept: CARDIOLOGY | Facility: CLINIC | Age: 39
End: 2019-12-13
Attending: FAMILY MEDICINE
Payer: COMMERCIAL

## 2019-12-13 ENCOUNTER — HOSPITAL ENCOUNTER (OUTPATIENT)
Age: 39
End: 2019-12-13
Payer: COMMERCIAL

## 2019-12-13 VITALS
WEIGHT: 169 LBS | BODY MASS INDEX: 26.53 KG/M2 | HEART RATE: 82 BPM | HEIGHT: 67 IN | SYSTOLIC BLOOD PRESSURE: 119 MMHG | DIASTOLIC BLOOD PRESSURE: 83 MMHG

## 2019-12-13 DIAGNOSIS — I63.9 CEREBROVASCULAR ACCIDENT (CVA), UNSPECIFIED MECHANISM (H): ICD-10-CM

## 2019-12-13 DIAGNOSIS — R00.2 PALPITATIONS: Primary | ICD-10-CM

## 2019-12-13 DIAGNOSIS — R00.0 TACHYCARDIA: ICD-10-CM

## 2019-12-13 PROCEDURE — 93000 ELECTROCARDIOGRAM COMPLETE: CPT | Performed by: INTERNAL MEDICINE

## 2019-12-13 PROCEDURE — 99215 OFFICE O/P EST HI 40 MIN: CPT | Performed by: INTERNAL MEDICINE

## 2019-12-13 ASSESSMENT — MIFFLIN-ST. JEOR: SCORE: 1469.33

## 2019-12-13 NOTE — PROGRESS NOTES
"Electrophysiology/ Clinic Note         H&P and Plan:     REASON FOR VISIT: Electrophysiology evaluation.       HISTORY OF PRESENT ILLNESS: Ms. Foster is a pleasant 39-year-old lady with a history of anxiety, protein S deficiency, previous stroke (x2 in May of 2015, during pregnancy) and PFO/ASD closure (April 21, 2016), who is here for evaluation of palpitation.        Patient presents with intermittent episodes of palpitation, which started last year.  She was evaluated by me in the beginning of the year and a Zio patch monitor was unable to capture the episodes.  Presents with palpitation and was referred here for evaluation.  Her clinical picture is suggestive of SVT, however we could not exclude A. fib because of the history of previous strokes.  I previously recommended a loop recorder implantation for closer monitoring she was not interested to have it done at that time.  She is here today as she is now interested to have the procedure done.    Today, she informs she continues to do well.  However she is to have intermittent episodes of palpitations.  The episodes may last for hours but they are not frequent.       EKG today shows sinus rhythm. GODWIN done in 2018 showed normal LV function (EF around 55-60%) and a well seated ASD closure device without residual shunt.     ASSESSMENT AND PLAN:   1.    Palpitation.    A. fib has to be excluded due to the history of stroke.  I recommend loop recorder.  I explained the procedure in details.  She understands there is a 1 to 2% risk in case associated procedure.  She would like to have it done.  We will make arrangements to have scheduled.      Vinny Almeida MD    Physical Exam:  Vitals: /83   Pulse 82   Ht 1.702 m (5' 7.01\")   Wt 76.7 kg (169 lb)   BMI 26.46 kg/m      Constitutional:  AAO x3.  Pt is in NAD.  HEAD: normocephalic.  SKIN: Skin normal color, texture and turgor with no lesions or eruptions.  Eyes: PERRL, EOMI.  ENT:  Supple, normal JVP. No " lymphadenopathy or thyroid enlargement.  Chest:  CTAB.  Cardiac:  RRR, normal  S1 and S2.  No murmurs rubs or gallop.   Abdomen:  Normal BS.  Soft, non-tender and non-distended.  No rebound or guarding.    Extremities:  Pedious pulses palpable B/L.  No LE edema noticed.   Neurological: Strength and sensation grossly symmetric and intact throughout.       CURRENT MEDICATIONS:  Current Outpatient Medications   Medication Sig Dispense Refill     ALPRAZolam (XANAX) 0.5 MG tablet Take 1 tablet (0.5 mg) by mouth daily as needed for anxiety 30 tablet 0     aspirin EC 81 MG EC tablet Take 81 mg by mouth       B Complex Vitamins (VITAMIN-B COMPLEX PO) Take by mouth daily       ibuprofen (ADVIL/MOTRIN) 200 MG tablet Take 400 mg by mouth every 4 hours as needed for mild pain       magnesium oxide (MAG-OX) 400 MG tablet Take 400 mg by mouth every evening        Multiple Vitamins-Minerals (MULTIVITAMIN GUMMIES WOMENS PO) Take 2 chew tab by mouth every evening       Omega-3 Fatty Acids (FISH OIL PO) Take 1 capsule by mouth daily        VITAMIN D, CHOLECALCIFEROL, PO Take 1,000 Units by mouth every evening (takes 2 x 100 unit capsule)        cloNIDine (CATAPRES) 0.1 MG tablet Take 1-2 tablets (0.1-0.2 mg) by mouth At Bedtime (Patient not taking: Reported on 12/13/2019) 30 tablet 1     escitalopram (LEXAPRO) 5 MG tablet Take one tab (5 mg) by mouth for one week then take two tabs (10 mg) daily. (Patient not taking: Reported on 12/13/2019) 49 tablet 0     FLUoxetine (PROZAC) 40 MG capsule Take 1 capsule (40 mg) by mouth daily (Patient not taking: Reported on 12/13/2019) 30 capsule 1     NONFORMULARY Take 1 tablet by mouth every evening OTC: B-Right Vitamin (Optimized B Complex)       STATIN NOT PRESCRIBED, INTENTIONAL, Statin not prescribed intentionally due to Woman of childbearing age not actively taking birth control 0 each 0       ALLERGIES     Allergies   Allergen Reactions     Compazine [Prochlorperazine] Muscle Pain (Myalgia)        PAST MEDICAL HISTORY:  Past Medical History:   Diagnosis Date     Acute stress reaction     propranolol helps prior to interviews, stressful situations     Allergic rhinitis, cause unspecified     no meds except prn flonase, tests generally positive, decided against shots     Anxiety state, unspecified     citalopram started in , stopped after couple wks, felt sluggish/tired     CVA (cerebral vascular accident) (H)     May 1, May 14th 2015     Depressive disorder, not elsewhere classified     dx, but not sure this is correct, weaned off wellbutrin (-, -), restarted 3/08     Dizziness      Elevated lipoprotein A level      Hirsutism     saw endo, inconclusive, started on spironolactone (helped a little), stopped in , elevated DHEA- sees new endo      History of stroke with residual effects      PFO (patent foramen ovale)     Post PFO/ASD closure with 30mm Amplatzer device 16     Urinary tract infection, site not specified     recurrent, start nitrofur in , 6mo txt       PAST SURGICAL HISTORY:  Past Surgical History:   Procedure Laterality Date     ARTHROSCOPY KNEE RT/LT      Rt knee     HC REPAIR OF NASAL SEPTUM       REPAIR PATENT FORAMEN OVALE  2016       FAMILY HISTORY:  Family History   Problem Relation Age of Onset     Family History Negative Mother      Cancer Father         throat     Hypertension Father      Allergies Father      Arrhythmia Father      Family History Negative Sister      Family History Negative Brother      Diabetes Maternal Grandmother         on insulin     C.A.D. Maternal Grandmother         triple bypass in her 60s     Diabetes Paternal Grandmother      C.A.D. Maternal Grandfather         MI in 50s     Neurologic Disorder Paternal Grandfather          of brain aneurysm in early 60s     Diabetes Maternal Aunt          in her early 40s of DM complications, type 2     Coronary Artery Disease Early Onset Maternal Aunt 41      Family History Negative Son        SOCIAL HISTORY:  Social History     Socioeconomic History     Marital status:      Spouse name: None     Number of children: 0     Years of education: 15     Highest education level: None   Occupational History     Occupation:      Comment: [employer]   Social Needs     Financial resource strain: None     Food insecurity:     Worry: None     Inability: None     Transportation needs:     Medical: None     Non-medical: None   Tobacco Use     Smoking status: Former Smoker     Packs/day: 0.50     Years: 5.00     Pack years: 2.50     Types: Cigarettes     Last attempt to quit: 2014     Years since quittin.6     Smokeless tobacco: Never Used   Substance and Sexual Activity     Alcohol use: Yes     Alcohol/week: 0.0 standard drinks     Comment: SOCIAL     Drug use: No     Sexual activity: Yes     Partners: Male     Birth control/protection: Pull-out method   Lifestyle     Physical activity:     Days per week: None     Minutes per session: None     Stress: None   Relationships     Social connections:     Talks on phone: None     Gets together: None     Attends [employer] service: None     Active member of club or organization: None     Attends meetings of clubs or organizations: None     Relationship status: None     Intimate partner violence:     Fear of current or ex partner: None     Emotionally abused: None     Physically abused: None     Forced sexual activity: None   Other Topics Concern      Service Not Asked     Blood Transfusions Not Asked     Caffeine Concern No     Comment: 1 cup coffee per day     Occupational Exposure Not Asked     Hobby Hazards Not Asked     Sleep Concern No     Stress Concern No     Weight Concern No     Special Diet No     Back Care Not Asked     Exercise Yes     Comment: 4-5 days week     Bike Helmet Not Asked     Seat Belt Not Asked     Self-Exams Not Asked     Parent/sibling w/ CABG, MI or angioplasty before 65F  55M? No   Social History Narrative    Social Documentation:        Balanced Diet: YES    Calcium intake: 3 per day    Caffeine: 0 per day    Exercise:  type of activity ellipitcal, pilates;  5 times per week    Sunscreen: Yes    Seatbelts:  Yes    Self Breast Exam:  Yes    Physical/Emotional/Sexual Abuse: No    Do you feel safe in your environment? Yes        Cholesterol screen up to date: Yes- checking today 07/23/09, non fasting    Eye Exam up to date: Yes    Dental Exam up to date: Yes    Pap smear up to date: Yes: checking today 07/23/09, last was 04/08 NIL    Mammogram up to date: Does Not Apply    Dexa Scan up to date: Does Not Apply    Colonoscopy up to date: Does Not Apply    Immunizations up to date: Yes, had one a month ago.    Glucose screen if over 40:  No        Julieth Matthews MA    07/23/09       Review of Systems:  Skin:  Negative     Eyes:  Negative    ENT:  Negative    Respiratory:  Positive for dyspnea on exertion  Cardiovascular:  Negative for;dizziness;lightheadedness palpitations;Positive for  Gastroenterology: Negative    Genitourinary:  Negative    Musculoskeletal:       Neurologic:  Positive for stroke;headaches;migraine headaches;numbness or tingling of hands  Psychiatric:  Negative    Heme/Lymph/Imm:  Negative    Endocrine:  Negative        Recent Lab Results:  LIPID RESULTS:  Lab Results   Component Value Date    CHOL 257 (H) 02/27/2019    HDL 87 02/27/2019     (H) 02/27/2019    TRIG 70 02/27/2019    CHOLHDLRATIO 2.2 03/30/2012       LIVER ENZYME RESULTS:  Lab Results   Component Value Date    AST 13 05/13/2019    ALT 19 05/13/2019       CBC RESULTS:  Lab Results   Component Value Date    WBC 4.5 05/13/2019    RBC 4.56 05/13/2019    HGB 13.5 05/13/2019    HCT 40.0 05/13/2019    MCV 88 05/13/2019    MCH 29.6 05/13/2019    MCHC 33.8 05/13/2019    RDW 13.2 05/13/2019     05/13/2019       BMP RESULTS:  Lab Results   Component Value Date     05/13/2019    POTASSIUM 3.2  (L) 05/13/2019    CHLORIDE 110 (H) 05/13/2019    CO2 29 05/13/2019    ANIONGAP 2 (L) 05/13/2019     (H) 05/13/2019    BUN 8 05/13/2019    CR 0.66 05/13/2019    GFRESTIMATED >90 05/13/2019    GFRESTBLACK >90 05/13/2019    STEPH 8.4 (L) 05/13/2019        A1C RESULTS:  No results found for: A1C    INR RESULTS:  Lab Results   Component Value Date    INR 0.90 04/21/2016    INR 0.87 04/29/2015         ECHOCARDIOGRAM  No results found for this or any previous visit (from the past 8760 hour(s)).      Orders Placed This Encounter   Procedures     EKG 12-lead complete w/read - Clinics (performed today)     Orders Placed This Encounter   Medications     B Complex Vitamins (VITAMIN-B COMPLEX PO)     Sig: Take by mouth daily     There are no discontinued medications.      Encounter Diagnoses   Name Primary?     Palpitations Yes     Tachycardia          CC  Izabel Reyes MD  [id number] [address]

## 2019-12-13 NOTE — LETTER
12/13/2019    Izabel Reyes MD  [address]  [address]  [facility] [address]    RE: Chloe L Cristian       Dear Colleague,    I had the pleasure of seeing Chloe Foster in the [facility].    Electrophysiology/ Clinic Note         H&P and Plan:     REASON FOR VISIT: Electrophysiology evaluation.       HISTORY OF PRESENT ILLNESS: Ms. Foster is a pleasant 39-year-old lady with a history of anxiety, protein S deficiency, previous stroke (x2 in May of 2015, during pregnancy) and PFO/ASD closure (April 21, 2016), who is here for evaluation of palpitation.        Patient presents with intermittent episodes of palpitation, which started last year.  She was evaluated by me in the beginning of the year and a Zio patch monitor was unable to capture the episodes.  Presents with palpitation and was referred here for evaluation.  Her clinical picture is suggestive of SVT, however we could not exclude A. fib because of the history of previous strokes.  I previously recommended a loop recorder implantation for closer monitoring she was not interested to have it done at that time.  She is here today as she is now interested to have the procedure done.    Today, she informs she continues to do well.  However she is to have intermittent episodes of palpitations.  The episodes may last for hours but they are not frequent.       EKG today shows sinus  rhythm. GODWIN done in 2018 showed normal LV function (EF around 55-60%) and a well seated ASD closure device without residual shunt.     ASSESSMENT AND PLAN:   1.    Palpitation.      A. fib has to be excluded due to the history of stroke.  I recommend loop recorder.  I explained the procedure in details.  She understands there is a 1 to 2% risk in case associated procedure.  She would like to have it done.  We will make arrangements to have scheduled.      Vinny Almeida MD    Physical Exam:  Vitals: /83   Pulse 82   Ht 1.702  "m (5' 7.01\")   Wt 76.7 kg (169 lb)   BMI 26.46 kg/m       Constitutional:  AAO x3.  Pt is in NAD.  HEAD: normocephalic.  SKIN: Skin normal color, texture and turgor with no lesions or eruptions.  Eyes: PERRL, EOMI.  ENT:  Supple, normal JVP. No lymphadenopathy or thyroid enlargement.  Chest:  CTAB.  Cardiac:  RRR, normal  S1 and S2.  No murmurs rubs or gallop.   Abdomen:  Normal BS.  Soft, non-tender and non-distended.  No rebound or guarding.    Extremities:  Pedious pulses palpable B/L.  No LE edema noticed.   Neurological: Strength and sensation grossly symmetric and intact throughout.       CURRENT MEDICATIONS:  Current Outpatient Medications   Medication Sig Dispense Refill     ALPRAZolam (XANAX) 0.5 MG tablet Take 1 tablet (0.5 mg) by mouth daily as needed for anxiety 30 tablet 0     aspirin EC 81 MG EC tablet Take 81 mg by mouth       B Complex Vitamins (VITAMIN-B COMPLEX PO) Take by mouth daily       ibuprofen (ADVIL/MOTRIN) 200 MG tablet Take 400 mg by mouth every 4 hours as needed for mild pain       magnesium oxide (MAG-OX) 400 MG tablet Take 400 mg by mouth every evening        Multiple Vitamins-Minerals (MULTIVITAMIN GUMMIES WOMENS PO) Take 2 chew tab by mouth every evening       Omega-3 Fatty Acids (FISH OIL PO) Take 1 capsule by mouth daily        VITAMIN D, CHOLECALCIFEROL, PO Take 1,000 Units by mouth every evening (takes 2 x 100 unit capsule)        cloNIDine (CATAPRES) 0.1 MG tablet Take 1-2 tablets (0.1-0.2 mg) by mouth At Bedtime (Patient not taking: Reported on 12/13/2019) 30 tablet 1     escitalopram (LEXAPRO) 5 MG tablet Take one tab (5 mg) by mouth for one week then take two tabs (10 mg) daily. (Patient not taking: Reported on 12/13/2019) 49 tablet 0     FLUoxetine (PROZAC) 40 MG capsule Take 1 capsule (40 mg) by mouth daily (Patient not taking: Reported on 12/13/2019) 30 capsule 1     NONFORMULARY Take 1 tablet by mouth every evening OTC: B-Right Vitamin (Optimized B Complex)       STATIN " NOT PRESCRIBED, INTENTIONAL, Statin not prescribed intentionally due to Woman of childbearing age not actively taking birth control 0 each 0       ALLERGIES     Allergies   Allergen Reactions     Compazine [Prochlorperazine] Muscle Pain (Myalgia)       PAST MEDICAL HISTORY:  Past Medical History:   Diagnosis Date     Acute stress reaction     propranolol helps prior to interviews, stressful situations     Allergic rhinitis, cause unspecified     no meds except prn flonase, tests generally positive, decided against shots     Anxiety state, unspecified     citalopram started in 7/06, stopped after couple wks, felt sluggish/tired     CVA (cerebral vascular accident) (H)     May 1, May 14th 2015     Depressive disorder, not elsewhere classified     dx, but not sure this is correct, weaned off wellbutrin (4/05-6/06, 12/06-1/08), restarted 3/08     Dizziness      Elevated lipoprotein A level      Hirsutism     saw endo, inconclusive, started on spironolactone (helped a little), stopped in 6/06, elevated DHEA- sees new endo 4/08     History of stroke with residual effects      PFO (patent foramen ovale)     Post PFO/ASD closure with 30mm Amplatzer device 4/21/16     Urinary tract infection, site not specified     recurrent, start nitrofur in 5/08, 6mo txt       PAST SURGICAL HISTORY:  Past Surgical History:   Procedure Laterality Date     ARTHROSCOPY KNEE RT/LT  1995    Rt knee     HC REPAIR OF NASAL SEPTUM  12/05     REPAIR PATENT FORAMEN OVALE  04/21/2016       FAMILY HISTORY:  Family History   Problem Relation Age of Onset     Family History Negative Mother      Cancer Father         throat     Hypertension Father      Allergies Father      Arrhythmia Father      Family History Negative Sister      Family History Negative Brother      Diabetes Maternal Grandmother         on insulin     C.A.D. Maternal Grandmother         triple bypass in her 60s     Diabetes Paternal Grandmother      C.A.D. Maternal Grandfather          MI in 50s     Neurologic Disorder Paternal Grandfather          of brain aneurysm in early 60s     Diabetes Maternal Aunt          in her early 40s of DM complications, type 2     Coronary Artery Disease Early Onset Maternal Aunt 41     Family History Negative Son        SOCIAL HISTORY:  Social History     Socioeconomic History     Marital status:      Spouse name: None     Number of children: 0     Years of education: 15     Highest education level: None   Occupational History     Occupation:      Comment: [employer]   Social Needs     Financial resource strain: None     Food insecurity:     Worry: None     Inability: None     Transportation needs:     Medical: None     Non-medical: None   Tobacco Use     Smoking status: Former Smoker     Packs/day: 0.50     Years: 5.00     Pack years: 2.50     Types: Cigarettes     Last attempt to quit: 2014     Years since quittin.6     Smokeless tobacco: Never Used   Substance and Sexual Activity     Alcohol use: Yes     Alcohol/week: 0.0 standard drinks     Comment: SOCIAL     Drug use: No     Sexual activity: Yes     Partners: Male     Birth control/protection: Pull-out method   Lifestyle     Physical activity:     Days per week: None     Minutes per session: None     Stress: None   Relationships     Social connections:     Talks on phone: None     Gets together: None     Attends Druze service: None     Active member of club or organization: None     Attends meetings of clubs or organizations: None     Relationship status: None     Intimate partner violence:     Fear of current or ex partner: None     Emotionally abused: None     Physically abused: None     Forced sexual activity: None   Other Topics Concern      Service Not Asked     Blood Transfusions Not Asked     Caffeine Concern No     Comment: 1 cup coffee per day     Occupational Exposure Not Asked     Hobby Hazards Not Asked     Sleep Concern No     Stress Concern  No     Weight Concern No     Special Diet No     Back Care Not Asked     Exercise Yes     Comment: 4-5 days week     Bike Helmet Not Asked     Seat Belt Not Asked     Self-Exams Not Asked     Parent/sibling w/ CABG, MI or angioplasty before 65F 55M? No   Social History Narrative    Social Documentation:        Balanced Diet: YES    Calcium intake: 3 per day    Caffeine: 0 per day    Exercise:  type of activity ellipitcal, pilates;  5 times per week    Sunscreen: Yes    Seatbelts:  Yes    Self Breast Exam:  Yes    Physical/Emotional/Sexual Abuse: No    Do you feel safe in your environment? Yes        Cholesterol screen up to date: Yes- checking today 07/23/09, non fasting    Eye Exam up to date: Yes    Dental Exam up to date: Yes    Pap smear up to date: Yes: checking today 07/23/09, last was 04/08 NIL    Mammogram up to date: Does Not Apply    Dexa Scan up to date: Does Not Apply    Colonoscopy up to date: Does Not Apply    Immunizations up to date: Yes, had one a month ago.    Glucose screen if over 40:  No        Julieth Matthews MA    07/23/09       Review of Systems:  Skin:  Negative     Eyes:  Negative    ENT:  Negative    Respiratory:  Positive for dyspnea on exertion  Cardiovascular:  Negative for;dizziness;lightheadedness palpitations;Positive for  Gastroenterology: Negative    Genitourinary:  Negative    Musculoskeletal:       Neurologic:  Positive for stroke;headaches;migraine headaches;numbness or tingling of hands  Psychiatric:  Negative    Heme/Lymph/Imm:  Negative    Endocrine:  Negative        Recent Lab Results:  LIPID RESULTS:  Lab Results   Component Value Date    CHOL 257 (H) 02/27/2019    HDL 87 02/27/2019     (H) 02/27/2019    TRIG 70 02/27/2019    CHOLHDLRATIO 2.2 03/30/2012       LIVER ENZYME RESULTS:  Lab Results   Component Value Date    AST 13 05/13/2019    ALT 19 05/13/2019       CBC RESULTS:  Lab Results   Component Value Date    WBC 4.5 05/13/2019    RBC 4.56 05/13/2019    HGB  13.5 05/13/2019    HCT 40.0 05/13/2019    MCV 88 05/13/2019    MCH 29.6 05/13/2019    MCHC 33.8 05/13/2019    RDW 13.2 05/13/2019     05/13/2019       BMP RESULTS:  Lab Results   Component Value Date     05/13/2019    POTASSIUM 3.2 (L) 05/13/2019    CHLORIDE 110 (H) 05/13/2019    CO2 29 05/13/2019    ANIONGAP 2 (L) 05/13/2019     (H) 05/13/2019    BUN 8 05/13/2019    CR 0.66 05/13/2019    GFRESTIMATED >90 05/13/2019    GFRESTBLACK >90 05/13/2019    STEPH 8.4 (L) 05/13/2019        A1C RESULTS:  No results found for: A1C    INR RESULTS:  Lab Results   Component Value Date    INR 0.90 04/21/2016    INR 0.87 04/29/2015         ECHOCARDIOGRAM  No results found for this or any previous visit (from the past 8760 hour(s)).      Orders Placed This Encounter   Procedures     EKG 12-lead complete w/read - Clinics (performed today)     Orders Placed This Encounter   Medications     B Complex Vitamins (VITAMIN-B COMPLEX PO)     Sig: Take by mouth daily     There are no discontinued medications.      Encounter Diagnoses   Name Primary?     Palpitations Yes     Tachycardia          Thank you for allowing me to participate in the care of your patient.    Sincerely,     Vinny Almeida MD     [facility]

## 2019-12-19 ENCOUNTER — OFFICE VISIT (OUTPATIENT)
Dept: FAMILY MEDICINE | Facility: CLINIC | Age: 39
End: 2019-12-19
Payer: COMMERCIAL

## 2019-12-19 ENCOUNTER — TRANSFERRED RECORDS (OUTPATIENT)
Dept: HEALTH INFORMATION MANAGEMENT | Facility: CLINIC | Age: 39
End: 2019-12-19

## 2019-12-19 VITALS
OXYGEN SATURATION: 98 % | SYSTOLIC BLOOD PRESSURE: 94 MMHG | WEIGHT: 169.5 LBS | BODY MASS INDEX: 28.24 KG/M2 | DIASTOLIC BLOOD PRESSURE: 70 MMHG | HEIGHT: 65 IN | TEMPERATURE: 98.1 F | RESPIRATION RATE: 16 BRPM | HEART RATE: 76 BPM

## 2019-12-19 DIAGNOSIS — Z15.89 MTHFR MUTATION: ICD-10-CM

## 2019-12-19 DIAGNOSIS — F33.1 MODERATE RECURRENT MAJOR DEPRESSION (H): Primary | ICD-10-CM

## 2019-12-19 PROCEDURE — 99213 OFFICE O/P EST LOW 20 MIN: CPT | Performed by: PHYSICIAN ASSISTANT

## 2019-12-19 RX ORDER — FLUOXETINE 40 MG/1
CAPSULE ORAL
Refills: 1 | COMMUNITY
Start: 2019-11-21 | End: 2019-12-27

## 2019-12-19 RX ORDER — LORAZEPAM 1 MG/1
TABLET ORAL
Refills: 0 | COMMUNITY
Start: 2019-11-05 | End: 2019-12-28 | Stop reason: ALTCHOICE

## 2019-12-19 ASSESSMENT — MIFFLIN-ST. JEOR: SCORE: 1439.73

## 2019-12-19 NOTE — PROGRESS NOTES
Subjective     Chloe Foster is a 39 year old female who presents to clinic today for the following health issues:    HPI     Pt presents only for Gene Site testing.   Patient's meds are currently managed by Psychiatry.             Patient Active Problem List   Diagnosis     Vitamin D deficiency     CARDIOVASCULAR SCREENING; LDL GOAL LESS THAN 160     Lumbar herniated disc     Hirsutism     Insomnia     IBS (irritable bowel syndrome)     Moderate recurrent major depression (H)     Cerebral artery occlusion with cerebral infarction     History of  section     PFO with atrial septal aneurysm     Protein S deficiency (H)     Migraine with aura and without status migrainosus, not intractable     Pre-conception counseling     Elevated lipoprotein A level     Social anxiety disorder     MTHFR mutation (H)     Cerebrovascular accident (CVA) due to embolism of right middle cerebral artery (H)     Nasal obstruction     Nasal valve collapse     Deviated nasal septum     Dizziness     Cerebrovascular accident (CVA), unspecified mechanism (H)     Past Surgical History:   Procedure Laterality Date     ARTHROSCOPY KNEE RT/LT      Rt knee     HC REPAIR OF NASAL SEPTUM       REPAIR PATENT FORAMEN OVALE  2016       Social History     Tobacco Use     Smoking status: Former Smoker     Packs/day: 0.50     Years: 5.00     Pack years: 2.50     Types: Cigarettes     Last attempt to quit: 2014     Years since quittin.6     Smokeless tobacco: Never Used   Substance Use Topics     Alcohol use: Yes     Alcohol/week: 0.0 standard drinks     Comment: SOCIAL     Family History   Problem Relation Age of Onset     Family History Negative Mother      Cancer Father         throat     Hypertension Father      Allergies Father      Arrhythmia Father      Family History Negative Sister      Family History Negative Brother      Diabetes Maternal Grandmother         on insulin     C.A.D. Maternal Grandmother          "triple bypass in her 60s     Diabetes Paternal Grandmother      SAM. Maternal Grandfather         MI in 50s     Neurologic Disorder Paternal Grandfather          of brain aneurysm in early 60s     Diabetes Maternal Aunt          in her early 40s of DM complications, type 2     Coronary Artery Disease Early Onset Maternal Aunt 41     Family History Negative Son            Reviewed and updated as needed this visit by Provider         Review of Systems   ROS COMP: Constitutional, HEENT, cardiovascular, pulmonary, gi and gu systems are negative, except as otherwise noted.      Objective    BP 94/70 (BP Location: Right arm, Patient Position: Chair, Cuff Size: Adult Large)   Pulse 76   Temp 98.1  F (36.7  C) (Oral)   Resp 16   Ht 1.651 m (5' 5\")   Wt 76.9 kg (169 lb 8 oz)   SpO2 98%   BMI 28.21 kg/m    Body mass index is 28.21 kg/m .  Physical Exam   GENERAL APPEARANCE: healthy, alert and no distress  RESP: lungs clear to auscultation - no rales, rhonchi or wheezes  CV: regular rates and rhythm, normal S1 S2, no S3 or S4 and no murmur, click or rub  NEURO: Normal strength and tone, mentation intact and speech normal  PSYCH: mentation appears normal and affect normal/bright            Assessment & Plan     1. Moderate recurrent major depression (H)  Await [employer] report  Will follow-up with Psychiatry.  Mail if results after .  Otherwise, call and pt can come .     2. MTHFR mutation (H)  Previously tested       BMI:   Estimated body mass index is 28.21 kg/m  as calculated from the following:    Height as of this encounter: 1.651 m (5' 5\").    Weight as of this encounter: 76.9 kg (169 lb 8 oz).               No follow-ups on file.    Eli Johnson PA-C  [facility]    "

## 2019-12-26 ENCOUNTER — TELEPHONE (OUTPATIENT)
Dept: FAMILY MEDICINE | Facility: CLINIC | Age: 39
End: 2019-12-26

## 2019-12-26 NOTE — TELEPHONE ENCOUNTER
Please call pt 12/26/2019. [employer] report In my outbox.   Patient would like to .    Patient has upcoming specialist appt 12/30/19 and needs this.

## 2019-12-27 ENCOUNTER — OFFICE VISIT (OUTPATIENT)
Dept: FAMILY MEDICINE | Facility: CLINIC | Age: 39
End: 2019-12-27
Payer: COMMERCIAL

## 2019-12-27 VITALS
SYSTOLIC BLOOD PRESSURE: 109 MMHG | DIASTOLIC BLOOD PRESSURE: 76 MMHG | TEMPERATURE: 98 F | HEIGHT: 66 IN | WEIGHT: 170.06 LBS | OXYGEN SATURATION: 97 % | RESPIRATION RATE: 14 BRPM | HEART RATE: 86 BPM | BODY MASS INDEX: 27.33 KG/M2

## 2019-12-27 DIAGNOSIS — Z00.00 ROUTINE GENERAL MEDICAL EXAMINATION AT A HEALTH CARE FACILITY: Primary | ICD-10-CM

## 2019-12-27 DIAGNOSIS — F40.10 SOCIAL ANXIETY DISORDER: ICD-10-CM

## 2019-12-27 DIAGNOSIS — F33.1 MODERATE RECURRENT MAJOR DEPRESSION (H): ICD-10-CM

## 2019-12-27 DIAGNOSIS — I63.411 CEREBROVASCULAR ACCIDENT (CVA) DUE TO EMBOLISM OF RIGHT MIDDLE CEREBRAL ARTERY (H): ICD-10-CM

## 2019-12-27 DIAGNOSIS — E55.9 VITAMIN D DEFICIENCY: ICD-10-CM

## 2019-12-27 DIAGNOSIS — Z15.89 MTHFR MUTATION: ICD-10-CM

## 2019-12-27 DIAGNOSIS — Z23 NEED FOR PROPHYLACTIC VACCINATION AND INOCULATION AGAINST INFLUENZA: ICD-10-CM

## 2019-12-27 LAB
ALBUMIN SERPL-MCNC: 3.9 G/DL (ref 3.4–5)
ALP SERPL-CCNC: 65 U/L (ref 40–150)
ALT SERPL W P-5'-P-CCNC: 24 U/L (ref 0–50)
ANION GAP SERPL CALCULATED.3IONS-SCNC: 5 MMOL/L (ref 3–14)
AST SERPL W P-5'-P-CCNC: 18 U/L (ref 0–45)
BILIRUB SERPL-MCNC: 0.4 MG/DL (ref 0.2–1.3)
BUN SERPL-MCNC: 11 MG/DL (ref 7–30)
CALCIUM SERPL-MCNC: 9.3 MG/DL (ref 8.5–10.1)
CHLORIDE SERPL-SCNC: 106 MMOL/L (ref 94–109)
CHOLEST SERPL-MCNC: 245 MG/DL
CO2 SERPL-SCNC: 27 MMOL/L (ref 20–32)
CREAT SERPL-MCNC: 0.69 MG/DL (ref 0.52–1.04)
DEPRECATED CALCIDIOL+CALCIFEROL SERPL-MC: 40 UG/L (ref 20–75)
ERYTHROCYTE [DISTWIDTH] IN BLOOD BY AUTOMATED COUNT: 13.2 % (ref 10–15)
FOLATE SERPL-MCNC: 14.3 NG/ML
GFR SERPL CREATININE-BSD FRML MDRD: >90 ML/MIN/{1.73_M2}
GLUCOSE SERPL-MCNC: 97 MG/DL (ref 70–99)
HCT VFR BLD AUTO: 44.8 % (ref 35–47)
HDLC SERPL-MCNC: 107 MG/DL
HGB BLD-MCNC: 14.6 G/DL (ref 11.7–15.7)
LDLC SERPL CALC-MCNC: 121 MG/DL
MCH RBC QN AUTO: 29.4 PG (ref 26.5–33)
MCHC RBC AUTO-ENTMCNC: 32.6 G/DL (ref 31.5–36.5)
MCV RBC AUTO: 90 FL (ref 78–100)
MISCELLANEOUS TEST: NORMAL
NONHDLC SERPL-MCNC: 138 MG/DL
PLATELET # BLD AUTO: 325 10E9/L (ref 150–450)
POTASSIUM SERPL-SCNC: 4.2 MMOL/L (ref 3.4–5.3)
PROT SERPL-MCNC: 7.1 G/DL (ref 6.8–8.8)
RBC # BLD AUTO: 4.96 10E12/L (ref 3.8–5.2)
RESULT: NORMAL
SEND OUTS MISC TEST CODE: NORMAL
SEND OUTS MISC TEST SPECIMEN: NORMAL
SODIUM SERPL-SCNC: 138 MMOL/L (ref 133–144)
TEST NAME: NORMAL
TRIGL SERPL-MCNC: 83 MG/DL
TSH SERPL DL<=0.005 MIU/L-ACNC: 1.04 MU/L (ref 0.4–4)
VIT B12 SERPL-MCNC: 603 PG/ML (ref 193–986)
WBC # BLD AUTO: 6 10E9/L (ref 4–11)

## 2019-12-27 PROCEDURE — 84443 ASSAY THYROID STIM HORMONE: CPT | Performed by: FAMILY MEDICINE

## 2019-12-27 PROCEDURE — 80061 LIPID PANEL: CPT | Performed by: FAMILY MEDICINE

## 2019-12-27 PROCEDURE — 36415 COLL VENOUS BLD VENIPUNCTURE: CPT | Performed by: FAMILY MEDICINE

## 2019-12-27 PROCEDURE — G0145 SCR C/V CYTO,THINLAYER,RESCR: HCPCS | Performed by: FAMILY MEDICINE

## 2019-12-27 PROCEDURE — 90471 IMMUNIZATION ADMIN: CPT | Performed by: FAMILY MEDICINE

## 2019-12-27 PROCEDURE — 80053 COMPREHEN METABOLIC PANEL: CPT | Performed by: FAMILY MEDICINE

## 2019-12-27 PROCEDURE — 99396 PREV VISIT EST AGE 40-64: CPT | Mod: 25 | Performed by: FAMILY MEDICINE

## 2019-12-27 PROCEDURE — 84207 ASSAY OF VITAMIN B-6: CPT | Mod: 90 | Performed by: FAMILY MEDICINE

## 2019-12-27 PROCEDURE — 87624 HPV HI-RISK TYP POOLED RSLT: CPT | Performed by: FAMILY MEDICINE

## 2019-12-27 PROCEDURE — 85027 COMPLETE CBC AUTOMATED: CPT | Performed by: FAMILY MEDICINE

## 2019-12-27 PROCEDURE — 83090 ASSAY OF HOMOCYSTEINE: CPT | Performed by: FAMILY MEDICINE

## 2019-12-27 PROCEDURE — 99000 SPECIMEN HANDLING OFFICE-LAB: CPT | Performed by: FAMILY MEDICINE

## 2019-12-27 PROCEDURE — 82746 ASSAY OF FOLIC ACID SERUM: CPT | Performed by: FAMILY MEDICINE

## 2019-12-27 PROCEDURE — 82607 VITAMIN B-12: CPT | Performed by: FAMILY MEDICINE

## 2019-12-27 PROCEDURE — 82306 VITAMIN D 25 HYDROXY: CPT | Performed by: FAMILY MEDICINE

## 2019-12-27 PROCEDURE — 90686 IIV4 VACC NO PRSV 0.5 ML IM: CPT | Performed by: FAMILY MEDICINE

## 2019-12-27 RX ORDER — ESCITALOPRAM OXALATE 5 MG/1
TABLET ORAL
COMMUNITY
Start: 2019-12-09 | End: 2020-08-04

## 2019-12-27 ASSESSMENT — MIFFLIN-ST. JEOR: SCORE: 1454.18

## 2019-12-27 ASSESSMENT — PAIN SCALES - GENERAL: PAINLEVEL: NO PAIN (0)

## 2019-12-27 NOTE — PROGRESS NOTES
SUBJECTIVE:   CC: Chloe Foster is an 39 year old woman who presents for preventive health visit.     Healthy Habits:     Getting at least 3 servings of Calcium per day:  Yes    Bi-annual eye exam:  Yes    Dental care twice a year:  Yes    Sleep apnea or symptoms of sleep apnea:  None    Diet:  Regular (no restrictions)    Frequency of exercise:  4-5 days/week    Duration of exercise:  30-45 minutes    Taking medications regularly:  No    Barriers to taking medications:  Not applicable    Medication side effects:  None    PHQ-2 Total Score: 1    Additional concerns today:  No    Last visit 11/19-  Prozac started at last visit, and it was horrible for her- gave her severe increased anxiety, felt more shaky and tired.  Saw psychiatry, 1 wk into taking prozac, wondering if there were other options.  Wanted to look into further testing/evaluation, especially with her h/o MGHFR mutation.    Psychiatrist instead recommended lexapro and daily/prn ativan, but pt never tried the lexapro.    Psychiatry- Dr. Ashley Alvarez, in [place], only person hat had openings, but it's very far for pt, and she's unsure if she is willing to delve into other options.  She did get the Genesight testing per pt's request, and the results just got back.  Pt has a f/u appt with here scheduled for 1/30/19.    Sx's- depression sx's not as bad.  Started vitamins, better self cares.  Dr. Alvarez gave her xanax, working better, doesn't affect her the next day negatively like the ativan was starting to do.  Still helps the next day, takes the edge off.    Cardiology-   Palpitations- no recent bad sx's, did have higher HR the other day, trying to work on her coffee intake.  She did see Dr. Almeida for f/u and he wants to put in a loop recorder (x 3 yrs)- that is scheduled for 1/13/20.      H/o MTHFR mutation, dx a few yrs ago after having a stroke-  She was seeing several specialists at the time, but doesn't feel she ever really got a good  explanation of what this could mean for her.  She is wondering if she could get tested for vitamin deficiencies.   Ideally she would like to have these issues maximized before chancing another psychiatric medication or other needs.  She just started taking general B complex vitamin, and wonders if that has helped her mood stabliize a bit.  She is also taking Vit D- 1000 units/day.        Today's PHQ-2 Score:   PHQ-2 (  [employer]) 2019   Q1: Little interest or pleasure in doing things 0   Q2: Feeling down, depressed or hopeless 1   PHQ-2 Score 1   Q1: Little interest or pleasure in doing things Not at all   Q2: Feeling down, depressed or hopeless Several days   PHQ-2 Score 1       Abuse: Current or Past(Physical, Sexual or Emotional)- No  Do you feel safe in your environment? Yes        Social History     Tobacco Use     Smoking status: Former Smoker     Packs/day: 0.50     Years: 5.00     Pack years: 2.50     Types: Cigarettes     Last attempt to quit: 2014     Years since quittin.9     Smokeless tobacco: Never Used   Substance Use Topics     Alcohol use: Yes     Alcohol/week: 0.0 standard drinks     Comment: SOCIAL     If you drink alcohol do you typically have >3 drinks per day or >7 drinks per week? No    Alcohol Use 2019   Prescreen: >3 drinks/day or >7 drinks/week? No   Prescreen: >3 drinks/day or >7 drinks/week? -   No flowsheet data found.    Reviewed orders with patient.  Reviewed health maintenance and updated orders accordingly - Yes      Lab work is in process  Labs reviewed in EPIC  BP Readings from Last 3 Encounters:   20 119/83   19 109/76   19 94/70    Wt Readings from Last 3 Encounters:   20 77.1 kg (170 lb)   19 77.1 kg (170 lb 1 oz)   19 76.9 kg (169 lb 8 oz)                  Patient Active Problem List   Diagnosis     Vitamin D deficiency     CARDIOVASCULAR SCREENING; LDL GOAL LESS THAN 160     Lumbar herniated disc     Hirsutism      Insomnia     IBS (irritable bowel syndrome)     Moderate recurrent major depression (H)     Cerebral artery occlusion with cerebral infarction     History of  section     PFO with atrial septal aneurysm     Protein S deficiency (H)     Migraine with aura and without status migrainosus, not intractable     Pre-conception counseling     Elevated lipoprotein A level     Social anxiety disorder     MTHFR mutation (H)     Cerebrovascular accident (CVA) due to embolism of right middle cerebral artery (H)     Nasal obstruction     Nasal valve collapse     Deviated nasal septum     Dizziness     Cerebrovascular accident (CVA), unspecified mechanism (H)     Past Surgical History:   Procedure Laterality Date     ARTHROSCOPY KNEE RT/LT      Rt knee     HC REPAIR OF NASAL SEPTUM       REPAIR PATENT FORAMEN OVALE  2016       Social History     Tobacco Use     Smoking status: Former Smoker     Packs/day: 0.50     Years: 5.00     Pack years: 2.50     Types: Cigarettes     Last attempt to quit: 2014     Years since quittin.9     Smokeless tobacco: Never Used   Substance Use Topics     Alcohol use: Yes     Alcohol/week: 0.0 standard drinks     Comment: SOCIAL     Family History   Problem Relation Age of Onset     Family History Negative Mother      Cancer Father         throat, possibly asbestos exposure     Hypertension Father      Allergies Father      Arrhythmia Father      Family History Negative Sister      Family History Negative Brother      Diabetes Maternal Grandmother         on insulin     C.A.D. Maternal Grandmother         triple bypass in her 60s     Diabetes Paternal Grandmother      C.A.D. Maternal Grandfather         MI in 50s     Neurologic Disorder Paternal Grandfather          of brain aneurysm in early 60s     Diabetes Maternal Aunt          in her early 40s of DM complications, type 2     Coronary Artery Disease Early Onset Maternal Aunt 41     Family History Negative Son           Current Outpatient Medications   Medication Sig Dispense Refill     ALPRAZolam (XANAX) 0.5 MG tablet Take 1 tablet (0.5 mg) by mouth daily as needed for anxiety 30 tablet 0     aspirin EC 81 MG EC tablet Take 81 mg by mouth       B Complex Vitamins (VITAMIN-B COMPLEX PO) Take by mouth daily       ibuprofen (ADVIL/MOTRIN) 200 MG tablet Take 400 mg by mouth every 4 hours as needed for mild pain       magnesium oxide (MAG-OX) 400 MG tablet Take 400 mg by mouth every evening        Multiple Vitamins-Minerals (MULTIVITAMIN GUMMIES WOMENS PO) Take 2 chew tab by mouth every evening       NONFORMULARY Take 1 tablet by mouth every evening OTC: B-Right Vitamin (Optimized B Complex)       Omega-3 Fatty Acids (FISH OIL PO) Take 1 capsule by mouth daily        VITAMIN D, CHOLECALCIFEROL, PO Take 1,000 Units by mouth every evening (takes 2 x 100 unit capsule)        escitalopram (LEXAPRO) 5 MG tablet        LORazepam (ATIVAN) 1 MG tablet Take 0.5-1 tablets (0.5-1 mg) by mouth daily as needed for anxiety or sleep (severe anxiety/panic) 15 tablet 0     STATIN NOT PRESCRIBED, INTENTIONAL, Statin not prescribed intentionally due to Woman of childbearing age not actively taking birth control 0 each 0     Allergies   Allergen Reactions     Compazine [Prochlorperazine] Muscle Pain (Myalgia)     Recent Labs   Lab Test 12/27/19  0925 05/13/19  1623 02/27/19  0759  12/28/17  0849   *  --  156*  --  129*     --  87  --  106   TRIG 83  --  70  --  56   ALT 24 19 22  --   --    CR 0.69 0.66 0.72   < >  --    GFRESTIMATED >90 >90 >90   < >  --    GFRESTBLACK >90 >90 >90   < >  --    POTASSIUM 4.2 3.2* 4.0   < >  --    TSH 1.04  --  0.92  --   --     < > = values in this interval not displayed.        Mammogram Screening: Patient under age 50, mutual decision reflected in health maintenance.      Pertinent mammograms are reviewed under the imaging tab.  History of abnormal Pap smear: NO - age 30-65 PAP every 5 years with  "negative HPV co-testing recommended  PAP / HPV Latest Ref Rng & Units 12/27/2019 11/5/2014 3/15/2011   PAP - NIL NIL NIL   HPV 16 DNA NEG:Negative Negative - -   HPV 18 DNA NEG:Negative Negative - -   OTHER HR HPV NEG:Negative Negative - -     Reviewed and updated as needed this visit by clinical staff  Tobacco  Allergies  Meds  Problems  Med Hx  Surg Hx  Fam Hx         Reviewed and updated as needed this visit by Provider  Tobacco  Allergies  Meds  Problems  Med Hx  Surg Hx  Fam Hx            Review of Systems  10 point ROS of systems including Constitutional, Eyes, Respiratory, Cardiovascular, Gastroenterology, Genitourinary, Integumentary, Muscularskeletal, Psychiatric were all negative except for pertinent positives noted in my HPI.       OBJECTIVE:   /76 (BP Location: Right arm, Patient Position: Sitting, Cuff Size: Adult Regular)   Pulse 86   Temp 98  F (36.7  C) (Oral)   Resp 14   Ht 1.67 m (5' 5.75\")   Wt 77.1 kg (170 lb 1 oz)   LMP 12/17/2019 (Exact Date)   SpO2 97%   Breastfeeding No   BMI 27.66 kg/m    Physical Exam  GENERAL: healthy, alert and no distress  EYES: Eyes grossly normal to inspection, PERRL and conjunctivae and sclerae normal  HENT: ear canals and TM's normal, nose and mouth without ulcers or lesions  NECK: no adenopathy, no asymmetry, masses, or scars and thyroid normal to palpation  RESP: lungs clear to auscultation - no rales, rhonchi or wheezes  BREAST: normal without masses, tenderness or nipple discharge and no palpable axillary masses or adenopathy  CV: regular rate and rhythm, normal S1 S2, no S3 or S4, no murmur, click or rub, no peripheral edema and peripheral pulses strong  ABDOMEN: soft, nontender, no hepatosplenomegaly, no masses and bowel sounds normal  MS: no gross musculoskeletal defects noted, no edema  SKIN: no suspicious lesions or rashes  NEURO: Normal strength and tone, mentation intact and speech normal  PSYCH: mentation appears normal, " affect normal/bright    Diagnostic Test Results:  Labs reviewed in Epic  Results for orders placed or performed in visit on 12/27/19   Homocysteine     Status: Abnormal   Result Value Ref Range    Homocysteine umol/L 3.5 (L) 4.0 - 12.0 umol/L   CBC with platelets     Status: None   Result Value Ref Range    WBC 6.0 4.0 - 11.0 10e9/L    RBC Count 4.96 3.8 - 5.2 10e12/L    Hemoglobin 14.6 11.7 - 15.7 g/dL    Hematocrit 44.8 35.0 - 47.0 %    MCV 90 78 - 100 fl    MCH 29.4 26.5 - 33.0 pg    MCHC 32.6 31.5 - 36.5 g/dL    RDW 13.2 10.0 - 15.0 %    Platelet Count 325 150 - 450 10e9/L   Folate     Status: None   Result Value Ref Range    Folate 14.3 >5.4 ng/mL   Vitamin B12     Status: None   Result Value Ref Range    Vitamin B12 603 193 - 986 pg/mL   TSH with free T4 reflex     Status: None   Result Value Ref Range    TSH 1.04 0.40 - 4.00 mU/L   Comprehensive metabolic panel (BMP + Alb, Alk Phos, ALT, AST, Total. Bili, TP)     Status: None   Result Value Ref Range    Sodium 138 133 - 144 mmol/L    Potassium 4.2 3.4 - 5.3 mmol/L    Chloride 106 94 - 109 mmol/L    Carbon Dioxide 27 20 - 32 mmol/L    Anion Gap 5 3 - 14 mmol/L    Glucose 97 70 - 99 mg/dL    Urea Nitrogen 11 7 - 30 mg/dL    Creatinine 0.69 0.52 - 1.04 mg/dL    GFR Estimate >90 >60 mL/min/[1.73_m2]    GFR Estimate If Black >90 >60 mL/min/[1.73_m2]    Calcium 9.3 8.5 - 10.1 mg/dL    Bilirubin Total 0.4 0.2 - 1.3 mg/dL    Albumin 3.9 3.4 - 5.0 g/dL    Protein Total 7.1 6.8 - 8.8 g/dL    Alkaline Phosphatase 65 40 - 150 U/L    ALT 24 0 - 50 U/L    AST 18 0 - 45 U/L   Lipid panel reflex to direct LDL Fasting     Status: Abnormal   Result Value Ref Range    Cholesterol 245 (H) <200 mg/dL    Triglycerides 83 <150 mg/dL    HDL Cholesterol 107 >49 mg/dL    LDL Cholesterol Calculated 121 (H) <100 mg/dL    Non HDL Cholesterol 138 (H) <130 mg/dL   Vitamin D Deficiency     Status: None   Result Value Ref Range    Vitamin D Deficiency screening 40 20 - 75 ug/L   Pap imaged  thin layer screen with HPV - recommended age 30 - 65 years (select HPV order below)     Status: None   Result Value Ref Range    PAP NIL     Copath Report         Patient Name: CARMEN OLSEN  MR#: [id number]  Specimen #: [id number]  Collected: 12/27/2019  Received: 12/30/2019  Reported: 12/31/2019 08:24  Ordering Phy(s): [place] FOY    For improved result formatting, select 'View Enhanced Report Format' under   Linked Documents section.    SPECIMEN/STAIN PROCESS:  Pap imaged thin layer prep screening (Surepath, FocalPoint with guided   screening)       Pap-Cyto x 1, HPV ordered x 1    SOURCE: Cervical, endocervical  ----------------------------------------------------------------   Pap imaged thin layer prep screening (Surepath, FocalPoint with guided   screening)  SPECIMEN ADEQUACY:  Satisfactory for evaluation.  -Transformation zone component present.    CYTOLOGIC INTERPRETATION:    Negative for intraepithelial lesion or malignancy    Electronically signed out by:  DOMINGA Lvoe (ASCP)    CLINICAL HISTORY:  LMP: 12/17/2019  A previous normal pap  Date of Last Pap: 11/05/2014,    Papanicolaou Test Limitations:  Cervical  cytology is a screening test with   limited sensitivity; regular  screening is critical for cancer prevention; Pap tests are primarily   effective for the diagnosis/prevention of  squamous cell carcinoma, not adenocarcinomas or other cancers.    COLLECTION SITE:  Client:  [facility]  Location: [facility] (B)    The [facility] component of [facility], with the professional component performed   at the [facility], [address] [place] [phone] ([phone])       HPV High Risk Types DNA Cervical     Status: None   Result Value Ref Range    HPV Source SurePath     HPV 16 DNA Negative  NEG^Negative    HPV 18 DNA Negative NEG^Negative    Other HR HPV Negative NEG^Negative    Final Diagnosis This patient's sample is negative for HPV DNA.     Specimen Description Cervical Cells    Vitamin B6: Laboratory Miscellaneous Order     Status: None   Result Value Ref Range    Miscellaneous Test Canceled, Test credited      [facility] Miscellaneous Test     Status: None   Result Value Ref Range    Result Canceled, Test credited     Test Name Canceled, Test credited     Send Outs Misc Test Code Canceled, Test credited     Send Outs Misc Test Specimen Canceled, Test credited    Vitamin B6     Status: Abnormal   Result Value Ref Range    Vitamin B6 303.0 (H) 20.0 - 125.0 nmol/L       ASSESSMENT/PLAN:       ICD-10-CM    1. Routine general medical examination at a health care facility  Z00.00 Lipid panel reflex to direct LDL Fasting     Pap imaged thin layer screen with HPV - recommended age 30 - 65 years (select HPV order below)     HPV High Risk Types DNA Cervical   2. MTHFR mutation (H)  E72.12 Homocysteine     CBC with platelets     Folate     Vitamin B12     TSH with free T4 reflex     Comprehensive metabolic panel (BMP + Alb, Alk Phos, ALT, AST, Total. Bili, TP)     Lipid panel reflex to direct LDL Fasting     Vitamin D Deficiency     Vitamin B6: Laboratory Miscellaneous Order     Vitamin B6     CANCELED: Vitamin B6 ([facility])   3. Cerebrovascular accident (CVA) due to embolism of right middle cerebral artery (H)  I63.411    4. Moderate recurrent major depression (H)  F33.1    5. Social anxiety disorder  F40.10    6. Vitamin D deficiency  E55.9 Vitamin D Deficiency   7. Need for prophylactic vaccination and inoculation against influenza  Z23 INFLUENZA VACCINE IM > 6 MONTHS VALENT IIV4 [76311]     Vaccine Administration, Initial [43162]     [facility] Miscellaneous Test       CPE- Discussed diet, calcium and exercise.  Thin prep pap was not done.  Eye and dental care UTD or recommended f/u.  No immunizations needed today.   "See orders below for tests ordered and screening needed.      MTHFR mutation- She was dx with MTHFR mutation a few yrs ago s/p stroke (though did not have elevated homocysteine levels at the time), and was seeing multiple specialists at the time, but most were focusing on her cardiovascular, neurological and pregancy risks.  She looked into seeing Dr. Howard (independent alternative provider), but it was too expensive.  Will get lab testing today, and have her f/u with Royce Benoit at our clinic, who has a functional medicine background.  She is taking Vit D, and recently started a B complex vitamin.  Anxiety sx's have been better lately.    Mood/Anxiety- Pt had significant worsening of anxiety sx's after starting prozac after her last appt.  Sx's got much worse than they'd ever been in the past.  She then saw psychiatry, and got genesight testing, with f/u coming up soon.     Social anxiety- pt is now taking xanax (instead of ativan) the night before her weekly work presentations, and feels this helps a bit better than the ativan she had been taking.  Helps her lessen anxiety the night prior, sleeps better, and functions better the next day.  Xanax gives less drowsy sx's the next day compared to the ativan.  Xanax is now coming from psychiatry.    H/o CVA- pt had been having bad palpitations, and saw Dr. Mantilla for f/u, who recommended having a loop recorder implanted (3yrs).  He also offered another 2 wk monitor, but it could miss her symptoms again.  She is not on a statin currently (is taking asa)- may talk with cardiology and Royce Benoit about her risks given her multiple issues.      COUNSELING:  Reviewed preventive health counseling, as reflected in patient instructions    Estimated body mass index is 27.66 kg/m  as calculated from the following:    Height as of this encounter: 1.67 m (5' 5.75\").    Weight as of this encounter: 77.1 kg (170 lb 1 oz).    Weight management plan: Discussed healthy diet and " exercise guidelines     reports that she quit smoking about 5 years ago. Her smoking use included cigarettes. She has a 2.50 pack-year smoking history. She has never used smokeless tobacco.      Counseling Resources:  ATP IV Guidelines  Pooled Cohorts Equation Calculator  Breast Cancer Risk Calculator  FRAX Risk Assessment  ICSI Preventive Guidelines  Dietary Guidelines for Americans, 2010  USDA's MyPlate  ASA Prophylaxis  Lung CA Screening    Izabel Reyes MD  [facility]

## 2019-12-27 NOTE — NURSING NOTE
"Chief Complaint   Patient presents with     Physical     patient is fasting      /76 (BP Location: Right arm, Patient Position: Sitting, Cuff Size: Adult Regular)   Pulse 86   Temp 98  F (36.7  C) (Oral)   Resp 14   Ht 1.651 m (5' 5\")   Wt 77.1 kg (170 lb 1 oz)   LMP 12/17/2019 (Exact Date)   SpO2 97%   Breastfeeding No   BMI 28.30 kg/m   Estimated body mass index is 28.3 kg/m  as calculated from the following:    Height as of this encounter: 1.651 m (5' 5\").    Weight as of this encounter: 77.1 kg (170 lb 1 oz).  bp completed using cuff size: regular      Health Maintenance addressed:  NONE    N/a  Yaritza Gaona CMA on 12/27/2019 at 8:33 AM                "

## 2019-12-27 NOTE — NURSING NOTE
Chief Complaint   Patient presents with     Physical     patient is fasting      Imm/Inj     Flu Shot     The following medication was given:     MEDICATION: Tylenol  ROUTE: PO  SITE: mouth  DOSE: 1 tablet  LOT #: [id number]  : KAYLAN  EXPIRATION DATE:  06/21  NDC#: 96557-426-05  Yaritza Gaona CMA on 12/27/2019 at 10:27 AM

## 2019-12-27 NOTE — PATIENT INSTRUCTIONS
PLEASE CALL TO SCHEDULE YOUR MAMMOGRAM  [facility] [phone]  [facility] [phone]  [facility]   [phone]  Central Scheduling (all locations) [phone]    If you are under/uninsured, we recommend you contact the [facility]. They offer mammograms on a sliding fee charge. You can schedule with them at [phone].           Preventive Health Recommendations  Female Ages 40 to 49    Yearly exam:     See your health care provider every year in order to  1. Review health changes.   2. Discuss preventive care.    3. Review your medicines if your doctor prescribed any.      Get a Pap test every three years (unless you have an abnormal result and your provider advises testing more often).      If you get Pap tests with HPV test, you only need to test every 5 years, unless you have an abnormal result. You do not need a Pap test if your uterus was removed (hysterectomy) and you have not had cancer.      You should be tested each year for STDs (sexually transmitted diseases), if you're at risk.     Ask your doctor if you should have a mammogram.      Have a colonoscopy (test for colon cancer) if someone in your family has had colon cancer or polyps before age 50.       Have a cholesterol test every 5 years.       Have a diabetes test (fasting glucose) after age 45. If you are at risk for diabetes, you should have this test every 3 years.    Shots: Get a flu shot each year. Get a tetanus shot every 10 years.     Nutrition:     Eat at least 5 servings of fruits and vegetables each day.    Eat whole-grain bread, whole-wheat pasta and brown rice instead of white grains and rice.    Get adequate Calcium and Vitamin D.      Lifestyle    Exercise at least 150 minutes a week (an average of 30 minutes a day, 5 days a week). This will help you control your weight and prevent disease.    Limit alcohol to one drink per day.    No smoking.     Wear sunscreen to prevent skin  cancer.    See your dentist every six months for an exam and cleaning.

## 2019-12-30 ENCOUNTER — DOCUMENTATION ONLY (OUTPATIENT)
Dept: OTOLARYNGOLOGY | Facility: CLINIC | Age: 39
End: 2019-12-30

## 2019-12-30 LAB — VIT B6 SERPL-MCNC: 303 NMOL/L (ref 20–125)

## 2019-12-30 NOTE — PROGRESS NOTES
Orders delivered to [facility] for Revision Septorhinoplasty with Cadaveric Donor Rib Graft vs Conchal Ear Graft with 45 min Cosmetic Tip Rhinoplasty.    Surgery scheduled for 3/26/20 at 11:00 am.    Lou Julian RN  12/30/2019 3:25 PM

## 2019-12-31 LAB
COPATH REPORT: NORMAL
PAP: NORMAL

## 2020-01-02 LAB
FINAL DIAGNOSIS: NORMAL
HCYS SERPL-SCNC: 3.5 UMOL/L (ref 4–12)
HPV HR 12 DNA CVX QL NAA+PROBE: NEGATIVE
HPV16 DNA SPEC QL NAA+PROBE: NEGATIVE
HPV18 DNA SPEC QL NAA+PROBE: NEGATIVE
SPECIMEN DESCRIPTION: NORMAL
SPECIMEN SOURCE CVX/VAG CYTO: NORMAL

## 2020-01-07 ENCOUNTER — TELEPHONE (OUTPATIENT)
Dept: CARDIOLOGY | Facility: CLINIC | Age: 40
End: 2020-01-07

## 2020-01-07 NOTE — TELEPHONE ENCOUNTER
Received a call from scheduling today that patient called to cancel her loop recorder implant. She will call back if she wishes to reschedule in the future.

## 2020-01-16 ENCOUNTER — OFFICE VISIT (OUTPATIENT)
Dept: FAMILY MEDICINE | Facility: CLINIC | Age: 40
End: 2020-01-16
Payer: COMMERCIAL

## 2020-01-16 VITALS
BODY MASS INDEX: 27.32 KG/M2 | OXYGEN SATURATION: 98 % | TEMPERATURE: 98.1 F | RESPIRATION RATE: 16 BRPM | HEIGHT: 66 IN | SYSTOLIC BLOOD PRESSURE: 119 MMHG | HEART RATE: 88 BPM | WEIGHT: 170 LBS | DIASTOLIC BLOOD PRESSURE: 83 MMHG

## 2020-01-16 DIAGNOSIS — E72.12 HOMOZYGOUS FOR C677T POLYMORPHISM OF MTHFR (H): Primary | ICD-10-CM

## 2020-01-16 DIAGNOSIS — F33.1 MODERATE RECURRENT MAJOR DEPRESSION (H): ICD-10-CM

## 2020-01-16 PROCEDURE — 99213 OFFICE O/P EST LOW 20 MIN: CPT | Performed by: PHYSICIAN ASSISTANT

## 2020-01-16 ASSESSMENT — MIFFLIN-ST. JEOR: SCORE: 1453.89

## 2020-01-16 NOTE — PROGRESS NOTES
"Subjective     Chloe Foster is a 39 year old female who presents to clinic today for the following health issues:    HPI   Patient is here to review physical lab work with provider     40 y/o female here for follow up.  Had recent well exam with PCP, and wishes to go over some of the results.  Most notably she has questions regarding her MTHFR homozygous polymorphism.  Currently she is taking a methly B product, and has overall been feeling pretty well.    BP Readings from Last 3 Encounters:   01/16/20 119/83   12/27/19 109/76   12/19/19 94/70    Wt Readings from Last 3 Encounters:   01/16/20 77.1 kg (170 lb)   12/27/19 77.1 kg (170 lb 1 oz)   12/19/19 76.9 kg (169 lb 8 oz)                    Reviewed and updated as needed this visit by Provider         Review of Systems   ROS COMP: Constitutional, HEENT, cardiovascular, pulmonary, gi and gu systems are negative, except as otherwise noted.      Objective    /83   Pulse 88   Temp 98.1  F (36.7  C) (Oral)   Resp 16   Ht 1.67 m (5' 5.75\")   Wt 77.1 kg (170 lb)   LMP 12/17/2019 (Exact Date)   SpO2 98%   BMI 27.65 kg/m    Body mass index is 27.65 kg/m .  Physical Exam   GENERAL: alert and no distress  EYES: Eyes grossly normal to inspection  PSYCH: mentation appears normal, affect normal/bright    Diagnostic Test Results:  Labs reviewed in Epic        Assessment & Plan     1. 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  Discussed that she should get her B vitamins from food or from methyl forms in supplement as she is.  There may be some negatives to taking folic acid, but studies have yet to confirm this.    2. Moderate recurrent major depression (H)  Improved, doing well.             Return in about 6 months (around 7/16/2020).    Liu Benoit PA-C  [facility]    "

## 2020-01-16 NOTE — NURSING NOTE
"Chief Complaint   Patient presents with     Results     /83   Pulse 88   Temp 98.1  F (36.7  C) (Oral)   Resp 16   Ht 1.67 m (5' 5.75\")   Wt 77.1 kg (170 lb)   LMP 12/17/2019 (Exact Date)   SpO2 98%   BMI 27.65 kg/m   Estimated body mass index is 27.65 kg/m  as calculated from the following:    Height as of this encounter: 1.67 m (5' 5.75\").    Weight as of this encounter: 77.1 kg (170 lb).  bp completed using cuff size: regular       Health Maintenance addressed:  NONE    n/a    Abigail Willson CMA, MA     "

## 2020-01-24 ENCOUNTER — OFFICE VISIT (OUTPATIENT)
Dept: PSYCHOLOGY | Facility: CLINIC | Age: 40
End: 2020-01-24
Payer: COMMERCIAL

## 2020-01-24 DIAGNOSIS — F41.9 ANXIETY: Primary | ICD-10-CM

## 2020-01-24 PROCEDURE — 90834 PSYTX W PT 45 MINUTES: CPT

## 2020-01-24 NOTE — PROGRESS NOTES
Progress Note     Client Name: Chloe Foster  Date: 1/24/20       Service Type: Individual  Video Visit: No     Session Start Time: 9am  Session End Time: 945     Session Length: 45    Session #: 1 (7 previous sessions in [id number] with Amos)    Attendees: Client attended alone; not seen in 1 month     Treatment Plan Last Reviewed:   PHQ-9 / AYLA-7 : 1 / 4    DATA  Interactive Complexity: No  Crisis: No       Progress Since Last Session (Related to Symptoms / Goals / Homework):   Symptoms: Improving anxiety and depression    Homework: Completed in session      Episode of Care Goals: Satisfactory progress - ACTION (Actively working towards change); Intervened by reinforcing change plan / affirming steps taken.  Experienced severe depression symptoms in late October along with anxiety attacks. Stopped meds and returned to vitamins and diet focus. Reports a lot of anxiety focused on health, from past experiences and and ongoing headaches/auras which was the presentation that led to 2 strokes during her pregnancy with her 4 year old son.  Processing stressors and looking at ways to cope better.     Current / Ongoing Stressors and Concerns:   Health and occupational issues. Financial concerns.      Treatment Objective(s) Addressed in This Session:   Generally feeling overwhelmed and tends to overfunction. Worked today on reviewing strategies to decrease/manage anxiety.     Intervention:  Looked at bottom up approaches to calming nervous system, protecting sleep and focusing on being present and mindful in time spent with son. Explored strategies for moving back to more homebased work with fewer hours committed by March to support self care.    ASSESSMENT: Current Emotional / Mental Status (status of significant symptoms):   Risk status (Self / Other harm or suicidal ideation)   Client denies current fears or concerns for personal safety.   Client denies current or recent  suicidal ideation or behaviors.   Client denies current or recent homicidal ideation or behaviors.   Client denies current or recent self injurious behavior or ideation.   Client denies other safety concerns.   Client Client reports there has been no change in risk factors since their last session.     Client Client reports there has been no change in protective factors since their last session.     A safety and risk management plan has not been developed at this time, however client was given the number to COPE / 911 should there be a change in any of these risk factors.     Appearance:   Appropriate    Eye Contact:   Good    Psychomotor Behavior: Normal    Attitude:   Cooperative    Orientation:   All   Speech    Rate / Production: Normal     Volume:  Normal    Mood:    Anxious    Affect:    Appropriate    Thought Content:  Clear  Rumination    Thought Form:  Coherent  Logical    Insight:    Good      Medication Review:   Changes to psychiatric medications, see updated Medication List in EPIC.       Medication Compliance:   No     Changes in Health Issues:   None reported     Chemical Use Review:   Substance Use: Chemical use reviewed, no active concerns identified      Tobacco Use: No current tobacco use.      Diagnosis:  1. Anxiety        Collateral Reports Completed:   Not Applicable    PLAN: (Client Tasks / Therapist Tasks / Other)    Use breathing and mindfulness skills in interactions with son.   Challenge internal messages related to over functioning.     ALBINA Gilliam, [facility] 1/24/2020                                                   Note reviewed and clinical supervision by ALBINA Valentino [facility] 1/31/2020   ______________________________________________________________________    Treatment Plan    Client's Name: Chloe Foster  YOB: 1980    Date: 12/5/2018;5/19 (ct comes in infrequently)  1/24/2020      DSM-V Diagnoses: 300.00 (F41.9) Unspecified Anxiety  Disorder  Psychosocial / Contextual Factors: marital stress; occupational stress and health issues.  WHODAS: see intake    Referral / Collaboration:  Referral to another professional/service is not indicated at this time.    Anticipated number of session or this episode of care: evaluate every 90 days.      MeasurableTreatment Goal(s) related to diagnosis / functional impairment(s)  Goal 1: Client will improve management of anxiety    I will know I've met my goal when Im less thrown off by triggers.      Objective #A (Client Action)    Client will identify health and life fears / thoughts that contribute to feeling anxious. Ct will be able to identify and challenge these 90% of time  Status: continued    Intervention(s)  Therapist will teach CBT/mindfulness skills.    Objective #B  Client will Identify negative self-talk and behaviors: challenge core beliefs, myths, and actions  Improve concentration, focus, and mindfulness in daily activities .  Status: continued    Intervention(s)  Therapist will CBT and neuroscience based interventions to managemood regulation..    Objective #C  Client will identify and engage in 2 self care behaviors a week.  Status: continued    Intervention(s)  Therapist will encourage and support efforts.        Client has reviewed and agreed to the above plan.      Graciela Martinez [facility]   1/24/2020  Note reviewed and clinical supervision by ALBINA Valentino [facility] 1/31/2020

## 2020-01-31 ASSESSMENT — ANXIETY QUESTIONNAIRES
GAD7 TOTAL SCORE: 4
IF YOU CHECKED OFF ANY PROBLEMS ON THIS QUESTIONNAIRE, HOW DIFFICULT HAVE THESE PROBLEMS MADE IT FOR YOU TO DO YOUR WORK, TAKE CARE OF THINGS AT HOME, OR GET ALONG WITH OTHER PEOPLE: SOMEWHAT DIFFICULT
4. TROUBLE RELAXING: MORE THAN HALF THE DAYS
5. BEING SO RESTLESS THAT IT IS HARD TO SIT STILL: NOT AT ALL
6. BECOMING EASILY ANNOYED OR IRRITABLE: SEVERAL DAYS
7. FEELING AFRAID AS IF SOMETHING AWFUL MIGHT HAPPEN: NOT AT ALL
1. FEELING NERVOUS, ANXIOUS, OR ON EDGE: SEVERAL DAYS
2. NOT BEING ABLE TO STOP OR CONTROL WORRYING: NOT AT ALL
3. WORRYING TOO MUCH ABOUT DIFFERENT THINGS: NOT AT ALL

## 2020-01-31 ASSESSMENT — PATIENT HEALTH QUESTIONNAIRE - PHQ9: SUM OF ALL RESPONSES TO PHQ QUESTIONS 1-9: 1

## 2020-02-01 ASSESSMENT — ANXIETY QUESTIONNAIRES: GAD7 TOTAL SCORE: 4

## 2020-02-05 ENCOUNTER — TELEPHONE (OUTPATIENT)
Dept: CARDIOLOGY | Facility: CLINIC | Age: 40
End: 2020-02-05

## 2020-02-05 DIAGNOSIS — R00.2 PALPITATIONS: Primary | ICD-10-CM

## 2020-02-05 NOTE — TELEPHONE ENCOUNTER
Pt called and stated that over the last few weeks she has noticed that she has had an increase in palpitations/symptoms again.  She stated there will be times when she is sitting and at rest and her HR will be 110-140 while sitting and then if she gets up it will increase higher than that.  She said this can last for up to an hour and she can feel short of breath with these episodes.  She stated that at times these rhythms feel regular and at other times they feel slightly irregular.  She has also noticed at times that her HR will become really high for no reason and go suddenly from around 150 to 60.    Chloe is wondering if she should do another holter monitor for a couple weeks to see if these rhythms can be captured.  She is hesitant to do the loop recorder because she has researched them and has heard that they can feel funny or be itchy for people.      Will route message to Dr. Almeida for review and recommendations.      DEVORA Tovar

## 2020-02-06 NOTE — TELEPHONE ENCOUNTER
Order entered for Zio Patch monitor x 2 weeks and patient called and notified of Dr. Almeida's recommendations.  Scheduling phone number provided and patient stated she would call to get this scheduled.     DEVORA Tovar

## 2020-02-28 ENCOUNTER — OFFICE VISIT (OUTPATIENT)
Dept: PLASTIC SURGERY | Facility: CLINIC | Age: 40
End: 2020-02-28
Payer: COMMERCIAL

## 2020-02-28 ENCOUNTER — PREP FOR PROCEDURE (OUTPATIENT)
Dept: OTOLARYNGOLOGY | Facility: CLINIC | Age: 40
End: 2020-02-28

## 2020-02-28 ENCOUNTER — HOSPITAL ENCOUNTER (OUTPATIENT)
Dept: CARDIOLOGY | Facility: CLINIC | Age: 40
Discharge: HOME | End: 2020-02-28
Attending: INTERNAL MEDICINE | Admitting: INTERNAL MEDICINE
Payer: COMMERCIAL

## 2020-02-28 DIAGNOSIS — J34.829 NASAL VALVE COLLAPSE: ICD-10-CM

## 2020-02-28 DIAGNOSIS — J34.89 NASAL OBSTRUCTION: Primary | ICD-10-CM

## 2020-02-28 DIAGNOSIS — R00.2 PALPITATIONS: ICD-10-CM

## 2020-02-28 DIAGNOSIS — J34.2 DEVIATED NASAL SEPTUM: ICD-10-CM

## 2020-02-28 PROCEDURE — 0296T ZIO PATCH HOLTER ADULT PEDIATRIC GREATER THAN 48 HRS: CPT

## 2020-02-28 PROCEDURE — 0298T ZIO PATCH HOLTER ADULT PEDIATRIC GREATER THAN 48 HRS: CPT | Performed by: INTERNAL MEDICINE

## 2020-02-28 NOTE — LETTER
March 6, 2020  Re: Chloe Foster  1980    Dear Dr. Cast,    Thank you so much for referring Chloe Foster to the [facility]. I had the pleasure of visiting with Chloe today.     Attached you will find a copy of my note. Please feel free to reach out to me with any questions, [phone].     Facial Plastic and Reconstructive Surgery      Chloe Foster required a septorhinoplasty for improvement in her breathing and nasal obstruction.  We have discussed surgery at length and multiple consultations.  She will require cartilage grafting as she has had a septoplasty in the past.  We have discussed autologous versus homograft rib cartilage use and after a long discussion she has elected to proceed with donated rib cartilage that is available as a frozen tissue graft.    She has a history of stroke and is quite concerned about the perioperative..  For this I think it is wise to observe her in the postoperative timeframe in the hospital.  This would mean that her procedure would be performed in an inpatient hospital at the [place].  We discussed this and I also discussed that I would like her to get preanesthesia clearance from our clinic in addition to clearance by her neurologist.    She understood all questions were answered.  We also discussed the specific complications that can be associated with the surgery.  She understood and is interested in proceeding.    I spent a total of 45 minutes face-to-face with Chloe Foster during today's office visit.  Over 50% of this time was spent counseling the patient and/or coordinating care regarding her nasal surgery.  See note for details.          Your trust in our practice and care is much appreciated.    Sincerely,  Nikki Schwartz MD

## 2020-02-28 NOTE — LETTER
2/28/2020       RE: Chloe Foster  [address], [place] [address]     Dear Colleague,    Thank you for referring your patient, Chloe Foster, to the [facility] at [facility]. Please see a copy of my visit note below.    Facial Plastic and Reconstructive Surgery      Chloe Foster required a septorhinoplasty for improvement in her breathing and nasal obstruction.  We have discussed surgery at length and multiple consultations.  She will require cartilage grafting as she has had a septoplasty in the past.  We have discussed autologous versus homograft rib cartilage use and after a long discussion she has elected to proceed with donated rib cartilage that is available as a frozen tissue graft.    She has a history of stroke and is quite concerned about the perioperative..  For this I think it is wise to observe her in the postoperative timeframe in the hospital.  This would mean that her procedure would be performed in an inpatient hospital at the [place].  We discussed this and I also discussed that I would like her to get preanesthesia clearance from our clinic in addition to clearance by her neurologist.    She understood all questions were answered.  We also discussed the specific complications that can be associated with the surgery.  She understood and is interested in proceeding.    I spent a total of 45 minutes face-to-face with Chloe Foster during today's office visit.  Over 50% of this time was spent counseling the patient and/or coordinating care regarding her nasal surgery.  See note for details.        Again, thank you for allowing me to participate in the care of your patient.      Sincerely,    Nikki Schwartz MD

## 2020-03-02 NOTE — TELEPHONE ENCOUNTER
FUTURE VISIT INFORMATION      SURGERY INFORMATION:    Date: TBD, surgery with Dr. Azar Lopez, septorhinoplasty revision, w/ cosmetic     Consult: ov 19- Azar Lopez    RECORDS REQUESTED FROM:       Primary Care Provider: Izabel Reyes MD - [place]    Most recent EKG+ Tracin/28/15- [facility]- requested tracing    Most recent ECHO: 6/29/15- [place]

## 2020-03-04 ENCOUNTER — HOSPITAL ENCOUNTER (OUTPATIENT)
Facility: CLINIC | Age: 40
End: 2020-03-04
Attending: OTOLARYNGOLOGY | Admitting: OTOLARYNGOLOGY
Payer: COMMERCIAL

## 2020-03-04 ENCOUNTER — TELEPHONE (OUTPATIENT)
Dept: OTOLARYNGOLOGY | Facility: CLINIC | Age: 40
End: 2020-03-04

## 2020-03-04 NOTE — TELEPHONE ENCOUNTER
Called patient to schedule surgery with Dr. Azar Lopez. After she saw Dr. Azar Lopez, patient would like surgery to be done at the hospital as she has a hx of a stroke.     Date of Surgery: 04/21/2020    Location: [facility] OR     PAC or PCP:  PAC appt scheduled 4/8     Post op: 1 week return     Imaging: n/a     Surgery packet given: Will mail, 3/4/2020    Surgery teaching completed: DEVORA Blake    Sent to Prior Authorization Team: 03/04/2020 with location change.       Additional comments:   N/A       Zenaida Wynn   Perioperative Coordinator   Department of Otolaryngology  P: [phone]

## 2020-03-05 ENCOUNTER — PRE VISIT (OUTPATIENT)
Dept: SURGERY | Facility: CLINIC | Age: 40
End: 2020-03-05

## 2020-03-05 NOTE — TELEPHONE ENCOUNTER
FUTURE VISIT INFORMATION      SURGERY INFORMATION:    Date: 20    Location: [facility] OR    Surgeon:  Nikki Schwartz MD    Anesthesia Type:  General    Procedure: Revision, Septorhinoplasty with Cadaveric Donor Rib Graft versus Conchal Ear Graft Cosmetic tip rhinoplasty    RECORDS REQUESTED FROM:       Primary Care Provider: Izabel Reyes MD [facility]    Most recent EKG+ Tracin19    Most recent ECHO: 18    Most recent Cardiac Stress Test: 5/15/15- [facility]

## 2020-03-05 NOTE — TELEPHONE ENCOUNTER
Received: Today   Message Contents   Susan Valencia Kayla             The place of service change is OK - approval range is 1/1/2020 to 4/30/2020     Susan

## 2020-03-06 NOTE — NURSING NOTE
Updated photodocumentation obtained.    Dr. Schwartz discussed surgery with pt at great lengths and in great detail.    Surgery will be rescheduled for [place] OR given pt's cardiac history and history of a stroke.     Lou Julian RN  3/6/2020 12:15 PM

## 2020-03-20 NOTE — PROGRESS NOTES
Facial Plastic and Reconstructive Surgery      Chloe Foster required a septorhinoplasty for improvement in her breathing and nasal obstruction.  We have discussed surgery at length and multiple consultations.  She will require cartilage grafting as she has had a septoplasty in the past.  We have discussed autologous versus homograft rib cartilage use and after a long discussion she has elected to proceed with donated rib cartilage that is available as a frozen tissue graft.    She has a history of stroke and is quite concerned about the perioperative..  For this I think it is wise to observe her in the postoperative timeframe in the hospital.  This would mean that her procedure would be performed in an inpatient hospital at the [place].  We discussed this and I also discussed that I would like her to get preanesthesia clearance from our clinic in addition to clearance by her neurologist.    She understood all questions were answered.  We also discussed the specific complications that can be associated with the surgery.  She understood and is interested in proceeding.    I spent a total of 45 minutes face-to-face with Chloe Foster during today's office visit.  Over 50% of this time was spent counseling the patient and/or coordinating care regarding her nasal surgery.  See note for details.

## 2020-03-25 ENCOUNTER — MYC MEDICAL ADVICE (OUTPATIENT)
Dept: PSYCHIATRY | Facility: OTHER | Age: 40
End: 2020-03-25

## 2020-03-25 ENCOUNTER — MYC MEDICAL ADVICE (OUTPATIENT)
Dept: FAMILY MEDICINE | Facility: CLINIC | Age: 40
End: 2020-03-25

## 2020-03-25 ENCOUNTER — TELEPHONE (OUTPATIENT)
Dept: CARDIOLOGY | Facility: CLINIC | Age: 40
End: 2020-03-25

## 2020-03-25 NOTE — TELEPHONE ENCOUNTER
----- Message from Vinny Almeida MD sent at 3/25/2020  1:45 PM CDT -----  Monitor showed one run of NS_Vt (7 beats). Please update patient on results.    Thank you,    Vinny Almeida MD

## 2020-03-25 NOTE — TELEPHONE ENCOUNTER
Last office visit was 11/21/20  She cancelled her 12/30/19 via Novaliq[employer]   No future appointment scheduled    Patient sent several My Charts since she was last seen in clinic.    Patient reports she is not taking any antidepressants and wishes just for a xanax refill. She will noted she is willing to make an appointment if necessary.    Will advise patient to follow-up with her PCP if she is feeling stable and just wants [facility]ills [facility].        Summary from 11/21/20 treatment plan:  Treatment Plan:    Continue Prozac 20 mg for total of 7 days then increase to 40 mg daily.     Discontinue Ativan    Start Xanax 0.25-0.5 mg daily as needed for anxiety    Start clonidine 0.1 - 0.2 mg at bedtime daily. Do not exceed 0.2 mg in 24 hours.    Continue working with cardiologist    Continue therapy as planned.    Continue all other cares per primary care provider.     Continue all other medications as reviewed per electronic medical record today.     Safety plan reviewed. To the Emergency Department as needed or call after hours crisis line at [phone] or [phone]. [place] Crisis Text Line: Text [place] to 863306  or  Suicide LifeLine Chat: suicidepreventionlifeline.org/chat    Schedule an appointment with me in 4-6 weeks or sooner as needed.  Call [facility] at [phone] to schedule

## 2020-03-25 NOTE — TELEPHONE ENCOUNTER
Called and reviewed ZioPatch monitor results with patient.  Pt verbalized understanding and will call the clinic if she continues experiencing symptoms or if she has any questions.     DEVROA Tovar

## 2020-03-27 ENCOUNTER — VIRTUAL VISIT (OUTPATIENT)
Dept: FAMILY MEDICINE | Facility: CLINIC | Age: 40
End: 2020-03-27
Payer: COMMERCIAL

## 2020-03-27 DIAGNOSIS — Z15.89 MTHFR MUTATION: ICD-10-CM

## 2020-03-27 DIAGNOSIS — F41.9 ANXIETY: ICD-10-CM

## 2020-03-27 DIAGNOSIS — I47.29 NSVT (NONSUSTAINED VENTRICULAR TACHYCARDIA) (H): ICD-10-CM

## 2020-03-27 DIAGNOSIS — F40.10 SOCIAL ANXIETY DISORDER: ICD-10-CM

## 2020-03-27 DIAGNOSIS — F41.9 ANXIETY: Primary | ICD-10-CM

## 2020-03-27 DIAGNOSIS — F33.1 MODERATE RECURRENT MAJOR DEPRESSION (H): ICD-10-CM

## 2020-03-27 PROCEDURE — 99213 OFFICE O/P EST LOW 20 MIN: CPT | Mod: TEL | Performed by: FAMILY MEDICINE

## 2020-03-27 RX ORDER — LORAZEPAM 1 MG/1
1 TABLET ORAL DAILY PRN
Qty: 15 TABLET | Refills: 0 | Status: SHIPPED | OUTPATIENT
Start: 2020-03-27 | End: 2020-03-27

## 2020-03-27 RX ORDER — LORAZEPAM 1 MG/1
.5-1 TABLET ORAL DAILY PRN
Qty: 15 TABLET | Refills: 0 | Status: SHIPPED | OUTPATIENT
Start: 2020-03-27 | End: 2020-08-04

## 2020-03-27 ASSESSMENT — ANXIETY QUESTIONNAIRES
7. FEELING AFRAID AS IF SOMETHING AWFUL MIGHT HAPPEN: NOT AT ALL
1. FEELING NERVOUS, ANXIOUS, OR ON EDGE: SEVERAL DAYS
GAD7 TOTAL SCORE: 3
5. BEING SO RESTLESS THAT IT IS HARD TO SIT STILL: NOT AT ALL
2. NOT BEING ABLE TO STOP OR CONTROL WORRYING: NOT AT ALL
IF YOU CHECKED OFF ANY PROBLEMS ON THIS QUESTIONNAIRE, HOW DIFFICULT HAVE THESE PROBLEMS MADE IT FOR YOU TO DO YOUR WORK, TAKE CARE OF THINGS AT HOME, OR GET ALONG WITH OTHER PEOPLE: NOT DIFFICULT AT ALL
6. BECOMING EASILY ANNOYED OR IRRITABLE: NOT AT ALL
3. WORRYING TOO MUCH ABOUT DIFFERENT THINGS: SEVERAL DAYS

## 2020-03-27 ASSESSMENT — PATIENT HEALTH QUESTIONNAIRE - PHQ9: 5. POOR APPETITE OR OVEREATING: SEVERAL DAYS

## 2020-03-27 NOTE — TELEPHONE ENCOUNTER
CW,   Ativan Rx didn't go through   Failed to eRx   Can you try resending?  Thanks,  Bernadine LOWRY RN

## 2020-03-27 NOTE — PROGRESS NOTES
"Chloe Foster is a 39 year old female who is being evaluated via a billable telephone visit.      The patient has been notified of following:     \"This telephone visit will be conducted via a call between you and your physician/provider. We have found that certain health care needs can be provided without the need for a physical exam.  This service lets us provide the care you need with a short phone conversation.  If a prescription is necessary we can send it directly to your pharmacy.  If lab work is needed we can place an order for that and you can then stop by our lab to have the test done at a later time.    If during the course of the call the physician/provider feels a telephone visit is not appropriate, you will not be charged for this service.\"     Chloe Foster complains of   Chief Complaint   Patient presents with     Anxiety       Anxiety-   Mood and anxiety sx's are much better compared to last fall.  The prozac had made her feel worse, so she saw Dr. Alvarez who rx'd lexapro, but she never took it.  Dr. Alvarez rx'd xanax, which she liked better at first, but then felt that the ativan worked better overall.  Felt like the xanax made her more tired the next day compared to the ativan.    She feels she's overall managing mood/anxiety better with better self cares and being better about taking her B-complex vitamins (which she doesn't think she was taking when sx's were worse in the fall).    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  Doing okay, though, but the ativan still helps with the sx's get significant.  Thinks she uses on ave ~1 tab/wk (maybe two 1/2 tabs during a week).    Sleep- can still wake ~3am, so not always the best.  She's tried melatonin - hang-over sensation on it, and has " crazy dreams.  Ativan- she sleeps really well on it, and she feels fine the next day- maybe just a mild slump in the afternoon.    Sx's- more situational with the covid situation and home stress with her  being more - has days of increased anxiety, when the ativan is helpful, or for getting a good nights sleep.      Averages out taking ~1 tab/wk of the ativan.    MTHFR mutation f/u-   Royce Benoit- discussion in 1/20 at appt- reveiwed.    B complex- feels like her mood had worsened when she had stopped the vitamins.  She was on the vitamin during 12/19 labs.      Cardiac f/u- tachycardia to 210s.  Dr. Cheatham- he wanted to do the implant, she was wary, so did ziopatch for 2 wks.  Results non-sustained NSVT, 7 beats.    She didn't have any major sx's when she was wearing it, unfortunately.  Looks like NSVT of 7 beats - 3/4 pm she wrote - 'thud', HR to 203.    Since taking it off the monitor, she hasn't had any major episodes.  She had an episode in 1/20 that lasted for 40 minutes- wishes that had been caught.  No return of such sustained sx's.      I have reviewed and updated the patient's Past Medical History, Social History, Family History and Medication List.    ALLERGIES  Compazine [prochlorperazine]      Assessment/Plan:    ICD-10-CM    1. Anxiety  F41.9 LORazepam (ATIVAN) 1 MG tablet   2. NSVT (nonsustained ventricular tachycardia) (H)  I47.2    3. MTHFR mutation (H)  E72.12    4. Moderate recurrent major depression (H)  F33.1    5. Social anxiety disorder  F40.10       Anxiety- different stressors, but doing okay with combination of self-cares, taking B-vitamins, and using 1/2-1 tab of ativan (1mg on ave a week), which really helps anxiety and insomnia sx's.  Minimal se's.  Initially thought xanax worked better, but now feels ativan works better.  She would like to return to f/u here for psych f/u.  Discussed that is fine but would like to do q3mo f/u- q6mo in-office appts, and in between can do phone  visits.  Pt agrees with plan.    NSVT- pt did ziopatch monitor through cardiology recently, and it caught some of her milder/shorter sx's, but none of the worse/sustained sx's (like she had in 1/20).  It found one episode of 7 beats of SVT, which we reviewed did correlate with one of her triggers and at that time, she noted 'thud', and HR of 203.  So do have concerns that what she is feeling is NSVT.  Asked her to reach back out to cardiology if sx's persist, and sooner if sx's worsen- may want to do another ziopatch to see if worse sx's are caught, or do the implant Dr. Almeida is recommending.    MTHFR- pt appreciated visit with Royce Benoit to review mehtyl folate supplementation and how it may affect her mood.  She has been feeling better since taking them more consistently.        Return in about 3 months (around 6/27/2020) for Anxiety follow-up, Routine Visit/Chronic Cares/Med Check.      Phone call duration:  22 minutes    Izabel Reyes MD

## 2020-03-28 ASSESSMENT — ANXIETY QUESTIONNAIRES: GAD7 TOTAL SCORE: 3

## 2020-04-01 NOTE — TELEPHONE ENCOUNTER
FUTURE VISIT INFORMATION        SURGERY INFORMATION:    Date: 20    Location: [facility] OR    Surgeon:  Nkiki Schwartz MD    Anesthesia Type:  General    Procedure: Revision, Septorhinoplasty with Cadaveric Donor Rib Graft versus Conchal Ear Graft Cosmetic tip rhinoplasty     RECORDS REQUESTED FROM:         Primary Care Provider: Izabel Reyes MD [facility]     Most recent EKG+ Tracin19     Most recent ECHO: 18     Most recent Cardiac Stress Test: 5/15/15- [facility]

## 2020-04-08 ENCOUNTER — PRE VISIT (OUTPATIENT)
Dept: SURGERY | Facility: CLINIC | Age: 40
End: 2020-04-08

## 2020-04-24 DIAGNOSIS — G43.101 MIGRAINE WITH AURA AND WITH STATUS MIGRAINOSUS, NOT INTRACTABLE: Primary | ICD-10-CM

## 2020-04-24 RX ORDER — TRAMADOL HYDROCHLORIDE 50 MG/1
50 TABLET ORAL EVERY 8 HOURS PRN
Qty: 10 TABLET | Refills: 0 | Status: SHIPPED | OUTPATIENT
Start: 2020-04-24 | End: 2020-08-10

## 2020-04-24 RX ORDER — DIPHENHYDRAMINE HCL 25 MG
25 TABLET ORAL EVERY 8 HOURS PRN
Qty: 10 TABLET | Refills: 0 | Status: SHIPPED | OUTPATIENT
Start: 2020-04-24 | End: 2020-08-10

## 2020-04-24 RX ORDER — METOCLOPRAMIDE 10 MG/1
10 TABLET ORAL EVERY 8 HOURS PRN
Qty: 10 TABLET | Refills: 0 | Status: SHIPPED | OUTPATIENT
Start: 2020-04-24 | End: 2020-08-10

## 2020-05-06 DIAGNOSIS — Z11.59 ENCOUNTER FOR SCREENING FOR OTHER VIRAL DISEASES: Primary | ICD-10-CM

## 2020-05-20 ENCOUNTER — MYC MEDICAL ADVICE (OUTPATIENT)
Dept: OTOLARYNGOLOGY | Facility: CLINIC | Age: 40
End: 2020-05-20

## 2020-06-29 ENCOUNTER — PRE VISIT (OUTPATIENT)
Dept: SURGERY | Facility: CLINIC | Age: 40
End: 2020-06-29

## 2020-06-29 ENCOUNTER — OFFICE VISIT (OUTPATIENT)
Dept: PLASTIC SURGERY | Facility: CLINIC | Age: 40
End: 2020-06-29
Payer: COMMERCIAL

## 2020-06-29 DIAGNOSIS — Z01.818 PREOPERATIVE EXAMINATION: Primary | ICD-10-CM

## 2020-06-29 NOTE — LETTER
June 29, 2020  Re: Chloe Foster  1980    Dear Dr. Cast,    Thank you so much for referring Chloe Foster to the [facility]. I had the pleasure of visiting with Chloe today.     Attached you will find a copy of my note. Please feel free to reach out to me with any questions, [phone].     Facial Plastic and Reconstructive Surgery    Chloe Foster comes in for another consult prior to surgery.  We have decided to do surgery as an outpatient and to have her see PAC.    I also would like to use cadaver rib and she is in agreement.    We went over plans, I would:  Straighten the nose   grafts bialterally  cammouflage graft left lower lateral cartilage for concavity, and reduce convexity of right lower lat  Create tip defining points but bringing rigth dome up and in  Reduce the dorsum with rasping    Your trust in our practice and care is much appreciated.    Sincerely,  Nikki Schwartz MD

## 2020-06-29 NOTE — PROGRESS NOTES
Facial Plastic and Reconstructive Surgery      Chloe ROCKY Foster comes in for another consult prior to surgery.  We have decided to do surgery as an outpatient and to have her see PAC.    I also would like to use cadaver rib and she is in agreement.    We went over plans, I would:  Straighten the nose   grafts bialterally  cammouflage graft left lower lateral cartilage for concavity, and reduce convexity of right lower lat  Create tip defining points but bringing rigth dome up and in  Reduce the dorsum with rasping

## 2020-06-29 NOTE — LETTER
6/29/2020       RE: Chloe Foster  [address] [address]     Dear Colleague,    Thank you for referring your patient, Chloe Foster, to the [facility] [place] [facility] at [facility]. Please see a copy of my visit note below.    Facial Plastic and Reconstructive Surgery      Chloe Foster comes in for another consult prior to surgery.  We have decided to do surgery as an outpatient and to have her see PAC.    I also would like to use cadaver rib and she is in agreement.    We went over plans, I would:  Straighten the nose   grafts bialterally  cammouflage graft left lower lateral cartilage for concavity, and reduce convexity of right lower lat  Create tip defining points but bringing rigth dome up and in  Reduce the dorsum with rasping      Again, thank you for allowing me to participate in the care of your patient.      Sincerely,    Nikki Schwartz MD

## 2020-07-27 ENCOUNTER — MYC MEDICAL ADVICE (OUTPATIENT)
Dept: FAMILY MEDICINE | Facility: CLINIC | Age: 40
End: 2020-07-27

## 2020-08-04 ENCOUNTER — VIRTUAL VISIT (OUTPATIENT)
Dept: FAMILY MEDICINE | Facility: CLINIC | Age: 40
End: 2020-08-04
Payer: COMMERCIAL

## 2020-08-04 DIAGNOSIS — K21.9 GASTROESOPHAGEAL REFLUX DISEASE, ESOPHAGITIS PRESENCE NOT SPECIFIED: Primary | ICD-10-CM

## 2020-08-04 DIAGNOSIS — F41.9 ANXIETY: ICD-10-CM

## 2020-08-04 DIAGNOSIS — F51.04 PSYCHOPHYSIOLOGICAL INSOMNIA: ICD-10-CM

## 2020-08-04 DIAGNOSIS — R19.5 LOOSE STOOLS: ICD-10-CM

## 2020-08-04 DIAGNOSIS — R11.0 NAUSEA: ICD-10-CM

## 2020-08-04 PROCEDURE — 99214 OFFICE O/P EST MOD 30 MIN: CPT | Mod: 95 | Performed by: FAMILY MEDICINE

## 2020-08-04 RX ORDER — LORAZEPAM 1 MG/1
.5-1 TABLET ORAL DAILY PRN
Qty: 15 TABLET | Refills: 0 | Status: SHIPPED | OUTPATIENT
Start: 2020-08-04 | End: 2020-12-29

## 2020-08-04 ASSESSMENT — ANXIETY QUESTIONNAIRES
IF YOU CHECKED OFF ANY PROBLEMS ON THIS QUESTIONNAIRE, HOW DIFFICULT HAVE THESE PROBLEMS MADE IT FOR YOU TO DO YOUR WORK, TAKE CARE OF THINGS AT HOME, OR GET ALONG WITH OTHER PEOPLE: SOMEWHAT DIFFICULT
2. NOT BEING ABLE TO STOP OR CONTROL WORRYING: NOT AT ALL
GAD7 TOTAL SCORE: 3
6. BECOMING EASILY ANNOYED OR IRRITABLE: SEVERAL DAYS
5. BEING SO RESTLESS THAT IT IS HARD TO SIT STILL: NOT AT ALL
3. WORRYING TOO MUCH ABOUT DIFFERENT THINGS: SEVERAL DAYS
1. FEELING NERVOUS, ANXIOUS, OR ON EDGE: NOT AT ALL
7. FEELING AFRAID AS IF SOMETHING AWFUL MIGHT HAPPEN: NOT AT ALL

## 2020-08-04 ASSESSMENT — PATIENT HEALTH QUESTIONNAIRE - PHQ9
SUM OF ALL RESPONSES TO PHQ QUESTIONS 1-9: 1
5. POOR APPETITE OR OVEREATING: SEVERAL DAYS

## 2020-08-04 NOTE — PROGRESS NOTES
"Chloe Foster is a 40 year old female who is being evaluated via a billable video visit.      The patient has been notified of following:     \"This video visit will be conducted via a call between you and your physician/provider. We have found that certain health care needs can be provided without the need for an in-person physical exam.  This service lets us provide the care you need with a video conversation.  If a prescription is necessary we can send it directly to your pharmacy.  If lab work is needed we can place an order for that and you can then stop by our lab to have the test done at a later time.    Video visits are billed at different rates depending on your insurance coverage.  Please reach out to your insurance provider with any questions.    If during the course of the call the physician/provider feels a video visit is not appropriate, you will not be charged for this service.\"    Patient has given verbal consent for Video visit? Yes  How would you like to obtain your AVS? MyChart  If you are dropped from the video visit, the video invite should be resent to:   Will anyone else be joining your video visit? No        Subjective     Chloe Foster is a 40 year old female who presents today via video visit for the following health issues:    HPI    GERD/Heartburn/GI   Onset: 1 month     Description:     Burning in chest: YES    Intensity: moderate    Progression of Symptoms: intermittent    Accompanying Signs & Symptoms:  Does it feel like food gets stuck: no  Nausea: YES  Vomiting (bloody?): no  Diarrhea for two weeks  Abdominal Pain: no, just burning sensation in abdomen   Black-Tarry stools: no:  Bloody stools: no    History:   Previous ulcers: no    Precipitating factors:   Caffeine use: YES  Alcohol use: YES- pt states she taste wine for work   NSAID/Aspirin use: YES- uses a baby aspiring everyday   Tobacco use: no  Worse with spicy foods.    Alleviating factors:      GI sx's- ~1 month " ago, started noticing nausea.  Almost felt like she would vomit.  Happening enough to be concerning.  Nausea could get bad.           If nauseated, she'll lie down, and it seems to pass in an hour.             Has tried to avoid acidic foods.    Never had chest sx's, more frequently, wondering about heartburn.  Not chest pain, more like a cramp.    She does sometimes get a burning, gnawing sensation in her stomach, like she hungry.             For about 2 weeks, she also had diarrhea (2-3 loose stools in            am) for daily for 2 wks, no fevers/chills, otherwise felt fine.             MVI's- takes those at night.  She's tried tums, if bad at night.        Anxiety sx's-   Main issue now, not being able to sleep due to mind racing.  Similar to before, except now it's not thinking about work presentations, new stressors with covid issues, trying to keep track of things now.  When sx's are bad, it feels like her body and mind are on a race.    Still takes ~1x/wk ativan at max, helps as a reset.  Starts with 1/2 tab, and if not asleep within an hour, takes another 1/2 tab.    Baseline anxiety- it's gotten better, but when it's bad it can be worse in intensity. Seems like the overall anxiety is less frequent, but she can get really overwhelmed when it comes on.  Combo of stress and anxiety.    Stressors-   is gone all the time working.  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  Son is home .       Video Start Time: 8:01 AM       Patient Active Problem List   Diagnosis     Vitamin D deficiency     CARDIOVASCULAR SCREENING; LDL GOAL LESS THAN 160     Lumbar herniated disc     Hirsutism     Insomnia     IBS (irritable bowel syndrome)     Moderate recurrent major depression (H)     Cerebral artery occlusion with cerebral infarction     History of  section     PFO with atrial septal aneurysm     Protein S deficiency (H)     Migraine with aura and without status migrainosus, not intractable     Pre-conception  counseling     Elevated lipoprotein A level     Social anxiety disorder     MTHFR mutation (H)     Cerebrovascular accident (CVA) due to embolism of right middle cerebral artery (H)     Nasal obstruction     Nasal valve collapse     Deviated nasal septum     Dizziness     Cerebrovascular accident (CVA), unspecified mechanism (H)     Past Surgical History:   Procedure Laterality Date     ARTHROSCOPY KNEE RT/LT      Rt knee     HC REPAIR OF NASAL SEPTUM       REPAIR PATENT FORAMEN OVALE  2016       Social History     Tobacco Use     Smoking status: Former Smoker     Packs/day: 0.50     Years: 5.00     Pack years: 2.50     Types: Cigarettes     Last attempt to quit: 2014     Years since quittin.3     Smokeless tobacco: Never Used   Substance Use Topics     Alcohol use: Yes     Alcohol/week: 0.0 standard drinks     Comment: SOCIAL     Family History   Problem Relation Age of Onset     Family History Negative Mother      Cancer Father         throat, possibly asbestos exposure     Hypertension Father      Allergies Father      Arrhythmia Father      Family History Negative Sister      Family History Negative Brother      Diabetes Maternal Grandmother         on insulin     C.A.D. Maternal Grandmother         triple bypass in her 60s     Diabetes Paternal Grandmother      C.A.D. Maternal Grandfather         MI in 50s     Neurologic Disorder Paternal Grandfather          of brain aneurysm in early 60s     Diabetes Maternal Aunt          in her early 40s of DM complications, type 2     Coronary Artery Disease Early Onset Maternal Aunt 41     Family History Negative Son          Current Outpatient Medications   Medication Sig Dispense Refill     ALPRAZolam (XANAX) 0.5 MG tablet Take 1 tablet (0.5 mg) by mouth daily as needed for anxiety 30 tablet 0     aspirin EC 81 MG EC tablet Take 81 mg by mouth       B Complex Vitamins (VITAMIN-B COMPLEX PO) Take by mouth daily       ibuprofen  (ADVIL/MOTRIN) 200 MG tablet Take 400 mg by mouth every 4 hours as needed for mild pain       LORazepam (ATIVAN) 1 MG tablet Take 0.5-1 tablets (0.5-1 mg) by mouth daily as needed for anxiety or sleep (severe anxiety/panic) 15 tablet 0     magnesium oxide (MAG-OX) 400 MG tablet Take 400 mg by mouth every evening        Multiple Vitamins-Minerals (MULTIVITAMIN GUMMIES WOMENS PO) Take 2 chew tab by mouth every evening       NONFORMULARY Take 1 tablet by mouth every evening OTC: B-Right Vitamin (Optimized B Complex)       Omega-3 Fatty Acids (FISH OIL PO) Take 1 capsule by mouth daily        STATIN NOT PRESCRIBED, INTENTIONAL, Statin not prescribed intentionally due to Woman of childbearing age not actively taking birth control 0 each 0     VITAMIN D, CHOLECALCIFEROL, PO Take 1,000 Units by mouth every evening (takes 2 x 100 unit capsule)        diphenhydrAMINE (BENADRYL ALLERGY) 25 MG tablet Take 1 tablet (25 mg) by mouth every 8 hours as needed for other (headache, take with metoclopramide) 10 tablet 0     metoclopramide (REGLAN) 10 MG tablet Take 1 tablet (10 mg) by mouth every 8 hours as needed (headache) 10 tablet 0     traMADol (ULTRAM) 50 MG tablet Take 1 tablet (50 mg) by mouth every 8 hours as needed for headaches or severe pain 10 tablet 0     Allergies   Allergen Reactions     Compazine [Prochlorperazine] Muscle Pain (Myalgia)     BP Readings from Last 3 Encounters:   01/16/20 119/83   12/27/19 109/76   12/19/19 94/70    Wt Readings from Last 3 Encounters:   01/16/20 77.1 kg (170 lb)   12/27/19 77.1 kg (170 lb 1 oz)   12/19/19 76.9 kg (169 lb 8 oz)                    Reviewed and updated as needed this visit by Provider         Review of Systems   Constitutional, HEENT, cardiovascular, pulmonary, gi and gu systems are negative, except as otherwise noted.      Objective       Physical Exam     GENERAL: Healthy, alert and no distress  EYES: Eyes grossly normal to inspection.  No discharge or erythema, or  obvious scleral/conjunctival abnormalities.  RESP: No audible wheeze, cough, or visible cyanosis.  No visible retractions or increased work of breathing.    SKIN: Visible skin clear. No significant rash, abnormal pigmentation or lesions.  NEURO: Cranial nerves grossly intact.  Mentation and speech appropriate for age.  PSYCH: Mentation appears normal, affect normal/bright, judgement and insight intact, normal speech and appearance well-groomed.      Diagnostic Test Results:  Labs reviewed in Epic        Assessment & Plan       ICD-10-CM    1. Gastroesophageal reflux disease, esophagitis presence not specified  K21.9    2. Nausea  R11.0    3. Loose stools  R19.5    4. Anxiety  F41.9 LORazepam (ATIVAN) 1 MG tablet     SLEEP EVALUATION & MANAGEMENT REFERRAL - [facility] [phone] (Age 15 and up)   5. Psychophysiological insomnia  F51.04 SLEEP EVALUATION & MANAGEMENT REFERRAL - [facility] [phone] (Age 15 and up)     GI sx's- Main sx's are nausea and epigastric gnawing sx's.  Suspect diarrhea was something else, but these prominent sx's could be GERD.  Rec 2 wks of PPI, then prn famotidine or tums.  F/u in a few weeks to discuss if helpful.    Anxiety/insomnia- still finding lorazepam helpful 1x/wk or less to help her sleep.  Helps her 'reset'.  Hasn't able to find a good therapist for anxiety.  Referred to sleep center as option to focus on CBT for sleep- pt will consider.  Cont q3-6 evelyn f/u for ativan use.  #12/months - bit over 3 months, so again sent #15.        Izabel Reyes MD  [facility]        Video-Visit Details    Type of service:  Video Visit    Video Start Time: 8:01 AM  Video End Time:8:26 AM    Originating Location (pt. Location): Home    Distant Location (provider location):  [facility]     Platform used for Video Visit: Rafal Reyes MD

## 2020-08-05 ENCOUNTER — MYC MEDICAL ADVICE (OUTPATIENT)
Dept: FAMILY MEDICINE | Facility: CLINIC | Age: 40
End: 2020-08-05

## 2020-08-05 DIAGNOSIS — Z20.822 ENCOUNTER FOR LABORATORY TESTING FOR COVID-19 VIRUS: ICD-10-CM

## 2020-08-05 DIAGNOSIS — Z20.828 VIRAL DISEASE EXPOSURE: Primary | ICD-10-CM

## 2020-08-05 ASSESSMENT — ANXIETY QUESTIONNAIRES: GAD7 TOTAL SCORE: 3

## 2020-08-06 NOTE — TELEPHONE ENCOUNTER
CW  Pt requesting antibody test for Covid.   was exposed to positive individual in March.  Pended, Please approve if appropriate  Noelle Chavez RN

## 2020-08-11 DIAGNOSIS — J34.2 DEVIATED NASAL SEPTUM: Primary | ICD-10-CM

## 2020-08-11 DIAGNOSIS — J34.89 NASAL OBSTRUCTION: ICD-10-CM

## 2020-08-11 DIAGNOSIS — J34.829 NASAL VALVE COLLAPSE: ICD-10-CM

## 2020-08-11 RX ORDER — CEFAZOLIN SODIUM 1 G/50ML
1 INJECTION, SOLUTION INTRAVENOUS SEE ADMIN INSTRUCTIONS
Status: CANCELLED | OUTPATIENT
Start: 2020-08-11

## 2020-08-11 RX ORDER — CEFAZOLIN SODIUM 2 G/50ML
2 SOLUTION INTRAVENOUS
Status: CANCELLED | OUTPATIENT
Start: 2020-08-11

## 2020-08-18 ENCOUNTER — TELEPHONE (OUTPATIENT)
Dept: OTOLARYNGOLOGY | Facility: CLINIC | Age: 40
End: 2020-08-18

## 2020-08-18 NOTE — TELEPHONE ENCOUNTER
----- Message from Katty Salmeron sent at 8/17/2020  8:16 AM CDT -----  Richar Estevez,    No PA needed for this order (because it's a revision).    Katty

## 2020-08-19 ENCOUNTER — OFFICE VISIT (OUTPATIENT)
Dept: FAMILY MEDICINE | Facility: CLINIC | Age: 40
End: 2020-08-19
Payer: COMMERCIAL

## 2020-08-19 VITALS
SYSTOLIC BLOOD PRESSURE: 114 MMHG | WEIGHT: 163 LBS | BODY MASS INDEX: 26.51 KG/M2 | OXYGEN SATURATION: 96 % | DIASTOLIC BLOOD PRESSURE: 75 MMHG | TEMPERATURE: 98 F | HEART RATE: 84 BPM

## 2020-08-19 DIAGNOSIS — R11.0 NAUSEA: ICD-10-CM

## 2020-08-19 DIAGNOSIS — K30 STOMACH UPSET: ICD-10-CM

## 2020-08-19 DIAGNOSIS — Z86.73 HISTORY OF CVA (CEREBROVASCULAR ACCIDENT): ICD-10-CM

## 2020-08-19 DIAGNOSIS — R42 DIZZINESS: ICD-10-CM

## 2020-08-19 DIAGNOSIS — K21.9 GASTROESOPHAGEAL REFLUX DISEASE, ESOPHAGITIS PRESENCE NOT SPECIFIED: Primary | ICD-10-CM

## 2020-08-19 DIAGNOSIS — G43.109 MIGRAINE WITH AURA AND WITHOUT STATUS MIGRAINOSUS, NOT INTRACTABLE: ICD-10-CM

## 2020-08-19 DIAGNOSIS — Z31.69 PRE-CONCEPTION COUNSELING: ICD-10-CM

## 2020-08-19 PROCEDURE — 99214 OFFICE O/P EST MOD 30 MIN: CPT | Performed by: FAMILY MEDICINE

## 2020-08-19 NOTE — PROGRESS NOTES
"Subjective     Chloe Foster is a 40 year old female who presents to clinic today for the following health issues:    HPI       Medication Followup of ***    Taking Medication as prescribed: {.:284619::\"yes\"}    Side Effects:  {NONEORCHOOSE:807789::\"None\"}    Medication Helping Symptoms:  {.:942430::\"yes\"}     {additonal problems for provider to add (Optional):016915}    Review of Systems   {ROS COMP (Optional):958714}      Objective    There were no vitals taken for this visit.  There is no height or weight on file to calculate BMI.  Physical Exam   {Exam List (Optional):229999}    {Diagnostic Test Results (Optional):712045}        {PROVIDER CHARTING PREFERENCE:604706}    "

## 2020-08-19 NOTE — PROGRESS NOTES
Subjective     Chloe Foster is a 40 year old female who presents to clinic today for the following health issues:    HPI       GERD/Nausea-  ~6 wks ago, sx's started.  Sometimes gets nausea in the morning and can't eat.  Vomiting- maybe once, assumed it was something she ate.  More mild nausea, upset stomach.    Didn't have the PPI med at [employer], so didn't try it.  Did try tums- helped the burning in her throat, and that has mostly resolved unless she has coffee.      Last few weeks feeling dizzy     F/u last visit...    Headache/migraines-   Mild headache, worse in am, feels it if she coughs.  At least a week of these sx's.  Comes and goes, but in R upper scalp.  2/10 at worst, just there. Driving yesterday and had bad  No aura's lately.  Tinnitus has been bad lately- did go in to get checked with ENT.    Dizziness- some dizziness this am, more when she started moving around.    Migraines seem to come seasonally- seems more spring/fall.      They are considering adoption or surrogate         Review of Systems   Constitutional, HEENT, cardiovascular, pulmonary, gi and gu systems are negative, except as otherwise noted.      Objective    /75 (BP Location: Left arm, Patient Position: Sitting)   Pulse 84   Temp 98  F (36.7  C) (Tympanic)   Wt 73.9 kg (163 lb)   SpO2 96%   BMI 26.51 kg/m    Body mass index is 26.51 kg/m .  Physical Exam   GENERAL APPEARANCE: healthy, alert and no distress     EYES: PERRL, sclera clear     HENT: nose and mouth without ulcers or lesions     NECK: no adenopathy, no asymmetry, masses, or scars and thyroid normal to palpation     RESP: lungs clear to auscultation - no rales, rhonchi or wheezes     CV: regular rates and rhythm, normal S1 S2, no S3 or S4 and no murmur, click or rub      Abdomen: soft, nontender, no HSM or masses and bowel sounds normal     Ext: warm, dry, no edema       Neuro: CN 2-12 grossly nl, fundi WNL bilaterally, UE and LE strength 5/5, nl sensation  "and DTR's.  Normal gait.      Psych: full range affect, normal speech and grooming, judgement and insight intact             Assessment & Plan     ICD-10-CM    1. Gastroesophageal reflux disease, esophagitis presence not specified  K21.9 GASTROENTEROLOGY ADULT REF CONSULT ONLY   2. History of CVA (cerebrovascular accident)  Z86.73 [facility] REFERRAL-PRECONCEPTION   3. Dizziness  R42    4. Pre-conception counseling  Z31.69 [facility] REFERRAL-PRECONCEPTION   5. Migraine with aura and without status migrainosus, not intractable  G43.109    6. Stomach upset  K30 GASTROENTEROLOGY ADULT REF CONSULT ONLY   7. Nausea  R11.0 GASTROENTEROLOGY ADULT REF CONSULT ONLY      Multiple symptoms/issues brought up today- difficult to get to any in depth.    Stomach upset/nausea/GERD- pt will do trial of pepcid for a couple weeks as rec at last video visit.  If not helping sx's, will make f/u appt with GI- referral given.    Dizziness/HA's- pt with h/o CVA and migraines, but has PFO closure since CVA.  With dizziness and more persistent headaches, will f/u with neurology.    Preconception counseling- due to CVA w/ last pregnancy,  fearful about prospect of her getting pregnant again. They are exploring options, one being surrogate with her eggs, but unsure if that is an option still with her age.  She'd like to discuss with MFM again- referral placed.         BMI:   Estimated body mass index is 26.51 kg/m  as calculated from the following:    Height as of 1/16/20: 1.67 m (5' 5.75\").    Weight as of this encounter: 73.9 kg (163 lb).   Weight management plan: Discussed healthy diet and exercise guidelines        Izabel Reyes MD  [facility]    "

## 2020-08-20 ENCOUNTER — TELEPHONE (OUTPATIENT)
Dept: OTOLARYNGOLOGY | Facility: CLINIC | Age: 40
End: 2020-08-20

## 2020-08-20 NOTE — TELEPHONE ENCOUNTER
Returned patients calls regarding scheduling surgery with Dr. Azar Lopez. Patient recalls that her conversation with Dr. Azar Lopez was approximately 3 weeks ago and was decided that surgery will be at [facility] and Dr. Azar Lopez was in agreement with this. She did not remember that they agreed on [facility] outpatient surgery center because of her history of a stroke.   Discussed the appointment /pre-op with anesthesia. Patient is okay with this, but states that she would like to have surgery at the hospital incase something was to happen.   Explained to patient that right now, Dr. Azar Lopez is completely full at [facility] for surgery until 2021, because of COVID-19 patients were rescheduled into these appts previously.   Discussed scheduling at the [facility], informed patient that right now the surgery schedule for [facility] is not out past 10/2 and we should hear next week regarding the fall surgery schedule. Although, we may not be scheduling elective surgery at the hospital because of COVID-19. Things also can change due to the current pandemic.     Plan to follow up with patient next week after speaking with Dr. Azar Lopez for clarity on location. Informed patient after her discussion at the end of June with provider, her note states outpatient surgery with cadaver rib. Will reach out to patient after OR schedule for hospital scheduling.     No further questions or concerns at this time.    Zenaida Wynn  08/20/20 1:00 PM

## 2020-08-21 DIAGNOSIS — O09.529 AMA (ADVANCED MATERNAL AGE) MULTIGRAVIDA 35+: Primary | ICD-10-CM

## 2020-08-24 ENCOUNTER — TRANSCRIBE ORDERS (OUTPATIENT)
Dept: MATERNAL FETAL MEDICINE | Facility: CLINIC | Age: 40
End: 2020-08-24

## 2020-08-24 DIAGNOSIS — Z31.69 ENCOUNTER FOR PRECONCEPTION CONSULTATION: Primary | ICD-10-CM

## 2020-08-24 DIAGNOSIS — O26.90 PREGNANCY RELATED CONDITION, ANTEPARTUM: Primary | ICD-10-CM

## 2020-08-28 ENCOUNTER — PREP FOR PROCEDURE (OUTPATIENT)
Dept: OTOLARYNGOLOGY | Facility: CLINIC | Age: 40
End: 2020-08-28

## 2020-08-28 DIAGNOSIS — J34.89 NASAL OBSTRUCTION: ICD-10-CM

## 2020-08-28 DIAGNOSIS — J34.2 DEVIATED NASAL SEPTUM: Primary | ICD-10-CM

## 2020-08-28 DIAGNOSIS — J34.829 NASAL VALVE COLLAPSE: ICD-10-CM

## 2020-08-28 DIAGNOSIS — Z11.59 ENCOUNTER FOR SCREENING FOR OTHER VIRAL DISEASES: Primary | ICD-10-CM

## 2020-08-28 NOTE — TELEPHONE ENCOUNTER
Patient was scheduled for 11/12/2020 at [facility] OR.   Patient has requested PAC appt + anesthesia consult same day. Patient has a history of a stroke and would like to discuss. COVID19 test was scheduled for patient on 11/9/2020 430pm. Will return to see Dr. Azar Lopez 1 week post op.   No further questions at this time.

## 2020-08-28 NOTE — TELEPHONE ENCOUNTER
Patient left message stating that she would like to reschedule surgery from 10/8 to 11/5 or 11/12. 11/5 is her first pick.     Left vm with Chloe stating that 11/5 Dr. Jackson is fully booked. Patient was scheduled 11/12 asked patient to call to confirm.

## 2020-08-28 NOTE — TELEPHONE ENCOUNTER
Called patient to discuss surgery scheduling. Informed patient that writer spoke with Dr. Azar Lopez and she is comfortable moving forward with scheduling surgery at the [facility] or [facility]. She does not believe patient must have [facility]. Patient should have appt in PAC. Explained location to patient and provided address.   Patient was presented with options for scheduling surgery.   [facility] - Early 2021   [facility] - Oct 8th   [facility] - On hold for now  Patient would like to move forward with scheduling at the [facility]. Patient picked date of Oct 8th. Discussed needing PAC eval and COVID19 test. Patient will discuss with  and call back to confirm or reschedule the date. Patient knows Dr. Azar Lopez operates on Thursdays.

## 2020-08-31 NOTE — TELEPHONE ENCOUNTER
FUTURE VISIT INFORMATION      SURGERY INFORMATION:    Date: 20    Location:  or    Surgeon:  Nikki Schwartz MD     Anesthesia Type:  general    Procedure: Revision Septorhinoplasty, Repair of Nasal Valve Collapse, Cadaveric Rib + Cosmetic Tip Rhinoplasty     Consult: ov     RECORDS REQUESTED FROM:       Primary Care Provider: Izabel Reyes MD- [place]    Most recent EKG+ Tracin20    Most recent ECHO: 18

## 2020-08-31 NOTE — TELEPHONE ENCOUNTER
FUTURE VISIT INFORMATION        SURGERY INFORMATION:    Date: 20    Location:  or    Surgeon:  Nikki Schwartz MD     Anesthesia Type:  general    Procedure: Revision Septorhinoplasty, Repair of Nasal Valve Collapse, Cadaveric Rib + Cosmetic Tip Rhinoplasty     Consult: ov      RECORDS REQUESTED FROM:         Primary Care Provider: Izabel Reyes MD- [place]     Most recent EKG+ Tracin20     Most recent ECHO: 18

## 2020-09-21 ENCOUNTER — VIRTUAL VISIT (OUTPATIENT)
Dept: NEUROLOGY | Facility: CLINIC | Age: 40
End: 2020-09-21
Payer: COMMERCIAL

## 2020-09-21 DIAGNOSIS — H53.9 VISION CHANGES: Primary | ICD-10-CM

## 2020-09-21 RX ORDER — DIAZEPAM 5 MG
5 TABLET ORAL ONCE
Qty: 1 TABLET | Refills: 0 | Status: SHIPPED | OUTPATIENT
Start: 2020-09-21 | End: 2020-09-21

## 2020-09-21 NOTE — LETTER
"9/21/2020       RE: Chloe Foster  [address] [address]     Dear Colleague,    Thank you for referring your patient, Chloe Foster, to the [facility] NEUROLOGY at [facility]. Please see a copy of my visit note below.    Chloe Foster is a 40 year old female who is being evaluated via a billable video visit.      The patient has been notified of following:     \"This video visit will be conducted via a call between you and your physician/provider. We have found that certain health care needs can be provided without the need for an in-person physical exam.  This service lets us provide the care you need with a video conversation.  If a prescription is necessary we can send it directly to your pharmacy.  If lab work is needed we can place an order for that and you can then stop by our lab to have the test done at a later time.    Video visits are billed at different rates depending on your insurance coverage.  Please reach out to your insurance provider with any questions.    If during the course of the call the physician/provider feels a video visit is not appropriate, you will not be charged for this service.\"    Patient has given verbal consent for Video visit?YES  How would you like to obtain your AVS? MyChart      Video-Visit Details    Type of service:  Video Visit    Video Start Time: 13:30  Video End Time: 13:57    Originating Location (pt. Location): Home    Distant Location (provider location):  [facility] NEUROLOGY     Platform used for Video Visit: Rafal Singh, EMT    Reason for visit: Return visit for complex migraine aura    HPI:  Chloe is a 40-year-old woman who has a past medical history significant for a PFO status post closure in April 2016 and diagnosis of a protein S deficiency.  She had an ischemic stroke in the setting of hypercoagulability of pregnancy prior to her PFO closure.  Since 2015 she has had " migraine auras.  Typical frequency was 2-3 times a year.  Last summer the increased frequency to about once or twice a week and again this year they have increased frequency again at its worst she was having them 2-3 times a day.  They typically last about an hour in totality.  She very rarely has a mild headache.  She has visual changes with the migraine aura symptoms which are the most prominent symptom she also can have some numbness and tingling in her face.    The patient reports that she is supposed to be having a septoplasty in October.  She reports that she is highly anxious that she may have had another stroke or that this possibly represents multiple sclerosis.  She had an MRI imaging a year ago that was stable with no new evidence of ischemic lesions.    Impression/recommendations:  1.  Migraine with aura  The patient will have a repeat MRI scan due to the change in her migraine frequency.  I do wonder whether septoplasty in and of itself might help prevent further migraines.  Alternatively I think a migraine preventive would be recommended at this point in time I discussed with her the options for migraine prevention including the different medication options and she will think about this as she awaits results.    I tried to reassure the patient that I still think her symptoms are consistent with a migraine with aura and not something such as multiple sclerosis or further strokes.  I think the imaging will go further to reassure the patient.    Latia Post MD [facility]  Department of Neurology  Pager [phone]              Again, thank you for allowing me to participate in the care of your patient.      Sincerely,    Latia Post MD

## 2020-09-21 NOTE — LETTER
9/21/2020       RE: Chloe Foster  [address] [facility] [address]     Dear Colleague,    Thank you for referring your patient, Chloe Foster, to the [facility] NEUROLOGY at [facility]. Please see a copy of my visit note below.    Reason for visit: Return visit for complex migraine aura    HPI:  Chloe is a 40-year-old woman who has a past medical history significant for a PFO status post closure in April 2016 and diagnosis of a protein S deficiency.  She had an ischemic stroke in the setting of hypercoagulability of pregnancy prior to her PFO closure.  Since 2015 she has had migraine auras.  Typical frequency was 2-3 times a year.  Last summer the increased frequency to about once or twice a week and again this year they have increased frequency again at its worst she was having them 2-3 times a day.  They typically last about an hour in totality.  She very rarely has a mild headache.  She has visual changes with the migraine aura symptoms which are the most prominent symptom she also can have some numbness and tingling in her face.    The patient reports that she is supposed to be having a septoplasty in October.  She reports that she is highly anxious that she may have had another stroke or that this possibly represents multiple sclerosis.  She had an MRI imaging a year ago that was stable with no new evidence of ischemic lesions.    Impression/recommendations:  1.  Migraine with aura  The patient will have a repeat MRI scan due to the change in her migraine frequency.  I do wonder whether septoplasty in and of itself might help prevent further migraines.  Alternatively I think a migraine preventive would be recommended at this point in time I discussed with her the options for migraine prevention including the different medication options and she will think about this as she awaits results.    I tried to reassure the patient that I still think  her symptoms are consistent with a migraine with aura and not something such as multiple sclerosis or further strokes.  I think the imaging will go further to reassure the patient.      Again, thank you for allowing me to participate in the care of your patient.  Sincerely,    Latia Post MD [facility]  Department of Neurology  Pager [phone]

## 2020-09-21 NOTE — PROGRESS NOTES
"Chloe Foster is a 40 year old female who is being evaluated via a billable video visit.      The patient has been notified of following:     \"This video visit will be conducted via a call between you and your physician/provider. We have found that certain health care needs can be provided without the need for an in-person physical exam.  This service lets us provide the care you need with a video conversation.  If a prescription is necessary we can send it directly to your pharmacy.  If lab work is needed we can place an order for that and you can then stop by our lab to have the test done at a later time.    Video visits are billed at different rates depending on your insurance coverage.  Please reach out to your insurance provider with any questions.    If during the course of the call the physician/provider feels a video visit is not appropriate, you will not be charged for this service.\"    Patient has given verbal consent for Video visit?YES  How would you like to obtain your AVS? [employer]      Video-Visit Details    Type of service:  Video Visit    Video Start Time: 13:30  Video End Time: 13:57    Originating Location (pt. Location): Home    Distant Location (provider location):  [facility] NEUROLOGY     Platform used for Video Visit: JANET Corey    Reason for visit: Return visit for complex migraine aura    HPI:  Chloe is a 40-year-old woman who has a past medical history significant for a PFO status post closure in April 2016 and diagnosis of a protein S deficiency.  She had an ischemic stroke in the setting of hypercoagulability of pregnancy prior to her PFO closure.  Since 2015 she has had migraine auras.  Typical frequency was 2-3 times a year.  Last summer the increased frequency to about once or twice a week and again this year they have increased frequency again at its worst she was having them 2-3 times a day.  They typically last about an hour in totality.  She very rarely has a " mild headache.  She has visual changes with the migraine aura symptoms which are the most prominent symptom she also can have some numbness and tingling in her face.    The patient reports that she is supposed to be having a septoplasty in October.  She reports that she is highly anxious that she may have had another stroke or that this possibly represents multiple sclerosis.  She had an MRI imaging a year ago that was stable with no new evidence of ischemic lesions.    Impression/recommendations:  1.  Migraine with aura  The patient will have a repeat MRI scan due to the change in her migraine frequency.  I do wonder whether septoplasty in and of itself might help prevent further migraines.  Alternatively I think a migraine preventive would be recommended at this point in time I discussed with her the options for migraine prevention including the different medication options and she will think about this as she awaits results.    I tried to reassure the patient that I still think her symptoms are consistent with a migraine with aura and not something such as multiple sclerosis or further strokes.  I think the imaging will go further to reassure the patient.    Latia Post MD [facility]  Department of Neurology  Pager [phone]

## 2020-10-13 NOTE — TELEPHONE ENCOUNTER
FUTURE VISIT INFORMATION        SURGERY INFORMATION:    Date: 20    Location:  or    Surgeon:  Nikki Schwartz MD     Anesthesia Type:  general    Procedure: Revision Septorhinoplasty, Repair of Nasal Valve Collapse, Cadaveric Rib + Cosmetic Tip Rhinoplasty     Consult: ov      RECORDS REQUESTED FROM:         Primary Care Provider: Izabel Reyes MD- [place]     Most recent EKG+ Tracin20     Most recent ECHO: 18

## 2020-10-19 ENCOUNTER — PRE VISIT (OUTPATIENT)
Dept: MATERNAL FETAL MEDICINE | Facility: CLINIC | Age: 40
End: 2020-10-19

## 2020-10-23 ENCOUNTER — PRE VISIT (OUTPATIENT)
Dept: SURGERY | Facility: CLINIC | Age: 40
End: 2020-10-23

## 2020-10-26 ENCOUNTER — HOSPITAL ENCOUNTER (OUTPATIENT)
Dept: MRI IMAGING | Facility: CLINIC | Age: 40
Discharge: HOME | End: 2020-10-26
Attending: PSYCHIATRY & NEUROLOGY | Admitting: PSYCHIATRY & NEUROLOGY
Payer: COMMERCIAL

## 2020-10-26 DIAGNOSIS — H53.9 VISION CHANGES: ICD-10-CM

## 2020-10-26 PROCEDURE — 70551 MRI BRAIN STEM W/O DYE: CPT

## 2020-11-04 ENCOUNTER — ANESTHESIA EVENT (OUTPATIENT)
Dept: SURGERY | Facility: CLINIC | Age: 40
End: 2020-11-04

## 2020-11-04 ENCOUNTER — OFFICE VISIT (OUTPATIENT)
Dept: SURGERY | Facility: CLINIC | Age: 40
End: 2020-11-04
Payer: COMMERCIAL

## 2020-11-04 ENCOUNTER — PRE VISIT (OUTPATIENT)
Dept: SURGERY | Facility: CLINIC | Age: 40
End: 2020-11-04

## 2020-11-04 VITALS
BODY MASS INDEX: 26.84 KG/M2 | OXYGEN SATURATION: 98 % | HEIGHT: 66 IN | HEART RATE: 81 BPM | DIASTOLIC BLOOD PRESSURE: 88 MMHG | SYSTOLIC BLOOD PRESSURE: 124 MMHG | RESPIRATION RATE: 14 BRPM | WEIGHT: 167 LBS

## 2020-11-04 DIAGNOSIS — Z20.822 ENCOUNTER FOR LABORATORY TESTING FOR COVID-19 VIRUS: ICD-10-CM

## 2020-11-04 DIAGNOSIS — Z01.818 PREOP EXAMINATION: Primary | ICD-10-CM

## 2020-11-04 DIAGNOSIS — Z01.818 PREOP EXAMINATION: ICD-10-CM

## 2020-11-04 DIAGNOSIS — Z20.828 VIRAL DISEASE EXPOSURE: ICD-10-CM

## 2020-11-04 LAB
ANION GAP SERPL CALCULATED.3IONS-SCNC: 4 MMOL/L (ref 3–14)
BUN SERPL-MCNC: 11 MG/DL (ref 7–30)
CALCIUM SERPL-MCNC: 9.2 MG/DL (ref 8.5–10.1)
CHLORIDE SERPL-SCNC: 107 MMOL/L (ref 94–109)
CO2 SERPL-SCNC: 28 MMOL/L (ref 20–32)
CREAT SERPL-MCNC: 0.73 MG/DL (ref 0.52–1.04)
ERYTHROCYTE [DISTWIDTH] IN BLOOD BY AUTOMATED COUNT: 12.6 % (ref 10–15)
GFR SERPL CREATININE-BSD FRML MDRD: >90 ML/MIN/{1.73_M2}
GLUCOSE SERPL-MCNC: 95 MG/DL (ref 70–99)
HCT VFR BLD AUTO: 44.3 % (ref 35–47)
HGB BLD-MCNC: 14.3 G/DL (ref 11.7–15.7)
MCH RBC QN AUTO: 29.7 PG (ref 26.5–33)
MCHC RBC AUTO-ENTMCNC: 32.3 G/DL (ref 31.5–36.5)
MCV RBC AUTO: 92 FL (ref 78–100)
PLATELET # BLD AUTO: 322 10E9/L (ref 150–450)
POTASSIUM SERPL-SCNC: 3.8 MMOL/L (ref 3.4–5.3)
RBC # BLD AUTO: 4.81 10E12/L (ref 3.8–5.2)
SODIUM SERPL-SCNC: 139 MMOL/L (ref 133–144)
WBC # BLD AUTO: 4.3 10E9/L (ref 4–11)

## 2020-11-04 PROCEDURE — 80048 BASIC METABOLIC PNL TOTAL CA: CPT | Performed by: PATHOLOGY

## 2020-11-04 PROCEDURE — 86769 SARS-COV-2 COVID-19 ANTIBODY: CPT | Performed by: PATHOLOGY

## 2020-11-04 PROCEDURE — 36415 COLL VENOUS BLD VENIPUNCTURE: CPT | Performed by: PATHOLOGY

## 2020-11-04 PROCEDURE — 85027 COMPLETE CBC AUTOMATED: CPT | Performed by: PATHOLOGY

## 2020-11-04 PROCEDURE — 99203 OFFICE O/P NEW LOW 30 MIN: CPT | Performed by: PHYSICIAN ASSISTANT

## 2020-11-04 RX ORDER — ACETAMINOPHEN 325 MG/1
325-650 TABLET ORAL EVERY 6 HOURS PRN
COMMUNITY

## 2020-11-04 ASSESSMENT — PAIN SCALES - GENERAL: PAINLEVEL: NO PAIN (0)

## 2020-11-04 ASSESSMENT — MIFFLIN-ST. JEOR: SCORE: 1434.5

## 2020-11-04 ASSESSMENT — LIFESTYLE VARIABLES: TOBACCO_USE: 1

## 2020-11-04 NOTE — PATIENT INSTRUCTIONS
Preparing for Your Surgery      Name:  Chloe Foster   MRN:  [id number]   :  1980   Today's Date:  2020         Arriving for surgery:  Surgery date:  2020  Arrival time:  5:45 am    Restrictions due to COVID 19:  Patients are allowed one visitor  All visitors must wear a mask  No visitors under 18  No ill visitors   parking is available for anyone with mobility limitations or disabilities. (Monday- Friday 7 am- 5 pm)    Please come to:    [facility]  [address] [place] [id number]    Please check in on the 5th floor at the Ambulatory Surgery Center       What can I eat or drink?    -  You may eat and drink normally until 8 hours before surgery. (Until 20 at 11 pm)  -  You may have clear liquids up to 4 hours before surgery. (Until 3 am)  Examples of clear liquids:  Water  Clear broth  Juices (apple, white grape, white cranberry  and cider) without pulp  Noncarbonated, powder based beverages  (lemonade and Jonel-Aid)  Sodas (Sprite, 7-Up, ginger ale and seltzer)  Coffee or tea (without milk or cream)  Gatorade    --No alcohol for at least 24 hours before surgery    Which medicines can I take?    Hold Aspirin  And Excedrin Migraine for 7 days before surgery.     Hold Multivitamins for 7 days before surgery.    Hold Supplements (omega 3) for 7 days before surgery.    Hold Ibuprofen (Advil, Motrin) for 1 day before surgery--unless otherwise directed by surgeon.  Hold Naproxen (Aleve) for 4 days before surgery.        -  PLEASE TAKE the following medications the day of surgery   Acetaminophen if needed  Lorazepam or Alprazolam if needed      How do I prepare myself?  - Please take 2 showers before surgery using Scrubcare or Hibiclens soap.    Use this soap only from the neck to your toes.     Leave the soap on your skin for one minute--then rinse thoroughly.      You may use your own shampoo and conditioner; no other hair products.   - Please  remove all jewelry and body piercings.  - No lotions, deodorants or fragrance.  - No makeup or fingernail polish.   - Bring your ID and insurance card.        - All patients are required to have a Covid-19 test within 4 days of surgery/procedure.      -Patients will be contacted by the [facility] scheduling team within 1 week of surgery to make an appointment.      - Patients may call the Scheduling team at [phone] if they have not been scheduled within 4 days of  surgery.      ALL PATIENTS ARE REQUIRED TO HAVE A RESPONSIBLE ADULT TO DRIVE AND BE IN ATTENDANCE WITH THEM FOR 24 HOURS FOLLOWING SURGERY       Questions or Concerns:    -For questions regarding the day of surgery please contact the Ambulatory Surgery Center at [phone].    -If you have health changes between today and your surgery please contact your surgeon.     For questions after surgery please call your surgeons office.

## 2020-11-04 NOTE — H&P
Pre-Operative H & P     CC:  Preoperative exam to assess for increased cardiopulmonary risk while undergoing surgery and anesthesia.    Date of Encounter: 11/4/2020  Primary Care Physician:  Izabel Reyes  Associated Diagnosis: Deviated nasal septum, nasal obstruction, nasal valve collapse     HPI  Chloe Foster is a 40 year old female who presents for pre-operative H & P in preparation for Revision Septorhinoplasty, Repair of Nasal Valve Collapse, Cadaveric Rib + Cosmetic Tip Rhinoplasty with Dr. Schwartz on 11/12/2020 at [facility]. General anesthesia.    This is a 40-year-old female with past medical history significant for CVA x2 with PFO during pregnancy, 5/20/2015, status post PFO closure 4/21/2016, low normal protein acid levels, migraine headaches, depression and anxiety.  The patient has a history of persistent nasal obstruction.  She had surgery on her nose to help with her breathing approximately 15 or 16 years ago which she believes to be a septoplasty.  Her inability to breathe well through her nose does affect her everyday life.  She states she wakes up with a dry mouth has trouble breathing through her nose at night.  She thinks that her nose is getting more crooked with time.  She has now ready to proceed with the above procedure.     History is obtained from the patient and chart review.    Past Medical History  Past Medical History:   Diagnosis Date     Acute stress reaction     propranolol helps prior to interviews, stressful situations     Allergic rhinitis, cause unspecified     no meds except prn flonase, tests generally positive, decided against shots     Anxiety state, unspecified     citalopram started in 7/06, stopped after couple wks, felt sluggish/tired     CVA (cerebral vascular accident) (H)     May 1, May 14th 2015     Depressive disorder, not elsewhere classified     dx, but not sure this is correct, weaned off wellbutrin (4/05-6/06,  12/06-1/08), restarted 3/08     Dizziness      Elevated lipoprotein A level      Hirsutism     saw endo, inconclusive, started on spironolactone (helped a little), stopped in 6/06, elevated DHEA- sees new endo 4/08     History of stroke with residual effects      PFO (patent foramen ovale)     Post PFO/ASD closure with 30mm Amplatzer device 4/21/16     Urinary tract infection, site not specified     recurrent, start nitrofur in 5/08, 6mo txt       Past Surgical History  Past Surgical History:   Procedure Laterality Date     ARTHROSCOPY KNEE RT/LT  1995    Rt knee     HC REPAIR OF NASAL SEPTUM  12/05     REPAIR PATENT FORAMEN OVALE  04/21/2016       Hx of Blood transfusions/reactions: denies     Hx of abnormal bleeding or anti-platelet use: ASA 81mg    Menstrual history: Patient's last menstrual period was 10/29/2020 (exact date).:     Steroid use in the last year: denies    Personal or FH with difficulty with Anesthesia:  denies    Prior to Admission Medications  Current Outpatient Medications   Medication Sig Dispense Refill     acetaminophen (TYLENOL) 325 MG tablet Take 325-650 mg by mouth every 6 hours as needed for mild pain       aspirin EC 81 MG EC tablet Take 81 mg by mouth every evening        aspirin-acetaminophen-caffeine (EXCEDRIN MIGRAINE) 250-250-65 MG tablet Take 1 tablet by mouth every 6 hours as needed for headaches       LORazepam (ATIVAN) 1 MG tablet Take 0.5-1 tablets (0.5-1 mg) by mouth daily as needed for anxiety or sleep (severe anxiety/panic) 15 tablet 0     magnesium oxide (MAG-OX) 400 MG tablet Take 400 mg by mouth every evening        NONFORMULARY Take 1 tablet by mouth every evening OTC: B-Right Vitamin (Optimized B Complex)       Omega-3 Fatty Acids (FISH OIL PO) Take 1 capsule by mouth At Bedtime        VITAMIN D, CHOLECALCIFEROL, PO Take 1,000 Units by mouth every evening (takes 2 x 100 unit capsule)        ALPRAZolam (XANAX) 0.5 MG tablet Take 1 tablet (0.5 mg) by mouth daily as needed  for anxiety 30 tablet 0     STATIN NOT PRESCRIBED, INTENTIONAL, Statin not prescribed intentionally due to Woman of childbearing age not actively taking birth control 0 each 0       Allergies  Allergies   Allergen Reactions     Compazine [Prochlorperazine] Muscle Pain (Myalgia)       Social History  Social History     Socioeconomic History     Marital status:      Spouse name: Not on file     Number of children: 0     Years of education: 15     Highest education level: Not on file   Occupational History     Occupation:      Comment: [employer]   Social Needs     Financial resource strain: Not on file     Food insecurity     Worry: Not on file     Inability: Not on file     Transportation needs     Medical: Not on file     Non-medical: Not on file   Tobacco Use     Smoking status: Former Smoker     Packs/day: 0.50     Years: 5.00     Pack years: 2.50     Types: Cigarettes     Quit date: 2014     Years since quittin.5     Smokeless tobacco: Never Used   Substance and Sexual Activity     Alcohol use: Yes     Alcohol/week: 0.0 standard drinks     Comment: SOCIAL     Drug use: No     Sexual activity: Yes     Partners: Male     Birth control/protection: Pull-out method   Lifestyle     Physical activity     Days per week: Not on file     Minutes per session: Not on file     Stress: Not on file   Relationships     Social connections     Talks on phone: Not on file     Gets together: Not on file     Attends Muslim service: Not on file     Active member of club or organization: Not on file     Attends meetings of clubs or organizations: Not on file     Relationship status: Not on file     Intimate partner violence     Fear of current or ex partner: Not on file     Emotionally abused: Not on file     Physically abused: Not on file     Forced sexual activity: Not on file   Other Topics Concern      Service Not Asked     Blood Transfusions Not Asked     Caffeine Concern No      Comment: 1 cup coffee per day     Occupational Exposure Not Asked     Hobby Hazards Not Asked     Sleep Concern No     Stress Concern No     Weight Concern No     Special Diet No     Back Care Not Asked     Exercise Yes     Comment: 4-5 days week     Bike Helmet Not Asked     Seat Belt Not Asked     Self-Exams Not Asked     Parent/sibling w/ CABG, MI or angioplasty before 65F 55M? No   Social History Narrative    Social Documentation:        Balanced Diet: YES    Calcium intake: 3 per day    Caffeine: 0 per day    Exercise:  type of activity ellipitcal, pilates;  5 times per week    Sunscreen: Yes    Seatbelts:  Yes    Self Breast Exam:  Yes    Physical/Emotional/Sexual Abuse: No    Do you feel safe in your environment? Yes        Cholesterol screen up to date: Yes- checking today 09, non fasting    Eye Exam up to date: Yes    Dental Exam up to date: Yes    Pap smear up to date: Yes: checking today 09, last was  NIL    Mammogram up to date: Does Not Apply    Dexa Scan up to date: Does Not Apply    Colonoscopy up to date: Does Not Apply    Immunizations up to date: Yes, had one a month ago.    Glucose screen if over 40:  No        Julieth Matthews MA    09       Family History  Family History   Problem Relation Age of Onset     Family History Negative Mother      Cancer Father         throat, possibly asbestos exposure     Hypertension Father      Allergies Father      Arrhythmia Father      Family History Negative Sister      Family History Negative Brother      Diabetes Maternal Grandmother         on insulin     C.A.D. Maternal Grandmother         triple bypass in her 60s     Diabetes Paternal Grandmother      C.A.D. Maternal Grandfather         MI in 50s     Neurologic Disorder Paternal Grandfather          of brain aneurysm in early 60s     Diabetes Maternal Aunt          in her early 40s of DM complications, type 2     Coronary Artery Disease Early Onset Maternal Aunt 41      Family History Negative Son            Anesthesia Evaluation     . Pt has had prior anesthetic.     No history of anesthetic complications          ROS/MED HX    ENT/Pulmonary:     (+)tobacco use, Past use , . .   (-) sleep apnea   Neurologic:     (+)migraines, CVA date: x2, 2015     Cardiovascular: Comment: H/o PFO s/p closure 4/2016    (+) ----. : . . . :. . Previous cardiac testing Echodate:8/2/2018results:GODWIN 8/2/2018   Interpretation Summary   An ASD closure device was seen in the expected anatomic position without any associated thrombus.The device was well seated and without residual shunt by   color Doppler or agitated saline.   The left atrial appendage is normal. It is free of spontaneous echo contrast and thrombus.   Global right ventricular function is normal.   Left ventricular function, chamber size, wall motion, and wall thickness are normal.The EF is 55-60%.     No change from prior.date: results: date: results: date: results:          METS/Exercise Tolerance:  >4 METS   Hematologic:  - neg hematologic  ROS      (-) history of blood clots and History of Transfusion   Musculoskeletal:  - neg musculoskeletal ROS       GI/Hepatic:  - neg GI/hepatic ROS       Renal/Genitourinary:  - ROS Renal section negative       Endo:  - neg endo ROS       Psychiatric:     (+) psychiatric history anxiety and depression      Infectious Disease:  - neg infectious disease ROS       Malignancy:      - no malignancy   Other:    (+) No chance of pregnancy no H/O Chronic Pain,           PHYSICAL EXAM:   Mental Status/Neuro: A/A/O; Age Appropriate   Airway: Facies: Feasible  Mallampati: I  Mouth/Opening: Full  TM distance: > 6 cm  Neck ROM: Full   Respiratory: Auscultation: CTAB     Resp. Rate: Normal     Resp. Effort: Normal      CV: Rhythm: Regular  Rate: Age appropriate  Heart: Normal Sounds  Edema: None   Comments:      Dental: Normal Dentition                The complete review of systems is negative other than noted in  "the HPI or here.       BP: 124/88 Pulse: 81   Resp: 14 SpO2: 98 %         167 lbs 0 oz  5' 5.7\"   Body mass index is 27.2 kg/m .       Physical Exam  Constitutional: Awake, alert, cooperative, no apparent distress, and appears stated age.  Eyes: Pupils equal, round and reactive to light, extra ocular muscles intact, sclera clear, conjunctiva normal.  HENT: Normocephalic, oral pharynx with moist mucus membranes, good dentition. No goiter appreciated.   Respiratory: Clear to auscultation bilaterally, no crackles or wheezing.  Cardiovascular: Regular rate and rhythm, normal S1 and S2, and no murmur noted.  Carotids +2, no bruits. No edema. Palpable pulses to radial  DP and PT arteries.   GI: Normal bowel sounds  Lymph/Hematologic: No cervical lymphadenopathy and no supraclavicular lymphadenopathy.  Genitourinary:  deferred  Skin: Warm and dry.  No rashes at anticipated surgical site.   Musculoskeletal: Full ROM of neck. There is no redness, warmth, or swelling of the joints. Gross motor strength is normal.    Neurologic: Awake, alert, oriented to name, place and time. Cranial nerves II-XII are grossly intact. Gait is normal.   Neuropsychiatric: Calm, cooperative. Normal affect.     Labs: (personally reviewed)  Component      Latest Ref Rng & Units 11/4/2020   WBC      4.0 - 11.0 10e9/L 4.3   RBC Count      3.8 - 5.2 10e12/L 4.81   Hemoglobin      11.7 - 15.7 g/dL 14.3   Hematocrit      35.0 - 47.0 % 44.3   MCV      78 - 100 fl 92   MCH      26.5 - 33.0 pg 29.7   MCHC      31.5 - 36.5 g/dL 32.3   RDW      10.0 - 15.0 % 12.6   Platelet Count      150 - 450 10e9/L 322     Component      Latest Ref Rng & Units 11/4/2020   Sodium      133 - 144 mmol/L 139   Potassium      3.4 - 5.3 mmol/L 3.8   Chloride      94 - 109 mmol/L 107   Carbon Dioxide      20 - 32 mmol/L 28   Anion Gap      3 - 14 mmol/L 4   Glucose      70 - 99 mg/dL 95   Urea Nitrogen      7 - 30 mg/dL 11   Creatinine      0.52 - 1.04 mg/dL 0.73   GFR Estimate   "    >60 mL/min/1.73:m2 >90   GFR Estimate If Black      >60 mL/min/1.73:m2 >90   Calcium      8.5 - 10.1 mg/dL 9.2       Cardiac echo: 8/2/2018  Interpretation Summary   An ASD closure device was seen in the expected anatomic position without any   associated thrombus.The device was well seated and without residual shunt by   color Doppler or agitated saline.   The left atrial appendage is normal. It is free of spontaneous echo contrast   and thrombus.   Global right ventricular function is normal.   Left ventricular function, chamber size, wall motion, and wall thickness are   normal.The EF is 55-60%.   No change from prior.      Outside records reviewed from: care everywhere    ASSESSMENT and PLAN  Chloe Foster is a 40 year old female scheduled for Revision Septorhinoplasty, Repair of Nasal Valve Collapse, Cadaveric Rib + Cosmetic Tip Rhinoplasty on 11/12/2020 by Dr. Azar Lopez in treatment of Deviated nasal septum, nasal obstruction, nasal valve collapse.  PAC referral for risk assessment and optimization for anesthesia with comorbid conditions of CVA x2 with PFO during pregnancy 2015, status post PFO closure 4/21/2016, low normal protein acid levels, migraine headaches, depression and anxiety :    Pre-operative considerations:  1.  Cardiac:  Functional status- METS >4.  Intermediate risk surgery with 0.9% (RCRI #) risk of major adverse cardiac event.   Hx of PFO s/p closure 2016. Denies any exertional symptoms.    2.  Pulm:  Airway feasible.  ANDRE risk: Low.  Remote h/o smoking.  3.  GI:  Risk of PONV score = 3.  If > 2, anti-emetic intervention recommended.  4.  Neuro: h/o CVA x2 2015 while pregnant due to PFO.  Will hold ASA 81mg for 7 days prior to DOS. Has migraines since that time.  Uses Excedrin.  Will hold for one week prior to DOS.   5.  Heme: h/o protein S deficiency while pregnant and low normal value since.  Seen by hematology, Dr. Casarez in 2017.      VTE risk: 0.26%    Patient is optimized and  is acceptable candidate for the proposed procedure.  No further diagnostic evaluation is needed.     Patient discussed with Dr. Johnson.       Mely Caal PA-C  Preoperative Assessment Center  [facility] and Surgery Center  Phone: [phone]  Fax: [phone]

## 2020-11-04 NOTE — ANESTHESIA PREPROCEDURE EVALUATION
"Anesthesia Pre-Procedure Evaluation    Patient: Chloe Foster   MRN:     [id number] Gender:   female   Age:    40 year old :      1980        Preoperative Diagnosis: * No surgery found *        LABS:  CBC:   Lab Results   Component Value Date    WBC 6.0 2019    WBC 4.5 2019    HGB 14.6 2019    HGB 13.5 2019    HCT 44.8 2019    HCT 40.0 2019     2019     2019     BMP:   Lab Results   Component Value Date     2019     2019    POTASSIUM 4.2 2019    POTASSIUM 3.2 (L) 2019    CHLORIDE 106 2019    CHLORIDE 110 (H) 2019    CO2 27 2019    CO2 29 2019    BUN 11 2019    BUN 8 2019    CR 0.69 2019    CR 0.66 2019    GLC 97 2019     (H) 2019     COAGS:   Lab Results   Component Value Date    PTT 27 2016    INR 0.90 2016     POC:   Lab Results   Component Value Date    HCG Negative 2019    HCGS Negative 2019     OTHER:   Lab Results   Component Value Date    STEPH 9.3 2019    MAG 2.0 2016    ALBUMIN 3.9 2019    PROTTOTAL 7.1 2019    ALT 24 2019    AST 18 2019    ALKPHOS 65 2019    BILITOTAL 0.4 2019    LIPASE 174 2019    TSH 1.04 2019        Preop Vitals    BP Readings from Last 3 Encounters:   20 114/75   20 119/83   19 109/76    Pulse Readings from Last 3 Encounters:   20 84   20 88   19 86      Resp Readings from Last 3 Encounters:   20 16   19 14   19 16    SpO2 Readings from Last 3 Encounters:   20 96%   20 98%   19 97%      Temp Readings from Last 1 Encounters:   20 98  F (36.7  C) (Tympanic)    Ht Readings from Last 1 Encounters:   20 1.67 m (5' 5.75\")      Wt Readings from Last 1 Encounters:   20 73.9 kg (163 lb)    Estimated body mass index is 26.51 kg/m  as calculated from " "the following:    Height as of 1/16/20: 1.67 m (5' 5.75\").    Weight as of 8/19/20: 73.9 kg (163 lb).     LDA:        Past Medical History:   Diagnosis Date     Acute stress reaction     propranolol helps prior to interviews, stressful situations     Allergic rhinitis, cause unspecified     no meds except prn flonase, tests generally positive, decided against shots     Anxiety state, unspecified     citalopram started in 7/06, stopped after couple wks, felt sluggish/tired     CVA (cerebral vascular accident) (H)     May 1, May 14th 2015     Depressive disorder, not elsewhere classified     dx, but not sure this is correct, weaned off wellbutrin (4/05-6/06, 12/06-1/08), restarted 3/08     Dizziness      Elevated lipoprotein A level      Hirsutism     saw endo, inconclusive, started on spironolactone (helped a little), stopped in 6/06, elevated DHEA- sees new endo 4/08     History of stroke with residual effects      PFO (patent foramen ovale)     Post PFO/ASD closure with 30mm Amplatzer device 4/21/16     Urinary tract infection, site not specified     recurrent, start nitrofur in 5/08, 6mo txt      Past Surgical History:   Procedure Laterality Date     ARTHROSCOPY KNEE RT/LT  1995    Rt knee     HC REPAIR OF NASAL SEPTUM  12/05     REPAIR PATENT FORAMEN OVALE  04/21/2016      Allergies   Allergen Reactions     Compazine [Prochlorperazine] Muscle Pain (Myalgia)        Anesthesia Evaluation     . Pt has had prior anesthetic.     No history of anesthetic complications          ROS/MED HX    ENT/Pulmonary:     (+)tobacco use, Past use , . .   (-) sleep apnea   Neurologic:     (+)migraines, CVA date: x2, 2015     Cardiovascular: Comment: H/o PFO s/p closure 4/2016    (+) ----. : . . . :. . Previous cardiac testing Echodate:8/2/2018results:GODWIN 8/2/2018   Interpretation Summary   An ASD closure device was seen in the expected anatomic position without any associated thrombus.The device was well seated and without residual " shunt by   color Doppler or agitated saline.   The left atrial appendage is normal. It is free of spontaneous echo contrast and thrombus.   Global right ventricular function is normal.   Left ventricular function, chamber size, wall motion, and wall thickness are normal.The EF is 55-60%.     No change from prior.date: results: date: results: date: results:          METS/Exercise Tolerance:  >4 METS   Hematologic:  - neg hematologic  ROS      (-) history of blood clots and History of Transfusion   Musculoskeletal:  - neg musculoskeletal ROS       GI/Hepatic:  - neg GI/hepatic ROS       Renal/Genitourinary:  - ROS Renal section negative       Endo:  - neg endo ROS       Psychiatric:     (+) psychiatric history anxiety and depression      Infectious Disease:  - neg infectious disease ROS       Malignancy:      - no malignancy   Other:    (+) No chance of pregnancy no H/O Chronic Pain,                       PHYSICAL EXAM:   Mental Status/Neuro: A/A/O; Age Appropriate   Airway: Facies: Feasible  Mallampati: I  Mouth/Opening: Full  TM distance: > 6 cm  Neck ROM: Full   Respiratory: Auscultation: CTAB     Resp. Rate: Normal     Resp. Effort: Normal      CV: Rhythm: Regular  Rate: Age appropriate  Heart: Normal Sounds  Edema: None   Comments:      Dental: Normal Dentition                JZG FV AN PLAN NO PONV RULE       PAC Discussion and Assessment    ASA Classification: 2  Case is suitable for:   Anesthetic techniques and relevant risks discussed: GA  Invasive monitoring and risk discussed: No  Types:   Possibility and Risk of blood transfusion discussed: No  NPO instructions given:   Additional anesthetic preparation and risks discussed:   Needs early admission to pre-op area:   Other:     PAC Resident/NP Anesthesia Assessment:  Chloe Foster is a 40 year old female scheduled for Revision Septorhinoplasty, Repair of Nasal Valve Collapse, Cadaveric Rib + Cosmetic Tip Rhinoplasty on 11/12/2020 by Dr. Azar Lopez in  treatment of Deviated nasal septum, nasal obstruction, nasal valve collapse.  PAC referral for risk assessment and optimization for anesthesia with comorbid conditions of CVA x2 with PFO during pregnancy 2015, status post PFO closure 4/21/2016, low normal protein acid levels, migraine headaches, depression and anxiety :    Pre-operative considerations:  1.  Cardiac:  Functional status- METS >4.  Intermediate risk surgery with 0.9% (RCRI #) risk of major adverse cardiac event.   Hx of PFO s/p closure 2016. Denies any exertional symptoms.    2.  Pulm:  Airway feasible.  ANDRE risk: Low.  Remote h/o smoking.  3.  GI:  Risk of PONV score = 3.  If > 2, anti-emetic intervention recommended.  4.  Neuro: h/o CVA x2 2015 while pregnant due to PFO.  Will hold ASA 81mg for 7 days prior to DOS. Has migraines since that time.  Uses Excedrin.  Will hold for one week prior to DOS.   5.  Heme: h/o protein S deficiency while pregnant and low normal value since.  Seen by hematology, Dr. Casarez in 2017.      VTE risk: 0.26%    Patient is optimized and is acceptable candidate for the proposed procedure.  No further diagnostic evaluation is needed.     Patient discussed with Dr. Johnson.     **For further details of assessment, testing, and physical exam please see H and P completed on same date.          Mely Caal PA-C, [facility]      Reviewed and Signed by PAC Mid-Level Provider/Resident  Mid-Level Provider/Resident: Mely Caal  Date: 11/4/2020  Time:     Attending Anesthesiologist Anesthesia Assessment:  I have examined the patient and reviewed the medical record.  I have discussed the patient with the ROOPA and concur with her assessment  The patient is scheduled for revision septoplasty and rhinoplasty under general anesthesia for correction of nasal deformity.    The patient is an active 40-year-old female in general good health.  The patient does have a history of CVA x2 in 2015 during pregnancy.  Her only residual deficits are  feeling of transient auras and transient fingertip numbness.  She has no permanent fix deficits.  The etiology of the CVA was felt to be a patent foramen ovale.  This was closed with a percutaneous device following her pregnancy.  A repeat echocardiogram in 2018 demonstrated the device was well-seated with no residual PFO.  As part of the work-up for the strokes she was found to be heterozygous for factor S deficiency but has had no documented DVT or thrombotic disease and is not on any anticoagulants.  Because she reports occasional palpitations she has had a Zoll  Monitor which documented SVT and occasional PVCs only.  Electrophysiologist recommended a loop recorder just to make sure there was no atrial fibrillation given her stroke history but this has not been done.  She reports rare episodes of palpitations which are asymptomatic.    Given her excellent exercise tolerance her recent normal echocardiogram and is on monitor demonstrating only SVT no further testing is warranted before surgery.  She is an acceptable candidate for the ASC.    Final plan per attending anesthesiologist the day of surgery.        Reviewed and Signed by PAC Anesthesiologist  Anesthesiologist: Mich Johnson MD  Date: 11/4/2020  Time:   Pass/Fail:   Disposition:     PAC Pharmacist Assessment:        Pharmacist:   Date:   Time:    Mely Caal PA-C

## 2020-11-05 ENCOUNTER — TELEPHONE (OUTPATIENT)
Dept: OTOLARYNGOLOGY | Facility: CLINIC | Age: 40
End: 2020-11-05

## 2020-11-05 NOTE — TELEPHONE ENCOUNTER
Called pt re: her upcoming surgery with Dr. Schwartz on 11/12/20.      Left vm for pt letting her know that the surgeon's fee for the cosmetic portion of surgery is due today.    Left my direct dial for call back.    Lou Julian RN  11/5/2020 9:55 AM

## 2020-11-06 LAB
COVID-19 SPIKE RBD ABY TITER: NORMAL
COVID-19 SPIKE RBD ABY: NEGATIVE

## 2020-11-09 NOTE — RESULT ENCOUNTER NOTE
Your COVID test was negative.    Please let me know if you have any questions.  Best,   Ahmet Reyes MD

## 2020-11-17 DIAGNOSIS — Z11.59 ENCOUNTER FOR SCREENING FOR OTHER VIRAL DISEASES: Primary | ICD-10-CM

## 2020-12-29 ENCOUNTER — VIRTUAL VISIT (OUTPATIENT)
Dept: FAMILY MEDICINE | Facility: CLINIC | Age: 40
End: 2020-12-29
Payer: COMMERCIAL

## 2020-12-29 DIAGNOSIS — F43.10 PTSD (POST-TRAUMATIC STRESS DISORDER): ICD-10-CM

## 2020-12-29 DIAGNOSIS — Z13.6 CARDIOVASCULAR SCREENING; LDL GOAL LESS THAN 160: ICD-10-CM

## 2020-12-29 DIAGNOSIS — F40.10 SOCIAL ANXIETY DISORDER: ICD-10-CM

## 2020-12-29 DIAGNOSIS — J30.81 ALLERGY TO DOGS: ICD-10-CM

## 2020-12-29 DIAGNOSIS — G47.00 INSOMNIA, UNSPECIFIED TYPE: ICD-10-CM

## 2020-12-29 DIAGNOSIS — G43.109 MIGRAINE WITH AURA AND WITHOUT STATUS MIGRAINOSUS, NOT INTRACTABLE: ICD-10-CM

## 2020-12-29 DIAGNOSIS — D68.59 PROTEIN S DEFICIENCY (H): ICD-10-CM

## 2020-12-29 DIAGNOSIS — Z15.89 MTHFR MUTATION: ICD-10-CM

## 2020-12-29 DIAGNOSIS — Z86.73 HISTORY OF CVA (CEREBROVASCULAR ACCIDENT): ICD-10-CM

## 2020-12-29 DIAGNOSIS — M79.18 TRAUMATIC BUTTOCK PAIN: ICD-10-CM

## 2020-12-29 DIAGNOSIS — F41.9 ANXIETY: Primary | ICD-10-CM

## 2020-12-29 PROCEDURE — 99214 OFFICE O/P EST MOD 30 MIN: CPT | Mod: 95 | Performed by: FAMILY MEDICINE

## 2020-12-29 RX ORDER — LORAZEPAM 1 MG/1
.5-1 TABLET ORAL DAILY PRN
Qty: 15 TABLET | Refills: 0 | Status: SHIPPED | OUTPATIENT
Start: 2020-12-29 | End: 2021-04-14

## 2020-12-29 ASSESSMENT — ANXIETY QUESTIONNAIRES
5. BEING SO RESTLESS THAT IT IS HARD TO SIT STILL: NOT AT ALL
3. WORRYING TOO MUCH ABOUT DIFFERENT THINGS: MORE THAN HALF THE DAYS
2. NOT BEING ABLE TO STOP OR CONTROL WORRYING: SEVERAL DAYS
6. BECOMING EASILY ANNOYED OR IRRITABLE: SEVERAL DAYS
1. FEELING NERVOUS, ANXIOUS, OR ON EDGE: MORE THAN HALF THE DAYS
GAD7 TOTAL SCORE: 9
7. FEELING AFRAID AS IF SOMETHING AWFUL MIGHT HAPPEN: SEVERAL DAYS
IF YOU CHECKED OFF ANY PROBLEMS ON THIS QUESTIONNAIRE, HOW DIFFICULT HAVE THESE PROBLEMS MADE IT FOR YOU TO DO YOUR WORK, TAKE CARE OF THINGS AT HOME, OR GET ALONG WITH OTHER PEOPLE: SOMEWHAT DIFFICULT

## 2020-12-29 ASSESSMENT — PATIENT HEALTH QUESTIONNAIRE - PHQ9
SUM OF ALL RESPONSES TO PHQ QUESTIONS 1-9: 9
5. POOR APPETITE OR OVEREATING: MORE THAN HALF THE DAYS

## 2020-12-29 NOTE — PROGRESS NOTES
"Chloe Foster is a 40 year old female who is being evaluated via a billable video visit.      The patient has been notified of following:     \"This video visit will be conducted via a call between you and your physician/provider. We have found that certain health care needs can be provided without the need for an in-person physical exam.  This service lets us provide the care you need with a video conversation.  If a prescription is necessary we can send it directly to your pharmacy.  If lab work is needed we can place an order for that and you can then stop by our lab to have the test done at a later time.    Video visits are billed at different rates depending on your insurance coverage.  Please reach out to your insurance provider with any questions.    If during the course of the call the physician/provider feels a video visit is not appropriate, you will not be charged for this service.\"    Patient has given verbal consent for Video visit? Yes  How would you like to obtain your AVS? MyChart  If you are dropped from the video visit, the video invite should be resent to: Text to cell phone: [phone]  Will anyone else be joining your video visit? No        Subjective     Chloe Foster is a 40 year old female who presents today via video visit for the following health issues:    HPI     Anxiety Follow-Up    How are you doing with your anxiety since your last visit? Worsened     Are you having other symptoms that might be associated with anxiety? No    Have you had a significant life event? No     Are you feeling depressed? Yes:  mild    Do you have any concerns with your use of alcohol or other drugs? No    Anxiety sx's have been worse, along with likely PTSD sx's, and social anxiety sx's.    Mood- not nearly as bad as it was a year ago.  Some bad days, seems seasonal.  Occasional bad days.  Worse PHQ due to worse sleep.  Not sure if that is mood related.    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  Wakes up and can't shut her mind off.    Holidays, even though they were home, still a lot to keep track of and get done.    Anxiety sx's- things are better in terms of working from home, less stressful overall, but now she's finding that she's avoiding going out and doing things when she should.  Similar to after maternity leave.  If she was in a better place, she'd feel okay going to an appt to get her nails done, or going shopping- wouldn't want to go out now.    Sx's are also likely worse because she's not sleeping well.  Health anxiety is always the major issue for her.  Very nerve-wracking to get her migraines with preceding visual loss auras.  Makes her think back to when she had the strokes x 2 along with migraines during pregnancy (prior to having PFO closed).  She can usually talk herself down that she won't have a stroke with migraines/vision changes, but it still provokes anxiety, and takes work to calm herself.    Using the ativan ~1/2 tab about once a week.  Mostly to catch up on sleep, to get her back in a better pattern of sleep.    Allergies/dog allergy-   Just got a lewis doodle puppy - 7 wks.  Should be less allergy provoking as part puddle.  Had a bit of a reaction yesterday, though, but it was just itchy eyes.  If bad, gets really itchy arms- much more intense reaction if other dogs.      Labs- wondering if we can check the same labs as last year.  Vit D - [place], but usually takes 5000 IU/day.  B complex- stopped taking it a couple weeks ago- wasn't sure if it was giving her se's- she was noticing the anxiety was always around the same time every day.  Looked it up, and saw it could cause more anxiety.    Fall and now with persistent buttock pain.  ~3 wks ago, she slipped and fell on their porch.  Went down really hard, hurt her back side  Turned black, bruising gone, but there's a dimple where it was a dark bruise.  Hard to sit  down now- hurts to sit on that area.  Hurts in the muscle area, scarred/hard area- 2-3 inches.  Rec heat/massage, can try PT, but will likely take a few months to mostly resolve.      Social History     Tobacco Use     Smoking status: Former Smoker     Packs/day: 0.50     Years: 5.00     Pack years: 2.50     Types: Cigarettes     Quit date: 2014     Years since quittin.6     Smokeless tobacco: Never Used   Substance Use Topics     Alcohol use: Yes     Alcohol/week: 0.0 standard drinks     Comment: SOCIAL     Drug use: No     AYLA-7 SCORE 3/27/2020 2020 2020   Total Score 3 3 9     PHQ 2020   PHQ-9 Total Score 1 1 9   Q9: Thoughts of better off dead/self-harm past 2 weeks Not at all Not at all Not at all     Last PHQ-9 2020   1.  Little interest or pleasure in doing things 1   2.  Feeling down, depressed, or hopeless 1   3.  Trouble falling or staying asleep, or sleeping too much 3   4.  Feeling tired or having little energy 3   5.  Poor appetite or overeating 0   6.  Feeling bad about yourself 0   7.  Trouble concentrating 1   8.  Moving slowly or restless 0   Q9: Thoughts of better off dead/self-harm past 2 weeks 0   PHQ-9 Total Score 9   Difficulty at work, home, or with people Somewhat difficult     AYLA-7  2020   1. Feeling nervous, anxious, or on edge 2   2. Not being able to stop or control worrying 1   3. Worrying too much about different things 2   4. Trouble relaxing 2   5. Being so restless that it is hard to sit still 0   6. Becoming easily annoyed or irritable 1   7. Feeling afraid, as if something awful might happen 1   AYLA-7 Total Score 9   If you checked any problems, how difficult have they made it for you to do your work, take care of things at home, or get along with other people? Somewhat difficult         How many servings of fruits and vegetables do you eat daily?  2-3    On average, how many sweetened beverages do you drink each day  (Examples: soda, juice, sweet tea, etc.  Do NOT count diet or artificially sweetened beverages)?   0    How many days per week do you exercise enough to make your heart beat faster? 4    How many minutes a day do you exercise enough to make your heart beat faster? 30 - 60    How many days per week do you miss taking your medication? 0         Review of Systems   Constitutional, HEENT, cardiovascular, pulmonary, GI, , musculoskeletal, neuro, skin, endocrine and psych systems are negative, except as otherwise noted.      Objective     Vitals:  No vitals were obtained today due to virtual visit.    Physical Exam     GENERAL: Healthy, alert and no distress  EYES: Eyes grossly normal to inspection.  No discharge or erythema, or obvious scleral/conjunctival abnormalities.  RESP: No audible wheeze, cough, or visible cyanosis.  No visible retractions or increased work of breathing.    SKIN: Visible skin clear. No significant rash, abnormal pigmentation or lesions.  NEURO: Cranial nerves grossly intact.  Mentation and speech appropriate for age.  PSYCH: Mentation appears normal, affect normal/bright, judgement and insight intact, normal speech and appearance well-groomed.           Assessment & Plan       ICD-10-CM    1. Anxiety  F41.9 MENTAL HEALTH REFERRAL  - Adult; Outpatient Treatment; Individual/Couples/Family/Group Therapy/Health Psychology; [facility]: Psychiatry Clinic [phone]; We will contact you to schedule the appointment or please call with any questions     LORazepam (ATIVAN) 1 MG tablet   2. PTSD (post-traumatic stress disorder)  F43.10 MENTAL HEALTH REFERRAL  - Adult; Outpatient Treatment; Individual/Couples/Family/Group Therapy/Health Psychology; [facility]: Psychiatry Clinic [phone]; We will contact you to schedule the appointment or please call with any questions   3. Insomnia, unspecified type  G47.00 LORazepam (ATIVAN) 1 MG tablet   4. Social anxiety disorder  F40.10    5. Migraine with aura and without  status migrainosus, not intractable  G43.109 Comprehensive metabolic panel (BMP + Alb, Alk Phos, ALT, AST, Total. Bili, TP)   6. History of CVA (cerebrovascular accident)  Z86.73 Comprehensive metabolic panel (BMP + Alb, Alk Phos, ALT, AST, Total. Bili, TP)     Lipid panel reflex to direct LDL Fasting   7. MTHFR mutation (H)  E72.12 Folate     Homocysteine     Vitamin B12     Comprehensive metabolic panel (BMP + Alb, Alk Phos, ALT, AST, Total. Bili, TP)     Lipid panel reflex to direct LDL Fasting     Vitamin D Deficiency     Vitamin B6   8. Protein S deficiency (H)  D68.59    9. CARDIOVASCULAR SCREENING; LDL GOAL LESS THAN 160  Z13.6 Comprehensive metabolic panel (BMP + Alb, Alk Phos, ALT, AST, Total. Bili, TP)     Lipid panel reflex to direct LDL Fasting     Anxiety/PTSD-   Pt has wanted to avoid daily/SSRI txt for sx's.  Has not found a good fit for therapy txt, even with CBT specific therapist x 2 recently. She is interested in type of therapy that would be better for PTSD- thinks many of her sx's stem from health concerns, with her h/o CVA during pregnancy which was associated with migraines.  Still gets migraines with significant aura (vision loss), so triggers the anxiety (though she has been cleared of concerns by neurology since getting PFO closed)  She is interested in EMDR. Could also consider DBT.  Will place a mental health referral for [facility] to see if they are able to set her up with EMDR or DBT, or possibly a therapist who does CBT in relation to PTSD from health concerns?    Migraines with aura, h/o CVA, s/p closure of PFO, MTHFR mutation, protein S deficiency -   Pt will cont f/u with Dr. Post for migraines, s/p CVA.  Labs- pt is wondering if we can check the same labs as last year.  Vit D - [place], but usually takes 5000 IU/day.  B complex- stopped taking it a couple weeks ago- looked up and saw it could sometimes cause more anxiety.  Will order labs as  above.    Allergies/dog allergy-   Just got a lewis doodle puppy - 7 wks.  Should be less allergy provoking as part puddle, but she did get itchy eyes yesterday (just got the puppy a couple days ago).  If she's really allergic to a dog, she tends to get really itchy arms- much more intense reaction.  Pt will try OTC allergy meds, and call if interested in allergy referral.    Persistent muscular/sub-q buttock pain s/p fall ~3 wks ago-  ~3 wks ago, she slipped and had a very hard fall on their porch, landed on buttock.  Very dark bruising at first, now cleared, but has a dimple and significant pain there, in area of ~2-3 inches.   Rec heat/massage, can try PT, but will likely take a few months to mostly resolve.        Return in about 3 months (around 3/29/2021) for Routine Visit/Chronic Cares/Med Check.    Izabel Reyes MD  [facility]        Video-Visit Details    Type of service:  Video Visit    Video Start Time: 10:49 AM  Video End Time:11:20 AM    Originating Location (pt. Location): Home    Distant Location (provider location):  [facility]     Platform used for Video Visit: Rafal, switched to [employer] Video due to audio issues.

## 2020-12-30 ASSESSMENT — ANXIETY QUESTIONNAIRES: GAD7 TOTAL SCORE: 9

## 2021-01-11 ENCOUNTER — PRE VISIT (OUTPATIENT)
Dept: MATERNAL FETAL MEDICINE | Facility: CLINIC | Age: 41
End: 2021-01-11

## 2021-01-11 NOTE — PROGRESS NOTES
Maternal-Fetal Medicine Consultation    Chloe Foster  : 1980  MRN: [id number]    REFERRAL:  Chloe Foster is sent by Dr. Reyes for preconception consultation     HPI:  Chloe Foster is a 40 year old  here for MFM preconception consultation for concerns regarding her history of ischemic cerebrovascular event and decreased protein S activity during pregnancy.      Ms. Chaves has a history of an ischemic stroke with her pregnancy back in  in the setting of protein S deficiency,  hypercoagulability of pregnancy and a open PFO. She is now s/p PFO closure. She reports that her event presented with a headache and visual symptoms. Since this event  she has had migraine auras that are very similar to how this presented, however her most recent evaluation do not show concerns for a recurrent stroke of other potential brain lesions. Her most recent MRI 10/2020 is stable.     She had a preconception consultation with us back in 2017.She reports that she and her  have decided they may want to complete their family planning through a pregnancy surrogate as although she is at a very low risk of this happening again she is concern this would be a very stressful and likely had a negative impact on her mental health. She like to discuss further options for fertility preservation.     Obstetrics History:  OB History    Para Term  AB Living   2 1 1 0 1 1   SAB TAB Ectopic Multiple Live Births   0 1 0 0 1      # Outcome Date GA Lbr Angel/2nd Weight Sex Delivery Anes PTL Lv   2 Term 05/16/15 38w1d   M CS-LTranv  N JANNETTE   1 TAB      TAB         Obstetric Comments   SAB at age 18     Gynecologic History:  - No history of abnormal pap smears  - No cervical surgery or procedures    Past Medical History  Past Medical History:   Diagnosis Date     Acute stress reaction     propranolol helps prior to interviews, stressful situations     Allergic rhinitis, cause unspecified     no  meds except prn flonase, tests generally positive, decided against shots     Anxiety state, unspecified     citalopram started in 7/06, stopped after couple wks, felt sluggish/tired     CVA (cerebral vascular accident) (H)     May 1, May 14th 2015     Depressive disorder, not elsewhere classified     dx, but not sure this is correct, weaned off wellbutrin (4/05-6/06, 12/06-1/08), restarted 3/08     Dizziness      Elevated lipoprotein A level      Hirsutism     saw endo, inconclusive, started on spironolactone (helped a little), stopped in 6/06, elevated DHEA- sees new endo 4/08     History of stroke with residual effects      PFO (patent foramen ovale)     Post PFO/ASD closure with 30mm Amplatzer device 4/21/16     Decreased Protein S activity during pregnancy             Past Surgical History:  Past Surgical History:   Procedure Laterality Date     ARTHROSCOPY KNEE RT/LT  1995    Rt knee     HC REPAIR OF NASAL SEPTUM  12/05     REPAIR PATENT FORAMEN OVALE  04/21/2016      Current Medications:    Prior to Admission medications    Medication Sig Last Dose Taking? Auth Provider   acetaminophen (TYLENOL) 325 MG tablet Take 325-650 mg by mouth every 6 hours as needed for mild pain   Reported, Patient   ALPRAZolam (XANAX) 0.5 MG tablet Take 1 tablet (0.5 mg) by mouth daily as needed for anxiety   Chelle Chambers DO   aspirin EC 81 MG EC tablet Take 81 mg by mouth every evening    Reported, Patient   aspirin-acetaminophen-caffeine (EXCEDRIN MIGRAINE) 250-250-65 MG tablet Take 1 tablet by mouth every 6 hours as needed for headaches   Reported, Patient   LORazepam (ATIVAN) 1 MG tablet Take 0.5-1 tablets (0.5-1 mg) by mouth daily as needed for anxiety or sleep (severe anxiety/panic)   Izabel Reyes MD   magnesium oxide (MAG-OX) 400 MG tablet Take 400 mg by mouth every evening    Reported, Patient   NONFORMULARY Take 1 tablet by mouth every evening OTC: B-Right Vitamin (Optimized B Complex)   Reported, Patient    Omega-3 Fatty Acids (FISH OIL PO) Take 1 capsule by mouth At Bedtime    Reported, Patient   STATIN NOT PRESCRIBED, INTENTIONAL, Statin not prescribed intentionally due to Woman of childbearing age not actively taking birth control   Izabel Reyes MD   VITAMIN D, CHOLECALCIFEROL, PO Take 1,000 Units by mouth every evening (takes 2 x 100 unit capsule)    Reported, Patient     Allergies:  Compazine [prochlorperazine]    Social History:   Social status:   Non smoker.     Family History:  Noncontributory to current consultation.       ROS:  10-point ROS negative except as in HPI     PHYSICAL EXAM:  Deferred due to the nature of this visit.     Pertinent laboratory and imaging studies:  MRI BRAIN WITHOUT CONTRAST  10/26/2020 8:12 PM     HISTORY:  Increase migraine aura, visual. Vision changes.     TECHNIQUE:  Multiplanar, multisequence MRI of the brain without  gadolinium IV contrast material.     COMPARISON: 6/5/2019.     FINDINGS: Again seen are 1-2 small signal hyperintensities in the  right frontoparietal white matter. These are not associated with any  mass effect and not significantly changed. Brain parenchyma is  otherwise normal. Diffusion-weighted images are normal. There is no  evidence for intracranial hemorrhage or acute infarct. Ventricles and  subarachnoid space are normal. Vascular structures are patent at the  skull base. There is no evidence for intracranial mass lesions.                                                                      IMPRESSION:  1. Stable exam with 2 subtle nonspecific right frontoparietal white  matter lesions.  2. No evidence for intracranial hemorrhage, acute infarct, or any  focal mass lesions.     ASHLYN HENLEY MD    Component      Latest Ref Rng & Units 4/5/2017 4/5/2017           8:35 AM  3:41 PM   Homocysteine umol/L      4.0 - 12.0 umol/L <4.0 (L) . . .    Protein S Free      55 - 125 % 62 Canceled, Test credited . . .         ASSESSMENT:  Chloe HIDALGO  "Cristian is a 40 year old here for [facility] preconception consultation for concerns regarding her history of ischemic cerebrovascular event in the setting of a PFO and decreased Protein S activity during pregnancy.     We reviewed with Ms. Foster that our overall assessment for a risk of a recurrent ischemic cerebrovascular episode if she has a future pregnancy is overall low, additionally if she were to have another pregnancy  prophylactic dose anticoagulation could be considered. However, she tells me that she and her  have decided they do not want to take any risk or potential complications that could affect her health and she expresses that another pregnancy could result in significant anxiety that could be detrimental to her well-being. Her  is likely going to get a vasectomy.  They are interested in further exploring the option of a pregnancy surrogate.     Today she has questions regarding fertility in the setting of her age and regarding the risks associated with ovulation induction with her medical history. We reviewed that age is a factor for decreased fertility and increased risk for aneuploidy. She met with our genetic counselors and you can see the attached report from this visit. Regarding the risks of ovulation induction and fertility treatments, we deferred as this is not in the area of our specialty and we recommended a consultation with a Reproductive Endocrinology Specialist. We provided her with a list of [facility]/Infertility clinics in the area.     RECOMMENDATIONS:    -Consultation with Reproductive Endocrinology Specialist.       Patient reviewed and discussed with Dr.Jacobs Luis Alfredo Miramontes MD    [facility] Fellow   1/14/2021    \"This video visit was conducted via a call between you and your physician/provider. We have found that certain health care needs can be provided without the need for an in-person physical exam.  This service lets us provide the care you need with a " "video conversation.  If a prescription is necessary we can send it directly to your pharmacy.  If lab work is needed we can place an order for that and you can then stop by our lab to have the test done at a later time.     Video visits are billed at different rates depending on your insurance coverage.  Please reach out to your insurance provider with any questions.     If during the course of the call the physician/provider feels a video visit is not appropriate, you will not be charged for this service.\"     Patient has given verbal consent for Video visit?YES    Approximate time spent in counselin mins      Physician Attestation   IDarling DO, saw and evaluated Chloe Foster with the fellow.     I personally reviewed the vital signs, medications, labs and imaging.    My key history or physical exam findings:   Reviewed her medical history, imaging and records from additional specialists.  The patient and her  have already decided that Chloe should not be pregnant.  They are interested in utilizing a gestational carrier.     She met with  to discuss the risks of aneuploidy with maternal age.     Key management decisions made by me:   Gave patient a list of [facility] clinics in the area  Reviewed risks associated with pregnancy related to patient's prior pregnancy complication and also maternal age.     Darling Guy DO  Date of Service (when I saw the patient): 21    Time Spent on this Encounter   Darling TYLER DO, spent a total of 30 minutes face to face or coordinating care of Chloe Foster.  Over 50% of my time on the unit was spent counseling the patient and/or coordinating care regarding hx of prior ischmic stroke in pregnancy.     "

## 2021-01-14 ENCOUNTER — VIRTUAL VISIT (OUTPATIENT)
Dept: MATERNAL FETAL MEDICINE | Facility: CLINIC | Age: 41
End: 2021-01-14
Attending: FAMILY MEDICINE
Payer: COMMERCIAL

## 2021-01-14 ENCOUNTER — VIRTUAL VISIT (OUTPATIENT)
Dept: MATERNAL FETAL MEDICINE | Facility: CLINIC | Age: 41
End: 2021-01-14
Attending: OBSTETRICS & GYNECOLOGY
Payer: COMMERCIAL

## 2021-01-14 DIAGNOSIS — O09.529 AMA (ADVANCED MATERNAL AGE) MULTIGRAVIDA 35+: ICD-10-CM

## 2021-01-14 DIAGNOSIS — Z31.5 ENCOUNTER FOR PROCREATIVE GENETIC COUNSELING AND TESTING: Primary | ICD-10-CM

## 2021-01-14 DIAGNOSIS — Z31.89 ENCOUNTER FOR FERTILITY PLANNING: ICD-10-CM

## 2021-01-14 DIAGNOSIS — Z31.69 ENCOUNTER FOR PRECONCEPTION CONSULTATION: ICD-10-CM

## 2021-01-14 PROCEDURE — 999N000069 HC STATISTIC GENETIC COUNSELING, < 16 MIN: Mod: TEL | Performed by: GENETIC COUNSELOR, MS

## 2021-01-14 PROCEDURE — 99242 OFF/OP CONSLTJ NEW/EST SF 20: CPT | Mod: 95 | Performed by: OBSTETRICS & GYNECOLOGY

## 2021-01-14 NOTE — PROGRESS NOTES
Pt had preconception Gc and M phone consult due to hx of stroke. Pt talked to Dr. Lindquist and Dr. Guy. See note in epic for today's discussion and recommendations. See separate note for GC discussion. Questions answered. No follow up at [facility] at this time. Rekha Florez RN

## 2021-01-14 NOTE — PROGRESS NOTES
[facility] [place]  Genetic Counseling Consult    Patient:  Chloe Foster YOB: 1980   Date of Service:  21      Chloe Foster was evaluated via a billable telephone visit at [facility] for preconception genetic consultation for the indication of age-related fertility concerns.     The patient has been notified of following:    This telephone visit will be conducted via a call between you and your physician/provider. We have found that certain health care needs can be provided without the need for a physical exam. This service lets us provide the care you need with a short phone conversation. If a prescription is necessary we can send it directly to your pharmacy. If lab work is needed we can place an order for that and you can then stop by our lab to have the test done at a later time.     If during the course of the call the provider feels a telephone visit is not appropriate, you will not be charged for this service.       Impression/Plan:   1.  Chloe had a virtual preconception genetic consultation today. We reviewed age-related aneuploidy risks should Chloe become pregnant or consider using a gestational carrier with embryos created from her eggs and her 's sperm. A list of local reproductive endocrinology clinics was provided to the patient today; she was encouraged to contact these clinics to gather more information regarding fertility tests, the egg retrieval process, and possible use of a gestational carrier. No genetic testing was ordered during today's consult.     2.  Chloe had an M consult with Dr. Darling Guy and Dr. Luis Alfredo Vidales. Please se Dr. Vidales's documentation for additional details.     Pregnancy & Medical History:   /Parity:    Gestational age: not currently pregnant    SAB at age 18    2015: Term male, born via  at the time Chloe's second  cryptogenic CVA was identified. He is reported to be in good health today.  Chloe reports not wishing to become pregnant at this time, given her pregnancy history. Notably, Chloe experienced two strokes (cryptogenic CVA) in her previous pregnancy, both within the third trimester. A diagnosis of possible protein S deficiency, MTHFR homozygous variants, and a PFO were noted. Her PFO was surgically closed in April 2016. Chloe had an [facility] preconception consultation with Dr. Ramesh Julien in February 2017. Chloe does experience frequent migraines with aura (vision loss) and has a personal history of anxiety and PTSD. Please see Dr. Vidales's documentation for additional details regarding Chloe's health history.   Chloe states that her and her  are nervous about Chloe attempting pregnancy given her history, and are inquiring about the option of proceeding with egg retrieval and surrogacy. They are not fully committed to the idea at this time, and are also interested in learning more about what pregnancy risks may be if Chloe were to become pregnant naturally. We reviewed that our clinic would not be able to provide her with specific answers regarding her oocyte reserve or how egg retrieval medications may impact her health in light of her history, and Chloe verbalized understanding. Chloe shared that she would like to know more about if risks to her health would be significantly increased over her last pregnancy in light of her PFO closure and her migraines with aura. I deferred this discussion to Dr. Guy and Dr. Vidales who are scheduled to speak to the patient later today.     Chloe shared that today's consult was primarily to begin gathering information about family planning options, and to understand next steps for fertility testing. Chloe also inquired about how her  could go about proceeding with a vasectomy. We recommend he reach out to his primary care provider and request  this procedure to coordinate next steps. I provided Chloe with the contact information of local fertility clinics that she can reach out to with questions regarding next steps in her family planning.     Family History:   A three-generation pedigree was obtained previously in 2017 by genetic counselor Latia Garcia ([facility] [place]) MS, [facility], and is scanned under the  Media  tab. Of note, there is no reported family history of coagulation, stroke, or clotting disorders. Chloe has a paternal grandfather who  from a brain aneurysm, believed to be alcohol-induced as he was reported to have had heavy alcohol use. There is no reported consanguinity.       Carrier Screening:   The patient reports that she and the father of the pregnancy have  ancestry:     Cystic fibrosis is an autosomal recessive genetic condition that occurs with increased frequency in individuals of  ancestry and carrier screening for this condition is available.  In addition,  screening in the [facility] includes cystic fibrosis.    The patient reports that she is of Mediterranean ([place]) ancestry:      The hemoglobinopathies are a group of genetic blood diseases that occur with increased frequency in individuals of Mediterranean ancestry and carrier screening for these conditions is available.  Carrier screening for the hemoglobinopathies includes a CBC with red blood cell indices, a ferritin level, and a quantitative hemoglobin electrophoresis or HPLC.  In addition,  screening in the [facility] includes many of the hemoglobinopathies.      Expanded carrier screening for mutations in a large panel of genes associated with autosomal recessive conditions including cystic fibrosis, spinal muscular atrophy, and others, is now available.      Carrier screening was declined in  when the patient met with genetic counselor Cristy Corey MS, [facility] [place] [place] and was not reviewed during today's consult.       Risk  Assessment:   We explained that the risk for fetal chromosome abnormalities increases with maternal age. We discussed common chromosome abnormalities, including Down syndrome, trisomy 13, trisomy 18, and sex chromosome trisomies.      At age 40 at midtrimester, the risk to have a baby with Down syndrome is 1 in 56.     At age 40 at midtrimester, the risk to have a baby with any chromosome abnormality is 1 in 31.     At age 40 at delivery, the risk to have a baby with Down syndrome is 1 in 89.     At age 40 at delivery, the risk to have a baby with any chromosome abnormality is 1 in 51.     Testing Options:   Should Chloe become pregnant, or utilize her eggs for embryo creation, we discussed the following prenatal testing options to screen for aneuploidy:     Non-invasive Prenatal Testing (NIPT)    Maternal plasma cell-free DNA testing; first trimester ultrasound with nuchal translucency and nasal bone assessment is recommended, when appropriate    Screens for fetal trisomy 21, trisomy 13, trisomy 18, and sex chromosome aneuploidy    Cannot screen for open neural tube defects; maternal serum AFP after 15 weeks is recommended    We also briefly discussed the option of genetic screening of embryos for aneuploidy (PGT-A) should Chloe elect to proceed with IVF.    We reviewed the benefits and limitations of this testing.  Screening tests provide a risk assessment specific to the pregnancy for certain fetal chromosome abnormalities, but cannot definitively diagnose or exclude a fetal chromosome abnormality.  We reviewed that follow-up genetic counseling and consideration of diagnostic testing (chroionic villus sampling or amniocentesis) is recommended with any abnormal screening result.     Diagnostic tests carry inherent risks- including risk of miscarriage- that require careful consideration.  These tests can detect fetal chromosome abnormalities with greater than 99% certainty.  Results can be compromised by  maternal cell contamination or mosaicism, and are limited by the resolution of cytogenetic G-banding technology.  There is no screening nor diagnostic test that can detect all forms of birth defects or mental disability.     It was a pleasure to be involved with Chloe au care. Total telephone call contact time: 15 minutes. Call started at: 12:45PM, Call ended at 1:00PM.     Gely Sparrow MS, [facility]  Genetic Counselor  [facility]  Maternal Fetal Medicine  Ph: [phone] ([place])  Ph: [phone] ([facility])

## 2021-02-02 ENCOUNTER — ANESTHESIA EVENT (OUTPATIENT)
Dept: SURGERY | Facility: CLINIC | Age: 41
End: 2021-02-02

## 2021-02-02 ENCOUNTER — MYC MEDICAL ADVICE (OUTPATIENT)
Dept: SURGERY | Facility: CLINIC | Age: 41
End: 2021-02-02

## 2021-02-02 ENCOUNTER — VIRTUAL VISIT (OUTPATIENT)
Dept: SURGERY | Facility: CLINIC | Age: 41
End: 2021-02-02
Payer: COMMERCIAL

## 2021-02-02 VITALS — HEIGHT: 66 IN | WEIGHT: 165 LBS | BODY MASS INDEX: 26.52 KG/M2

## 2021-02-02 DIAGNOSIS — Z01.818 PREOP EXAMINATION: Primary | ICD-10-CM

## 2021-02-02 PROCEDURE — 99214 OFFICE O/P EST MOD 30 MIN: CPT | Mod: 95 | Performed by: PHYSICIAN ASSISTANT

## 2021-02-02 ASSESSMENT — LIFESTYLE VARIABLES: TOBACCO_USE: 1

## 2021-02-02 ASSESSMENT — PAIN SCALES - GENERAL: PAINLEVEL: NO PAIN (0)

## 2021-02-02 ASSESSMENT — MIFFLIN-ST. JEOR: SCORE: 1425.43

## 2021-02-02 NOTE — H&P
Pre-Operative H & P     CC:  Preoperative exam to assess for increased cardiopulmonary risk while undergoing surgery and anesthesia.    Date of Encounter: 2/2/2021  Primary Care Physician:  Izabel Reyes  Associated Diagnosis: Deviated nasal septum, nasal obstruction, nasal valve collapse         Video-Visit Details    Type of service:  Video Visit    Patient verbally consented to video service today: YES      Video Start Time: 0906  Video End Time (time video stopped): 0916    Originating Location (pt. Location): Home    Distant Location (provider location): home    Mode of Communication:  Video Conference via [employer]         HPI  Chloe Foster is a 40 year old female who presents for pre-operative H & P in preparation for Revision Septorhinoplasty, Repair of Nasal Valve Collapse, Cadaveric Rib + Cosmetic Tip Rhinoplasty with Dr. Schwartz on 2/18/2021 at [facility]. General anesthesia.     This is a 40-year-old female with past medical history significant for CVA x2 with PFO during pregnancy, 5/20/2015, status post PFO closure 4/21/2016, low normal protein acid levels, migraine headaches, depression and anxiety.  The patient has a history of persistent nasal obstruction.  She had surgery on her nose to help with her breathing approximately 15 or 16 years ago which she believes to be a septoplasty.  Her inability to breathe well through her nose does affect her everyday life.  She states she wakes up with a dry mouth has trouble breathing through her nose at night.  She thinks that her nose is getting more crooked with time.  She has now ready to proceed with the above procedure.      History is obtained from the patient and chart review.        Past Medical History  Past Medical History:   Diagnosis Date     Acute stress reaction     propranolol helps prior to interviews, stressful situations     Allergic rhinitis, cause unspecified     no meds except prn flonase,  tests generally positive, decided against shots     Anxiety state, unspecified     citalopram started in 7/06, stopped after couple wks, felt sluggish/tired     CVA (cerebral vascular accident) (H)     May 1, May 14th 2015     Depressive disorder, not elsewhere classified     dx, but not sure this is correct, weaned off wellbutrin (4/05-6/06, 12/06-1/08), restarted 3/08     Dizziness      Elevated lipoprotein A level      Hirsutism     saw endo, inconclusive, started on spironolactone (helped a little), stopped in 6/06, elevated DHEA- sees new endo 4/08     History of stroke with residual effects      PFO (patent foramen ovale)     Post PFO/ASD closure with 30mm Amplatzer device 4/21/16     Urinary tract infection, site not specified     recurrent, start nitrofur in 5/08, 6mo txt       Past Surgical History  Past Surgical History:   Procedure Laterality Date     ARTHROSCOPY KNEE RT/LT  1995    Rt knee     HC REPAIR OF NASAL SEPTUM  12/05     REPAIR PATENT FORAMEN OVALE  04/21/2016       Hx of Blood transfusions/reactions: denies     Hx of abnormal bleeding or anti-platelet use: ASA 81mg    Menstrual history: Patient's last menstrual period was 01/20/2021.:     Steroid use in the last year: denies    Personal or FH with difficulty with Anesthesia:  denies    Prior to Admission Medications  Current Outpatient Medications   Medication Sig Dispense Refill     acetaminophen (TYLENOL) 325 MG tablet Take 325-650 mg by mouth every 6 hours as needed for mild pain       ALPRAZolam (XANAX) 0.5 MG tablet Take 1 tablet (0.5 mg) by mouth daily as needed for anxiety 30 tablet 0     aspirin EC 81 MG EC tablet Take 81 mg by mouth every evening        aspirin-acetaminophen-caffeine (EXCEDRIN MIGRAINE) 250-250-65 MG tablet Take 1 tablet by mouth every 6 hours as needed for headaches       LORazepam (ATIVAN) 1 MG tablet Take 0.5-1 tablets (0.5-1 mg) by mouth daily as needed for anxiety or sleep (severe anxiety/panic) 15 tablet 0      magnesium oxide (MAG-OX) 400 MG tablet Take 400 mg by mouth every evening        Multiple Vitamins-Minerals (MULTIVITAMIN ADULT EXTRA C PO) Take by mouth every evening       NONFORMULARY Take 1 tablet by mouth every evening OTC: B-Right Vitamin (Optimized B Complex)       Omega-3 Fatty Acids (FISH OIL PO) Take 1 capsule by mouth At Bedtime        VITAMIN D, CHOLECALCIFEROL, PO Take 1,000 Units by mouth every evening (takes 2 x 100 unit capsule)        STATIN NOT PRESCRIBED, INTENTIONAL, Statin not prescribed intentionally due to Woman of childbearing age not actively taking birth control 0 each 0       Allergies  Allergies   Allergen Reactions     Compazine [Prochlorperazine] Muscle Pain (Myalgia)       Social History  Social History     Socioeconomic History     Marital status:      Spouse name: Not on file     Number of children: 0     Years of education: 15     Highest education level: Not on file   Occupational History     Occupation:      Comment: [employer]   Social Needs     Financial resource strain: Not on file     Food insecurity     Worry: Not on file     Inability: Not on file     Transportation needs     Medical: Not on file     Non-medical: Not on file   Tobacco Use     Smoking status: Former Smoker     Packs/day: 0.50     Years: 5.00     Pack years: 2.50     Types: Cigarettes     Quit date: 2014     Years since quittin.7     Smokeless tobacco: Never Used   Substance and Sexual Activity     Alcohol use: Yes     Alcohol/week: 0.0 standard drinks     Comment: SOCIAL     Drug use: No     Sexual activity: Yes     Partners: Male     Birth control/protection: Pull-out method   Lifestyle     Physical activity     Days per week: Not on file     Minutes per session: Not on file     Stress: Not on file   Relationships     Social connections     Talks on phone: Not on file     Gets together: Not on file     Attends [employer] service: Not on file     Active member of club or  organization: Not on file     Attends meetings of clubs or organizations: Not on file     Relationship status: Not on file     Intimate partner violence     Fear of current or ex partner: Not on file     Emotionally abused: Not on file     Physically abused: Not on file     Forced sexual activity: Not on file   Other Topics Concern      Service Not Asked     Blood Transfusions Not Asked     Caffeine Concern No     Comment: 1 cup coffee per day     Occupational Exposure Not Asked     Hobby Hazards Not Asked     Sleep Concern No     Stress Concern No     Weight Concern No     Special Diet No     Back Care Not Asked     Exercise Yes     Comment: 4-5 days week     Bike Helmet Not Asked     Seat Belt Not Asked     Self-Exams Not Asked     Parent/sibling w/ CABG, MI or angioplasty before 65F 55M? No   Social History Narrative    Social Documentation:        Balanced Diet: YES    Calcium intake: 3 per day    Caffeine: 0 per day    Exercise:  type of activity ellipitcal, pilates;  5 times per week    Sunscreen: Yes    Seatbelts:  Yes    Self Breast Exam:  Yes    Physical/Emotional/Sexual Abuse: No    Do you feel safe in your environment? Yes        Cholesterol screen up to date: Yes- checking today 07/23/09, non fasting    Eye Exam up to date: Yes    Dental Exam up to date: Yes    Pap smear up to date: Yes: checking today 07/23/09, last was 04/08 NIL    Mammogram up to date: Does Not Apply    Dexa Scan up to date: Does Not Apply    Colonoscopy up to date: Does Not Apply    Immunizations up to date: Yes, had one a month ago.    Glucose screen if over 40:  No        Julieth Matthews MA    07/23/09       Family History  Family History   Problem Relation Age of Onset     Family History Negative Mother      Cancer Father         throat, possibly asbestos exposure     Hypertension Father      Allergies Father      Arrhythmia Father      Family History Negative Sister      Family History Negative Brother      Diabetes  Maternal Grandmother         on insulin     C.A.D. Maternal Grandmother         triple bypass in her 60s     Diabetes Paternal Grandmother      C.A.D. Maternal Grandfather         MI in 50s     Neurologic Disorder Paternal Grandfather          of brain aneurysm in early 60s     Diabetes Maternal Aunt          in her early 40s of DM complications, type 2     Coronary Artery Disease Early Onset Maternal Aunt 41     Family History Negative Son            Anesthesia Evaluation     . Pt has had prior anesthetic.     No history of anesthetic complications          ROS/MED HX    ENT/Pulmonary:     (+) tobacco use, Past use,  (-) sleep apnea   Neurologic:     (+) migraines, CVA, date: 2015, with deficits, - tingling in mouth or fingers.     Cardiovascular: Comment: H/o PFO s/p closure 2016    (+) -----Previous cardiac testing   Echo: Date: 2018 Results:  GODWIN 2018   Interpretation Summary   An ASD closure device was seen in the expected anatomic position without any associated thrombus.The device was well seated and without residual shunt by   color Doppler or agitated saline.   The left atrial appendage is normal. It is free of spontaneous echo contrast and thrombus.   Global right ventricular function is normal.   Left ventricular function, chamber size, wall motion, and wall thickness are normal.The EF is 55-60%.     No change from prior.   Stress Test: Date: Results:    ECG Reviewed: Date: Results:    Cath: Date: Results:        METS/Exercise Tolerance:  >4 METS   Hematologic:  - neg hematologic  ROS    (-) history of blood clots and History of Transfusion   Musculoskeletal:  - neg musculoskeletal ROS       GI/Hepatic:  - neg GI/hepatic ROS       Renal/Genitourinary:  - neg Renal ROS       Endo:  - neg endo ROS       Psychiatric:     (+) psychiatric history anxiety and depression    (-) chronic opioid use history   Infectious Disease:  - neg infectious disease ROS       Malignancy:       Other:   "  (+) , no H/O Chronic Pain,         The complete review of systems is negative other than noted in the HPI or here.       165 lbs 0 oz  5' 5.7\"   Body mass index is 26.88 kg/m .       Physical Exam    Please refer to the physical examination documented by the anesthesiologist in the anesthesia record on the day of surgery    Constitutional: Awake, alert, cooperative, no apparent distress, and appears stated age.  Respiratory: non labored breathing  Neurologic: Awake, alert, oriented to name, place and time.   Neuropsychiatric: Calm, cooperative. Normal affect.     PRIOR LABS/DIAGNOSTIC STUDIES:  All labs and imaging personally reviewed      Component      Latest Ref Rng & Units 11/4/2020   Sodium      133 - 144 mmol/L 139   Potassium      3.4 - 5.3 mmol/L 3.8   Chloride      94 - 109 mmol/L 107   Carbon Dioxide      20 - 32 mmol/L 28   Anion Gap      3 - 14 mmol/L 4   Glucose      70 - 99 mg/dL 95   Urea Nitrogen      7 - 30 mg/dL 11   Creatinine      0.52 - 1.04 mg/dL 0.73   GFR Estimate      >60 mL/min/1.73:m2 >90   GFR Estimate If Black      >60 mL/min/1.73:m2 >90   Calcium      8.5 - 10.1 mg/dL 9.2     Component      Latest Ref Rng & Units 11/4/2020   WBC      4.0 - 11.0 10e9/L 4.3   RBC Count      3.8 - 5.2 10e12/L 4.81   Hemoglobin      11.7 - 15.7 g/dL 14.3   Hematocrit      35.0 - 47.0 % 44.3   MCV      78 - 100 fl 92   MCH      26.5 - 33.0 pg 29.7   MCHC      31.5 - 36.5 g/dL 32.3   RDW      10.0 - 15.0 % 12.6   Platelet Count      150 - 450 10e9/L 322       Cardiac echo: 8/2/2018  Interpretation Summary   An ASD closure device was seen in the expected anatomic position without any   associated thrombus.The device was well seated and without residual shunt by   color Doppler or agitated saline.   The left atrial appendage is normal. It is free of spontaneous echo contrast   and thrombus.   Global right ventricular function is normal.   Left ventricular function, chamber size, wall motion, and wall " thickness are   normal.The EF is 55-60%.   No change from prior.        CT FACIAL BONES WITHOUT CONTRAST 10/22/2019 12:52 PM  Findings:  There is no significant soft tissue swelling of the face.  There is no fracture of the facial bones. The cribriform plate appears  intact. Alignment of the facial bones appears normal.      There is no hematoma, soft tissue mass or gas within the orbits.      Leftward nasal septal deviation. Small mucous retention cysts in both  maxillary sinuses. Minimal left sphenoid sinus mucosal thickening.  Clear frontal sinus. Clear ethmoid air cells.                                                                      Impression:   Mild paranasal sinus mucosal thickening without evidence of acute  sinusitis.       Outside records reviewed from: care everywhere    ASSESSMENT and PLAN  Chloe Foster is a 40 year old female scheduled for Revision Septorhinoplasty, Repair of Nasal Valve Collapse, Cadaveric Rib + Cosmetic Tip Rhinoplasty on 2/18/2021 by Dr. Azar Lopez in treatment of Deviated nasal septum, nasal obstruction, nasal valve collapse.  PAC referral for risk assessment and optimization for anesthesia with comorbid conditions of CVA x2 with PFO during pregnancy 2015, status post PFO closure 4/21/2016,  migraine headaches, depression and anxiety :    Pre-operative considerations:  1.  Cardiac:  Functional status- METS >4.  Intermediate risk surgery with 0.9% (RCRI #) risk of major adverse cardiac event.   Hx of PFO s/p closure 2016. Denies any exertional symptoms.    --echo 2018 as above with device in place and no residual PFO.  Pt does experience infrequent palpitations.  Prior Zio monitor showed SVT and occasional PVCs.  She denies any lightheadedness, dizziness, near syncope, syncope.    2.  Pulm:  Airway feasible.  ANDRE risk: Low.  Remote h/o smoking.  3.  GI:  Risk of PONV score = 3.  If > 2, anti-emetic intervention recommended.  4.  Neuro: h/o CVA x2 2015 while pregnant due  to PFO. Occasional numbness in mouth and fingertips.  No permanent deficits.  Will hold ASA 81mg for 7 days prior to DOS. Has migraines since that time.  Uses Excedrin.  Will hold for one week prior to DOS.   5.  Heme: h/o protein S deficiency, heterozygous, while pregnant and low normal value since.  No h/o VTE.  Seen by hematology, Dr. Casarez in 2017.      VTE risk: 0.26%    Patient is optimized and is acceptable candidate for the proposed procedure.  No further diagnostic evaluation is needed.     Patient discussed with Dr. Voss.     Mely Caal PA-C  Preoperative Assessment Center  [facility]  Phone: [phone]  Fax: [phone]

## 2021-02-02 NOTE — PATIENT INSTRUCTIONS
Preparing for Your Surgery      Name:  Chloe Foster   MRN:  [id number]   :  1980   Today's Date:  2021         Arriving for surgery:  Surgery date:  2021  Arrival time:  5:45 am    Restrictions due to COVID 19:  One consistent visitor is allowed per patient  No ill visitors  All visitors must wear face mask     parking is available for anyone with mobility limitations or disabilities. (Monday- Friday 7 am- 5 pm)    Please come to:    [facility]  [address] [place] [id number]    Please check in on the 5th floor at the Ambulatory Surgery Center       What can I eat or drink?    -  You may eat and drink normally until 8 hours before surgery. (Until 21 at 11 pm)  -  You may have clear liquids up to 4 hours before surgery. (Until 3 am)  Examples of clear liquids:  Water  Clear broth  Juices (apple, white grape, white cranberry  and cider) without pulp  Noncarbonated, powder based beverages  (lemonade and Jonel-Aid)  Sodas (Sprite, 7-Up, ginger ale and seltzer)  Coffee or tea (without milk or cream)  Gatorade    --No alcohol for at least 24 hours before surgery    Which medicines can I take?    Hold Aspirin and Excedrin Migraine for 7 days before surgery.     Hold Multivitamins for 7 days before surgery.    Hold Supplements (omega 3) for 7 days before surgery.    Hold Ibuprofen (Advil, Motrin) for 1 day before surgery--unless otherwise directed by surgeon.  Hold Naproxen (Aleve) for 4 days before surgery.        -  PLEASE TAKE the following medications the day of surgery   Alprazolam if needed  Tylenol if needed       How do I prepare myself?  - Please take 2 showers before surgery using Scrubcare or Hibiclens soap.    Use this soap only from the neck to your toes.     Leave the soap on your skin for one minute--then rinse thoroughly.      You may use your own shampoo and conditioner; no other hair products.   - Please remove all jewelry and body  piercings.  - No lotions, deodorants or fragrance.  - No makeup or fingernail polish.   - Bring your ID and insurance card.        - All patients are required to have a Covid-19 test within 4 days of surgery/procedure.      -Patients will be contacted by the [facility] scheduling team within 1 week of surgery to make an appointment.      - Patients may call the Scheduling team at [phone] if they have not been scheduled within 4 days of  surgery.      ALL PATIENTS ARE REQUIRED TO HAVE A RESPONSIBLE ADULT TO DRIVE AND BE IN ATTENDANCE WITH THEM FOR 24 HOURS FOLLOWING SURGERY       Questions or Concerns:    -For questions regarding the day of surgery please contact the Ambulatory Surgery Center at [phone].    -If you have health changes between today and your surgery please contact your surgeon.     For questions after surgery please call your surgeons office.

## 2021-02-02 NOTE — PROGRESS NOTES
Chloe is a 40 year old who is being evaluated via a billable video visit.      How would you like to obtain your AVS? MyChart  If the video visit is dropped, the invitation should be resent by: Other e-mail: MyChart  Will anyone else be joining your video visit? No      HPI     Review of Systems     Physical Exam

## 2021-02-02 NOTE — ANESTHESIA PREPROCEDURE EVALUATION
"Anesthesia Pre-Procedure Evaluation    Patient: Chloe Foster   MRN:     [id number] Gender:   female   Age:    40 year old :      1980        Preoperative Diagnosis: * No surgery found *        LABS:  CBC:   Lab Results   Component Value Date    WBC 4.3 2020    WBC 6.0 2019    HGB 14.3 2020    HGB 14.6 2019    HCT 44.3 2020    HCT 44.8 2019     2020     2019     BMP:   Lab Results   Component Value Date     2020     2019    POTASSIUM 3.8 2020    POTASSIUM 4.2 2019    CHLORIDE 107 2020    CHLORIDE 106 2019    CO2 28 2020    CO2 27 2019    BUN 11 2020    BUN 11 2019    CR 0.73 2020    CR 0.69 2019    GLC 95 2020    GLC 97 2019     COAGS:   Lab Results   Component Value Date    PTT 27 2016    INR 0.90 2016     POC:   Lab Results   Component Value Date    HCG Negative 2019    HCGS Negative 2019     OTHER:   Lab Results   Component Value Date    STEPH 9.2 2020    MAG 2.0 2016    ALBUMIN 3.9 2019    PROTTOTAL 7.1 2019    ALT 24 2019    AST 18 2019    ALKPHOS 65 2019    BILITOTAL 0.4 2019    LIPASE 174 2019    TSH 1.04 2019        Preop Vitals    BP Readings from Last 3 Encounters:   20 124/88   20 114/75   20 119/83    Pulse Readings from Last 3 Encounters:   20 81   20 84   20 88      Resp Readings from Last 3 Encounters:   20 14   20 16   19 14    SpO2 Readings from Last 3 Encounters:   20 98%   20 96%   20 98%      Temp Readings from Last 1 Encounters:   20 98  F (36.7  C) (Tympanic)    Ht Readings from Last 1 Encounters:   21 1.669 m (5' 5.7\")      Wt Readings from Last 1 Encounters:   21 74.8 kg (165 lb)    Estimated body mass index is 26.88 kg/m  as calculated from the " "following:    Height as of this encounter: 1.669 m (5' 5.7\").    Weight as of this encounter: 74.8 kg (165 lb).     LDA:        Past Medical History:   Diagnosis Date     Acute stress reaction     propranolol helps prior to interviews, stressful situations     Allergic rhinitis, cause unspecified     no meds except prn flonase, tests generally positive, decided against shots     Anxiety state, unspecified     citalopram started in 7/06, stopped after couple wks, felt sluggish/tired     CVA (cerebral vascular accident) (H)     May 1, May 14th 2015     Depressive disorder, not elsewhere classified     dx, but not sure this is correct, weaned off wellbutrin (4/05-6/06, 12/06-1/08), restarted 3/08     Dizziness      Elevated lipoprotein A level      Hirsutism     saw endo, inconclusive, started on spironolactone (helped a little), stopped in 6/06, elevated DHEA- sees new endo 4/08     History of stroke with residual effects      PFO (patent foramen ovale)     Post PFO/ASD closure with 30mm Amplatzer device 4/21/16     Urinary tract infection, site not specified     recurrent, start nitrofur in 5/08, 6mo txt      Past Surgical History:   Procedure Laterality Date     ARTHROSCOPY KNEE RT/LT  1995    Rt knee     HC REPAIR OF NASAL SEPTUM  12/05     REPAIR PATENT FORAMEN OVALE  04/21/2016      Allergies   Allergen Reactions     Compazine [Prochlorperazine] Muscle Pain (Myalgia)        Anesthesia Evaluation     . Pt has had prior anesthetic.     No history of anesthetic complications          ROS/MED HX    ENT/Pulmonary:     (+) tobacco use, Past use,  (-) sleep apnea   Neurologic:     (+) migraines, CVA, date: x2, 2015, with deficits, - tingling in mouth or fingers.     Cardiovascular: Comment: H/o PFO s/p closure 4/2016    (+) -----Previous cardiac testing   Echo: Date: 8/2/2018 Results:  GODWIN 8/2/2018   Interpretation Summary   An ASD closure device was seen in the expected anatomic position without any associated " thrombus.The device was well seated and without residual shunt by   color Doppler or agitated saline.   The left atrial appendage is normal. It is free of spontaneous echo contrast and thrombus.   Global right ventricular function is normal.   Left ventricular function, chamber size, wall motion, and wall thickness are normal.The EF is 55-60%.     No change from prior.   Stress Test: Date: Results:    ECG Reviewed: Date: Results:    Cath: Date: Results:        METS/Exercise Tolerance:  >4 METS   Hematologic:  - neg hematologic  ROS    (-) history of blood clots and History of Transfusion   Musculoskeletal:  - neg musculoskeletal ROS       GI/Hepatic:  - neg GI/hepatic ROS       Renal/Genitourinary:  - neg Renal ROS       Endo:  - neg endo ROS       Psychiatric:     (+) psychiatric history anxiety and depression    (-) chronic opioid use history   Infectious Disease:  - neg infectious disease ROS       Malignancy:       Other:    (+) , no H/O Chronic Pain,                   JZG FV AN PHYSICAL EXAM    JZG FV AN PLAN NO PONV RULE    [unfilled]  PAC Discussion and Assessment    ASA Classification: 2    Anesthetic techniques and relevant risks discussed: GA  Invasive monitoring and risk discussed: No    Possibility and Risk of blood transfusion discussed: No            PAC Resident/NP Anesthesia Assessment: Chloe Foster is a 40 year old female scheduled for Revision Septorhinoplasty, Repair of Nasal Valve Collapse, Cadaveric Rib + Cosmetic Tip Rhinoplasty on 2/18/2021 by Dr. Azar Lopez in treatment of Deviated nasal septum, nasal obstruction, nasal valve collapse.  PAC referral for risk assessment and optimization for anesthesia with comorbid conditions of CVA x2 with PFO during pregnancy 2015, status post PFO closure 4/21/2016,  migraine headaches, depression and anxiety :    Pre-operative considerations:  1.  Cardiac:  Functional status- METS >4.  Intermediate risk surgery with 0.9% (RCRI #) risk of major  adverse cardiac event.   Hx of PFO s/p closure 2016. Denies any exertional symptoms.    --echo 2018 as above with device in place and no residual PFO.  Pt does experience infrequent palpitations.  Prior Zio monitor showed SVT and occasional PVCs.  She denies any lightheadedness, dizziness, near syncope, syncope.    2.  Pulm:  Airway feasible.  ANDRE risk: Low.  Remote h/o smoking.  3.  GI:  Risk of PONV score = 3.  If > 2, anti-emetic intervention recommended.  4.  Neuro: h/o CVA x2 2015 while pregnant due to PFO. Occasional numbness in mouth and fingertips.  No permanent deficits.  Will hold ASA 81mg for 7 days prior to DOS. Has migraines since that time.  Uses Excedrin.  Will hold for one week prior to DOS.   5.  Heme: h/o protein S deficiency, heterozygous, while pregnant and low normal value since.  No h/o VTE.  Seen by hematology, Dr. Casarez in 2017.      VTE risk: 0.26%    Patient is optimized and is acceptable candidate for the proposed procedure.  No further diagnostic evaluation is needed.     Patient discussed with Dr. Voss.     **For further details of assessment, testing, and physical exam please see H and P completed on same date.          Mely Caal PA-C, [facility]    Reviewed and Signed by PAC Mid-Level Provider/Resident  Mid-Level Provider/Resident: Mely Caal  Date: 2/2/21      Attending Anesthesiologist Anesthesia Assessment:                               Mely Caal PA-C

## 2021-02-14 ENCOUNTER — HEALTH MAINTENANCE LETTER (OUTPATIENT)
Age: 41
End: 2021-02-14

## 2021-02-15 DIAGNOSIS — Z11.59 ENCOUNTER FOR SCREENING FOR OTHER VIRAL DISEASES: ICD-10-CM

## 2021-02-15 PROCEDURE — U0003 INFECTIOUS AGENT DETECTION BY NUCLEIC ACID (DNA OR RNA); SEVERE ACUTE RESPIRATORY SYNDROME CORONAVIRUS 2 (SARS-COV-2) (CORONAVIRUS DISEASE [COVID-19]), AMPLIFIED PROBE TECHNIQUE, MAKING USE OF HIGH THROUGHPUT TECHNOLOGIES AS DESCRIBED BY CMS-2020-01-R: HCPCS | Performed by: OTOLARYNGOLOGY

## 2021-02-15 PROCEDURE — U0005 INFEC AGEN DETEC AMPLI PROBE: HCPCS | Performed by: OTOLARYNGOLOGY

## 2021-02-16 LAB
SARS-COV-2 RNA RESP QL NAA+PROBE: NOT DETECTED
SPECIMEN SOURCE: NORMAL

## 2021-02-17 RX ORDER — NALOXONE HYDROCHLORIDE 0.4 MG/ML
0.2 INJECTION, SOLUTION INTRAMUSCULAR; INTRAVENOUS; SUBCUTANEOUS
Status: CANCELLED | OUTPATIENT
Start: 2021-02-17 | End: 2021-02-18

## 2021-02-17 RX ORDER — OXYCODONE HYDROCHLORIDE 5 MG/1
5 TABLET ORAL EVERY 4 HOURS PRN
Status: CANCELLED | OUTPATIENT
Start: 2021-02-17

## 2021-02-17 RX ORDER — MEPERIDINE HYDROCHLORIDE 25 MG/ML
12.5 INJECTION INTRAMUSCULAR; INTRAVENOUS; SUBCUTANEOUS
Status: CANCELLED | OUTPATIENT
Start: 2021-02-17

## 2021-02-17 RX ORDER — SODIUM CHLORIDE, SODIUM LACTATE, POTASSIUM CHLORIDE, CALCIUM CHLORIDE 600; 310; 30; 20 MG/100ML; MG/100ML; MG/100ML; MG/100ML
INJECTION, SOLUTION INTRAVENOUS CONTINUOUS
Status: CANCELLED | OUTPATIENT
Start: 2021-02-17

## 2021-02-17 RX ORDER — ALBUTEROL SULFATE 0.83 MG/ML
2.5 SOLUTION RESPIRATORY (INHALATION) EVERY 4 HOURS PRN
Status: CANCELLED | OUTPATIENT
Start: 2021-02-17

## 2021-02-17 RX ORDER — FENTANYL CITRATE 50 UG/ML
25-50 INJECTION, SOLUTION INTRAMUSCULAR; INTRAVENOUS
Status: CANCELLED | OUTPATIENT
Start: 2021-02-17

## 2021-02-17 RX ORDER — NALOXONE HYDROCHLORIDE 0.4 MG/ML
0.4 INJECTION, SOLUTION INTRAMUSCULAR; INTRAVENOUS; SUBCUTANEOUS
Status: CANCELLED | OUTPATIENT
Start: 2021-02-17 | End: 2021-02-18

## 2021-02-17 RX ORDER — ONDANSETRON 2 MG/ML
4 INJECTION INTRAMUSCULAR; INTRAVENOUS EVERY 30 MIN PRN
Status: CANCELLED | OUTPATIENT
Start: 2021-02-17

## 2021-02-17 RX ORDER — ONDANSETRON 4 MG/1
4 TABLET, ORALLY DISINTEGRATING ORAL EVERY 30 MIN PRN
Status: CANCELLED | OUTPATIENT
Start: 2021-02-17

## 2021-02-18 ENCOUNTER — HOSPITAL ENCOUNTER (OUTPATIENT)
Facility: AMBULATORY SURGERY CENTER | Age: 41
End: 2021-02-18
Attending: OTOLARYNGOLOGY
Payer: COMMERCIAL

## 2021-02-18 ENCOUNTER — TELEPHONE (OUTPATIENT)
Dept: HEMATOLOGY | Facility: CLINIC | Age: 41
End: 2021-02-18

## 2021-02-18 ENCOUNTER — TELEPHONE (OUTPATIENT)
Dept: OTOLARYNGOLOGY | Facility: CLINIC | Age: 41
End: 2021-02-18

## 2021-02-18 VITALS
OXYGEN SATURATION: 96 % | RESPIRATION RATE: 16 BRPM | WEIGHT: 170 LBS | HEART RATE: 102 BPM | SYSTOLIC BLOOD PRESSURE: 116 MMHG | DIASTOLIC BLOOD PRESSURE: 88 MMHG | BODY MASS INDEX: 26.68 KG/M2 | HEIGHT: 67 IN | TEMPERATURE: 98.2 F

## 2021-02-18 DIAGNOSIS — J34.89 NASAL OBSTRUCTION: ICD-10-CM

## 2021-02-18 DIAGNOSIS — D68.59 PROTEIN S DEFICIENCY (H): Primary | ICD-10-CM

## 2021-02-18 DIAGNOSIS — J34.2 DEVIATED NASAL SEPTUM: ICD-10-CM

## 2021-02-18 DIAGNOSIS — J34.829 NASAL VALVE COLLAPSE: ICD-10-CM

## 2021-02-18 LAB
HCG UR QL: NEGATIVE
INTERNAL QC OK POCT: YES

## 2021-02-18 PROCEDURE — 81025 URINE PREGNANCY TEST: CPT | Performed by: PATHOLOGY

## 2021-02-18 RX ORDER — CEFAZOLIN SODIUM 2 G/50ML
2 SOLUTION INTRAVENOUS
Status: DISCONTINUED | OUTPATIENT
Start: 2021-02-18 | End: 2021-02-19 | Stop reason: HOSPADM

## 2021-02-18 RX ORDER — SODIUM CHLORIDE, SODIUM LACTATE, POTASSIUM CHLORIDE, CALCIUM CHLORIDE 600; 310; 30; 20 MG/100ML; MG/100ML; MG/100ML; MG/100ML
INJECTION, SOLUTION INTRAVENOUS CONTINUOUS
Status: DISCONTINUED | OUTPATIENT
Start: 2021-02-18 | End: 2021-02-19 | Stop reason: HOSPADM

## 2021-02-18 RX ORDER — HEPARIN SODIUM 10000 [USP'U]/ML
5000 INJECTION, SOLUTION INTRAVENOUS; SUBCUTANEOUS ONCE
Status: COMPLETED | OUTPATIENT
Start: 2021-02-18 | End: 2021-02-18

## 2021-02-18 RX ORDER — GABAPENTIN 300 MG/1
300 CAPSULE ORAL ONCE
Status: COMPLETED | OUTPATIENT
Start: 2021-02-18 | End: 2021-02-18

## 2021-02-18 RX ORDER — LIDOCAINE 40 MG/G
CREAM TOPICAL
Status: DISCONTINUED | OUTPATIENT
Start: 2021-02-18 | End: 2021-02-19 | Stop reason: HOSPADM

## 2021-02-18 RX ORDER — ACETAMINOPHEN 325 MG/1
975 TABLET ORAL ONCE
Status: COMPLETED | OUTPATIENT
Start: 2021-02-18 | End: 2021-02-18

## 2021-02-18 RX ADMIN — GABAPENTIN 300 MG: 300 CAPSULE ORAL at 06:23

## 2021-02-18 RX ADMIN — HEPARIN SODIUM 5000 UNITS: 10000 INJECTION, SOLUTION INTRAVENOUS; SUBCUTANEOUS at 06:39

## 2021-02-18 RX ADMIN — ACETAMINOPHEN 975 MG: 325 TABLET ORAL at 06:23

## 2021-02-18 ASSESSMENT — MIFFLIN-ST. JEOR: SCORE: 1471.91

## 2021-02-18 NOTE — TELEPHONE ENCOUNTER
Called pt re: the need to follow up with Hematology prior to rescheduling nasal surgery with Dr. Schwartz.    I let pt know that I sent a message to the Hematology scheduling pool asking them to schedule an appt with Dr. Casarez.  I also gave pt the clinic's phone number in the event that she doesn't hear from the clinic by this afternoon.    Pt is agreeable to this plan and does not want her surgeon's fee and cosmetic fees refunded at this time. She plans to reschedule nasal surgery as soon as she's cleared by Hematology.    Pt will call me if she changes her mind on wanting the refund, in the event she's not able to reschedule soon with Dr. Schwartz.    Lou Julian RN  2/18/2021 9:52 AM

## 2021-02-18 NOTE — TELEPHONE ENCOUNTER
Chloe Foster  [id number]  1980    Chloe called today.  She stated that her surgeon cancelled her septoplasty revision due to her Protein S level being low around pregnancy.  Even though she had this level tested after pregnancy and was in normal range at least 2 times (2016 & 2017), the surgeon will not reschedule her surgery until the level is checked again.  I have placed orders for Dr. Casarez. She has an video appointment with Dr. Casarez on Monday.  We do not anticipate these results to be abnormal.  She plans to get them drawn at [facility] tomorrow 2/19/21.  She is currently not on estrogen.  Cheyenne Dao, MSN, RN, PHN -Nurse Clinician, [facility] [phone]

## 2021-02-19 ENCOUNTER — HOSPITAL ENCOUNTER (OUTPATIENT)
Dept: LAB | Facility: CLINIC | Age: 41
Discharge: HOME | End: 2021-02-19
Attending: FAMILY MEDICINE | Admitting: INTERNAL MEDICINE
Payer: COMMERCIAL

## 2021-02-19 DIAGNOSIS — Z86.73 PERSONAL HISTORY OF TRANSIENT CEREBRAL ISCHEMIA: ICD-10-CM

## 2021-02-19 DIAGNOSIS — D68.59 PROTEIN S DEFICIENCY (H): ICD-10-CM

## 2021-02-19 DIAGNOSIS — Z15.89 MTHFR MUTATION: ICD-10-CM

## 2021-02-19 DIAGNOSIS — J34.2 DEVIATED NASAL SEPTUM: Primary | ICD-10-CM

## 2021-02-19 DIAGNOSIS — J34.829 NASAL VALVE COLLAPSE: ICD-10-CM

## 2021-02-19 DIAGNOSIS — Z13.6 SCREENING FOR ISCHEMIC HEART DISEASE: ICD-10-CM

## 2021-02-19 DIAGNOSIS — J34.89 NASAL OBSTRUCTION: ICD-10-CM

## 2021-02-19 DIAGNOSIS — Z13.6 CARDIOVASCULAR SCREENING; LDL GOAL LESS THAN 160: ICD-10-CM

## 2021-02-19 DIAGNOSIS — Z01.818 PRE-OP EXAM: ICD-10-CM

## 2021-02-19 DIAGNOSIS — Z15.89 MTHFR MUTATION: Primary | ICD-10-CM

## 2021-02-19 DIAGNOSIS — G43.109 MIGRAINE WITH AURA AND WITHOUT STATUS MIGRAINOSUS, NOT INTRACTABLE: ICD-10-CM

## 2021-02-19 DIAGNOSIS — Z86.73 HISTORY OF CVA (CEREBROVASCULAR ACCIDENT): ICD-10-CM

## 2021-02-19 LAB
ALBUMIN SERPL-MCNC: 3.6 G/DL (ref 3.4–5)
ALP SERPL-CCNC: 62 U/L (ref 40–150)
ALT SERPL W P-5'-P-CCNC: 19 U/L (ref 0–50)
ANION GAP SERPL CALCULATED.3IONS-SCNC: 4 MMOL/L (ref 3–14)
AST SERPL W P-5'-P-CCNC: 13 U/L (ref 0–45)
BILIRUB SERPL-MCNC: 0.3 MG/DL (ref 0.2–1.3)
BUN SERPL-MCNC: 9 MG/DL (ref 7–30)
CALCIUM SERPL-MCNC: 9.1 MG/DL (ref 8.5–10.1)
CHLORIDE SERPL-SCNC: 111 MMOL/L (ref 94–109)
CO2 SERPL-SCNC: 28 MMOL/L (ref 20–32)
CREAT SERPL-MCNC: 0.65 MG/DL (ref 0.52–1.04)
DEPRECATED CALCIDIOL+CALCIFEROL SERPL-MC: 33 UG/L (ref 20–75)
FOLATE SERPL-MCNC: 17.5 NG/ML
GFR SERPL CREATININE-BSD FRML MDRD: >90 ML/MIN/{1.73_M2}
GLUCOSE SERPL-MCNC: 87 MG/DL (ref 70–99)
POTASSIUM SERPL-SCNC: 4 MMOL/L (ref 3.4–5.3)
PROT SERPL-MCNC: 6.6 G/DL (ref 6.8–8.8)
SODIUM SERPL-SCNC: 143 MMOL/L (ref 133–144)
VIT B12 SERPL-MCNC: 504 PG/ML (ref 193–986)

## 2021-02-19 PROCEDURE — 84207 ASSAY OF VITAMIN B-6: CPT | Performed by: INTERNAL MEDICINE

## 2021-02-19 PROCEDURE — 80053 COMPREHEN METABOLIC PANEL: CPT | Performed by: INTERNAL MEDICINE

## 2021-02-19 PROCEDURE — 82607 VITAMIN B-12: CPT | Performed by: INTERNAL MEDICINE

## 2021-02-19 PROCEDURE — 82746 ASSAY OF FOLIC ACID SERUM: CPT | Performed by: FAMILY MEDICINE

## 2021-02-19 PROCEDURE — 83090 ASSAY OF HOMOCYSTEINE: CPT | Performed by: INTERNAL MEDICINE

## 2021-02-19 PROCEDURE — 82306 VITAMIN D 25 HYDROXY: CPT | Performed by: INTERNAL MEDICINE

## 2021-02-19 PROCEDURE — 36415 COLL VENOUS BLD VENIPUNCTURE: CPT | Performed by: INTERNAL MEDICINE

## 2021-02-19 PROCEDURE — 85306 CLOT INHIBIT PROT S FREE: CPT | Performed by: INTERNAL MEDICINE

## 2021-02-19 RX ORDER — CEFAZOLIN SODIUM 2 G/50ML
2 SOLUTION INTRAVENOUS SEE ADMIN INSTRUCTIONS
Status: CANCELLED | OUTPATIENT
Start: 2021-02-19

## 2021-02-19 RX ORDER — CEFAZOLIN SODIUM 2 G/50ML
2 SOLUTION INTRAVENOUS
Status: CANCELLED | OUTPATIENT
Start: 2021-02-19

## 2021-02-22 ENCOUNTER — VIRTUAL VISIT (OUTPATIENT)
Dept: HEMATOLOGY | Facility: CLINIC | Age: 41
End: 2021-02-22
Attending: INTERNAL MEDICINE
Payer: COMMERCIAL

## 2021-02-22 DIAGNOSIS — D68.59 PROTEIN S DEFICIENCY (H): ICD-10-CM

## 2021-02-22 LAB — PROT S FREE AG ACT/NOR PPP IA: 69 % (ref 55–125)

## 2021-02-22 PROCEDURE — 99203 OFFICE O/P NEW LOW 30 MIN: CPT | Mod: 95 | Performed by: INTERNAL MEDICINE

## 2021-02-22 NOTE — PROGRESS NOTES
Patient was contacted to complete the pre-visit call prior to their video visit with the provider.      Allergies and medications were reviewed.    I thanked them for their time to cover this information     Tc Borjas CMA

## 2021-02-23 NOTE — PROGRESS NOTES
[facility]  [facility]  [facility] [address] [address], [address], [place] [address]  Main: [phone], Fax: [phone]          Outpatient Clinic Visit  Date:  02/22/2021      NOTE:  Due to the ongoing COVID-19 pandemic, this visit was conducted by video, with the patient's approval.      Chloe Foster is a 40-year-old woman previously seen here in June 2017.  She has a history of stroke which occurred in 2015 at 36 weeks of gestation.  She was subsequently found to have a low protein S level and a patent foramen ovale, which was subsequently closed.  There was no other evidence of genetic or acquired thrombophilia.  Her protein S level returned to normal outside of pregnancy.  There is no other personal or family history of thrombosis.      She is here today because she is in need of clearance from us prior to proceeding with ENT surgery (revision septorhinoplasty).  Since we last saw her, there have been no significant changes to her overall health, and no changes to the lack of any other personal or family history of thrombosis.  She is not taking estrogen.    Free protein S levels have been documented in the normal range on 3 separate occasions, outside of pregnancy:     Free protein S (normal %)   January 2016  65%   April 2017  62%   February 2021  69%       ASSESSMENT / PLAN:  1.  History of stroke occurring during third trimester of pregnancy in May 2015.   2.  Patent foramen ovale, status post closure in April 2016.   3.  History of acquired protein S deficiency (due to pregnancy).    Chloe has a history of acquired protein S deficiency secondary to the hormonal effects of pregnancy.  This is a well described cause of transiently decreased protein S levels.  She does not have genetic protein S deficiency.  In the absence of estrogen effect, her protein S level has been documented to be in the normal range on 3 separate occasions over the last  several years.    No further testing or treatment is needed from a hematology / coagulation perspective.  She is not at increased risk for thrombosis given her history, as long as she avoids estrogen.  There is no contraindication to proceeding with the planned ENT procedure.      Total time 20 minutes, including review of medical records and labs, video / telephone visit, and documentation.      Ricco Casarez MD  Associate Professor of Medicine  Division of Hematology, Oncology, and Transplantation  Director, [facility]

## 2021-02-24 LAB — HCYS SERPL-SCNC: 4.7 UMOL/L (ref 4–12)

## 2021-02-25 ENCOUNTER — TELEPHONE (OUTPATIENT)
Dept: OTOLARYNGOLOGY | Facility: CLINIC | Age: 41
End: 2021-02-25

## 2021-02-25 DIAGNOSIS — Z01.818 PRE-OP EXAM: Primary | ICD-10-CM

## 2021-02-25 NOTE — TELEPHONE ENCOUNTER
Left  for pt to confirm her scheduled surgery with Dr. Schwartz on Monday, March 15th at 1100.    Also informed pt that she will need another pre-op appt and another COVID test prior to surgery.    Left my direct dial for call back.    Lou Julian RN  2/25/2021 9:36 AM

## 2021-02-26 ENCOUNTER — MYC MEDICAL ADVICE (OUTPATIENT)
Dept: PSYCHIATRY | Facility: CLINIC | Age: 41
End: 2021-02-26

## 2021-03-01 ENCOUNTER — FCC EXTENDED DOCUMENTATION (OUTPATIENT)
Dept: PSYCHOLOGY | Facility: CLINIC | Age: 41
End: 2021-03-01

## 2021-03-01 NOTE — PROGRESS NOTES
Discharge Summary  Multiple Sessions    Client Name: Chloe Foster MRN#: [id number] YOB: 1980      Intake / Discharge Date: 2/10/17; 6/25/19      DSM5 Diagnoses: (Sustained by DSM5 Criteria Listed Above)  Diagnoses: 296.31 (F33.0) Major Depressive Disorder, Recurrent Episode, Mild _ and With anxious distress  Psychosocial & Contextual Factors: Employment and health issues.  WHODAS 2.0 (12 item) Score:           Presenting Concern:  Health related stress and relational stress      Reason for Discharge:  Client did not return      Disposition at Time of Last Encounter:   Comments:   stable     Risk Management:   Client denies a history of suicidal ideation, suicide attempts, self-injurious behavior, homicidal ideation, homicidal behavior and and other safety concerns  Recommended that patient call 911 or go to the local ED should there be a change in any of these risk factors.      Referred To:  May resume in future as needed.        Tamanna Lim, BETO   3/1/2021

## 2021-03-01 NOTE — TELEPHONE ENCOUNTER
FUTURE VISIT INFORMATION      SURGERY INFORMATION:    Date: 3.15.21    [place] [facility]    Surgeon:  Dr. Schwartz    Anesthesia Type:  General    Procedure: Revision Septorhinoplasty, Repair of Nasal Valve Collapse, Cadaveric Rib Graft + 45 min Cosmetic Tip Rhinoplasty      RECORDS REQUESTED FROM:       Primary Care Provider: Dr. Reyes    Most recent EKG+ Tracin19     Most recent ECHO: ..16

## 2021-03-03 ENCOUNTER — PRE VISIT (OUTPATIENT)
Dept: SURGERY | Facility: CLINIC | Age: 41
End: 2021-03-03

## 2021-03-03 ENCOUNTER — ANESTHESIA EVENT (OUTPATIENT)
Dept: SURGERY | Facility: CLINIC | Age: 41
End: 2021-03-03

## 2021-03-03 ENCOUNTER — VIRTUAL VISIT (OUTPATIENT)
Dept: SURGERY | Facility: CLINIC | Age: 41
End: 2021-03-03
Payer: COMMERCIAL

## 2021-03-03 DIAGNOSIS — Z01.818 PREOP EXAMINATION: Primary | ICD-10-CM

## 2021-03-03 PROCEDURE — 99214 OFFICE O/P EST MOD 30 MIN: CPT | Mod: 95 | Performed by: PHYSICIAN ASSISTANT

## 2021-03-03 ASSESSMENT — PAIN SCALES - GENERAL: PAINLEVEL: NO PAIN (0)

## 2021-03-03 ASSESSMENT — LIFESTYLE VARIABLES: TOBACCO_USE: 1

## 2021-03-03 NOTE — PROGRESS NOTES
Chloe is a 40 year old who is being evaluated via a billable video visit.      How would you like to obtain your AVS? MyChart    Will anyone else be joining your video visit? No    HPI           Review of Systems         it.    Physical Exam     QUIRINO Osullivan LPN

## 2021-03-03 NOTE — ANESTHESIA PREPROCEDURE EVALUATION
Anesthesia Pre-Procedure Evaluation    Patient: Chloe Foster   MRN: [id number] : 1980        Preoperative Diagnosis: * No surgery found *   Procedure :      Past Medical History:   Diagnosis Date     Acute stress reaction     propranolol helps prior to interviews, stressful situations     Allergic rhinitis, cause unspecified     no meds except prn flonase, tests generally positive, decided against shots     Anxiety state, unspecified     citalopram started in , stopped after couple wks, felt sluggish/tired     CVA (cerebral vascular accident) (H)     May 1, May 14th 2015     Depressive disorder, not elsewhere classified     dx, but not sure this is correct, weaned off wellbutrin (-, -), restarted 3/08     Dizziness      Elevated lipoprotein A level      Hirsutism     saw endo, inconclusive, started on spironolactone (helped a little), stopped in , elevated DHEA- sees new endo      History of stroke with residual effects      PFO (patent foramen ovale)     Post PFO/ASD closure with 30mm Amplatzer device 16     Urinary tract infection, site not specified     recurrent, start nitrofur in , 6mo txt      Past Surgical History:   Procedure Laterality Date     ARTHROSCOPY KNEE RT/LT      Rt knee     HC REPAIR OF NASAL SEPTUM       REPAIR PATENT FORAMEN OVALE  2016      Allergies   Allergen Reactions     Compazine [Prochlorperazine] Muscle Pain (Myalgia)      Social History     Tobacco Use     Smoking status: Former Smoker     Packs/day: 0.50     Years: 5.00     Pack years: 2.50     Types: Cigarettes     Quit date: 2014     Years since quittin.8     Smokeless tobacco: Never Used   Substance Use Topics     Alcohol use: Yes     Alcohol/week: 0.0 standard drinks     Comment: SOCIAL      Wt Readings from Last 1 Encounters:   21 77.1 kg (170 lb)        Anesthesia Evaluation   Pt has had prior anesthetic.     No history of anesthetic complications        ROS/MED HX  ENT/Pulmonary:     (+) tobacco use, Past use,  (-) sleep apnea   Neurologic:     (+) migraines, CVA, date: x2, 2015, with deficits, - some numbness and tingling in mouth.     Cardiovascular: Comment: Hx of PFO, s/p closure 4/2016    (+) -----Previous cardiac testing   Echo: Date: 8/2/2018 Results:  GODWIN 8/2/2018   Interpretation Summary   An ASD closure device was seen in the expected anatomic position without any associated thrombus.The device was well seated and without residual shunt by   color Doppler or agitated saline.   The left atrial appendage is normal. It is free of spontaneous echo contrast and thrombus.   Global right ventricular function is normal.   Left ventricular function, chamber size, wall motion, and wall thickness are normal.The EF is 55-60%.   Stress Test: Date: Results:    ECG Reviewed: Date: Results:    Cath: Date: Results:      METS/Exercise Tolerance: >4 METS    Hematologic:  - neg hematologic  ROS  (-) history of blood clots and history of blood transfusion   Musculoskeletal:  - neg musculoskeletal ROS     GI/Hepatic:  - neg GI/hepatic ROS     Renal/Genitourinary:  - neg Renal ROS     Endo:  - neg endo ROS     Psychiatric/Substance Use:     (+) anxiety and depression     Infectious Disease:  - neg infectious disease ROS     Malignancy:  - neg malignancy ROS     Other:      (+) LMP: 2/17/21, ,            OUTSIDE LABS:  CBC:   Lab Results   Component Value Date    WBC 4.3 11/04/2020    WBC 6.0 12/27/2019    HGB 14.3 11/04/2020    HGB 14.6 12/27/2019    HCT 44.3 11/04/2020    HCT 44.8 12/27/2019     11/04/2020     12/27/2019     BMP:   Lab Results   Component Value Date     02/19/2021     11/04/2020    POTASSIUM 4.0 02/19/2021    POTASSIUM 3.8 11/04/2020    CHLORIDE 111 (H) 02/19/2021    CHLORIDE 107 11/04/2020    CO2 28 02/19/2021    CO2 28 11/04/2020    BUN 9 02/19/2021    BUN 11 11/04/2020    CR 0.65 02/19/2021    CR 0.73 11/04/2020    GLC 87  02/19/2021    GLC 95 11/04/2020     COAGS:   Lab Results   Component Value Date    PTT 27 04/21/2016    INR 0.90 04/21/2016     POC:   Lab Results   Component Value Date    HCG Negative 02/18/2021    HCGS Negative 05/13/2019     HEPATIC:   Lab Results   Component Value Date    ALBUMIN 3.6 02/19/2021    PROTTOTAL 6.6 (L) 02/19/2021    ALT 19 02/19/2021    AST 13 02/19/2021    ALKPHOS 62 02/19/2021    BILITOTAL 0.3 02/19/2021     OTHER:   Lab Results   Component Value Date    STEPH 9.1 02/19/2021    MAG 2.0 04/21/2016    LIPASE 174 05/13/2019    TSH 1.04 12/27/2019             PAC Discussion and Assessment    ASA Classification: 2  Case is suitable for: ASC  Anesthetic techniques and relevant risks discussed: GA  Invasive monitoring and risk discussed: No    Possibility and Risk of blood transfusion discussed: No            PAC Resident/NP Anesthesia Assessment: Chloe Foster is a 40 year old female scheduled for Revision Septorhinoplasty, Repair of Nasal Valve Collapse, Cadaveric Rib + Cosmetic Tip Rhinoplasty on 3/15/21 by Dr. Azar Lopez in treatment of Deviated nasal septum, nasal obstruction, nasal valve collapse.  PAC referral for risk assessment and optimization for anesthesia with comorbid conditions of CVA x2 with PFO during pregnancy 2015, status post PFO closure 4/21/2016,  migraine headaches, depression and anxiety :     Pre-operative considerations:  1.  Cardiac:  Functional status- METS >4.  Intermediate risk surgery with 0.9% (RCRI #) risk of major adverse cardiac event.   Hx of PFO s/p closure 2016. Denies any exertional symptoms.    --echo 2018 as above with device in place and no residual PFO.  Pt does experience infrequent palpitations.  Prior Zio monitor showed SVT and occasional PVCs.  She denies any lightheadedness, dizziness, near syncope, syncope.    2.  Pulm:  Airway feasible.  ANDRE risk: Low.  Remote h/o smoking.  3.  GI:  Risk of PONV score = 3.  If > 2, anti-emetic intervention  "recommended.  4.  Neuro: h/o CVA x2 2015 while pregnant due to PFO. Occasional numbness in mouth and fingertips.  No permanent deficits.  Will hold ASA 81mg for 7 days prior to DOS. Has migraines since that time.  Uses Excedrin.  Will hold for one week prior to DOS.   5.  Heme: h/o protein S deficiency, heterozygous, while pregnant and low normal value since.  No h/o VTE.  Seen by hematology, Dr. Casarez 2/22/21, and protein S level checked.  69%  (reference range %).  Per Dr. Casarez's note: \"No further testing or treatment is needed from a hematology / coagulation perspective.  She is not at increased risk for thrombosis given her history, as long as she avoids estrogen.  There is no contraindication to proceeding with the planned ENT procedure.\"  6.  Psych: h/o anxiety and depression.  Pt is anxious about being anxious in pre-op.  She asked today if she can be given something in pre-op to help keep her calm.     VTE risk: 0.26%     Patient is optimized and is acceptable candidate for the proposed procedure.  No further diagnostic evaluation is needed.   Reviewed and Signed by PAC Mid-Level Provider/Resident  Mid-Level Provider/Resident: Mely Caal  Date: 3/3/21                                 Mely Caal PA-C  "

## 2021-03-03 NOTE — PATIENT INSTRUCTIONS
Preparing for Your Surgery      Name:  Chloe Foster   MRN:  [id number]   :  1980   Today's Date:  3/3/2021       Arriving for surgery:  Surgery date:  3/15/21  Arrival time:  09:20 am    Restrictions due to COVID 19:  One consistent visitor per patient is allowed  No ill visitors  All visitors must wear face mask     parking is available for anyone with mobility limitations or disabilities.  ([facility]  24 hours/ 7 days a week; [facility]  7 am- 3:30 pm, Mon- Fri)    Please come to:   [facility] [address] [address]  -  Proceed to the 5th floor to check into the Ambulatory Surgery Center.              >> There will be patient concierges on the 1st and 5th floor, for assistance or an escort, if you would like.              >> Please call [phone] with any questions.    What can I eat or drink?  -  You may eat and drink normally for up to 8 hours before your surgery.   -  You may have clear liquids until 4 hours before surgery.     Examples of clear liquids:  Water  Clear broth  Juices (apple, white grape, white cranberry  and cider) without pulp  Noncarbonated, powder based beverages  (lemonade and Jonel-Aid)  Sodas (Sprite, 7-Up, ginger ale and seltzer)  Coffee or tea (without milk or cream)  Gatorade    -  No Alcohol for at least 24 hours before surgery     Which medicines can I take?    Hold Aspirin (and excedrin) for 7 days before surgery.   Hold Multivitamins for 7 days before surgery.  Hold Supplements (fish oil) for 7 days before surgery.  Hold Ibuprofen (Advil, Motrin) for 1 day before surgery--unless otherwise directed by surgeon.  Hold Naproxen (Aleve) for 4 days before surgery.  -  PLEASE TAKE these medications the day of surgery:  Tylenol and/or ativan if needed; take scheduled morning medications.    How do I prepare myself?  - Please take 2 showers before surgery using Scrubcare or Hibiclens soap.    Use  this soap only from the neck to your toes.     Leave the soap on your skin for one minute--then rinse thoroughly.      You may use your own shampoo and conditioner; no other hair products.   - Please remove all jewelry and body piercings.  - No lotions, deodorants or fragrance.  - No makeup or fingernail polish.   - Bring your ID and insurance card.    - All patients are required to have a Covid-19 test within 4 days of surgery/procedure.      -Patients will be contacted by the [facility] scheduling team within 1 week of surgery to make an appointment.      - Patients may call the Scheduling team at [phone] if they have not been scheduled within 4 days of  surgery.      ALL PATIENTS GOING HOME THE SAME DAY OF SURGERY ARE REQUIRED TO HAVE A RESPONSIBLE ADULT TO DRIVE AND BE IN ATTENDANCE WITH THEM FOR 24 HOURS FOLLOWING SURGERY     Questions or Concerns:    - For any questions regarding the day of surgery or your hospital stay, please contact the Pre Admission Nursing Office at [phone].       - If you have health changes between today and your surgery please call your surgeon.       For questions after surgery please call your surgeons office.

## 2021-03-03 NOTE — H&P (VIEW-ONLY)
Pre-Operative H & P     CC:  Preoperative exam to assess for increased cardiopulmonary risk while undergoing surgery and anesthesia.    Date of Encounter: 3/3/2021  Primary Care Physician:  Izabel Reyes  Associated Diagnosis: Deviated nasal septum, nasal obstruction, nasal valve collapse    \[facility]\""  Chloe Foster is a 40 year old female who presents for pre-operative H & P in preparation for revision septorhinoplasty, repair of nasal valve collapse, cadaveric rib graft +45-minute cosmetic tip rhinoplasty with Dr. Azar Lopez on 3/15/21 at [facility]. General anesthesia.    Video-Visit Details     Type of service:  Video Visit     Patient verbally consented to video service today: YES        Video Start Time: 1129  Video End Time (time video stopped): 1135    Originating Location (pt. Location): Home     Distant Location (provider location): home     Mode of Communication:  Video Conference via [email or web address]           \[facility]\""  Chloe Foster is a 40 year old female who presents for pre-operative H & P in preparation for Revision Septorhinoplasty, Repair of Nasal Valve Collapse, Cadaveric Rib + Cosmetic Tip Rhinoplasty with Dr. Schwartz on 3/15/2021 at [facility]. General anesthesia.     This is a 40-year-old female with past medical history significant for CVA x2 with PFO during pregnancy, 5/20/2015, status post PFO closure 4/21/2016, low normal protein acid levels, migraine headaches, depression and anxiety.  The patient has a history of persistent nasal obstruction.  She had surgery on her nose to help with her breathing approximately 15 or 16 years ago which she believes to be a septoplasty.  Her inability to breathe well through her nose does affect her everyday life.  She states she wakes up with a dry mouth has trouble breathing through her nose at night.  She thinks that her nose is getting more crooked with time.  She has now ready to proceed  with the above procedure.     Of note patient was scheduled for the surgery in February but it was canceled so that she could follow-up with hematology.  She has done so and hematology has given they are okay for her to proceed with surgery.  See assessment and plan below for further details.     History is obtained from the patient and chart review.        Past Medical History  Past Medical History:   Diagnosis Date     Acute stress reaction     propranolol helps prior to interviews, stressful situations     Allergic rhinitis, cause unspecified     no meds except prn flonase, tests generally positive, decided against shots     Anxiety state, unspecified     citalopram started in 7/06, stopped after couple wks, felt sluggish/tired     CVA (cerebral vascular accident) (H)     May 1, May 14th 2015     Depressive disorder, not elsewhere classified     dx, but not sure this is correct, weaned off wellbutrin (4/05-6/06, 12/06-1/08), restarted 3/08     Dizziness      Elevated lipoprotein A level      Hirsutism     saw endo, inconclusive, started on spironolactone (helped a little), stopped in 6/06, elevated DHEA- sees new endo 4/08     History of stroke with residual effects      PFO (patent foramen ovale)     Post PFO/ASD closure with 30mm Amplatzer device 4/21/16     Urinary tract infection, site not specified     recurrent, start nitrofur in 5/08, 6mo txt       Past Surgical History  Past Surgical History:   Procedure Laterality Date     ARTHROSCOPY KNEE RT/LT  1995    Rt knee     HC REPAIR OF NASAL SEPTUM  12/05     REPAIR PATENT FORAMEN OVALE  04/21/2016       Hx of Blood transfusions/reactions: denies     Hx of abnormal bleeding or anti-platelet use: ASA 81mg    Menstrual history: Patient's last menstrual period was 02/17/2021.:     Steroid use in the last year: denies    Personal or FH with difficulty with Anesthesia:  denies    Prior to Admission Medications  Current Outpatient Medications   Medication Sig  Dispense Refill     acetaminophen (TYLENOL) 325 MG tablet Take 325-650 mg by mouth every 6 hours as needed for mild pain       ALPRAZolam (XANAX) 0.5 MG tablet Take 1 tablet (0.5 mg) by mouth daily as needed for anxiety 30 tablet 0     aspirin EC 81 MG EC tablet Take 81 mg by mouth every evening        aspirin-acetaminophen-caffeine (EXCEDRIN MIGRAINE) 250-250-65 MG tablet Take 1 tablet by mouth every 6 hours as needed for headaches       LORazepam (ATIVAN) 1 MG tablet Take 0.5-1 tablets (0.5-1 mg) by mouth daily as needed for anxiety or sleep (severe anxiety/panic) 15 tablet 0     magnesium oxide (MAG-OX) 400 MG tablet Take 400 mg by mouth every evening        Multiple Vitamins-Minerals (MULTIVITAMIN ADULT EXTRA C PO) Take by mouth every evening       NONFORMULARY Take 1 tablet by mouth every evening OTC: B-Right Vitamin (Optimized B Complex)       Omega-3 Fatty Acids (FISH OIL PO) Take 1 capsule by mouth At Bedtime        VITAMIN D, CHOLECALCIFEROL, PO Take 1,000 Units by mouth every evening (takes 2 x 100 unit capsule)        STATIN NOT PRESCRIBED, INTENTIONAL, Statin not prescribed intentionally due to Woman of childbearing age not actively taking birth control 0 each 0       Allergies  Allergies   Allergen Reactions     Compazine [Prochlorperazine] Muscle Pain (Myalgia)       Social History  Social History     Socioeconomic History     Marital status:      Spouse name: Not on file     Number of children: 0     Years of education: 15     Highest education level: Not on file   Occupational History     Occupation:      Comment: [employer]   Social Needs     Financial resource strain: Not on file     Food insecurity     Worry: Not on file     Inability: Not on file     Transportation needs     Medical: Not on file     Non-medical: Not on file   Tobacco Use     Smoking status: Former Smoker     Packs/day: 0.50     Years: 5.00     Pack years: 2.50     Types: Cigarettes     Quit date:  2014     Years since quittin.8     Smokeless tobacco: Never Used   Substance and Sexual Activity     Alcohol use: Yes     Alcohol/week: 0.0 standard drinks     Comment: SOCIAL     Drug use: No     Sexual activity: Yes     Partners: Male     Birth control/protection: Pull-out method   Lifestyle     Physical activity     Days per week: Not on file     Minutes per session: Not on file     Stress: Not on file   Relationships     Social connections     Talks on phone: Not on file     Gets together: Not on file     Attends Nondenominational service: Not on file     Active member of club or organization: Not on file     Attends meetings of clubs or organizations: Not on file     Relationship status: Not on file     Intimate partner violence     Fear of current or ex partner: Not on file     Emotionally abused: Not on file     Physically abused: Not on file     Forced sexual activity: Not on file   Other Topics Concern      Service Not Asked     Blood Transfusions Not Asked     Caffeine Concern No     Comment: 1 cup coffee per day     Occupational Exposure Not Asked     Hobby Hazards Not Asked     Sleep Concern No     Stress Concern No     Weight Concern No     Special Diet No     Back Care Not Asked     Exercise Yes     Comment: 4-5 days week     Bike Helmet Not Asked     Seat Belt Not Asked     Self-Exams Not Asked     Parent/sibling w/ CABG, MI or angioplasty before 65F 55M? No   Social History Narrative    Social Documentation:        Balanced Diet: YES    Calcium intake: 3 per day    Caffeine: 0 per day    Exercise:  type of activity ellipitcal, pilates;  5 times per week    Sunscreen: Yes    Seatbelts:  Yes    Self Breast Exam:  Yes    Physical/Emotional/Sexual Abuse: No    Do you feel safe in your environment? Yes        Cholesterol screen up to date: Yes- checking today 09, non fasting    Eye Exam up to date: Yes    Dental Exam up to date: Yes    Pap smear up to date: Yes: checking today 09,  last was  NIL    Mammogram up to date: Does Not Apply    Dexa Scan up to date: Does Not Apply    Colonoscopy up to date: Does Not Apply    Immunizations up to date: Yes, had one a month ago.    Glucose screen if over 40:  No        Julieth Matthews MA    09       Family History  Family History   Problem Relation Age of Onset     Family History Negative Mother      Cancer Father         throat, possibly asbestos exposure     Hypertension Father      Allergies Father      Arrhythmia Father      Family History Negative Sister      Family History Negative Brother      Diabetes Maternal Grandmother         on insulin     C.A.D. Maternal Grandmother         triple bypass in her 60s     Diabetes Paternal Grandmother      C.A.D. Maternal Grandfather         MI in 50s     Neurologic Disorder Paternal Grandfather          of brain aneurysm in early 60s     Diabetes Maternal Aunt          in her early 40s of DM complications, type 2     Coronary Artery Disease Early Onset Maternal Aunt 41     Family History Negative Son            Anesthesia Evaluation   Pt has had prior anesthetic.     No history of anesthetic complications       ROS/MED HX  ENT/Pulmonary:     (+) tobacco use, Past use,  (-) sleep apnea   Neurologic:     (+) migraines, CVA, date: 2015, with deficits, - some numbness and tingling in mouth.     Cardiovascular: Comment: Hx of PFO, s/p closure 2016    (+) -----Previous cardiac testing   Echo: Date: 2018 Results:  [facility]ODWIN 2018   Interpretation Summary   An ASD closure device was seen in the expected anatomic position without any associated thrombus.The device was well seated and without residual shunt by   color Doppler or agitated saline.   The left atrial appendage is normal. It is free of spontaneous echo contrast and thrombus.   Global right ventricular function is normal.   Left ventricular function, chamber size, wall motion, and wall thickness are normal.The EF is 55-60%.    Stress Test: Date: Results:    ECG Reviewed: Date: Results:    Cath: Date: Results:      METS/Exercise Tolerance: >4 METS    Hematologic:  - neg hematologic  ROS  (-) history of blood clots and history of blood transfusion   Musculoskeletal:  - neg musculoskeletal ROS     GI/Hepatic:  - neg GI/hepatic ROS     Renal/Genitourinary:  - neg Renal ROS     Endo:  - neg endo ROS     Psychiatric/Substance Use:     (+) anxiety and depression     Infectious Disease:  - neg infectious disease ROS     Malignancy:  - neg malignancy ROS     Other:      (+) LMP: 2/17/21, ,         Reviewed and Signed by PAC Mid-Level Provider/Resident  Mid-Level Provider/Resident: Mely Cala  Date: 3/3/21                                 Mely Caal PA-C           The complete review of systems is negative other than noted in the HPI or here.                         0 lbs 0 oz  Data Unavailable   There is no height or weight on file to calculate BMI.    Physical Exam     Please refer to the physical examination documented by the anesthesiologist in the anesthesia record on the day of surgery     Constitutional: Awake, alert, cooperative, no apparent distress, and appears stated age.  Respiratory: non labored breathing  Neurologic: Awake, alert, oriented to name, place and time.   Neuropsychiatric: Calm, cooperative. Normal affect.       PRIOR LABS/DIAGNOSTIC STUDIES:  All labs and imaging personally reviewed    Component      Latest Ref Rng & Units 11/4/2020   WBC      4.0 - 11.0 10e9/L 4.3   RBC Count      3.8 - 5.2 10e12/L 4.81   Hemoglobin      11.7 - 15.7 g/dL 14.3   Hematocrit      35.0 - 47.0 % 44.3   MCV      78 - 100 fl 92   MCH      26.5 - 33.0 pg 29.7   MCHC      31.5 - 36.5 g/dL 32.3   RDW      10.0 - 15.0 % 12.6   Platelet Count      150 - 450 10e9/L 322     Component      Latest Ref Rng & Units 2/19/2021   Sodium      133 - 144 mmol/L 143   Potassium      3.4 - 5.3 mmol/L 4.0   Chloride      94 - 109 mmol/L 111 (H)   Carbon  Dioxide      20 - 32 mmol/L 28   Anion Gap      3 - 14 mmol/L 4   Glucose      70 - 99 mg/dL 87   Urea Nitrogen      7 - 30 mg/dL 9   Creatinine      0.52 - 1.04 mg/dL 0.65   GFR Estimate      >60 mL/min/1.73:m2 >90   GFR Estimate If Black      >60 mL/min/1.73:m2 >90   Calcium      8.5 - 10.1 mg/dL 9.1   Bilirubin Total      0.2 - 1.3 mg/dL 0.3   Albumin      3.4 - 5.0 g/dL 3.6   Protein Total      6.8 - 8.8 g/dL 6.6 (L)   Alkaline Phosphatase      40 - 150 U/L 62   ALT      0 - 50 U/L 19   AST      0 - 45 U/L 13     Component      Latest Ref Rng & Units 2/19/2021   Protein S Free      55 - 125 % 69     Cardiac echo: 8/2/2018  Interpretation Summary   An ASD closure device was seen in the expected anatomic position without any   associated thrombus.The device was well seated and without residual shunt by   color Doppler or agitated saline.   The left atrial appendage is normal. It is free of spontaneous echo contrast   and thrombus.   Global right ventricular function is normal.   Left ventricular function, chamber size, wall motion, and wall thickness are   normal.The EF is 55-60%.   No change from prior.           CT FACIAL BONES WITHOUT CONTRAST 10/22/2019 12:52 PM  Findings:  There is no significant soft tissue swelling of the face.  There is no fracture of the facial bones. The cribriform plate appears  intact. Alignment of the facial bones appears normal.      There is no hematoma, soft tissue mass or gas within the orbits.      Leftward nasal septal deviation. Small mucous retention cysts in both  maxillary sinuses. Minimal left sphenoid sinus mucosal thickening.  Clear frontal sinus. Clear ethmoid air cells.                                                                      Impression:   Mild paranasal sinus mucosal thickening without evidence of acute  Sinusitis.      Outside records reviewed from: care everywhere    ASSESSMENT and PLAN  Chloe Foster is a 40 year old female scheduled for Revision  "Septorhinoplasty, Repair of Nasal Valve Collapse, Cadaveric Rib + Cosmetic Tip Rhinoplasty on 3/15/21 by Dr. Azar Lopez in treatment of Deviated nasal septum, nasal obstruction, nasal valve collapse.  PAC referral for risk assessment and optimization for anesthesia with comorbid conditions of CVA x2 with PFO during pregnancy 2015, status post PFO closure 4/21/2016,  migraine headaches, depression and anxiety :     Pre-operative considerations:  1.  Cardiac:  Functional status- METS >4.  Intermediate risk surgery with 0.9% (RCRI #) risk of major adverse cardiac event.   Hx of PFO s/p closure 2016. Denies any exertional symptoms.    --echo 2018 as above with device in place and no residual PFO.  Pt does experience infrequent palpitations.  Prior Zio monitor showed SVT and occasional PVCs.  She denies any lightheadedness, dizziness, near syncope, syncope.    2.  Pulm:  Airway feasible.  ANDRE risk: Low.  Remote h/o smoking.  3.  GI:  Risk of PONV score = 3.  If > 2, anti-emetic intervention recommended.  4.  Neuro: h/o CVA x2 2015 while pregnant due to PFO. Occasional numbness in mouth and fingertips.  No permanent deficits.  Will hold ASA 81mg for 7 days prior to DOS. Has migraines since that time.  Uses Excedrin.  Will hold for one week prior to DOS.   5.  Heme: h/o protein S deficiency, heterozygous, while pregnant and low normal value since.  No h/o VTE.  Seen by hematology, Dr. Casarez 2/22/21, and protein S level checked.  69%  (reference range %).  Per Dr. Casarez's note: \"No further testing or treatment is needed from a hematology / coagulation perspective.  She is not at increased risk for thrombosis given her history, as long as she avoids estrogen.  There is no contraindication to proceeding with the planned ENT procedure.\"  6.  Psych: h/o anxiety and depression.  Pt is anxious about being anxious in pre-op.  She asked today if she can be given something in pre-op to help keep her calm.     VTE risk: " 0.26%     Patient is optimized and is acceptable candidate for the proposed procedure.  No further diagnostic evaluation is needed.         Mely Caal PA-C  Preoperative Assessment Center  [facility]  Phone: [phone]  Fax: [phone]

## 2021-03-03 NOTE — H&P
Pre-Operative H & P     CC:  Preoperative exam to assess for increased cardiopulmonary risk while undergoing surgery and anesthesia.    Date of Encounter: 3/3/2021  Primary Care Physician:  Izabel Reyes  Associated Diagnosis: Deviated nasal septum, nasal obstruction, nasal valve collapse    [facility]  Chloe Foster is a 40 year old female who presents for pre-operative H & P in preparation for revision septorhinoplasty, repair of nasal valve collapse, cadaveric rib graft +45-minute cosmetic tip rhinoplasty with Dr. Azar Lopez on 3/15/21 at [facility] [place]. General anesthesia.    Video-Visit Details     Type of service:  Video Visit     Patient verbally consented to video service today: YES        Video Start Time: 1129  Video End Time (time video stopped): 1135    Originating Location (pt. Location): Home     Distant Location (provider location): home     Mode of Communication:  Video Conference via [employer]           [facility]  Chloe Foster is a 40 year old female who presents for pre-operative H & P in preparation for Revision Septorhinoplasty, Repair of Nasal Valve Collapse, Cadaveric Rib + Cosmetic Tip Rhinoplasty with Dr. Schwartz on 3/15/2021 at [facility] [place]. General anesthesia.     This is a 40-year-old female with past medical history significant for CVA x2 with PFO during pregnancy, 5/20/2015, status post PFO closure 4/21/2016, low normal protein acid levels, migraine headaches, depression and anxiety.  The patient has a history of persistent nasal obstruction.  She had surgery on her nose to help with her breathing approximately 15 or 16 years ago which she believes to be a septoplasty.  Her inability to breathe well through her nose does affect her everyday life.  She states she wakes up with a dry mouth has trouble breathing through her nose at night.  She thinks that her nose is getting more crooked with time.  She has now ready to proceed  with the above procedure.     Of note patient was scheduled for the surgery in February but it was canceled so that she could follow-up with hematology.  She has done so and hematology has given they are okay for her to proceed with surgery.  See assessment and plan below for further details.     History is obtained from the patient and chart review.        Past Medical History  Past Medical History:   Diagnosis Date     Acute stress reaction     propranolol helps prior to interviews, stressful situations     Allergic rhinitis, cause unspecified     no meds except prn flonase, tests generally positive, decided against shots     Anxiety state, unspecified     citalopram started in 7/06, stopped after couple wks, felt sluggish/tired     CVA (cerebral vascular accident) (H)     May 1, May 14th 2015     Depressive disorder, not elsewhere classified     dx, but not sure this is correct, weaned off wellbutrin (4/05-6/06, 12/06-1/08), restarted 3/08     Dizziness      Elevated lipoprotein A level      Hirsutism     saw endo, inconclusive, started on spironolactone (helped a little), stopped in 6/06, elevated DHEA- sees new endo 4/08     History of stroke with residual effects      PFO (patent foramen ovale)     Post PFO/ASD closure with 30mm Amplatzer device 4/21/16     Urinary tract infection, site not specified     recurrent, start nitrofur in 5/08, 6mo txt       Past Surgical History  Past Surgical History:   Procedure Laterality Date     ARTHROSCOPY KNEE RT/LT  1995    Rt knee     HC REPAIR OF NASAL SEPTUM  12/05     REPAIR PATENT FORAMEN OVALE  04/21/2016       Hx of Blood transfusions/reactions: denies     Hx of abnormal bleeding or anti-platelet use: ASA 81mg    Menstrual history: Patient's last menstrual period was 02/17/2021.:     Steroid use in the last year: denies    Personal or FH with difficulty with Anesthesia:  denies    Prior to Admission Medications  Current Outpatient Medications   Medication Sig  Dispense Refill     acetaminophen (TYLENOL) 325 MG tablet Take 325-650 mg by mouth every 6 hours as needed for mild pain       ALPRAZolam (XANAX) 0.5 MG tablet Take 1 tablet (0.5 mg) by mouth daily as needed for anxiety 30 tablet 0     aspirin EC 81 MG EC tablet Take 81 mg by mouth every evening        aspirin-acetaminophen-caffeine (EXCEDRIN MIGRAINE) 250-250-65 MG tablet Take 1 tablet by mouth every 6 hours as needed for headaches       LORazepam (ATIVAN) 1 MG tablet Take 0.5-1 tablets (0.5-1 mg) by mouth daily as needed for anxiety or sleep (severe anxiety/panic) 15 tablet 0     magnesium oxide (MAG-OX) 400 MG tablet Take 400 mg by mouth every evening        Multiple Vitamins-Minerals (MULTIVITAMIN ADULT EXTRA C PO) Take by mouth every evening       NONFORMULARY Take 1 tablet by mouth every evening OTC: B-Right Vitamin (Optimized B Complex)       Omega-3 Fatty Acids (FISH OIL PO) Take 1 capsule by mouth At Bedtime        VITAMIN D, CHOLECALCIFEROL, PO Take 1,000 Units by mouth every evening (takes 2 x 100 unit capsule)        STATIN NOT PRESCRIBED, INTENTIONAL, Statin not prescribed intentionally due to Woman of childbearing age not actively taking birth control 0 each 0       Allergies  Allergies   Allergen Reactions     Compazine [Prochlorperazine] Muscle Pain (Myalgia)       Social History  Social History     Socioeconomic History     Marital status:      Spouse name: Not on file     Number of children: 0     Years of education: 15     Highest education level: Not on file   Occupational History     Occupation:      Comment: [employer]   Social Needs     Financial resource strain: Not on file     Food insecurity     Worry: Not on file     Inability: Not on file     Transportation needs     Medical: Not on file     Non-medical: Not on file   Tobacco Use     Smoking status: Former Smoker     Packs/day: 0.50     Years: 5.00     Pack years: 2.50     Types: Cigarettes     Quit date:  2014     Years since quittin.8     Smokeless tobacco: Never Used   Substance and Sexual Activity     Alcohol use: Yes     Alcohol/week: 0.0 standard drinks     Comment: SOCIAL     Drug use: No     Sexual activity: Yes     Partners: Male     Birth control/protection: Pull-out method   Lifestyle     Physical activity     Days per week: Not on file     Minutes per session: Not on file     Stress: Not on file   Relationships     Social connections     Talks on phone: Not on file     Gets together: Not on file     Attends Yazidism service: Not on file     Active member of club or organization: Not on file     Attends meetings of clubs or organizations: Not on file     Relationship status: Not on file     Intimate partner violence     Fear of current or ex partner: Not on file     Emotionally abused: Not on file     Physically abused: Not on file     Forced sexual activity: Not on file   Other Topics Concern      Service Not Asked     Blood Transfusions Not Asked     Caffeine Concern No     Comment: 1 cup coffee per day     Occupational Exposure Not Asked     Hobby Hazards Not Asked     Sleep Concern No     Stress Concern No     Weight Concern No     Special Diet No     Back Care Not Asked     Exercise Yes     Comment: 4-5 days week     Bike Helmet Not Asked     Seat Belt Not Asked     Self-Exams Not Asked     Parent/sibling w/ CABG, MI or angioplasty before 65F 55M? No   Social History Narrative    Social Documentation:        Balanced Diet: YES    Calcium intake: 3 per day    Caffeine: 0 per day    Exercise:  type of activity ellipitcal, pilates;  5 times per week    Sunscreen: Yes    Seatbelts:  Yes    Self Breast Exam:  Yes    Physical/Emotional/Sexual Abuse: No    Do you feel safe in your environment? Yes        Cholesterol screen up to date: Yes- checking today 09, non fasting    Eye Exam up to date: Yes    Dental Exam up to date: Yes    Pap smear up to date: Yes: checking today 09,  last was  NIL    Mammogram up to date: Does Not Apply    Dexa Scan up to date: Does Not Apply    Colonoscopy up to date: Does Not Apply    Immunizations up to date: Yes, had one a month ago.    Glucose screen if over 40:  No        Julieth Matthews MA    09       Family History  Family History   Problem Relation Age of Onset     Family History Negative Mother      Cancer Father         throat, possibly asbestos exposure     Hypertension Father      Allergies Father      Arrhythmia Father      Family History Negative Sister      Family History Negative Brother      Diabetes Maternal Grandmother         on insulin     C.A.D. Maternal Grandmother         triple bypass in her 60s     Diabetes Paternal Grandmother      C.A.D. Maternal Grandfather         MI in 50s     Neurologic Disorder Paternal Grandfather          of brain aneurysm in early 60s     Diabetes Maternal Aunt          in her early 40s of DM complications, type 2     Coronary Artery Disease Early Onset Maternal Aunt 41     Family History Negative Son            Anesthesia Evaluation   Pt has had prior anesthetic.     No history of anesthetic complications       ROS/MED HX  ENT/Pulmonary:     (+) tobacco use, Past use,  (-) sleep apnea   Neurologic:     (+) migraines, CVA, date: 2015, with deficits, - some numbness and tingling in mouth.     Cardiovascular: Comment: Hx of PFO, s/p closure 2016    (+) -----Previous cardiac testing   Echo: Date: 2018 Results:  GODWIN 2018   Interpretation Summary   An ASD closure device was seen in the expected anatomic position without any associated thrombus.The device was well seated and without residual shunt by   color Doppler or agitated saline.   The left atrial appendage is normal. It is free of spontaneous echo contrast and thrombus.   Global right ventricular function is normal.   Left ventricular function, chamber size, wall motion, and wall thickness are normal.The EF is 55-60%.    Stress Test: Date: Results:    ECG Reviewed: Date: Results:    Cath: Date: Results:      METS/Exercise Tolerance: >4 METS    Hematologic:  - neg hematologic  ROS  (-) history of blood clots and history of blood transfusion   Musculoskeletal:  - neg musculoskeletal ROS     GI/Hepatic:  - neg GI/hepatic ROS     Renal/Genitourinary:  - neg Renal ROS     Endo:  - neg endo ROS     Psychiatric/Substance Use:     (+) anxiety and depression     Infectious Disease:  - neg infectious disease ROS     Malignancy:  - neg malignancy ROS     Other:      (+) LMP: 2/17/21, ,         Reviewed and Signed by PAC Mid-Level Provider/Resident  Mid-Level Provider/Resident: Mely Caal  Date: 3/3/21                                 Mely Caal PA-C           The complete review of systems is negative other than noted in the HPI or here.                         0 lbs 0 oz  Data Unavailable   There is no height or weight on file to calculate BMI.    Physical Exam     Please refer to the physical examination documented by the anesthesiologist in the anesthesia record on the day of surgery     Constitutional: Awake, alert, cooperative, no apparent distress, and appears stated age.  Respiratory: non labored breathing  Neurologic: Awake, alert, oriented to name, place and time.   Neuropsychiatric: Calm, cooperative. Normal affect.       PRIOR LABS/DIAGNOSTIC STUDIES:  All labs and imaging personally reviewed    Component      Latest Ref Rng & Units 11/4/2020   WBC      4.0 - 11.0 10e9/L 4.3   RBC Count      3.8 - 5.2 10e12/L 4.81   Hemoglobin      11.7 - 15.7 g/dL 14.3   Hematocrit      35.0 - 47.0 % 44.3   MCV      78 - 100 fl 92   MCH      26.5 - 33.0 pg 29.7   MCHC      31.5 - 36.5 g/dL 32.3   RDW      10.0 - 15.0 % 12.6   Platelet Count      150 - 450 10e9/L 322     Component      Latest Ref Rng & Units 2/19/2021   Sodium      133 - 144 mmol/L 143   Potassium      3.4 - 5.3 mmol/L 4.0   Chloride      94 - 109 mmol/L 111 (H)   Carbon  Dioxide      20 - 32 mmol/L 28   Anion Gap      3 - 14 mmol/L 4   Glucose      70 - 99 mg/dL 87   Urea Nitrogen      7 - 30 mg/dL 9   Creatinine      0.52 - 1.04 mg/dL 0.65   GFR Estimate      >60 mL/min/1.73:m2 >90   GFR Estimate If Black      >60 mL/min/1.73:m2 >90   Calcium      8.5 - 10.1 mg/dL 9.1   Bilirubin Total      0.2 - 1.3 mg/dL 0.3   Albumin      3.4 - 5.0 g/dL 3.6   Protein Total      6.8 - 8.8 g/dL 6.6 (L)   Alkaline Phosphatase      40 - 150 U/L 62   ALT      0 - 50 U/L 19   AST      0 - 45 U/L 13     Component      Latest Ref Rng & Units 2/19/2021   Protein S Free      55 - 125 % 69     Cardiac echo: 8/2/2018  Interpretation Summary   An ASD closure device was seen in the expected anatomic position without any   associated thrombus.The device was well seated and without residual shunt by   color Doppler or agitated saline.   The left atrial appendage is normal. It is free of spontaneous echo contrast   and thrombus.   Global right ventricular function is normal.   Left ventricular function, chamber size, wall motion, and wall thickness are   normal.The EF is 55-60%.   No change from prior.           CT FACIAL BONES WITHOUT CONTRAST 10/22/2019 12:52 PM  Findings:  There is no significant soft tissue swelling of the face.  There is no fracture of the facial bones. The cribriform plate appears  intact. Alignment of the facial bones appears normal.      There is no hematoma, soft tissue mass or gas within the orbits.      Leftward nasal septal deviation. Small mucous retention cysts in both  maxillary sinuses. Minimal left sphenoid sinus mucosal thickening.  Clear frontal sinus. Clear ethmoid air cells.                                                                      Impression:   Mild paranasal sinus mucosal thickening without evidence of acute  Sinusitis.      Outside records reviewed from: care everywhere    ASSESSMENT and PLAN  Chloe Foster is a 40 year old female scheduled for Revision  "Septorhinoplasty, Repair of Nasal Valve Collapse, Cadaveric Rib + Cosmetic Tip Rhinoplasty on 3/15/21 by Dr. Azar Lopez in treatment of Deviated nasal septum, nasal obstruction, nasal valve collapse.  PAC referral for risk assessment and optimization for anesthesia with comorbid conditions of CVA x2 with PFO during pregnancy 2015, status post PFO closure 4/21/2016,  migraine headaches, depression and anxiety :     Pre-operative considerations:  1.  Cardiac:  Functional status- METS >4.  Intermediate risk surgery with 0.9% (RCRI #) risk of major adverse cardiac event.   Hx of PFO s/p closure 2016. Denies any exertional symptoms.    --echo 2018 as above with device in place and no residual PFO.  Pt does experience infrequent palpitations.  Prior Zio monitor showed SVT and occasional PVCs.  She denies any lightheadedness, dizziness, near syncope, syncope.    2.  Pulm:  Airway feasible.  ANDRE risk: Low.  Remote h/o smoking.  3.  GI:  Risk of PONV score = 3.  If > 2, anti-emetic intervention recommended.  4.  Neuro: h/o CVA x2 2015 while pregnant due to PFO. Occasional numbness in mouth and fingertips.  No permanent deficits.  Will hold ASA 81mg for 7 days prior to DOS. Has migraines since that time.  Uses Excedrin.  Will hold for one week prior to DOS.   5.  Heme: h/o protein S deficiency, heterozygous, while pregnant and low normal value since.  No h/o VTE.  Seen by hematology, Dr. Casarez 2/22/21, and protein S level checked.  69%  (reference range %).  Per Dr. Casarez's note: \"No further testing or treatment is needed from a hematology / coagulation perspective.  She is not at increased risk for thrombosis given her history, as long as she avoids estrogen.  There is no contraindication to proceeding with the planned ENT procedure.\"  6.  Psych: h/o anxiety and depression.  Pt is anxious about being anxious in pre-op.  She asked today if she can be given something in pre-op to help keep her calm.     VTE risk: " 0.26%     Patient is optimized and is acceptable candidate for the proposed procedure.  No further diagnostic evaluation is needed.         Mely Caal PA-C  Preoperative Assessment Center  [facility]  Phone: [phone]  Fax: [phone]

## 2021-03-12 DIAGNOSIS — Z01.818 PRE-OP EXAM: ICD-10-CM

## 2021-03-12 LAB
SARS-COV-2 RNA RESP QL NAA+PROBE: NORMAL
SPECIMEN SOURCE: NORMAL

## 2021-03-12 PROCEDURE — U0005 INFEC AGEN DETEC AMPLI PROBE: HCPCS | Performed by: OTOLARYNGOLOGY

## 2021-03-12 PROCEDURE — U0003 INFECTIOUS AGENT DETECTION BY NUCLEIC ACID (DNA OR RNA); SEVERE ACUTE RESPIRATORY SYNDROME CORONAVIRUS 2 (SARS-COV-2) (CORONAVIRUS DISEASE [COVID-19]), AMPLIFIED PROBE TECHNIQUE, MAKING USE OF HIGH THROUGHPUT TECHNOLOGIES AS DESCRIBED BY CMS-2020-01-R: HCPCS | Performed by: OTOLARYNGOLOGY

## 2021-03-13 LAB
LABORATORY COMMENT REPORT: NORMAL
SARS-COV-2 RNA RESP QL NAA+PROBE: NEGATIVE
SPECIMEN SOURCE: NORMAL

## 2021-03-15 ENCOUNTER — ANESTHESIA (OUTPATIENT)
Dept: SURGERY | Facility: AMBULATORY SURGERY CENTER | Age: 41
End: 2021-03-15

## 2021-03-15 ENCOUNTER — HOSPITAL ENCOUNTER (OUTPATIENT)
Facility: AMBULATORY SURGERY CENTER | Age: 41
Discharge: HOME | End: 2021-03-15
Attending: OTOLARYNGOLOGY | Admitting: OTOLARYNGOLOGY
Payer: COMMERCIAL

## 2021-03-15 ENCOUNTER — ANESTHESIA EVENT (OUTPATIENT)
Dept: SURGERY | Facility: AMBULATORY SURGERY CENTER | Age: 41
End: 2021-03-15

## 2021-03-15 VITALS
HEIGHT: 67 IN | BODY MASS INDEX: 26.37 KG/M2 | DIASTOLIC BLOOD PRESSURE: 97 MMHG | WEIGHT: 168 LBS | SYSTOLIC BLOOD PRESSURE: 146 MMHG | RESPIRATION RATE: 14 BRPM | OXYGEN SATURATION: 95 % | TEMPERATURE: 97.8 F | HEART RATE: 75 BPM

## 2021-03-15 DIAGNOSIS — Z98.890 POST-OPERATIVE STATE: Primary | ICD-10-CM

## 2021-03-15 DIAGNOSIS — J34.829 NASAL VALVE COLLAPSE: ICD-10-CM

## 2021-03-15 DIAGNOSIS — J34.89 NASAL OBSTRUCTION: ICD-10-CM

## 2021-03-15 DIAGNOSIS — J34.2 DEVIATED NASAL SEPTUM: ICD-10-CM

## 2021-03-15 LAB
HCG UR QL: NEGATIVE
INTERNAL QC OK POCT: YES

## 2021-03-15 PROCEDURE — 370N000012: Mod: DBP

## 2021-03-15 PROCEDURE — 30420 RECONSTRUCTION OF NOSE: CPT

## 2021-03-15 PROCEDURE — 360N000051 HC SURGERY LEVEL COSMETIC 45 MIN: Mod: DBP

## 2021-03-15 PROCEDURE — 30802 ABLATE INF TURBINATE SUBMUC: CPT

## 2021-03-15 PROCEDURE — 81025 URINE PREGNANCY TEST: CPT | Performed by: PATHOLOGY

## 2021-03-15 DEVICE — GRAFT ALLODERM 2X4CM THIN CHARGE PER SQ CM= 8 UNITS: Type: IMPLANTABLE DEVICE | Site: NOSE | Status: FUNCTIONAL

## 2021-03-15 DEVICE — GRAFT COSTAL CARTILAGE MED 3X30MM 10-30MM 450030: Type: IMPLANTABLE DEVICE | Site: NOSE | Status: FUNCTIONAL

## 2021-03-15 RX ORDER — CEFAZOLIN SODIUM 2 G/50ML
2 SOLUTION INTRAVENOUS
Status: COMPLETED | OUTPATIENT
Start: 2021-03-15 | End: 2021-03-15

## 2021-03-15 RX ORDER — ONDANSETRON 4 MG/1
4-8 TABLET, ORALLY DISINTEGRATING ORAL EVERY 8 HOURS PRN
Qty: 4 TABLET | Refills: 1 | Status: SHIPPED | OUTPATIENT
Start: 2021-03-15 | End: 2021-04-14

## 2021-03-15 RX ORDER — LIDOCAINE HYDROCHLORIDE 20 MG/ML
INJECTION, SOLUTION INFILTRATION; PERINEURAL PRN
Status: DISCONTINUED | OUTPATIENT
Start: 2021-03-15 | End: 2021-03-15

## 2021-03-15 RX ORDER — CEFAZOLIN SODIUM 2 G/50ML
2 SOLUTION INTRAVENOUS SEE ADMIN INSTRUCTIONS
Status: DISCONTINUED | OUTPATIENT
Start: 2021-03-15 | End: 2021-03-16 | Stop reason: HOSPADM

## 2021-03-15 RX ORDER — ONDANSETRON 4 MG/1
4 TABLET, ORALLY DISINTEGRATING ORAL EVERY 30 MIN PRN
Status: DISCONTINUED | OUTPATIENT
Start: 2021-03-15 | End: 2021-03-16 | Stop reason: HOSPADM

## 2021-03-15 RX ORDER — PROPOFOL 10 MG/ML
INJECTION, EMULSION INTRAVENOUS PRN
Status: DISCONTINUED | OUTPATIENT
Start: 2021-03-15 | End: 2021-03-15

## 2021-03-15 RX ORDER — OXYCODONE HYDROCHLORIDE 5 MG/1
5 TABLET ORAL
Status: CANCELLED | OUTPATIENT
Start: 2021-03-15

## 2021-03-15 RX ORDER — NALOXONE HYDROCHLORIDE 0.4 MG/ML
0.2 INJECTION, SOLUTION INTRAMUSCULAR; INTRAVENOUS; SUBCUTANEOUS
Status: DISCONTINUED | OUTPATIENT
Start: 2021-03-15 | End: 2021-03-16 | Stop reason: HOSPADM

## 2021-03-15 RX ORDER — NALOXONE HYDROCHLORIDE 0.4 MG/ML
0.4 INJECTION, SOLUTION INTRAMUSCULAR; INTRAVENOUS; SUBCUTANEOUS
Status: DISCONTINUED | OUTPATIENT
Start: 2021-03-15 | End: 2021-03-16 | Stop reason: HOSPADM

## 2021-03-15 RX ORDER — ACETAMINOPHEN 325 MG/1
975 TABLET ORAL ONCE
Status: COMPLETED | OUTPATIENT
Start: 2021-03-15 | End: 2021-03-15

## 2021-03-15 RX ORDER — DEXAMETHASONE SODIUM PHOSPHATE 4 MG/ML
INJECTION, SOLUTION INTRA-ARTICULAR; INTRALESIONAL; INTRAMUSCULAR; INTRAVENOUS; SOFT TISSUE PRN
Status: DISCONTINUED | OUTPATIENT
Start: 2021-03-15 | End: 2021-03-15

## 2021-03-15 RX ORDER — ONDANSETRON 2 MG/ML
INJECTION INTRAMUSCULAR; INTRAVENOUS PRN
Status: DISCONTINUED | OUTPATIENT
Start: 2021-03-15 | End: 2021-03-15

## 2021-03-15 RX ORDER — BUPIVACAINE HYDROCHLORIDE 2.5 MG/ML
INJECTION, SOLUTION INFILTRATION; PERINEURAL PRN
Status: DISCONTINUED | OUTPATIENT
Start: 2021-03-15 | End: 2021-03-15 | Stop reason: HOSPADM

## 2021-03-15 RX ORDER — SODIUM CHLORIDE, SODIUM LACTATE, POTASSIUM CHLORIDE, CALCIUM CHLORIDE 600; 310; 30; 20 MG/100ML; MG/100ML; MG/100ML; MG/100ML
INJECTION, SOLUTION INTRAVENOUS CONTINUOUS
Status: DISCONTINUED | OUTPATIENT
Start: 2021-03-15 | End: 2021-03-16 | Stop reason: HOSPADM

## 2021-03-15 RX ORDER — COCAINE HYDROCHLORIDE 40 MG/ML
SOLUTION NASAL PRN
Status: DISCONTINUED | OUTPATIENT
Start: 2021-03-15 | End: 2021-03-15 | Stop reason: HOSPADM

## 2021-03-15 RX ORDER — FENTANYL CITRATE 50 UG/ML
25-50 INJECTION, SOLUTION INTRAMUSCULAR; INTRAVENOUS
Status: CANCELLED | OUTPATIENT
Start: 2021-03-15

## 2021-03-15 RX ORDER — MUPIROCIN 20 MG/G
OINTMENT TOPICAL PRN
Status: DISCONTINUED | OUTPATIENT
Start: 2021-03-15 | End: 2021-03-15 | Stop reason: HOSPADM

## 2021-03-15 RX ORDER — FENTANYL CITRATE 50 UG/ML
25-50 INJECTION, SOLUTION INTRAMUSCULAR; INTRAVENOUS
Status: DISCONTINUED | OUTPATIENT
Start: 2021-03-15 | End: 2021-03-16 | Stop reason: HOSPADM

## 2021-03-15 RX ORDER — OXYCODONE HYDROCHLORIDE 5 MG/1
5-10 TABLET ORAL EVERY 4 HOURS PRN
Qty: 30 TABLET | Refills: 0 | Status: SHIPPED | OUTPATIENT
Start: 2021-03-15 | End: 2021-04-14

## 2021-03-15 RX ORDER — GABAPENTIN 300 MG/1
300 CAPSULE ORAL ONCE
Status: COMPLETED | OUTPATIENT
Start: 2021-03-15 | End: 2021-03-15

## 2021-03-15 RX ORDER — LIDOCAINE 40 MG/G
CREAM TOPICAL
Status: DISCONTINUED | OUTPATIENT
Start: 2021-03-15 | End: 2021-03-16 | Stop reason: HOSPADM

## 2021-03-15 RX ORDER — OXYMETAZOLINE HYDROCHLORIDE 0.05 G/100ML
SPRAY NASAL PRN
Status: DISCONTINUED | OUTPATIENT
Start: 2021-03-15 | End: 2021-03-15 | Stop reason: HOSPADM

## 2021-03-15 RX ORDER — PROPOFOL 10 MG/ML
INJECTION, EMULSION INTRAVENOUS CONTINUOUS PRN
Status: DISCONTINUED | OUTPATIENT
Start: 2021-03-15 | End: 2021-03-15

## 2021-03-15 RX ORDER — MEPERIDINE HYDROCHLORIDE 25 MG/ML
12.5 INJECTION INTRAMUSCULAR; INTRAVENOUS; SUBCUTANEOUS
Status: DISCONTINUED | OUTPATIENT
Start: 2021-03-15 | End: 2021-03-16 | Stop reason: HOSPADM

## 2021-03-15 RX ORDER — OXYCODONE HYDROCHLORIDE 5 MG/1
5 TABLET ORAL EVERY 4 HOURS PRN
Status: DISCONTINUED | OUTPATIENT
Start: 2021-03-15 | End: 2021-03-16 | Stop reason: HOSPADM

## 2021-03-15 RX ORDER — ONDANSETRON 2 MG/ML
4 INJECTION INTRAMUSCULAR; INTRAVENOUS EVERY 30 MIN PRN
Status: DISCONTINUED | OUTPATIENT
Start: 2021-03-15 | End: 2021-03-16 | Stop reason: HOSPADM

## 2021-03-15 RX ORDER — ACETAMINOPHEN 325 MG/1
650 TABLET ORAL
Status: CANCELLED | OUTPATIENT
Start: 2021-03-15

## 2021-03-15 RX ORDER — LIDOCAINE HYDROCHLORIDE AND EPINEPHRINE 10; 10 MG/ML; UG/ML
INJECTION, SOLUTION INFILTRATION; PERINEURAL PRN
Status: DISCONTINUED | OUTPATIENT
Start: 2021-03-15 | End: 2021-03-15 | Stop reason: HOSPADM

## 2021-03-15 RX ORDER — FENTANYL CITRATE 50 UG/ML
INJECTION, SOLUTION INTRAMUSCULAR; INTRAVENOUS PRN
Status: DISCONTINUED | OUTPATIENT
Start: 2021-03-15 | End: 2021-03-15

## 2021-03-15 RX ADMIN — Medication 50 MG: at 11:30

## 2021-03-15 RX ADMIN — LIDOCAINE HYDROCHLORIDE 100 MG: 20 INJECTION, SOLUTION INFILTRATION; PERINEURAL at 11:30

## 2021-03-15 RX ADMIN — SODIUM CHLORIDE, SODIUM LACTATE, POTASSIUM CHLORIDE, CALCIUM CHLORIDE: 600; 310; 30; 20 INJECTION, SOLUTION INTRAVENOUS at 11:24

## 2021-03-15 RX ADMIN — PROPOFOL: 10 INJECTION, EMULSION INTRAVENOUS at 12:18

## 2021-03-15 RX ADMIN — Medication 0.5 MG: at 11:52

## 2021-03-15 RX ADMIN — GABAPENTIN 300 MG: 300 CAPSULE ORAL at 09:44

## 2021-03-15 RX ADMIN — CEFAZOLIN SODIUM 1 G: 2 SOLUTION INTRAVENOUS at 13:50

## 2021-03-15 RX ADMIN — PROPOFOL 150 MCG/KG/MIN: 10 INJECTION, EMULSION INTRAVENOUS at 11:30

## 2021-03-15 RX ADMIN — FENTANYL CITRATE 50 MCG: 50 INJECTION, SOLUTION INTRAMUSCULAR; INTRAVENOUS at 15:44

## 2021-03-15 RX ADMIN — FENTANYL CITRATE 50 MCG: 50 INJECTION, SOLUTION INTRAMUSCULAR; INTRAVENOUS at 15:30

## 2021-03-15 RX ADMIN — FENTANYL CITRATE 50 MCG: 50 INJECTION, SOLUTION INTRAMUSCULAR; INTRAVENOUS at 15:26

## 2021-03-15 RX ADMIN — ACETAMINOPHEN 975 MG: 325 TABLET ORAL at 09:44

## 2021-03-15 RX ADMIN — PROPOFOL 50 MG: 10 INJECTION, EMULSION INTRAVENOUS at 11:50

## 2021-03-15 RX ADMIN — FENTANYL CITRATE 100 MCG: 50 INJECTION, SOLUTION INTRAMUSCULAR; INTRAVENOUS at 11:30

## 2021-03-15 RX ADMIN — DEXAMETHASONE SODIUM PHOSPHATE 4 MG: 4 INJECTION, SOLUTION INTRA-ARTICULAR; INTRALESIONAL; INTRAMUSCULAR; INTRAVENOUS; SOFT TISSUE at 11:46

## 2021-03-15 RX ADMIN — ONDANSETRON 4 MG: 2 INJECTION INTRAMUSCULAR; INTRAVENOUS at 14:44

## 2021-03-15 RX ADMIN — PROPOFOL 150 MG: 10 INJECTION, EMULSION INTRAVENOUS at 11:30

## 2021-03-15 RX ADMIN — OXYCODONE HYDROCHLORIDE 5 MG: 5 TABLET ORAL at 15:39

## 2021-03-15 RX ADMIN — CEFAZOLIN SODIUM 2 G: 2 SOLUTION INTRAVENOUS at 11:38

## 2021-03-15 RX ADMIN — OXYCODONE HYDROCHLORIDE 5 MG: 5 TABLET ORAL at 16:00

## 2021-03-15 RX ADMIN — ONDANSETRON 4 MG: 2 INJECTION INTRAMUSCULAR; INTRAVENOUS at 16:14

## 2021-03-15 RX ADMIN — FENTANYL CITRATE 50 MCG: 50 INJECTION, SOLUTION INTRAMUSCULAR; INTRAVENOUS at 15:50

## 2021-03-15 ASSESSMENT — LIFESTYLE VARIABLES: TOBACCO_USE: 1

## 2021-03-15 ASSESSMENT — MIFFLIN-ST. JEOR: SCORE: 1459.67

## 2021-03-15 NOTE — OP NOTE
SURGEON:  Nikki Schwartz MD   ASSISTANT SURGEON: none      TITLE OF OPERATION:                       Septorhinoplasty    Bilateral inferior turbinate reduction and outfracture.    Nasal valve repair, bilateral.    Extensive nasal reconstruction with homograft costal cartilage   Alloderm graft for septal reconstruction, 2 x 4 inches      INDICATIONS:      Chloe Foster is a 40 year old patient with a significant history of nasal obstruction that has led to significant dysfunction and symptoms. Physical examination in clinic demonstrated notable structural deformities requiring a surgical correction for improvement of function. These would not be amenable to medical therapy or by septoplasty. The structural deficits involve the septum in its direct relationship to the bony and cartilaginous nasal framework.     The risks and benefits of the procedure were discussed in clinic but also in the preoperative area. The risks discussed included bleeding with need for intervention, scarring, infection, shifting of the nasal framework, incomplete correction of nasal obstruction, contour deformities, vasomotor rhinitis and changes to the external appearance of the nose. The possibility of future need for additional procedures was also discussed. We also discussed the post operative care and expected course.     All questions were answered and the patient signed consent for the above mentioned procedures.     PREOPERATIVE DIAGNOSES:   History of nasal trauma.     History of nasal fracture.    Septal deviation.   Nasal obstruction.   Nasal valve stenosis.     Bilateral inferior turbinate hypertrophy      POSTOPERATIVE DIAGNOSES:     History of nasal trauma.     History of nasal fracture.    Septal deviation.   Nasal obstruction.   Nasal valve stenosis.     Bilateral inferior turbinate hypertrophy       ANESTHESIA:    General endotracheal anesthesia  Local 1% lidocaine with 1:100,000 epinephrine 10 cc's   Topical 4%  cocaine intranasally on pledgets, needed to be used over afrin due to bleeding and patient responsiveness to stimuli even with lidocaine  0.25% Bupivacaine, 2 cc's      IMPLANT DEVICES:   Bilateral Esteban splints, Aquaplast nasal splint over the nasal dorsum.       SPECIMENS:  None.       COMPLICATIONS:  None.       BLOOD LOSS:  65 mL        SURGEON'S NARRATIVE:       FINDINGS:  The patient had a significant severe scar in the mucoperichondrial flaps, very tedious to elevate, required reconstruction  With alloderm reinforcement to prevent septal perforation, cartilage was quite fragile and not supportive, L strut had to be reconstructed, bones showed evidence of past comminution. Lower lateral cartilages very asymmetric, left with significant concavity, left convex     Grafts:   Bilateral  grafts,costal cartilage  Tip onlay graft, septal cartilage  Left lower lateral onlay graft, septal cartilage     Reconstruction: Complete septal L-strut reconstruction with homograft rib cartilage    Osteotomies:     Bilateral medial fading  Bilateral lateral        DESCRIPTION OF PROCEDURE:      The patient was brought back to the operating room by the Anesthesiology staff. They were laid supine on the operating room table and induced into general anesthesia with the endotracheal tube secured at the midline of the chin.  The head of the bed was then turned 90 degrees towards the surgical team.  Arms were tucked at the side with all pressure points appropriately padded.  A time-out procedure was performed with all members of the operating room staff in agreement of the site of surgery, the patient identification and surgery to be performed. The nasal block was then performed with 1% lidocaine with 1:100,000 epinephrine and four-percent cocaine nasal pledgets were placed topically into bilateral nasal passages. The face was prepped and draped in usual sterile fashion with ophthalmic diluted Betadine.  The eyes were taped  with Tegaderm.  Subsequently, surgery began.       A columellar incision was made with an #11 blade scalpel, and subsequently marginal incisions were made with a #15 blade.  The soft tissue envelope in a sub-SMAS plane was elevated off of the lower lateral cartilages.  This exposed the dome and bilateral lower lateral cartilages.  Subsequently, dissection was followed cranially.  This was done in the sub-SMAS plane leading to exposure of the scroll region and bilateral upper lateral cartilages.  Once we reached the edge of the nasal bones, a Kristofer elevator was used to transition the elevation to a subperiosteal plane.  Subsequently, we divided the domes bilaterally to expose the caudal edge of the septum and the anterior septal angle.       Bilateral mucoperichondrial flaps were elevated using a #15 blade scalpel.  This was significantly tedious and time-consuming, as it had to be meticulously done because he had a lot of scar in this soft tissue plane. Once we elevated the bilateral flaps, we performed the septoplasty after the upper lateral cartilages were divided off of the dorsal septum. Medial fading osteotomies were then performed using a curved guarded osteotome.      A 1.3 mm L-strut dorsally and caudally was preserved of the septum, and subsequently the septum was harvested. Once this was harvested, the mucosa was then reapproximated with a mattress quilting stitch with a 4-0 chromic in a running fashion.      Irradiated homograft costal cartilage was used for reconstruction and graft material. This was rinsed multiple times on the back table per provided instructions.   Two  grafts were fashioned from the homograft rib cartilage. These were placed in between the dorsal septum and the upper lateral cartilage.  They were sutured in place with mattress 5-0 PDS sutures.  The upper lateral cartilages were then resuspended onto the complex. The caudal septum was then approached and set medially. A  caudal septal extension graft  was placed to support the tip structure.    Lateral fading osteotomies were then performed in a hi-low-hi fashion, allowing for mobilization of the nasal bones.       Bilateral inferior turbinates were then reduced submucosally with the bipolar turbinate cautery and outfractured     The soft tissue envelope was then redraped over the framework.  This demonstrated that the nose was nice and straight and found that the tip had appropriate support. The medial crura were then reapproximated with through-and-through 4-0 plain gut sutures on a Thuan needle.  The above mentioned grafts were placed. The dome was set in this position, also.    The columellar incision was closed with interrupted 6-0 Monocryl sutures.  Bilateral marginal incisions were then closed with interrupted 4-0 chromic sutures.  The nose was suctioned, the nasopharynx was suctioned, the oropharynx was suctioned, and the stomach was suctioned and emptied of its contents.       We then placed bilateral Esteban splints with Bactroban ointment.  Mastisol and Steri-Strip were placed over the nasal dorsum.  The Aquaplast nasal splint was then placed above that. 0.25% Bupivacaine was the injected, 2 cc's for a lasting nasal block. This completed the procedure.    Due to the extensive loss of nasal structural framework and the significant scarring intranasally and on the mucosal flaps, this case had increased complexity than the typical septorhinoplasty. It required one hour of additional surgical time then a typical procedure of this type. In addition, the severe deformity required almost complete reconstruction of the nasal framework.          The patient tolerated the procedure well.  There were no complications. The patient was extubated and taken to recovery following commands and breathing spontaneously.       I was present and scrubbed for the entire procedure.           RANDA GODINEZ MD

## 2021-03-15 NOTE — ANESTHESIA PREPROCEDURE EVALUATION
Anesthesia Pre-Procedure Evaluation    Patient: Chloe Foster   MRN: [id number] : 1980        Preoperative Diagnosis: * No surgery found *   Procedure :      Past Medical History:   Diagnosis Date     Acute stress reaction     propranolol helps prior to interviews, stressful situations     Allergic rhinitis, cause unspecified     no meds except prn flonase, tests generally positive, decided against shots     Anxiety state, unspecified     citalopram started in , stopped after couple wks, felt sluggish/tired     CVA (cerebral vascular accident) (H)     May 1, May 14th 2015     Depressive disorder, not elsewhere classified     dx, but not sure this is correct, weaned off wellbutrin (-, -), restarted 3/08     Dizziness      Elevated lipoprotein A level      Hirsutism     saw endo, inconclusive, started on spironolactone (helped a little), stopped in , elevated DHEA- sees new endo      History of stroke with residual effects      PFO (patent foramen ovale)     Post PFO/ASD closure with 30mm Amplatzer device 16     Urinary tract infection, site not specified     recurrent, start nitrofur in , 6mo txt      Past Surgical History:   Procedure Laterality Date     ARTHROSCOPY KNEE RT/LT      Rt knee     HC REPAIR OF NASAL SEPTUM       REPAIR PATENT FORAMEN OVALE  2016      Allergies   Allergen Reactions     Compazine [Prochlorperazine] Muscle Pain (Myalgia)      Social History     Tobacco Use     Smoking status: Former Smoker     Packs/day: 0.50     Years: 5.00     Pack years: 2.50     Types: Cigarettes     Quit date: 2014     Years since quittin.9     Smokeless tobacco: Never Used   Substance Use Topics     Alcohol use: Yes     Alcohol/week: 0.0 standard drinks     Comment: SOCIAL      Wt Readings from Last 1 Encounters:   03/15/21 76.2 kg (168 lb)        Anesthesia Evaluation   Pt has had prior anesthetic.     No history of anesthetic complications        ROS/MED HX  ENT/Pulmonary:     (+) tobacco use, Past use,  (-) sleep apnea   Neurologic:     (+) migraines, CVA, date: x2, 2015, with deficits, - some numbness and tingling in mouth.     Cardiovascular: Comment: Hx of PFO, s/p closure 4/2016    (+) -----Previous cardiac testing   Echo: Date: 8/2/2018 Results:  G[facility]WIN 8/2/2018   Interpretation Summary   An ASD closure device was seen in the expected anatomic position without any associated thrombus.The device was well seated and without residual shunt by   color Doppler or agitated saline.   The left atrial appendage is normal. It is free of spontaneous echo contrast and thrombus.   Global right ventricular function is normal.   Left ventricular function, chamber size, wall motion, and wall thickness are normal.The EF is 55-60%.   Stress Test: Date: Results:    ECG Reviewed: Date: Results:    Cath: Date: Results:      METS/Exercise Tolerance: >4 METS    Hematologic:  - neg hematologic  ROS  (-) history of blood clots and history of blood transfusion   Musculoskeletal:  - neg musculoskeletal ROS     GI/Hepatic:  - neg GI/hepatic ROS     Renal/Genitourinary:  - neg Renal ROS     Endo:  - neg endo ROS     Psychiatric/Substance Use:     (+) anxiety and depression     Infectious Disease:  - neg infectious disease ROS     Malignancy:  - neg malignancy ROS     Other:      (+) LMP: 2/17/21, ,         Physical Exam    Airway        Mallampati: II       Respiratory Devices and Support         Dental           Cardiovascular          Rhythm and rate: regular and normal     Pulmonary           breath sounds clear to auscultation           OUTSIDE LABS:  CBC:   Lab Results   Component Value Date    WBC 4.3 11/04/2020    WBC 6.0 12/27/2019    HGB 14.3 11/04/2020    HGB 14.6 12/27/2019    HCT 44.3 11/04/2020    HCT 44.8 12/27/2019     11/04/2020     12/27/2019     BMP:   Lab Results   Component Value Date     02/19/2021     11/04/2020    POTASSIUM 4.0  02/19/2021    POTASSIUM 3.8 11/04/2020    CHLORIDE 111 (H) 02/19/2021    CHLORIDE 107 11/04/2020    CO2 28 02/19/2021    CO2 28 11/04/2020    BUN 9 02/19/2021    BUN 11 11/04/2020    CR 0.65 02/19/2021    CR 0.73 11/04/2020    GLC 87 02/19/2021    GLC 95 11/04/2020     COAGS:   Lab Results   Component Value Date    PTT 27 04/21/2016    INR 0.90 04/21/2016     POC:   Lab Results   Component Value Date    HCG Negative 03/15/2021    HCGS Negative 05/13/2019     HEPATIC:   Lab Results   Component Value Date    ALBUMIN 3.6 02/19/2021    PROTTOTAL 6.6 (L) 02/19/2021    ALT 19 02/19/2021    AST 13 02/19/2021    ALKPHOS 62 02/19/2021    BILITOTAL 0.3 02/19/2021     OTHER:   Lab Results   Component Value Date    STEPH 9.1 02/19/2021    MAG 2.0 04/21/2016    LIPASE 174 05/13/2019    TSH 1.04 12/27/2019       Anesthesia Plan    ASA Status:  2   NPO Status:  NPO Appropriate    Anesthesia Type: General.     - Airway: ETT      Maintenance: TIVA.        Consents    Anesthesia Plan(s) and associated risks, benefits, and realistic alternatives discussed. Questions answered and patient/representative(s) expressed understanding.     - Discussed with:  Patient         Postoperative Care    Pain management: Oral pain medications.   PONV prophylaxis: Ondansetron (or other 5HT-3), Dexamethasone or Solumedrol     Comments:                PAC Discussion and Assessment    ASA Classification: 2  Case is suitable for: ASC  Anesthetic techniques and relevant risks discussed: GA  Invasive monitoring and risk discussed: No    Possibility and Risk of blood transfusion discussed: No            PAC Resident/NP Anesthesia Assessment: Chloe Foster is a 40 year old female scheduled for Revision Septorhinoplasty, Repair of Nasal Valve Collapse, Cadaveric Rib + Cosmetic Tip Rhinoplasty on 3/15/21 by Dr. Azar Lopez in treatment of Deviated nasal septum, nasal obstruction, nasal valve collapse.  PAC referral for risk assessment and optimization for  "anesthesia with comorbid conditions of CVA x2 with PFO during pregnancy 2015, status post PFO closure 4/21/2016,  migraine headaches, depression and anxiety :     Pre-operative considerations:  1.  Cardiac:  Functional status- METS >4.  Intermediate risk surgery with 0.9% (RCRI #) risk of major adverse cardiac event.   Hx of PFO s/p closure 2016. Denies any exertional symptoms.    --echo 2018 as above with device in place and no residual PFO.  Pt does experience infrequent palpitations.  Prior Zio monitor showed SVT and occasional PVCs.  She denies any lightheadedness, dizziness, near syncope, syncope.    2.  Pulm:  Airway feasible.  ANDRE risk: Low.  Remote h/o smoking.  3.  GI:  Risk of PONV score = 3.  If > 2, anti-emetic intervention recommended.  4.  Neuro: h/o CVA x2 2015 while pregnant due to PFO. Occasional numbness in mouth and fingertips.  No permanent deficits.  Will hold ASA 81mg for 7 days prior to DOS. Has migraines since that time.  Uses Excedrin.  Will hold for one week prior to DOS.   5.  Heme: h/o protein S deficiency, heterozygous, while pregnant and low normal value since.  No h/o VTE.  Seen by hematology, Dr. Casarez 2/22/21, and protein S level checked.  69%  (reference range %).  Per Dr. Casarez's note: \"No further testing or treatment is needed from a hematology / coagulation perspective.  She is not at increased risk for thrombosis given her history, as long as she avoids estrogen.  There is no contraindication to proceeding with the planned ENT procedure.\"  6.  Psych: h/o anxiety and depression.  Pt is anxious about being anxious in pre-op.  She asked today if she can be given something in pre-op to help keep her calm.     VTE risk: 0.26%     Patient is optimized and is acceptable candidate for the proposed procedure.  No further diagnostic evaluation is needed.   Reviewed and Signed by PAC Mid-Level Provider/Resident  Mid-Level Provider/Resident: Mely Caal  Date: " 3/3/21                                 Alex Leung MD

## 2021-03-15 NOTE — ANESTHESIA POSTPROCEDURE EVALUATION
Patient: Chloe Foster    Procedure(s):  Revision Septorhinoplasty, Repair of Nasal Valve Collapse, Cadaveric Rib Graft + 45 min Cosmetic Tip Rhinoplasty    Diagnosis:Deviated nasal septum [J34.2]  Nasal obstruction [J34.89]  Nasal valve collapse [J34.89]  Diagnosis Additional Information: No value filed.    Anesthesia Type:  General    Note:  Disposition: Outpatient   Postop Pain Control: Uneventful            Sign Out: Well controlled pain   PONV: No   Neuro/Psych: Uneventful            Sign Out: Acceptable/Baseline neuro status   Airway/Respiratory: Uneventful            Sign Out: Acceptable/Baseline resp. status   CV/Hemodynamics: Uneventful            Sign Out: Acceptable CV status   Other NRE: NONE   DID A NON-ROUTINE EVENT OCCUR? No         Last vitals:  Vitals:    03/15/21 1525 03/15/21 1530 03/15/21 1545   BP: (!) 131/103     Pulse: 109     Resp: 16 18 16   Temp:      SpO2: 95% 94%        Last vitals prior to Anesthesia Care Transfer:  CRNA VITALS  3/15/2021 1448 - 3/15/2021 1548      3/15/2021             Resp Rate (set):  10          Electronically Signed By: Alex Leung MD  March 15, 2021  4:11 PM

## 2021-03-15 NOTE — INTERVAL H&P NOTE
I have reviewed this H&P and documented any relevant changes in my preoperative note signed today.

## 2021-03-15 NOTE — ANESTHESIA CARE TRANSFER NOTE
Patient: Chloe Foster    Procedure(s):  Revision Septorhinoplasty, Repair of Nasal Valve Collapse, Cadaveric Rib Graft + 45 min Cosmetic Tip Rhinoplasty    Diagnosis: Deviated nasal septum [J34.2]  Nasal obstruction [J34.89]  Nasal valve collapse [J34.89]  Diagnosis Additional Information: No value filed.    Anesthesia Type:   General     Note:      Level of Consciousness: awake  Oxygen Supplementation: room air    Independent Airway: airway patency satisfactory and stable  Dentition: dentition unchanged  Vital Signs Stable: post-procedure vital signs reviewed and stable  Report to RN Given: handoff report given  Patient transferred to: PACU    Handoff Report: Identifed the Patient, Identified the Reponsible Provider, Reviewed the pertinent medical history, Discussed the surgical course, Reviewed Intra-OP anesthesia mangement and issues during anesthesia, Set expectations for post-procedure period and Allowed opportunity for questions and acknowledgement of understanding      Vitals: (Last set prior to Anesthesia Care Transfer)  CRNA VITALS  3/15/2021 1448 - 3/15/2021 1518      3/15/2021             Resp Rate (set):  10        Electronically Signed By: LUCY Laurent CRNA  March 15, 2021  3:18 PM

## 2021-03-15 NOTE — DISCHARGE INSTRUCTIONS
[facility]  Home Care Following Anesthesia  For 24 hours after surgery:  1. Get plenty of rest.  A responsible adult must stay with you for at least 24 hours after you leave the surgery center.  2. Do not drive or use heavy equipment.  If you have weakness or tingling, don't drive or use heavy equipment until this feeling goes away.   3. Do not drink alcohol.   4. Avoid strenuous or risky activities.  Ask for help when climbing stairs.  5. You may feel lightheaded.  IF so, sit for a few minutes before standing.  Have someone help you get up.   6. If you have nausea (feel sick to your stomach): Drink only clear liquids such as apple juice, ginger ale, broth or 7-Up.  Rest may also help.  Be sure to drink enough fluids.  Move to a regular diet as you feel able.   7. You may have a slight fever.  Call the doctor if your fever is over 100 F (37.7 C) (taken under the tongue) or lasts longer than 24 hours.  8. You may have a dry mouth, a sore throat, muscle aches or trouble sleeping. These should go away after 24 hours.  9. Do not make important or legal decisions.   If you use hormonal birth control (such as the pill, patch, ring or implants):  You will need a second form of birth control for 7 days (condoms, a diaphragm or contraceptive foam).  While in the surgery center, you received a medicine called Sugammadex.  Hormonal birth control (such as the pill, patch, ring or implants) will not work as well for a week after taking this medicine.         Tips for taking pain medications  To get the best pain relief possible, remember these points:    Take pain medications as directed, before pain becomes severe.    Pain medication can upset your stomach: taking it with food may help.    Constipation is a common side effect of pain medication. Drink plenty of  fluids.    Eat foods high in fiber. Take a stool softener if recommended by your doctor or pharmacist.    Do not drink alcohol,  drive or operate machinery while taking pain medications.    Ask about other ways to control pain, such as with heat, ice or relaxation.    Tylenol/Acetaminophen Consumption  To help encourage the safe use of acetaminophen, the makers of TYLENOL  have lowered the maximum daily dose for single-ingredient Extra Strength TYLENOL  (acetaminophen) products sold in the [place] from 8 pills per day (4,000 mg) to 6 pills per day (3,000 mg). The dosing interval has also changed from 2 pills every 4-6 hours to 2 pills every 6 hours.    If you feel your pain relief is insufficient, you may take Tylenol/Acetaminophen in addition to your narcotic pain medication.     Be careful not to exceed 3,000 mg of Tylenol/Acetaminophen in a 24 hour period from all sources.    If you are taking extra strength Tylenol/acetaminophen (500 mg), the maximum dose is 6 tablets in 24 hours.    If you are taking regular strength acetaminophen (325 mg), the maximum dose is 9 tablets in 24 hours.    Call a doctor for any of the followin. Signs of infection (fever, growing tenderness at the surgery site, a large amount of drainage or bleeding, severe pain, foul-smelling drainage, redness, swelling).  2. It has been over 8 to 10 hours since surgery and you are still not able to urinate (pass water).  3. Headache for over 24 hours.  4. Numbness, tingling or weakness the day after surgery (if you had spinal anesthesia).  5. Signs of Covid-19 infection (temperature over 100 degrees, shortness of breath, cough, loss of taste/smell, generalized body aches, persistent headache, chills, sore throat, nausea/vomiting/diarrhea)  Your doctor is:  Dr. Nikki Schwartz, Otolaryngology: [phone]                    Or dial [phone] and ask for the resident on call for:  ENT Otolaryngology  For emergency care, call the:  [facility]:  [phone] (TTY for hearing impaired: [phone])

## 2021-03-18 ENCOUNTER — MYC MEDICAL ADVICE (OUTPATIENT)
Dept: PLASTIC SURGERY | Facility: CLINIC | Age: 41
End: 2021-03-18

## 2021-03-18 NOTE — TELEPHONE ENCOUNTER
Called pt re: her MyChart messages.    I told pt that the longer she keeps the nasal splints in, the better.  She can drive herself to her post-op appt as long as she doesn't take a narcotic prior.    Pt is agreeable to this plan and will wait until Monday's appt for splint removal.    Pt. States that her facial swelling has improved, but her eyes have become very itchy.  I encouraged her to use artificial tears to keep her eyes well lubricated.  Pt will reach out tomorrow if her eyes are not feeling any better.    She also inquired about taking an antihistamine as it feels like her allergies are acting up.  I told her that she can take an antihistamine as long as she's off the narcotic.    Lou Julian RN  3/18/2021 1:23 PM

## 2021-03-19 ENCOUNTER — OFFICE VISIT (OUTPATIENT)
Dept: PLASTIC SURGERY | Facility: CLINIC | Age: 41
End: 2021-03-19
Payer: COMMERCIAL

## 2021-03-19 DIAGNOSIS — Z98.890 POSTOPERATIVE STATE: Primary | ICD-10-CM

## 2021-03-19 NOTE — LETTER
March 19, 2021  Re: Chloe Foster  1980    Dear Dr. Cast,    Thank you so much for referring Chloe Foster to the [facility]. I had the pleasure of visiting with Chloe today.     Attached you will find a copy of my note. Please feel free to reach out to me with any questions, [phone].     Facial Plastic and Reconstructive Surgery      Chloe Foster came in to be evaluated as she is having significant discomfort and itching of her eyes. She was nervous about an allergic reaction as thus I asked her to come in.    She has significant improvement in swelling and bruising. She has is in the lower eyelids and cheeks, she does not have any skin irritation, no redness or inflammation notable. The area of the steri-stips and acquaplast is normal. Her eyes are not injected, no irritation, no conjuctivitis, no lid edema or inflammation.     Exam is within expected range for POD 5 from surgery. There are signs of inflammation or allergic reaction at the skin or around the nose. Her eye exam is pretty benign. I offered to take splints and cast off, although counseled this may not help symptoms and that I would prefer to stabilize her nose further.     She can add ibuprofen and I am ok with taking loratidine. She will be seen on Monday but knows we are available over the weekend.         Your trust in our practice and care is much appreciated.    Sincerely,  Nikki Schwartz MD

## 2021-03-19 NOTE — PROGRESS NOTES
Facial Plastic and Reconstructive Surgery      Chloe Foster came in to be evaluated as she is having significant discomfort and itching of her eyes. She was nervous about an allergic reaction as thus I asked her to come in.    She has significant improvement in swelling and bruising. She has is in the lower eyelids and cheeks, she does not have any skin irritation, no redness or inflammation notable. The area of the steri-stips and acquaplast is normal. Her eyes are not injected, no irritation, no conjuctivitis, no lid edema or inflammation.     Exam is within expected range for POD 5 from surgery. There are signs of inflammation or allergic reaction at the skin or around the nose. Her eye exam is pretty benign. I offered to take splints and cast off, although counseled this may not help symptoms and that I would prefer to stabilize her nose further.     She can add ibuprofen and I am ok with taking loratidine. She will be seen on Monday but knows we are available over the weekend.

## 2021-03-19 NOTE — LETTER
3/19/2021       RE: Chloe Foster  [address] [address]     Dear Colleague,    Thank you for referring your patient, Chloe Foster, to the [facility]. Please see a copy of my visit note below.    Facial Plastic and Reconstructive Surgery      Chloe Foster came in to be evaluated as she is having significant discomfort and itching of her eyes. She was nervous about an allergic reaction as thus I asked her to come in.    She has significant improvement in swelling and bruising. She has is in the lower eyelids and cheeks, she does not have any skin irritation, no redness or inflammation notable. The area of the steri-stips and acquaplast is normal. Her eyes are not injected, no irritation, no conjuctivitis, no lid edema or inflammation.     Exam is within expected range for POD 5 from surgery. There are signs of inflammation or allergic reaction at the skin or around the nose. Her eye exam is pretty benign. I offered to take splints and cast off, although counseled this may not help symptoms and that I would prefer to stabilize her nose further.     She can add ibuprofen and I am ok with taking loratidine. She will be seen on Monday but knows we are available over the weekend.     Again, thank you for allowing me to participate in the care of your patient.      Sincerely,    Nikki Schwartz MD

## 2021-03-22 ENCOUNTER — ALLIED HEALTH/NURSE VISIT (OUTPATIENT)
Dept: PLASTIC SURGERY | Facility: CLINIC | Age: 41
End: 2021-03-22
Payer: COMMERCIAL

## 2021-03-22 DIAGNOSIS — Z98.890 POSTOPERATIVE STATE: Primary | ICD-10-CM

## 2021-03-22 NOTE — PROGRESS NOTES
Pt. Comes in POD #7 s/p Revision Septorhinoplasty, Repair of Nasal Valve Collapse, Cadaveric Rib, Cosmetic Tip Rhinoplasty.    Nasal cast fell off at home a couple of days ago.  Steri strips removed and Esteban splints removed today.    Pt has one pair of splints that are to remain in place for two more weeks.    Pt's nose is appropriately swollen and tender.  Periorbital swelling has greatly improved since last week.    F/u in 2 wk for removal of remaining splints.    Lou Julian RN  3/22/2021 11:02 AM

## 2021-03-31 ENCOUNTER — ALLIED HEALTH/NURSE VISIT (OUTPATIENT)
Dept: OTOLARYNGOLOGY | Facility: CLINIC | Age: 41
End: 2021-03-31
Payer: COMMERCIAL

## 2021-03-31 DIAGNOSIS — Z98.890 POSTOPERATIVE STATE: Primary | ICD-10-CM

## 2021-03-31 PROCEDURE — 99207 PR NO CHARGE NURSE ONLY: CPT

## 2021-03-31 NOTE — PROGRESS NOTES
Pt. Comes in PO 3/15/21 Revision Septorhinoplasty, Repair of Nasal Valve Collapse, Cadaveric Rib, Cosmetic Tip Rhinoplasty.    Remaining nasal splints removed.    Pt states that she is feeling better and breathing better.  Nasal swelling and tenderness has improved.    F/u with Dr. Schwartz in two + weeks.    Lou Julian RN  3/31/2021 9:25 AM

## 2021-04-13 NOTE — PROGRESS NOTES
Chloe is a 40 year old who is being evaluated via a billable video visit.      How would you like to obtain your AVS? MyChart  If the video visit is dropped, the invitation should be resent by: Text to cell phone: [phone]  Will anyone else be joining your video visit? No        Assessment & Plan       ICD-10-CM    1. Anxiety  F41.9 hydrOXYzine (ATARAX) 25 MG tablet     LORazepam (ATIVAN) 1 MG tablet   2. Insomnia, unspecified type  G47.00 hydrOXYzine (ATARAX) 25 MG tablet     LORazepam (ATIVAN) 1 MG tablet   3. Social anxiety disorder  F40.10 LORazepam (ATIVAN) 1 MG tablet   4. Moderate recurrent major depression (H)  F33.1    5. Deviated nasal septum  J34.2    6. History of cardioembolic cerebrovascular accident (CVA)  Z86.73       Anxiety/Insomnia/MDD-  For anxiety/insomnia, sx's have been best addressed with ativan 1/2 tab the evening before meetings, but she's been using more lately for insomnia leading up to surgeries (prepped twice, so much more use).  Now working is ramping up, so needed an early refill.  Okay to use on nights prior to meetings, but do not want to increase use overall.  Will also send in rx for hydroxyzine to try to see if helpful.    MDD/anxiety baseline- pt has had multiple daily med trials, but many se's on them.  Does wonder about the lowest-dose option of wellbutrin - and we haven't tried wellbutrin 75mg just once daily- could try that.  She's not ready to start that now, but might if sx's do not improve.  Will do video call with new PHQ/AYLA if wanting to start prior to her 5/21 physical appt.    S/p deviated septum repair in 3/21- hard recovery and two preps, but now doing well s/p surgery.    H/o CVA in '15, s/p PFO closure -   Pt did have f/u with Dr. Casarez prior to surgery, who reviewed her Protein S levels, and said that she had the issue related to pregnancy, but levels since that time have been fine, so would say she does NOT have Protein S deficiency, and does not need  special txt for surgery, etc.  Pt is sure she does not want to have further pregnancies.  He also did not think the homozygous MTHFR mutation was significant, and her homocysteine levels have been low/normal. She has been taking methylated B vitamin supplementation.  F/u homocysteine/folate levels have looked good.       35 minutes spent on the date of the encounter doing chart review, review of outside records, review of test results, interpretation of tests, patient visit and documentation         Return in about 6 weeks (around 5/26/2021) for Routine Visit/Chronic Cares/Med Check, Anxiety (sooner if wellbutrin start desired- video okay).    Izabel Reyes MD  [facility]            Aly Corona is a 40 year old who presents for the following health issues - anxiety, insomnia    HPI     Depression and Anxiety Follow-Up    How are you doing with your depression since your last visit? Worsened     How are you doing with your anxiety since your last visit?  Worsened     Are you having other symptoms that might be associated with depression or anxiety? No    Have you had a significant life event? OTHER: current life events     Do you have any concerns with your use of alcohol or other drugs? No    Lots of recent increased stressors- surgery- prepped for it twice...  She had nose surgery revision- 3/15/21.  Was scheduled for Feb, had everything hooked up.  Dr. Lopez came in, was worried about Protein S, just wanted to check.  Dr. Chow saw her, and looked at Protein S levels- thought it was limited to a pregnancy issue for her as it's been normal a few times since pregnancy.  States she does not have a Protein S deficiency dx.    Prior to surgery, she was anxious about the surgery, and used ativan a few nights prior.  Then it was rescheduled, so she was using it more leading up to the second surgery.  Took more than her usual in the last 6-8 wks due to the surgeries.    Really rough  recovery, longer than she expected.  Thought she could go back to work in a week, but ended up needing to take two weeks off.    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  Went from not working much to working a lot, with a lot of meetings, and they've wanted to do more in-person with more of them vaccinated.  All good things, but nerve-wracking for her, transition back to 'live meetings', and more meetings, and working more is tough.  Feels like she's getting abruptly thrown back into the real world.     Hopes to be able to keep her ativan level stable- has been at ~4/month or ~12/3 months, but did use more lately with the surgeries.    Does note that her mood/anxiety baseline has been worse with all these stressors, but has been wary of daily meds due to her h/o se's on many of them.  Does wonder about a really low dose- wellbutrin- if we tried the absolute lowest dose.    Coming in in May for her physical and her son's WCC.        Social History     Tobacco Use     Smoking status: Former Smoker     Packs/day: 0.50     Years: 5.00     Pack years: 2.50     Types: Cigarettes     Quit date: 2014     Years since quittin.9     Smokeless tobacco: Never Used   Substance Use Topics     Alcohol use: Yes     Alcohol/week: 0.0 standard drinks     Comment: SOCIAL     Drug use: No     PHQ 2020   PHQ-9 Total Score 1 9 4   Q9: Thoughts of better off dead/self-harm past 2 weeks Not at all Not at all Not at all     AYLA-7 SCORE 2020   Total Score - - 8 (mild anxiety)   Total Score 3 9 8     Last PHQ-9 2021   1.  Little interest or pleasure in doing things 1   2.  Feeling down, depressed, or hopeless 1   3.  Trouble falling or staying asleep, or sleeping too much 1   4.  Feeling tired or having little energy 1   5.  Poor appetite or overeating 0   6.  Feeling bad about yourself 0   7.  Trouble  concentrating 0   8.  Moving slowly or restless 0   Q9: Thoughts of better off dead/self-harm past 2 weeks 0   PHQ-9 Total Score 4   Difficulty at work, home, or with people -     AYLA-7  4/14/2021   1. Feeling nervous, anxious, or on edge 2   2. Not being able to stop or control worrying 1   3. Worrying too much about different things 2   4. Trouble relaxing 2   5. Being so restless that it is hard to sit still 0   6. Becoming easily annoyed or irritable 1   7. Feeling afraid, as if something awful might happen 0   AYLA-7 Total Score 8   If you checked any problems, how difficult have they made it for you to do your work, take care of things at home, or get along with other people? -       Suicide Assessment Five-step Evaluation and Treatment (SAFE-T)      Migraine     Since your last clinic visit, how have your headaches changed?  No change    How often are you getting headaches or migraines?     Are you able to do normal daily activities when you have a migraine? No    Are you taking rescue/relief medications? (Select all that apply) Tylenol and Excedrin    How helpful is your rescue/relief medication?  I get some relief    Are you taking any medications to prevent migraines? (Select all that apply)  No    In the past 4 weeks, how often have you gone to urgent care or the emergency room because of your headaches?  0    How many servings of fruits and vegetables do you eat daily?  2-3    On average, how many sweetened beverages do you drink each day (Examples: soda, juice, sweet tea, etc.  Do NOT count diet or artificially sweetened beverages)?   0    How many days per week do you exercise enough to make your heart beat faster? 3 or less    How many minutes a day do you exercise enough to make your heart beat faster? 30 - 60    How many days per week do you miss taking your medication? 0        Review of Systems   Constitutional, HEENT, cardiovascular, pulmonary, gi and gu systems are negative, except as otherwise  noted.      Objective           Vitals:  No vitals were obtained today due to virtual visit.    Physical Exam   GENERAL: Healthy, alert and no distress  EYES: Eyes grossly normal to inspection.  No discharge or erythema, or obvious scleral/conjunctival abnormalities.  RESP: No audible wheeze, cough, or visible cyanosis.  No visible retractions or increased work of breathing.    SKIN: Visible skin clear. No significant rash, abnormal pigmentation or lesions.  NEURO: Cranial nerves grossly intact.  Mentation and speech appropriate for age.  PSYCH: Mentation appears normal, affect normal/bright, judgement and insight intact, normal speech and appearance well-groomed.          Video-Visit Details    Type of service:  Video Visit    Video Start Time: 1:54 PM  Video End Time:2:17 PM    Originating Location (pt. Location): Home    Distant Location (provider location):  [facility]     Platform used for Video Visit: [employer]  Answers for HPI/ROS submitted by the patient on 4/14/2021   If you checked off any problems, how difficult have these problems made it for you to do your work, take care of things at home, or get along with other people?: Somewhat difficult  PHQ9 TOTAL SCORE: 4  AYLA 7 TOTAL SCORE: 8

## 2021-04-14 ENCOUNTER — VIRTUAL VISIT (OUTPATIENT)
Dept: FAMILY MEDICINE | Facility: CLINIC | Age: 41
End: 2021-04-14
Payer: COMMERCIAL

## 2021-04-14 DIAGNOSIS — I25.3 PFO WITH ATRIAL SEPTAL ANEURYSM: ICD-10-CM

## 2021-04-14 DIAGNOSIS — Q21.12 PFO WITH ATRIAL SEPTAL ANEURYSM: ICD-10-CM

## 2021-04-14 DIAGNOSIS — F40.10 SOCIAL ANXIETY DISORDER: ICD-10-CM

## 2021-04-14 DIAGNOSIS — J34.2 DEVIATED NASAL SEPTUM: ICD-10-CM

## 2021-04-14 DIAGNOSIS — Z86.73 HISTORY OF CARDIOEMBOLIC CEREBROVASCULAR ACCIDENT (CVA): ICD-10-CM

## 2021-04-14 DIAGNOSIS — F33.1 MODERATE RECURRENT MAJOR DEPRESSION (H): ICD-10-CM

## 2021-04-14 DIAGNOSIS — G47.00 INSOMNIA, UNSPECIFIED TYPE: ICD-10-CM

## 2021-04-14 DIAGNOSIS — F41.9 ANXIETY: Primary | ICD-10-CM

## 2021-04-14 PROCEDURE — 99214 OFFICE O/P EST MOD 30 MIN: CPT | Mod: 95 | Performed by: FAMILY MEDICINE

## 2021-04-14 RX ORDER — HYDROXYZINE HYDROCHLORIDE 25 MG/1
25 TABLET, FILM COATED ORAL DAILY PRN
Qty: 15 TABLET | Refills: 0 | Status: SHIPPED | OUTPATIENT
Start: 2021-04-14 | End: 2021-10-27 | Stop reason: SINTOL

## 2021-04-14 RX ORDER — LORAZEPAM 1 MG/1
.5-1 TABLET ORAL DAILY PRN
Qty: 15 TABLET | Refills: 0 | Status: SHIPPED | OUTPATIENT
Start: 2021-04-14 | End: 2021-07-09

## 2021-04-14 ASSESSMENT — ANXIETY QUESTIONNAIRES
3. WORRYING TOO MUCH ABOUT DIFFERENT THINGS: MORE THAN HALF THE DAYS
GAD7 TOTAL SCORE: 8
1. FEELING NERVOUS, ANXIOUS, OR ON EDGE: MORE THAN HALF THE DAYS
7. FEELING AFRAID AS IF SOMETHING AWFUL MIGHT HAPPEN: NOT AT ALL
4. TROUBLE RELAXING: MORE THAN HALF THE DAYS
2. NOT BEING ABLE TO STOP OR CONTROL WORRYING: SEVERAL DAYS
7. FEELING AFRAID AS IF SOMETHING AWFUL MIGHT HAPPEN: NOT AT ALL
5. BEING SO RESTLESS THAT IT IS HARD TO SIT STILL: NOT AT ALL
GAD7 TOTAL SCORE: 8
6. BECOMING EASILY ANNOYED OR IRRITABLE: SEVERAL DAYS
GAD7 TOTAL SCORE: 8

## 2021-04-14 ASSESSMENT — PATIENT HEALTH QUESTIONNAIRE - PHQ9
SUM OF ALL RESPONSES TO PHQ QUESTIONS 1-9: 4
10. IF YOU CHECKED OFF ANY PROBLEMS, HOW DIFFICULT HAVE THESE PROBLEMS MADE IT FOR YOU TO DO YOUR WORK, TAKE CARE OF THINGS AT HOME, OR GET ALONG WITH OTHER PEOPLE: SOMEWHAT DIFFICULT
SUM OF ALL RESPONSES TO PHQ QUESTIONS 1-9: 4

## 2021-04-15 ASSESSMENT — ANXIETY QUESTIONNAIRES: GAD7 TOTAL SCORE: 8

## 2021-04-30 ENCOUNTER — OFFICE VISIT (OUTPATIENT)
Dept: PLASTIC SURGERY | Facility: CLINIC | Age: 41
End: 2021-04-30
Payer: COMMERCIAL

## 2021-04-30 DIAGNOSIS — Z98.890 POSTOPERATIVE STATE: Primary | ICD-10-CM

## 2021-04-30 NOTE — PROGRESS NOTES
Facial Plastic and Reconstructive Surgery      Chloe Foster is healing really well  She needs to increase nasal saline  Breathing is good    Reports some decreased taste and smell discernment, we will observe  Denies COVID and symptoms

## 2021-04-30 NOTE — LETTER
4/30/2021       RE: Chloe Foster  [address] [facility] [address]     Dear Colleague,    Thank you for referring your patient, Chloe Foster, to the [facility]. Please see a copy of my visit note below.    Facial Plastic and Reconstructive Surgery      Chloe Foster is healing really well  She needs to increase nasal saline  Breathing is good    Reports some decreased taste and smell discernment, we will observe  Denies COVID and symptoms    Again, thank you for allowing me to participate in the care of your patient.       Sincerely,    Nikki Schwartz MD

## 2021-04-30 NOTE — LETTER
April 30, 2021  Re: Chloe Foster  1980    Dear Dr. Cast,    Thank you so much for referring Chloe Foster to the [facility]. I had the pleasure of visiting with Chloe today.     Attached you will find a copy of my note. Please feel free to reach out to me with any questions, [phone].     Facial Plastic and Reconstructive Surgery      Chloe Foster is healing really well  She needs to increase nasal saline  Breathing is good    Reports some decreased taste and smell discernment, we will observe  Denies COVID and symptoms        Your trust in our practice and care is much appreciated.    Sincerely,  Nikki Schwartz MD

## 2021-07-06 NOTE — PATIENT INSTRUCTIONS
PLEASE CALL TO SCHEDULE YOUR MAMMOGRAM  [facility] [phone]  [facility] [phone]  [facility]   [phone]  Central Scheduling (all locations) [phone]    If you are under/uninsured, we recommend you contact the [facility]. They offer mammograms on a sliding fee charge. You can schedule with them at [phone].           Preventive Health Recommendations  Female Ages 40 to 49    Yearly exam:     See your health care provider every year in order to  1. Review health changes.   2. Discuss preventive care.    3. Review your medicines if your doctor prescribed any.      Get a Pap test every three years (unless you have an abnormal result and your provider advises testing more often).      If you get Pap tests with HPV test, you only need to test every 5 years, unless you have an abnormal result. You do not need a Pap test if your uterus was removed (hysterectomy) and you have not had cancer.      You should be tested each year for STDs (sexually transmitted diseases), if you're at risk.     Ask your doctor if you should have a mammogram.      Have a colonoscopy (test for colon cancer) if someone in your family has had colon cancer or polyps before age 50.       Have a cholesterol test every 5 years.       Have a diabetes test (fasting glucose) after age 45. If you are at risk for diabetes, you should have this test every 3 years.    Shots: Get a flu shot each year. Get a tetanus shot every 10 years.     Nutrition:     Eat at least 5 servings of fruits and vegetables each day.    Eat whole-grain bread, whole-wheat pasta and brown rice instead of white grains and rice.    Get adequate Calcium and Vitamin D.      Lifestyle    Exercise at least 150 minutes a week (an average of 30 minutes a day, 5 days a week). This will help you control your weight and prevent disease.    Limit alcohol to one drink per day.    No smoking.     Wear sunscreen to prevent skin  cancer.    See your dentist every six months for an exam and cleaning.

## 2021-07-06 NOTE — PROGRESS NOTES
SUBJECTIVE:   CC: Chloe Foster is an 40 year old woman who presents for preventive health visit.     Patient has been advised of split billing requirements and indicates understanding: Yes     Healthy Habits:     Getting at least 3 servings of Calcium per day:  Yes    Bi-annual eye exam:  Yes    Dental care twice a year:  Yes    Sleep apnea or symptoms of sleep apnea:  None    Diet:  Regular (no restrictions)    Frequency of exercise:  2-3 days/week    Duration of exercise:  15-30 minutes    Taking medications regularly:  Yes    Medication side effects:  Not applicable    PHQ-2 Total Score: 2    Additional concerns today:  No    Anxiety-   Taking lorazepam 1 tab - 0-1x/wk.  Still mostly just on nights prior to weekly am work meetings when she has to speak.  Still sometimes has anxiety prior to the meeting.    Will do a video visit to discuss furthe rconcerns with anxiety/mdd soon.    Lipids- was doing cardio with ellipitcal 4-5d/wk, for 30 min.  Not doing that any longer.  Now ceiling is finished.  Now doing a lot around the house- moving, painting, finishing basement.    Brother's family is living with them in basement, which is good but stressful.  They moved out of their house before they were able to find a new one due to the crazy market.        Today's PHQ-2 Score:   PHQ-2 ( 1999 [employer]) 7/9/2021   Q1: Little interest or pleasure in doing things 1   Q2: Feeling down, depressed or hopeless 1   PHQ-2 Score 2   Q1: Little interest or pleasure in doing things Several days   Q2: Feeling down, depressed or hopeless Several days   PHQ-2 Score 2       Abuse: Current or Past (Physical, Sexual or Emotional) - No  Do you feel safe in your environment? Yes    Have you ever done Advance Care Planning? (For example, a Health Directive, POLST, or a discussion with a medical provider or your loved ones about your wishes): No, advance care planning information given to patient to review.  Patient declined advance  care planning discussion at this time.    Social History     Tobacco Use     Smoking status: Former Smoker     Packs/day: 0.50     Years: 5.00     Pack years: 2.50     Types: Cigarettes     Quit date: 2014     Years since quittin.2     Smokeless tobacco: Never Used   Substance Use Topics     Alcohol use: Yes     Alcohol/week: 0.0 standard drinks     Comment: SOCIAL     If you drink alcohol do you typically have >3 drinks per day or >7 drinks per week? No    Alcohol Use 2021   Prescreen: >3 drinks/day or >7 drinks/week? No   Prescreen: >3 drinks/day or >7 drinks/week? -       Reviewed orders with patient.  Reviewed health maintenance and updated orders accordingly - Yes      Lab work is in process  Labs reviewed in EPIC  BP Readings from Last 3 Encounters:   21 120/79   03/15/21 (!) 146/97   21 116/88    Wt Readings from Last 3 Encounters:   21 74.8 kg (165 lb)   03/15/21 76.2 kg (168 lb)   21 77.1 kg (170 lb)                  Patient Active Problem List   Diagnosis     Vitamin D deficiency     CARDIOVASCULAR SCREENING; LDL GOAL LESS THAN 160     Lumbar herniated disc     Hirsutism     Insomnia     IBS (irritable bowel syndrome)     Moderate recurrent major depression (H)     History of cardioembolic cerebrovascular accident (CVA)     PFO with atrial septal aneurysm     Protein S deficiency (H)     Migraine with aura and without status migrainosus, not intractable     Pre-conception counseling     Elevated lipoprotein A level     Social anxiety disorder     MTHFR mutation (H)     Cerebrovascular accident (CVA) due to embolism of right middle cerebral artery (H)     Deviated nasal septum     Dizziness     Past Surgical History:   Procedure Laterality Date     ARTHROSCOPY KNEE RT/LT      Rt knee     HC REPAIR OF NASAL SEPTUM       REPAIR PATENT FORAMEN OVALE  2016     SEPTORHINOPLASTY N/A 3/15/2021    Procedure: Revision Septorhinoplasty, Repair of Nasal Valve  Collapse, Cadaveric Rib Graft + 45 min Cosmetic Tip Rhinoplasty;  Surgeon: Nikki Schwartz MD;  [place] [facility] [place] History     Tobacco Use     Smoking status: Former Smoker     Packs/day: 0.50     Years: 5.00     Pack years: 2.50     Types: Cigarettes     Quit date: 2014     Years since quittin.2     Smokeless tobacco: Never Used   Substance Use Topics     Alcohol use: Yes     Alcohol/week: 0.0 standard drinks     Comment: SOCIAL     Family History   Problem Relation Age of Onset     Family History Negative Mother      Cancer Father         throat, possibly asbestos exposure     Hypertension Father      Allergies Father      Arrhythmia Father      Other Cancer Father      Family History Negative Sister      Family History Negative Brother      Diabetes Maternal Grandmother         on insulin     C.A.D. Maternal Grandmother         triple bypass in her 60s     Diabetes Paternal Grandmother      C.A.D. Maternal Grandfather         MI in 50s     Neurologic Disorder Paternal Grandfather          of brain aneurysm in early 60s     Diabetes Maternal Aunt          in her early 40s of DM complications, type 2     Coronary Artery Disease Early Onset Maternal Aunt 41     Family History Negative Son          Current Outpatient Medications   Medication Sig Dispense Refill     acetaminophen (TYLENOL) 325 MG tablet Take 325-650 mg by mouth every 6 hours as needed for mild pain       aspirin EC 81 MG EC tablet Take 81 mg by mouth every evening        aspirin-acetaminophen-caffeine (EXCEDRIN MIGRAINE) 250-250-65 MG tablet Take 1 tablet by mouth every 6 hours as needed for headaches       hydrOXYzine (ATARAX) 25 MG tablet Take 1 tablet (25 mg) by mouth daily as needed for anxiety 15 tablet 0     LORazepam (ATIVAN) 1 MG tablet Take 0.5-1 tablets (0.5-1 mg) by mouth daily as needed for anxiety or sleep (severe anxiety/panic) 15 tablet 0     magnesium oxide (MAG-OX) 400 MG tablet Take 400 mg by mouth  every evening        Multiple Vitamins-Minerals (MULTIVITAMIN ADULT EXTRA C PO) Take by mouth every evening       NONFORMULARY Take 1 tablet by mouth every evening OTC: B-Right Vitamin (Optimized B Complex)       Omega-3 Fatty Acids (FISH OIL PO) Take 1 capsule by mouth At Bedtime        propranolol (INDERAL) 10 MG tablet Take 1-2 tablets (10-20 mg) by mouth as needed (anxiety (take 30-60 minutes prior to meetings)) 30 tablet 1     STATIN NOT PRESCRIBED, INTENTIONAL, Statin not prescribed intentionally due to Woman of childbearing age not actively taking birth control 0 each 0     VITAMIN D, CHOLECALCIFEROL, PO Take 1,000 Units by mouth every evening (takes 2 x 100 unit capsule)        Allergies   Allergen Reactions     Compazine [Prochlorperazine] Muscle Pain (Myalgia)     Recent Labs   Lab Test 07/09/21  0921 02/19/21  1005 12/27/19  0925 05/13/19  1623 02/27/19  0759   *  --  121*  --  156*     --  107  --  87   TRIG 46  --  83  --  70   ALT  --  19 24 19 22   CR 0.75 0.65 0.69 0.66 0.72   GFRESTIMATED >90 >90 >90 >90 >90   GFRESTBLACK >90 >90 >90 >90 >90   POTASSIUM 3.8 4.0 4.2 3.2* 4.0   TSH  --   --  1.04  --  0.92        Breast Cancer Screening:  Any new diagnosis of family breast, ovarian, or bowel cancer? No    FHS-7: No flowsheet data found.    Mammogram Screening - Offered annual screening and updated Health Maintenance for mutual plan based on risk factor consideration    Pertinent mammograms are reviewed under the imaging tab.    History of abnormal Pap smear: NO - age 30-65 PAP every 5 years with negative HPV co-testing recommended  PAP / HPV Latest Ref Rng & Units 12/27/2019 11/5/2014 3/15/2011   PAP - NIL NIL NIL   HPV 16 DNA NEG:Negative Negative - -   HPV 18 DNA NEG:Negative Negative - -   OTHER HR HPV NEG:Negative Negative - -     Reviewed and updated as needed this visit by clinical staff  Tobacco  Allergies  Meds   Med Hx  Surg Hx  Fam Hx  Soc Hx        Reviewed and updated  "as needed this visit by Provider                    Review of Systems   Constitutional: Negative for chills and fever.   HENT: Negative for congestion, ear pain, hearing loss and sore throat.    Eyes: Negative for pain and visual disturbance.   Respiratory: Negative for cough and shortness of breath.    Cardiovascular: Negative for chest pain, palpitations and peripheral edema.   Gastrointestinal: Negative for abdominal pain, constipation, diarrhea, heartburn, hematochezia and nausea.   Genitourinary: Negative for dysuria, frequency, genital sores, hematuria and urgency.   Musculoskeletal: Negative for arthralgias, joint swelling and myalgias.   Skin: Negative for rash.   Neurological: Negative for dizziness, weakness, headaches and paresthesias.   Psychiatric/Behavioral: Negative for mood changes. The patient is nervous/anxious.           OBJECTIVE:   /79 (BP Location: Right arm, Patient Position: Sitting, Cuff Size: Adult Regular)   Pulse 90   Temp 97.9  F (36.6  C) (Oral)   Resp 16   Ht 1.664 m (5' 5.5\")   Wt 74.8 kg (165 lb)   SpO2 96%   BMI 27.04 kg/m    Physical Exam  GENERAL: healthy, alert and no distress  EYES: Eyes grossly normal to inspection, PERRL and conjunctivae and sclerae normal  HENT: ear canals and TM's normal, nose and mouth without ulcers or lesions  NECK: no adenopathy, no asymmetry, masses, or scars and thyroid normal to palpation  RESP: lungs clear to auscultation - no rales, rhonchi or wheezes  BREAST: normal without masses, tenderness or nipple discharge and no palpable axillary masses or adenopathy  CV: regular rate and rhythm, normal S1 S2, no S3 or S4, no murmur, click or rub, no peripheral edema and peripheral pulses strong  ABDOMEN: soft, nontender, no hepatosplenomegaly, no masses and bowel sounds normal  MS: no gross musculoskeletal defects noted, no edema  SKIN: no suspicious lesions or rashes  NEURO: Normal strength and tone, mentation intact and speech normal  PSYCH: " mentation appears normal, affect normal/bright    Diagnostic Test Results:  Labs reviewed in Epic  Results for orders placed or performed in visit on 07/09/21   Lipid panel reflex to direct LDL Fasting     Status: Abnormal   Result Value Ref Range    Cholesterol 235 (H) <200 mg/dL    Triglycerides 46 <150 mg/dL    HDL Cholesterol 116 >49 mg/dL    LDL Cholesterol Calculated 110 (H) <100 mg/dL    Non HDL Cholesterol 119 <130 mg/dL   Basic metabolic panel  (Ca, Cl, CO2, Creat, Gluc, K, Na, BUN)     Status: None   Result Value Ref Range    Sodium 138 133 - 144 mmol/L    Potassium 3.8 3.4 - 5.3 mmol/L    Chloride 107 94 - 109 mmol/L    Carbon Dioxide 26 20 - 32 mmol/L    Anion Gap 5 3 - 14 mmol/L    Glucose 94 70 - 99 mg/dL    Urea Nitrogen 8 7 - 30 mg/dL    Creatinine 0.75 0.52 - 1.04 mg/dL    GFR Estimate >90 >60 mL/min/[1.73_m2]    GFR Estimate If Black >90 >60 mL/min/[1.73_m2]    Calcium 9.8 8.5 - 10.1 mg/dL   **Hepatitis C Screen Reflex to RNA FUTURE anytime     Status: None   Result Value Ref Range    Hepatitis C Antibody Nonreactive NR^Nonreactive       ASSESSMENT/PLAN:       ICD-10-CM    1. Routine general medical examination at a health care facility  Z00.00 **Hepatitis C Screen Reflex to RNA FUTURE anytime   2. Acute reaction to stress  F43.0 propranolol (INDERAL) 10 MG tablet   3. Social anxiety disorder  F40.10 propranolol (INDERAL) 10 MG tablet     LORazepam (ATIVAN) 1 MG tablet   4. Anxiety  F41.9 LORazepam (ATIVAN) 1 MG tablet   5. Insomnia, unspecified type  G47.00 LORazepam (ATIVAN) 1 MG tablet   6. Moderate recurrent major depression (H)  F33.1    7. Hyperlipidemia LDL goal <130  E78.5 Lipid panel reflex to direct LDL Fasting   8. Encounter for routine adult health examination without abnormal findings  Z00.00 Basic metabolic panel  (Ca, Cl, CO2, Creat, Gluc, K, Na, BUN)     CPE- We discussed ways to maintain a healthy lifestyle with sensible eating, regular exercise and self cares. We dicussed calcium  "and Vitamin D intake, vaccinations and preventive health screens.  See orders above for tests ordered and screening needed.  Thin prep pap was not done. No immunizations needed today.       Anxiety/insomnia/MDD/social anxiety-   Will cont prn rare ativan- mostly on nights prior to meetings.  Still with some sx's with meetings.  Rec trial of propranolol prior to meetings- can use even if using ativan night prior.  She's tried before, but will try again.  Will discuss further at future appt for MDD/Anxiety.    Lipids/h/o CVA (s/p PFO/ASD closure in 2016)- will check fasting labs today.      Patient has been advised of split billing requirements and indicates understanding: Yes       COUNSELING:  Reviewed preventive health counseling, as reflected in patient instructions    Estimated body mass index is 26.31 kg/m  as calculated from the following:    Height as of 3/15/21: 1.702 m (5' 7\").    Weight as of 3/15/21: 76.2 kg (168 lb).    Weight management plan: Discussed healthy diet and exercise guidelines    She reports that she quit smoking about 7 years ago. Her smoking use included cigarettes. She has a 2.50 pack-year smoking history. She has never used smokeless tobacco.      Counseling Resources:  ATP IV Guidelines  Pooled Cohorts Equation Calculator  Breast Cancer Risk Calculator  BRCA-Related Cancer Risk Assessment: FHS-7 Tool  FRAX Risk Assessment  ICSI Preventive Guidelines  Dietary Guidelines for Americans, 2010  USDA's MyPlate  ASA Prophylaxis  Lung CA Screening    Izabel Reyes MD  [facility]  "

## 2021-07-09 ENCOUNTER — OFFICE VISIT (OUTPATIENT)
Dept: FAMILY MEDICINE | Facility: CLINIC | Age: 41
End: 2021-07-09
Payer: COMMERCIAL

## 2021-07-09 VITALS
DIASTOLIC BLOOD PRESSURE: 79 MMHG | WEIGHT: 165 LBS | OXYGEN SATURATION: 96 % | HEIGHT: 66 IN | BODY MASS INDEX: 26.52 KG/M2 | RESPIRATION RATE: 16 BRPM | HEART RATE: 90 BPM | SYSTOLIC BLOOD PRESSURE: 120 MMHG | TEMPERATURE: 97.9 F

## 2021-07-09 DIAGNOSIS — E78.5 HYPERLIPIDEMIA LDL GOAL <130: ICD-10-CM

## 2021-07-09 DIAGNOSIS — Z00.00 ROUTINE GENERAL MEDICAL EXAMINATION AT A HEALTH CARE FACILITY: Primary | ICD-10-CM

## 2021-07-09 DIAGNOSIS — F40.10 SOCIAL ANXIETY DISORDER: ICD-10-CM

## 2021-07-09 DIAGNOSIS — F41.9 ANXIETY: ICD-10-CM

## 2021-07-09 DIAGNOSIS — G47.00 INSOMNIA, UNSPECIFIED TYPE: ICD-10-CM

## 2021-07-09 DIAGNOSIS — F33.1 MODERATE RECURRENT MAJOR DEPRESSION (H): ICD-10-CM

## 2021-07-09 DIAGNOSIS — F43.0 ACUTE REACTION TO STRESS: ICD-10-CM

## 2021-07-09 LAB
ANION GAP SERPL CALCULATED.3IONS-SCNC: 5 MMOL/L (ref 3–14)
BUN SERPL-MCNC: 8 MG/DL (ref 7–30)
CALCIUM SERPL-MCNC: 9.8 MG/DL (ref 8.5–10.1)
CHLORIDE SERPL-SCNC: 107 MMOL/L (ref 94–109)
CHOLEST SERPL-MCNC: 235 MG/DL
CO2 SERPL-SCNC: 26 MMOL/L (ref 20–32)
CREAT SERPL-MCNC: 0.75 MG/DL (ref 0.52–1.04)
GFR SERPL CREATININE-BSD FRML MDRD: >90 ML/MIN/{1.73_M2}
GLUCOSE SERPL-MCNC: 94 MG/DL (ref 70–99)
HCV AB SERPL QL IA: NONREACTIVE
HDLC SERPL-MCNC: 116 MG/DL
LDLC SERPL CALC-MCNC: 110 MG/DL
NONHDLC SERPL-MCNC: 119 MG/DL
POTASSIUM SERPL-SCNC: 3.8 MMOL/L (ref 3.4–5.3)
SODIUM SERPL-SCNC: 138 MMOL/L (ref 133–144)
TRIGL SERPL-MCNC: 46 MG/DL

## 2021-07-09 PROCEDURE — 80048 BASIC METABOLIC PNL TOTAL CA: CPT | Performed by: FAMILY MEDICINE

## 2021-07-09 PROCEDURE — 36415 COLL VENOUS BLD VENIPUNCTURE: CPT | Performed by: FAMILY MEDICINE

## 2021-07-09 PROCEDURE — 80061 LIPID PANEL: CPT | Performed by: FAMILY MEDICINE

## 2021-07-09 PROCEDURE — 99396 PREV VISIT EST AGE 40-64: CPT | Performed by: FAMILY MEDICINE

## 2021-07-09 PROCEDURE — 86803 HEPATITIS C AB TEST: CPT | Performed by: FAMILY MEDICINE

## 2021-07-09 RX ORDER — LORAZEPAM 1 MG/1
.5-1 TABLET ORAL DAILY PRN
Qty: 15 TABLET | Refills: 0 | Status: SHIPPED | OUTPATIENT
Start: 2021-07-09 | End: 2021-10-27

## 2021-07-09 RX ORDER — PROPRANOLOL HYDROCHLORIDE 10 MG/1
10-20 TABLET ORAL PRN
Qty: 30 TABLET | Refills: 1 | Status: SHIPPED | OUTPATIENT
Start: 2021-07-09 | End: 2023-05-04

## 2021-07-09 ASSESSMENT — ENCOUNTER SYMPTOMS
CHILLS: 0
DIZZINESS: 0
NAUSEA: 0
JOINT SWELLING: 0
WEAKNESS: 0
PARESTHESIAS: 0
ABDOMINAL PAIN: 0
HEMATOCHEZIA: 0
SHORTNESS OF BREATH: 0
EYE PAIN: 0
CONSTIPATION: 0
PALPITATIONS: 0
HEADACHES: 0
NERVOUS/ANXIOUS: 1
FEVER: 0
HEARTBURN: 0
HEMATURIA: 0
DYSURIA: 0
COUGH: 0
ARTHRALGIAS: 0
MYALGIAS: 0
SORE THROAT: 0
DIARRHEA: 0
FREQUENCY: 0

## 2021-07-09 ASSESSMENT — MIFFLIN-ST. JEOR: SCORE: 1422.25

## 2021-07-19 NOTE — RESULT ENCOUNTER NOTE
Here are your lab results from your recent visit...  -Your Hepatitis C antibody screening test was negative.  -Your basic metabolic panel (which includes electrolyte levels, blood sugar level and kidney function tests) looks good.  -Your cholesterol panel looks very good with a lower LDL (the bad cholesterol) and a high/good HDL (the good cholesterol).  Your triglycerides are also very low which is good.      Please let me know if you have any questions.  Best,   Ahmet Reyes MD

## 2021-08-06 ENCOUNTER — OFFICE VISIT (OUTPATIENT)
Dept: PLASTIC SURGERY | Facility: CLINIC | Age: 41
End: 2021-08-06
Payer: COMMERCIAL

## 2021-08-06 DIAGNOSIS — Z98.890 POSTOPERATIVE STATE: Primary | ICD-10-CM

## 2021-08-06 NOTE — LETTER
8/6/2021       RE: Chloe Foster  [address] [address]     Dear Colleague,    Thank you [facility] your patient, Chloe Foster[facility]. Please see a copy of my visit note below.    Facial Plastic and Reconstructive Surgery      Chloe Foster is still doing well.   Her breathing is still good and her nasal swelling is decreasing.  She will mold.       Again, thank you for allowing me to participate in the care of your patient.      Sincerely,    Nikki Schwartz MD

## 2021-08-06 NOTE — LETTER
8/6/2021       RE: Chloe Foster  [address] [address]     Dear Colleague,    [facility] you [facility] your [facility], Chloe Foster[facility]. Please see a copy of my visit note below.    Facial Plastic and Reconstructive Surgery      Chloe Foster is still doing well.   Her breathing is still good and her nasal swelling is decreasing.  She will mold.       Again, thank you for allowing me to participate in the care of your patient.      Sincerely,    Nikki Schwartz MD

## 2021-08-06 NOTE — LETTER
September 20, 2021  Re: Chloe Foster  1980    Dear Dr. Cast,    Thank you so much for referring Chloe Foster to the [facility]. I had the pleasure of visiting with Chloe today.     Attached you will find a copy of my note. Please feel free to reach out to me with any questions, [phone].     Facial Plastic and Reconstructive Surgery      Chloe Foster is still doing well.   Her breathing is still good and her nasal swelling is decreasing.  She will mold.         Your trust in our practice and care is much appreciated.    Sincerely,  Nikki Schwartz MD

## 2021-09-03 NOTE — PROGRESS NOTES
Chloe is a 41 year old who is being evaluated via a billable telephone visit.      What phone number would you like to be contacted at? [phone]  How would you like to obtain your AVS? MyChart      Assessment & Plan       ICD-10-CM    1. Allergic rhinitis due to pollen, unspecified seasonality  J30.1 Adult Allergy/Asthma Referral   2. Allergic rhinitis due to animals  J30.81 Adult Allergy/Asthma Referral      Worsening allergy sx's - worse since getting new lewis-doodle puppy in ~1/21, and even worse this summer.  Pataday improves eye sx's, but still significant sx's.  Tried allegra but needed afternoon nap each time.  Rec taking nasal spray along with the pataday.  Can add trial of claritin or zyrtec to see if they make her less sleepy.  Referred to allergy for allergy testing as well.    Izabel Reyes MD  [facility]   Chloe is a 41 year old who presents for the following health issues- worsening allergy sx's    HPI     Allergies  Onset/Duration: winter 2020 per pt states they got a dog 12/2020  Symptoms:   Nasal congestion: YES  Sneezing: YES- here and there   Red, itchy eyes: YES  Progression of Symptoms: intermittent  Accompanying Signs & Symptoms:  Cough: no  Wheezing: no  Rash: no  Sinus/facial pain: no  History:   Is it seasonal: during any time of the year   History of Asthma: no  Has allergy testing been done: no  Precipitating factors:   Got a dog 12/2020  Alleviating factors:    Therapies tried and outcome: claritin but gets tired , use allergy eye drops helps a little bit     Minimal allergy sx's prior to this winter when they got a dog.    12/20- ride home with new dog- watery itchy, eyes - eyes main sx's.  Lewis doodle- mix of two lewis doodles she thinks so likely still just 50% poodle.  Once they brought him home- on/off sx's, seemed to get better.    After nose surgery in 3/21 - sx's worse, eyes were really itchy.  Started using allergy  eye drops   Eyes water all the time, and are still itchy.    Intermittent sx's, but worse sx's these last three months- this summer was really bad.  Eye sx's, nasal congestion, itchy eyes are the worse.    Claritin - makes her sleepy when she's taken it a couple times.  Felt like she needed a nap.  Still taking the eye drops- pataday (OTC).  (JAYLEN is an eye doctor- recommended pataday).      Review of Systems   Constitutional, HEENT, cardiovascular, pulmonary, gi and gu systems are negative, except as otherwise noted.      Objective     Vitals:  No vitals were obtained today due to virtual visit.    Physical Exam   healthy, alert and no distress  PSYCH: Alert and oriented times 3; coherent speech, normal   rate and volume, able to articulate logical thoughts, able   to abstract reason, no tangential thoughts, no hallucinations   or delusions  Her affect is normal  RESP: No cough, no audible wheezing, able to talk in full sentences  Remainder of exam unable to be completed due to telephone visits            Phone call duration: 11 minutes

## 2021-09-08 ENCOUNTER — VIRTUAL VISIT (OUTPATIENT)
Dept: FAMILY MEDICINE | Facility: CLINIC | Age: 41
End: 2021-09-08
Payer: COMMERCIAL

## 2021-09-08 DIAGNOSIS — J30.1 ALLERGIC RHINITIS DUE TO POLLEN, UNSPECIFIED SEASONALITY: Primary | ICD-10-CM

## 2021-09-08 DIAGNOSIS — J30.81 ALLERGIC RHINITIS DUE TO ANIMALS: ICD-10-CM

## 2021-09-08 PROCEDURE — 99213 OFFICE O/P EST LOW 20 MIN: CPT | Mod: 95 | Performed by: FAMILY MEDICINE

## 2021-09-20 NOTE — PROGRESS NOTES
Facial Plastic and Reconstructive Surgery      Chloe Foster is still doing well.   Her breathing is still good and her nasal swelling is decreasing.  She will mold.

## 2021-09-25 ENCOUNTER — HEALTH MAINTENANCE LETTER (OUTPATIENT)
Age: 41
End: 2021-09-25

## 2021-10-08 ENCOUNTER — OFFICE VISIT (OUTPATIENT)
Dept: PLASTIC SURGERY | Facility: CLINIC | Age: 41
End: 2021-10-08
Payer: COMMERCIAL

## 2021-10-08 DIAGNOSIS — Z98.890 POSTOPERATIVE STATE: Primary | ICD-10-CM

## 2021-10-08 NOTE — PROGRESS NOTES
Facial Plastic and Reconstructive Surgery      Chloe Foster is healing well. She states her breathing is much improved. She has been molding.  She has a nicely patent nasal airway. She has some fullness on the right midvault that I treated with kenalog, on the left mid vault there is a slight depression.    We discussed kenalog to the right and possible filler to the left depression. We also discussed possible conchal cartilage camouflage on the left. We will assess in March at the year appointment.

## 2021-10-08 NOTE — LETTER
10/8/2021       RE: Chloe Foster  [address] [facility] [address]     Dear Colleague,    Thank you for referring your patient, Chloe Foster, to the [facility]. Please see a copy of my visit note below.      Facial Plastic and Reconstructive Surgery      Chloe Foster is healing well. She states her breathing is much improved. She has been molding.  She has a nicely patent nasal airway. She has some fullness on the right midvault that I treated with kenalog, on the left mid vault there is a slight depression.    We discussed kenalog to the right and possible filler to the left depression. We also discussed possible conchal cartilage camouflage on the left. We will assess in March at the year appointment.           Again, thank you for allowing me to participate in the care of your patient.      Sincerely,    Nikki Schwartz MD

## 2021-10-08 NOTE — LETTER
10/8/2021       RE: Chloe Foster  [address] [address]     Dear Colleague,    Thank you for referring your patient, Chloe Foster, to the [facility]. Please see a copy of my visit note below.      Facial Plastic and Reconstructive Surgery      Chloe Foster is healing well. She states her breathing is much improved. She has been molding.  She has a nicely patent nasal airway. She has some fullness on the right midvault that I treated with kenalog, on the left mid vault there is a slight depression.    We discussed kenalog to the right and possible filler to the left depression. We also discussed possible conchal cartilage camouflage on the left. We will assess in March at the year appointment.           Again, thank you for allowing me to participate in the care of your patient.      Sincerely,    Nikki Schwartz MD

## 2021-10-08 NOTE — LETTER
October 8, 2021  Re: Chloe Foster  1980    Dear Dr. Cast,    Thank you so much for referring Chloe Foster to the [facility]. I had the pleasure of visiting with Chloe today.     Attached you will find a copy of my note. Please feel free to reach out to me with any questions, [phone].       Facial Plastic and Reconstructive Surgery      Chloe Foster is healing well. She states her breathing is much improved. She has been molding.  She has a nicely patent nasal airway. She has some fullness on the right midvault that I treated with kenalog, on the left mid vault there is a slight depression.    We discussed kenalog to the right and possible filler to the left depression. We also discussed possible conchal cartilage camouflage on the left. We will assess in March at the year appointment.             Your trust in our practice and care is much appreciated.    Sincerely,  Nikki Schwartz MD

## 2021-10-18 ENCOUNTER — MYC MEDICAL ADVICE (OUTPATIENT)
Dept: CARDIOLOGY | Facility: CLINIC | Age: 41
End: 2021-10-18

## 2021-10-18 ENCOUNTER — TELEPHONE (OUTPATIENT)
Dept: CARDIOLOGY | Facility: CLINIC | Age: 41
End: 2021-10-18

## 2021-10-18 NOTE — TELEPHONE ENCOUNTER
Patient sent my chart message noting she has been having more palpitations over the last few months.  Was seen by Dr Almeida 12/13/2019 and at that time it was recommended that she have loop placed d/t history of anxiety, protein S deficiency, previous stroke (x2 in May of 2015, during pregnancy) and PFO/ASD closure to rule out any AF episodes.   Patient did not follow through and is now concerned d/t her symptoms.  She has recently purchased series 7 apple watch.  Asked patient to obtain tracings in the next couple days when HR/palpitations occur and bring to her appointment for Dr Almeida to review.  She reports she awakens with HR in the 90s at times and can often times have HR >100.  She has periodically checked on her Vilynx louann when she is having palpitations and it notes she is having tachycardia.  Offered appointment with Dr Almeida on 10/20 in [facility] at 10:45am.  DEVORA Tse

## 2021-10-20 ENCOUNTER — LAB (OUTPATIENT)
Dept: LAB | Facility: CLINIC | Age: 41
End: 2021-10-20
Payer: COMMERCIAL

## 2021-10-20 ENCOUNTER — OFFICE VISIT (OUTPATIENT)
Dept: CARDIOLOGY | Facility: CLINIC | Age: 41
End: 2021-10-20
Payer: COMMERCIAL

## 2021-10-20 VITALS
HEART RATE: 88 BPM | OXYGEN SATURATION: 97 % | WEIGHT: 167.6 LBS | DIASTOLIC BLOOD PRESSURE: 78 MMHG | BODY MASS INDEX: 26.3 KG/M2 | SYSTOLIC BLOOD PRESSURE: 114 MMHG | HEIGHT: 67 IN

## 2021-10-20 DIAGNOSIS — R00.2 PALPITATIONS: ICD-10-CM

## 2021-10-20 DIAGNOSIS — R00.0 TACHYCARDIA: Primary | ICD-10-CM

## 2021-10-20 LAB — TSH SERPL DL<=0.005 MIU/L-ACNC: 0.67 MU/L (ref 0.4–4)

## 2021-10-20 PROCEDURE — 84443 ASSAY THYROID STIM HORMONE: CPT

## 2021-10-20 PROCEDURE — 99203 OFFICE O/P NEW LOW 30 MIN: CPT | Mod: 25 | Performed by: INTERNAL MEDICINE

## 2021-10-20 PROCEDURE — 36415 COLL VENOUS BLD VENIPUNCTURE: CPT

## 2021-10-20 PROCEDURE — 93000 ELECTROCARDIOGRAM COMPLETE: CPT | Performed by: INTERNAL MEDICINE

## 2021-10-20 ASSESSMENT — MIFFLIN-ST. JEOR: SCORE: 1452.86

## 2021-10-20 NOTE — PROGRESS NOTES
"Electrophysiology/ Clinic Note         H&P and Plan:     REASON FOR VISIT: Electrophysiology evaluation.      HISTORY OF PRESENT ILLNESS: Ms. Foster is a pleasant 41-year-old lady with a history of anxiety, protein S deficiency, previous stroke (x2 in May of 2015, during pregnancy) and PFO/ASD closure (April 21, 2016), who is here for evaluation of palpitation.       I evaluated patient back in 2019.  At that time, she presents with intermittent episodes of palpitation and due to the history of previous CVAs, I recommend the loop recorder implantation.  She was not interested in procedure at that time.       Today, she informs she is doing well. However she continues to complain of intermittent episodes of palpitations.  She affirms that episodes are very brief and happens without any specific trigger.  She denies any other symptoms of chest pain, lightheadedness, near-syncope or syncope.     EKG today shows sinus rhythm.  I reviewed her several tracings from her iWatch which revealed normal sinus rhythm.       PREVIOUS STUDIES (personally reviewed):   -ZIO Patch monitoring (2/28/2020): One run of nonsustained VT (seven beats).  - GODWIN (2018): Normal LV function (EF around 55-60%).  There wasa well seated ASD closure device without residual shunt.    ASSESSMENT AND PLAN:   1.    Palpitation.   Her symptoms are not suggestive of tachycardia.  However, due to the history of CVA, A. fib should be excluded.  Additionally she had a previously PFO closure placed.  I recommend the following:  -Check TSH.  -Echocardiogram.  -Zio patch monitor for 3 days.    Vinny Almeida MD    Physical Exam:  Vitals: /78 (BP Location: Right arm, Patient Position: Sitting, Cuff Size: Adult Regular)   Pulse 88   Ht 1.702 m (5' 7\")   Wt 76 kg (167 lb 9.6 oz)   SpO2 97%   BMI 26.25 kg/m      Constitutional:  AAO x3.  Pt is in NAD.  HEAD: normocephalic.  SKIN: Skin normal color, texture and turgor with no lesions or " eruptions.  Eyes: PERRL, EOMI.  ENT:  Supple, normal JVP. No lymphadenopathy or thyroid enlargement.  Chest:  CTAB.  Cardiac:  RRR, normal  S1 and S2.  No murmurs rubs or gallop.    Abdomen:  Normal BS.  Soft, non-tender and non-distended.  No rebound or guarding.    Extremities:  Pedious pulses palpable B/L.  No LE edema noticed.   Neurological: Strength and sensation grossly symmetric and intact throughout.       CURRENT MEDICATIONS:  Current Outpatient Medications   Medication Sig Dispense Refill     acetaminophen (TYLENOL) 325 MG tablet Take 325-650 mg by mouth every 6 hours as needed for mild pain       aspirin EC 81 MG EC tablet Take 81 mg by mouth every evening        aspirin-acetaminophen-caffeine (EXCEDRIN MIGRAINE) 250-250-65 MG tablet Take 1 tablet by mouth every 6 hours as needed for headaches       hydrOXYzine (ATARAX) 25 MG tablet Take 1 tablet (25 mg) by mouth daily as needed for anxiety 15 tablet 0     LORazepam (ATIVAN) 1 MG tablet Take 0.5-1 tablets (0.5-1 mg) by mouth daily as needed for anxiety or sleep (severe anxiety/panic) 15 tablet 0     magnesium oxide (MAG-OX) 400 MG tablet Take 400 mg by mouth every evening        Multiple Vitamins-Minerals (MULTIVITAMIN ADULT EXTRA C PO) Take by mouth every evening       NONFORMULARY Take 1 tablet by mouth every evening OTC: B-Right Vitamin (Optimized B Complex)       Omega-3 Fatty Acids (FISH OIL PO) Take 1 capsule by mouth At Bedtime        propranolol (INDERAL) 10 MG tablet Take 1-2 tablets (10-20 mg) by mouth as needed (anxiety (take 30-60 minutes prior to meetings)) 30 tablet 1     STATIN NOT PRESCRIBED, INTENTIONAL, Statin not prescribed intentionally due to Woman of childbearing age not actively taking birth control 0 each 0     VITAMIN D, CHOLECALCIFEROL, PO Take 1,000 Units by mouth every evening (takes 2 x 100 unit capsule)          ALLERGIES     Allergies   Allergen Reactions     Compazine [Prochlorperazine] Muscle Pain (Myalgia)       PAST  MEDICAL HISTORY:  Past Medical History:   Diagnosis Date     Acute stress reaction     propranolol helps prior to interviews, stressful situations     Allergic rhinitis, cause unspecified     no meds except prn flonase, tests generally positive, decided against shots     Anxiety state, unspecified     citalopram started in 7/06, stopped after couple wks, felt sluggish/tired     CVA (cerebral vascular accident) (H)     May 1, May 14th 2015     Depressive disorder, not elsewhere classified     dx, but not sure this is correct, weaned off wellbutrin (4/05-6/06, 12/06-1/08), restarted 3/08     Dizziness      Elevated lipoprotein A level      Hirsutism     saw endo, inconclusive, started on spironolactone (helped a little), stopped in 6/06, elevated DHEA- sees new endo 4/08     History of stroke with residual effects      PFO (patent foramen ovale)     Post PFO/ASD closure with 30mm Amplatzer device 4/21/16     Protein S deficiency (H)     Per Dr. Casarez, prengnacy related.  Protein S levels normalized afer pregnancy.     Urinary tract infection, site not specified     recurrent, start nitrofur in 5/08, 6mo txt       PAST SURGICAL HISTORY:  Past Surgical History:   Procedure Laterality Date     ARTHROSCOPY KNEE RT/LT  1995    Rt knee     HC REPAIR OF NASAL SEPTUM  12/05     REPAIR PATENT FORAMEN OVALE  04/21/2016     SEPTORHINOPLASTY N/A 3/15/2021    Procedure: Revision Septorhinoplasty, Repair of Nasal Valve Collapse, Cadaveric Rib Graft + 45 min Cosmetic Tip Rhinoplasty;  Surgeon: Nikki Schwartz MD;  Location: [facility] OR       FAMILY HISTORY:  Family History   Problem Relation Age of Onset     Family History Negative Mother      Cancer Father         throat, possibly asbestos exposure     Hypertension Father      Allergies Father      Arrhythmia Father      Other Cancer Father      Family History Negative Sister      Family History Negative Brother      Diabetes Maternal Grandmother         on insulin     C.A.D.  Maternal Grandmother         triple bypass in her 60s     Diabetes Paternal Grandmother      BRIGIDATY. Maternal Grandfather         MI in 50s     Neurologic Disorder Paternal Grandfather          of brain aneurysm in early 60s     Diabetes Maternal Aunt          in her early 40s of DM complications, type 2     Coronary Artery Disease Early Onset Maternal Aunt 41     Family History Negative Son        SOCIAL HISTORY:  Social History     Socioeconomic History     Marital status:      Spouse name: Not on file     Number of children: 0     Years of education: 15     Highest education level: Not on file   Occupational History     Occupation:      Comment: [employer]   Tobacco Use     Smoking status: Former Smoker     Packs/day: 0.50     Years: 5.00     Pack years: 2.50     Types: Cigarettes     Quit date: 2014     Years since quittin.5     Smokeless tobacco: Never Used   Substance and Sexual Activity     Alcohol use: Yes     Alcohol/week: 0.0 standard drinks     Comment: SOCIAL     Drug use: No     Sexual activity: Yes     Partners: Male     Birth control/protection: Pull-out method   Other Topics Concern      Service Not Asked     Blood Transfusions Not Asked     Caffeine Concern No     Comment: 1 cup coffee per day     Occupational Exposure Not Asked     Hobby Hazards Not Asked     Sleep Concern No     Stress Concern No     Weight Concern No     Special Diet No     Back Care Not Asked     Exercise Yes     Comment: 4-5 days week     Bike Helmet Not Asked     Seat Belt Not Asked     Self-Exams Not Asked     Parent/sibling w/ CABG, MI or angioplasty before 65F 55M? No   Social History Narrative    Social Documentation:        Balanced Diet: YES    Calcium intake: 3 per day    Caffeine: 0 per day    Exercise:  type of activity ellipitcal, pilates;  5 times per week    Sunscreen: Yes    Seatbelts:  Yes    Self Breast Exam:  Yes    Physical/Emotional/Sexual Abuse: No    Do you  feel safe in your environment? Yes        Cholesterol screen up to date: Yes- checking today 07/23/09, non fasting    Eye Exam up to date: Yes    Dental Exam up to date: Yes    Pap smear up to date: Yes: checking today 07/23/09, last was 04/08 NIL    Mammogram up to date: Does Not Apply    Dexa Scan up to date: Does Not Apply    Colonoscopy up to date: Does Not Apply    Immunizations up to date: Yes, had one a month ago.    Glucose screen if over 40:  No        Julieth Matthews MA    07/23/09     Social Determinants of Health     Financial Resource Strain:      Difficulty of Paying Living Expenses:    Food Insecurity:      Worried About Running Out of Food in the Last Year:      Ran Out of Food in the Last Year:    Transportation Needs:      Lack of Transportation (Medical):      Lack of Transportation (Non-Medical):    Physical Activity:      Days of Exercise per Week:      Minutes of Exercise per Session:    Stress:      Feeling of Stress :    Social Connections:      Frequency of Communication with Friends and Family:      Frequency of Social Gatherings with Friends and Family:      Attends Buddhism Services:      Active Member of Clubs or Organizations:      Attends Club or Organization Meetings:      Marital Status:    Intimate Partner Violence:      Fear of Current or Ex-Partner:      Emotionally Abused:      Physically Abused:      Sexually Abused:        Review of Systems:  Skin:        Eyes:       ENT:       Respiratory:       Cardiovascular:       Gastroenterology:      Genitourinary:       Musculoskeletal:       Neurologic:       Psychiatric:       Heme/Lymph/Imm:       Endocrine:           Recent Lab Results:  LIPID RESULTS:  Lab Results   Component Value Date    CHOL 235 (H) 07/09/2021     07/09/2021     (H) 07/09/2021    TRIG 46 07/09/2021    CHOLHDLRATIO 2.2 03/30/2012       LIVER ENZYME RESULTS:  Lab Results   Component Value Date    AST 13 02/19/2021    ALT 19 02/19/2021       CBC  RESULTS:  Lab Results   Component Value Date    WBC 4.3 11/04/2020    RBC 4.81 11/04/2020    HGB 14.3 11/04/2020    HCT 44.3 11/04/2020    MCV 92 11/04/2020    MCH 29.7 11/04/2020    MCHC 32.3 11/04/2020    RDW 12.6 11/04/2020     11/04/2020       BMP RESULTS:  Lab Results   Component Value Date     07/09/2021    POTASSIUM 3.8 07/09/2021    CHLORIDE 107 07/09/2021    CO2 26 07/09/2021    ANIONGAP 5 07/09/2021    GLC 94 07/09/2021    BUN 8 07/09/2021    CR 0.75 07/09/2021    GFRESTIMATED >90 07/09/2021    GFRESTBLACK >90 07/09/2021    STEPH 9.8 07/09/2021        A1C RESULTS:  No results found for: A1C    INR RESULTS:  Lab Results   Component Value Date    INR 0.90 04/21/2016    INR 0.87 04/29/2015         ECHOCARDIOGRAM  No results found for this or any previous visit (from the past 8760 hour(s)).      Orders Placed This Encounter   Procedures     EKG 12-lead complete w/read - Clinics (performed today)     No orders of the defined types were placed in this encounter.    There are no discontinued medications.      Encounter Diagnoses   Name Primary?     Tachycardia Yes     Palpitations          CC  No referring provider defined for this encounter.

## 2021-10-20 NOTE — LETTER
10/20/2021    Izabel Reyes MD  [address]  [facility] [address]    RE: Chloe L Cristian       Dear Colleague,    I had the pleasure of seeing Chloe Foster in the [facility].    Electrophysiology/ Clinic Note         H&P and Plan:     REASON FOR VISIT: Electrophysiology evaluation.      HISTORY OF PRESENT ILLNESS: Ms. Foster is a pleasant 41-year-old lady with a history of anxiety, protein S deficiency, previous stroke (x2 in May of 2015, during pregnancy) and PFO/ASD closure (April 21, 2016), who is here for evaluation of palpitation.       I evaluated patient back in 2019.  At that time, she presents with intermittent episodes of palpitation and due to the history of previous CVAs, I recommend the loop recorder implantation.  She was not interested in procedure at that time.       Today, she informs she is doing well. However she continues to complain of intermittent episodes of palpitations.  She affirms that episodes are very brief and happens without any specific trigger.  She denies any other symptoms of chest pain, lightheadedness, near-syncope or syncope.     EKG today shows sinus rhythm.  I reviewed her several tracings from her iWatch which revealed normal sinus rhythm.       PREVIOUS STUDIES (personally reviewed):   -ZIO Patch monitoring (2/28/2020): One run of nonsustained VT (seven beats).  - GODWIN (2018): Normal LV function (EF around 55-60%).  There wasa well seated ASD closure device without residual shunt.    ASSESSMENT AND PLAN:   1.    Palpitation.   Her symptoms are not suggestive of tachycardia.  However, due to the history of CVA, A. fib should be excluded.  Additionally she had a previously PFO closure placed.  I recommend the following:  -Check TSH.  -Echocardiogram.  -Zio patch monitor for 3 days.    Vinny Almeida MD    Physical Exam:  Vitals: /78 (BP Location: Right arm, Patient Position:  "Sitting, Cuff Size: Adult Regular)   Pulse 88   Ht 1.702 m (5' 7\")   Wt 76 kg (167 lb 9.6 oz)   SpO2 97%   BMI 26.25 kg/m      Constitutional:  AAO x3.  Pt is in NAD.  HEAD: normocephalic.  SKIN: Skin normal color, texture and turgor with no lesions or eruptions.  Eyes: PERRL, EOMI.  ENT:  Supple, normal JVP. No lymphadenopathy or thyroid enlargement.  Chest:  CTAB.  Cardiac:  RRR, normal  S1 and S2.  No murmurs rubs or gallop.    Abdomen:  Normal BS.  Soft, non-tender and non-distended.  No rebound or guarding.    Extremities:  Pedious pulses palpable B/L.  No LE edema noticed.   Neurological: Strength and sensation grossly symmetric and intact throughout.       CURRENT MEDICATIONS:  Current Outpatient Medications   Medication Sig Dispense Refill     acetaminophen (TYLENOL) 325 MG tablet Take 325-650 mg by mouth every 6 hours as needed for mild pain       aspirin EC 81 MG EC tablet Take 81 mg by mouth every evening        aspirin-acetaminophen-caffeine (EXCEDRIN MIGRAINE) 250-250-65 MG tablet Take 1 tablet by mouth every 6 hours as needed for headaches       hydrOXYzine (ATARAX) 25 MG tablet Take 1 tablet (25 mg) by mouth daily as needed for anxiety 15 tablet 0     LORazepam (ATIVAN) 1 MG tablet Take 0.5-1 tablets (0.5-1 mg) by mouth daily as needed for anxiety or sleep (severe anxiety/panic) 15 tablet 0     magnesium oxide (MAG-OX) 400 MG tablet Take 400 mg by mouth every evening        Multiple Vitamins-Minerals (MULTIVITAMIN ADULT EXTRA C PO) Take by mouth every evening       NONFORMULARY Take 1 tablet by mouth every evening OTC: B-Right Vitamin (Optimized B Complex)       Omega-3 Fatty Acids (FISH OIL PO) Take 1 capsule by mouth At Bedtime        propranolol (INDERAL) 10 MG tablet Take 1-2 tablets (10-20 mg) by mouth as needed (anxiety (take 30-60 minutes prior to meetings)) 30 tablet 1     STATIN NOT PRESCRIBED, INTENTIONAL, Statin not prescribed intentionally due to Woman of childbearing age not actively " taking birth control 0 each 0     VITAMIN D, CHOLECALCIFEROL, PO Take 1,000 Units by mouth every evening (takes 2 x 100 unit capsule)          ALLERGIES     Allergies   Allergen Reactions     Compazine [Prochlorperazine] Muscle Pain (Myalgia)       PAST MEDICAL HISTORY:  Past Medical History:   Diagnosis Date     Acute stress reaction     propranolol helps prior to interviews, stressful situations     Allergic rhinitis, cause unspecified     no meds except prn flonase, tests generally positive, decided against shots     Anxiety state, unspecified     citalopram started in 7/06, stopped after couple wks, felt sluggish/tired     CVA (cerebral vascular accident) (H)     May 1, May 14th 2015     Depressive disorder, not elsewhere classified     dx, but not sure this is correct, weaned off wellbutrin (4/05-6/06, 12/06-1/08), restarted 3/08     Dizziness      Elevated lipoprotein A level      Hirsutism     saw endo, inconclusive, started on spironolactone (helped a little), stopped in 6/06, elevated DHEA- sees new endo 4/08     History of stroke with residual effects      PFO (patent foramen ovale)     Post PFO/ASD closure with 30mm Amplatzer device 4/21/16     Protein S deficiency (H)     Per Dr. Casarez, prengnacy related.  Protein S levels normalized afer pregnancy.     Urinary tract infection, site not specified     recurrent, start nitrofur in 5/08, 6mo txt       PAST SURGICAL HISTORY:  Past Surgical History:   Procedure Laterality Date     ARTHROSCOPY KNEE RT/LT  1995    Rt knee     HC REPAIR OF NASAL SEPTUM  12/05     REPAIR PATENT FORAMEN OVALE  04/21/2016     SEPTORHINOPLASTY N/A 3/15/2021    Procedure: Revision Septorhinoplasty, Repair of Nasal Valve Collapse, Cadaveric Rib Graft + 45 min Cosmetic Tip Rhinoplasty;  Surgeon: Nikki Schwartz MD;  Location: [facility] OR       FAMILY HISTORY:  Family History   Problem Relation Age of Onset     Family History Negative Mother      Cancer Father         throat, possibly  asbestos exposure     Hypertension Father      Allergies Father      Arrhythmia Father      Other Cancer Father      Family History Negative Sister      Family History Negative Brother      Diabetes Maternal Grandmother         on insulin     C.A.D. Maternal Grandmother         triple bypass in her 60s     Diabetes Paternal Grandmother      C.A.D. Maternal Grandfather         MI in 50s     Neurologic Disorder Paternal Grandfather          of brain aneurysm in early 60s     Diabetes Maternal Aunt          in her early 40s of DM complications, type 2     Coronary Artery Disease Early Onset Maternal Aunt 41     Family History Negative Son        SOCIAL HISTORY:  Social History     Socioeconomic History     Marital status:      Spouse name: Not on file     Number of children: 0     Years of education: 15     Highest education level: Not on file   Occupational History     Occupation:      Comment: [employer]   Tobacco Use     Smoking status: Former Smoker     Packs/day: 0.50     Years: 5.00     Pack years: 2.50     Types: Cigarettes     Quit date: 2014     Years since quittin.5     Smokeless tobacco: Never Used   Substance and Sexual Activity     Alcohol use: Yes     Alcohol/week: 0.0 standard drinks     Comment: SOCIAL     Drug use: No     Sexual activity: Yes     Partners: Male     Birth control/protection: Pull-out method   Other Topics Concern      Service Not Asked     Blood Transfusions Not Asked     Caffeine Concern No     Comment: 1 cup coffee per day     Occupational Exposure Not Asked     Hobby Hazards Not Asked     Sleep Concern No     Stress Concern No     Weight Concern No     Special Diet No     Back Care Not Asked     Exercise Yes     Comment: 4-5 days week     Bike Helmet Not Asked     Seat Belt Not Asked     Self-Exams Not Asked     Parent/sibling w/ CABG, MI or angioplasty before 65F 55M? No   Social History Narrative    Social Documentation:         Balanced Diet: YES    Calcium intake: 3 per day    Caffeine: 0 per day    Exercise:  type of activity ellipitcal, pilates;  5 times per week    Sunscreen: Yes    Seatbelts:  Yes    Self Breast Exam:  Yes    Physical/Emotional/Sexual Abuse: No    Do you feel safe in your environment? Yes        Cholesterol screen up to date: Yes- checking today 07/23/09, non fasting    Eye Exam up to date: Yes    Dental Exam up to date: Yes    Pap smear up to date: Yes: checking today 07/23/09, last was 04/08 NIL    Mammogram up to date: Does Not Apply    Dexa Scan up to date: Does Not Apply    Colonoscopy up to date: Does Not Apply    Immunizations up to date: Yes, had one a month ago.    Glucose screen if over 40:  No        Julieth Matthews MA    07/23/09     Social Determinants of Health     Financial Resource Strain:      Difficulty of Paying Living Expenses:    Food Insecurity:      Worried About Running Out of Food in the Last Year:      Ran Out of Food in the Last Year:    Transportation Needs:      Lack of Transportation (Medical):      Lack of Transportation (Non-Medical):    Physical Activity:      Days of Exercise per Week:      Minutes of Exercise per Session:    Stress:      Feeling of Stress :    Social Connections:      Frequency of Communication with Friends and Family:      Frequency of Social Gatherings with Friends and Family:      Attends Orthodoxy Services:      Active Member of Clubs or Organizations:      Attends Club or Organization Meetings:      Marital Status:    Intimate Partner Violence:      Fear of Current or Ex-Partner:      Emotionally Abused:      Physically Abused:      Sexually Abused:        Review of Systems:  Skin:        Eyes:       ENT:       Respiratory:       Cardiovascular:       Gastroenterology:      Genitourinary:       Musculoskeletal:       Neurologic:       Psychiatric:       Heme/Lymph/Imm:       Endocrine:           Recent Lab Results:  LIPID RESULTS:  Lab Results   Component  Value Date    CHOL 235 (H) 07/09/2021     07/09/2021     (H) 07/09/2021    TRIG 46 07/09/2021    CHOLHDLRATIO 2.2 03/30/2012       LIVER ENZYME RESULTS:  Lab Results   Component Value Date    AST 13 02/19/2021    ALT 19 02/19/2021       CBC RESULTS:  Lab Results   Component Value Date    WBC 4.3 11/04/2020    RBC 4.81 11/04/2020    HGB 14.3 11/04/2020    HCT 44.3 11/04/2020    MCV 92 11/04/2020    MCH 29.7 11/04/2020    MCHC 32.3 11/04/2020    RDW 12.6 11/04/2020     11/04/2020       BMP RESULTS:  Lab Results   Component Value Date     07/09/2021    POTASSIUM 3.8 07/09/2021    CHLORIDE 107 07/09/2021    CO2 26 07/09/2021    ANIONGAP 5 07/09/2021    GLC 94 07/09/2021    BUN 8 07/09/2021    CR 0.75 07/09/2021    GFRESTIMATED >90 07/09/2021    GFRESTBLACK >90 07/09/2021    STEPH 9.8 07/09/2021        A1C RESULTS:  No results found for: A1C    INR RESULTS:  Lab Results   Component Value Date    INR 0.90 04/21/2016    INR 0.87 04/29/2015         ECHOCARDIOGRAM  No results found for this or any previous visit (from the past 8760 hour(s)).      Orders Placed This Encounter   Procedures     EKG 12-lead complete w/read - Clinics (performed today)     No orders of the defined types were placed in this encounter.    There are no discontinued medications.      Encounter Diagnoses   Name Primary?     Tachycardia Yes     Palpitations          CC  No referring provider defined for this encounter.                  Thank you for allowing me to participate in the care of your patient.      Sincerely,     Vinny Almeida MD     [facility]  cc:   No referring provider defined for this encounter.

## 2021-10-27 ENCOUNTER — VIRTUAL VISIT (OUTPATIENT)
Dept: FAMILY MEDICINE | Facility: CLINIC | Age: 41
End: 2021-10-27
Payer: COMMERCIAL

## 2021-10-27 DIAGNOSIS — G47.00 INSOMNIA, UNSPECIFIED TYPE: ICD-10-CM

## 2021-10-27 DIAGNOSIS — F40.10 SOCIAL ANXIETY DISORDER: ICD-10-CM

## 2021-10-27 DIAGNOSIS — F41.9 ANXIETY: ICD-10-CM

## 2021-10-27 PROCEDURE — 99214 OFFICE O/P EST MOD 30 MIN: CPT | Mod: 95 | Performed by: FAMILY MEDICINE

## 2021-10-27 RX ORDER — LORAZEPAM 1 MG/1
.5-1 TABLET ORAL DAILY PRN
Qty: 15 TABLET | Refills: 1 | Status: SHIPPED | OUTPATIENT
Start: 2021-10-27 | End: 2022-05-12

## 2021-10-27 NOTE — PROGRESS NOTES
Chloe is a 41 year old who is being evaluated via a billable video visit.      How would you like to obtain your AVS? MyChart  If the video visit is dropped, the invitation should be resent by: Text to cell phone: -  Will anyone else be joining your video visit? No      Assessment & Plan       ICD-10-CM    1. Anxiety  F41.9 LORazepam (ATIVAN) 1 MG tablet   2. Insomnia, unspecified type  G47.00 LORazepam (ATIVAN) 1 MG tablet   3. Social anxiety disorder  F40.10 LORazepam (ATIVAN) 1 MG tablet      Anxiety/social anxiety/insomnia/stressors--  Increased mood/anxiety sx's lately with work stressors and home stressors.  Starting to see the couples therapist for individual therapy soon.  Se's on the hydroxyzine, but finds the lorazepam 1 tab helpful, usually ~1x/wk.  If meeting presentations, often takes the lorazepam the night prior, and if still sx's prior to meeting she'll also take the low-dose propranolol.  Lorazepam really helps her sleep and keep more calm for the presentations, and thinks the propranolol helps as well.  Will send in #15 (should last 3 months) of the lorazepam with a refill- okay to cont q6mo f/u, and can get one fill in meantime.    Palpitations/h/o CVA, h/o PFO closure-   Pt saw Dr. Almeida for concerns regarding higher resting HR and symptomatic palpitations and finding resting HR sometimes increasing to 110-120s.  He did not seem concerned, but wanted to r/o a.fib.  Ordered echo (scheduled in 11/21), and 3-day ziopatch.  Should discuss concerns with Dr. Almeida as well- are there things she should/could do even if a.fib are not found?  Discussed continued lowered caffeine, increase in exercise which she's planning, cont good diet.  Could discuss propranolol use for symptomatic palpitations with Dr. Almeida as well.      Return in about 6 months (around 4/27/2022) for Anxiety follow-up, Depression follow-up, Routine Visit/Chronic Cares/Med Check.    Izabel Reyes MD  [facility]  ROSALINDA Corona is a 41 year old who presents for the following health issues - anxiety, mood, palpitations, h/o stroke prior to PFO closure    HPI       Anxiety-   Med refill and check in.  Lorazepam- using them ~1 tab about once a week.  Tried the hydroxyzine- knocked her out, even with a 1/2 tab.  Took a propranolol- had to present at a meeting about a month ago.  Took the lorazepam the night prior.  Having more home stressors.  Getting into therapy - next month.  Individual therapy.  Marital issues.    Using Sleep Cycle- tracking it, and best nights are on lorazepam.    Palpitations- usually last ~1hr, but in general, her baseline seems to have gone up.  Few weeks ago, even higher.  Saw cardiology- Dr. Almeida.  Elevated heart rate- 90s in am, and 110s.  Abnls sensations q2-3 wks.  Has ziopatch next week, and echo in 11/21.  TSH was normal.  He is not concerned unless    Exercise- not much lately.  Elliptical- but new basement ceiling is too low.  Was going to order a peloton.  Does walk a lot and she's active, plays with her son.  Has cut out most of the caffeine- way less than half-caf.    Last few months, elevated HR, up to 111-117 and stays there for awhile.  q2-3 weeks.        Review of Systems   Constitutional, HEENT, cardiovascular, pulmonary, gi and gu systems are negative, except as otherwise noted.      Objective     Vitals:  No vitals were obtained today due to virtual visit.    Physical Exam   GENERAL: Healthy, alert and no distress  EYES: Eyes grossly normal to inspection.  No discharge or erythema, or obvious scleral/conjunctival abnormalities.  RESP: No audible wheeze, cough, or visible cyanosis.  No visible retractions or increased work of breathing.    SKIN: Visible skin clear. No significant rash, abnormal pigmentation or lesions.  NEURO: Cranial nerves grossly intact.  Mentation and speech appropriate for age.  PSYCH: Mentation appears normal, affect normal/bright, judgement  and insight intact, normal speech and appearance well-groomed.    Lab on 10/20/2021   Component Date Value Ref Range Status     TSH 10/20/2021 0.67  0.40 - 4.00 mU/L Final             Video-Visit Details    Type of service:  Video Visit    Video End Time:1:27 PM (31 minute visit)    Originating Location (pt. Location): Home    Distant Location (provider location):  [facility]     Platform used for Video Visit: Rafal

## 2021-11-01 ENCOUNTER — HOSPITAL ENCOUNTER (OUTPATIENT)
Dept: CARDIOLOGY | Facility: CLINIC | Age: 41
Discharge: HOME | End: 2021-11-01
Attending: INTERNAL MEDICINE | Admitting: INTERNAL MEDICINE
Payer: COMMERCIAL

## 2021-11-01 DIAGNOSIS — R00.2 PALPITATIONS: ICD-10-CM

## 2021-11-01 PROCEDURE — 93244 EXT ECG>48HR<7D REV&INTERPJ: CPT | Performed by: INTERNAL MEDICINE

## 2021-11-01 PROCEDURE — 93242 EXT ECG>48HR<7D RECORDING: CPT

## 2021-11-16 ENCOUNTER — TRANSFERRED RECORDS (OUTPATIENT)
Dept: HEALTH INFORMATION MANAGEMENT | Facility: CLINIC | Age: 41
End: 2021-11-16
Payer: COMMERCIAL

## 2021-11-23 ENCOUNTER — HOSPITAL ENCOUNTER (OUTPATIENT)
Dept: CARDIOLOGY | Facility: CLINIC | Age: 41
Discharge: HOME | End: 2021-11-23
Attending: INTERNAL MEDICINE | Admitting: INTERNAL MEDICINE
Payer: COMMERCIAL

## 2021-11-23 DIAGNOSIS — R00.2 PALPITATIONS: ICD-10-CM

## 2021-11-23 LAB — LVEF ECHO: NORMAL

## 2021-11-23 PROCEDURE — 93306 TTE W/DOPPLER COMPLETE: CPT | Mod: 26 | Performed by: INTERNAL MEDICINE

## 2021-11-23 PROCEDURE — 93306 TTE W/DOPPLER COMPLETE: CPT

## 2021-12-26 ENCOUNTER — HOSPITAL ENCOUNTER (EMERGENCY)
Facility: CLINIC | Age: 41
Discharge: HOME | End: 2021-12-26
Attending: EMERGENCY MEDICINE | Admitting: EMERGENCY MEDICINE
Payer: COMMERCIAL

## 2021-12-26 ENCOUNTER — APPOINTMENT (OUTPATIENT)
Dept: MRI IMAGING | Facility: CLINIC | Age: 41
End: 2021-12-26
Attending: EMERGENCY MEDICINE
Payer: COMMERCIAL

## 2021-12-26 VITALS
RESPIRATION RATE: 20 BRPM | HEIGHT: 67 IN | WEIGHT: 165 LBS | TEMPERATURE: 97.5 F | HEART RATE: 90 BPM | BODY MASS INDEX: 25.9 KG/M2 | DIASTOLIC BLOOD PRESSURE: 89 MMHG | SYSTOLIC BLOOD PRESSURE: 117 MMHG | OXYGEN SATURATION: 95 %

## 2021-12-26 DIAGNOSIS — Z86.73 HISTORY OF CVA (CEREBROVASCULAR ACCIDENT): ICD-10-CM

## 2021-12-26 DIAGNOSIS — R20.0 RIGHT FACIAL NUMBNESS: ICD-10-CM

## 2021-12-26 LAB
ANION GAP SERPL CALCULATED.3IONS-SCNC: 3 MMOL/L (ref 3–14)
ATRIAL RATE - MUSE: 93 BPM
B-HCG FREE SERPL-ACNC: <5 IU/L (ref 0–5)
BASOPHILS # BLD AUTO: 0.1 10E3/UL (ref 0–0.2)
BASOPHILS NFR BLD AUTO: 1 %
BUN SERPL-MCNC: 15 MG/DL (ref 7–30)
CALCIUM SERPL-MCNC: 8.7 MG/DL (ref 8.5–10.1)
CHLORIDE BLD-SCNC: 109 MMOL/L (ref 94–109)
CO2 SERPL-SCNC: 27 MMOL/L (ref 20–32)
CREAT SERPL-MCNC: 0.73 MG/DL (ref 0.52–1.04)
DIASTOLIC BLOOD PRESSURE - MUSE: NORMAL MMHG
EOSINOPHIL # BLD AUTO: 0.2 10E3/UL (ref 0–0.7)
EOSINOPHIL NFR BLD AUTO: 3 %
ERYTHROCYTE [DISTWIDTH] IN BLOOD BY AUTOMATED COUNT: 13 % (ref 10–15)
GFR SERPL CREATININE-BSD FRML MDRD: >90 ML/MIN/1.73M2
GLUCOSE BLD-MCNC: 100 MG/DL (ref 70–99)
HCT VFR BLD AUTO: 42.2 % (ref 35–47)
HGB BLD-MCNC: 13.7 G/DL (ref 11.7–15.7)
IMM GRANULOCYTES # BLD: 0 10E3/UL
IMM GRANULOCYTES NFR BLD: 0 %
INTERPRETATION ECG - MUSE: NORMAL
LYMPHOCYTES # BLD AUTO: 2.2 10E3/UL (ref 0.8–5.3)
LYMPHOCYTES NFR BLD AUTO: 40 %
MCH RBC QN AUTO: 29.3 PG (ref 26.5–33)
MCHC RBC AUTO-ENTMCNC: 32.5 G/DL (ref 31.5–36.5)
MCV RBC AUTO: 90 FL (ref 78–100)
MONOCYTES # BLD AUTO: 0.5 10E3/UL (ref 0–1.3)
MONOCYTES NFR BLD AUTO: 8 %
NEUTROPHILS # BLD AUTO: 2.6 10E3/UL (ref 1.6–8.3)
NEUTROPHILS NFR BLD AUTO: 48 %
NRBC # BLD AUTO: 0 10E3/UL
NRBC BLD AUTO-RTO: 0 /100
P AXIS - MUSE: 55 DEGREES
PLATELET # BLD AUTO: 299 10E3/UL (ref 150–450)
POTASSIUM BLD-SCNC: 3.9 MMOL/L (ref 3.4–5.3)
PR INTERVAL - MUSE: 152 MS
QRS DURATION - MUSE: 80 MS
QT - MUSE: 370 MS
QTC - MUSE: 460 MS
R AXIS - MUSE: 26 DEGREES
RBC # BLD AUTO: 4.67 10E6/UL (ref 3.8–5.2)
SODIUM SERPL-SCNC: 139 MMOL/L (ref 133–144)
SYSTOLIC BLOOD PRESSURE - MUSE: NORMAL MMHG
T AXIS - MUSE: 56 DEGREES
VENTRICULAR RATE- MUSE: 93 BPM
WBC # BLD AUTO: 5.5 10E3/UL (ref 4–11)

## 2021-12-26 PROCEDURE — 99285 EMERGENCY DEPT VISIT HI MDM: CPT | Mod: 25

## 2021-12-26 PROCEDURE — 85025 COMPLETE CBC W/AUTO DIFF WBC: CPT | Performed by: EMERGENCY MEDICINE

## 2021-12-26 PROCEDURE — 93005 ELECTROCARDIOGRAM TRACING: CPT

## 2021-12-26 PROCEDURE — 84702 CHORIONIC GONADOTROPIN TEST: CPT

## 2021-12-26 PROCEDURE — 250N000011 HC RX IP 250 OP 636: Performed by: EMERGENCY MEDICINE

## 2021-12-26 PROCEDURE — 70551 MRI BRAIN STEM W/O DYE: CPT

## 2021-12-26 PROCEDURE — 96374 THER/PROPH/DIAG INJ IV PUSH: CPT

## 2021-12-26 PROCEDURE — 36415 COLL VENOUS BLD VENIPUNCTURE: CPT | Performed by: EMERGENCY MEDICINE

## 2021-12-26 PROCEDURE — 80048 BASIC METABOLIC PNL TOTAL CA: CPT | Performed by: EMERGENCY MEDICINE

## 2021-12-26 RX ORDER — LORAZEPAM 2 MG/ML
1 INJECTION INTRAMUSCULAR ONCE
Status: COMPLETED | OUTPATIENT
Start: 2021-12-26 | End: 2021-12-26

## 2021-12-26 RX ORDER — ASPIRIN 81 MG/1
324 TABLET, CHEWABLE ORAL ONCE
Status: DISCONTINUED | OUTPATIENT
Start: 2021-12-26 | End: 2021-12-26

## 2021-12-26 RX ADMIN — LORAZEPAM 1 MG: 2 INJECTION INTRAMUSCULAR; INTRAVENOUS at 10:25

## 2021-12-26 ASSESSMENT — ENCOUNTER SYMPTOMS
ABDOMINAL PAIN: 0
NUMBNESS: 1
HEADACHES: 0

## 2021-12-26 ASSESSMENT — MIFFLIN-ST. JEOR: SCORE: 1441.07

## 2021-12-26 NOTE — ED PROVIDER NOTES
"  History   Chief Complaint:  Numbness       HPI   Chloe Foster is a 41 year old female with history of migraines with aura and a CVA who presents with right facial numbness. The patient states that it started around 0840 this morning and lasted about 2 minutes. She denies any headache, abdominal pain, nor any other ongoing symptoms. She says her usual aura involves partial loss of vision and seeing zig-zagging lines across her visual field about an hour prior to the headache.    Review of Systems   Gastrointestinal: Negative for abdominal pain.   Neurological: Positive for numbness (Right side of face). Negative for headaches.   All other systems reviewed and are negative.    Allergies:  Compazine    Medications:  Ativan  Excedrin    Past Medical History:     Acute stress reaction  Anxiety  CVA  Cerebral artery occlusion with cerebral infarction  Depression  Protein S deficiency      Past Surgical History:    Right knee arthroscopy  Repair of nasal septum     Family History:    Father: Throat cancer, Hypertension, Arrhythmia, Alcoholism    Social History:  The patient presents alone. She works as an  at "[employer]".    Physical Exam     Patient Vitals for the past 24 hrs:   BP Temp Temp src Pulse Resp SpO2 Height Weight   12/26/21 0930 117/89 -- -- 90 -- 95 % -- --   12/26/21 0921 (!) 144/89 97.5  F (36.4  C) Temporal 95 20 97 % 1.702 m (5' 7\") 74.8 kg (165 lb)     Physical Exam  Eye:  Pupils are equal, round, and reactive.  Extraocular movements intact.    ENT:  No rhinorrhea.  Moist mucus membranes.  Normal tongue and tonsil.    Cardiac:  Regular rate and rhythm.  No murmurs, gallops, or rubs.    Pulmonary:  Clear to auscultation bilaterally.  No wheezes, rales, or rhonchi.    Abdomen:  Positive bowel sounds.  Abdomen is soft and non-distended, without focal tenderness.    Musculoskeletal:  Normal movement of all extremities without evidence for deficit.    Skin:  Warm and dry without " rashes.    Neuro:  CN II - XII intact.  5/5 strength in all extremities.  Normal sensation throughout.  Normal finger to nose and heel to shin.  2+ patellar reflexes.  Normal gait.    Psychiatric:  Normal affect with appropriate interaction with examiner.     Emergency Department Course   ECG  ECG obtained at 0926, ECG read at 0930  Normal sinus rhythm  Normal ECG   Rate 93 bpm. MS interval 152 ms. QRS duration 80 ms. QT/QTc 370/460 ms. P-R-T axes 55 26 56.     Imaging:  MR Brain w/o Contrast:  1. No acute intracranial pathology.  2. Minimal nonspecific cerebral white matter T2 hyperintensities  Report per radiology    Laboratory:  iStat HCG Quantitative Pregnancy, POCT: <5.0    CBC: WBC 5.5, HGB 13.7,       BMP: Glucose 100 o/w WNL (Creatinine 0.73)       Emergency Department Course:  Reviewed:  I reviewed nursing notes, vitals, past medical history and Care Everywhere    Assessments:  0955 I obtained history and examined the patient as noted above.   1118 I rechecked the patient and explained findings.    Interventions:  1025 Ativan, 1 mg, IV    Disposition:  The patient was discharged to home.     Impression & Plan   Medical Decision Making:  This 41-year-old woman with a prior stroke due to a patent foramen ovale (since closed) presents to us with a short spell of right-sided numbness along with facial droop.  The symptoms resolved rather quickly.  However, with her history of stroke, she wanted to be assured that this did not represent a new stroke.  She does have a long history of migraines with aura, though she has never had this particular aura.    On my exam, she has no physical signs of stroke.  Her vital signs are reassuring.  MRI shows no evidence of a new infarct.  She continues to be stable throughout my care with normal vitals and normal labs.  I feel she is safe to be discharged home with a probable diagnosis of migraine with aura.  Nonetheless, she was advised to return to us if she has any  worsening of condition or other emergent concerns.      Diagnosis:    ICD-10-CM    1. Right facial numbness  R20.0     RESOLVED   2. History of CVA (cerebrovascular accident)  Z86.73      Scribe Disclosure:  Asia TYLER, am serving as a scribe at 9:45 AM on 12/26/2021 to document services personally performed by Trierweiler, Chad A, MD based on my observations and the provider's statements to me.      Trierweiler, Chad A, MD  12/29/21 0006

## 2021-12-26 NOTE — ED TRIAGE NOTES
Right facial numbness on right side this morning at 0845. Right facial numbness lasted a minute with facial droop. Pt. States all is resolved now but has had 2 strokes in the past.

## 2021-12-28 ENCOUNTER — TRANSFERRED RECORDS (OUTPATIENT)
Dept: HEALTH INFORMATION MANAGEMENT | Facility: CLINIC | Age: 41
End: 2021-12-28
Payer: COMMERCIAL

## 2021-12-31 NOTE — PROGRESS NOTES
Chloe is a 41 year old who is being evaluated via a billable telephone visit.      What phone number would you like to be contacted at? [phone]  How would you like to obtain your AVS? MyChart    Assessment & Plan       ICD-10-CM    1. Major depressive disorder, recurrent episode, mild (H)  F33.0 EMOTIONAL / BEHAVIORAL ASSESSMENT     buPROPion (WELLBUTRIN) 75 MG tablet   2. Psychophysiological insomnia  F51.04 traZODone (DESYREL) 50 MG tablet   3. Social anxiety disorder  F40.10    4. Seasonal affective disorder (H)  F33.8 buPROPion (WELLBUTRIN) 75 MG tablet      Long h/o mild but recurrent mood/anxiety/insomnia issues, now with worsening mood/motivation issues, possible SAD involvement.  Tends to be very sensitive to meds, but did have help in sx's with low-dose wellbutrin in the past, but did have se's including insomnia on it in the past.  --Will try wellbutrin 37.5mg (1-2x/day) or 75mg in am.  --Okay for pt to experiment from using 37.5mg just in the am, or take another dose in early afternoon, or use 75mg in am alone.  --If insomnia se's on it, sent in trazodone 25-50mg to use at night, which she's used in the past.  She can also use unisom if that works better for her.  Rec she try the CBT louann for insomnia first if able, though.  Risks and benefits of medication(s) including potential side effects reviewed with patient.  Questions answered.   RTC (virtual fine) in 6 wks for f/u.    Return in about 6 weeks (around 2/18/2022) for Depression follow-up.    Izabel Reyes MD  [facility]   Chloe is a 41 year old who presents for the following health issues- worsening mood symptoms    HPI     Depression Followup    How are you doing with your depression since your last visit? Worsened     Are you having other symptoms that might be associated with depression? Yes:  sleeping and motivation in general     Have you had a significant life event?  No     Are you  feeling anxious or having panic attacks?   Yes:  usual     Do you have any concerns with your use of alcohol or other drugs? No    Sx's creeping in over the last few months.  Not super sad but lack of motivation, may be seasonal.  Stacking up.  Sx's are lasting longer.  Able to enjoy things,   Not horrible/scary- more of the blahs.  Finding it hard to get things done, which causes other things to worsen.    Last time, had talked about taking wellbutrin with a really small, small dose.    Sleep- waking at ~1:30am.  Mind racing.    Dry January, so not etoh related insomnia.  Took unisom 1/2 tab at times when she wakes, but then is groggy in am.  Melatonin in past makes her groggy.  CBD didn't help.    CBT therapy for sleep louann- hasn't tried it yet.      Social History     Tobacco Use     Smoking status: Former Smoker     Packs/day: 0.50     Years: 5.00     Pack years: 2.50     Types: Cigarettes     Quit date: 2014     Years since quittin.7     Smokeless tobacco: Never Used   Substance Use Topics     Alcohol use: Yes     Alcohol/week: 0.0 standard drinks     Comment: SOCIAL     Drug use: No     PHQ 2020   PHQ-9 Total Score 1 9 4   Q9: Thoughts of better off dead/self-harm past 2 weeks Not at all Not at all Not at all     AYLA-7 SCORE 2020   Total Score - - 8 (mild anxiety)   Total Score 3 9 8         How many servings of fruits and vegetables do you eat daily?  4 or more    On average, how many sweetened beverages do you drink each day (Examples: soda, juice, sweet tea, etc.  Do NOT count diet or artificially sweetened beverages)?   0    How many days per week do you exercise enough to make your heart beat faster? 5    How many minutes a day do you exercise enough to make your heart beat faster? 30 - 60  How many days per week do you miss taking your medication? Currently not taking medication     What makes it hard for you to take your medications?  Not taking  medication       Review of Systems   Constitutional, HEENT, cardiovascular, pulmonary, gi and gu systems are negative, except as otherwise noted.        Objective     Vitals:  No vitals were obtained today due to virtual visit.    Physical Exam   healthy, alert and no distress  PSYCH: Alert and oriented times 3; coherent speech, normal   rate and volume, able to articulate logical thoughts, able   to abstract reason, no tangential thoughts, no hallucinations   or delusions  Her affect is normal  RESP: No cough, no audible wheezing, able to talk in full sentences  Remainder of exam unable to be completed due to telephone visits              Phone call duration: 9 minutes

## 2022-01-07 ENCOUNTER — VIRTUAL VISIT (OUTPATIENT)
Dept: FAMILY MEDICINE | Facility: CLINIC | Age: 42
End: 2022-01-07
Payer: COMMERCIAL

## 2022-01-07 DIAGNOSIS — F33.0 MAJOR DEPRESSIVE DISORDER, RECURRENT EPISODE, MILD (H): Primary | ICD-10-CM

## 2022-01-07 DIAGNOSIS — F33.8 SEASONAL AFFECTIVE DISORDER (H): ICD-10-CM

## 2022-01-07 DIAGNOSIS — F51.04 PSYCHOPHYSIOLOGICAL INSOMNIA: ICD-10-CM

## 2022-01-07 DIAGNOSIS — F40.10 SOCIAL ANXIETY DISORDER: ICD-10-CM

## 2022-01-07 PROCEDURE — 96127 BRIEF EMOTIONAL/BEHAV ASSMT: CPT | Mod: 95 | Performed by: FAMILY MEDICINE

## 2022-01-07 PROCEDURE — 99214 OFFICE O/P EST MOD 30 MIN: CPT | Mod: 95 | Performed by: FAMILY MEDICINE

## 2022-01-07 RX ORDER — BUPROPION HYDROCHLORIDE 75 MG/1
37.5 TABLET ORAL 2 TIMES DAILY
Qty: 60 TABLET | Refills: 0 | Status: SHIPPED | OUTPATIENT
Start: 2022-01-07 | End: 2022-11-08

## 2022-01-07 RX ORDER — TRAZODONE HYDROCHLORIDE 50 MG/1
25-50 TABLET, FILM COATED ORAL AT BEDTIME
Qty: 30 TABLET | Refills: 0 | Status: SHIPPED | OUTPATIENT
Start: 2022-01-07 | End: 2023-05-04

## 2022-01-07 ASSESSMENT — PATIENT HEALTH QUESTIONNAIRE - PHQ9
5. POOR APPETITE OR OVEREATING: SEVERAL DAYS
SUM OF ALL RESPONSES TO PHQ QUESTIONS 1-9: 8

## 2022-01-07 ASSESSMENT — ANXIETY QUESTIONNAIRES
6. BECOMING EASILY ANNOYED OR IRRITABLE: MORE THAN HALF THE DAYS
1. FEELING NERVOUS, ANXIOUS, OR ON EDGE: SEVERAL DAYS
2. NOT BEING ABLE TO STOP OR CONTROL WORRYING: SEVERAL DAYS
5. BEING SO RESTLESS THAT IT IS HARD TO SIT STILL: NOT AT ALL
GAD7 TOTAL SCORE: 6
7. FEELING AFRAID AS IF SOMETHING AWFUL MIGHT HAPPEN: NOT AT ALL
IF YOU CHECKED OFF ANY PROBLEMS ON THIS QUESTIONNAIRE, HOW DIFFICULT HAVE THESE PROBLEMS MADE IT FOR YOU TO DO YOUR WORK, TAKE CARE OF THINGS AT HOME, OR GET ALONG WITH OTHER PEOPLE: SOMEWHAT DIFFICULT
3. WORRYING TOO MUCH ABOUT DIFFERENT THINGS: SEVERAL DAYS

## 2022-01-08 ASSESSMENT — ANXIETY QUESTIONNAIRES: GAD7 TOTAL SCORE: 6

## 2022-01-10 ENCOUNTER — OFFICE VISIT (OUTPATIENT)
Dept: FAMILY MEDICINE | Facility: CLINIC | Age: 42
End: 2022-01-10
Payer: COMMERCIAL

## 2022-01-10 ENCOUNTER — ANCILLARY PROCEDURE (OUTPATIENT)
Dept: GENERAL RADIOLOGY | Facility: CLINIC | Age: 42
End: 2022-01-10
Attending: PHYSICIAN ASSISTANT
Payer: COMMERCIAL

## 2022-01-10 VITALS
WEIGHT: 165 LBS | RESPIRATION RATE: 16 BRPM | HEART RATE: 91 BPM | OXYGEN SATURATION: 96 % | TEMPERATURE: 97.7 F | BODY MASS INDEX: 25.9 KG/M2 | DIASTOLIC BLOOD PRESSURE: 81 MMHG | SYSTOLIC BLOOD PRESSURE: 110 MMHG | HEIGHT: 67 IN

## 2022-01-10 DIAGNOSIS — M25.572 PAIN IN JOINT INVOLVING ANKLE AND FOOT, LEFT: Primary | ICD-10-CM

## 2022-01-10 PROCEDURE — 73610 X-RAY EXAM OF ANKLE: CPT | Mod: LT | Performed by: RADIOLOGY

## 2022-01-10 PROCEDURE — 99213 OFFICE O/P EST LOW 20 MIN: CPT | Performed by: PHYSICIAN ASSISTANT

## 2022-01-10 ASSESSMENT — MIFFLIN-ST. JEOR: SCORE: 1441.07

## 2022-01-10 NOTE — PROGRESS NOTES
"  Assessment & Plan     Pain in joint involving ankle and foot, left  Rest, compress, ice, active painless ROM exercises.    - XR Ankle Left G/E 3 Views             BMI:   Estimated body mass index is 25.84 kg/m  as calculated from the following:    Height as of this encounter: 1.702 m (5' 7\").    Weight as of this encounter: 74.8 kg (165 lb).           No follow-ups on file.    Liu Benoit PA-C  [facility]   Chloe is a 41 year old who presents for the following health issues     HPI     Pain History:  When did you first notice your pain? - today  Have you seen anyone else for your pain? No  Where in your body do you have pain? Musculoskeletal problem/pain  Onset/Duration: today   Description  Location: ankle - left  Joint Swelling: YES  Redness: YES  Pain: YES  Warmth: no  Intensity:  moderate, severe  Progression of Symptoms:  worsening  Accompanying signs and symptoms:   Fevers: no  Numbness/tingling/weakness: YES  History  Trauma to the area: YES- pt tripped today coming down the stairs   Recent illness:  no  Previous similar problem: no  Previous evaluation:  no  Precipitating or alleviating factors:  Aggravating factors include: walking  Therapies tried and outcome: tylenol           Review of Systems   Constitutional, HEENT, cardiovascular, pulmonary, gi and gu systems are negative, except as otherwise noted.      Objective    /81   Pulse 91   Temp 97.7  F (36.5  C) (Skin)   Resp 16   Ht 1.702 m (5' 7\")   Wt 74.8 kg (165 lb)   LMP 12/14/2021   SpO2 96%   BMI 25.84 kg/m    Body mass index is 25.84 kg/m .  Physical Exam   GENERAL: alert and no distress  EYES: Eyes grossly normal to inspection  MS: swelling lateral ankle, tender ATFL.  Non tender distal 5th metatarsal     Results for orders placed or performed in visit on 01/10/22   XR Ankle Left G/E 3 Views    Impression    IMPRESSION:  Intact-appearing left ankle mortise and distal syndesmosis. No " acute  displaced left ankle fracture. Moderate lateral malleolus left ankle  soft tissue swelling. Tibiotalar and hindfoot joint spaces are normal.  Ankle joint effusion.    PARAM VEGA MD         SYSTEM ID:  [id number]

## 2022-01-11 ENCOUNTER — VIRTUAL VISIT (OUTPATIENT)
Dept: NEUROLOGY | Facility: CLINIC | Age: 42
End: 2022-01-11
Payer: COMMERCIAL

## 2022-01-11 DIAGNOSIS — G43.101 MIGRAINE WITH AURA AND WITH STATUS MIGRAINOSUS, NOT INTRACTABLE: Primary | ICD-10-CM

## 2022-01-11 DIAGNOSIS — M62.838 MUSCLE SPASM: ICD-10-CM

## 2022-01-11 PROCEDURE — 99214 OFFICE O/P EST MOD 30 MIN: CPT | Mod: 95 | Performed by: PSYCHIATRY & NEUROLOGY

## 2022-01-11 RX ORDER — RIMEGEPANT SULFATE 75 MG/75MG
75 TABLET, ORALLY DISINTEGRATING ORAL DAILY PRN
Qty: 8 TABLET | Refills: 11 | Status: SHIPPED | OUTPATIENT
Start: 2022-01-11

## 2022-01-11 ASSESSMENT — HEADACHE IMPACT TEST (HIT 6)
HOW OFTEN DID HEADACHS LIMIT CONCENTRATION ON WORK OR DAILY ACTIVITY: SOMETIMES
HOW OFTEN HAVE YOU FELT FED UP OR IRRITATED BECAUSE OF YOUR HEADACHES: VERY OFTEN
HOW OFTEN DO HEADACHES LIMIT YOUR DAILY ACTIVITIES: SOMETIMES
HOW OFTEN HAVE YOU FELT TOO TIRED TO WORK BECAUSE OF YOUR HEADACHES: SOMETIMES
HOW OFTEN HAVE YOU FELT TOO TIRED TO WORK BECAUSE OF YOUR HEADACHES: SOMETIMES
HOW OFTEN DID HEADACHS LIMIT CONCENTRATION ON WORK OR DAILY ACTIVITY: SOMETIMES
HOW OFTEN HAVE YOU FELT FED UP OR IRRITATED BECAUSE OF YOUR HEADACHES: VERY OFTEN
HIT6 TOTAL SCORE: 60
WHEN YOU HAVE A HEADACHE HOW OFTEN DO YOU WISH YOU COULD LIE DOWN: VERY OFTEN
HOW OFTEN DO HEADACHES LIMIT YOUR DAILY ACTIVITIES: SOMETIMES
WHEN YOU HAVE HEADACHES HOW OFTEN IS THE PAIN SEVERE: RARELY
WHEN YOU HAVE HEADACHES HOW OFTEN IS THE PAIN SEVERE: RARELY
HIT6 TOTAL SCORE: 60
WHEN YOU HAVE A HEADACHE HOW OFTEN DO YOU WISH YOU COULD LIE DOWN: VERY OFTEN

## 2022-01-11 NOTE — PROGRESS NOTES
Chloe is a 41 year old who is being evaluated via a billable video visit.      How would you like to obtain your AVS? MyChart  If the video visit is dropped, the invitation should be resent by: Text to cell phone: [phone]  Will anyone else be joining your video visit? No    Video Start Time: 3:01 PM  Video-Visit Details    Type of service:  Video Visit    Video End Time: 3:20PM    Originating Location (pt. Location): Home    Distant Location (provider location):  [facility]     Platform used for Video Visit: Well     MIGRAINE DISABILITY ASSESSMENT (MIDAS)    On how many days in the last 3 months did you miss work or school because of your headaches?  0    How many days in the last 3 months was your productivity at work or school reduced by half or more because of your headaches? (Do not include days you counted in question 1 where you missed work or school.)  7     On how many days in the last 3 months did you not do household work (such as housework, home repairs and maintenance, shopping, caring for children and relatives) because of your headaches?  10    How many days in the last 3 months was your productivity in household work reduced by half or more because of your headaches? (Do not include days you counted in question 3 where you did not do household work).  10    On how many days in the last 3 months did you miss family, social, or lesiure activities because of your headaches?  10    MIDAS Total Score: 37    On how many days in the last 3 months did you have a headache? (If a headache lasted more than 1 day, count each day.)   15    On a scale of 0 - 10, on average how painful were these headaches (where 0 = no pain at all, and 10 = pain as bad as it can be.)  8    Last Patient-Answered HIT-6 Questionnaire  HIT-6 1/11/2022   When you have headaches, how often is the pain severe 8   How often do headaches limit your ability to do usual daily activities including  household work, work, school, or social activities? 10   When you have a headache, how often do you wish you could lie down? 11   In the past 4 weeks, how often have you felt too tired to do work or daily activities because of your headaches 10   In the past 4 weeks, how often have you felt fed up or irritated because of your headaches 11   In the past 4 weeks, how often did headaches limit your ability to concentrate on work or daily activities 10   HIT-6 Total Score 60           [facility]    Chloe Foster MRN# [id number]   Age: 41 year old YOB: 1980     Requesting physician: Referred Self  Izabel Reyes            Assessment and Plan:     (G43.101) Migraine with aura and with status migrainosus, not intractable  (primary encounter diagnosis)  Comment: Chronic episodic condition, when flaring causes disability. In setting of stroke she can not use triptans. Ideal candidate for gepants. Will prescribe Nurtec.   Plan: Rimegepant Sulfate (NURTEC) 75 MG TBDP            (M62.838) Muscle spasm  Comment: I believe this was a consequence of Botox procedure over weakening some facial muscles and not others. In future lower doses with more concentrated placement at procerus and  recommended. Botox is not contraindicated in light of the result and it will not be permanent. She will seek future care through [facility] where providers have more experience with cosmetic and medical needs     Latia Post MD           History of Present Illness:   CC: Urgent add on    Chloe is a 41-year-old with history of stroke in hypercoagulable state of pregnancy. She also has migraine history. No triptan use due to strokes. Had used Excedrin in past.     Was privately paying for Botox and after her injections in November with a top up in December she describes a feeling of pulling in the procerus area of forehead.     She saw cosmetic provider who stated the result was not an effect of  Botox. She has gone to the ER and had MRI brain that ruled out acute stroke.     Newer headache              Physical Exam:     Facial movements appear normal at this time on camera. Some paralysis seen in frontalis but not procerus or corrugators         Pertinent History/Data for appointment:     Outside records reviewed from Botox procedures. 2 visits.

## 2022-01-11 NOTE — LETTER
1/11/2022       RE: Chloe Foster  [address] [facility] [address]     Dear Colleague,    Thank you for referring your patient, Chloe Foster, to the [facility] NEUR[facility]IC [place] [facility]. Please see a copy of my visit note below.    MIGRAINE DISABILITY ASSESSMENT (MIDAS)    On how many days in the last 3 months did you miss work or school because of your headaches?  0    How many days in the last 3 months was your productivity at work or school reduced by half or more because of your headaches? (Do not include days you counted in question 1 where you missed work or school.)  7     On how many days in the last 3 months did you not do household work (such as housework, home repairs and maintenance, shopping, caring for children and relatives) because of your headaches?  10    How many days in the last 3 months was your productivity in household work reduced by half or more because of your headaches? (Do not include days you counted in question 3 where you did not do household work).  10    On how many days in the last 3 months did you miss family, social, or lesiure activities because of your headaches?  10    MIDAS Total Score: 37    On how many days in the last 3 months did you have a headache? (If a headache lasted more than 1 day, count each day.)   15    On a scale of 0 - 10, on average how painful were these headaches (where 0 = no pain at all, and 10 = pain as bad as it can be.)  8    Last Patient-Answered HIT-6 Questionnaire  HIT-6 1/11/2022   When you have headaches, how often is the pain severe 8   How often do headaches limit your ability to do usual daily activities including household work, work, school, or social activities? 10   When you have a headache, how often do you wish you could lie down? 11   In the past 4 weeks, how often have you felt too tired to do work or daily activities because of your  headaches 10   In the past 4 weeks, how often have you felt fed up or irritated because of your headaches 11   In the past 4 weeks, how often did headaches limit your ability to concentrate on work or daily activities 10   HIT-6 Total Score 60           [facility]    Chloe Foster MRN# [id number]   Age: 41 year old YOB: 1980     Requesting physician: Referred Self  Izabel Reyes            Assessment and Plan:     (G43.101) Migraine with aura and with status migrainosus, not intractable  (primary encounter diagnosis)  Comment: Chronic episodic condition, when flaring causes disability. In setting of stroke she can not use triptans. Ideal candidate for gepants. Will prescribe Nurtec.   Plan: Rimegepant Sulfate (NURTEC) 75 MG TBDP          (M62.838) Muscle spasm  Comment: I believe this was a consequence of Botox procedure over weakening some facial muscles and not others. In future lower doses with more concentrated placement at procerus and  recommended. Botox is not contraindicated in light of the result and it will not be permanent. She will seek future care through [facility] where providers have more experience with cosmetic and medical needs     Latia Post MD           History of Present Illness:   CC: Urgent add on    Chloe is a 41-year-old with history of stroke in hypercoagulable state of pregnancy. She also has migraine history. No triptan use due to strokes. Had used Excedrin in past.     Was privately paying for Botox and after her injections in November with a top up in December she describes a feeling of pulling in the procerus area of forehead.     She saw cosmetic provider who stated the result was not an effect of Botox. She has gone to the ER and had MRI brain that ruled out acute stroke.     Newer headache          Physical Exam:     Facial movements appear normal at this time on camera. Some paralysis seen in frontalis but not procerus or  corrugators         Pertinent History/Data for appointment:     Outside records reviewed from Botox procedures. 2 visits.       Latia Post MD

## 2022-01-17 ENCOUNTER — TELEPHONE (OUTPATIENT)
Dept: NEUROLOGY | Facility: CLINIC | Age: 42
End: 2022-01-17
Payer: COMMERCIAL

## 2022-01-17 NOTE — TELEPHONE ENCOUNTER
Prior Authorization Retail Medication Request    Medication/Dose: Nurtec 75 mg   ICD code (if different than what is on RX):  Chronic migraine  Previously Tried and Failed:  In setting of stroke she can not use triptans. Ideal candidate for gepants. Will prescribe Nurtec.     Rationale:  Chronic episodic condition, when flaring causes disability    Insurance Name:  [facility]  Insurance ID:  [id number]       Pharmacy Information (if different than what is on RX)  Name:    Phone:

## 2022-01-18 NOTE — TELEPHONE ENCOUNTER
Central Prior Authorization Team   Phone: [phone]    PA Initiation    Medication: Nurtec 75 mg   Insurance Company: [employer] [place] - Phone [phone] Fax [phone]  Pharmacy Filling the Rx: [facility] #[id number] - [place], [place] - [address] & [facility]  Filling Pharmacy Phone: [phone]  Filling Pharmacy Fax: [phone]  Start Date: 1/18/2022

## 2022-01-20 NOTE — TELEPHONE ENCOUNTER
Prior Authorization Approval    Authorization Effective Date: 1/11/2022  Authorization Expiration Date: 1/11/2023  Medication: Nurtec 75 mg-PA APPROVED   Approved Dose/Quantity:  UP TO 45 TABLETS PER 90 DAYS   Reference #:     Insurance Company: [employer] [place] - Phone [phone] Fax [phone]  Expected CoPay:       CoPay Card Available:      Foundation Assistance Needed:    Which Pharmacy is filling the prescription (Not needed for infusion/clinic administered): [id number] - [place], [place] - [address]  Pharmacy Notified: Yes- **Instructed pharmacy to notify patient when script is ready to /ship.**  Patient Notified: Yes

## 2022-03-25 ENCOUNTER — OFFICE VISIT (OUTPATIENT)
Dept: PLASTIC SURGERY | Facility: CLINIC | Age: 42
End: 2022-03-25
Payer: COMMERCIAL

## 2022-03-25 DIAGNOSIS — Z98.890 POSTOPERATIVE STATE: Primary | ICD-10-CM

## 2022-03-25 DIAGNOSIS — Z41.1 ELECTIVE PROCEDURE FOR UNACCEPTABLE COSMETIC APPEARANCE: ICD-10-CM

## 2022-03-25 NOTE — PROGRESS NOTES
Facial Plastic and Reconstructive Surgery      Chloe Foster comes in for nasal follow up.   She still notes fullness on the right with some concavity on the left.   We discussed treating the right with steroids and consideration of filling the left with  the future.     She also mentioned she had odd contractions to botox in the past, but would like to test if she can tolerate it- She has noted the her ala pull and lead to pointing of her nasal tip.     We would start with botox to the glabella, 12 units and then if that is ok we will continue with other areas.

## 2022-03-25 NOTE — LETTER
March 25, 2022  Re: Chloe Foster  1980      Thank you so much for referring Chloe Foster to the [facility]. I had the pleasure of visiting with Chloe today.     Attached you will find a copy of my note. Please feel free to reach out to me with any questions, [phone].       Facial Plastic and Reconstructive Surgery      Chloe Foster comes in for nasal follow up.   She still notes fullness on the right with some concavity on the left.   We discussed treating the right with steroids and consideration of filling the left with  the future.     She also mentioned she had odd contractions to botox in the past, but would like to test if she can tolerate it- She has noted the her ala pull and lead to pointing of her nasal tip.     We would start with botox to the glabella, 12 units and then if that is ok we will continue with other areas.       Sincerely,  Nikki Schwartz MD

## 2022-03-25 NOTE — LETTER
3/25/2022       RE: Chloe Foster  [address] [address]     Dear Colleague,    Thank you for referring your patient, Chloe Foster, to the [facility]. Please see a copy of my visit note below.      Facial Plastic and Reconstructive Surgery      Chloe Foster comes in for nasal follow up.   She still notes fullness on the right with some concavity on the left.   We discussed treating the right with steroids and consideration of filling the left with  the future.     She also mentioned she had odd contractions to botox in the past, but would like to test if she can tolerate it- She has noted the her ala pull and lead to pointing of her nasal tip.     We would start with botox to the glabella, 12 units and then if that is ok we will continue with other areas.         Nikki Schwartz MD

## 2022-05-11 NOTE — PROGRESS NOTES
"Chloe is a 41 year old who is being evaluated via a billable video visit.      How would you like to obtain your AVS? MyChart  If the video visit is dropped, the invitation should be resent by: Text to cell phone: [phone]  Will anyone else be joining your video visit? No      Video Start Time: 4:03 PM    Assessment & Plan     Anxiety  Insomnia, unspecified type  Social anxiety disorder  Long standing, chronic, controlled overall; regular refills of short supply of lorazepam to lase a few months sent to pharmacy; Proper use and risk for abuse discussed with patient at length. Patient to return to clinic when refills needed. Patient will follow up with PCP for routine follow up status post medicines changes as scheduled as well.  - LORazepam (ATIVAN) 1 MG tablet; Take 0.5-1 tablets (0.5-1 mg) by mouth daily as needed for anxiety or sleep (severe anxiety/panic)    Review of prior external note(s) from - previous routine PCP notes  15 minutes spent on the date of the encounter doing chart review, history and exam, documentation and further activities per the note       BMI:   Estimated body mass index is 25.84 kg/m  as calculated from the following:    Height as of 1/10/22: 1.702 m (5' 7\").    Weight as of 1/10/22: 74.8 kg (165 lb).       There are no Patient Instructions on file for this visit.    Return in about 12 days (around 5/24/2022) for Follow up, with PCP, or sooner with worsening symptoms.    Shelby Ferro PA-C  [facility]    Aly Corona is a 41 year old who presents for the following health issues     History of Present Illness       Reason for visit:  Medication refill    She eats 4 or more servings of fruits and vegetables daily.She consumes 0 sweetened beverage(s) daily.She exercises with enough effort to increase her heart rate 30 to 60 minutes per day.  She exercises with enough effort to increase her heart rate 6 days per week.   She is taking medications " regularly.    Prescription for lorazepam (ativan) # 15 with 1 refill given by PCP 10/27/21  Has appointment with PCP in a few weeks for routine follow up but needs just in case prescription until then.  Pt never started the wellbutrin, but plans to start next week prior to PCP appointment.    Social History     Tobacco Use     Smoking status: Former Smoker     Packs/day: 0.50     Years: 5.00     Pack years: 2.50     Types: Cigarettes     Quit date: 2014     Years since quittin.0     Smokeless tobacco: Never Used   Substance Use Topics     Alcohol use: Yes     Alcohol/week: 0.0 standard drinks     Comment: SOCIAL     Drug use: No     PHQ 2020   PHQ-9 Total Score 9 4 8   Q9: Thoughts of better off dead/self-harm past 2 weeks Not at all Not at all Not at all     AYLA-7 SCORE 2020   Total Score - 8 (mild anxiety) -   Total Score 9 8 6     Last PHQ-9 2022   1.  Little interest or pleasure in doing things 2   2.  Feeling down, depressed, or hopeless 1   3.  Trouble falling or staying asleep, or sleeping too much 2   4.  Feeling tired or having little energy 2   5.  Poor appetite or overeating 0   6.  Feeling bad about yourself 0   7.  Trouble concentrating 1   8.  Moving slowly or restless 0   Q9: Thoughts of better off dead/self-harm past 2 weeks 0   PHQ-9 Total Score 8   Difficulty at work, home, or with people Somewhat difficult     AYLA-7  2022   1. Feeling nervous, anxious, or on edge 1   2. Not being able to stop or control worrying 1   3. Worrying too much about different things 1   4. Trouble relaxing 1   5. Being so restless that it is hard to sit still 0   6. Becoming easily annoyed or irritable 2   7. Feeling afraid, as if something awful might happen 0   AYLA-7 Total Score 6   If you checked any problems, how difficult have they made it for you to do your work, take care of things at home, or get along with other people? Somewhat difficult            Review of Systems   Constitutional, HEENT, cardiovascular, pulmonary, GI, , musculoskeletal, neuro, skin, endocrine and psych systems are negative, except as otherwise noted.      Objective           Vitals:  No vitals were obtained today due to virtual visit.    Physical Exam   GENERAL: Healthy, alert and no distress  EYES: Eyes grossly normal to inspection.  No discharge or erythema, or obvious scleral/conjunctival abnormalities.  RESP: No audible wheeze, cough, or visible cyanosis.  No visible retractions or increased work of breathing.    SKIN: Visible skin clear. No significant rash, abnormal pigmentation or lesions.  NEURO: Cranial nerves grossly intact.  Mentation and speech appropriate for age.  PSYCH: Mentation appears normal, affect normal/bright, judgement and insight intact, normal speech and appearance well-groomed.            Video-Visit Details    Type of service:  Video Visit    Video End Time:4:13 PM    Originating Location (pt. Location): Home    Distant Location (provider location):  [facility]     Platform used for Video Visit: [employer]

## 2022-05-12 ENCOUNTER — VIRTUAL VISIT (OUTPATIENT)
Dept: FAMILY MEDICINE | Facility: CLINIC | Age: 42
End: 2022-05-12
Payer: COMMERCIAL

## 2022-05-12 DIAGNOSIS — F40.10 SOCIAL ANXIETY DISORDER: ICD-10-CM

## 2022-05-12 DIAGNOSIS — F41.9 ANXIETY: ICD-10-CM

## 2022-05-12 DIAGNOSIS — G47.00 INSOMNIA, UNSPECIFIED TYPE: ICD-10-CM

## 2022-05-12 PROCEDURE — 99213 OFFICE O/P EST LOW 20 MIN: CPT | Mod: 95 | Performed by: PHYSICIAN ASSISTANT

## 2022-05-12 RX ORDER — LORAZEPAM 1 MG/1
.5-1 TABLET ORAL DAILY PRN
Qty: 15 TABLET | Refills: 1 | Status: SHIPPED | OUTPATIENT
Start: 2022-05-12 | End: 2022-11-08

## 2022-07-15 ENCOUNTER — OFFICE VISIT (OUTPATIENT)
Dept: PLASTIC SURGERY | Facility: CLINIC | Age: 42
End: 2022-07-15
Payer: COMMERCIAL

## 2022-07-15 DIAGNOSIS — Z41.1 ENCOUNTER FOR COSMETIC PROCEDURE: Primary | ICD-10-CM

## 2022-07-15 NOTE — PROGRESS NOTES
Facial Plastic and Reconstructive Surgery    Procedure: Chemodenervation with Botulinum Toxin A  Indication: Undesirable Dynamic Rhytids  Injector: Nikki Schwartz MD      Informed consent was obtained.  The skin was cleaned with antimicrobial solution and a topical ice was placed.     The patient was asked to systematically engage the muscles in the area to be injected. The tuberculin needles were used for subdermal injection and hemostasis was obtained with light digital pressure when needed. The skin was cleaned.     A total of 10 units were injected.  Botulinum toxin A type: Botox    Please see procedure log.    The patient tolerated the procedure well and there were no complications. Post procedure care instructions were given to the patient.        Chloe Foster returns for repeat treatment with steroid injection for scar.  The last treatment showed effect and improvement and thus repeat treatment is warranted today.     Procedure: Triamcinolone Injection to Hypertrophic/Keloid Scar  Indication: Hypertrophic/ Keloid Scar  Surgeon: Nikki Schwartz MD    Verbal consent was obtained. The skin was cleaned with alcohol. 0.1 ml of Kenalog 25 mg/ml were injected into the lesion.  Hemostasis was obtained with pressure and guaze. The skin was cleaned.  The patient tolerated the procedure well and there were no complications.  I performed the procedure.

## 2022-07-15 NOTE — LETTER
7/15/2022       RE: Chloe Foster  [address] [facility] [address]     Dear Colleague,    Thank you for referring your patient, Chloe Foster, to the [facility] at [facility]. Please see a copy of my visit note below.    Facial Plastic and Reconstructive Surgery    Procedure: Chemodenervation with Botulinum Toxin A  Indication: Undesirable Dynamic Rhytids  Injector: Nikki Schwartz MD      Informed consent was obtained.  The skin was cleaned with antimicrobial solution and a topical ice was placed.     The patient was asked to systematically engage the muscles in the area to be injected. The tuberculin needles were used for subdermal injection and hemostasis was obtained with light digital pressure when needed. The skin was cleaned.     A total of 10 units were injected.  Botulinum toxin A type: Botox    Please see procedure log.    The patient tolerated the procedure well and there were no complications. Post procedure care instructions were given to the patient.        Chloe Foster returns for repeat treatment with steroid injection for scar.  The last treatment showed effect and improvement and thus repeat treatment is warranted today.     Procedure: Triamcinolone Injection to Hypertrophic/Keloid Scar  Indication: Hypertrophic/ Keloid Scar  Surgeon: Nikki Schwartz MD    Verbal consent was obtained. The skin was cleaned with alcohol. 0.1 ml of Kenalog 25 mg/ml were injected into the lesion.  Hemostasis was obtained with pressure and guaze. The skin was cleaned.  The patient tolerated the procedure well and there were no complications.  I performed the procedure.        Again, thank you for allowing me to participate in the care of your patient.      Sincerely,    Nikki Schwartz MD

## 2022-07-15 NOTE — LETTER
July 15, 2022  Re: Chloe Foster  1980    Dear Dr. Cast,    Thank you so much for referring Chloe Foster to the [facility]. I had the pleasure of visiting with Chloe today.     Attached you will find a copy of my note. Please feel free to reach out to me with any questions, [phone].     Facial Plastic and Reconstructive Surgery    Procedure: Chemodenervation with Botulinum Toxin A  Indication: Undesirable Dynamic Rhytids  Injector: Nikki Schwartz MD      Informed consent was obtained.  The skin was cleaned with antimicrobial solution and a topical ice was placed.     The patient was asked to systematically engage the muscles in the area to be injected. The tuberculin needles were used for subdermal injection and hemostasis was obtained with light digital pressure when needed. The skin was cleaned.     A total of 10 units were injected.  Botulinum toxin A type: Botox    Please see procedure log.    The patient tolerated the procedure well and there were no complications. Post procedure care instructions were given to the patient.        Chloe Foster returns for repeat treatment with steroid injection for scar.  The last treatment showed effect and improvement and thus repeat treatment is warranted today.     Procedure: Triamcinolone Injection to Hypertrophic/Keloid Scar  Indication: Hypertrophic/ Keloid Scar  Surgeon: Nikki Schwartz MD    Verbal consent was obtained. The skin was cleaned with alcohol. 0.1 ml of Kenalog 25 mg/ml were injected into the lesion.  Hemostasis was obtained with pressure and guaze. The skin was cleaned.  The patient tolerated the procedure well and there were no complications.  I performed the procedure.    Your trust in our practice and care is much appreciated.    Sincerely,  Nikki Schwartz MD

## 2022-08-19 ENCOUNTER — OFFICE VISIT (OUTPATIENT)
Dept: PLASTIC SURGERY | Facility: CLINIC | Age: 42
End: 2022-08-19
Payer: COMMERCIAL

## 2022-08-19 DIAGNOSIS — L90.5 SCAR OF NOSE: Primary | ICD-10-CM

## 2022-08-19 NOTE — LETTER
August 19, 2022  Re: Chloe Foster  1980      Thank you so much for referring Chloe Foster to the [facility]. I had the pleasure of visiting with Chloe today.     Attached you will find a copy of my note. Please feel free to reach out to me with any questions, [phone].       Facial Plastic and Reconstructive Surgery    Chloe Foster returns for repeat treatment with steroid injection for scar.  The last treatment showed effect and improvement and thus repeat treatment is warranted today.  *    Procedure: Triamcinolone Injection to Hypertrophic/Keloid Scar  Indication: Hypertrophic/ Keloid Scar  Surgeon: Nikki Schwartz MD    Verbal consent was obtained. The skin was cleaned with alcohol. 0.01 ml of Kenalog 10 mg/ ml were injected into the lesion.  Hemostasis was obtained with pressure and guaze. The skin was cleaned.  The patient tolerated the procedure well and there were no complications.  I performed the procedure.        Sincerely,    Nikki Schwartz MD

## 2022-08-19 NOTE — PROGRESS NOTES
Facial Plastic and Reconstructive Surgery    Chloe Foster returns for repeat treatment with steroid injection for scar.  The last treatment showed effect and improvement and thus repeat treatment is warranted today.  *    Procedure: Triamcinolone Injection to Hypertrophic/Keloid Scar  Indication: Hypertrophic/ Keloid Scar  Surgeon: Nikki Schwartz MD    Verbal consent was obtained. The skin was cleaned with alcohol. 0.01 ml of Kenalog 10 mg/ ml were injected into the lesion.  Hemostasis was obtained with pressure and guaze. The skin was cleaned.  The patient tolerated the procedure well and there were no complications.  I performed the procedure.

## 2022-08-19 NOTE — LETTER
8/19/2022       RE: Chloe Foster  [address] [facility] [address]     Dear Colleague,    Thank you for referring your patient, Chloe Foster, to the [facility]. Please see a copy of my visit note below.      Facial Plastic and Reconstructive Surgery      Chloe Foster returns for repeat treatment with steroid injection for scar.  The last treatment showed effect and improvement and thus repeat treatment is warranted today.  *    Procedure: Triamcinolone Injection to Hypertrophic/Keloid Scar  Indication: Hypertrophic/ Keloid Scar  Surgeon: Nikki Schwartz MD    Verbal consent was obtained. The skin was cleaned with alcohol. 0.01 ml of Kenalog 10 mg/ ml were injected into the lesion.  Hemostasis was obtained with pressure and guaze. The skin was cleaned.  The patient tolerated the procedure well and there were no complications.  I performed the procedure.      Sincerely,    Nikki Schwartz MD

## 2022-08-27 ENCOUNTER — HEALTH MAINTENANCE LETTER (OUTPATIENT)
Age: 42
End: 2022-08-27

## 2022-10-28 ENCOUNTER — OFFICE VISIT (OUTPATIENT)
Dept: PLASTIC SURGERY | Facility: CLINIC | Age: 42
End: 2022-10-28
Payer: COMMERCIAL

## 2022-10-28 DIAGNOSIS — Z41.1 ENCOUNTER FOR COSMETIC PROCEDURE: Primary | ICD-10-CM

## 2022-10-28 NOTE — PROGRESS NOTES
Facial Plastic and Reconstructive Surgery    Procedure: Chemodenervation with Botulinum Toxin A  Indication: Undesirable Dynamic Rhytids  Injector: Nikki Schwartz MD      Informed consent was obtained.  The skin was cleaned with antimicrobial solution and a topical ice was placed.     The patient was asked to systematically engage the muscles in the area to be injected. The tuberculin needles were used for subdermal injection and hemostasis was obtained with light digital pressure when needed. The skin was cleaned.     A total of 10 units were injected.  Botulinum toxin A type: Botox    Please see procedure log.    The patient tolerated the procedure well and there were no complications. Post procedure care instructions were given to the patient.    Procedure: Triamcinolone Injection to Hypertrophic/Keloid Scar  Indication: Hypertrophic/ Keloid Scar  Surgeon: Nikki Schwartz MD    Verbal consent was obtained. The skin was cleaned with alcohol. 0.05 ml of Kenalog 40 were injected into the lesion.  Hemostasis was obtained with pressure and guaze. The skin was cleaned.  The patient tolerated the procedure well and there were no complications.  I performed the procedure.

## 2022-10-28 NOTE — LETTER
October 28, 2022  Re: Chloe Foster  1980      Thank you so much for referring Chloe Foster to the [facility]. I had the pleasure of visiting with Chloe today.     Attached you will find a copy of my note. Please feel free to reach out to me with any questions, [phone].     Facial Plastic and Reconstructive Surgery    Procedure: Chemodenervation with Botulinum Toxin A  Indication: Undesirable Dynamic Rhytids  Injector: Nikki Schwartz MD      Informed consent was obtained.  The skin was cleaned with antimicrobial solution and a topical ice was placed.     The patient was asked to systematically engage the muscles in the area to be injected. The tuberculin needles were used for subdermal injection and hemostasis was obtained with light digital pressure when needed. The skin was cleaned.     A total of 10 units were injected.  Botulinum toxin A type: Botox    Please see procedure log.    The patient tolerated the procedure well and there were no complications. Post procedure care instructions were given to the patient.    Procedure: Triamcinolone Injection to Hypertrophic/Keloid Scar  Indication: Hypertrophic/ Keloid Scar  Surgeon: Nikki Schwartz MD    Verbal consent was obtained. The skin was cleaned with alcohol. 0.05 ml of Kenalog 40 were injected into the lesion.  Hemostasis was obtained with pressure and guaze. The skin was cleaned.  The patient tolerated the procedure well and there were no complications.  I performed the procedure.      Your trust in our practice and care is much appreciated.    Sincerely,  Nikki Schwartz MD

## 2022-10-28 NOTE — LETTER
10/28/2022       RE: Chloe Foster  [address] [facility] [address]     Dear Colleague,    Thank you for referring your patient, Chloe Foster, to the [facility] at [facility]. Please see a copy of my visit note below.    Facial Plastic and Reconstructive Surgery    Procedure: Chemodenervation with Botulinum Toxin A  Indication: Undesirable Dynamic Rhytids  Injector: Nikki Schwartz MD      Informed consent was obtained.  The skin was cleaned with antimicrobial solution and a topical ice was placed.     The patient was asked to systematically engage the muscles in the area to be injected. The tuberculin needles were used for subdermal injection and hemostasis was obtained with light digital pressure when needed. The skin was cleaned.     A total of 10 units were injected.  Botulinum toxin A type: Botox    Please see procedure log.    The patient tolerated the procedure well and there were no complications. Post procedure care instructions were given to the patient.    Procedure: Triamcinolone Injection to Hypertrophic/Keloid Scar  Indication: Hypertrophic/ Keloid Scar  Surgeon: Nikki Schwartz MD    Verbal consent was obtained. The skin was cleaned with alcohol. 0.05 ml of Kenalog 40 were injected into the lesion.  Hemostasis was obtained with pressure and guaze. The skin was cleaned.  The patient tolerated the procedure well and there were no complications.  I performed the procedure.      Sincerely,    Nikki Schwartz MD

## 2022-11-08 ENCOUNTER — VIRTUAL VISIT (OUTPATIENT)
Dept: FAMILY MEDICINE | Facility: CLINIC | Age: 42
End: 2022-11-08
Payer: COMMERCIAL

## 2022-11-08 DIAGNOSIS — F33.8 SEASONAL AFFECTIVE DISORDER (H): ICD-10-CM

## 2022-11-08 DIAGNOSIS — G47.00 INSOMNIA, UNSPECIFIED TYPE: ICD-10-CM

## 2022-11-08 DIAGNOSIS — F41.9 ANXIETY: ICD-10-CM

## 2022-11-08 DIAGNOSIS — F33.0 MAJOR DEPRESSIVE DISORDER, RECURRENT EPISODE, MILD (H): ICD-10-CM

## 2022-11-08 DIAGNOSIS — F40.10 SOCIAL ANXIETY DISORDER: ICD-10-CM

## 2022-11-08 PROCEDURE — 99213 OFFICE O/P EST LOW 20 MIN: CPT | Mod: 95 | Performed by: PHYSICIAN ASSISTANT

## 2022-11-08 RX ORDER — BUPROPION HYDROCHLORIDE 75 MG/1
37.5 TABLET ORAL 2 TIMES DAILY
Qty: 60 TABLET | Refills: 0 | Status: SHIPPED | OUTPATIENT
Start: 2022-11-08 | End: 2023-05-04

## 2022-11-08 RX ORDER — LORAZEPAM 1 MG/1
.5-1 TABLET ORAL DAILY PRN
Qty: 15 TABLET | Refills: 1 | Status: SHIPPED | OUTPATIENT
Start: 2022-11-08 | End: 2023-05-04

## 2022-11-08 NOTE — PROGRESS NOTES
"Chloe is a 42 year old who is being evaluated via a billable video visit.      How would you like to obtain your AVS? MyChart  If the video visit is dropped, the invitation should be resent by: Text to cell phone: [phone]  Will anyone else be joining your video visit? No          Assessment & Plan     Anxiety  Insomnia, unspecified type  Social anxiety disorder  Long standing, chronic, controlled overall; regular refills of short supply of lorazepam to last a few months sent to pharmacy; Proper use and risk for abuse discussed with patient at length. Patient to return to clinic when refills needed. Patient will follow up with PCP as scheduled as well.    Major depressive disorder, recurrent episode, mild (H)  Seasonal affective disorder (H)  Comment: ready to give treatment a try again; previous prescription resent to pharmacy so that patient can start prior to routine follow up with PCP in a few weeks.  Plan: buPROPion (WELLBUTRIN) 75 MG tablet          Review of prior external note(s) from - previous routine notes  15 minutes spent on the date of the encounter doing chart review, history and exam, documentation and further activities per the note       BMI:   Estimated body mass index is 25.84 kg/m  as calculated from the following:    Height as of 1/10/22: 1.702 m (5' 7\").    Weight as of 1/10/22: 74.8 kg (165 lb).       There are no Patient Instructions on file for this visit.    Return in about 4 weeks (around 12/6/2022) for Follow up, with PCP.    Shelby Ferro PA-C  [facility]    Aly Corona is a 42 year old presenting for the following health issues:  Recheck Medication (Needs refill of lorazepam)      HPI     Anxiety Follow-Up    How are you doing with your anxiety since your last visit? No change    Are you having other symptoms that might be associated with anxiety? No    Have you had a significant life event? No     Are you feeling depressed? No    Do you " have any concerns with your use of alcohol or other drugs? No     Patient hasn't started the Wellbutrin yet, but wanted to maybe try the low dose per her PCP prior to follow up with PCP in a few weeks.    Will sometimes use the trazodone at night and rarely the propranolol    Prescription for lorazepam (ativan) # 15 with 1 refill given by PCP 10/27/21 then with me 22    Social History     Tobacco Use     Smoking status: Former     Packs/day: 0.50     Years: 5.00     Pack years: 2.50     Types: Cigarettes     Quit date: 2014     Years since quittin.5     Smokeless tobacco: Never   Substance Use Topics     Alcohol use: Yes     Alcohol/week: 0.0 standard drinks     Comment: SOCIAL     Drug use: No     AYLA-7 SCORE 2020   Total Score - 8 (mild anxiety) -   Total Score 9 8 6     PHQ 2020   PHQ-9 Total Score 9 4 8   Q9: Thoughts of better off dead/self-harm past 2 weeks Not at all Not at all Not at all     Last PHQ-9 2022   1.  Little interest or pleasure in doing things 2   2.  Feeling down, depressed, or hopeless 1   3.  Trouble falling or staying asleep, or sleeping too much 2   4.  Feeling tired or having little energy 2   5.  Poor appetite or overeating 0   6.  Feeling bad about yourself 0   7.  Trouble concentrating 1   8.  Moving slowly or restless 0   Q9: Thoughts of better off dead/self-harm past 2 weeks 0   PHQ-9 Total Score 8   Difficulty at work, home, or with people Somewhat difficult     AYLA-7  2022   1. Feeling nervous, anxious, or on edge 1   2. Not being able to stop or control worrying 1   3. Worrying too much about different things 1   4. Trouble relaxing 1   5. Being so restless that it is hard to sit still 0   6. Becoming easily annoyed or irritable 2   7. Feeling afraid, as if something awful might happen 0   YALA-7 Total Score 6   If you checked any problems, how difficult have they made it for you to do your work, take care of  things at home, or get along with other people? Somewhat difficult       Review of Systems   Constitutional, HEENT, cardiovascular, pulmonary, GI, , musculoskeletal, neuro, skin, endocrine and psych systems are negative, except as otherwise noted.      Objective           Vitals:  No vitals were obtained today due to virtual visit.    Physical Exam   GENERAL: Healthy, alert and no distress  EYES: Eyes grossly normal to inspection.  No discharge or erythema, or obvious scleral/conjunctival abnormalities.  RESP: No audible wheeze, cough, or visible cyanosis.  No visible retractions or increased work of breathing.    SKIN: Visible skin clear. No significant rash, abnormal pigmentation or lesions.  NEURO: Cranial nerves grossly intact.  Mentation and speech appropriate for age.  PSYCH: Mentation appears normal, affect normal/bright, judgement and insight intact, normal speech and appearance well-groomed.            Video-Visit Details    Video Start Time: 8:30 AM    Type of service:  Video Visit    Video End Time:8:38 AM    Originating Location (pt. Location): Home        Distant Location (provider location):  Off-site    Platform used for Video Visit: Rafal

## 2022-12-02 ENCOUNTER — ANCILLARY PROCEDURE (OUTPATIENT)
Dept: GENERAL RADIOLOGY | Facility: CLINIC | Age: 42
End: 2022-12-02
Attending: FAMILY MEDICINE
Payer: COMMERCIAL

## 2022-12-02 ENCOUNTER — OFFICE VISIT (OUTPATIENT)
Dept: FAMILY MEDICINE | Facility: CLINIC | Age: 42
End: 2022-12-02
Payer: COMMERCIAL

## 2022-12-02 VITALS
BODY MASS INDEX: 27.66 KG/M2 | TEMPERATURE: 97.5 F | HEIGHT: 66 IN | DIASTOLIC BLOOD PRESSURE: 83 MMHG | WEIGHT: 172.1 LBS | SYSTOLIC BLOOD PRESSURE: 123 MMHG | HEART RATE: 92 BPM | OXYGEN SATURATION: 97 %

## 2022-12-02 DIAGNOSIS — M25.562 PATELLAR PAIN, LEFT: ICD-10-CM

## 2022-12-02 DIAGNOSIS — Z13.6 CARDIOVASCULAR SCREENING; LDL GOAL LESS THAN 160: ICD-10-CM

## 2022-12-02 DIAGNOSIS — R63.5 WEIGHT GAIN: ICD-10-CM

## 2022-12-02 DIAGNOSIS — Z00.00 ROUTINE GENERAL MEDICAL EXAMINATION AT A HEALTH CARE FACILITY: Primary | ICD-10-CM

## 2022-12-02 DIAGNOSIS — R10.32 ABDOMINAL PAIN, LEFT LOWER QUADRANT: ICD-10-CM

## 2022-12-02 DIAGNOSIS — E55.9 VITAMIN D DEFICIENCY: ICD-10-CM

## 2022-12-02 DIAGNOSIS — Z15.89 MTHFR MUTATION: ICD-10-CM

## 2022-12-02 DIAGNOSIS — G47.00 INSOMNIA, UNSPECIFIED TYPE: ICD-10-CM

## 2022-12-02 DIAGNOSIS — F33.1 MODERATE RECURRENT MAJOR DEPRESSION (H): ICD-10-CM

## 2022-12-02 DIAGNOSIS — R05.3 PERSISTENT COUGH: ICD-10-CM

## 2022-12-02 DIAGNOSIS — K59.09 OTHER CONSTIPATION: ICD-10-CM

## 2022-12-02 DIAGNOSIS — G43.109 MIGRAINE WITH AURA AND WITHOUT STATUS MIGRAINOSUS, NOT INTRACTABLE: ICD-10-CM

## 2022-12-02 DIAGNOSIS — F40.10 SOCIAL ANXIETY DISORDER: ICD-10-CM

## 2022-12-02 LAB
ALBUMIN SERPL BCG-MCNC: 4.5 G/DL (ref 3.5–5.2)
ALP SERPL-CCNC: 78 U/L (ref 35–104)
ALT SERPL W P-5'-P-CCNC: 14 U/L (ref 10–35)
ANION GAP SERPL CALCULATED.3IONS-SCNC: 10 MMOL/L (ref 7–15)
AST SERPL W P-5'-P-CCNC: 24 U/L (ref 10–35)
BILIRUB SERPL-MCNC: 0.5 MG/DL
BUN SERPL-MCNC: 13.5 MG/DL (ref 6–20)
CALCIUM SERPL-MCNC: 9.4 MG/DL (ref 8.6–10)
CHLORIDE SERPL-SCNC: 103 MMOL/L (ref 98–107)
CHOLEST SERPL-MCNC: 232 MG/DL
CREAT SERPL-MCNC: 0.66 MG/DL (ref 0.51–0.95)
DEPRECATED CALCIDIOL+CALCIFEROL SERPL-MC: 26 UG/L (ref 20–75)
DEPRECATED HCO3 PLAS-SCNC: 24 MMOL/L (ref 22–29)
ERYTHROCYTE [DISTWIDTH] IN BLOOD BY AUTOMATED COUNT: 13.2 % (ref 10–15)
FOLATE SERPL-MCNC: 10.6 NG/ML (ref 4.6–34.8)
GFR SERPL CREATININE-BSD FRML MDRD: >90 ML/MIN/1.73M2
GLUCOSE SERPL-MCNC: 95 MG/DL (ref 70–99)
HCT VFR BLD AUTO: 45.4 % (ref 35–47)
HDLC SERPL-MCNC: 94 MG/DL
HGB BLD-MCNC: 14.9 G/DL (ref 11.7–15.7)
INSULIN SERPL-ACNC: 6.4 UU/ML (ref 2.6–24.9)
LDLC SERPL CALC-MCNC: 126 MG/DL
MCH RBC QN AUTO: 30 PG (ref 26.5–33)
MCHC RBC AUTO-ENTMCNC: 32.8 G/DL (ref 31.5–36.5)
MCV RBC AUTO: 91 FL (ref 78–100)
NONHDLC SERPL-MCNC: 138 MG/DL
PLATELET # BLD AUTO: 276 10E3/UL (ref 150–450)
POTASSIUM SERPL-SCNC: 4.2 MMOL/L (ref 3.4–5.3)
PROT SERPL-MCNC: 7.2 G/DL (ref 6.4–8.3)
RBC # BLD AUTO: 4.97 10E6/UL (ref 3.8–5.2)
SODIUM SERPL-SCNC: 137 MMOL/L (ref 136–145)
TRIGL SERPL-MCNC: 58 MG/DL
VIT B12 SERPL-MCNC: 469 PG/ML (ref 232–1245)
WBC # BLD AUTO: 6 10E3/UL (ref 4–11)

## 2022-12-02 PROCEDURE — 99214 OFFICE O/P EST MOD 30 MIN: CPT | Mod: 25 | Performed by: FAMILY MEDICINE

## 2022-12-02 PROCEDURE — 80061 LIPID PANEL: CPT | Performed by: FAMILY MEDICINE

## 2022-12-02 PROCEDURE — 82607 VITAMIN B-12: CPT | Performed by: FAMILY MEDICINE

## 2022-12-02 PROCEDURE — 85027 COMPLETE CBC AUTOMATED: CPT | Performed by: FAMILY MEDICINE

## 2022-12-02 PROCEDURE — 83525 ASSAY OF INSULIN: CPT | Performed by: FAMILY MEDICINE

## 2022-12-02 PROCEDURE — 82746 ASSAY OF FOLIC ACID SERUM: CPT | Performed by: FAMILY MEDICINE

## 2022-12-02 PROCEDURE — 82306 VITAMIN D 25 HYDROXY: CPT | Performed by: FAMILY MEDICINE

## 2022-12-02 PROCEDURE — 36415 COLL VENOUS BLD VENIPUNCTURE: CPT | Performed by: FAMILY MEDICINE

## 2022-12-02 PROCEDURE — 80053 COMPREHEN METABOLIC PANEL: CPT | Performed by: FAMILY MEDICINE

## 2022-12-02 PROCEDURE — 99396 PREV VISIT EST AGE 40-64: CPT | Performed by: FAMILY MEDICINE

## 2022-12-02 PROCEDURE — 74019 RADEX ABDOMEN 2 VIEWS: CPT | Mod: TC | Performed by: RADIOLOGY

## 2022-12-02 PROCEDURE — 83090 ASSAY OF HOMOCYSTEINE: CPT | Performed by: FAMILY MEDICINE

## 2022-12-02 PROCEDURE — 84443 ASSAY THYROID STIM HORMONE: CPT | Performed by: FAMILY MEDICINE

## 2022-12-02 RX ORDER — ALBUTEROL SULFATE 90 UG/1
2 AEROSOL, METERED RESPIRATORY (INHALATION) EVERY 6 HOURS PRN
Qty: 8 G | Refills: 0 | Status: ON HOLD | OUTPATIENT
Start: 2022-12-02 | End: 2023-09-25

## 2022-12-02 ASSESSMENT — ENCOUNTER SYMPTOMS
CONSTIPATION: 1
SORE THROAT: 0
ABDOMINAL PAIN: 1
ARTHRALGIAS: 0
DYSURIA: 0
PALPITATIONS: 0
DIARRHEA: 1
BREAST MASS: 0
HEARTBURN: 0
DIZZINESS: 0
CHILLS: 0
FREQUENCY: 0
WEAKNESS: 0
SHORTNESS OF BREATH: 0
NERVOUS/ANXIOUS: 0
FEVER: 0
HEMATURIA: 0
JOINT SWELLING: 0
NAUSEA: 1
PARESTHESIAS: 0
COUGH: 1
HEADACHES: 1
MYALGIAS: 0
HEMATOCHEZIA: 0
EYE PAIN: 0

## 2022-12-02 ASSESSMENT — PATIENT HEALTH QUESTIONNAIRE - PHQ9
SUM OF ALL RESPONSES TO PHQ QUESTIONS 1-9: 6
SUM OF ALL RESPONSES TO PHQ QUESTIONS 1-9: 6
5. POOR APPETITE OR OVEREATING: MORE THAN HALF THE DAYS
10. IF YOU CHECKED OFF ANY PROBLEMS, HOW DIFFICULT HAVE THESE PROBLEMS MADE IT FOR YOU TO DO YOUR WORK, TAKE CARE OF THINGS AT HOME, OR GET ALONG WITH OTHER PEOPLE: SOMEWHAT DIFFICULT

## 2022-12-02 ASSESSMENT — ANXIETY QUESTIONNAIRES
GAD7 TOTAL SCORE: 6
1. FEELING NERVOUS, ANXIOUS, OR ON EDGE: SEVERAL DAYS
5. BEING SO RESTLESS THAT IT IS HARD TO SIT STILL: NOT AT ALL
6. BECOMING EASILY ANNOYED OR IRRITABLE: MORE THAN HALF THE DAYS
7. FEELING AFRAID AS IF SOMETHING AWFUL MIGHT HAPPEN: NOT AT ALL
3. WORRYING TOO MUCH ABOUT DIFFERENT THINGS: SEVERAL DAYS
2. NOT BEING ABLE TO STOP OR CONTROL WORRYING: NOT AT ALL
IF YOU CHECKED OFF ANY PROBLEMS ON THIS QUESTIONNAIRE, HOW DIFFICULT HAVE THESE PROBLEMS MADE IT FOR YOU TO DO YOUR WORK, TAKE CARE OF THINGS AT HOME, OR GET ALONG WITH OTHER PEOPLE: SOMEWHAT DIFFICULT
GAD7 TOTAL SCORE: 6

## 2022-12-02 NOTE — PROGRESS NOTES
SUBJECTIVE:   CC: Chloe is an 42 year old who presents for preventive health visit.     Patient has been advised of split billing requirements and indicates understanding: Yes     Healthy Habits:     Getting at least 3 servings of Calcium per day:  Yes    Bi-annual eye exam:  Yes    Dental care twice a year:  Yes    Sleep apnea or symptoms of sleep apnea:  None    Diet:  Regular (no restrictions)    Frequency of exercise:  4-5 days/week    Duration of exercise:  30-45 minutes    Taking medications regularly:  No    Barriers to taking medications:  None    Medication side effects:  None    PHQ-2 Total Score: 2    Additional concerns today:  Yes    Mood worse/SAD-  Previously on wellbutrin, had issues with heart racing se's on it, which would make her more anxious.  Has been going back and forth about trying wellbutrin at a low dose to see if it helps because her mood has definitely been worse.  Anxiety hasn't been nearly as bad, but still using prn ativan at night prior to stressful work days.      Got new rx for wellbutrin ~4 wks ago, but didn't take it, but wants to.  Needs a time when things are a bit slower for work- anticipates starting it soon.    Anxiety/insomnia-  Still using lorazepam intermittently.  Coincides with work issues.  Can go without for a couple weeks, or twice in a week with meetings.  Usually starts with 1/2 tab, and if not sleeping within 1-1 1/2 hrs she'll take another half.  Otherwise for sleep, has trazodone.  Taking magnesium at night, taking her vitamins for the most part.      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  Still working 15 hrs/wk for [employer] as the work fits so well together, but it's good that it's limited.      Issues/concerns-    --Weight gain- wt has been creeping up.  Zoila most mornings, 15-30 minutes.  Active during  the day except when stuck in front of the computer much of the day.    Likely more sedentary with more computer work at home.  Eats very healthy, not much dairy or pizza.  Some etoh, much of it work related.  gave up etoh, and lost a bunch of weight.      --Abd pain/discomfort  Since ~2/22- intermittent dull pain in left lateral pelvic/abd area.  Bad for a few days at a time, then goes away.  Squeezing/dull/pulsing sensation  [facility], and it didn't feel right.  Goes away for a few weeks, then comes back.  Doesn't think it correlates with her cycle, but not sure.  Sometimes has diarrhea/constipation around those times, likely more constipation?  For the last several months, about one out of five stools is normal, if normal, soft, regular size.  Lots of diarrhea that happens.  Lots of greens/fiber, and could still be constipated.  Either constipated or diarrhea.    During the left pelvic pain regions, thinks stools are worse, ? More constipation?      --Excruciating pain in left patellar pain when she kneels on it.  Even jeans can aggravate the pain.      -Cough that has been going on in middle of Sept (bad cold then, COVID neg).  Uses a sleep louann, snoring a bit worse.  Coughing over the night, coughing in the morning.  Has gotten better and worse, but hasn't gone away.  Son is on abx- getting a lot better on them.  He had bad sx's for weeks with cough/congestion.  Never really snored prior to her nasal surgery, now does.  Not sure it helped anything.        Answers for HPI/ROS submitted by the patient on 12/2/2022  If you checked off any problems, how difficult have these problems made it for you to do your work, take care of things at home, or get along with other people?: Somewhat difficult  PHQ9 TOTAL SCORE: 6        Today's PHQ-2 Score:   PHQ-2 ( 1999 [employer]) 12/2/2022   Q1: Little interest or pleasure in doing things 1   Q2: Feeling down, depressed or hopeless 1   PHQ-2 Score 2    PHQ-2 Total Score (12-17 Years)- Positive if 3 or more points; Administer PHQ-A if positive -   Q1: Little interest or pleasure in doing things Several days   Q2: Feeling down, depressed or hopeless Several days   PHQ-2 Score 2           Social History     Tobacco Use     Smoking status: Former     Packs/day: 0.50     Years: 5.00     Pack years: 2.50     Types: Cigarettes     Quit date: 2014     Years since quittin.6     Smokeless tobacco: Never   Substance Use Topics     Alcohol use: Yes     Alcohol/week: 0.0 standard drinks     Comment: SOCIAL     If you drink alcohol do you typically have >3 drinks per day or >7 drinks per week? No    Alcohol Use 2022   Prescreen: >3 drinks/day or >7 drinks/week? No   Prescreen: >3 drinks/day or >7 drinks/week? -   No flowsheet data found.    Reviewed orders with patient.  Reviewed health maintenance and updated orders accordingly - Yes        Lab work is in process  Labs reviewed in EPIC  BP Readings from Last 3 Encounters:   22 123/83   01/10/22 110/81   21 117/89    Wt Readings from Last 3 Encounters:   22 78.1 kg (172 lb 1.6 oz)   01/10/22 74.8 kg (165 lb)   21 74.8 kg (165 lb)                  Patient Active Problem List   Diagnosis     Vitamin D deficiency     CARDIOVASCULAR SCREENING; LDL GOAL LESS THAN 160     Lumbar herniated disc     Hirsutism     Insomnia     IBS (irritable bowel syndrome)     Moderate recurrent major depression (H)     History of cardioembolic cerebrovascular accident (CVA)     PFO with atrial septal aneurysm     Protein S deficiency (H)     Migraine with aura and without status migrainosus, not intractable     Pre-conception counseling     Elevated lipoprotein A level     Social anxiety disorder     MTHFR mutation     Cerebrovascular accident (CVA) due to embolism of right middle cerebral artery (H)     Deviated nasal septum     Past Surgical History:   Procedure Laterality Date     ARTHROSCOPY KNEE RT/LT       Rt knee     HC REPAIR OF NASAL SEPTUM       REPAIR PATENT FORAMEN OVALE  2016     SEPTORHINOPLASTY N/A 3/15/2021    Procedure: Revision Septorhinoplasty, Repair of Nasal Valve Collapse, Cadaveric Rib Graft + 45 min Cosmetic Tip Rhinoplasty;  Surgeon: Nikki Schwartz MD;  [place] [facility] [place]       Social History     Tobacco Use     Smoking status: Former     Packs/day: 0.50     Years: 5.00     Pack years: 2.50     Types: Cigarettes     Quit date: 2014     Years since quittin.6     Smokeless tobacco: Never   Substance Use Topics     Alcohol use: Yes     Comment: SOCIAL     Family History   Problem Relation Age of Onset     Family History Negative Mother      Cancer Father         throat, possibly asbestos exposure     Hypertension Father      Allergies Father      Arrhythmia Father      Other Cancer Father      Family History Negative Sister      Family History Negative Brother      Diabetes Maternal Grandmother         on insulin     C.A.D. Maternal Grandmother         triple bypass in her 60s     C.A.D. Maternal Grandfather         MI in 50s     Diabetes Paternal Grandmother      Neurologic Disorder Paternal Grandfather          of brain aneurysm in early 60s     Family History Negative Son      Diabetes Maternal Aunt          in her early 40s of DM complications, type 2     Coronary Artery Disease Early Onset Maternal Aunt 41     Obesity Maternal Aunt          Current Outpatient Medications   Medication Sig Dispense Refill     acetaminophen (TYLENOL) 325 MG tablet Take 325-650 mg by mouth every 6 hours as needed for mild pain        albuterol (PROAIR HFA/PROVENTIL HFA/VENTOLIN HFA) 108 (90 Base) MCG/ACT inhaler Inhale 2 puffs into the lungs every 6 hours as needed for other (persistent cough) 8 g 0     aspirin EC 81 MG EC tablet Take 81 mg by mouth every evening        aspirin-acetaminophen-caffeine (EXCEDRIN MIGRAINE) 250-250-65 MG tablet Take 1 tablet by mouth every 6 hours as  needed for headaches       buPROPion (WELLBUTRIN) 75 MG tablet Take 0.5 tablets (37.5 mg) by mouth 2 times daily 60 tablet 0     LORazepam (ATIVAN) 1 MG tablet Take 0.5-1 tablets (0.5-1 mg) by mouth daily as needed for anxiety or sleep (severe anxiety/panic) 15 tablet 1     magnesium oxide (MAG-OX) 400 MG tablet Take 400 mg by mouth every evening        Multiple Vitamins-Minerals (MULTIVITAMIN ADULT EXTRA C PO) Take by mouth every evening       NONFORMULARY Take 1 tablet by mouth every evening OTC: B-Right Vitamin (Optimized B Complex)       Omega-3 Fatty Acids (FISH OIL PO) Take 1 capsule by mouth At Bedtime        propranolol (INDERAL) 10 MG tablet Take 1-2 tablets (10-20 mg) by mouth as needed (anxiety (take 30-60 minutes prior to meetings)) 30 tablet 1     Rimegepant Sulfate (NURTEC) 75 MG TBDP Take 75 mg by mouth daily as needed (migraine) 8 tablet 11     STATIN NOT PRESCRIBED, INTENTIONAL, Statin not prescribed intentionally due to Woman of childbearing age not actively taking birth control 0 each 0     traZODone (DESYREL) 50 MG tablet Take 0.5-1 tablets (25-50 mg) by mouth At Bedtime 30 tablet 0     VITAMIN D, CHOLECALCIFEROL, PO Take 1,000 Units by mouth every evening (takes 2 x 100 unit capsule)        Allergies   Allergen Reactions     Compazine [Prochlorperazine] Muscle Pain (Myalgia)     Recent Labs   Lab Test 12/02/22  0822 12/26/21  0939 10/20/21  1122 07/09/21  0921 02/19/21  1005 11/04/20  1118 12/27/19  0925   *  --   --  110*  --   --  121*   HDL 94  --   --  116  --   --  107   TRIG 58  --   --  46  --   --  83   ALT 14  --   --   --  19  --  24   CR 0.66 0.73  --  0.75 0.65   < > 0.69   GFRESTIMATED >90 >90  --  >90 >90   < > >90   GFRESTBLACK  --   --   --  >90 >90   < > >90   POTASSIUM 4.2 3.9  --  3.8 4.0   < > 4.2   TSH  --   --  0.67  --   --   --  1.04    < > = values in this interval not displayed.        Breast Cancer Screening:    Breast CA Risk Assessment (FHS-7) 7/9/2021   Do  "you have a family history of breast, colon, or ovarian cancer? No / Unknown         Mammogram Screening - Offered annual screening and updated Health Maintenance for mutual plan based on risk factor consideration    Pertinent mammograms are reviewed under the imaging tab.    History of abnormal Pap smear: NO - age 30-65 PAP every 5 years with negative HPV co-testing recommended  PAP / HPV Latest Ref Rng & Units 12/27/2019 11/5/2014 3/15/2011   PAP (Historical) - NIL NIL NIL   HPV16 NEG:Negative Negative - -   HPV18 NEG:Negative Negative - -   HRHPV NEG:Negative Negative - -     Reviewed and updated as needed this visit by clinical staff                  Reviewed and updated as needed this visit by Provider                     Review of Systems   Constitutional: Negative for chills and fever.   HENT: Positive for congestion. Negative for ear pain, hearing loss and sore throat.    Eyes: Negative for pain and visual disturbance.   Respiratory: Positive for cough. Negative for shortness of breath.    Cardiovascular: Negative for chest pain, palpitations and peripheral edema.   Gastrointestinal: Positive for abdominal pain, constipation, diarrhea and nausea. Negative for heartburn and hematochezia.   Breasts:  Negative for tenderness, breast mass and discharge.   Genitourinary: Negative for dysuria, frequency, genital sores, hematuria, pelvic pain, urgency, vaginal bleeding and vaginal discharge.   Musculoskeletal: Negative for arthralgias, joint swelling and myalgias.   Skin: Negative for rash.   Neurological: Positive for headaches. Negative for dizziness, weakness and paresthesias.   Psychiatric/Behavioral: Negative for mood changes. The patient is not nervous/anxious.           OBJECTIVE:   /83   Pulse 92   Temp 97.5  F (36.4  C) (Temporal)   Ht 1.67 m (5' 5.75\")   Wt 78.1 kg (172 lb 1.6 oz)   LMP 11/18/2022   SpO2 97%   BMI 27.99 kg/m    Physical Exam  GENERAL: healthy, alert and no distress  EYES: Eyes " grossly normal to inspection, PERRL and conjunctivae and sclerae normal  HENT: ear canals and TM's normal, nose and mouth without ulcers or lesions  NECK: no adenopathy, no asymmetry, masses, or scars and thyroid normal to palpation  RESP: lungs clear to auscultation - no rales, rhonchi or wheezes  BREAST: normal without masses, tenderness or nipple discharge and no palpable axillary masses or adenopathy  CV: regular rate and rhythm, normal S1 S2, no S3 or S4, no murmur, click or rub, no peripheral edema and peripheral pulses strong  ABDOMEN: soft, nontender, no hepatosplenomegaly, no masses and bowel sounds normal  MS: no gross musculoskeletal defects noted, no edema  SKIN: no suspicious lesions or rashes  NEURO: Normal strength and tone, mentation intact and speech normal  PSYCH: mentation appears normal, affect normal/bright    Diagnostic Test Results:  Labs reviewed in Epic  Results for orders placed or performed in visit on 12/02/22   XR Abdomen 2 Views     Status: None    Narrative    XR ABDOMEN 2 VIEWS 12/2/2022 8:21 AM    HISTORY: Other constipation; Abdominal pain, left lower quadrant    COMPARISON: None.      Impression    IMPRESSION: Moderate stool in the colon. Bowel gas pattern is  nonobstructed. No free intraperitoneal air.    BERNICE CHILDERS MD         SYSTEM ID:  [id number]   Results for orders placed or performed in visit on 12/02/22   Lipid panel reflex to direct LDL Fasting     Status: Abnormal   Result Value Ref Range    Cholesterol 232 (H) <200 mg/dL    Triglycerides 58 <150 mg/dL    Direct Measure HDL 94 >=50 mg/dL    LDL Cholesterol Calculated 126 (H) <=100 mg/dL    Non HDL Cholesterol 138 (H) <130 mg/dL    Narrative    Cholesterol  Desirable:  <200 mg/dL    Triglycerides  Normal:  Less than 150 mg/dL  Borderline High:  150-199 mg/dL  High:  200-499 mg/dL  Very High:  Greater than or equal to 500 mg/dL    Direct Measure HDL  Female:  Greater than or equal to 50 mg/dL   Male:  Greater than or  equal to 40 mg/dL    LDL Cholesterol  Desirable:  <100mg/dL  Above Desirable:  100-129 mg/dL   Borderline High:  130-159 mg/dL   High:  160-189 mg/dL   Very High:  >= 190 mg/dL    Non HDL Cholesterol  Desirable:  130 mg/dL  Above Desirable:  130-159 mg/dL  Borderline High:  160-189 mg/dL  High:  190-219 mg/dL  Very High:  Greater than or equal to 220 mg/dL   Comprehensive metabolic panel (BMP + Alb, Alk Phos, ALT, AST, Total. Bili, TP)     Status: Normal   Result Value Ref Range    Sodium 137 136 - 145 mmol/L    Potassium 4.2 3.4 - 5.3 mmol/L    Chloride 103 98 - 107 mmol/L    Carbon Dioxide (CO2) 24 22 - 29 mmol/L    Anion Gap 10 7 - 15 mmol/L    Urea Nitrogen 13.5 6.0 - 20.0 mg/dL    Creatinine 0.66 0.51 - 0.95 mg/dL    Calcium 9.4 8.6 - 10.0 mg/dL    Glucose 95 70 - 99 mg/dL    Alkaline Phosphatase 78 35 - 104 U/L    AST 24 10 - 35 U/L    ALT 14 10 - 35 U/L    Protein Total 7.2 6.4 - 8.3 g/dL    Albumin 4.5 3.5 - 5.2 g/dL    Bilirubin Total 0.5 <=1.2 mg/dL    GFR Estimate >90 >60 mL/min/1.73m2   Vitamin D Deficiency     Status: Normal   Result Value Ref Range    Vitamin D, Total (25-Hydroxy) 26 20 - 75 ug/L    Narrative    Season, race, dietary intake, and treatment affect the concentration of 25-hydroxy-Vitamin D. Values may decrease during winter months and increase during summer months. Values 20-29 ug/L may indicate Vitamin D insufficiency and values <20 ug/L may indicate Vitamin D deficiency.    Vitamin D determination is routinely performed by an immunoassay specific for 25 hydroxyvitamin D3.  If an individual is on vitamin D2(ergocalciferol) supplementation, please specify 25 OH vitamin D2 and D3 level determination by LCMSMS test [id number].     Folate     Status: Normal   Result Value Ref Range    Folic Acid 10.6 4.6 - 34.8 ng/mL   CBC with platelets     Status: Normal   Result Value Ref Range    WBC Count 6.0 4.0 - 11.0 10e3/uL    RBC Count 4.97 3.80 - 5.20 10e6/uL    Hemoglobin 14.9 11.7 - 15.7 g/dL     Hematocrit 45.4 35.0 - 47.0 %    MCV 91 78 - 100 fL    MCH 30.0 26.5 - 33.0 pg    MCHC 32.8 31.5 - 36.5 g/dL    RDW 13.2 10.0 - 15.0 %    Platelet Count 276 150 - 450 10e3/uL   Vitamin B12     Status: Normal   Result Value Ref Range    Vitamin B12 469 232 - 1,245 pg/mL   Insulin level     Status: Normal   Result Value Ref Range    Insulin 6.4 2.6 - 24.9 uU/mL       ASSESSMENT/PLAN:       1. Routine general medical examination at a health care facility  Reviewed PMH and medications/supplements. Discussed healthy habits, eye/dental care, healthcare maintenance issues, including cancer screenings (colonoscopies, PSA), relevant immunizations, and cardiac risk factor screenings such as for cholesterol, HTN, and DM.  No immunizations needed today.  See orders for tests and screening needed.    - Vitamin B12; Future  - Vitamin B12    2. Moderate recurrent major depression (H)/3. Insomnia, unspecified type/4. Social anxiety disorder  Pt feels anxiety is under okay control, still using lorazepam 0-2x/wk in setting of work stressors/insomnia.  Working well, no excalation of use for yrs.  No se's, does not need refills now.  Does continue to feel worse in terms of mood.  Still hasn't started the wellbutrin, wary of the ramp-up se's, and wants a slower week to start it- anticipates starting it soon, plans to try 75mg wellbutrin in am.  Hasn't been using trazodone, but may use it if sleep is worse with the wellbutrin.  Risks and benefits of medication(s) including potential side effects reviewed with patient.  Questions answered.   Will also start back on Vit D.   Hasn't been taking B12.  Will check levels for both.  - Comprehensive metabolic panel (BMP + Alb, Alk Phos, ALT, AST, Total. Bili, TP); Future  - Comprehensive metabolic panel (BMP + Alb, Alk Phos, ALT, AST, Total. Bili, TP)    5. Abdominal pain, left lower quadrant/6. Other constipation  Intermittent LLQ/left pelvic dull/aching pain that cane come for a week or few  weeks.  Not usually with periods, unsure if around ovulation.  Tends to vary between diarrhea and constipation, thinks more constipation sx's around these times?    On exam, lateral left pelvic pain most when palpating stool in sigmoid colon area, pelvic exam without discomfort or masses.  Abd xray- moderate amount of stool, otherwise normal.  Rec starting daily fiber supplement, and trials of miralax if any constipation sx's, can also try suppository to help clean out.  Follow-up on sx's at next appt.  - XR Abdomen 2 Views; Future    7. Persistent cough  Several weeks, mostly at night (caught by  and sleep monitor- she is sleeping through it) and morning.  Does feel more congestion in am as well.  Lungs clear on exam.    Rec flonase daily until sx's resolve.  Consider albuterol prior to sleep and in am to see if helpful, though no h/o asthma or fam h/o.  - albuterol (PROAIR HFA/PROVENTIL HFA/VENTOLIN HFA) 108 (90 Base) MCG/ACT inhaler; Inhale 2 puffs into the lungs every 6 hours as needed for other (persistent cough)  Dispense: 8 g; Refill: 0    8. Patellar pain, left  Sharp excrutiating pain with kneeling, unable to wear jeans, interested in consult.  - Orthopedic  Referral; Future    9. Migraine with aura and without status migrainosus, not intractable  Still just with auras, though they can come in clusters. Rec trying rx med to see if she can reduce clusters.   Also get back on magnesium nightly- last increase in clusters was when she was not taking magnesium.  - Comprehensive metabolic panel (BMP + Alb, Alk Phos, ALT, AST, Total. Bili, TP); Future  - Vitamin B12; Future  - Comprehensive metabolic panel (BMP + Alb, Alk Phos, ALT, AST, Total. Bili, TP)  - Vitamin B12    10. Weight gain  Will check labs as below.  Cont work with diet/exercise.  - Comprehensive metabolic panel (BMP + Alb, Alk Phos, ALT, AST, Total. Bili, TP); Future  - Insulin level; Future  -TSH w/ reflex    11. MTHFR mutation  -  Comprehensive metabolic panel (BMP + Alb, Alk Phos, ALT, AST, Total. Bili, TP); Future  - Folate; Future  - Homocysteine; Future  - CBC with platelets; Future  - Comprehensive metabolic panel (BMP + Alb, Alk Phos, ALT, AST, Total. Bili, TP)  - Folate  - Homocysteine  - CBC with platelets    12. Vitamin D deficiency  - Vitamin D Deficiency; Future  - Vitamin D Deficiency    13. CARDIOVASCULAR SCREENING; LDL GOAL LESS THAN 160  - Lipid panel reflex to direct LDL Fasting; Future  - Lipid panel reflex to direct LDL Fasting     Patient has been advised of split billing requirements and indicates understanding: Yes      COUNSELING:  Reviewed preventive health counseling, as reflected in patient instructions        She reports that she quit smoking about 8 years ago. Her smoking use included cigarettes. She has a 2.50 pack-year smoking history. She has never used smokeless tobacco.      Izabel Reyes MD  [facility]

## 2022-12-05 LAB — TSH SERPL DL<=0.005 MIU/L-ACNC: 1.03 UIU/ML (ref 0.3–4.2)

## 2022-12-05 NOTE — RESULT ENCOUNTER NOTE
The abdominal xray looked normal except for the moderate amount of stool as expected.  Hopefully the clean out regimens help, but let us know if not.    Best,   Ahmet Reyes MD

## 2022-12-07 LAB — HCYS SERPL-SCNC: 5.7 UMOL/L (ref 4–12)

## 2022-12-09 NOTE — RESULT ENCOUNTER NOTE
-Your homocysteine and folate levels look normal, and your CBC labs (which includes blood labs looking for signs of infection or anemia) are normal as well.  -Your CMP (which includes electrolyte levels, blood sugar levels, and kidney and liver function tests) is normal.  -Your TSH (thyroid stimulating hormone, which is elevated in hypothyroidism, and lowered in hyperthyroidism) is in a good range as well.  -Your Vitamin B12 is in a good range.  Your Vitamin D is on the lower end of normal, so I would start or increase supplementation (usually 0296-7524 units/day).  -Your cholesterol panel looks good, though your LDL (the bad cholesterol) is a bit higher, and your HDL (the good cholesterol) is a bit lower (though still in a great range).    -Your insulin is low at 6.4 (our pre-pre-diabetes check), which is very good.    Please let me know if you have any questions.  Best,   Ahmet Reyes MD

## 2022-12-29 NOTE — TELEPHONE ENCOUNTER
DIAGNOSIS: Patellar pain, left  Izabel Reyes MD  [employer]  No Images 12/27 cv   APPOINTMENT DATE: 1.6.23   NOTES STATUS DETAILS   OFFICE NOTE from referring provider Internal 12.2.22 Izabel Reyes MD     MEDICATION LIST Internal

## 2023-01-06 ENCOUNTER — PRE VISIT (OUTPATIENT)
Dept: ORTHOPEDICS | Facility: CLINIC | Age: 43
End: 2023-01-06

## 2023-01-11 DIAGNOSIS — M25.569 KNEE PAIN: Primary | ICD-10-CM

## 2023-03-03 NOTE — PATIENT INSTRUCTIONS
1.44 Keep headache journal of your symptoms. Try to track how often you have a headache, how long it lasts and what medicines you used. You can also track severity of headache    If you get a migraine try taking 2 tablets of Excedrin migraine (or generic) at onset of migraine.    Investigate what medicine caused the allergic reaction of muscle spasm at [facility]. Send my chart if you need it. compazine or Reglan (metoclopramide)      Lifestyle changes you can make to help your headaches:  1. Try to drink enough water each day (48-70 fluid ounces a day)  2. Limit caffeine intact-one caffeinated beverage a day  3. Eat regular meals  4. Try to get cardiovascular exercise (walking, swimming, biking) 4-5 times a week  5. Try to have a routine sleep schedule going to bed at the same time each night and getting up the same time each morning with enough time to sleep in between  6. Sometimes foods can trigger or make migraines worse. You can try to eliminate them if you think they make symptoms worse: dairy, gluten, nuts (cashews, almonds), artificial sweeteners, artificial colors, high sugar content foods, certain alcohol, chocolate.    To learn more about headaches you can go to the following websites:  https://americanmigrainefoundation.org/  http://www.headaches.org/

## 2023-05-02 NOTE — TELEPHONE ENCOUNTER
DIAGNOSIS: Patellar pain, left  Izabel Reyes MD  [employer]  No Images 12/27 cv     APPOINTMENT DATE: 5/5/23   NOTES STATUS DETAILS   OFFICE NOTE from referring provider Internal 12/2/22 OV Izabel Reyes MD   MEDICATION LIST Internal

## 2023-05-04 ENCOUNTER — VIRTUAL VISIT (OUTPATIENT)
Dept: FAMILY MEDICINE | Facility: CLINIC | Age: 43
End: 2023-05-04
Payer: COMMERCIAL

## 2023-05-04 DIAGNOSIS — F43.0 ACUTE REACTION TO STRESS: ICD-10-CM

## 2023-05-04 DIAGNOSIS — K59.09 OTHER CONSTIPATION: Primary | ICD-10-CM

## 2023-05-04 DIAGNOSIS — F41.9 ANXIETY: ICD-10-CM

## 2023-05-04 DIAGNOSIS — F51.04 PSYCHOPHYSIOLOGICAL INSOMNIA: ICD-10-CM

## 2023-05-04 DIAGNOSIS — F40.10 SOCIAL ANXIETY DISORDER: ICD-10-CM

## 2023-05-04 PROCEDURE — 99213 OFFICE O/P EST LOW 20 MIN: CPT | Mod: VID | Performed by: PHYSICIAN ASSISTANT

## 2023-05-04 RX ORDER — PROPRANOLOL HYDROCHLORIDE 10 MG/1
10-20 TABLET ORAL PRN
Qty: 30 TABLET | Refills: 1 | Status: SHIPPED | OUTPATIENT
Start: 2023-05-04 | End: 2023-10-09

## 2023-05-04 RX ORDER — LORAZEPAM 1 MG/1
.5-1 TABLET ORAL DAILY PRN
Qty: 15 TABLET | Refills: 1 | Status: SHIPPED | OUTPATIENT
Start: 2023-05-04 | End: 2023-10-09

## 2023-05-04 RX ORDER — TRAZODONE HYDROCHLORIDE 50 MG/1
25-50 TABLET, FILM COATED ORAL AT BEDTIME
Qty: 30 TABLET | Refills: 1 | Status: SHIPPED | OUTPATIENT
Start: 2023-05-04

## 2023-05-04 NOTE — PROGRESS NOTES
"Chloe is a 42 year old who is being evaluated via a billable video visit.      How would you like to obtain your AVS? Tymphanyhart  If the video visit is dropped, the invitation should be resent by: Text to cell phone: [phone]  Will anyone else be joining your video visit? No          Assessment & Plan     (K59.09) Other constipation  (primary encounter diagnosis)  Comment: ready for next steps; has tried various over the counter supplements and dietary changes but needs further assistance/work up.  Plan: Adult GI  Referral - Consult Only       (F51.04) Psychophysiological insomnia  (F43.0) Acute reaction to stress  (F40.10) Social anxiety disorder  (F41.9) Anxiety  Long standing, chronic, controlled overall; regular refills of short supply of lorazepam to last a few months sent to pharmacy; Proper use and risk for abuse discussed with patient at length. Patient still has previous prescription for Wellbutrin and will continue to consider, ok to update via Tymphanyhart if/when refills needed.     Review of prior external note(s) from - previous routine notes  20 minutes spent by me on the date of the encounter doing chart review, history and exam, documentation and further activities per the note       BMI:   Estimated body mass index is 27.99 kg/m  as calculated from the following:    Height as of 12/2/22: 1.67 m (5' 5.75\").    Weight as of 12/2/22: 78.1 kg (172 lb 1.6 oz).       There are no Patient Instructions on file for this visit.    Shelby Ferro PA-C  [facility]    Subjective   Chloe is a 42 year old, presenting for the following health issues:  Recheck Medication and Anxiety         View : No data to display.              HPI       Patient never started Wellbutrin and feels ok about it, but still considering.    Will sometimes use the trazodone at night and rarely the propranolol, probably needs refills    Prescription for lorazepam (ativan) # 15 with 1 refill given " by PCP 10/27/21 then with me 5/12/22 and 11/8/22.      Review of Systems   Constitutional, HEENT, cardiovascular, pulmonary, GI, , musculoskeletal, neuro, skin, endocrine and psych systems are negative, except as otherwise noted.      Objective           Vitals:  No vitals were obtained today due to virtual visit.    Physical Exam   GENERAL: Healthy, alert and no distress  EYES: Eyes grossly normal to inspection.  No discharge or erythema, or obvious scleral/conjunctival abnormalities.  RESP: No audible wheeze, cough, or visible cyanosis.  No visible retractions or increased work of breathing.    SKIN: Visible skin clear. No significant rash, abnormal pigmentation or lesions.  NEURO: Cranial nerves grossly intact.  Mentation and speech appropriate for age.  PSYCH: Mentation appears normal, affect normal/bright, judgement and insight intact, normal speech and appearance well-groomed.            Video-Visit Details    Type of service:  Video Visit     Originating Location (pt. Location): Home    Distant Location (provider location):  Off-site  Platform used for Video Visit: [employer]al

## 2023-05-05 ENCOUNTER — OFFICE VISIT (OUTPATIENT)
Dept: PLASTIC SURGERY | Facility: CLINIC | Age: 43
End: 2023-05-05

## 2023-05-05 ENCOUNTER — PRE VISIT (OUTPATIENT)
Dept: ORTHOPEDICS | Facility: CLINIC | Age: 43
End: 2023-05-05

## 2023-05-05 DIAGNOSIS — Z41.1 ENCOUNTER FOR COSMETIC PROCEDURE: Primary | ICD-10-CM

## 2023-05-05 NOTE — LETTER
May 5, 2023  Re: Chloe Foster  1980      Dear Dr. Cast,    Thank you so much for referring Chloe Foster to the [facility]. I had the pleasure of visiting with Chloe today.     Attached you will find a copy of my note. Please feel free to reach out to me with any questions, [phone].     Facial Plastic and Reconstructive Surgery      Chloe Foster presents today for follow-up.  She is here to get cosmetic Botox but in addition wanted to discussed her nose.    She continues to have nasal deformity as we have been attempting to manage significant nasal scarring with nasal steroid injections.  There is prominence of the right nasal mid vault that has stopped responding to this approach.  She also has a slight concavity of the left mid vault with a shifting and a small twist.  She has dependence of the lower lateral cartilage on the right with a medial prominence.    These are aspects of the nose that are resulting from the initial surgery.  She also continues to have collapse of the nasal airway right greater than the left.  However when I support bilateral lateral nasal walls with a Q-tip I am able to get improvement in her breathing.  The septum is straight without deviation.    Plan:    Chloe and I discussed potential improvement and correction of her nasal deformities.  He should benefit from chondral cartilage grafting from her ear to the nose in addition to reduction of the scar on the right.  She also would benefit from mobilization of the right lower lateral cartilage dependent intermediate richie.  We discussed these procedures today.  We discussed what it would entail.  I would approach in the nasally.  I do think she has significant scar and thus challenging surgery for elevating of the soft tissue envelope.  I would do this under nasally but did discuss possible risk of additional unfavorable changes as a result.    We also discussed that options for nasal wall  support for breathing can be costal cartilage or lateral nasal wall implants. The costal cartilage would add prominence and width. She is interested in considering the implants     Procedure: Chemodenervation with Botulinum Toxin A  Indication: Undesirable Dynamic Rhytids  Injector: Nikki Schwartz MD      Informed consent was obtained.  The skin was cleaned with antimicrobial solution and a topical ice was placed.     The patient was asked to systematically engage the muscles in the area to be injected. The tuberculin needles were used for subdermal injection and hemostasis was obtained with light digital pressure when needed. The skin was cleaned.     A total of 20 units were injected.  Botulinum toxin A type: Botox    Please see procedure log.    The patient tolerated the procedure well and there were no complications. Post procedure care instructions were given to the patient.          Your trust in our practice and care is much appreciated.    Sincerely,    Nikki Schwartz MD

## 2023-05-05 NOTE — PROGRESS NOTES
Facial Plastic and Reconstructive Surgery      Chloe Foster presents today for follow-up.  She is here to get cosmetic Botox but in addition wanted to discussed her nose.    She continues to have nasal deformity as we have been attempting to manage significant nasal scarring with nasal steroid injections.  There is prominence of the right nasal mid vault that has stopped responding to this approach.  She also has a slight concavity of the left mid vault with a shifting and a small twist.  She has dependence of the lower lateral cartilage on the right with a medial prominence.    These are aspects of the nose that are resulting from the initial surgery.  She also continues to have collapse of the nasal airway right greater than the left.  However when I support bilateral lateral nasal walls with a Q-tip I am able to get improvement in her breathing.  The septum is straight without deviation.    Plan:    Chloe and I discussed potential improvement and correction of her nasal deformities.  He should benefit from chondral cartilage grafting from her ear to the nose in addition to reduction of the scar on the right.  She also would benefit from mobilization of the right lower lateral cartilage dependent intermediate richie.  We discussed these procedures today.  We discussed what it would entail.  I would approach in the nasally.  I do think she has significant scar and thus challenging surgery for elevating of the soft tissue envelope.  I would do this under nasally but did discuss possible risk of additional unfavorable changes as a result.    We also discussed that options for nasal wall support for breathing can be costal cartilage or lateral nasal wall implants. The costal cartilage would add prominence and width. She is interested in considering the implants     Procedure: Chemodenervation with Botulinum Toxin A  Indication: Undesirable Dynamic Rhytids  Injector: Nikki Schwartz MD      Informed consent  was obtained.  The skin was cleaned with antimicrobial solution and a topical ice was placed.     The patient was asked to systematically engage the muscles in the area to be injected. The tuberculin needles were used for subdermal injection and hemostasis was obtained with light digital pressure when needed. The skin was cleaned.     A total of 20 units were injected.  Botulinum toxin A type: Botox    Please see procedure log.    The patient tolerated the procedure well and there were no complications. Post procedure care instructions were given to the patient.

## 2023-05-05 NOTE — LETTER
5/5/2023       RE: Chloe Foster  [address] [facility] [address]       Dear Colleague,    Thank you for referring your patient, Chloe Foster, to the [facility]. Please see a copy of my visit note below.    Facial Plastic and Reconstructive Surgery      Chloe Foster presents today for follow-up.  She is here to get cosmetic Botox but in addition wanted to discussed her nose.    She continues to have nasal deformity as we have been attempting to manage significant nasal scarring with nasal steroid injections.  There is prominence of the right nasal mid vault that has stopped responding to this approach.  She also has a slight concavity of the left mid vault with a shifting and a small twist.  She has dependence of the lower lateral cartilage on the right with a medial prominence.    These are aspects of the nose that are resulting from the initial surgery.  She also continues to have collapse of the nasal airway right greater than the left.  However when I support bilateral lateral nasal walls with a Q-tip I am able to get improvement in her breathing.  The septum is straight without deviation.    Plan:    Chloe and I discussed potential improvement and correction of her nasal deformities.  He should benefit from chondral cartilage grafting from her ear to the nose in addition to reduction of the scar on the right.  She also would benefit from mobilization of the right lower lateral cartilage dependent intermediate richie.  We discussed these procedures today.  We discussed what it would entail.  I would approach in the nasally.  I do think she has significant scar and thus challenging surgery for elevating of the soft tissue envelope.  I would do this under nasally but did discuss possible risk of additional unfavorable changes as a result.    We also discussed that options for nasal wall support for breathing  can be costal cartilage or lateral nasal wall implants. The costal cartilage would add prominence and width. She is interested in considering the implants     Procedure: Chemodenervation with Botulinum Toxin A  Indication: Undesirable Dynamic Rhytids  Injector: Nikki Schwartz MD      Informed consent was obtained.  The skin was cleaned with antimicrobial solution and a topical ice was placed.     The patient was asked to systematically engage the muscles in the area to be injected. The tuberculin needles were used for subdermal injection and hemostasis was obtained with light digital pressure when needed. The skin was cleaned.     A total of 20 units were injected.  Botulinum toxin A type: Botox    Please see procedure log.    The patient tolerated the procedure well and there were no complications. Post procedure care instructions were given to the patient.        Again, thank you for allowing me to participate in the care of your patient.      Sincerely,    Nikki Schwartz MD

## 2023-05-30 ENCOUNTER — OFFICE VISIT (OUTPATIENT)
Dept: URGENT CARE | Facility: URGENT CARE | Age: 43
End: 2023-05-30
Payer: COMMERCIAL

## 2023-05-30 VITALS
OXYGEN SATURATION: 95 % | SYSTOLIC BLOOD PRESSURE: 127 MMHG | TEMPERATURE: 98.2 F | BODY MASS INDEX: 29.28 KG/M2 | DIASTOLIC BLOOD PRESSURE: 87 MMHG | RESPIRATION RATE: 18 BRPM | HEART RATE: 87 BPM | WEIGHT: 180 LBS

## 2023-05-30 DIAGNOSIS — R00.0 RAPID HEARTBEAT: ICD-10-CM

## 2023-05-30 DIAGNOSIS — R00.2 PALPITATIONS: Primary | ICD-10-CM

## 2023-05-30 PROCEDURE — 99214 OFFICE O/P EST MOD 30 MIN: CPT

## 2023-05-30 PROCEDURE — 93000 ELECTROCARDIOGRAM COMPLETE: CPT

## 2023-05-30 NOTE — PROGRESS NOTES
Chief Complaint   Patient presents with     Urgent Care     Heart palpitation for 5 days now        ASSESSMENT/PLAN:  Chloe was seen today for urgent care.    Diagnoses and all orders for this visit:    Palpitations  -     Adult Leadless EKG Monitor 3 to 7 Days; Future  -     Adult Cardiology Eval  Referral; Future    Rapid heartbeat  -     EKG 12-lead complete w/read - Clinics    Differential diagnosis includes arrhythmia, ischemia, electrolyte abnormality, stress, viral infection among others    Patient appears well overall.  No red flag symptoms.  EKG largely unchanged from tracing 2 years ago without any signs of acute ischemia.  Vitals and exam reassuring.  Ordering Zio patch and having patient follow-up with cardiology    Beto Perry PA-C      SUBJECTIVE:  Chloe is a 42 year old female who presents to urgent care with 5 days of heart palpitations.  They do seem a little bit better today but they were quite persistent yesterday.  She has a home cardia monitor that showed SVT, preventricular contractions and a wide QRS on some of the tracings she took while she felt the palpitations.  No chest pain, shortness of breath or lightheadedness.  No lower extremity swelling or edema.  Has a history of CVA that may or may not have been related to a PFO that was later fixed.  No changes in medication, no changes in stress, rarely drinks caffeine or alcohol.    ROS: Pertinent ROS neg other than the symptoms noted above in the HPI.     OBJECTIVE:  /87   Pulse 87   Temp 98.2  F (36.8  C)   Resp 18   Wt 81.6 kg (180 lb)   SpO2 95%   BMI 29.28 kg/m     GENERAL: healthy, alert and no distress  EYES: Eyes grossly normal to inspection, PERRL and conjunctivae and sclerae normal  HENT:  nose and mouth without ulcers or lesions  RESP: lungs clear to auscultation - no rales, rhonchi or wheezes  CV: regular rate and rhythm, normal S1 S2, no S3 or S4, no murmur, click or rub, no peripheral edema and  peripheral pulses strong  MS: no gross musculoskeletal defects noted, no edema  SKIN: no suspicious lesions or rashes    DIAGNOSTICS  EKG - Reviewed and interpreted by me: Low voltage in the limb leads, borderline LAD, no significant change from EKG in 2021, no signs of ischemia  No results found for any visits on 05/30/23.     Current Outpatient Medications   Medication     acetaminophen (TYLENOL) 325 MG tablet     albuterol (PROAIR HFA/PROVENTIL HFA/VENTOLIN HFA) 108 (90 Base) MCG/ACT inhaler     aspirin EC 81 MG EC tablet     aspirin-acetaminophen-caffeine (EXCEDRIN MIGRAINE) 250-250-65 MG tablet     LORazepam (ATIVAN) 1 MG tablet     magnesium oxide (MAG-OX) 400 MG tablet     Multiple Vitamins-Minerals (MULTIVITAMIN ADULT EXTRA C PO)     NONFORMULARY     Omega-3 Fatty Acids (FISH OIL PO)     propranolol (INDERAL) 10 MG tablet     Rimegepant Sulfate (NURTEC) 75 MG TBDP     STATIN NOT PRESCRIBED, INTENTIONAL,     traZODone (DESYREL) 50 MG tablet     VITAMIN D, CHOLECALCIFEROL, PO     No current facility-administered medications for this visit.      Patient Active Problem List   Diagnosis     Vitamin D deficiency     CARDIOVASCULAR SCREENING; LDL GOAL LESS THAN 160     Lumbar herniated disc     Hirsutism     Insomnia     IBS (irritable bowel syndrome)     Moderate recurrent major depression (H)     History of cardioembolic cerebrovascular accident (CVA)     PFO with atrial septal aneurysm     Protein S deficiency (H)     Migraine with aura and without status migrainosus, not intractable     Pre-conception counseling     Elevated lipoprotein A level     Social anxiety disorder     MTHFR mutation     Cerebrovascular accident (CVA) due to embolism of right middle cerebral artery (H)     Deviated nasal septum      Past Medical History:   Diagnosis Date     Acute stress reaction     propranolol helps prior to interviews, stressful situations     Allergic rhinitis, cause unspecified     no meds except prn flonase, tests  generally positive, decided against shots     Anxiety state, unspecified     citalopram started in , stopped after couple wks, felt sluggish/tired     CVA (cerebral vascular accident) (H)     May 1, May 14th 2015     Depressive disorder, not elsewhere classified     dx, but not sure this is correct, weaned off wellbutrin (-, -), restarted 3/08     Dizziness      Elevated lipoprotein A level      Hirsutism     saw endo, inconclusive, started on spironolactone (helped a little), stopped in , elevated DHEA- sees new endo      History of stroke with residual effects      PFO (patent foramen ovale)     Post PFO/ASD closure with 30mm Amplatzer device 16     Protein S deficiency (H)     Per Dr. Casarez, prengnacy related.  Protein S levels normalized afer pregnancy.     Urinary tract infection, site not specified     recurrent, start nitrofur in , 6mo txt     Past Surgical History:   Procedure Laterality Date     ARTHROSCOPY KNEE RT/LT      Rt knee     HC REPAIR OF NASAL SEPTUM       REPAIR PATENT FORAMEN OVALE  2016     SEPTORHINOPLASTY N/A 3/15/2021    Procedure: Revision Septorhinoplasty, Repair of Nasal Valve Collapse, Cadaveric Rib Graft + 45 min Cosmetic Tip Rhinoplasty;  Surgeon: Nikki Schwartz MD;  Location: [facility] OR     Family History   Problem Relation Age of Onset     Family History Negative Mother      Cancer Father         throat, possibly asbestos exposure     Hypertension Father      Allergies Father      Arrhythmia Father      Other Cancer Father      Family History Negative Sister      Family History Negative Brother      Diabetes Maternal Grandmother         on insulin     C.A.D. Maternal Grandmother         triple bypass in her 60s     C.A.D. Maternal Grandfather         MI in 50s     Diabetes Paternal Grandmother      Neurologic Disorder Paternal Grandfather          of brain aneurysm in early 60s     Family History Negative Son      Diabetes  Maternal Aunt          in her early 40s of DM complications, type 2     Coronary Artery Disease Early Onset Maternal Aunt 41     Obesity Maternal Aunt      Social History     Tobacco Use     Smoking status: Former     Packs/day: 0.50     Years: 5.00     Pack years: 2.50     Types: Cigarettes     Quit date: 2014     Years since quittin.1     Smokeless tobacco: Never   Vaping Use     Vaping status: Not on file   Substance Use Topics     Alcohol use: Yes     Comment: SOCIAL              The plan of care was discussed with the patient. They understand and agree with the course of treatment prescribed. A printed summary was given including instructions and medications.  The use of Dragon/[employer]ifen.[employer] dictation services may have been used to construct the content in this note; any grammatical or spelling errors are non-intentional. Please contact the author of this note directly if you are in need of any clarification.

## 2023-06-12 ENCOUNTER — VIRTUAL VISIT (OUTPATIENT)
Dept: GASTROENTEROLOGY | Facility: CLINIC | Age: 43
End: 2023-06-12
Attending: PHYSICIAN ASSISTANT
Payer: COMMERCIAL

## 2023-06-12 ENCOUNTER — TELEPHONE (OUTPATIENT)
Dept: GASTROENTEROLOGY | Facility: CLINIC | Age: 43
End: 2023-06-12

## 2023-06-12 VITALS — WEIGHT: 180 LBS | BODY MASS INDEX: 28.93 KG/M2 | HEIGHT: 66 IN

## 2023-06-12 DIAGNOSIS — R14.0 BLOATING: ICD-10-CM

## 2023-06-12 DIAGNOSIS — R19.7 DIARRHEA, UNSPECIFIED TYPE: Primary | ICD-10-CM

## 2023-06-12 DIAGNOSIS — K59.09 OTHER CONSTIPATION: ICD-10-CM

## 2023-06-12 PROCEDURE — 99204 OFFICE O/P NEW MOD 45 MIN: CPT | Mod: VID | Performed by: INTERNAL MEDICINE

## 2023-06-12 ASSESSMENT — PAIN SCALES - GENERAL: PAINLEVEL: NO PAIN (0)

## 2023-06-12 NOTE — TELEPHONE ENCOUNTER
Prior Authorization Retail Medication Request    Medication/Dose: linaclotide (LINZESS) 72 MCG capsule  ICD code (if different than what is on RX):  K59.09      Insurance Name:  [employer] [place]   Insurance ID:  [id number]       Pharmacy Information (if different than what is on RX)  Name:  [facility]  Phone:  [phone]

## 2023-06-12 NOTE — PROGRESS NOTES
"Virtual Visit Details    Type of service:  Video Visit Start 9:00  Video Visit End: 920     Originating Location (pt. Location): Home    Distant Location (provider location):  On-site  Platform used for Video Visit: [facility]             GASTROENTEROLOGY NEW PATIENT VIDEO VISIT    CC/REFERRING MD:    Izabel Reyes    REASON FOR CONSULTATION:   Shelby Ferro for   Chief Complaint   Patient presents with     Consult       HISTORY OF PRESENT ILLNESS:    Chloe Foster is a 42 year old female who is being evaluated via a billable video visit for irregular bowel movements and abdominal cramping    Flips between constipation and diarrhea   Going on for over a year now  Started with abdominal cramping intermittently started April of last year   Cramping sometimes now, but better   Felt like a pulsing sensation in lower left \"cramping\" but not very painful 2/10, more unsettling   Now about once per month   No correlation with BMs   Cramping was more after she ate   + bloating    BMs:   3 AMs out of the week: diarrhea   - can be watery, mushy  1-2 times per week normal BM  Can go 2-3 days without going - at least weekly   - no straining or type 1 stools    Thinks lactose intolerant - avoids. Definitely a trigger   - garlic is a trigger   - doesn't eat a lot of gluten- upsets stomach     No FH celiac disease   Celiac testing negative in past  No hx cholecystectomy    Tx tried: magnesium, psyllium husk   - miralax: uses as needed but causes diarrhea       I have reviewed and updated the patient's Past Medical History, Social History, Family History and Medication List.    Exam:    General appearance:  Healthy appearing adult, in no acute distress  Eyes:  Sclera anicteric  Ears, nose, mouth and throat:  No obvious external lesions of ears and nose.  Hearing intact  Neck:  Symmetric, No obvious external lesions  Respiratory:  Normal respiration, no use of accessory muscles "   Psychiatric:  Oriented to person, place and time, Appropriate mood and affect.   Neurologic:  Peripheral muscle function and dexterity appear to be intact      PERTINENT STUDIES have been reviewed.    ASSESSMENT/PLAN:    Chloe Foster is a 42 year old female who presents for evaluation of irregular bowel movements and abdominal cramping    14 month history of irregular bowel habits alternating between constipation and diarrhea. She also reports LLQ cramping/pulsating discomfort that has improved, but is still present about once per month. Not associated with bowel movements, but more so with eating. She has tried Miralax which caused diarrhea, and is taking psyllium husk and magnesium. She had an abdominal xray showing moderate stool burden in the past. No alarm symptoms.    - Recommend Linzess 72 mcg daily  - Dietitian referral  - colonoscopy, EGD  - Hydrogen breath test for SIBO  - future considerations could include: trial of colestipol, pelvic floor center referral  - Follow up after testing    Video-Visit Details  Video Visit Time: 20 minutes  Type of service:  Video Visit  Originating Location (pt. Location): Home        Distant Location (provider location):  On-site  Platform used for Video Visit: [facility] 3 months    Thank you for this consultation.  It was a pleasure to participate in the care of this patient; please contact us with any further questions. 30 minutes spent on the date of the encounter doing chart review, history and exam, documentation and further activities as noted above.    Alex Gross MD  Division of Gastroenterology, Hepatology and Nutrition  [facility]

## 2023-06-12 NOTE — PROGRESS NOTES
"Virtual Visit Details    Type of service:  Video Visit     Originating Location (pt. Location): Home  {PROVIDER LOCATION On-site should be selected for visits conducted from your clinic location or adjoining [facility] hospital, academic office, or other nearby [facility] building. Off-site should be selected for all other provider locations, including home:[id number]}  Distant Location (provider location):  On-site  Platform used for Video Visit: [facility]             GASTROENTEROLOGY NEW PATIENT VIDEO VISIT    CC/REFERRING MD:    Izabel Reyes    REASON FOR CONSULTATION:   Shelby Ferro for   Chief Complaint   Patient presents with     Consult       HISTORY OF PRESENT ILLNESS:    Chloe Foster is a 42 year old female who is being evaluated via a billable video visit for irregular bowel movements and abdominal cramping    Flips between constipation and diarrhea   Going on for over a year now  Started with abdominal cramping intermittently started April of last year   Cramping sometimes now, but better   Felt like a pulsing sensation in lower left \"cramping\" but not very painful 2/10, more unsettling   Now about once per month   No correlation with BMs   Cramping was more after she ate   + bloating    BMs:   3 AMs out of the week: diarrhea   - can be watery, mushy  1-2 times per week normal BM  Can go 2-3 days without going - at least weekly   - no straining or type 1 stools    Thinks lactose intolerant - avoids. Definitely a trigger   - garlic is a trigger   - doesn't eat a lot of gluten- upsets stomach     No FH celiac disease   Celiac testing negative in past  No hx cholecystectomy    Tx tried: magnesium, psyllium husk   - miralax: uses as needed but causes diarrhea       I have reviewed and updated the patient's Past Medical History, Social History, Family History and Medication List.    Exam:    General appearance:  Healthy appearing adult, in no acute distress  Eyes:  Sclera " anicteric  Ears, nose, mouth and throat:  No obvious external lesions of ears and nose.  Hearing intact  Neck:  Symmetric, No obvious external lesions  Respiratory:  Normal respiration, no use of accessory muscles   Psychiatric:  Oriented to person, place and time, Appropriate mood and affect.   Neurologic:  Peripheral muscle function and dexterity appear to be intact      PERTINENT STUDIES have been reviewed.    ASSESSMENT/PLAN:    Chloe Foster is a 42 year old female who presents for evaluation of irregular bowel movements and abdominal cramping    14 month history of irregular bowel habits alternating between constipation and diarrhea. She also reports LLQ cramping/pulsating discomfort that has improved, but is still present about once per month. Not associated with bowel movements, but more so with eating. She has tried Miralax which caused diarrhea, and is taking psyllium husk and magnesium. She had an abdominal xray showing moderate stool burden in the past. No alarm symptoms.    - Recommend Linzess 72 mcg daily  - Dietitian referral  - colonoscopy, EGD  - Hydrogen breath test for SIBO  - future considerations could include: trial of colestipol, pelvic floor center referral  - Follow up after testing    Video-Visit Details  Video Visit Time: 15 minutes  Type of service:  Video Visit  Originating Location (pt. Location): Home    {PROVIDER LOCATION On-site should be selected for visits conducted from your clinic location or adjoining [facility] hospital, academic office, or other nearby [facility] building. Off-site should be selected for all other provider locations, including home:[id number]}    Distant Location (provider location):  On-site  Platform used for Video Visit: [facility] 3 months    Thank you for this consultation.  It was a pleasure to participate in the care of this patient; please contact us with any further questions. 30 minutes spent on the date of the encounter doing chart review, history and  exam, documentation and further activities as noted above.    Alex Gross MD  Division of Gastroenterology, Hepatology and Nutrition  [facility]

## 2023-06-12 NOTE — NURSING NOTE
Is the patient currently in the state of [place]? YES    Visit mode:VIDEO    If the visit is dropped, the patient can be reconnected by: VIDEO VISIT: Send to e-mail at: [email or web address]    Will anyone else be joining the visit? NO      How would you like to obtain your AVS? MyChart    Are changes needed to the allergy or medication list? NO    Reason for visit: Consult

## 2023-06-15 ENCOUNTER — TELEPHONE (OUTPATIENT)
Dept: GASTROENTEROLOGY | Facility: CLINIC | Age: 43
End: 2023-06-15

## 2023-06-15 ENCOUNTER — VIRTUAL VISIT (OUTPATIENT)
Dept: FAMILY MEDICINE | Facility: CLINIC | Age: 43
End: 2023-06-15
Payer: COMMERCIAL

## 2023-06-15 DIAGNOSIS — R63.5 WEIGHT GAIN: Primary | ICD-10-CM

## 2023-06-15 PROCEDURE — 99213 OFFICE O/P EST LOW 20 MIN: CPT | Mod: VID | Performed by: PHYSICIAN ASSISTANT

## 2023-06-15 NOTE — TELEPHONE ENCOUNTER
"Endoscopy Scheduling Screen    Have you had a positive Covid test in the last 14 days?  No    Are you active on MyChart?   Yes    What insurance is in the chart?  BC/BS: Schedule in [facility] unless patient meets exclusion criteria.     Ordering/Referring Provider: FELICIANO Gross   (If ordering provider performs procedure, schedule with ordering provider unless otherwise instructed. )    BMI: Estimated body mass index is 29.27 kg/m  as calculated from the following:    Height as of 6/12/23: 1.67 m (5' 5.75\").    Weight as of 6/12/23: 81.6 kg (180 lb).     Sedation Ordered  moderate sedation.   If patient BMI > 50 do not schedule in [facility].    Are you taking any prescription medications for pain?   No    Are you taking methadone or Suboxone?  No    Do you have a history of malignant hyperthermia or adverse reaction to anesthesia?  No    (Females) Are you currently pregnant?   No     Have you been diagnosed or told you have pulmonary hypertension?   No    Do you have an LVAD?  No    Have you been told you have moderate to severe sleep apnea?  No    Have you been told you have COPD, asthma, or any other lung disease?  No    Do you have any heart conditions?  No     Have you ever had or are you awaiting a heart or lung transplant?   No    Have you had a stroke or transient ischemic attack (TIA aka \"mini stroke\" in the last 6 months?   No    Have you been diagnosed with or been told you have cirrhosis of the liver?   No    Are you currently on dialysis?   No    Do you need assistance transferring?   No    BMI: Estimated body mass index is 29.27 kg/m  as calculated from the following:    Height as of 6/12/23: 1.67 m (5' 5.75\").    Weight as of 6/12/23: 81.6 kg (180 lb).     Is patients BMI > 40 and scheduling location [facility]?  No    Do you take the medication Phentermine, Ozempic or Wegovy?  n      Do you take the medication Naltrexone?  No    Do you take blood thinners?  No    Preferred Pharmacy:      [id number] [place], [place] " - [address]  [address] [address]  Phone: [phone] Fax: [phone]          Prep   Are you currently on dialysis or do you have chronic kidney disease?  No (standard prep)    Do you have a diagnosis of diabetes?  No    Do you have a diagnosis of cystic fibrosis (CF)?  No    On a regular basis do you go 3 -5 days between bowel movements?  No    BMI > 40?  No    Final Scheduling Details   Colonoscopy prep sent?  MiraLAX (No Mag Citrate)    Procedure scheduled  Colonoscopy/EGD    Surgeon:  FELICIANO Gross     Date of procedure:  9/25     Schedule PAC:   No    Location  [facility]    Sedation   Moderate Sedation    Patient Reminders:    You will receive a call from a Nurse to review instructions and health history.  This assessment must be completed prior to your procedure.  Failure to complete the Nurse assessment may result in the procedure being cancelled.       On the day of your procedure, please designate an adult(s) who can drive you home stay with you for the next 24 hours. The medicines used in the exam will make you sleepy. You will not be able to drive.       You cannot take public transportation, ride share services, or non-medical taxi service without a responsible caregiver.  Medical transport services are allowed with the requirement that a responsible caregiver will receive you at your destination.  We require that drivers and caregivers are confirmed prior to your procedure.      HBT also scheduled 7/25

## 2023-06-15 NOTE — PROGRESS NOTES
Chloe is a 42 year old who is being evaluated via a billable video visit.      How would you like to obtain your AVS? MyChart  If the video visit is dropped, the invitation should be resent by: Text to cell phone: [phone]  Will anyone else be joining your video visit? No          Assessment & Plan     Weight gain  Patient interested in ozempic type weight management options; will refer to weight clinic and go from there; continue with healthy diet and exercise.Return to clinic with any worsening or changes in symptoms and follow up with PCP for routine care.    - Adult Comprehensive Weight Management  Referral; Future    Review of prior external note(s) from - previous routine notes  15 minutes spent by me on the date of the encounter doing chart review, history and exam, documentation and further activities per the note       There are no Patient Instructions on file for this visit.    Shelby Ferro PA-C  [facility]    Subjective   Chloe is a 42 year old, presenting for the following health issues:  Weight Problem         View : No data to display.              HPI     Patient has continued hard time loosing weight for the past few years.  Notes a healthy diet and regular exercise but interested in next steps, specifically ozempic or something similar.  Cardiac and high cholesterol history, so wondering if candidate.        Review of Systems   Constitutional, HEENT, cardiovascular, pulmonary, GI, , musculoskeletal, neuro, skin, endocrine and psych systems are negative, except as otherwise noted.      Objective           Vitals:  No vitals were obtained today due to virtual visit.    Physical Exam   GENERAL: Healthy, alert and no distress  EYES: Eyes grossly normal to inspection.  No discharge or erythema, or obvious scleral/conjunctival abnormalities.  RESP: No audible wheeze, cough, or visible cyanosis.  No visible retractions or increased work of breathing.     SKIN: Visible skin clear. No significant rash, abnormal pigmentation or lesions.  NEURO: Cranial nerves grossly intact.  Mentation and speech appropriate for age.  PSYCH: Mentation appears normal, affect normal/bright, judgement and insight intact, normal speech and appearance well-groomed.            Video-Visit Details    Type of service:  Video Visit     Originating Location (pt. Location): Home    Distant Location (provider location):  Off-site  Platform used for Video Visit: [employer]

## 2023-06-16 NOTE — TELEPHONE ENCOUNTER
Central Prior Authorization Team - Phone: [phone]     Prior Authorization Approval    Medication: LINZESS 72 MCG PO CAPS  Authorization Effective Date: 6/16/2023  Authorization Expiration Date: 6/16/2024  Approved Dose/Quantity: 30  Reference #:     Insurance Company: [employer] [place] - Phone [phone] Fax [phone]  Expected CoPay:       CoPay Card Available:      Financial Assistance Needed:   Which Pharmacy is filling the prescription: [id number] - [place], [place] - [address] AT [address] [place] [address]  Pharmacy Notified: Yes  Patient Notified: Yes PHARMACY WILL NOTIFY WHEN READY

## 2023-06-16 NOTE — TELEPHONE ENCOUNTER
Central Prior Authorization Team - Phone: [phone]     PA Initiation    Medication: LINZESS 72 MCG PO CAPS  Insurance Company: [employer] [place] - Phone [phone] Fax [phone]  Pharmacy Filling the Rx: [id number] [place], [place] - [address] AT [address] [place] [address] & [facility]  Filling Pharmacy Phone: [phone]  Filling Pharmacy Fax:    Start Date: 6/16/2023

## 2023-07-17 DIAGNOSIS — M95.0 ACQUIRED NASAL DEFORMITY: Primary | ICD-10-CM

## 2023-07-17 RX ORDER — CEFAZOLIN SODIUM 2 G/50ML
2 SOLUTION INTRAVENOUS
Status: CANCELLED | OUTPATIENT
Start: 2023-07-17

## 2023-07-17 RX ORDER — CEFAZOLIN SODIUM 2 G/50ML
2 SOLUTION INTRAVENOUS SEE ADMIN INSTRUCTIONS
Status: CANCELLED | OUTPATIENT
Start: 2023-07-17

## 2023-08-04 ENCOUNTER — OFFICE VISIT (OUTPATIENT)
Dept: PLASTIC SURGERY | Facility: CLINIC | Age: 43
End: 2023-08-04

## 2023-08-04 DIAGNOSIS — Z41.1 ENCOUNTER FOR COSMETIC PROCEDURE: Primary | ICD-10-CM

## 2023-08-04 NOTE — LETTER
August 4, 2023  Re: Chloe ROCKY Cristian  1980      Dear Dr. Cast,    Thank you so much for referring Chloe HIDALGO Miriamtemi to the [facility]. I had the pleasure of visiting with Chloe today.     Attached you will find a copy of my note. Please feel free to reach out to me with any questions, [phone].     Facial Plastic and Reconstructive Surgery    Procedure: Chemodenervation with Botulinum Toxin A  Indication: Undesirable Dynamic Rhytids  Injector: Nikki Schwartz MD      Informed consent was obtained.  The skin was cleaned with antimicrobial solution and a topical ice was placed.     The patient was asked to systematically engage the muscles in the area to be injected. The tuberculin needles were used for subdermal injection and hemostasis was obtained with light digital pressure when needed. The skin was cleaned.     A total of 20 units were injected.  Botulinum toxin A type: Botox    Please see procedure log.    The patient tolerated the procedure well and there were no complications. Post procedure care instructions were given to the patient.          Your trust in our practice and care is much appreciated.    Sincerely,    Nikki Schwartz MD

## 2023-08-04 NOTE — LETTER
8/4/2023       RE: Chloe Foster  [address] [facility] [address]       Dear Colleague,    Thank you for referring your patient, Chloe Foster, to the [facility] at [facility]. Please see a copy of my visit note below.    Facial Plastic and Reconstructive Surgery    Procedure: Chemodenervation with Botulinum Toxin A  Indication: Undesirable Dynamic Rhytids  Injector: Nikki Schwartz MD      Informed consent was obtained.  The skin was cleaned with antimicrobial solution and a topical ice was placed.     The patient was asked to systematically engage the muscles in the area to be injected. The tuberculin needles were used for subdermal injection and hemostasis was obtained with light digital pressure when needed. The skin was cleaned.     A total of 20 units were injected.  Botulinum toxin A type: Botox    Please see procedure log.    The patient tolerated the procedure well and there were no complications. Post procedure care instructions were given to the patient.          Again, thank you for allowing me to participate in the care of your patient.      Sincerely,    Nikki Schwartz MD

## 2023-08-04 NOTE — PROGRESS NOTES
Facial Plastic and Reconstructive Surgery    Procedure: Chemodenervation with Botulinum Toxin A  Indication: Undesirable Dynamic Rhytids  Injector: Nikki Schwartz MD      Informed consent was obtained.  The skin was cleaned with antimicrobial solution and a topical ice was placed.     The patient was asked to systematically engage the muscles in the area to be injected. The tuberculin needles were used for subdermal injection and hemostasis was obtained with light digital pressure when needed. The skin was cleaned.     A total of 20 units were injected.  Botulinum toxin A type: Botox    Please see procedure log.    The patient tolerated the procedure well and there were no complications. Post procedure care instructions were given to the patient.

## 2023-08-08 ENCOUNTER — HOSPITAL ENCOUNTER (OUTPATIENT)
Dept: NUTRITION | Facility: CLINIC | Age: 43
Discharge: HOME | End: 2023-08-08
Attending: INTERNAL MEDICINE | Admitting: INTERNAL MEDICINE
Payer: COMMERCIAL

## 2023-08-08 PROCEDURE — 97802 MEDICAL NUTRITION INDIV IN: CPT | Mod: GT,95 | Performed by: DIETITIAN, REGISTERED

## 2023-08-08 NOTE — PROGRESS NOTES
"[facility]  Medical Nutrition Therapy    Visit Type: Initial Assessment    Chloe Foster, 43 year old referred by Alex Gross MD  for MNT related to   K59.09 (ICD-10-CM) - Other constipation   R19.7 (ICD-10-CM) - Diarrhea, unspecified type   R14.0 (ICD-10-CM) - Bloating     Virtual Visit Details    Type of service:  Video Visit     Originating Location (pt. Location): Home    Distant Location (provider location):  Off-site  Platform used for Video Visit: [employer]       Nutrition Assessment:  Anthropometrics  Height:   Ht Readings from Last 1 Encounters:   06/12/23 1.67 m (5' 5.75\")         BMI:  29.2   Weight Status:  Overweight BMI 25-29.9   Weight:   Wt Readings from Last 1 Encounters:   06/12/23 81.6 kg (180 lb)       UBW: 170 lb      IBW:  58 kg (128 lb) IBW %: 140%        Weight History:   Wt Readings from Last 20 Encounters:   06/12/23 81.6 kg (180 lb)   05/30/23 81.6 kg (180 lb)   12/02/22 78.1 kg (172 lb 1.6 oz)   01/10/22 74.8 kg (165 lb)   12/26/21 74.8 kg (165 lb)   10/20/21 76 kg (167 lb 9.6 oz)   07/09/21 74.8 kg (165 lb)   03/15/21 76.2 kg (168 lb)   02/18/21 77.1 kg (170 lb)   02/02/21 74.8 kg (165 lb)   11/04/20 75.8 kg (167 lb)   08/19/20 73.9 kg (163 lb)   01/16/20 77.1 kg (170 lb)   12/27/19 77.1 kg (170 lb 1 oz)   12/19/19 76.9 kg (169 lb 8 oz)   12/13/19 76.7 kg (169 lb)   11/05/19 75.3 kg (166 lb)   07/31/19 74.8 kg (165 lb)   07/03/19 74.8 kg (165 lb)   06/04/19 74.9 kg (165 lb 1.6 oz)     -Wt gain of 15 lb (9.1%) over the past 1.5 years.     Goal Weight:   160-170 lb    Nutrition History    PMH:   Per referring provider's note:  \"Flips between constipation and diarrhea   Going on for over a year now  Started with abdominal cramping intermittently started April of last year   Cramping sometimes now, but better   Felt like a pulsing sensation in lower left \"cramping\" but not very painful 2/10, more unsettling   Now about once per month   No correlation with BMs " "  Cramping was more after she ate   + bloating  BMs:   3 AMs out of the week: diarrhea   - can be watery, mushy  1-2 times per week normal BM  Can go 2-3 days without going - at least weekly   - no straining or type 1 stools  Thinks lactose intolerant - avoids. Definitely a trigger   - garlic is a trigger   - doesn't eat a lot of gluten- upsets stomach   No FH celiac disease   Celiac testing negative in past  No hx cholecystectomy  Tx tried: magnesium, psyllium husk   - miralax: uses as needed but causes diarrhea\"    Per patient during this encounter:  -This week has constipation. Feels like she has to go and can't. Then 2-3 days in a row of bad diarrhea. Psyllium husk helped, but not enough. Got a stomach ache from Metamucil pills. Got an upset stomach from the powder.   -Hasn't kept a journal. Knows that garlic and butter, cream, some cheeses causes issues. Can eat brie and soft cheeses. Pizza isn't well tolerated. Doesn't feel well. Has horrible diarrhea and cramping after these foods. Has been struggling with this reaction for the past few years. Has always avoided dairy.   -Hasn't tried the low FODMAP diet.      Patient Active Problem List   Diagnosis    Vitamin D deficiency    CARDIOVASCULAR SCREENING; LDL GOAL LESS THAN 160    Lumbar herniated disc    Hirsutism    Insomnia    IBS (irritable bowel syndrome)    Moderate recurrent major depression (H)    History of cardioembolic cerebrovascular accident (CVA)    PFO with atrial septal aneurysm    Protein S deficiency (H)    Migraine with aura and without status migrainosus, not intractable    Pre-conception counseling    Elevated lipoprotein A level    Social anxiety disorder    MTHFR mutation    Cerebrovascular accident (CVA) due to embolism of right middle cerebral artery (H)    Deviated nasal septum      Labs: reviewed      Meds:   Current Outpatient Medications   Medication Instructions    acetaminophen (TYLENOL) 325-650 mg, Oral, EVERY 6 HOURS PRN    " albuterol (PROAIR HFA/PROVENTIL HFA/VENTOLIN HFA) 108 (90 Base) MCG/ACT inhaler 2 puffs, Inhalation, EVERY 6 HOURS PRN    aspirin-acetaminophen-caffeine (EXCEDRIN MIGRAINE) 250-250-65 MG tablet 1 tablet, Oral, EVERY 6 HOURS PRN    aspirin 81 mg, Oral, EVERY EVENING    linaclotide (LINZESS) 72 mcg, Oral, EVERY MORNING BEFORE BREAKFAST    LORazepam (ATIVAN) 0.5-1 mg, Oral, DAILY PRN    magnesium oxide (MAG-OX) 400 mg, Oral, EVERY EVENING    Multiple Vitamins-Minerals (MULTIVITAMIN ADULT EXTRA C PO) Oral, EVERY EVENING    NONFORMULARY 1 tablet, Oral, EVERY EVENING, OTC: B-Right Vitamin (Optimized B Complex)     Nurtec 75 mg, Oral, DAILY PRN    Omega-3 Fatty Acids (FISH OIL PO) 1 capsule, Oral, AT BEDTIME    propranolol (INDERAL) 10-20 mg, Oral, PRN    STATIN NOT PRESCRIBED, INTENTIONAL, Statin not prescribed intentionally due to Woman of childbearing age not actively taking birth control    traZODone (DESYREL) 25-50 mg, Oral, AT BEDTIME    VITAMIN D, CHOLECALCIFEROL, PO 1,000 Units, Oral, EVERY EVENING, (takes 2 x 100 unit capsule)        Supplements: reviewed      Social/Environmental:   -average sleep per night: sleeps well. Used to wake up at 3 am. Now she sleeps better. Uses a sleep louann to record sleeping.   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 Has a lot of stress and anxiety. She is very sensitive to caffeine. Cut-back on it a lot. Caffeine causes anxiety. Under a lot of stress.       Food Record  Breakfast: Half caffeinated coffee in the morning. Doesn't eat until 10:00. Eggs with greens, soft-boiled eggs  Lunch: Ukrainian foods (stri-gonzales) with rice, but watches carbs, sometimes on the go and has a half sandwich, pumpkin seeds or pistachios (takes on the go)  Dinner: Veggies, fruit if she can, Ukrainian food - spring rolls. Sometimes doesn't have dinner at home. 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 Feeds her son and may have some corn chips. Snacks at 6-7 pm. Trying not to go for ice cream at 9:00 pm.   -Sometimes goes longer than 4 hrs without eating. Most of the time eats consistently.   Beverages: Water throughout the day. Sometimes sparkling water, sometimes Kombucha drinks.   -Take-out most days of the week    Nutritional Details:   -Food allergies: none  -Food intolerances: dairy   -Food sensitivities: none  -GI concerns: Diarrhea, Constipation, and Bloating  -Appetite: good  -Pace of eating: moderate  -Role of cooking: self   -Role of food shopping: self      Physical Activity:  [place] every morning 20-30 min or more if she can. Unless she gets lots of steps in.      ASSESSED MALNUTRITION STATUS  % Weight Loss:  None noted  % Intake:  No decreased intake noted  Subcutaneous Fat Loss:  None observed  Loss of Muscle Mass:  None observed  Fluid Retention:  None noted    Malnutrition Diagnosis:  Patient does not meet two of the above criteria necessary for diagnosing malnutrition      Nutrition Diagnosis:  Altered GI function related to sensitivity to FODMAPs as evidenced by report of loose stools and bloating after consuming high-FODMAP foods, dx IBS, irregular bowel pattern ranging from constipation to diarrhea.         Nutrition Intervention:  Nutrition Prescription Summary: MNT for IBS     Nutrition Education (Content):  -Educated patient on IBS and foods that are known to trigger symptoms  -Suggested finding ways to relax throughout the day for stress relief  -Educated pt on the Low FODMAP diet. Talked about what FODMAPs are and what they do in the body.   -Educated pt on foods that contain Fermentable, Oligosaccharides (fructans-wheat, rye, garlic, onion, leeks, and artichokes and GOS- beans, lentils, soybeans, and nuts, including cashews), Disaccharides (lactose, milk sugar), Monosaccharides (excess fructose-certain fruits, honey, and high-fructose corn syrup), and Polyols (sorbitol,  mannitol, maltitol, erythritol, xylitol, and isomalt, apricots, avocados, cherries, nectarines, peaches, and plums and mushrooms)  -Discussed carefully planning meals and making shopping lists. Educated pt on reading food labels- talked about if high-FODMAP ingredients are listed at the bottom of an ingredient list, the food may still be considered low-FODMAP and ok  -Recommended choosing low-FODMAP foods containing 3-4 grams of fiber per serving   -Discussed high and low FODMAPs from all food groups (handout provided)  -Discussed appropriate portion sizes of FODMAPs  -Recommended following the elimination phase (avoiding all FODMAPs) for 4 weeks-Provided a sample menu and a list of resources for recipes and books.  -Suggested limiting caffeine     Emailed/mailed information discussed including nutrition handouts to patient.       Nutrition Prescription: Macronutrient and Micronutrient details   Dosing weight: Current wt (82 kg) for energy, IBW (58 kg) for protein and fluids  Energy: 3170-0193 kcals/day ([place] Jeor)    Justification:  (overweight, to promote a 1 lb wt loss per week)   Protein: 58-70 g Pro/day (1-1.2 g pro/Kg)    Justification:  (maintenance)   Fluid: 0150-1802 mL/day   (25-30 mL/kg)     Justification:  (overweight)   Fiber:     Women (19-50 years): 25 grams per day          Carbohydrate: 45-65% kcal   <25 g added sugar/day       Fat: 20-35% kcal  <7% kcal from saturated fat   Micronutrient: DRI  <2,300 mg sodium/day            Vitamin and Mineral Supplements: n/a       Patient Engagement:   Assessed learning needs and learning preference: Yes.  Teaching Method(s) used: Explanation  Video    Nutrition Education (Application):  a)  Discussed current eating plans / recommended alternative food choices    b)  Patient verbalizes understanding of diet by asking questions     Anticipate >90% compliance   Stage of Change:  action      Nutrition Goals:  1) Follow the low FODMAP diet for 4 weeks   2)  Find ways to relax and decrease stress level throughout the day - deep breathing, meditation, yoga, reading, journaling, white noise or soothing music, etc.   3) Keep a food intake/GI symptoms journal       Nutrition Follow Up / Monitoring:   Weight, PO intake, PA, GI symptoms      Nutrition Recommendations:  Patient to follow-up with RD in 4 weeks.  Patient has RD contact information to call/email if needed.      Start time: 10:00  End time: 11:00    Total Time Duration: 60 min      Lay Liu MS, RD, LD, [facility]  Clinical Dietitian  [phone]

## 2023-08-29 ENCOUNTER — TELEPHONE (OUTPATIENT)
Dept: GASTROENTEROLOGY | Facility: CLINIC | Age: 43
End: 2023-08-29
Payer: COMMERCIAL

## 2023-08-29 NOTE — TELEPHONE ENCOUNTER
Left message for patient regarding upcoming HBT. Sent instructions through [employer] as well on 7/21/23 and was read by patient on 8/21/23.

## 2023-09-11 ENCOUNTER — TELEPHONE (OUTPATIENT)
Dept: GASTROENTEROLOGY | Facility: CLINIC | Age: 43
End: 2023-09-11
Payer: COMMERCIAL

## 2023-09-11 DIAGNOSIS — K59.00 CONSTIPATION: Primary | ICD-10-CM

## 2023-09-11 NOTE — TELEPHONE ENCOUNTER
"Attempted to contact patient in order to complete pre assessment questions.     No answer. Left message to return call to [phone] option 4      Procedure details:    Patient scheduled for Colonoscopy/Upper endoscopy (EGD) on 9.25.2023.     Arrival time: 1230. Procedure time 1315    Pre op exam needed? N/A    Facility location: [facility]; [address] [place] [address] [place] [address]    Sedation type: Conscious sedation     Indication for procedure:     abdominal bloating, please obtain small bowel biopsies for celiac   Alternating diarrhea and constipation      Chart review:     Electronic implanted devices? No    Diabetic? No    Diabetic medication HOLDING recommendations: (if applicable)  Oral diabetic medications: N/A  Diabetic injectables: N/A  Insulin: N/A      Medication review:    Anticoagulants? No    NSAIDS? No NSAID medications per patient's medication list.  RN will verify with pre-assessment call.    Other medication HOLDING recommendations:  N/A      Prep for procedure:     Bowel prep recommendation: Discuss bowel habits.  Low volume extended prep?    Per 6.12.2023 OV notes \"BMs:   3 AMs out of the week: diarrhea   - can be watery, mushy  1-2 times per week normal BM  Can go 2-3 days without going - at least weekly   - no straining or type 1 stools\"        Shaila Wilder RN  Endoscopy Procedure Pre Assessment RN        "

## 2023-09-13 NOTE — PROGRESS NOTES
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  -eating protein first  -eating slowly, chewing food well  -avoiding/limiting calorie containing beverages  -limiting carbohydrates and changing to whole grains  -limiting restaurant or cafeteria eating to twice a week or less    We discussed the importance of restorative sleep and stress management in maintaining a healthy weight.  We discussed insulin resistance and glycemic index as it relates to appetite and weight control.   We discussed the importance of physical activity including cardiovascular and strength training in maintaining a healthier weight and explored viable options.  Patient education of above written in AVS.      Sincerely,    Nichole Miller PA-C

## 2023-09-15 RX ORDER — POLYETHYLENE GLYCOL 3350 17 G/17G
POWDER, FOR SOLUTION ORAL
Qty: 238 G | Refills: 0 | Status: SHIPPED | OUTPATIENT
Start: 2023-09-15

## 2023-09-15 RX ORDER — POLYETHYLENE GLYCOL 3350 17 G/17G
POWDER, FOR SOLUTION ORAL
Qty: 238 G | Refills: 0 | Status: ON HOLD | OUTPATIENT
Start: 2023-09-15 | End: 2023-09-25

## 2023-09-15 RX ORDER — BISACODYL 5 MG/1
TABLET, DELAYED RELEASE ORAL
Qty: 4 TABLET | Refills: 0 | Status: ON HOLD | OUTPATIENT
Start: 2023-09-15 | End: 2023-09-25

## 2023-09-15 RX ORDER — BISACODYL 5 MG/1
TABLET, DELAYED RELEASE ORAL
Qty: 4 TABLET | Refills: 0 | Status: SHIPPED | OUTPATIENT
Start: 2023-09-15 | End: 2023-10-09

## 2023-09-15 NOTE — TELEPHONE ENCOUNTER
Pre assessment completed for upcoming procedure.   (Please see previous telephone encounter notes for complete details)    Patient  returned call.       Procedure details:    Arrival time and facility location reviewed.    Pre op exam needed? N/A    Designated  policy reviewed. Instructed to have someone stay 6 hours post procedure.     COVID policy reviewed.      Medication review:    Medications reviewed. Please see supporting documentation below. Holding recommendations discussed (if applicable).       Prep for procedure:     Procedure prep instructions reviewed.  (Low volume extended)      Additional information needed?  N/A      Patient  verbalized understanding and had no questions or concerns at this time.      Gloria Morales RN  Endoscopy Procedure Pre Assessment RN  [phone] option 4

## 2023-09-15 NOTE — TELEPHONE ENCOUNTER
Second call attempt to complete pre assessment.     No answer.  Left message to return call to [phone] #4 within 24 hours or risk procedure being cancelled.     [employer] message sent.    RN sent low-volume GoLytely prep instructions to [employer].  Prep prescription sent to    [id number] - [place], [place] - [address]          Shaila Wilder RN  Endoscopy Procedure Pre Assessment RN

## 2023-09-21 VITALS — BODY MASS INDEX: 28.28 KG/M2 | HEIGHT: 66 IN | WEIGHT: 176 LBS

## 2023-09-22 ENCOUNTER — VIRTUAL VISIT (OUTPATIENT)
Dept: SURGERY | Facility: CLINIC | Age: 43
End: 2023-09-22
Payer: COMMERCIAL

## 2023-09-22 DIAGNOSIS — R63.5 WEIGHT GAIN: ICD-10-CM

## 2023-09-22 DIAGNOSIS — E55.9 VITAMIN D DEFICIENCY: Primary | ICD-10-CM

## 2023-09-22 DIAGNOSIS — E66.3 OVERWEIGHT: ICD-10-CM

## 2023-09-22 DIAGNOSIS — E66.3 OVERWEIGHT: Primary | ICD-10-CM

## 2023-09-22 PROCEDURE — 97802 MEDICAL NUTRITION INDIV IN: CPT | Mod: VID

## 2023-09-22 PROCEDURE — 99205 OFFICE O/P NEW HI 60 MIN: CPT | Mod: VID | Performed by: PHYSICIAN ASSISTANT

## 2023-09-22 RX ORDER — BUPROPION HYDROCHLORIDE 75 MG/1
37.5 TABLET ORAL 2 TIMES DAILY
Qty: 30 TABLET | Refills: 3 | Status: SHIPPED | OUTPATIENT
Start: 2023-09-22

## 2023-09-22 RX ORDER — NALTREXONE HYDROCHLORIDE 50 MG/1
TABLET, FILM COATED ORAL
Qty: 30 TABLET | Refills: 3 | Status: SHIPPED | OUTPATIENT
Start: 2023-09-22

## 2023-09-22 NOTE — PROGRESS NOTES
"Virtual Visit Details    Type of service:  Video Visit     Originating Location (pt. Location): Home    Distant Location (provider location):  On-site  Platform used for Video Visit: [facility] WEIGHT LOSS INITIAL EVALUATION  DIAGNOSIS:  Overweight     NUTRITION HISTORY:  Breakfast/Lunch scrambled eggs + sweet potato noodles + green onions/bok chop 10-11 AM  Dinner: stir gonzales (Asian foods) chicken + vegetables + rice; farro salad with green (5:00 PM)   Snacks: raspberries or apple; corn chips and salsa; leftovers   Beverage choices: water; 1/2 caff; 1/2 cold pressed; 1-2 wine 3-4 times per   Dining out: limited   Exercise: Patient uses [employer] 5-6 days per week -30 minutes or pilates   Additional Information: Patient eats 2 meals per day.  Patient tastes while preparing meals.  Patient previously tracked intake on louann and was consuming 6857-1340 calories.  Patient has been working from home since the pandemic and gets 0242-1573 steps.  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  Patient may find that she doesn't eat and then gets too hungry and will eat more processed foods (turkey hot dogs).      Patient does not eat dairy; limits any bread or pasta       WEIGHT LOSS MEDICATIONS:  Bupropion; Naltrexone     ANTHROPOMETRICS:  Height: 5'6\"   Weight: 176 lbs patient reported   BMI:  28.4 kg/m2  NUTRITION DIAGNOSIS:   Overweight related to overeating and poor lifestyle habits as evidence by patient's subjective statements and  BMI of 28.4 kg/m2   NUTRITION INTERVENTIONS  Nutrition Prescription:  Recommend modified energy- nutrient intake  Implementation:  Nutrition Education (Content):  Discussed portion sizes., label reading, carbohydrate counting, food behaviors    Reviewed healthy beverage choices-patient to assess actual alcohol consumed throughout week   Provided: My Plate     Nutrition Education (Application):   Patient to practice goals as stated below  Patient verbalizes understanding of diet " by stating will start tracking intake again   Expected patient engagement: good     Goals:  Recheck calorie intake by using louann -aim for 4940-9583 calories per day     FOLLOW UP AND MONITORING:   Other  - follow up in 8-12 weeks.     TIME SPENT WITH PATIENT:  28 minutes   Franny Long, RD, LD  [facility]   [phone] (office phone)

## 2023-09-22 NOTE — PATIENT INSTRUCTIONS
"To ensure quality you may receive a patient satisfaction survey. The greatest compliment you can give is \"Likely to Recommend.\"    Nice to talk with you today.  Thank you for your trust. Below is the plan discussed.-  ELOISE Varela      Plan:  Start Naltrexone.   Directions: Take 1/2 tab 1-2 hours prior to biggest cravings or extra hunger for a week.  If tolerating, increase to 1/2 tablet twice daily.     Start Wellbutrin  Take 1/2 tablet for 2 weeks, if tolerating then increase to 1/2 tablet twice daily.     Goals:  Add in 5-10 min outdoor walk    Labs ordered.  Please call [phone] to set up a lab appt.     FOLLOW-UP:    Please call [phone] to schedule your next visit in 3 months.    Bupropion and Naltrexone    We are starting  two medications, Naltrexone and Bupropione (Wellbutrin). The Bupropion helps lessen appetite and the Naltrexone works by blocking certain receptors in the brain and curbing cravings.      For some of our patients the medication works right away. Other patients don't feel much of a change but find they've lost weight. Like all weight loss medications, Contrave works best when you help it work. This means:  1. Having less tempting high calorie (fattening) food around the house or office. (For people with strong cravings this is very important.)   2. Staying away from situations or people that may trigger your cravings .   3. Eating out only one time or less each week.  4. Eating your meals at a table with the TV or computer off.    Instructions:  Bupropion: Take 1 tablet in the morning for the first week, if tolerating then increase to one tablet in the morning and one tablet in the evening.  (If you have trouble with sleep you can take your second dose in the afternoon instead)    Naltrexone: Start with 1/2 tab 1-2 hours prior to the time you have the most trouble with cravings or extra hunger. If you are doing well after a week, you may switch to 1/2 tablet twice daily. "     Side-effects of naltrexone:The main side-effect we see is nausea or a mild headache and usually goes away with time.     Side-effects of bupropion : The most common side effect include: nausea; constipation; headache;  trouble sleeping; and dry mouth.       WARNING: Naltrexone blocks the action of opioid type pain medications. If you routinely take any medication like Codeine, Oxycontin, Percocet, Morphine, Dilaudid or Methodone, do not take this until you have talked with weight management staff.     Call the nurse at [phone] if you have any questions or concerns. (Do not stop taking it if you don't think it's working. For some people it works without them knowing it.)       In order to get refills of this or any medication we prescribe you must be seen in the medical weight mgmt clinic every 2-4 months.

## 2023-09-22 NOTE — PATIENT INSTRUCTIONS
Richar Corona,    It was nice meeting you today.  Here are some of our talking points:    Start tracking intake on louann-aim for 1-2 days during the week and 1 day on the weekend  Aim for 2750-5361 calories per day   Eat 3 meals per day   Carbohydrates per meal: 30-45 grams   Assess alcohol consumption per week   Macronutrient breakdown: 40% carbohydrate; 35% fat and 25% protein (Aim for Mediterranean type eating pattern)    My Plate (carbohydrate portions)   [email or web address]    Please call [phone] to schedule your next RD appointment in 8-12 weeks.    Have a great fall!    Take sammy,    Franny Cota, RD, LD  [facility] Dietitian/[facility] Loss [facility]   [phone] (office phone)

## 2023-09-25 ENCOUNTER — HOSPITAL ENCOUNTER (OUTPATIENT)
Facility: CLINIC | Age: 43
Discharge: HOME | End: 2023-09-25
Attending: INTERNAL MEDICINE | Admitting: INTERNAL MEDICINE
Payer: COMMERCIAL

## 2023-09-25 VITALS
OXYGEN SATURATION: 96 % | DIASTOLIC BLOOD PRESSURE: 74 MMHG | RESPIRATION RATE: 21 BRPM | HEART RATE: 75 BPM | SYSTOLIC BLOOD PRESSURE: 114 MMHG

## 2023-09-25 LAB
COLONOSCOPY: NORMAL
UPPER GI ENDOSCOPY: NORMAL

## 2023-09-25 PROCEDURE — 99153 MOD SED SAME PHYS/QHP EA: CPT | Performed by: INTERNAL MEDICINE

## 2023-09-25 PROCEDURE — 88305 TISSUE EXAM BY PATHOLOGIST: CPT | Mod: TC | Performed by: INTERNAL MEDICINE

## 2023-09-25 PROCEDURE — 250N000013 HC RX MED GY IP 250 OP 250 PS 637: Performed by: INTERNAL MEDICINE

## 2023-09-25 PROCEDURE — 250N000011 HC RX IP 250 OP 636: Performed by: INTERNAL MEDICINE

## 2023-09-25 PROCEDURE — 45380 COLONOSCOPY AND BIOPSY: CPT | Performed by: INTERNAL MEDICINE

## 2023-09-25 PROCEDURE — 43239 EGD BIOPSY SINGLE/MULTIPLE: CPT | Performed by: INTERNAL MEDICINE

## 2023-09-25 PROCEDURE — G0500 MOD SEDAT ENDO SERVICE >5YRS: HCPCS | Performed by: INTERNAL MEDICINE

## 2023-09-25 PROCEDURE — 88305 TISSUE EXAM BY PATHOLOGIST: CPT | Mod: 26 | Performed by: PATHOLOGY

## 2023-09-25 RX ORDER — ONDANSETRON 2 MG/ML
4 INJECTION INTRAMUSCULAR; INTRAVENOUS EVERY 6 HOURS PRN
Status: CANCELLED | OUTPATIENT
Start: 2023-09-25

## 2023-09-25 RX ORDER — NALOXONE HYDROCHLORIDE 0.4 MG/ML
0.2 INJECTION, SOLUTION INTRAMUSCULAR; INTRAVENOUS; SUBCUTANEOUS
Status: CANCELLED | OUTPATIENT
Start: 2023-09-25

## 2023-09-25 RX ORDER — ONDANSETRON 2 MG/ML
4 INJECTION INTRAMUSCULAR; INTRAVENOUS
Status: CANCELLED | OUTPATIENT
Start: 2023-09-25

## 2023-09-25 RX ORDER — SIMETHICONE 40MG/0.6ML
SUSPENSION, DROPS(FINAL DOSAGE FORM)(ML) ORAL PRN
Status: DISCONTINUED | OUTPATIENT
Start: 2023-09-25 | End: 2023-09-25 | Stop reason: HOSPADM

## 2023-09-25 RX ORDER — NALOXONE HYDROCHLORIDE 0.4 MG/ML
0.4 INJECTION, SOLUTION INTRAMUSCULAR; INTRAVENOUS; SUBCUTANEOUS
Status: CANCELLED | OUTPATIENT
Start: 2023-09-25

## 2023-09-25 RX ORDER — FLUMAZENIL 0.1 MG/ML
0.2 INJECTION, SOLUTION INTRAVENOUS
Status: CANCELLED | OUTPATIENT
Start: 2023-09-25 | End: 2023-09-26

## 2023-09-25 RX ORDER — LIDOCAINE 40 MG/G
CREAM TOPICAL
Status: CANCELLED | OUTPATIENT
Start: 2023-09-25

## 2023-09-25 RX ORDER — FENTANYL CITRATE 50 UG/ML
INJECTION, SOLUTION INTRAMUSCULAR; INTRAVENOUS PRN
Status: DISCONTINUED | OUTPATIENT
Start: 2023-09-25 | End: 2023-09-25 | Stop reason: HOSPADM

## 2023-09-25 RX ORDER — ONDANSETRON 4 MG/1
4 TABLET, ORALLY DISINTEGRATING ORAL EVERY 6 HOURS PRN
Status: CANCELLED | OUTPATIENT
Start: 2023-09-25

## 2023-09-25 RX ORDER — DIPHENHYDRAMINE HYDROCHLORIDE 50 MG/ML
INJECTION INTRAMUSCULAR; INTRAVENOUS PRN
Status: DISCONTINUED | OUTPATIENT
Start: 2023-09-25 | End: 2023-09-25 | Stop reason: HOSPADM

## 2023-09-25 ASSESSMENT — ACTIVITIES OF DAILY LIVING (ADL): ADLS_ACUITY_SCORE: 35

## 2023-09-25 NOTE — H&P
Chloe HIDALGO Cristian  [id number]  female  43 year old      Reason for procedure/surgery: Bloating Constipation Diarrhea     Patient Active Problem List   Diagnosis    Vitamin D deficiency    CARDIOVASCULAR SCREENING; LDL GOAL LESS THAN 160    Lumbar herniated disc    Hirsutism    Insomnia    IBS (irritable bowel syndrome)    Moderate recurrent major depression (H)    History of cardioembolic cerebrovascular accident (CVA)    PFO with atrial septal aneurysm    Protein S deficiency (H)    Migraine with aura and without status migrainosus, not intractable    Pre-conception counseling    Elevated lipoprotein A level    Social anxiety disorder    MTHFR mutation    Cerebrovascular accident (CVA) due to embolism of right middle cerebral artery (H)    Deviated nasal septum    Overweight    Weight gain       Past Surgical History:    Past Surgical History:   Procedure Laterality Date    ARTHROSCOPY KNEE RT/LT  1995    Rt knee    GYN SURGERY      HC REPAIR OF NASAL SEPTUM  12/2005    REPAIR PATENT FORAMEN OVALE  04/21/2016    SEPTORHINOPLASTY N/A 03/15/2021    Procedure: Revision Septorhinoplasty, Repair of Nasal Valve Collapse, Cadaveric Rib Graft + 45 min Cosmetic Tip Rhinoplasty;  Surgeon: Nikki Schwartz MD;  Location: [facility] OR       Past Medical History:   Past Medical History:   Diagnosis Date    Acute stress reaction     propranolol helps prior to interviews, stressful situations    Allergic rhinitis, cause unspecified     no meds except prn flonase, tests generally positive, decided against shots    Anxiety state, unspecified     citalopram started in 7/06, stopped after couple wks, felt sluggish/tired    CVA (cerebral vascular accident) (H)     May 1, May 14th 2015    Depressive disorder, not elsewhere classified     dx, but not sure this is correct, weaned off wellbutrin (4/05-6/06, 12/06-1/08), restarted 3/08    Dizziness     Elevated lipoprotein A level     Hirsutism     saw trent, inconclusive, started on  spironolactone (helped a little), stopped in , elevated DHEA- sees new endo     History of stroke with residual effects     PFO (patent foramen ovale)     Post PFO/ASD closure with 30mm Amplatzer device 16    Protein S deficiency (H)     Per Dr. Casarez, prengnacy related.  Protein S levels normalized afer pregnancy.    Urinary tract infection, site not specified     recurrent, start nitrofur in , 6mo txt       Social History:   Social History     Tobacco Use    Smoking status: Former     Packs/day: 0.50     Years: 5.00     Pack years: 2.50     Types: Cigarettes     Quit date: 2014     Years since quittin.4    Smokeless tobacco: Never   Substance Use Topics    Alcohol use: Yes     Comment: SOCIAL       Family History:   Family History   Problem Relation Age of Onset    Family History Negative Mother     Cancer Father         throat, possibly asbestos exposure    Hypertension Father     Allergies Father     Arrhythmia Father     Other Cancer Father     Family History Negative Sister     Family History Negative Brother     Diabetes Maternal Grandmother         on insulin    C.A.D. Maternal Grandmother         triple bypass in her 60s    C.A.D. Maternal Grandfather         MI in 50s    Diabetes Paternal Grandmother     Neurologic Disorder Paternal Grandfather          of brain aneurysm in early 60s    Family History Negative Son     Diabetes Maternal Aunt          in her early 40s of DM complications, type 2    Coronary Artery Disease Early Onset Maternal Aunt 41    Obesity Maternal Aunt        Allergies:   Allergies   Allergen Reactions    Compazine [Prochlorperazine] Muscle Pain (Myalgia)       Active Medications:   No current outpatient medications on file.       Systemic Review:   CONSTITUTIONAL: NEGATIVE for fever, chills, change in weight  ENT/MOUTH: NEGATIVE for ear, mouth and throat problems  RESP: NEGATIVE for significant cough or SOB  CV: NEGATIVE for chest pain, palpitations  or peripheral edema    Physical Examination:   Vital Signs: BP (!) 141/92   Resp 16   SpO2 96%   GENERAL: healthy, alert and no distress  NECK: no adenopathy, no asymmetry, masses, or scars  RESP: lungs clear to auscultation - no rales, rhonchi or wheezes  CV: regular rate and rhythm, normal S1 S2, no S3 or S4, no murmur, click or rub, no peripheral edema and peripheral pulses strong  ABDOMEN: soft, nontender, no hepatosplenomegaly, no masses and bowel sounds normal  MS: no gross musculoskeletal defects noted, no edema    ASA: 2    Mallampati Score: 2    Plan: Appropriate to proceed as scheduled.      Alex Gross MD  9/25/2023    PCP:  Izabel Reyes

## 2023-09-26 LAB
PATH REPORT.COMMENTS IMP SPEC: NORMAL
PATH REPORT.COMMENTS IMP SPEC: NORMAL
PATH REPORT.FINAL DX SPEC: NORMAL
PATH REPORT.GROSS SPEC: NORMAL
PATH REPORT.MICROSCOPIC SPEC OTHER STN: NORMAL
PATH REPORT.RELEVANT HX SPEC: NORMAL
PHOTO IMAGE: NORMAL

## 2023-10-04 ENCOUNTER — TELEPHONE (OUTPATIENT)
Dept: SURGERY | Facility: CLINIC | Age: 43
End: 2023-10-04
Payer: COMMERCIAL

## 2023-10-04 DIAGNOSIS — E66.3 OVERWEIGHT: Primary | ICD-10-CM

## 2023-10-04 NOTE — TELEPHONE ENCOUNTER
Reason for Call:  Other prescription    Detailed comments: patient checked with her insurance & they do cover Ozempic, patient would like it sent to [facility].    Phone Number Patient can be reached at: Cell number on file:    Telephone Information:   Mobile [phone]       Best Time: any    Can we leave a detailed message on this number? YES    Call taken on 10/4/2023 at 1:53 PM by Latia Davidson

## 2023-10-05 NOTE — TELEPHONE ENCOUNTER
Pt's insulin level is normal.  She is not diabetic or pre-diabetic. Ozempic is covered by insurance but not for weight management- only for for diabetes.  We can add a HgA1C to her future labs.  If this shows diabetes we can discuss the merits of starting this medication.  She has chronic constipation and I have some significant concerns starting this without discussing this first in clinic.

## 2023-10-09 ENCOUNTER — PATIENT OUTREACH (OUTPATIENT)
Dept: GASTROENTEROLOGY | Facility: CLINIC | Age: 43
End: 2023-10-09

## 2023-10-09 ENCOUNTER — VIRTUAL VISIT (OUTPATIENT)
Dept: FAMILY MEDICINE | Facility: CLINIC | Age: 43
End: 2023-10-09
Payer: COMMERCIAL

## 2023-10-09 DIAGNOSIS — Z13.6 CARDIOVASCULAR SCREENING; LDL GOAL LESS THAN 160: ICD-10-CM

## 2023-10-09 DIAGNOSIS — F40.10 SOCIAL ANXIETY DISORDER: Primary | ICD-10-CM

## 2023-10-09 PROCEDURE — 99213 OFFICE O/P EST LOW 20 MIN: CPT | Mod: VID | Performed by: PHYSICIAN ASSISTANT

## 2023-10-09 RX ORDER — PROPRANOLOL HYDROCHLORIDE 10 MG/1
10-20 TABLET ORAL PRN
Qty: 30 TABLET | Refills: 1 | Status: SHIPPED | OUTPATIENT
Start: 2023-10-09 | End: 2024-04-19

## 2023-10-09 RX ORDER — LORAZEPAM 1 MG/1
.5-1 TABLET ORAL DAILY PRN
Qty: 15 TABLET | Refills: 1 | Status: SHIPPED | OUTPATIENT
Start: 2023-10-09 | End: 2024-04-19

## 2023-10-09 ASSESSMENT — ANXIETY QUESTIONNAIRES
GAD7 TOTAL SCORE: 6
7. FEELING AFRAID AS IF SOMETHING AWFUL MIGHT HAPPEN: SEVERAL DAYS
6. BECOMING EASILY ANNOYED OR IRRITABLE: SEVERAL DAYS
3. WORRYING TOO MUCH ABOUT DIFFERENT THINGS: SEVERAL DAYS
5. BEING SO RESTLESS THAT IT IS HARD TO SIT STILL: NOT AT ALL
4. TROUBLE RELAXING: SEVERAL DAYS
2. NOT BEING ABLE TO STOP OR CONTROL WORRYING: SEVERAL DAYS
1. FEELING NERVOUS, ANXIOUS, OR ON EDGE: SEVERAL DAYS
IF YOU CHECKED OFF ANY PROBLEMS ON THIS QUESTIONNAIRE, HOW DIFFICULT HAVE THESE PROBLEMS MADE IT FOR YOU TO DO YOUR WORK, TAKE CARE OF THINGS AT HOME, OR GET ALONG WITH OTHER PEOPLE: SOMEWHAT DIFFICULT
GAD7 TOTAL SCORE: 6

## 2023-10-09 ASSESSMENT — PATIENT HEALTH QUESTIONNAIRE - PHQ9
10. IF YOU CHECKED OFF ANY PROBLEMS, HOW DIFFICULT HAVE THESE PROBLEMS MADE IT FOR YOU TO DO YOUR WORK, TAKE CARE OF THINGS AT HOME, OR GET ALONG WITH OTHER PEOPLE: SOMEWHAT DIFFICULT
SUM OF ALL RESPONSES TO PHQ QUESTIONS 1-9: 5
SUM OF ALL RESPONSES TO PHQ QUESTIONS 1-9: 5

## 2023-10-09 NOTE — TELEPHONE ENCOUNTER
Returned pt call regarding prep for HBT tomorrow.  Pt did have a colonoscopy 2 weeks ago today, she is able to proceed with testing tomorrow.  Understanding verbalized.

## 2023-10-09 NOTE — PROGRESS NOTES
Chloe is a 43 year old who is being evaluated via a billable video visit.      How would you like to obtain your AVS? MyChart  If the video visit is dropped, the invitation should be resent by: Text to cell phone: [phone]  Will anyone else be joining your video visit? No          Assessment & Plan     Social anxiety disorder  Long standing, chronic, controlled- regular refills sent to pharmacy. Proper use and risk for abuse discussed with patient at length. Patient to return to clinic when refills needed.   - LORazepam (ATIVAN) 1 MG tablet; Take 0.5-1 tablets (0.5-1 mg) by mouth daily as needed for anxiety or sleep (severe anxiety/panic)  - propranolol (INDERAL) 10 MG tablet; Take 1-2 tablets (10-20 mg) by mouth as needed (anxiety (take 30-60 minutes prior to meetings))  - TSH with free T4 reflex; Future    CARDIOVASCULAR SCREENING; LDL GOAL LESS THAN 160  - Lipid panel reflex to direct LDL Fasting; Future    Review of prior external note(s) from - previous routine PCP notes  20 minutes spent by me on the date of the encounter doing chart review, history and exam, documentation and further activities per the note       There are no Patient Instructions on file for this visit.    Shelby Ferro PA-C  [facility]   Chloe is a 43 year old, presenting for the following health issues:  Recheck Medication      History of Present Illness       Mental Health Follow-up:  Patient presents to follow-up on Depression & Anxiety.Patient's depression since last visit has been:  No change  The patient is not having other symptoms associated with depression.  Patient's anxiety since last visit has been:  Medium  The patient is not having other symptoms associated with anxiety.  Any significant life events: job concerns  Patient is feeling anxious or having panic attacks.  Patient has no concerns about alcohol or drug use.    She eats 4 or more servings of fruits and vegetables  daily.She consumes 0 sweetened beverage(s) daily.She exercises with enough effort to increase her heart rate 30 to 60 minutes per day.  She exercises with enough effort to increase her heart rate 5 days per week.   She is taking medications regularly.         Review of Systems   Constitutional, HEENT, cardiovascular, pulmonary, GI, , musculoskeletal, neuro, skin, endocrine and psych systems are negative, except as otherwise noted.      Objective           Vitals:  No vitals were obtained today due to virtual visit.    Physical Exam   GENERAL: Healthy, alert and no distress  EYES: Eyes grossly normal to inspection.  No discharge or erythema, or obvious scleral/conjunctival abnormalities.  RESP: No audible wheeze, cough, or visible cyanosis.  No visible retractions or increased work of breathing.    SKIN: Visible skin clear. No significant rash, abnormal pigmentation or lesions.  NEURO: Cranial nerves grossly intact.  Mentation and speech appropriate for age.  PSYCH: Mentation appears normal, affect normal/bright, judgement and insight intact, normal speech and appearance well-groomed.              Video-Visit Details    Type of service:  Video Visit     Originating Location (pt. Location): Home    Distant Location (provider location):  Off-site  Platform used for Video Visit: [employer]

## 2023-10-11 ENCOUNTER — LAB (OUTPATIENT)
Dept: LAB | Facility: CLINIC | Age: 43
End: 2023-10-11
Payer: COMMERCIAL

## 2023-10-11 ENCOUNTER — HOSPITAL ENCOUNTER (OUTPATIENT)
Dept: CARDIOLOGY | Facility: CLINIC | Age: 43
Discharge: HOME | End: 2023-10-11
Attending: PHYSICIAN ASSISTANT | Admitting: PHYSICIAN ASSISTANT
Payer: COMMERCIAL

## 2023-10-11 DIAGNOSIS — E66.3 OVERWEIGHT: ICD-10-CM

## 2023-10-11 DIAGNOSIS — E55.9 VITAMIN D DEFICIENCY: ICD-10-CM

## 2023-10-11 DIAGNOSIS — F40.10 SOCIAL ANXIETY DISORDER: ICD-10-CM

## 2023-10-11 DIAGNOSIS — R00.2 PALPITATIONS: ICD-10-CM

## 2023-10-11 DIAGNOSIS — Z13.6 CARDIOVASCULAR SCREENING; LDL GOAL LESS THAN 160: ICD-10-CM

## 2023-10-11 LAB
ALBUMIN SERPL BCG-MCNC: 4.4 G/DL (ref 3.5–5.2)
ALP SERPL-CCNC: 74 U/L (ref 35–104)
ALT SERPL W P-5'-P-CCNC: 13 U/L (ref 0–50)
ANION GAP SERPL CALCULATED.3IONS-SCNC: 13 MMOL/L (ref 7–15)
AST SERPL W P-5'-P-CCNC: 19 U/L (ref 0–45)
BILIRUB SERPL-MCNC: 0.4 MG/DL
BUN SERPL-MCNC: 13.3 MG/DL (ref 6–20)
CALCIUM SERPL-MCNC: 9.4 MG/DL (ref 8.6–10)
CHLORIDE SERPL-SCNC: 103 MMOL/L (ref 98–107)
CHOLEST SERPL-MCNC: 229 MG/DL
CREAT SERPL-MCNC: 0.66 MG/DL (ref 0.51–0.95)
DEPRECATED HCO3 PLAS-SCNC: 22 MMOL/L (ref 22–29)
EGFRCR SERPLBLD CKD-EPI 2021: >90 ML/MIN/1.73M2
GLUCOSE SERPL-MCNC: 94 MG/DL (ref 70–99)
HBA1C MFR BLD: 5.5 % (ref 0–5.6)
HDLC SERPL-MCNC: 93 MG/DL
LDLC SERPL CALC-MCNC: 115 MG/DL
NONHDLC SERPL-MCNC: 136 MG/DL
POTASSIUM SERPL-SCNC: 4 MMOL/L (ref 3.4–5.3)
PROT SERPL-MCNC: 6.8 G/DL (ref 6.4–8.3)
SODIUM SERPL-SCNC: 138 MMOL/L (ref 135–145)
TRIGL SERPL-MCNC: 106 MG/DL
TSH SERPL DL<=0.005 MIU/L-ACNC: 0.7 UIU/ML (ref 0.3–4.2)
VIT D+METAB SERPL-MCNC: 20 NG/ML (ref 20–50)

## 2023-10-11 PROCEDURE — 93244 EXT ECG>48HR<7D REV&INTERPJ: CPT | Performed by: INTERNAL MEDICINE

## 2023-10-11 PROCEDURE — 82306 VITAMIN D 25 HYDROXY: CPT

## 2023-10-11 PROCEDURE — 84443 ASSAY THYROID STIM HORMONE: CPT

## 2023-10-11 PROCEDURE — 93242 EXT ECG>48HR<7D RECORDING: CPT

## 2023-10-11 PROCEDURE — 36415 COLL VENOUS BLD VENIPUNCTURE: CPT

## 2023-10-11 PROCEDURE — 83036 HEMOGLOBIN GLYCOSYLATED A1C: CPT

## 2023-10-11 PROCEDURE — 80053 COMPREHEN METABOLIC PANEL: CPT

## 2023-10-11 PROCEDURE — 80061 LIPID PANEL: CPT

## 2023-10-12 NOTE — RESULT ENCOUNTER NOTE
"Saqib Corona  Your attached labs are normal. Keep up the good work!  Please contact the office with any questions or concerns.    Shelby Orta \"Jose Miguel\" ELOISE Ferro    "

## 2023-10-20 ENCOUNTER — TELEPHONE (OUTPATIENT)
Dept: SURGERY | Facility: CLINIC | Age: 43
End: 2023-10-20
Payer: COMMERCIAL

## 2023-10-20 NOTE — TELEPHONE ENCOUNTER
Called pt re: Lab result comments from [facility]  No answer. Instructed pt to review lab results comments and call/message with any questions. Number provided.    Of note, pt has viewed lab result comments on vitamin D results- so might have already seen lab comments.    Genevieve PATTERSON RN

## 2023-11-02 ENCOUNTER — OFFICE VISIT (OUTPATIENT)
Dept: CARDIOLOGY | Facility: CLINIC | Age: 43
End: 2023-11-02
Payer: COMMERCIAL

## 2023-11-02 ENCOUNTER — ANCILLARY PROCEDURE (OUTPATIENT)
Dept: GENERAL RADIOLOGY | Facility: CLINIC | Age: 43
End: 2023-11-02
Attending: FAMILY MEDICINE
Payer: COMMERCIAL

## 2023-11-02 ENCOUNTER — OFFICE VISIT (OUTPATIENT)
Dept: ORTHOPEDICS | Facility: CLINIC | Age: 43
End: 2023-11-02
Attending: FAMILY MEDICINE
Payer: COMMERCIAL

## 2023-11-02 VITALS
HEIGHT: 66 IN | HEART RATE: 84 BPM | OXYGEN SATURATION: 95 % | SYSTOLIC BLOOD PRESSURE: 132 MMHG | BODY MASS INDEX: 28.98 KG/M2 | DIASTOLIC BLOOD PRESSURE: 88 MMHG | WEIGHT: 180.3 LBS

## 2023-11-02 DIAGNOSIS — E78.2 MIXED HYPERLIPIDEMIA: Primary | ICD-10-CM

## 2023-11-02 DIAGNOSIS — R00.2 PALPITATIONS: ICD-10-CM

## 2023-11-02 DIAGNOSIS — M70.42 PREPATELLAR BURSITIS OF BOTH KNEES: ICD-10-CM

## 2023-11-02 DIAGNOSIS — M25.562 PATELLAR PAIN, LEFT: ICD-10-CM

## 2023-11-02 DIAGNOSIS — M70.41 PREPATELLAR BURSITIS OF BOTH KNEES: ICD-10-CM

## 2023-11-02 PROCEDURE — 73562 X-RAY EXAM OF KNEE 3: CPT | Mod: TC | Performed by: RADIOLOGY

## 2023-11-02 PROCEDURE — 99204 OFFICE O/P NEW MOD 45 MIN: CPT | Performed by: INTERNAL MEDICINE

## 2023-11-02 PROCEDURE — 99203 OFFICE O/P NEW LOW 30 MIN: CPT | Performed by: FAMILY MEDICINE

## 2023-11-02 ASSESSMENT — PAIN SCALES - GENERAL: PAINLEVEL: NO PAIN (0)

## 2023-11-02 NOTE — LETTER
11/2/2023         RE: Chloe Foster  [address] [address]        Dear Colleague,    Thank you for referring your patient, Chloe Foster, to the [facility] [place]. Please see a copy of my visit note below.    CHIEF COMPLAINT:  Bilateral knee pain     HISTORY OF PRESENT ILLNESS  Ms. Foster is a pleasant 43 year old year old female who presents to clinic today with bilateral knee pain and has been ongoing for almost 10 years and L is worse than R.  Chloe explains that there was no injury and that the most pain is when she's kneeling on it or compression, even wearing skinny jeans. No pain with running, exercising, stooping, walking, or stairs.  Only with pressure to anterior knee tissues.  No prepatellar swelling. Denies erythema.    Onset: gradual, intermittent, worsening  Location: bilateral knee L>R  Quality:  sharp  Duration: 10 years   Severity: 10/10 at worst with kneeling  Timing:intermittent episodes   Modifying factors:  resting and non-use makes it better, movement and use makes it worse  Associated signs & symptoms: denies  Previous similar pain: No  Treatments to date: none    Additional history: as documented    Review of Systems:  Have you recently had a a fever, chills, weight loss? No  Do you have any vision problems? No  Do you have any chest pain or edema? No  Do you have any shortness of breath or wheezing?  No  Do you have stomach problems? No  Do you have any numbness or focal weakness? No  Do you have diabetes? No  Do you have problems with bleeding or clotting? No  Do you have an rashes or other skin lesions? No    MEDICAL HISTORY  Patient Active Problem List   Diagnosis     Vitamin D deficiency     CARDIOVASCULAR SCREENING; LDL GOAL LESS THAN 160     Lumbar herniated disc     Hirsutism     Insomnia     IBS (irritable bowel syndrome)     Moderate recurrent major depression (H)     History of cardioembolic  cerebrovascular accident (CVA)     PFO with atrial septal aneurysm     Protein S deficiency (H24)     Migraine with aura and without status migrainosus, not intractable     Pre-conception counseling     Elevated lipoprotein A level     Social anxiety disorder     MTHFR mutation     Cerebrovascular accident (CVA) due to embolism of right middle cerebral artery (H)     Deviated nasal septum     Overweight     Weight gain       Current Outpatient Medications   Medication Sig Dispense Refill     acetaminophen (TYLENOL) 325 MG tablet Take 325-650 mg by mouth every 6 hours as needed for mild pain       aspirin EC 81 MG EC tablet Take 81 mg by mouth every evening        aspirin-acetaminophen-caffeine (EXCEDRIN MIGRAINE) 250-250-65 MG tablet Take 1 tablet by mouth every 6 hours as needed for headaches       buPROPion (WELLBUTRIN) 75 MG tablet Take 0.5 tablets (37.5 mg) by mouth 2 times daily 30 tablet 3     LORazepam (ATIVAN) 1 MG tablet Take 0.5-1 tablets (0.5-1 mg) by mouth daily as needed for anxiety or sleep (severe anxiety/panic) 15 tablet 1     magnesium oxide (MAG-OX) 400 MG tablet Take 400 mg by mouth every evening       Multiple Vitamins-Minerals (MULTIVITAMIN ADULT EXTRA C PO) Take by mouth every evening       naltrexone (DEPADE/REVIA) 50 MG tablet Take 1/2 tablet 1-2 hours before biggest craving time for 7 days, then increase to 1/2 tablet twice daily. 30 tablet 3     NONFORMULARY Take 1 tablet by mouth every evening OTC: B-Right Vitamin (Optimized B Complex)       Omega-3 Fatty Acids (FISH OIL PO) Take 1 capsule by mouth At Bedtime        polyethylene glycol (MIRALAX) 17 GM/Dose powder Take 1 capful (17g) of Miralax mixed with 8 oz of a clear liquid twice daily for 7 days prior to your scheduled procedure. 238 g 0     propranolol (INDERAL) 10 MG tablet Take 1-2 tablets (10-20 mg) by mouth as needed (anxiety (take 30-60 minutes prior to meetings)) 30 tablet 1     Rimegepant Sulfate (NURTEC) 75 MG TBDP Take 75 mg  by mouth daily as needed (migraine) 8 tablet 11     STATIN NOT PRESCRIBED, INTENTIONAL, Statin not prescribed intentionally due to Woman of childbearing age not actively taking birth control (Patient not taking: Reported on 2023) 0 each 0     traZODone (DESYREL) 50 MG tablet Take 0.5-1 tablets (25-50 mg) by mouth At Bedtime 30 tablet 1     VITAMIN D, CHOLECALCIFEROL, PO Take 1,000 Units by mouth every evening (takes 2 x 100 unit capsule)          Allergies   Allergen Reactions     Compazine [Prochlorperazine] Muscle Pain (Myalgia)       Family History   Problem Relation Age of Onset     Family History Negative Mother      Cancer Father         throat, possibly asbestos exposure     Hypertension Father      Allergies Father      Arrhythmia Father      Other Cancer Father      Family History Negative Sister      Family History Negative Brother      Diabetes Maternal Grandmother         on insulin     C.A.D. Maternal Grandmother         triple bypass in her 60s     C.A.D. Maternal Grandfather         MI in 50s     Diabetes Paternal Grandmother      Neurologic Disorder Paternal Grandfather          of brain aneurysm in early 60s     Family History Negative Son      Diabetes Maternal Aunt          in her early 40s of DM complications, type 2     Coronary Artery Disease Early Onset Maternal Aunt 41     Obesity Maternal Aunt        Additional medical/Social/S[facility] and updated as appropriate.       PHYSICAL EXAM  There were no vitals taken for this visit.    General  - normal appearance, in no obvious distress  Musculoskeletal - Bilateral knee  - stance: normal gait without limp  - inspection: no swelling or effusion, normal bone and joint alignment, no obvious deformity no prepatellar bursal swelling  - palpation: Prepatellar bursal thickening appreciated on left knee with tenderness at prepatellar tissues.  - ROM: 135 degrees flexion, 0 degrees extension, not painful, normal  actively and passively compared to contralateral  - strength: 5/5 in flexion, 5/5 in extension  - special tests:  (-) Lachman  (-) Van  (-) varus at 0 and 30 degrees flexion  (-) valgus at 0 and 30 degrees flexion  Neuro  - no sensory or motor deficit, grossly normal coordination, normal muscle tone  Skin  - no ecchymosis, erythema, warmth, or induration, no obvious rash      IMAGING : XR knee bilateral 3 viewsFinal results and radiologist's interpretation, available in the [facility] health record. Images were reviewed with the patient/family members in the office today. My personal interpretation of the performed imaging is no acute osseous abnormality or significant degenerative changes of joint.     ASSESSMENT & PLAN  Ms. Foster is a 43 year old year old female who presents to clinic today with anterior prepatellar knee pain with kneeling and direct contact with tissue.    XR has ruled out degenerative changes as a primary cause.  Clinically, mild prepatellar bursitis is likely cause.    Diagnosis:   Prepatellar bursitis of bilateral knees    -Activity and kneeling modifications reviewed during Yoga  -May utilize knee padded sleeve and increase thickness of yoga mat.  -Trial of diclofenac gel to anterior knees  -Consideration for MRI as an option if worsening symptoms  -Follow up if persisting or worsening    It was a pleasure seeing Chloe today.    Mat Phipps DO, [facility]  Primary Care Sports Medicine       Again, thank you for allowing me to participate in the care of your patient.        Sincerely,        Mat Phipps DO

## 2023-11-02 NOTE — PROGRESS NOTES
"CARDIOLOGY CLINIC FOLLOW-UP NOTE      REASON FOR VISIT:   Follow-up palpitations    PRIMARY CARE PHYSICIAN:  Izabel Reyes        History of Present Illness   Chloe Foster is an extremely pleasant 43 year old female who previously had seen Dr. Almeida in our clinic, here for follow-up of her palpitations.  Her medical history is significant for CVA x2 in 2015 during pregnancy s/p PFO/ASD closure in April 2016, protein S deficiency, dyslipidemia, and anxiety.  Her family history is significant for multiple family members with coronary disease in their 50s-70s, though none in first-degree relatives.  She is a former smoker, having quit in 2014.    She has seen Dr. Almeida in the past to discuss her palpitations, and these have come and gone in frequency over time.  They did recur again recently and accordingly she thought it would be a good idea to come in to be evaluated by cardiology.  She did have a recent 7-day Zio patch last month, but unfortunately she did not have any of her symptoms while wearing the monitor.  She notes that her symptoms usually feel like a \"dropping sensation\" in her chest, and sometimes a hard beating sensation.  She does not have any prolonged palpitations or associated syncope/presyncope.  No other cardiac symptoms.  She reports no recent changes to her medications, diet, or stress level.  She has only about 1/2 cup of coffee per day.  She does work as a  so she frequently tests alcoholic drinks, but reports that she spits out most of the beverage rather than ingesting it.    As part of today's visit, I reviewed her most recent lipid panel, chemistry panel, A1c, ALT, TSH, Zio patch, and echocardiogram.      Assessment & Plan     Palpitations, uncertain etiology but most consistent with benign PACs/PVCs by description  No evidence of AF on 3-day Zio patch from 2021 or 7-day Zio patch from 2023  CVA x2 in 2015 during pregnancy, s/p PFO/ASD closure in April " 2016  Protein S deficiency  Dyslipidemia  Anxiety  Family history of CAD in several second-degree relatives  Former tobacco abuse (quit in 2014)      It was a pleasure to speak with Chloe in clinic today.  We discussed the potential causes of her palpitations, and thankfully the sound most likely benign by description.  While we were not able to capture any episodes while she was wearing the Zio patch, her symptoms have a very characteristic description for ectopic beats (PACs/PVCs).  We talked about methods for symptom reduction such as stress management, minimizing alcohol/caffeine intake, etc.  For now I do not think that any additional testing is required at this time.  We also did discuss her potential risk of heart disease in the future.  She is concerned about this understandably given her family history of several second-degree relatives with coronary disease.  While she overall has a fairly low risk profile, I think that a coronary calcium scan would be reasonable to better characterize her risk.  Based on these findings we can decide whether or not to push for the addition of statin therapy.      -Coronary calcium scan  -Decision on addition of statin therapy based on coronary calcium scan results  -Continue aspirin 81 mg daily  -Reassurance given regarding likely ectopic beats  -Minimize EtOH/caffeine intake  -Heart healthy diet, regular aerobic exercise      Follow-up: Presuming no significant calcium burden on CT scan, we will plan on annual follow-up with labs beforehand.  If calcium burden is severe, we will plan on earlier follow-up to discuss next steps.      On the date of the patient's visit, I spent a total of 40 minutes reviewing the patient's chart; interviewing, examining, and counseling the patient; coordinating with other providers as necessary, entering orders, and documenting in the medical chart.        Kristofer Parker MD  Interventional Cardiology  November 2,  2023        Medications   Current Outpatient Medications   Medication    acetaminophen (TYLENOL) 325 MG tablet    aspirin EC 81 MG EC tablet    aspirin-acetaminophen-caffeine (EXCEDRIN MIGRAINE) 250-250-65 MG tablet    buPROPion (WELLBUTRIN) 75 MG tablet    LORazepam (ATIVAN) 1 MG tablet    magnesium oxide (MAG-OX) 400 MG tablet    Multiple Vitamins-Minerals (MULTIVITAMIN ADULT EXTRA C PO)    naltrexone (DEPADE/REVIA) 50 MG tablet    NONFORMULARY    Omega-3 Fatty Acids (FISH OIL PO)    polyethylene glycol (MIRALAX) 17 GM/Dose powder    propranolol (INDERAL) 10 MG tablet    Rimegepant Sulfate (NURTEC) 75 MG TBDP    traZODone (DESYREL) 50 MG tablet    VITAMIN D, CHOLECALCIFEROL, PO    STATIN NOT PRESCRIBED, INTENTIONAL,     No current facility-administered medications for this visit.     Allergies   Allergies   Allergen Reactions    Compazine [Prochlorperazine] Muscle Pain (Myalgia)         Physical Exam       BP: 132/88 Pulse: 84     SpO2: 95 %      Vital Signs with Ranges  Pulse:  [84] 84  BP: (132)/(88) 132/88  SpO2:  [95 %] 95 %  180 lbs 4.8 oz    Constitutional: Well-appearing, no acute distress  Respiratory: Normal respiratory effort, CTAB  Cardiovascular: RRR, no m/r/g.  JVP < 7 cm H2O.  There is no LE edema.  Normal carotid upstrokes, no carotid bruits.

## 2023-11-02 NOTE — LETTER
"11/2/2023    Izabel Reyes MD  [address] [facility] [address]  [facility] [facility] [address]    RE: Chloe Foster       Dear Colleague,     I had the pleasure of seeing Chloe Foster in the [facility].  CARDIOLOGY CLINIC FOLLOW-UP NOTE      REASON FOR VISIT:   Follow-up palpitations    PRIMARY CARE PHYSICIAN:  Izabel Reyes        History of Present Illness  Chloe Foster is an extremely pleasant 43 year old female who previously had seen Dr. Almeida in our clinic, here for follow-up of her palpitations.  Her medical history is significant for CVA x2 in 2015 during pregnancy s/p PFO/ASD closure in April 2016, protein S deficiency, dyslipidemia, and anxiety.  Her family history is significant for multiple family members with coronary disease in their 50s-70s, though none in first-degree relatives.  She is a former smoker, having quit in 2014.    She has seen Dr. Almeida in the past to discuss her palpitations, and these have come and gone in frequency over time.  They did recur again recently and accordingly she thought it would be a good idea to come in to be evaluated by cardiology.  She did have a recent 7-day Zio patch last month, but unfortunately she did not have any of her symptoms while wearing the monitor.  She notes that her symptoms usually feel like a \"dropping sensation\" in her chest, and sometimes a hard beating sensation.  She does not have any prolonged palpitations or associated syncope/presyncope.  No other cardiac symptoms.  She reports no recent changes to her medications, diet, or stress level.  She has only about 1/2 cup of coffee per day.  She does work as a  so she frequently tests alcoholic drinks, but reports that she spits out most of the beverage rather than ingesting it.    As part of today's visit, I reviewed her most recent lipid panel, chemistry panel, A1c, ALT, TSH, Zio patch, and echocardiogram.      Assessment & " Plan    Palpitations, uncertain etiology but most consistent with benign PACs/PVCs by description  No evidence of AF on 3-day Zio patch from 2021 or 7-day Zio patch from 2023  CVA x2 in 2015 during pregnancy, s/p PFO/ASD closure in April 2016  Protein S deficiency  Dyslipidemia  Anxiety  Family history of CAD in several second-degree relatives  Former tobacco abuse (quit in 2014)      It was a pleasure to speak with Chloe in clinic today.  We discussed the potential causes of her palpitations, and thankfully the sound most likely benign by description.  While we were not able to capture any episodes while she was wearing the Zio patch, her symptoms have a very characteristic description for ectopic beats (PACs/PVCs).  We talked about methods for symptom reduction such as stress management, minimizing alcohol/caffeine intake, etc.  For now I do not think that any additional testing is required at this time.  We also did discuss her potential risk of heart disease in the future.  She is concerned about this understandably given her family history of several second-degree relatives with coronary disease.  While she overall has a fairly low risk profile, I think that a coronary calcium scan would be reasonable to better characterize her risk.  Based on these findings we can decide whether or not to push for the addition of statin therapy.      -Coronary calcium scan  -Decision on addition of statin therapy based on coronary calcium scan results  -Continue aspirin 81 mg daily  -Reassurance given regarding likely ectopic beats  -Minimize EtOH/caffeine intake  -Heart healthy diet, regular aerobic exercise      Follow-up: Presuming no significant calcium burden on CT scan, we will plan on annual follow-up with labs beforehand.  If calcium burden is severe, we will plan on earlier follow-up to discuss next steps.      On the date of the patient's visit, I spent a total of 40 minutes reviewing the patient's chart;  interviewing, examining, and counseling the patient; coordinating with other providers as necessary, entering orders, and documenting in the medical chart.        Kristofer Parker MD  Interventional Cardiology  November 2, 2023        Medications  Current Outpatient Medications   Medication    acetaminophen (TYLENOL) 325 MG tablet    aspirin EC 81 MG EC tablet    aspirin-acetaminophen-caffeine (EXCEDRIN MIGRAINE) 250-250-65 MG tablet    buPROPion (WELLBUTRIN) 75 MG tablet    LORazepam (ATIVAN) 1 MG tablet    magnesium oxide (MAG-OX) 400 MG tablet    Multiple Vitamins-Minerals (MULTIVITAMIN ADULT EXTRA C PO)    naltrexone (DEPADE/REVIA) 50 MG tablet    NONFORMULARY    Omega-3 Fatty Acids (FISH OIL PO)    polyethylene glycol (MIRALAX) 17 GM/Dose powder    propranolol (INDERAL) 10 MG tablet    Rimegepant Sulfate (NURTEC) 75 MG TBDP    traZODone (DESYREL) 50 MG tablet    VITAMIN D, CHOLECALCIFEROL, PO    STATIN NOT PRESCRIBED, INTENTIONAL,     No current facility-administered medications for this visit.     Allergies  Allergies   Allergen Reactions    Compazine [Prochlorperazine] Muscle Pain (Myalgia)         Physical Exam      BP: 132/88 Pulse: 84     SpO2: 95 %      Vital Signs with Ranges  Pulse:  [84] 84  BP: (132)/(88) 132/88  SpO2:  [95 %] 95 %  180 lbs 4.8 oz    Constitutional: Well-appearing, no acute distress  Respiratory: Normal respiratory effort, CTAB  Cardiovascular: RRR, no m/r/g.  JVP < 7 cm H2O.  There is no LE edema.  Normal carotid upstrokes, no carotid bruits.      Thank you for allowing me to participate in the care of your patient.      Sincerely,     Kristofer Parker MD     [facility]  cc:   Beto Perry PA-C  [facility]  [address] [place] [address]

## 2023-11-02 NOTE — PATIENT INSTRUCTIONS
Thank you for choosing [facility]    DR HERNANDEZ'S CLINIC LOCATIONS  [place] [address] [address] [facility], [address]th [place]   [place], [address] [place] [address]   [phone]       APPOINTMENTS: [phone]    CARE QUESTIONS: [phone],    BILLING QUESTIONS: [phone]    FAX NUMBER: [phone]        Follow up: as needed      1. Patellar pain, left        WHAT IS KNEECAP BURSITIS?    Bursitis is an irritation or inflammation of a bursa in your knee. A bursa is a fluid-filled sac that surrounds joints or tendons. A bursa reduces friction by cushioning muscles or tendons and bones that move back and forth across each other.    There are several bursae in the knee. The prepatellar bursa is located just in front of the kneecap near the attachment of the kneecap (patellar) tendon. Prepatellar bursitis is also called 's knee because maids got it from cleaning floors on their knees. The injury is common in wrestlers, who get it from their knees rubbing on the mats. Volleyball players get it from diving onto their knees for the ball.    WHAT IS THE CAUSE?    Bursitis can result from:    Overuse  A direct blow to the knee  Chronic friction, such as from frequent kneeling    WHAT ARE THE SYMPTOMS?    Prepatellar bursitis causes pain and swelling over the front of the knee. You may have pain when you bend or straighten your leg.    HOW IS IT DIAGNOSED?    Your healthcare provider will examine your knee for tenderness over the bursa. He or she may use a needle and syringe to get a sample of fluid from the bursa to check for infection and look for other causes of the bursitis. You may have X-rays and blood tests.    HOW IS IT TREATED?    To treat this condition:    Rest the joint that is hurting. Raise your knee on a pillow when you sit or lie down.  Do not put any pressure on the sore and swollen area until the swelling  subsides.  Put an ice pack, gel pack, or package of frozen vegetables wrapped in a cloth on your knee every 3 to 4 hours for up to 20 minutes at a time until the pain goes away.  Wear a knee sleeve or an elastic bandage around your knee to reduce any swelling or to prevent swelling from occurring.  Take an anti-inflammatory medicine, such as ibuprofen, as directed by your provider. Nonsteroidal anti-inflammatory medicines (NSAIDs) may cause stomach bleeding and other problems. These risks increase with age. Read the label and take as directed. Unless recommended by your healthcare provider, do not take for more than 10 days.    Follow your provider s instructions for doing exercises to help you recover.    Ask your provider:    How and when you will hear your test results  How long it will take to recover  What activities you should avoid and when you can return to your normal activities  How to take care of yourself at home  What symptoms or problems you should watch for and what to do if you have them  Make sure you know when you should come back for a checkup.    HOW LONG WILL THE EFFECTS LAST?    The length of recovery depends on many factors such as your age, health, and if you have had a previous injury. Recovery time also depends on the severity of the injury. The pain from prepatellar bursitis is usually gone within a few weeks although there may be painless swelling for up to several months.    HOW CAN I HELP PREVENT PREPATELLAR BURSITIS?    Prepatellar bursitis is best prevented by avoiding direct blows to the kneecap area and by avoiding prolonged kneeling. Proper protective kneepads will help prevent inflammation of the bursa.    Developed by [employer].  Published by [employer].  Copyright  2014 [employer] and/or one of its subsidiaries. All rights reserved.

## 2023-11-02 NOTE — PROGRESS NOTES
CHIEF COMPLAINT:  Bilateral knee pain     HISTORY OF PRESENT ILLNESS  Ms. Foster is a pleasant 43 year old year old female who presents to clinic today with bilateral knee pain and has been ongoing for almost 10 years and L is worse than R.  Chloe explains that there was no injury and that the most pain is when she's kneeling on it or compression, even wearing skinny jeans. No pain with running, exercising, stooping, walking, or stairs.  Only with pressure to anterior knee tissues.  No prepatellar swelling. Denies erythema.    Onset: gradual, intermittent, worsening  Location: bilateral knee L>R  Quality:  sharp  Duration: 10 years   Severity: 10/10 at worst with kneeling  Timing:intermittent episodes   Modifying factors:  resting and non-use makes it better, movement and use makes it worse  Associated signs & symptoms: denies  Previous similar pain: No  Treatments to date: none    Additional history: as documented    Review of Systems:  Have you recently had a a fever, chills, weight loss? No  Do you have any vision problems? No  Do you have any chest pain or edema? No  Do you have any shortness of breath or wheezing?  No  Do you have stomach problems? No  Do you have any numbness or focal weakness? No  Do you have diabetes? No  Do you have problems with bleeding or clotting? No  Do you have an rashes or other skin lesions? No    MEDICAL HISTORY  Patient Active Problem List   Diagnosis    Vitamin D deficiency    CARDIOVASCULAR SCREENING; LDL GOAL LESS THAN 160    Lumbar herniated disc    Hirsutism    Insomnia    IBS (irritable bowel syndrome)    Moderate recurrent major depression (H)    History of cardioembolic cerebrovascular accident (CVA)    PFO with atrial septal aneurysm    Protein S deficiency (H24)    Migraine with aura and without status migrainosus, not intractable    Pre-conception counseling    Elevated lipoprotein A level    Social anxiety disorder    MTHFR mutation    Cerebrovascular accident  (CVA) due to embolism of right middle cerebral artery (H)    Deviated nasal septum    Overweight    Weight gain       Current Outpatient Medications   Medication Sig Dispense Refill    acetaminophen (TYLENOL) 325 MG tablet Take 325-650 mg by mouth every 6 hours as needed for mild pain      aspirin EC 81 MG EC tablet Take 81 mg by mouth every evening       aspirin-acetaminophen-caffeine (EXCEDRIN MIGRAINE) 250-250-65 MG tablet Take 1 tablet by mouth every 6 hours as needed for headaches      buPROPion (WELLBUTRIN) 75 MG tablet Take 0.5 tablets (37.5 mg) by mouth 2 times daily 30 tablet 3    LORazepam (ATIVAN) 1 MG tablet Take 0.5-1 tablets (0.5-1 mg) by mouth daily as needed for anxiety or sleep (severe anxiety/panic) 15 tablet 1    magnesium oxide (MAG-OX) 400 MG tablet Take 400 mg by mouth every evening      Multiple Vitamins-Minerals (MULTIVITAMIN ADULT EXTRA C PO) Take by mouth every evening      naltrexone (DEPADE/REVIA) 50 MG tablet Take 1/2 tablet 1-2 hours before biggest craving time for 7 days, then increase to 1/2 tablet twice daily. 30 tablet 3    NONFORMULARY Take 1 tablet by mouth every evening OTC: B-Right Vitamin (Optimized B Complex)      Omega-3 Fatty Acids (FISH OIL PO) Take 1 capsule by mouth At Bedtime       polyethylene glycol (MIRALAX) 17 GM/Dose powder Take 1 capful (17g) of Miralax mixed with 8 oz of a clear liquid twice daily for 7 days prior to your scheduled procedure. 238 g 0    propranolol (INDERAL) 10 MG tablet Take 1-2 tablets (10-20 mg) by mouth as needed (anxiety (take 30-60 minutes prior to meetings)) 30 tablet 1    Rimegepant Sulfate (NURTEC) 75 MG TBDP Take 75 mg by mouth daily as needed (migraine) 8 tablet 11    STATIN NOT PRESCRIBED, INTENTIONAL, Statin not prescribed intentionally due to Woman of childbearing age not actively taking birth control (Patient not taking: Reported on 6/12/2023) 0 each 0    traZODone (DESYREL) 50 MG tablet Take 0.5-1 tablets (25-50 mg) by mouth At  Bedtime 30 tablet 1    VITAMIN D, CHOLECALCIFEROL, PO Take 1,000 Units by mouth every evening (takes 2 x 100 unit capsule)          Allergies   Allergen Reactions    Compazine [Prochlorperazine] Muscle Pain (Myalgia)       Family History   Problem Relation Age of Onset    Family History Negative Mother     Cancer Father         throat, possibly asbestos exposure    Hypertension Father     Allergies Father     Arrhythmia Father     Other Cancer Father     Family History Negative Sister     Family History Negative Brother     Diabetes Maternal Grandmother         on insulin    C.A.D. Maternal Grandmother         triple bypass in her 60s    C.A.D. Maternal Grandfather         MI in 50s    Diabetes Paternal Grandmother     Neurologic Disorder Paternal Grandfather          of brain aneurysm in early 60s    Family History Negative Son     Diabetes Maternal Aunt          in her early 40s of DM complications, type 2    Coronary Artery Disease Early Onset Maternal Aunt 41    Obesity Maternal Aunt        Additional medical/Social/Surgical histories reviewed in [facility] and updated as appropriate.       PHYSICAL EXAM  There were no vitals taken for this visit.    General  - normal appearance, in no obvious distress  Musculoskeletal - Bilateral knee  - stance: normal gait without limp  - inspection: no swelling or effusion, normal bone and joint alignment, no obvious deformity no prepatellar bursal swelling  - palpation: Prepatellar bursal thickening appreciated on left knee with tenderness at prepatellar tissues.  - ROM: 135 degrees flexion, 0 degrees extension, not painful, normal actively and passively compared to contralateral  - strength: 5/5 in flexion, 5/5 in extension  - special tests:  (-) Lachman  (-) Van  (-) varus at 0 and 30 degrees flexion  (-) valgus at 0 and 30 degrees flexion  Neuro  - no sensory or motor deficit, grossly normal coordination, normal muscle tone  Skin  - no ecchymosis, erythema, warmth,  or induration, no obvious rash      IMAGING : XR knee bilateral 3 viewsFinal results and radiologist's interpretation, available in the [facility] health record. Images were reviewed with the patient/family members in the office today. My personal interpretation of the performed imaging is no acute osseous abnormality or significant degenerative changes of joint.     ASSESSMENT & PLAN  Ms. Foster is a 43 year old year old female who presents to clinic today with anterior prepatellar knee pain with kneeling and direct contact with tissue.    XR has ruled out degenerative changes as a primary cause.  Clinically, mild prepatellar bursitis is likely cause.    Diagnosis:   Prepatellar bursitis of bilateral knees    -Activity and kneeling modifications reviewed during Yoga  -May utilize knee padded sleeve and increase thickness of yoga mat.  -Trial of diclofenac gel to anterior knees  -Consideration for MRI as an option if worsening symptoms  -Follow up if persisting or worsening    It was a pleasure seeing Chloe today.    Mat Phipps DO, CAQSM  Primary Care Sports Medicine

## 2023-11-07 ENCOUNTER — TELEPHONE (OUTPATIENT)
Dept: GASTROENTEROLOGY | Facility: CLINIC | Age: 43
End: 2023-11-07
Payer: COMMERCIAL

## 2023-11-07 NOTE — TELEPHONE ENCOUNTER
Non-Invasive GI Procedure Scheduling Questionnaire    Have you had a positive Covid test in the last 14 days?  No    Are you active on [employer]?   Yes    What insurance is in the chart?  Other:  [employer]    Ordering/Referring Provider:     DANIELLE GILMORE      (If ordering provider performs procedure, schedule with ordering provider unless otherwise instructed. )    (Females) Are you currently pregnant? No - Okay to continue scheduling.    Have you ever had or are you awaiting a heart or lung transplant? No - Schedule next available.    Do you need assistance transferring? No - Continue scheduling.    Do you take acid reflux medication or proton-pump inhibitors (PPI)? No - Continue scheduling.        Patient Reminders:  Please inform patients to review instructions sent via [employer] or letter two weeks before procedure.  The Manometry exam may take up to 90 minutes.  The Breath Test exam may take up to 4 hours.

## 2023-11-14 ENCOUNTER — VIRTUAL VISIT (OUTPATIENT)
Dept: GASTROENTEROLOGY | Facility: CLINIC | Age: 43
End: 2023-11-14
Attending: INTERNAL MEDICINE
Payer: COMMERCIAL

## 2023-11-14 DIAGNOSIS — R14.0 BLOATING: ICD-10-CM

## 2023-11-14 DIAGNOSIS — R19.7 DIARRHEA, UNSPECIFIED TYPE: ICD-10-CM

## 2023-11-14 DIAGNOSIS — K59.09 OTHER CONSTIPATION: ICD-10-CM

## 2023-11-14 PROCEDURE — 99214 OFFICE O/P EST MOD 30 MIN: CPT | Mod: VID | Performed by: STUDENT IN AN ORGANIZED HEALTH CARE EDUCATION/TRAINING PROGRAM

## 2023-11-14 NOTE — PROGRESS NOTES
"    GASTROENTEROLOGY Follow-up VIDEO VISIT    Virtual Visit Details    Type of service:  Video Visit   Originating Location (pt. Location): Home    Distant Location (provider location):  On-site  Platform used for Video Visit: Rafal DORSEY/REFERRING MD:    Izabel Reyes    REASON FOR CONSULTATION:   Alex Gross for   Chief Complaint   Patient presents with    RECHECK       HISTORY OF PRESENT ILLNESS:    Chloe Foster is a 43 year old female who is being evaluated via a billable video visit for follow up on irregular bowel movements, abdominal cramping, and to follow up after EGD/colonoscopy.    Patient was last seen by Dr. Alex Gross on 6/12/23:   Flips between constipation and diarrhea   Going on for over a year now  Started with abdominal cramping intermittently started April of last year   Cramping sometimes now, but better   Felt like a pulsing sensation in lower left \"cramping\" but not very painful 2/10, more unsettling   Now about once per month   No correlation with BMs   Cramping was more after she ate   + bloating     BMs:   3 AMs out of the week: diarrhea   - can be watery, mushy  1-2 times per week normal BM  Can go 2-3 days without going - at least weekly   - no straining or type 1 stools     Thinks lactose intolerant - avoids. Definitely a trigger   - garlic is a trigger   - doesn't eat a lot of gluten- upsets stomach      No FH celiac disease. Celiac testing negative in past  No hx cholecystectomy  Tx tried: magnesium, psyllium husk   - miralax: uses as needed but causes diarrhea     Recommended Linzess 72 mcg daily, dietitian referral, colonoscopy, EGD, SIBO breath test. Future considerations could include trial of colestipol, PFC referral.     EGD was normal, colon appeared normal. Biopsies negative for celiac disease, h. Pylori, microscopic colitis.     Interval History 11/14/23:   Today, she reports that her symptoms are about the same as they were, except the " abdominal cramping seems to have improved since her colonoscopy. She met with a dietitian and discussed a FODMAP elimination and reintroduction. She reports she has been very busy lately so hasn't started the FODMAP diet yet. She does have SIBO breath test scheduled next month. Linzess was not covered by her insurance so she hasn't started.     Bowel movements now are typically loose. Once in a while she skips 1-2 days of a bowel movement. Reports the last 4 days have been pretty bad. Associated with cramping and bloating. She has been taking psyllium husk capsules, 2 pills per day. She finds the loose stools to be the most bothersome symptom at this time. She has eliminated dairy. She also finds gluten to make her feel sick.     I have reviewed and updated the patient's Past Medical History, Social History, Family History and Medication List.    Exam:    General appearance:  Healthy appearing adult, in no acute distress  Eyes:  Sclera anicteric  Ears, nose, mouth and throat:  No obvious external lesions of ears and nose.  Hearing intact  Neck:  Symmetric, No obvious external lesions  Respiratory:  Normal respiration, no use of accessory muscles   MSK:  No visual upper extremity, neck or facial muscle atrophy  Psychiatric:  Oriented to person, place and time, Appropriate mood and affect.   Neurologic:  Peripheral muscle function and dexterity appear to be intact      PERTINENT STUDIES have been reviewed.  Colonoscopy/EGD 9/25/23  A: Small intestine, duodenum, biopsies:  -Normal small intestinal mucosa without evidence of untreated sprue, peptic duodenitis or other microscopic alteration     B: Stomach, biopsies:  - Normal oxyntic mucosa with minimal chronic inflammation  -No evidence of acute inflammation, intestinal metaplasia or dysplasia  -No evidence of Helicobacter pylori-like organisms on routine/H&E stain        C: Large intestine, random, biopsies:  -  Normal colonic mucosa showing no evidence of collagenous  or lymphocytic (microscopic) colitis, active or chronic colitis or other microscopic abnormality      ASSESSMENT/PLAN:    Chloe Foster is a 43 year old female who presents for follow up of loose stools    1. Other constipation  2. Diarrhea, unspecified type  3. Bloating  - Adult GI Clinic Follow-Up Order (Blank)     19 month history of irregular bowel habits alternating between constipation and diarrhea, with associated cramping and bloating. LLQ cramping/pulsating discomfort that has improved. Not associated with bowel movements, but more so with eating. She avoids dairy and does find gluten to trigger symptoms as well. She has tried Miralax which caused diarrhea, and is taking 2 psyllium husk pills per day and magnesium. She had an abdominal xray showing moderate stool burden in the past. No alarm symptoms. Recent EGD and colonoscopy performed: EGD was normal, colon appeared normal. Biopsies negative for celiac disease, h. Pylori, microscopic colitis. We reviewed these results today. Linzess was not covered by insurance. At this time the loose stools are the most bothersome symptom, she is not having hard stools, and skips 1-2 days of BM per week. She has met with a dietitian and discussed FODMAP, hasn't started yet.      - Hydrogen breath test for SIBO scheduled next month  - Recommend soluble powdered fiber supplement (metamucil powder) 1-2 tsp daily for 2-3 weeks and slowly increase to 1-2 tablespoons daily   - future considerations could include: trial of bentyl, colestipol, pelvic floor center referral, visit with GI dietitian for reintroduction of FODMAPs  - Follow up after breath test and FODMAP elimination/reintroduction       Video-Visit Details  Video Visit Time: 17 minutes   Start 10:05  End 10:22  Type of service:  Video Visit  Originating Location (pt. Location): Home    Distant Location (provider location):  On-site  Platform used for Video Visit: Rafal    32 minutes spent on the date of the  encounter doing chart review, history and exam, documentation and further activities as noted above.    Talya Alberto PA-C  Division of Gastroenterology, Hepatology, and Nutrition  [facility]       RTC about 4 months

## 2023-11-14 NOTE — NURSING NOTE
Is the patient currently in the state of [place]? YES    Visit mode:VIDEO    If the visit is dropped, the patient can be reconnected by: VIDEO VISIT: Text to cell phone:   Telephone Information:   Mobile [phone]       Will anyone else be joining the visit? NO  (If patient encounters technical issues they should call [phone])    How would you like to obtain your AVS? MyChart    Are changes needed to the allergy or medication list? No    Reason for visit: RECHECK    Rolando HUNTER

## 2023-11-14 NOTE — LETTER
"    11/14/2023         RE: Chloe Foster  [address] [address]        Dear Colleague,    Thank you for referring your patient, Chloe Foster, to the [facility] [place]. Please see a copy of my visit note below.        GASTROENTEROLOGY Follow-up VIDEO VISIT    CC/REFERRING MD:    Izabel Reyes    REASON FOR CONSULTATION:   Alex Gross for   Chief Complaint   Patient presents with    RECHECK       HISTORY OF PRESENT ILLNESS:    Chloe Foster is a 43 year old female who is being evaluated via a billable video visit for follow up on irregular bowel movements, abdominal cramping, and to follow up after EGD/colonoscopy.    Patient was last seen by Dr. Alex Gross on 6/12/23:   Flips between constipation and diarrhea   Going on for over a year now  Started with abdominal cramping intermittently started April of last year   Cramping sometimes now, but better   Felt like a pulsing sensation in lower left \"cramping\" but not very painful 2/10, more unsettling   Now about once per month   No correlation with BMs   Cramping was more after she ate   + bloating     BMs:   3 AMs out of the week: diarrhea   - can be watery, mushy  1-2 times per week normal BM  Can go 2-3 days without going - at least weekly   - no straining or type 1 stools     Thinks lactose intolerant - avoids. Definitely a trigger   - garlic is a trigger   - doesn't eat a lot of gluten- upsets stomach      No FH celiac disease. Celiac testing negative in past  No hx cholecystectomy  Tx tried: magnesium, psyllium husk   - miralax: uses as needed but causes diarrhea     Recommended Linzess 72 mcg daily, dietitian referral, colonoscopy, EGD, SIBO breath test. Future considerations could include trial of colestipol, PFC referral.     EGD was normal, colon appeared normal. Biopsies negative for celiac disease, h. Pylori, microscopic colitis.     Interval History " 11/14/23:   Today, she reports that her symptoms are about the same as they were, except the abdominal cramping seems to have improved since her colonoscopy. She met with a dietitian and discussed a FODMAP elimination and reintroduction. She reports she has been very busy lately so hasn't started the FODMAP diet yet. She does have SIBO breath test scheduled next month. Linzess was not covered by her insurance so she hasn't started.     Bowel movements now are typically loose. Once in a while she skips 1-2 days of a bowel movement. Reports the last 4 days have been pretty bad. Associated with cramping and bloating. She has been taking psyllium husk capsules, 2 pills per day. She finds the loose stools to be the most bothersome symptom at this time. She has eliminated dairy. She also finds gluten to make her feel sick.     I have reviewed and updated the patient's Past Medical History, Social History, Family History and Medication List.    Exam:    General appearance:  Healthy appearing adult, in no acute distress  Eyes:  Sclera anicteric  Ears, nose, mouth and throat:  No obvious external lesions of ears and nose.  Hearing intact  Neck:  Symmetric, No obvious external lesions  Respiratory:  Normal respiration, no use of accessory muscles   MSK:  No visual upper extremity, neck or facial muscle atrophy  Psychiatric:  Oriented to person, place and time, Appropriate mood and affect.   Neurologic:  Peripheral muscle function and dexterity appear to be intact      PERTINENT STUDIES have been reviewed.  Colonoscopy/EGD 9/25/23  A: Small intestine, duodenum, biopsies:  -Normal small intestinal mucosa without evidence of untreated sprue, peptic duodenitis or other microscopic alteration     B: Stomach, biopsies:  - Normal oxyntic mucosa with minimal chronic inflammation  -No evidence of acute inflammation, intestinal metaplasia or dysplasia  -No evidence of Helicobacter pylori-like organisms on routine/H&E stain        C:  Large intestine, random, biopsies:  -  Normal colonic mucosa showing no evidence of collagenous or lymphocytic (microscopic) colitis, active or chronic colitis or other microscopic abnormality      ASSESSMENT/PLAN:    Chloe Foster is a 43 year old female who presents for follow up of loose stools    1. Other constipation  2. Diarrhea, unspecified type  3. Bloating  - Adult GI Clinic Follow-Up Order (Blank)     19 month history of irregular bowel habits alternating between constipation and diarrhea, with associated cramping and bloating. LLQ cramping/pulsating discomfort that has improved. Not associated with bowel movements, but more so with eating. She avoids dairy and does find gluten to trigger symptoms as well. She has tried Miralax which caused diarrhea, and is taking 2 psyllium husk pills per day and magnesium. She had an abdominal xray showing moderate stool burden in the past. No alarm symptoms. Recent EGD and colonoscopy performed: EGD was normal, colon appeared normal. Biopsies negative for celiac disease, h. Pylori, microscopic colitis. We reviewed these results today. Linzess was not covered by insurance. At this time the loose stools are the most bothersome symptom, she is not having hard stools, and skips 1-2 days of BM per week. She has met with a dietitian and discussed FODMAP, hasn't started yet.      - Hydrogen breath test for SIBO scheduled next month  - Recommend soluble powdered fiber supplement (metamucil powder) 1-2 tsp daily for 2-3 weeks and slowly increase to 1-2 tablespoons daily   - future considerations could include: trial of bentyl, colestipol, pelvic floor center referral, visit with GI dietitian for reintroduction of FODMAPs  - Follow up after breath test and FODMAP elimination/reintroduction         32 minutes spent on the date of the encounter doing chart review, history and exam, documentation and further activities as noted above.      RTC about 4 months        Again,  thank you for allowing me to participate in the care of your patient.      Sincerely,    Talya Alberto PA-C

## 2023-11-14 NOTE — PATIENT INSTRUCTIONS
It was a pleasure taking care of you today.  I've included a brief summary of our discussion and care plan from today's visit below.  Please review this information with your primary care provider.  _______________________________________________________________________     My recommendations are summarized as follows:   - Hydrogen breath test for SIBO scheduled next month  - Recommend soluble powdered fiber supplement (metamucil powder) 1-2 tsp daily for 2-3 weeks and slowly increase to 1-2 tablespoons daily to bulk the stools, give more control   - Follow up after breath test and FODMAP elimination/reintroduction    ______________________________________________________________________     How do I schedule labs, imaging studies, or procedures that were ordered in clinic today?      Labs: To schedule lab appointment you can contact your local [facility] or call [phone] to schedule at any convenient [facility] location.      Procedures: If a colonoscopy, upper endoscopy, breath test, esophageal manometry, or pH impedence was ordered today, our endoscopy team will call you to schedule this. If you have not heard from our endoscopy team within a week, please call [phone] to schedule.      Imaging Studies: If you were scheduled for a CT scan, X-ray, MRI, ultrasound, HIDA scan or other imaging study, please call [phone] to have this scheduled.      Referral: If a referral to another specialty was ordered, expect a phone call or follow instructions above. If you have not heard from anyone regarding your referral in a week, please call our clinic to check the status.      Who do I call with any questions after my visit?  Please be in touch if there are any further questions that arise following today's visit.  There are multiple ways to contact your gastroenterology care team.       During business hours, you may reach a Gastroenterology nurse at [phone]     To schedule or reschedule  an appointment, please call [phone].      You can always send a secure message through [employer].  [employer] messages are answered by your nurse or doctor typically within 24 hours.  Please allow extra time on weekends and holidays.       For urgent/emergent questions after business hours, you may reach the on-call GI Fellow by contacting the [facility]  at [phone].     How will I get the results of any tests ordered?    You will receive all of your results.  If you have signed up for [employer], any tests ordered at your visit will be available to you after your physician reviews them.  Typically this takes 1-2 weeks.  If there are urgent results that require a change in your care plan, your physician or nurse will call you to discuss the next steps.       What is [employer]?  [employer] is a secure way for you to access all of your healthcare records from the [facility].  It is a web based computer program, so you can sign on to it from any location.  It also allows you to send secure messages to your care team.  I recommend signing up for [employer] access if you have not already done so and are comfortable with using a computer.       How to I schedule a follow-up visit?  If you did not schedule a follow-up visit today, please call [phone] to schedule a follow-up office visit.

## 2023-11-15 ENCOUNTER — TELEPHONE (OUTPATIENT)
Dept: GASTROENTEROLOGY | Facility: CLINIC | Age: 43
End: 2023-11-15
Payer: COMMERCIAL

## 2023-11-15 NOTE — TELEPHONE ENCOUNTER
1st attempt- LVM and sent mychart    Schedule:  4 mo follow-up appointment with Talya Alberto (around March 2024). [facility] GI, in person or virtual

## 2023-11-17 ENCOUNTER — HOSPITAL ENCOUNTER (OUTPATIENT)
Dept: CARDIOLOGY | Facility: CLINIC | Age: 43
Discharge: HOME | End: 2023-11-17
Attending: INTERNAL MEDICINE | Admitting: INTERNAL MEDICINE
Payer: COMMERCIAL

## 2023-11-17 ENCOUNTER — TELEPHONE (OUTPATIENT)
Dept: GASTROENTEROLOGY | Facility: CLINIC | Age: 43
End: 2023-11-17
Payer: COMMERCIAL

## 2023-11-17 DIAGNOSIS — R00.2 PALPITATIONS: ICD-10-CM

## 2023-11-17 DIAGNOSIS — E78.2 MIXED HYPERLIPIDEMIA: ICD-10-CM

## 2023-11-17 PROCEDURE — 75571 CT HRT W/O DYE W/CA TEST: CPT | Mod: 26 | Performed by: INTERNAL MEDICINE

## 2023-11-17 PROCEDURE — 75571 CT HRT W/O DYE W/CA TEST: CPT

## 2023-11-21 ENCOUNTER — ANCILLARY PROCEDURE (OUTPATIENT)
Dept: MAMMOGRAPHY | Facility: CLINIC | Age: 43
End: 2023-11-21
Attending: FAMILY MEDICINE
Payer: COMMERCIAL

## 2023-11-21 DIAGNOSIS — Z12.31 VISIT FOR SCREENING MAMMOGRAM: ICD-10-CM

## 2023-11-21 PROCEDURE — 77063 BREAST TOMOSYNTHESIS BI: CPT | Mod: TC | Performed by: RADIOLOGY

## 2023-11-21 PROCEDURE — 77067 SCR MAMMO BI INCL CAD: CPT | Mod: TC | Performed by: RADIOLOGY

## 2023-12-05 ENCOUNTER — TELEPHONE (OUTPATIENT)
Dept: GASTROENTEROLOGY | Facility: CLINIC | Age: 43
End: 2023-12-05
Payer: COMMERCIAL

## 2023-12-05 NOTE — TELEPHONE ENCOUNTER
Left message for patient regarding upcoming HBT. Sent instructions through [employer] as well on 11/7/2023.    Priya German LPN

## 2023-12-06 ENCOUNTER — MYC MEDICAL ADVICE (OUTPATIENT)
Dept: FAMILY MEDICINE | Facility: CLINIC | Age: 43
End: 2023-12-06
Payer: COMMERCIAL

## 2023-12-06 DIAGNOSIS — F40.10 SOCIAL ANXIETY DISORDER: Primary | ICD-10-CM

## 2023-12-08 NOTE — TELEPHONE ENCOUNTER
CW,  Please see below [email or web address] message and advise.  Pended mental health referral if approved.  Thanks,  Kelsy AMARAL RN

## 2023-12-12 ENCOUNTER — OFFICE VISIT (OUTPATIENT)
Dept: GASTROENTEROLOGY | Facility: CLINIC | Age: 43
End: 2023-12-12
Attending: INTERNAL MEDICINE
Payer: COMMERCIAL

## 2023-12-12 VITALS
BODY MASS INDEX: 27.47 KG/M2 | RESPIRATION RATE: 16 BRPM | HEART RATE: 81 BPM | WEIGHT: 175 LBS | TEMPERATURE: 97.8 F | SYSTOLIC BLOOD PRESSURE: 137 MMHG | DIASTOLIC BLOOD PRESSURE: 85 MMHG | HEIGHT: 67 IN | OXYGEN SATURATION: 97 %

## 2023-12-12 DIAGNOSIS — K59.09 OTHER CONSTIPATION: ICD-10-CM

## 2023-12-12 DIAGNOSIS — R19.7 DIARRHEA, UNSPECIFIED TYPE: ICD-10-CM

## 2023-12-12 DIAGNOSIS — R14.0 BLOATING: ICD-10-CM

## 2023-12-12 PROCEDURE — 91065 BREATH HYDROGEN/METHANE TEST: CPT | Performed by: INTERNAL MEDICINE

## 2023-12-12 ASSESSMENT — PAIN SCALES - GENERAL: PAINLEVEL: NO PAIN (0)

## 2023-12-12 NOTE — PROGRESS NOTES
"Non-Invasive GI Procedure Visit    Chloe Foster is a 43 year old female with history of    Other constipation  Diarrhea, unspecified type  Bloating.   Patient stated reason for procedure: Diarrhea and Bloating      COVID-19 PCR Results           No data to display              COVID-19 Antibody Results, Testing for Immunity           No data to display                Pre-Procedure Assessment  Patient presents to clinic today for Hydrogen Breath Test    Referring Provider: Dr Alex Gross    Previous HBT: No  Is patient a smoker: No    Does patient report having a colonoscopy, barium study, barium enema or taking antibiotics within the last 2 weeks? Yes / No: No.  Does patient report taking a stool softener, fiber supplement, laxative or anti-diarrheal in the last 3 - 4 days? Yes / No: No.  Does the patient report taking a probiotic in the last 1 - 2 days? Yes / No: No.  Does the patient report taking any medications today? No    Does the patient report following the pre-procedure bland diet? Yes  Does patient report being NPO for a minimum of 12 hours before the test? Yes.     Patient reported symptoms:Diarrhea, Constipation, and Bloating  How long has patient had these symptoms? 'years' increased symptoms in the year and half    .    Patient Hx  Patient's history, medications and allergies were reviewed.     Height: 5' 7\"   Weight: 175 lbs 0 oz    Patient Active Problem List    Diagnosis Date Noted    Overweight 09/22/2023     Priority: Medium     9/22/2023 [facility] Initial Wt 176lb Naltrexone/Wellbutrin Start/ dietician      Weight gain 09/22/2023     Priority: Medium    Deviated nasal septum 02/15/2019     Priority: Medium     S/p repair 3/15/21      Cerebrovascular accident (CVA) due to embolism of right middle cerebral artery (H) 08/12/2018     Priority: Medium    MTHFR mutation 11/30/2016     Priority: Medium     4/16 cardiology consultation notes--  \"2.  Homozygous MTHFR.  If folate levels fall, this " "predisposes her to hypercoagulable state from an elevated homocysteine.  She remains on supplemental therapy ordered by Neurology\"  6/17- hematology thought MTHFR mutation insignificant, and homocysteine levels low in 4/17.  12/19 appt with Royce Benoit discussing methyl folate supplementation in regards to MTHFR mutation      Social anxiety disorder 10/04/2016     Priority: Medium     PRN ativan - usually 0.5mg night prior to meeting w/ speaking (helps her get to sleep, and also seems to have residual calming the next day at meeting).  4/21 - will also do trial of hydroxyzine.    Anxiety sx's started in ~'05-06.  12/16- worsening generalized anxiety with work, social situations, propranolol not helpful.   Ativan 1/2 dose - takes night prior which helps her get better sleep prior to anxiety-provoking work meetings.  In past, used propranolol prior to meetings, interviews- finds the ativan more helpful.  Switched to xanax in '19 through psychiatry, finds it helpful like ativan, with less se's the next day.  3/20- switch back to ativan as xanax started having more se's the next day.  Using more for general (and COVID anxiety, financial stressors) as not working due to COVID issues now.      Patient is followed by [place] [facility] for ongoing prescription of benzodiazepines.  All refills should be approved by this provider, or covering partner.      Medication(s): ativan   Maximum quantity per month: #5/mo, #15/q3 months  Clinic visit frequency required: Q 3  Months (q6mo in-office, others can be phone visits)     Controlled substance agreement on file: Yes       Date(s): 12/22/17  Benzodiazepine use reviewed by psychiatry:  Yes    Last [facility] website verification:  done on 9/18/2019   [email or web address]          Pre-conception counseling 02/25/2016     Priority: Medium     See 2/17 [facility] consultation and 6/17 hematology consultation.  [facility]  JUDIE " recommendations:  1) Consult with Dr. Chow, hematology for anticoagulation planning, indications and dosing (prophylactic lovenox when pregnant, RTC at 30-32 weeks)  2) Obtain repeat protein S levels prior to pregnancy (normal)  3) Consider obtaining hemocysteine level (low)   4) If homocysteine level elevated, begin vitamin B6 25 mg TID and Vitamin B 12 100 mg daily. (NA)  5) Level 2 ultrasound at 18-20 weeks, growth scans every four weeks if on anticoagulation in pregnancy  6) Genetics consult recommended for AMA, patient will consider       See also 2/1/16 consultation from [facility].  Reviewed h/o CVA at 36 wks in setting of PFO and atrial septal aneurysm, with induction at ~38wks due to second CVA.  Reviewed hypercoagulation w/u- with no signs of thrombophilia.  Reviewed recent testing including positive homozygous MTHFR testing (from 1/16)- does not indicated high-risk thrombophilia.  Would leave decision to close PFO to cardiology and neurology.  Thought pregnancy is NOT contraindicated- thought risk of another CVA is low.  Recommended repeat ECHO to evaluate for atrial enlargment, and recommended lovenox as soon as she is confirmed to have an IUP (confirm not ectopic).      Elevated lipoprotein A level 02/25/2016     Priority: Medium     4/16 cardiology consultation notes--  3.  Elevated Lp(a) which increases her cardiovascular risks over lifetime.  Currently, her LDL is controlled at 108, HDL 71.  She does have some heart disease in her family as I outlined above.  In the future, consideration could be given to statin therapy, but certainly not until she completes her pregnancies.  She hopes to have another child in the future post-closure.          Migraine with aura and without status migrainosus, not intractable 11/20/2015     Priority: Medium     Dr. Post, [facility].    H/o CVA, stroke specialists rec avoiding triptans, sudafed.  Rec magnesium, acupuncture, neurology referral if worsening, cont  asa.    HA's transitioned to migraines with aura/neuro sx's at beginning of pregnancy in '14.  Worsening migraines in 3rd trimester, with CVA at ~36 wks (seen at [facility]).  Return of sx's at ~38wks, so induced at [facility], c/sx.  Used to see Dr. Larkin, [facility]        PFO with atrial septal aneurysm 2015     Priority: Medium     Post PFO/ASD closure 16 with 30mm Amplatzer device.  On asa 81mg/d.    Dx w/ 5/2/15 GODWIN bubble study ([facility]) s/p CVA during pregnancy, no thrombus.  Lovenox stopped 13 wks post-partum- switched to asa.            Protein S deficiency (H24) 2015     Priority: Medium     Resolved after pregnancy per Dr. Casarez- see 3/21 consultation.    Low protein S levels- limited to pregnancy (normal protein S levels s/p pregnancy.   Low protein S found s/p CVA during pregnancy.    , Hematology,  consultation- thought 'I believe her previous stroke can be attributed to a small venous clot with paradoxical embolus through the PFO.  This issue has been adequately addressed by closure of the PFO as outlined above.'. Also thought her MTHFR mutation was insignificant.  The Protein S is the harder issue.  It was low at the time of the CVA, but she has lower levels at baseline.  While there was not good data to help recommendations, after review/discussion, recommended starting enoxaparin (40mg SQ daily) at onset of new pregnancy, and f/u with hematology at 30-32 wks gestation to discuss management around likely .      History of cardioembolic cerebrovascular accident (CVA) 2015     Priority: Medium     5/15 CVA during pregnancy (possibly x 2, in setting of worsening migraines).    --CVA thought to be from PFO, now s/p closure.    --Had protein S deficiency during pregnancy, but normal levels s/p pregnancy- resolved per Dr. Casarez.  --Tested positive for MTHFR homozygous mutation, but homocysteine levels have been low/normal.  Insignificant per   Hermelindo.      --CVA x 2 during pregnancy in ~5/15 (1st ~36 wks gestation), in setting of worsening migraines, PFO, and low protein S.  Initially inpt x 7 days at [facility], sent home on heparin, return of CVA sx's 7 days later, induced and delivered (at 38wks), on lovenox BID x 13 wks PP, then asa.   --PFO closure completed on 4/21/16.  No complications.  30mm Amplatz Cribiform occluder device. Rec plavix 75 mg daily 6+ months, asa 81mg/d.  ----See 2/17 MFM pre-conception counseling visit and 6/17 hematology consultations for recommendations for future pregnancy.  Rec lovenox 40mg SQ starting when pregnant.  .      Moderate recurrent major depression (H) 02/26/2013     Priority: Medium     Off daily meds for most part for a few yrs.  Still takes prn ativan - ave ~1/wk.  Okay to to manage ativan here with q3mo visits (3/20 discussion)    Daily med h/o-  -She did not take the lexapro rx'd by psychiatry in early '20.  Prozac in ~10/19 gave bad se's.  -Zoloft 100mg/d- started in 2/17, helpful. Stopped wellbutrin XL 150mg in '13 prior to pregnancy (had been very helpful).   -wellbutrin w/ se's of insomnia (may be willing to try 75mg once daily)      IBS (irritable bowel syndrome) 05/29/2012     Priority: Medium     W/u through [facility], colonoscopy and endoscopy in 5/12.  Celiac labs negative.  Sx's better with very low dairy diet.      Lumbar herniated disc 04/04/2012     Priority: Medium     L4-L5, seeing work comp (injury 6/17/11).  Back pain and radiation to R buttock.  On restriction for work- 6hrs/day max, minimized lifting/carrying.  Two steroid injections at [facility].  Helping some.  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      Hirsutism 04/04/2012     Priority: Medium     Was seeing Leonora Lilly.  Had taken spironolactone & OCP which were helpful, taking them on/off, off for most of '11, '12.  Sx's worsened off med/s.  Meds stopped in anticipation  of trying to get pregnant.      Insomnia 04/04/2012     Priority: Medium     On/off issues, used 1/2 ativan tab ~1x/wk.   Used trazodone periodically.      CARDIOVASCULAR SCREENING; LDL GOAL LESS THAN 160 10/31/2010     Priority: Medium    Vitamin D deficiency 03/12/2009     Priority: Medium     Takes Vit D 2137-7914 units/day.         Prior to Admission medications    Medication Sig Start Date End Date Taking? Authorizing Provider   acetaminophen (TYLENOL) 325 MG tablet Take 325-650 mg by mouth every 6 hours as needed for mild pain    Reported, Patient   aspirin EC 81 MG EC tablet Take 81 mg by mouth every evening  9/21/15   Reported, Patient   aspirin-acetaminophen-caffeine (EXCEDRIN MIGRAINE) 250-250-65 MG tablet Take 1 tablet by mouth every 6 hours as needed for headaches    Reported, Patient   buPROPion (WELLBUTRIN) 75 MG tablet Take 0.5 tablets (37.5 mg) by mouth 2 times daily 9/22/23   Nichole Miller PA-C   LORazepam (ATIVAN) 1 MG tablet Take 0.5-1 tablets (0.5-1 mg) by mouth daily as needed for anxiety or sleep (severe anxiety/panic) 10/9/23   Shelby Ferro PA-C   magnesium oxide (MAG-OX) 400 MG tablet Take 400 mg by mouth every evening 5/20/15   Reported, Patient   Multiple Vitamins-Minerals (MULTIVITAMIN ADULT EXTRA C PO) Take by mouth every evening    Reported, Patient   naltrexone (DEPADE/REVIA) 50 MG tablet Take 1/2 tablet 1-2 hours before biggest craving time for 7 days, then increase to 1/2 tablet twice daily. 9/22/23   Nichole Miller PA-C   NONFORMULARY Take 1 tablet by mouth every evening OTC: B-Right Vitamin (Optimized B Complex)    Reported, Patient   Omega-3 Fatty Acids (FISH OIL PO) Take 1 capsule by mouth At Bedtime     Reported, Patient   polyethylene glycol (MIRALAX) 17 GM/Dose powder Take 1 capful (17g) of Miralax mixed with 8 oz of a clear liquid twice daily for 7 days prior to your scheduled procedure. 9/15/23   Alex Gross MD   propranolol (INDERAL) 10  MG tablet Take 1-2 tablets (10-20 mg) by mouth as needed (anxiety (take 30-60 minutes prior to meetings)) 10/9/23   Shelby Ferro PA-C   Rimegepant Sulfate (NURTEC) 75 MG TBDP Take 75 mg by mouth daily as needed (migraine) 1/11/22   Latia Post MD   STATIN NOT PRESCRIBED, INTENTIONAL, Statin not prescribed intentionally due to Woman of childbearing age not actively taking birth control  Patient not taking: Reported on 6/12/2023 12/21/16   Izabel Reyes MD   traZODone (DESYREL) 50 MG tablet Take 0.5-1 tablets (25-50 mg) by mouth At Bedtime 5/4/23   Shelby Ferro PA-C   VITAMIN D, CHOLECALCIFEROL, PO Take 1,000 Units by mouth every evening (takes 2 x 100 unit capsule)     Reported, Patient     Allergies   Allergen Reactions    Compazine [Prochlorperazine] Muscle Pain (Myalgia)     Past Medical History:   Diagnosis Date    Acute stress reaction     propranolol helps prior to interviews, stressful situations    Allergic rhinitis, cause unspecified     no meds except prn flonase, tests generally positive, decided against shots    Anxiety state, unspecified     citalopram started in 7/06, stopped after couple wks, felt sluggish/tired    CVA (cerebral vascular accident) (H)     May 1, May 14th 2015    Depressive disorder, not elsewhere classified     dx, but not sure this is correct, weaned off wellbutrin (4/05-6/06, 12/06-1/08), restarted 3/08    Dizziness     Elevated lipoprotein A level     Hirsutism     saw endo, inconclusive, started on spironolactone (helped a little), stopped in 6/06, elevated DHEA- sees new endo 4/08    History of stroke with residual effects     PFO (patent foramen ovale)     Post PFO/ASD closure with 30mm Amplatzer device 4/21/16    Protein S deficiency (H24)     Per Dr. Casarez, prengnacy related.  Protein S levels normalized afer pregnancy.    Urinary tract infection, site not specified     recurrent, start nitrofur in 5/08, 6mo txt     Past  Surgical History:   Procedure Laterality Date    ARTHROSCOPY KNEE RT/LT      Rt knee    COLONOSCOPY N/A 2023    Procedure: Colonoscopy;  Surgeon: Alex Gross MD;  Location:  GI    ESOPHAGOSCOPY, GASTROSCOPY, DUODENOSCOPY (EGD), COMBINED N/A 2023    Procedure: ESOPHAGOGASTRODUODENOSCOPY, WITH BIOPSY;  Surgeon: Alex Gross MD;  Location:  GI    GYN SURGERY      HC REPAIR OF NASAL SEPTUM  2005    REPAIR PATENT FORAMEN OVALE  2016    SEPTORHINOPLASTY N/A 03/15/2021    Procedure: Revision Septorhinoplasty, Repair of Nasal Valve Collapse, Cadaveric Rib Graft + 45 min Cosmetic Tip Rhinoplasty;  Surgeon: Nikki Schwartz MD;  [place] [facility] [place]     Family History   Problem Relation Age of Onset    Family History Negative Mother     Cancer Father         throat, possibly asbestos exposure    Hypertension Father     Allergies Father     Arrhythmia Father     Other Cancer Father     Family History Negative Sister     Family History Negative Brother     Diabetes Maternal Grandmother         on insulin    C.A.D. Maternal Grandmother         triple bypass in her 60s    C.A.D. Maternal Grandfather         MI in 50s    Diabetes Paternal Grandmother     Neurologic Disorder Paternal Grandfather          of brain aneurysm in early 60s    Family History Negative Son     Diabetes Maternal Aunt          in her early 40s of DM complications, type 2    Coronary Artery Disease Early Onset Maternal Aunt 41    Obesity Maternal Aunt      Social History     Tobacco Use    Smoking status: Former     Packs/day: 0.50     Years: 5.00     Additional pack years: 0.00     Total pack years: 2.50     Types: Cigarettes     Quit date: 2014     Years since quittin.6    Smokeless tobacco: Never   Substance Use Topics    Alcohol use: Yes     Comment: SOCIAL        Pre-Procedure Education & Consent  Procedure education was provided to: Patient  Teaching method: Explanation  Barriers to learning: No  Barrier    Patient indicated understanding of pre-procedure instruction and appropriate consent was obtained and documented.    ____________________________________________________________________    Post-Procedure Documentation: Hydrogen Breath Test     Baseline breath obtained prior to patient drinking Lactulose.     Patient tolerated test with no complaints.    HBT Results    Sample Clock Times Ppm H2 Ppm CH4 CO2% Comments   Baseline 0843 0 16 4.0     Challenge Dose Given 0845 - - - -   #1 0900 0 16 3.5     #2 0915 7 14 3.2     #3 0930 12 18 3.3     #4 0945 42 31 3.4     #5 1000 52 34 3.4     #6 1015 55 38 3.9     #7 1030 60 36 3.5     #8 1045 60 33 3.3     #9 1100 61 37 3.4       #  B   Patient given discharge instructions.    Notification of pending test results sent to provider for interpretation. Please reference scanned document for final interpretation of results. Patient will follow up with referring provider for test results.    Ivory Olguin RN on 12/12/2023 at 8:36 AM

## 2023-12-12 NOTE — LETTER
"    12/12/2023         RE: Chloe Foster  [address] [facility] [address]        Dear Colleague,    Thank you for referring your patient, Chloe Foster, to the [facility] [place]. Please see a copy of my visit note below.    Non-Invasive GI Procedure Visit    Chloe Foster is a 43 year old female with history of    Other constipation  Diarrhea, unspecified type  Bloating.   Patient stated reason for procedure: Diarrhea and Bloating      COVID-19 PCR Results           No data to display              COVID-19 Antibody Results, Testing for Immunity           No data to display                Pre-Procedure Assessment  Patient presents to clinic today for Hydrogen Breath Test    Referring Provider: Dr Alex Gross    Previous HBT: No  Is patient a smoker: No    Does patient report having a colonoscopy, barium study, barium enema or taking antibiotics within the last 2 weeks? Yes / No: No.  Does patient report taking a stool softener, fiber supplement, laxative or anti-diarrheal in the last 3 - 4 days? Yes / No: No.  Does the patient report taking a probiotic in the last 1 - 2 days? Yes / No: No.  Does the patient report taking any medications today? No    Does the patient report following the pre-procedure bland diet? Yes  Does patient report being NPO for a minimum of 12 hours before the test? Yes.     Patient reported symptoms:Diarrhea, Constipation, and Bloating  How long has patient had these symptoms? 'years' increased symptoms in the year and half    .    Patient Hx  Patient's history, medications and allergies were reviewed.     Height: 5' 7\"   Weight: 175 lbs 0 oz    Patient Active Problem List    Diagnosis Date Noted    Overweight 09/22/2023     Priority: Medium     9/22/2023 [facility] Initial Wt 176lb Naltrexone/Wellbutrin Start/ dietician      Weight gain 09/22/2023     Priority: Medium    Deviated nasal septum 02/15/2019     Priority: Medium     S/p " "repair 3/15/21      Cerebrovascular accident (CVA) due to embolism of right middle cerebral artery (H) 08/12/2018     Priority: Medium    MTHFR mutation 11/30/2016     Priority: Medium     4/16 cardiology consultation notes--  \"2.  Homozygous MTHFR.  If folate levels fall, this predisposes her to hypercoagulable state from an elevated homocysteine.  She remains on supplemental therapy ordered by Neurology\"  6/17- hematology thought MTHFR mutation insignificant, and homocysteine levels low in 4/17.  12/19 appt with Royce Benoit discussing methyl folate supplementation in regards to MTHFR mutation      Social anxiety disorder 10/04/2016     Priority: Medium     PRN ativan - usually 0.5mg night prior to meeting w/ speaking (helps her get to sleep, and also seems to have residual calming the next day at meeting).  4/21 - will also do trial of hydroxyzine.    Anxiety sx's started in ~'05-06.  12/16- worsening generalized anxiety with work, social situations, propranolol not helpful.   Ativan 1/2 dose - takes night prior which helps her get better sleep prior to anxiety-provoking work meetings.  In past, used propranolol prior to meetings, interviews- finds the ativan more helpful.  Switched to xanax in '19 through psychiatry, finds it helpful like ativan, with less se's the next day.  3/20- switch back to ativan as xanax started having more se's the next day.  Using more for general (and COVID anxiety, financial stressors) as not working due to COVID issues now.      Patient is followed by [place] [facility] for ongoing prescription of benzodiazepines.  All refills should be approved by this provider, or covering partner.      Medication(s): ativan   Maximum quantity per month: #5/mo, #15/q3 months  Clinic visit frequency required: Q 3  Months (q6mo in-office, others can be phone visits)     Controlled substance agreement on file: Yes       Date(s): 12/22/17  Benzodiazepine use reviewed by psychiatry:  Yes    Last " [facility] website verification:  done on 9/18/2019   [email or web address]          Pre-conception counseling 02/25/2016     Priority: Medium     [facility].  [facility] Recs (with OB f/u notes in italics)  [facility] recommendations:  1) Consult with Dr. Chow, hematology for anticoagulation planning, indications and dosing (prophylactic lovenox when pregnant, RTC at 30-32 weeks)  2) Obtain repeat protein S levels prior to pregnancy (normal)  3) Consider obtaining hemocysteine level (low)   4) If homocysteine level elevated, begin vitamin B6 25 mg TID and Vitamin B 12 100 mg daily. (NA)  5) Level 2 ultrasound at 18-20 weeks, growth scans every four weeks if on anticoagulation in pregnancy  6) Genetics consult recommended for AMA, patient will consider       See also 2/1/16 consultation from [facility] NULL.  Reviewed h/o CVA at 36 wks in setting of PFO and atrial septal aneurysm, with induction at ~38wks due to second CVA.  Reviewed hypercoagulation w/u- with no signs of thrombophilia.  Reviewed recent testing including positive homozygous MTHFR testing (from 1/16)- does not indicated high-risk thrombophilia.  Would leave decision to close PFO to cardiology and neurology.  Thought pregnancy is NOT contraindicated- thought risk of another CVA is low.  Recommended repeat ECHO to evaluate for atrial enlargment, and recommended lovenox as soon as she is confirmed to have an IUP (confirm not ectopic).      Elevated lipoprotein A level 02/25/2016     Priority: Medium     4/16 cardiology consultation notes--  3.  Elevated Lp(a) which increases her cardiovascular risks over lifetime.  Currently, her LDL is controlled at 108, HDL 71.  She does have some heart disease in her family as I outlined above.  In the future, consideration could be given to statin therapy, but certainly not until she completes her pregnancies.  She hopes to have another child in the future  post-closure.          Migraine with aura and without status migrainosus, not intractable 2015     Priority: Medium     Dr. Post, N.    H/o CVA, stroke specialists rec avoiding triptans, sudafed.  Rec magnesium, acupuncture, neurology referral if worsening, cont asa.    HA's transitioned to migraines with aura/neuro sx's at beginning of pregnancy in '14.  Worsening migraines in 3rd trimester, with CVA at ~36 wks (seen at [facility]).  Return of sx's at ~38wks, so induced at [facility], c/sx.  Used to see Dr. Larkin, [facility]        PFO with atrial septal aneurysm 2015     Priority: Medium     Post PFO/ASD closure 16 with 30mm Amplatzer device.  On asa 81mg/d.    Dx w/ 5/2/15 GODWIN bubble study ([facility]) s/p CVA during pregnancy, no thrombus.  Lovenox stopped 13 wks post-partum- switched to asa.            Protein S deficiency (H24) 2015     Priority: Medium     Resolved after pregnancy per Dr. Casarez- see 3/21 consultation.    Low protein S levels- limited to pregnancy (normal protein S levels s/p pregnancy.   Low protein S found s/p CVA during pregnancy.    , Hematology,  consultation- thought 'I believe her previous stroke can be attributed to a small venous clot with paradoxical embolus through the PFO.  This issue has been adequately addressed by closure of the PFO as outlined above.'. Also thought her MTHFR mutation was insignificant.  The Protein S is the harder issue.  It was low at the time of the CVA, but she has lower levels at baseline.  While there was not good data to help recommendations, after review/discussion, recommended starting enoxaparin (40mg SQ daily) at onset of new pregnancy, and f/u with hematology at 30-32 wks gestation to discuss management around likely .      History of cardioembolic cerebrovascular accident (CVA) 2015     Priority: Medium     5/15 CVA during pregnancy (possibly x 2, in setting of worsening migraines).     --CVA thought to be from PFO, now s/p closure.    --Had protein S deficiency during pregnancy, but normal levels s/p pregnancy- resolved per Dr. Casarez.  --Tested positive for MTHFR homozygous mutation, but homocysteine levels have been low/normal.  Insignificant per Dr. Casarez.      --CVA x 2 during pregnancy in ~5/15 (1st ~36 wks gestation), in setting of worsening migraines, PFO, and low protein S.  Initially inpt x 7 days at [facility], sent home on heparin, return of CVA sx's 7 days later, induced and delivered (at 38wks), on lovenox BID x 13 wks PP, then asa.   --PFO closure completed on 4/21/16.  No complications.  30mm Amplatz Cribiform occluder device. Rec plavix 75 mg daily 6+ months, asa 81mg/d.  ----See 2/17 MFM pre-conception counseling visit and 6/17 hematology consultations for recommendations for future pregnancy.  Rec lovenox 40mg SQ starting when pregnant.  .      Moderate recurrent major depression (H) 02/26/2013     Priority: Medium     Off daily meds for most part for a few yrs.  Still takes prn ativan - ave ~1/wk.  Okay to to manage ativan here with q3mo visits (3/20 discussion)    Daily med h/o-  -She did not take the lexapro rx'd by psychiatry in early '20.  Prozac in ~10/19 gave bad se's.  -Zoloft 100mg/d- started in 2/17, helpful. Stopped wellbutrin XL 150mg in '13 prior to pregnancy (had been very helpful).   -wellbutrin w/ se's of insomnia (may be willing to try 75mg once daily)      IBS (irritable bowel syndrome) 05/29/2012     Priority: Medium     W/u through [facility], colonoscopy and endoscopy in 5/12.  Celiac labs negative.  Sx's better with very low dairy diet.      Lumbar herniated disc 04/04/2012     Priority: Medium     L4-L5, seeing work comp (injury 6/17/11).  Back pain and radiation to R buttock.  On restriction for work- 6hrs/day max, minimized lifting/carrying.  Two steroid injections at [facility].  Helping some.  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      Hirsutism 04/04/2012     Priority: Medium     Was seeing Leonora Lilly.  Had taken spironolactone & OCP which were helpful, taking them on/off, off for most of '11, '12.  Sx's worsened off med/s.  Meds stopped in anticipation of trying to get pregnant.      Insomnia 04/04/2012     Priority: Medium     On/off issues, used 1/2 ativan tab ~1x/wk.   Used trazodone periodically.      CARDIOVASCULAR SCREENING; LDL GOAL LESS THAN 160 10/31/2010     Priority: Medium    Vitamin D deficiency 03/12/2009     Priority: Medium     Takes Vit D 8551-2732 units/day.         Prior to Admission medications    Medication Sig Start Date End Date Taking? Authorizing Provider   acetaminophen (TYLENOL) 325 MG tablet Take 325-650 mg by mouth every 6 hours as needed for mild pain    Reported, Patient   aspirin EC 81 MG EC tablet Take 81 mg by mouth every evening  9/21/15   Reported, Patient   aspirin-acetaminophen-caffeine (EXCEDRIN MIGRAINE) 250-250-65 MG tablet Take 1 tablet by mouth every 6 hours as needed for headaches    Reported, Patient   buPROPion (WELLBUTRIN) 75 MG tablet Take 0.5 tablets (37.5 mg) by mouth 2 times daily 9/22/23   Nichole Miller PA-C   LORazepam (ATIVAN) 1 MG tablet Take 0.5-1 tablets (0.5-1 mg) by mouth daily as needed for anxiety or sleep (severe anxiety/panic) 10/9/23   Shelby Ferro PA-C   magnesium oxide (MAG-OX) 400 MG tablet Take 400 mg by mouth every evening 5/20/15   Reported, Patient   Multiple Vitamins-Minerals (MULTIVITAMIN ADULT EXTRA C PO) Take by mouth every evening    Reported, Patient   naltrexone (DEPADE/REVIA) 50 MG tablet Take 1/2 tablet 1-2 hours before biggest craving time for 7 days, then increase to 1/2 tablet twice daily. 9/22/23   Nichole Miller PA-C   NONFORMULARY Take 1 tablet by mouth every evening OTC: B-Right Vitamin (Optimized B Complex)    Reported, Patient   Omega-3 Fatty Acids (FISH OIL PO) Take 1  capsule by mouth At Bedtime     Reported, Patient   polyethylene glycol (MIRALAX) 17 GM/Dose powder Take 1 capful (17g) of Miralax mixed with 8 oz of a clear liquid twice daily for 7 days prior to your scheduled procedure. 9/15/23   Alex Gross MD   propranolol (INDERAL) 10 MG tablet Take 1-2 tablets (10-20 mg) by mouth as needed (anxiety (take 30-60 minutes prior to meetings)) 10/9/23   Shelby Ferro PA-C   Rimegepant Sulfate (NURTEC) 75 MG TBDP Take 75 mg by mouth daily as needed (migraine) 1/11/22   Latia Post MD   STATIN NOT PRESCRIBED, INTENTIONAL, Statin not prescribed intentionally due to Woman of childbearing age not actively taking birth control  Patient not taking: Reported on 6/12/2023 12/21/16   Izabel Reyes MD   traZODone (DESYREL) 50 MG tablet Take 0.5-1 tablets (25-50 mg) by mouth At Bedtime 5/4/23   Shelby Ferro PA-C   VITAMIN D, CHOLECALCIFEROL, PO Take 1,000 Units by mouth every evening (takes 2 x 100 unit capsule)     Reported, Patient     Allergies   Allergen Reactions    Compazine [Prochlorperazine] Muscle Pain (Myalgia)     Past Medical History:   Diagnosis Date    Acute stress reaction     propranolol helps prior to interviews, stressful situations    Allergic rhinitis, cause unspecified     no meds except prn flonase, tests generally positive, decided against shots    Anxiety state, unspecified     citalopram started in 7/06, stopped after couple wks, felt sluggish/tired    CVA (cerebral vascular accident) (H)     May 1, May 14th 2015    Depressive disorder, not elsewhere classified     dx, but not sure this is correct, weaned off wellbutrin (4/05-6/06, 12/06-1/08), restarted 3/08    Dizziness     Elevated lipoprotein A level     Hirsutism     saw endo, inconclusive, started on spironolactone (helped a little), stopped in 6/06, elevated DHEA- sees new endo 4/08    History of stroke with residual effects     PFO (patent foramen ovale)      Post PFO/ASD closure with 30mm Amplatzer device 16    Protein S deficiency (H24)     Per Dr. Casarez, prengnacy related.  Protein S levels normalized afer pregnancy.    Urinary tract infection, site not specified     recurrent, start nitrofur in , 6mo txt     Past Surgical History:   Procedure Laterality Date    ARTHROSCOPY KNEE RT/LT      Rt knee    COLONOSCOPY N/A 2023    Procedure: Colonoscopy;  Surgeon: Alex Gross MD;  Location:  GI    ESOPHAGOSCOPY, GASTROSCOPY, DUODENOSCOPY (EGD), COMBINED N/A 2023    Procedure: ESOPHAGOGASTRODUODENOSCOPY, WITH BIOPSY;  Surgeon: Alex Gross MD;  Location:  GI    GYN SURGERY      HC REPAIR OF NASAL SEPTUM  2005    REPAIR PATENT FORAMEN OVALE  2016    SEPTORHINOPLASTY N/A 03/15/2021    Procedure: Revision Septorhinoplasty, Repair of Nasal Valve Collapse, Cadaveric Rib Graft + 45 min Cosmetic Tip Rhinoplasty;  Surgeon: Nikki Schwartz MD;  Location: [facility]     Family History   Problem Relation Age of Onset    Family History Negative Mother     Cancer Father         throat, possibly asbestos exposure    Hypertension Father     Allergies Father     Arrhythmia Father     Other Cancer Father     Family History Negative Sister     Family History Negative Brother     Diabetes Maternal Grandmother         on insulin    C.A.D. Maternal Grandmother         triple bypass in her 60s    C.A.D. Maternal Grandfather         MI in 50s    Diabetes Paternal Grandmother     Neurologic Disorder Paternal Grandfather          of brain aneurysm in early 60s    Family History Negative Son     Diabetes Maternal Aunt          in her early 40s of DM complications, type 2    Coronary Artery Disease Early Onset Maternal Aunt 41    Obesity Maternal Aunt      Social History     Tobacco Use    Smoking status: Former     Packs/day: 0.50     Years: 5.00     Additional pack years: 0.00     Total pack years: 2.50     Types: Cigarettes     Quit date: 2014      Years since quittin.6    Smokeless tobacco: Never   Substance Use Topics    Alcohol use: Yes     Comment: SOCIAL        Pre-Procedure Education & Consent  Procedure education was provided to: Patient  Teaching method: Explanation  Barriers to learning: No Barrier    Patient indicated understanding of pre-procedure instruction and appropriate consent was obtained and documented.    ____________________________________________________________________    Post-Procedure Documentation: Hydrogen Breath Test     Baseline breath obtained prior to patient drinking Lactulose.     Patient tolerated test with no complaints.    HBT Results    Sample Clock Times Ppm H2 Ppm CH4 CO2% Comments   Baseline 0843 0 16 4.0     Challenge Dose Given 0845 - - - -   #1 0900 0 16 3.5     #2 0915 7 14 3.2     #3 0930 12 18 3.3     #4 0945 42 31 3.4     #5 1000 52 34 3.4     #6 1015 55 38 3.9     #7 1030 60 36 3.5     #8 1045 60 33 3.3     #9 1100 61 37 3.4       #  B   Patient given discharge instructions.    Notification of pending test results sent to provider for interpretation. Please reference scanned document for final interpretation of results. Patient will follow up with referring provider for test results.    Ivory Olguin RN on 2023 at 8:36 AM           Again, thank you for allowing me to participate in the care of your patient.      Sincerely,    Non-Invasive GI Nurse

## 2023-12-12 NOTE — PATIENT INSTRUCTIONS
Hydrogen Breath Test  1. You may experience diarrhea for the next 12-24 hours.  2. Resume a regular diet.  3. Follow-up with your referring doctor in clinic.  4. If you have questions call [phone] from 7:00am-5:00pm.

## 2023-12-13 ENCOUNTER — IMMUNIZATION (OUTPATIENT)
Dept: NURSING | Facility: CLINIC | Age: 43
End: 2023-12-13
Payer: COMMERCIAL

## 2023-12-13 PROCEDURE — 90686 IIV4 VACC NO PRSV 0.5 ML IM: CPT

## 2023-12-13 PROCEDURE — 90471 IMMUNIZATION ADMIN: CPT

## 2023-12-20 NOTE — NURSING NOTE
----- Message from Jasson Monroy MD sent at 12/19/2023 12:00 AM CST -----  Needs follow-up visit for elevated A1c, rest of labs are all normal, thanks   Pt presents to [facility] for preconception consult due to hx of stroke in her last pregnancy. Pt states she would like to think about pregnancy but would like to know about risks. Pt here with  and child. States she is not pregnant at this time. Pt met with Dr. Smith and Dr. Julien. See [facility] for today's consult discussion and consult recommendations. Pt would like a referral to see Hematology. Given referral and number for Dr. Ricco Casarez. Pt would also like to have a consult with [facility] again prior to pregnancy. Had declined [facility] prior to this appointment. Order placed and pt will call when convenient for them. Questions answered at this time. Frank ornelas. Rekha Florez RN

## 2023-12-28 DIAGNOSIS — K63.829 INTESTINAL METHANOGEN OVERGROWTH: ICD-10-CM

## 2023-12-28 DIAGNOSIS — K63.8219 SMALL INTESTINAL BACTERIAL OVERGROWTH (SIBO): Primary | ICD-10-CM

## 2023-12-28 RX ORDER — NEOMYCIN SULFATE 500 MG/1
500 TABLET ORAL 2 TIMES DAILY
Qty: 28 TABLET | Refills: 0 | Status: SHIPPED | OUTPATIENT
Start: 2023-12-28 | End: 2024-01-11

## 2024-01-04 NOTE — PROGRESS NOTES
"Chloe is a 43 year old who is being evaluated via a billable video visit.      The patient has been notified of following:     \"This video visit will be conducted via a call between you and your physician/provider. We have found that certain health care needs can be provided without the need for an in-person physical exam.  This service lets us provide the care you need with a video conversation.  If a prescription is necessary we can send it directly to your pharmacy.  If lab work is needed we can place an order for that and you can then stop by our lab to have the test done at a later time.    Video visits are billed at different rates depending on your insurance coverage.  Please reach out to your insurance provider with any questions.    If during the course of the call the physician/provider feels a video visit is not appropriate, you will not be charged for this service.\"    Patient has given verbal consent for Video visit? Yes    How would you like to obtain your AVS? MyChart    If the video visit is dropped, the invitation should be resent by: MY CHART    Will anyone else be joining your video visit? No    I    Video-Visit Details    Type of service:  Video Visit    Video Start Time: 9:09 AM    Video End Time:9:30 am    Originating Location (pt. Location): Home    Distant Location (provider location):   Off-Site - Provider Home Office    Platform used for Video Visit: StratusLIVE    2024      Return Medical Weight Management Note     Chloe Foster  MRN:  [id number]  :  1980    Assessment & Plan   Problem List Items Addressed This Visit       History of cardioembolic cerebrovascular accident (CVA)    MTHFR mutation    Overweight - Primary    Relevant Medications    liraglutide - Weight Management (SAXENDA) 18 MG/3ML pen    insulin pen needle (31G X 5 MM) 31G X 5 MM miscellaneous        AOM Considerations:  Phentermine:  Avoid, Wellbutrin caused anxiety and tachycardia at higher " doses  Topiramate:  Avoid since taste changes can occur and pt is  and tastes wine for living  GLP-1:  Ozempic covered if DM, Shortage, Pt has sig constipation,     Naltrexone:  Candidate, no cravings  Wellbutrin:  Tried but became anxious and pulse increased  Metformin:  N DM, Pre DM  Contrave: No AOM coverage,  Qsymia: No AOM coverage,     PATIENT INSTRUCTIONS:  I ordered Saxenda.  Will see if covered.  If so, and constipation/diarrhea controlled  May use famotidine 20 mg Twice daily as needed for nausea/heartburn when starting the medication.     If not covered, can see if Wegovy is covered.    Otherwise plan will be to restart Wellbutrin at a low dose 37.5 mg (1/2 tablet fin the am or several weeks until tolerating, and then increasing to 75 mg in the morning until I see you again)    Can also start Naltrexone once off your Abx.  1/4 tablet in the morning. If tolerating, can then increase to a 1/2 tablet if desired.     FOLLOW-UP:    Please call [phone] to schedule your next visit in 3 months    35 minutes spent on the date of the encounter doing chart review, history and exam, result review, counseling, developing plan of care, documentation, and further activities as noted      INTERVAL HISTORY:  Chloe returns for medical weight management follow up.  Last seen on 9/21/23 for MWL initial.      Previous Plan:  Start Naltrexone. - did not try  Start Wellbutrin  - tried it for a few days but it increase her anxiety so she stopped. She was also very busy at the time so did not feel it was the right time to start.  Would be interested in another trial at a lower dose with a slow titration upwards.  In the past this medication worked well for weight loss and she remembers that side effects lessened with time.    Is interested in looking at GLP-1 agonists.  Unsure if her insurance would cover.    Goals:  Add in 5-10 min outdoor walk-has increased her cardiovascular exercise.    WEIGHT  METRICS:  Body mass index is 28.89 kg/m .   Current Weight: 179 lb (81.2 kg)  Last Visits Weight: 176 lb (79.8 kg)  Initial Weight (lbs): 176 lbs  Cumulative weight loss (lbs): -3  Weight Loss Percentage: -1.7%    Wt Readings from Last 10 Encounters:   01/09/24 179 lb (81.2 kg)   12/12/23 175 lb (79.4 kg)   11/02/23 180 lb 4.8 oz (81.8 kg)   09/21/23 176 lb (79.8 kg)   06/12/23 180 lb (81.6 kg)   05/30/23 180 lb (81.6 kg)   12/02/22 172 lb 1.6 oz (78.1 kg)   01/10/22 165 lb (74.8 kg)   12/26/21 165 lb (74.8 kg)   10/20/21 167 lb 9.6 oz (76 kg)                 1/9/2024     8:35 AM   Weight Loss Medication History Reviewed With Patient   Which weight loss medications are you currently taking on a regular basis? None   Are you having any side effects from the weight loss medication that we have prescribed you? Yes   If you are having side effects please describe: Tried wellbutrin for a few days and it made me anxious. I stopped because I had a lot of work commitments. I will try again this month, but I would like to see if I can get approved for Wegovy in the meantime.   Pt has positive for CIBO,  which GI suspects is contributing to the constipation and diarrhea and is starting FODMAP diet.          1/9/2024     8:35 AM   Changes and Difficulties   I have made the following changes to my diet since my last visit: Tried to cut back on carbs, dairy   With regards to my diet, I am still struggling with: No weight loss   I have made the following changes to my activity/exercise since my last visit: Increase exercise   With regards to my activity/exercise, I am still struggling with: I need to add more strength training       LABS:  Reviewed in Epic    BP Readings from Last 6 Encounters:   12/12/23 137/85   11/02/23 132/88   09/25/23 114/74   05/30/23 127/87   12/02/22 123/83   01/10/22 110/81       Pulse Readings from Last 6 Encounters:   12/12/23 81   11/02/23 84   09/25/23 75   05/30/23 87   12/02/22 92   01/10/22 91  "      PE:  Ht 5' 6\" (1.676 m)   Wt 179 lb (81.2 kg)   BMI 28.89 kg/m    GENERAL: Healthy, alert and no distress  RESP: No audible wheeze, cough, or visible cyanosis.  No visible retractions or increased work of breathing.    SKIN: Visible skin clear. No significant rash, abnormal pigmentation or lesions.  PSYCH: Mentation appears normal, affect normal/bright, judgement and insight intact        "

## 2024-01-09 ENCOUNTER — VIRTUAL VISIT (OUTPATIENT)
Dept: SURGERY | Facility: CLINIC | Age: 44
End: 2024-01-09
Payer: COMMERCIAL

## 2024-01-09 VITALS — WEIGHT: 179 LBS | BODY MASS INDEX: 28.77 KG/M2 | HEIGHT: 66 IN

## 2024-01-09 DIAGNOSIS — Z15.89 MTHFR MUTATION: ICD-10-CM

## 2024-01-09 DIAGNOSIS — Z86.73 HISTORY OF CARDIOEMBOLIC CEREBROVASCULAR ACCIDENT (CVA): ICD-10-CM

## 2024-01-09 DIAGNOSIS — E66.3 OVERWEIGHT: Primary | ICD-10-CM

## 2024-01-09 PROCEDURE — 99214 OFFICE O/P EST MOD 30 MIN: CPT | Mod: 95 | Performed by: PHYSICIAN ASSISTANT

## 2024-01-09 NOTE — PATIENT INSTRUCTIONS
"To ensure quality you may receive a patient satisfaction survey. The greatest compliment you can give is \"Likely to Recommend.\"    Nice to talk with you today.  Thank you for your trust. Below is the plan discussed.-  ELOISE Varela      Plan:  I ordered Saxenda.  Will see if covered.  If so, and constipation/diarrhea controlled  Start Saxenda  Week 1- Inject 0.6 mg daily  Week 2- Inject 1.2 mg daily  Week 3- Inject 1.8 mg daily    Week 4- Inject 2.4 mg daily  Week 5- Inject 3.0 mg daily      May use famotidine 20 mg Twice daily as needed for nausea/heartburn when starting the medication.     If not covered, can see if Wegovy is covered.    Otherwise plan will be to restart Wellbutrin at a low dose 37.5 mg (1/2 tablet fin the am or several weeks until tolerating, and then increasing to 75 mg in the morning until I see you again)    Can also start Naltrexone once off your Abx.  1/4 tablet in the morning. If tolerating, can then increase to a 1/2 tablet if desired.     FOLLOW-UP:    Please call [phone] to schedule your next visit in 3 months        "

## 2024-01-09 NOTE — ASSESSMENT & PLAN NOTE
We discussed healthy habits to assist with weight loss. We discussed medication that may assist with weight loss.  Saxenda was ordered today day.  If covered patient would start this once constipation was under control.  If not we would send in Wegovy to see if insurance covers this.  If not then she will restart bupropion 75 mg 1/2 tablet daily and once tolerating could go up to 1/2 tablet twice daily.  Risks/ benefits and possible side effects were discussed and questions were answered. Written information was given.

## 2024-01-23 ENCOUNTER — TELEPHONE (OUTPATIENT)
Dept: SURGERY | Facility: CLINIC | Age: 44
End: 2024-01-23
Payer: COMMERCIAL

## 2024-01-23 VITALS — WEIGHT: 188 LBS | HEIGHT: 66 IN | BODY MASS INDEX: 30.22 KG/M2

## 2024-01-23 NOTE — TELEPHONE ENCOUNTER
Received call from patient re: Plan with medications moving forward.    Patient is confused on which medications were ordered/which medications are going to be used moving forward.     Last seen by KARLI 1/9/24 and Saxenda was ordered and tentative plan was to use Saxenda as a bridge to Wegovy maintenance dose.  Wegovy was not ordered since [facility] wanted to see if Saxenda would be covered.  Saxenda was approved by her insurance, but is on backorder.  In lengthy [employer] message thread yesterday KARLI said:    I wanted to see if the Saxenda was covered first before I tried the Wegovy.     Since both are having such supply issues I am not going to order the Wegovy.  Plus where her weight is, we need to save the supply for patients whose weight are in the obese category.     I can offer Zepbound.  I ordered this to her [facility] pharmacy.  She can see if this is approved from her insurance.        A PA was initiated 1/22.    Patient reports weight this AM is 188#.    Patient would like to touch base with a provider to discuss the new drug Zepbound that was ordered, as this medication was added on.    Offered pt to see Belen NAVAS tomorrow to reiterate this plan and provide education on why Zepbound was prescribed.    Pt scheduled to see Belen NAVAS tomorrow at 8:30am.    Will notify provider.    Genevieve PATTERSON RN

## 2024-01-23 NOTE — TELEPHONE ENCOUNTER
Prior Authorization Retail Medication Request    Medication/Dose: PA:  tirzepatide-Weight Management (ZEPBOUND) 2.5 MG/0.5ML prefilled pen 2 mL 0 1/22/2024 - No  Sig - Route: Inject 0.5 mLs (2.5 mg) Subcutaneous every 7 days - Subcutaneous  tirzepatide-Weight Management (ZEPBOUND) 5 MG/0.5ML prefilled pen 2 mL 1 1/22/2024 - No  Sig - Route: Inject 0.5 mLs (5 mg) Subcutaneous every 7 days     Diagnosis and ICD code (if different than what is on RX): [E66.09]   New/renewal/insurance change PA/secondary ins. PA: NEW  Previously Tried and Failed: Is approved for Saxenda - but this is on a national shortage. Pt unable to find in stock.  Rationale: Weight management    Insurance   Primary:     [facility]     Insurance ID:  [id number]     Secondary (if applicable):    [facility]     Insurance ID:  [id number]     Pharmacy Information (if different than what is on RX)  Name:    [id number] [place] [address] AT [facility] [address]     Phone: [phone]   Fax: [phone]

## 2024-01-24 ENCOUNTER — VIRTUAL VISIT (OUTPATIENT)
Dept: SURGERY | Facility: CLINIC | Age: 44
End: 2024-01-24
Payer: COMMERCIAL

## 2024-01-24 VITALS — BODY MASS INDEX: 30.22 KG/M2 | WEIGHT: 188 LBS | HEIGHT: 66 IN

## 2024-01-24 DIAGNOSIS — E66.09 CLASS 1 OBESITY DUE TO EXCESS CALORIES WITH SERIOUS COMORBIDITY AND BODY MASS INDEX (BMI) OF 30.0 TO 30.9 IN ADULT: Primary | ICD-10-CM

## 2024-01-24 DIAGNOSIS — E66.811 CLASS 1 OBESITY DUE TO EXCESS CALORIES WITH SERIOUS COMORBIDITY AND BODY MASS INDEX (BMI) OF 30.0 TO 30.9 IN ADULT: Primary | ICD-10-CM

## 2024-01-24 DIAGNOSIS — E78.00 ELEVATED CHOLESTEROL: ICD-10-CM

## 2024-01-24 PROCEDURE — 99215 OFFICE O/P EST HI 40 MIN: CPT | Mod: 95 | Performed by: PHYSICIAN ASSISTANT

## 2024-01-24 NOTE — PATIENT INSTRUCTIONS
"To ensure quality you may receive a patient satisfaction survey. The greatest compliment you can give is \"Likely to Recommend.\"    Nice to talk with you today.  Thank you for your trust. Below is the plan discussed.-  Belen Wong PA-C     Plan:  If Zepbound is denied: Set up a nurse only visit at the closest [facility] to get a formal weight/height to calculate BMI so we can include that in an appeal (if we have the option for an appeal). Have them forward or 'cc me the results.  Start Zepbound, if approved:  You'll start at 2.5 mg once weekly for 4 weeks. If you'd like to continue  If you're tolerating it well, increase to 5 mg once weekly thereafter until I see you again.    You can take over the counter famotidine (Pepcid) 20 mg once or twice daily as needed for nausea/heartburn.    If you get your medication pen and have questions about doing the injection - call or Mychart our office for the nurses to set up a virtual teaching session with you.    Here is the link for the  website to apply for a savings card:  Savings Card, Cost & Coverage Support  Zepbound (tirzepatide) ([email or web address])       3. If you start Saxenda instead:  Start Saxenda  Week 1- Inject 0.6 mg daily  Week 2- Inject 1.2 mg daily  Week 3- Inject 1.8 mg daily    Week 4- Inject 2.4 mg daily  Week 5- Inject 3.0 mg daily      Please know that these are guidelines. Listen to your body. You can stay at the lowest effective dose of the medication. If you're having side effects - please stay at that dose for 1 week of being side effect free before continuing the titration/increase of the dose. Once you are at 3.0 mg daily Saxenda and tolerating it well for at least 2 weeks, we can consider switching to maintenance 1.7 mg once weekly Wegovy - please send me a [employer] message if you'd like to switch.    May use famotidine 20 mg Twice daily as needed for nausea/heartburn when starting the medication.       FOLLOW-UP:    Keep your visit in " April so we can continue to monitor/recheck labs/etc.    Great meeting you/chatting this morning and let me know of other questions moving forward!    Zepbound (Tirzepatide)    Zepbound (Tirzepatide): is an injectable prescription medicine recently FDA approved for adults with obesity or overweight with an additional condition affected by their weight.      It is given as a shot once a week. It activates the body's receptors for GIP (glucose-dependent insulinotropic polypeptide) and GLP-1 (glucagon-like peptide-1) receptor, two naturally occurring hormones that help tell the brain that you are full. It also works is by slowing down how quickly food leaves your stomach.     You will start to feel nichols more quickly, notice portion changes and eat less often between meals.  Patients are advised to eat slowly and purposefully. Give yourself less portions. You may find after starting this medication you have a new point of fullness. Maintain consistent eating schedule with meals +/- snacks and get in good hydration.    Dosing:  Initial dose: 2.5 mg injected subcutaneously once weekly for 4 weeks  Further dose changes can include: increase to 5 mg once weekly for 4 weeks, then 7.5 mg once weekly for 4 weeks, then 10 mg once weekly for 4 weeks then 12.5 mg once weekly for 4 weeks, then 15 mg (maximum dose) once weekly.    Dose changes are based on how you are tolerating the current dose, what benefits you are seeing at the current dose and if improvement in effectiveness of the drug is needed. The goal is to find the dose that works best for you with the least amount of side effects. You may find you reach this at a lower dose than the maximum dose.     Side effects: Common side effects include nausea, Heartburn,diarrhea, constipation. This is worse when starting the medication and with dose dose escalation.  It does improve with time. Stay adequately hydrated and eat small portions to decrease the risk of side effects.      The risk of pancreatitis (inflammation of the pancreas) has been associated with this type of medication, but is very rare.  If you have had pancreatitis in the past, this medication may not be for you. If you experience persistent severe abdominal pain (sometimes radiating to the back potentially accompanied by vomiting and have a fever), stop the medication and contact your provider immediately for assessment. They will do a blood test to check for pancreatitis.       Caution:   Tirzepatide (Zepbound, Mounjaro) May decrease effectiveness of your oral birth control while starting and with dose adjustments.  Use backup forms of birth control if necessary.      For any questions or concerns please send a [employer] message to our team or call our weight management call center at [phone] during regular business hours.     There is a small chance you may have some low blood sugar after taking the medication.   The signs of low blood sugar are:  Weakness  Shaky   Hungry  Sweating  Confusion      See below for ways to treat low blood sugar without adding in lots of extra calories.      Treating Low Blood Sugar    If you have symptoms of low blood sugar (sweating, shaking, dizzy, confused) eat 15 grams of carbs and wait 15 minutes:    Glucose Tabs are best for sugars under 70 -  Dex4 or BD Glucose tablets are good, you will need to take 3-4 of these to equal 15 grams.     One small box of raisins  4 oz fruit juice box or   cup fruit juice  1 small apple  1 small banana    cup canned fruit in water    English muffin or a slice of bread with jelly   1 low fat frozen waffle with sugar-free syrup    cup cottage cheese with   cup frozen or fresh blueberries  1 cup skim or low-fat milk    cup whole grain cereal  4-6 crackers such as Triscuits      This medication is usually not covered by insurance and can be quite expensive. Sometimes a prior authorization is required, which may take up to 1-2 weeks for an insurance  company to make a decision if they will cover the medication. Please be patient, you will be notified after a decision has been made.    Contact the nurse via [email or web address] or call [phone] if you have any questions or concerns. (Do not stop taking it if you don't think it's working. For some people it works without them knowing it.)     In order to get refills of this or any medication we prescribe you must be seen in the medical weight mgmt clinic every 2-4 months.    Using Your Mounjaro/Zepbound Pen  Medicine (tirzepatide)    Storing your pens  Store your pens in the refrigerator until you are ready to use them. Don't let them freeze.  Your pen may be stored at room temperature for 21 days or fewer. Just make sure the temperature doesn't get higher than 86 or lower than 46 degrees Fahrenheit.   Protect the pens from light.  Never use any pens that have .    Check your pen before use:  The liquid in the pen window should be clear or light yellow. Bubbles are okay to see.   Do not use the pen if you can see the window contains any specks, is cloudy, or has changed color.  Make sure you have the medication and dose your health care provider prescribed.    Getting ready to inject:   1. Wash and dry your hands well.  2. Make sure the counter you use to place your supplies on is clean.  3. Make sure your injection time will not be interrupted by children or pets.  4. Have alcohol wipes or alcohol and cotton balls available to clean the injection site.   5. Choose your injection site. It can be on your stomach, back of upper arms, or upper legs. Remember to change your injection site each time you inject. Try to be at least 1 inch away from the previous one. Stay at least 1 inch away from your belly button.       Inject your dose:  1. Each pen is prefilled with one dose of medicine.    2. Pull off the grey cap at the bottom of the pen. Throw it in the trash. Do not put the cap back on the pen.   3. Make sure your  pen is in the locked position. You will find the lock at the top of the pen.   4. Clean the injection site with alcohol.   5. Place the clear part of the pen against your skin. Unlock the pen by turning it to the unlock  position at the top.   6. Press and hold the purple injection button at the top of the pen. Listen for 2 clicks. The last click tells you the injection has been completed.   7. This injection is given 1 day a week. If you need to change the day you inject, there needs to be at least 3 days or 72 hours between the two doses.  8. If you miss a dose, you may take the dose within 4 days or 96 hours of the missed dose.   9. Your injection may be best tolerated if given at night.     Disposing of your pens:  Dispose of your pens in a puncture-resistant container (hard plastic bottle) or Sharps container.  Check with the county you live in on how to dispose of the container.  Do not recycle the container with used pens.       Of note, you may not be able to  your medication right away. It may require a prior authorization from your insurance company. This may take a week or more.

## 2024-01-24 NOTE — ASSESSMENT & PLAN NOTE
Reviewed plan. Agree with trying for Zepbound d/t better availability. Discussed mechanism of action, common side effects, titration guidelines, and monitoring for pancreatitis/gallbladder problems/low sugars/MTC/MEN2. Medication handout given in AVS

## 2024-01-24 NOTE — ASSESSMENT & PLAN NOTE
Seen in labs - last repeated 10/11/23.  Discussed in clinic visit. Anticipate improvement with weight loss.

## 2024-01-24 NOTE — PROGRESS NOTES
"Chloe is a 43 year old who is being evaluated via a billable video visit.      The patient has been notified of following:     \"This video visit will be conducted via a call between you and your physician/provider. We have found that certain health care needs can be provided without the need for an in-person physical exam.  This service lets us provide the care you need with a video conversation.  If a prescription is necessary we can send it directly to your pharmacy.  If lab work is needed we can place an order for that and you can then stop by our lab to have the test done at a later time.    Video visits are billed at different rates depending on your insurance coverage.  Please reach out to your insurance provider with any questions.    If during the course of the call the physician/provider feels a video visit is not appropriate, you will not be charged for this service.\"    Patient has given verbal consent for Video visit? Yes    How would you like to obtain your AVS? MyChart    If the video visit is dropped, the invitation should be resent by: Text to cell phone: [phone]    Will anyone else be joining your video visit? No    I    Video-Visit Details    Type of service:  Video Visit    Originating Location (pt. Location): Home in [place]    Distant Location (provider location):   [facility]    Platform used for Video Visit: [facility]    Video Start Time: 8:21 AM    Video End Time:8:46 AM      2024      Return Medical Weight Management Note     Chloe Foster  MRN:  [id number]  :  1980    Assessment & Plan   Problem List Items Addressed This Visit       Class 1 obesity due to excess calories with serious comorbidity and body mass index (BMI) of 30.0 to 30.9 in adult - Primary     Reviewed plan. Agree with trying for Zepbound d/t better availability. Discussed mechanism of action, common side effects, titration guidelines, and monitoring for " pancreatitis/gallbladder problems/low sugars/MTC/MEN2. Medication handout given in AVS           Elevated cholesterol     Seen in labs - last repeated 10/11/23.  Discussed in clinic visit. Anticipate improvement with weight loss.               AOM Considerations:  Phentermine:   Avoid, Wellbutrin caused anxiety and tachycardia at higher doses  Topiramate:     Avoid since taste changes can occur and pt is  and tastes wine for living  GLP-1:             Ozempic covered if DM, Shortage, Pt has sig constipation,  Saxenda apparoved, but on shortage so trying for Zepbound 1/23/24      Naltrexone:      Candidate, no cravings  Wellbutrin:       Tried but became anxious and pulse increased  Metformin:       N DM, Pre DM  Contrave:        No AOM coverage,  Qsymia:           No AOM coverage,      Other than baby ASA, really no other prescribed regular medications - all are prn.       PATIENT INSTRUCTIONS:  Plan:  If Zepbound is denied: Set up a nurse only visit at the closest [facility] to get a formal weight/height to calculate BMI so we can include that in an appeal (if we have the option for an appeal). Have them forward or 'cc me the results.  Start Zepbound, if approved:  You'll start at 2.5 mg once weekly for 4 weeks. If you'd like to continue  If you're tolerating it well, increase to 5 mg once weekly thereafter until I see you again.    You can take over the counter famotidine (Pepcid) 20 mg once or twice daily as needed for nausea/heartburn.    If you get your medication pen and have questions about doing the injection - call or Mychart our office for the nurses to set up a virtual teaching session with you.    Here is the link for the  website to apply for a savings card:  Savings Card, Cost & Coverage Support  Zepbound (tirzepatide) ([email or web address])       3. If you start Saxenda instead:  Start Saxenda  Week 1- Inject 0.6 mg daily  Week 2- Inject 1.2 mg daily  Week 3- Inject 1.8 mg daily     Week 4- Inject 2.4 mg daily  Week 5- Inject 3.0 mg daily      Please know that these are guidelines. Listen to your body. You can stay at the lowest effective dose of the medication. If you're having side effects - please stay at that dose for 1 week of being side effect free before continuing the titration/increase of the dose. Once you are at 3.0 mg daily Saxenda and tolerating it well for at least 2 weeks, we can consider switching to maintenance 1.7 mg once weekly Wegovy - please send me a bSafe message if you'd like to switch.    May use famotidine 20 mg Twice daily as needed for nausea/heartburn when starting the medication.       FOLLOW-UP:    Keep your visit in April so we can continue to monitor/recheck labs/etc.    42 minutes spent on the date of the encounter doing chart review, history and exam, result review, counseling, developing plan of care, documentation, and further activities as noted      INTERVAL HISTORY:  Chloe returns for medical weight management follow up.  Last seen on 1/9/24 with Nichole Miller PA-C. Plan at that time was to try for Saxenda. Saxenda was approved but is on a national shortage so she was going to try for Zepbound (ordered yesterday) which is still pending.    Chloe wanted to discuss treatment plan/update and so scheduled with me this morning.     High cholesterol noted in lab draw from 10/11/23. 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     Meds: Not taking Wellbutrin currently but may restart pending GLP-1/GIP meds     Saxenda prescribed but on shortage    Weight has increased - curious if it changes plan approval.     Other than baby ASA, really no other prescribed regular medications - all are prn.     WEIGHT METRICS:  Body mass index is 30.34 kg/m .   Current Weight: 188 lb (85.3 kg) (patient reported)  Last Visits Weight: 179 lb (81.2 kg)  Initial Weight (lbs): 176 lbs  Cumulative weight loss (lbs): -12  Weight Loss Percentage:  "-6.82%    Wt Readings from Last 10 Encounters:   01/24/24 188 lb (85.3 kg)   01/23/24 188 lb (85.3 kg)   01/09/24 179 lb (81.2 kg)   12/12/23 175 lb (79.4 kg)   11/02/23 180 lb 4.8 oz (81.8 kg)   09/21/23 176 lb (79.8 kg)   06/12/23 180 lb (81.6 kg)   05/30/23 180 lb (81.6 kg)   12/02/22 172 lb 1.6 oz (78.1 kg)   01/10/22 165 lb (74.8 kg)                 1/9/2024     8:35 AM   Weight Loss Medication History Reviewed With Patient   Which weight loss medications are you currently taking on a regular basis? None   Are you having any side effects from the weight loss medication that we have prescribed you? Yes   If you are having side effects please describe: Tried wellbutrin for a few days and it made me anxious. I stopped because I had a lot of work commitments. I will try again this month, but I would like to see if I can get approved for Wegovy in the meantime.           1/9/2024     8:35 AM   Changes and Difficulties   I have made the following changes to my diet since my last visit: Tried to cut back on carbs, dairy   With regards to my diet, I am still struggling with: No weight loss   I have made the following changes to my activity/exercise since my last visit: Increase exercise   With regards to my activity/exercise, I am still struggling with: I need to add more strength training       LABS:  Reviewed in Epic    10/2023 lipids elevated     BP Readings from Last 6 Encounters:   12/12/23 137/85   11/02/23 132/88   09/25/23 114/74   05/30/23 127/87   12/02/22 123/83   01/10/22 110/81       Pulse Readings from Last 6 Encounters:   12/12/23 81   11/02/23 84   09/25/23 75   05/30/23 87   12/02/22 92   01/10/22 91       PE:  Ht 5' 6\" (1.676 m)   Wt 188 lb (85.3 kg)   BMI 30.34 kg/m    GENERAL: Healthy, alert and no distress  EYES: Eyes grossly normal to inspection.    RESP: No audible wheeze, cough, or visible cyanosis.  No increased work of breathing.    SKIN: Visible skin clear. No significant rash, abnormal " pigmentation or lesions.  NEURO: Mentation and speech appropriate for age.  PSYCH: Mentation appears normal, affect normal/bright, judgement and insight intact, normal speech and appearance well-groomed.      Sincerely,      Belen Wong PA-C

## 2024-01-28 ENCOUNTER — MYC MEDICAL ADVICE (OUTPATIENT)
Dept: SURGERY | Facility: CLINIC | Age: 44
End: 2024-01-28
Payer: COMMERCIAL

## 2024-01-28 DIAGNOSIS — E66.3 OVERWEIGHT: ICD-10-CM

## 2024-01-29 ENCOUNTER — DOCUMENTATION ONLY (OUTPATIENT)
Dept: OTOLARYNGOLOGY | Facility: CLINIC | Age: 44
End: 2024-01-29
Payer: COMMERCIAL

## 2024-01-29 NOTE — PROGRESS NOTES
Cosmetic quote/GFE for Tip Rhinoplasty emailed to patient, per her request.    Pt has upcoming appt with Dr. Schwartz in March, in which we can further discuss the quote and questions surrounding the potential surgery.    Lou Julian RN  1/29/2024 9:52 AM

## 2024-01-29 NOTE — TELEPHONE ENCOUNTER
Saxenda approved - moved 90 day supply to [facility] for availability per pt request.  Zepbound not covered.    Genevieve PATTERSON RN

## 2024-02-02 NOTE — TELEPHONE ENCOUNTER
Retail Pharmacy Prior Authorization Team   Phone: [phone]    PA Initiation    Medication: ZEPBOUND 2.5 MG/0.5ML SC SOAJ  Insurance Company: [employer] ([facility]) - Phone [phone] Fax [phone]  Pharmacy Filling the Rx: [id number] [place] [address] [place] [facility] [place] [address]  Filling Pharmacy Phone: [phone]  Filling Pharmacy Fax:    Start Date: 2/2/2024

## 2024-02-03 NOTE — TELEPHONE ENCOUNTER
Note: Due to record-high volumes, our turn-around is time taking longer than normal . We are currently 8 days behind in the pools.   We are working diligently to submit all requests in a timely manner and in the order they are received. Please only flag true urgent requests as high priority to the pool at this time.   If you have questions - please send a note/message in the active PA encounter and send back to the RPPA (Retail Pharmacy Prior Authorization) team [[id number]].    If you have more specific questions about our process please reach out to our supervisor Cristy Garcia.   Thank you!     PRIOR AUTHORIZATION DENIED    Medication: ZEPBOUND 2.5 MG/0.5ML SC SOAJ  Insurance Company: [employer] ([facility]) - Phone [phone] Fax [phone]  Denial Date: 2/2/2024  Denial Rational:         Appeal Information: N/A  Patient Notified: No

## 2024-02-04 ENCOUNTER — HEALTH MAINTENANCE LETTER (OUTPATIENT)
Age: 44
End: 2024-02-04

## 2024-02-06 ENCOUNTER — MYC MEDICAL ADVICE (OUTPATIENT)
Dept: SURGERY | Facility: CLINIC | Age: 44
End: 2024-02-06
Payer: COMMERCIAL

## 2024-02-06 DIAGNOSIS — E66.3 OVERWEIGHT: ICD-10-CM

## 2024-02-09 ENCOUNTER — PATIENT OUTREACH (OUTPATIENT)
Dept: GASTROENTEROLOGY | Facility: CLINIC | Age: 44
End: 2024-02-09
Payer: COMMERCIAL

## 2024-02-09 DIAGNOSIS — K63.8219 SMALL INTESTINAL BACTERIAL OVERGROWTH (SIBO): Primary | ICD-10-CM

## 2024-02-09 DIAGNOSIS — K59.09 OTHER CONSTIPATION: ICD-10-CM

## 2024-02-09 NOTE — PROGRESS NOTES
Discussed Chloe's symptoms with Talya YOUNG     To discuss with Chloe that since she did not have symptom relief from the antibiotics for SIBO to get a KUB and meet with the dietician to discuss a low fodmap diet.     Spoke with Chloe and  discussed the above, She will call radiology to schedule the x-ray.     Orders placed for KUB and dietician

## 2024-02-20 ENCOUNTER — MYC MEDICAL ADVICE (OUTPATIENT)
Dept: FAMILY MEDICINE | Facility: CLINIC | Age: 44
End: 2024-02-20
Payer: COMMERCIAL

## 2024-02-20 DIAGNOSIS — Z86.73 HISTORY OF CARDIOEMBOLIC CEREBROVASCULAR ACCIDENT (CVA): ICD-10-CM

## 2024-02-20 DIAGNOSIS — Z87.74 S/P PATENT FORAMEN OVALE CLOSURE: ICD-10-CM

## 2024-02-20 DIAGNOSIS — R20.0 LIP NUMBNESS: Primary | ICD-10-CM

## 2024-02-21 PROBLEM — Z87.74 S/P PATENT FORAMEN OVALE CLOSURE: Status: ACTIVE | Noted: 2024-02-21

## 2024-02-21 NOTE — TELEPHONE ENCOUNTER
Pt has h/o CVA, s/p PFO closure.    It looks like she last had an appt with neurology in 2019 with Dr. Post at the [facility] clinic (after ER visit for migraine with aura).  Also had ER visit with MRI in 12/21 with R side facial numbness/droop which resolved quickly, normal MRI, thought to be migraine with aura (though new type of aura).    If intermittent lip numbness for a week, would rec going back to ER if worsening lip sx's, new sx's, especially if any other neurological sx's (that are not c/w migraine with aura- those are okay).    Will place neurology referral as well.      She is also overdue for physical appt with me- last saw her in 12/22.  Please have her schedule.  Thanks!  CW

## 2024-03-01 ENCOUNTER — HOSPITAL ENCOUNTER (OUTPATIENT)
Dept: GENERAL RADIOLOGY | Facility: CLINIC | Age: 44
Discharge: HOME | End: 2024-03-01
Attending: STUDENT IN AN ORGANIZED HEALTH CARE EDUCATION/TRAINING PROGRAM | Admitting: STUDENT IN AN ORGANIZED HEALTH CARE EDUCATION/TRAINING PROGRAM
Payer: COMMERCIAL

## 2024-03-01 DIAGNOSIS — K59.09 OTHER CONSTIPATION: ICD-10-CM

## 2024-03-01 PROCEDURE — 74018 RADEX ABDOMEN 1 VIEW: CPT

## 2024-03-22 ENCOUNTER — OFFICE VISIT (OUTPATIENT)
Dept: PLASTIC SURGERY | Facility: CLINIC | Age: 44
End: 2024-03-22

## 2024-03-22 DIAGNOSIS — Z41.1 ELECTIVE PROCEDURE FOR UNACCEPTABLE COSMETIC APPEARANCE: Primary | ICD-10-CM

## 2024-03-22 NOTE — PROGRESS NOTES
Facial Plastic and Reconstructive Surgery      Chloe Foster comes in for follow-up.  She still concerned about the nasal shape.  The mid vault is full on the right side and this leads to a slight C-shaped to her external profile.  She also today noted that she feels that the columella hangs too much.  Spent some time defining this as her lower lateral cartilages are asymmetric however she does not mean the actual part of her columella.  This I believe has appropriate columellar show from the profile view.  I do believe that the focus for her is that on base view the columella is shifted towards the right and she would prefer that centered.  She also has some dependency of the intermediate richie in the right alar rim on the right compared to the left.    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  When we support this with a Q-tip and wind nose there she feels significant improvement.  This is however in contrast with the desire to have that area sculpted.  We discussed this today also.  We discussed changes to her nasal contour and profile.  I did state that reducing the fullness on the right may lead to worsened breathing on that right side.  She is however interested to proceed.  She be interested in the future using lateral nasal wall implants as these do not add significant volume on that side.  I would add some contra cartilage onto the left mid vault to kind to help balance that.

## 2024-03-22 NOTE — LETTER
March 22, 2024  Re: Chloe Foster  1980    Dear Dr. Cast,    Thank you so much for referring Chloe Foster to the [facility]. I had the pleasure of visiting with Chloe today.     Attached you will find a copy of my note. Please feel free to reach out to me with any questions, [phone].     Facial Plastic and Reconstructive Surgery      Chloe Foster comes in for follow-up.  She still concerned about the nasal shape.  The mid vault is full on the right side and this leads to a slight C-shaped to her external profile.  She also today noted that she feels that the columella hangs too much.  Spent some time defining this as her lower lateral cartilages are asymmetric however she does not mean the actual part of her columella.  This I believe has appropriate columellar show from the profile view.  I do believe that the focus for her is that on base view the columella is shifted towards the right and she would prefer that centered.  She also has some dependency of the intermediate richie in the right alar rim on the right compared to the left.    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  When we support this with a Q-tip and wind nose there she feels significant improvement.  This is however in contrast with the desire to have that area sculpted.  We discussed this today also.  We discussed changes to her nasal contour and profile.  I did state that reducing the fullness on the right may lead to worsened breathing on that right side.  She is however interested to proceed.  She be interested in the future using lateral nasal wall implants as these do not add significant volume on that side.  I would add some contra cartilage onto the left mid vault to kind to help balance that.             Your trust in our practice and care is much appreciated.    Sincerely,    Nikki Schwartz MD

## 2024-03-22 NOTE — LETTER
3/22/2024       RE: Chloe Foster  [address], [place] [address]     Dear Colleague,    Thank you for referring your patient, Chloe Foster, to the [facility]. Please see a copy of my visit note below.    Facial Plastic and Reconstructive Surgery    Chloe Foster comes in for follow-up.  She still concerned about the nasal shape.  The mid vault is full on the right side and this leads to a slight C-shaped to her external profile.  She also today noted that she feels that the columella hangs too much.  Spent some time defining this as her lower lateral cartilages are asymmetric however she does not mean the actual part of her columella.  This I believe has appropriate columellar show from the profile view.  I do believe that the focus for her is that on base view the columella is shifted towards the right and she would prefer that centered.  She also has some dependency of the intermediate richie in the right alar rim on the right compared to the left.    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  When we support this with a Q-tip and wind nose there she feels significant improvement.  This is however in contrast with the desire to have that area sculpted.  We discussed this today also.  We discussed changes to her nasal contour and profile.  I did state that reducing the fullness on the right may lead to worsened breathing on that right side.  She is however interested to proceed.  She be interested in the future using lateral nasal wall implants as these do not add significant volume on that side.  I would add some contra cartilage onto the left mid vault to kind to help balance that.       Again, thank you for allowing me to participate in the care of your patient.      Sincerely,    Nikki Schwartz MD

## 2024-04-09 NOTE — PROGRESS NOTES
"Chloe is a 43 year old who is being evaluated via a billable video visit.      The patient has been notified of following:     \"This video visit will be conducted via a call between you and your physician/provider. We have found that certain health care needs can be provided without the need for an in-person physical exam.  This service lets us provide the care you need with a video conversation.  If a prescription is necessary we can send it directly to your pharmacy.  If lab work is needed we can place an order for that and you can then stop by our lab to have the test done at a later time.    Video visits are billed at different rates depending on your insurance coverage.  Please reach out to your insurance provider with any questions.    If during the course of the call the physician/provider feels a video visit is not appropriate, you will not be charged for this service.\"    Patient has given verbal consent for Video/phone visit? Yes    How would you like to obtain your AVS? MyChart    If the video visit is dropped, the invitation should be resent by: Text to cell phone: [phone]    Will anyone else be joining your video visit? No    I    Video-Visit Details    Type of service:  Video Visit    Originating Location (pt. Location): [facility] - at home office     Distant Location (provider location):   [facility]    Platform used for phone Visit: phone    phone  Start Time: 10:27 AM    phone  End Time:10:45 AM    2024      Return Medical Weight Management Note     Chloe Foster  MRN:  [id number]  :  1980    Assessment & Plan   Problem List Items Addressed This Visit       Overweight    Relevant Medications    tirzepatide-Weight Management (ZEPBOUND) 2.5 MG/0.5ML prefilled pen    tirzepatide-Weight Management (ZEPBOUND) 5 MG/0.5ML prefilled pen    Class 1 obesity due to excess calories with serious comorbidity and body mass index (BMI) of 30.0 " to 30.9 in adult - Primary     Chloe will try for Wegovy due to shortages for Saxenda. Discussed common side effects, titration guidelines, and  discussed risks for pancreatitis/gallbladder problems/gastroparesis/low sugars/MTC/MEN2. Medication handout given in AVS             Relevant Medications    Semaglutide-Weight Management (WEGOVY) 0.25 MG/0.5ML pen    Semaglutide-Weight Management (WEGOVY) 1 MG/0.5ML pen    Semaglutide-Weight Management (WEGOVY) 0.5 MG/0.5ML pen    tirzepatide-Weight Management (ZEPBOUND) 2.5 MG/0.5ML prefilled pen    tirzepatide-Weight Management (ZEPBOUND) 5 MG/0.5ML prefilled pen    Elevated cholesterol    Relevant Medications    Semaglutide-Weight Management (WEGOVY) 0.25 MG/0.5ML pen    Semaglutide-Weight Management (WEGOVY) 1 MG/0.5ML pen    Semaglutide-Weight Management (WEGOVY) 0.5 MG/0.5ML pen      AOM Considerations:  Phentermine:   Avoid, Wellbutrin caused anxiety and tachycardia at higher doses  Topiramate:     Avoid since taste changes can occur and pt is  and tastes wine for living  GLP-1:             Ozempic covered if DM, Shortage, Pt has sig constipation,  Saxenda apparoved, but on shortage so trying for Zepbound 1/23/24 - denied, wegovy tried for 4/24     Naltrexone:      Candidate, no cravings  Wellbutrin:       Tried but became anxious and pulse increased - utilized previously may restart pending availability of GLP/GIP  Metformin:       N DM, Pre DM  Contrave:        No AOM coverage,  Qsymia:           No AOM coverage,       Other than baby ASA, really no other prescribed regular medications - all are prn.     PATIENT INSTRUCTIONS:  Pt Instructions:  Start Wegovy (semaglutide)   0.25 mg once weekly for 4 weeks,   if tolerating increase to 0.5 mg weekly for 4 weeks,   if tolerating increase to 1 mg weekly    May use famotidine 20 mg twice daily as needed for nausea/heartburn when starting the medication.       Goals:  Continue Focus on healthy food  choices - prioritizing protein and veggies in your meals. I recommend eating every 4-6 hours to reduce the risk of low blood sugars and try to minimize snacking between meals. Stay well hydrated though the day as well.  Great job with exercising - please continue to invest in yourself with regular exercise. Continue to strive for a range for 150-300 minutes of exercise for weight maintenance and incorporating strength training twice a week to help preserve muscle! Glad you mentioned getting steps in on the questionnaire - consider adding some strength training as well!      Follow up:    Call [phone] to schedule next visit in 3-4 months    22 minutes spent on the date of the encounter doing chart review, history and exam, result review, counseling, developing plan of care, documentation, and further activities as noted      INTERVAL HISTORY:  Chloe returns for medical weight management follow up.  Last seen on 1/2024 with plan to try for Zepbound for greater availability.    WEIGHT METRICS:  Body mass index is 30.18 kg/m .   Current Weight: 187 lb (84.8 kg) (pt reported)  Last Visits Weight: 188 lb (85.3 kg)  Initial Weight (lbs): 176 lbs  Cumulative weight loss (lbs): -11  Weight Loss Percentage: -6.25%    Wt Readings from Last 10 Encounters:   04/16/24 187 lb (84.8 kg)   01/24/24 188 lb (85.3 kg)   01/23/24 188 lb (85.3 kg)   01/09/24 179 lb (81.2 kg)   12/12/23 175 lb (79.4 kg)   11/02/23 180 lb 4.8 oz (81.8 kg)   09/21/23 176 lb (79.8 kg)   06/12/23 180 lb (81.6 kg)   05/30/23 180 lb (81.6 kg)   12/02/22 172 lb 1.6 oz (78.1 kg)                 4/16/2024     9:36 AM   Weight Loss Medication History Reviewed With Patient   Which weight loss medications are you currently taking on a regular basis? None   If you are not taking a weight loss medication that was prescribed to you, please indicate why: Other   Are you having any side effects from the weight loss medication that we have prescribed you? No  "          4/16/2024     9:36 AM   Changes and Difficulties   I have made the following changes to my diet since my last visit: less carbs   With regards to my diet, I am still struggling with: weight   I have made the following changes to my activity/exercise since my last visit: increased exercise and walking/steps   With regards to my activity/exercise, I am still struggling with: motivation       LABS:  Reviewed in Epic    10/11/2023 hemoglobin A1c normal  TSH normal  CMP normal      BP Readings from Last 6 Encounters:   12/12/23 137/85   11/02/23 132/88   09/25/23 114/74   05/30/23 127/87   12/02/22 123/83   01/10/22 110/81       Pulse Readings from Last 6 Encounters:   12/12/23 81   11/02/23 84   09/25/23 75   05/30/23 87   12/02/22 92   01/10/22 91       PE:  Ht 5' 6\" (1.676 m)   Wt 187 lb (84.8 kg)   BMI 30.18 kg/m    Phone visit    RESP: No audible wheeze, cough.  Speaking in full sentences  NEURO: Mentation and speech appropriate for age.  PSYCH: Mentation appears normal,        Sincerely,      AYSHA DobbinsC       "

## 2024-04-11 ENCOUNTER — PREP FOR PROCEDURE (OUTPATIENT)
Dept: OTOLARYNGOLOGY | Facility: CLINIC | Age: 44
End: 2024-04-11
Payer: COMMERCIAL

## 2024-04-11 DIAGNOSIS — M95.0 ACQUIRED NASAL DEFORMITY: Primary | ICD-10-CM

## 2024-04-16 ENCOUNTER — VIRTUAL VISIT (OUTPATIENT)
Dept: SURGERY | Facility: CLINIC | Age: 44
End: 2024-04-16
Payer: COMMERCIAL

## 2024-04-16 ENCOUNTER — TELEPHONE (OUTPATIENT)
Dept: SURGERY | Facility: CLINIC | Age: 44
End: 2024-04-16

## 2024-04-16 VITALS — HEIGHT: 66 IN | WEIGHT: 187 LBS | BODY MASS INDEX: 30.05 KG/M2

## 2024-04-16 DIAGNOSIS — E66.09 CLASS 1 OBESITY DUE TO EXCESS CALORIES WITH SERIOUS COMORBIDITY AND BODY MASS INDEX (BMI) OF 30.0 TO 30.9 IN ADULT: Primary | ICD-10-CM

## 2024-04-16 DIAGNOSIS — E78.00 ELEVATED CHOLESTEROL: ICD-10-CM

## 2024-04-16 DIAGNOSIS — E66.3 OVERWEIGHT: ICD-10-CM

## 2024-04-16 DIAGNOSIS — E66.811 CLASS 1 OBESITY DUE TO EXCESS CALORIES WITH SERIOUS COMORBIDITY AND BODY MASS INDEX (BMI) OF 30.0 TO 30.9 IN ADULT: Primary | ICD-10-CM

## 2024-04-16 PROCEDURE — 99441 PR PHYSICIAN TELEPHONE EVALUATION 5-10 MIN: CPT | Performed by: PHYSICIAN ASSISTANT

## 2024-04-16 RX ORDER — SEMAGLUTIDE 0.5 MG/.5ML
0.5 INJECTION, SOLUTION SUBCUTANEOUS WEEKLY
Qty: 2 ML | Refills: 1 | Status: SHIPPED | OUTPATIENT
Start: 2024-04-16 | End: 2024-04-19

## 2024-04-16 RX ORDER — SEMAGLUTIDE 1 MG/.5ML
1 INJECTION, SOLUTION SUBCUTANEOUS WEEKLY
Qty: 2 ML | Refills: 1 | Status: SHIPPED | OUTPATIENT
Start: 2024-04-16 | End: 2024-04-19

## 2024-04-16 RX ORDER — SEMAGLUTIDE 1 MG/.5ML
1 INJECTION, SOLUTION SUBCUTANEOUS WEEKLY
Qty: 2 ML | Refills: 1 | Status: SHIPPED | OUTPATIENT
Start: 2024-04-16 | End: 2024-04-16

## 2024-04-16 RX ORDER — SEMAGLUTIDE 0.5 MG/.5ML
0.5 INJECTION, SOLUTION SUBCUTANEOUS WEEKLY
Qty: 2 ML | Refills: 1 | Status: SHIPPED | OUTPATIENT
Start: 2024-04-16 | End: 2024-04-16

## 2024-04-16 NOTE — PATIENT INSTRUCTIONS
"Nice to talk with you today. Below is the plan discussed.-  Belen Wong PA-C     Pt Instructions:  Start Wegovy (semaglutide)   0.25 mg once weekly for 4 weeks,   if tolerating increase to 0.5 mg weekly for 4 weeks,   if tolerating increase to 1 mg weekly    May use famotidine 20 mg twice daily as needed for nausea/heartburn when starting the medication.       Goals:  Continue Focus on healthy food choices - prioritizing protein and veggies in your meals. I recommend eating every 4-6 hours to reduce the risk of low blood sugars and try to minimize snacking between meals. Stay well hydrated though the day as well.  Great job with exercising - please continue to invest in yourself with regular exercise. Continue to strive for a range for 150-300 minutes of exercise for weight maintenance and incorporating strength training twice a week to help preserve muscle! Glad you mentioned getting steps in on the questionnaire - consider adding some strength training as well!      Follow up:    Call [phone] to schedule next visit in 3-4 months    WEGOVY (semaglutide)      Wegovy (semaglutide) injection 2.4 mg is an injectable prescription medicine used for adults with obesity (BMI ?30) or overweight (excess weight) (BMI ?27) who also have weight-related medical problems to help them lose weight and keep the weight off.  It is a GLP-1 agonist medication. GLP1 agonists stimulate the hormone GLP-1 in your body to allow you to feel full quickly and stay full longer.    Due to the shortage, You may need to be persistent and patient to get these initial dosages due to the shortage.  Once you are able to obtain the 0.25 and 0.5 mg dose \"8 weeks of therapy\" you can begin treatment.     Directions:  Start Wegovy (semaglutide) 0.25 mg once weekly for 4 weeks, then if tolerating increase to 0.5 mg weekly for 4 weeks, then if tolerating increase to 1 mg weekly for 4 weeks, then if tolerating increase to 1.7 mg weekly for 4 weeks, " then if tolerating increase to 2.4 mg weekly thereafter.      -Each Wegovy pen is a once weekly single-dose prefilled pen with a pen injector already built within the pen. Discard the Wegovy pen after use in sharps container.     Common Side Effects:    nausea, headache, diarrhea, stomach upset.  If these become unmanageable call or mychart.    Serious Side effects:   Pancreatitis (inflammation of the pancreas) has been associated with this type of medication, but is very rare.Symptoms of pancreatitis include: Pain in your upper stomach area which may travel to your back and be worse after eating.     Storage:   Store the prescription in the refrigerator. Once it is time to use the Wegovy pen, you can keep the pen at room temperature and it is good for up to 28 days at room temperature.     How to inject:  For a video on how to use the pen please go to:  https://www.Qui.lt/about-wegovy/how-to-use-the-wegovy-pen.html#itemTwo       For any questions or concerns please send a YelloYello message to our team or call  [phone].  For emergencies please 911 or seek immediate medical care.     There is a small chance you may have some low blood sugar after taking the medication.   The signs of low blood sugar are:  Weakness  Shaky   Hungry  Sweating  Confusion      See below for ways to treat low blood sugar without adding in lots of extra calories.      Treating Low Blood Sugar    If you have symptoms of low blood sugar (sweating, shaking, dizzy, confused) eat 15 grams of carbs and wait 15 minutes:    Glucose Tabs are best for sugars under 70 -  Dex4 or BD Glucose tablets are good, you will need to take 3-4 of these to equal 15 grams.     One small box of raisins  4 oz fruit juice box or   cup fruit juice  1 small apple  1 small banana    cup canned fruit in water    English muffin or a slice of bread with jelly   1 low fat frozen waffle with sugar-free syrup    cup cottage cheese with   cup frozen or fresh  blueberries  1 cup skim or low-fat milk    cup whole grain cereal  4-6 crackers such as Triscuits      This medication is usually not covered by insurance and can be quite expensive. Sometimes a prior authorization is required, which may take up to 1-2 weeks for an insurance company to make a decision if they will cover the medication. Please be patient, you will be notified after a decision has been made.    Contact the nurse via Playnery or call [phone] if you have any questions or concerns. (Do not stop taking it if you don't think it's working. For some people it works without them knowing it.)     In order to get refills of this or any medication we prescribe you must be seen in the medical weight mgmt clinic every 2-4 months.    Using Your Wegovy Pen  Medicine (semaglutide)    Storing your pens  Store your pens in the refrigerator until you are ready to use them. Don't let them freeze.  Your pen may be stored at room temperature for 28 days or fewer. Just make sure the temperature doesn't get higher than 86 or lower than 46 degrees Fahrenheit.   Protect the pens from light.  Never use any pens that have .    Check your pen before use:  The liquid in the pen window should be clear and colorless. Bubbles are okay to see.   Do not use the pen if you can see the window contains any specks or it is cloudy or has changed color.  Make sure you have the medication and dose your health care provider prescribed.    Getting ready to inject:   1. Wash and dry your hands well.  2. Make sure the counter you use to place your supplies on is clean.  3. Make sure your injection time will not be interrupted by children or pets.  4. Have alcohol wipes or alcohol and cotton balls available to clean the injection site.   5. Choose your injection site. It can be on your stomach, back of upper arms, or upper legs. Remember to change your injection site each time you inject. Try to be at least 1 inch away from the previous  one. Stay at least 1 inch away from your belly button.     Inject your dose:  1. Pull off the grey cap off the pen. Throw it in the trash. Do not put the cap back on the pen.   2. Clean the injection site with alcohol.   3. Push the grey part of the pen firmly against your skin. This will start the injection.  4. When the pen is injecting, you will hear 2 clicks. The first click tells you the injection has started. The second one tells you the injection is continuing.   5. The injection is done when the yellow bar in the glass window at the bottom of the pen has stopped moving.   6. This injection is given 1 day a week. The pens come in  5 doses ranging from 0.25 mg to 2.4 mg. Each dose comes in a different color pen.  7. If you miss a dose, take is as soon as you remember. Do not take a missed dose, if it is within 2 days of the next dose.    8. Your injection may be best tolerated if given at night.     Disposing of your pens:  Dispose of your pens in a puncture-resistant container (hard plastic bottle) or Sharps container.  Check with the [facility] you live in on how to dispose of the container.  Do not recycle the container with used pens.     Compound Semaglutide at [facility]  [facility] is now offering compounded semaglutide during the time of Wegovy national shortage/limited supply. Semaglutide is the generic name of Wegovy. [facility] is following the highest standards for sterility and compounding practices. Not all compounding practices are equal. Therefore, [facility] will not be prescribing compounded semaglutide outside of the [facility]. Compounding of semaglutide is legal for as long as Wegovy is on the FDA's national shortage list. When/if Wegovy is taken off the FDA's shortage list, compounded semaglutide will no longer be legal to manufacture. When this occurs, patients will have to turn to acquiring Wegovy via its  available manufactured pen, look into alternative weight loss medication(s), or stop the medication. Compound semaglutide will be available as a pre-filled syringe. Due to high demand of compounded semaglutide, orders may take 1-2 weeks to obtain from time of prescribing. Each dose of the medication will require a separate prescription.     As with any weight loss medication(s), there is a risk of weight regain should you stop semaglutide. It is important to be aware of this risk should you stop compounded semaglutide with no plans to transition back to an alternative injectable option as the use of semaglutide is intended for long term weight management with the intention of remaining on this injectable long term.        Obtaining Medication and Storage:   The pharmacy must speak to the patient directly prior to shipping medication to walk through administration, shipping and cost. Pre-filled syringes of compounded semaglutide and needles will be mailed from the compounding pharmacy to your home in a refrigerated box. The pre-filled syringes should be stored in the refrigerator until time of injection. The medication is good for at least 30 days in refrigerator.     Dosing:  Week 1-4: Inject 0.25 mg subcutaneously once weekly  Week 5-8: If tolerating, increase to 0.5 mg once weekly  Week 9-12: If tolerating, increase to 1 mg once weekly  Week 13-15: If tolerating, increase to 1.5 mg once weekly  Week 16-19: If tolerating, increase to 2 mg once weekly  Week 20 & on: If tolerating, increase to 2.5 mg once weekly   *If you are having some nausea or other side effects to where you are hesitant to move up to the next dose, stay at the same dose you are on for an additional 4 weeks to see if side effect(s) improves/resolves. Make sure to take this time to hydrate and utilizing smaller more consistent meals, such as 4-6 small meals per day.  It may be advantageous to stay at the same dose if you are seeing good efficacy  (both on and off the scale) and having minimal/manageable side effects. If you do not have additional refills on that dose's prescription, please reach out to the clinic.    Common Side Effects:  Side effect profile is the same as Wegovy. Monitor for nausea, diarrhea, constipation, headache, indigestion, tiredness (fatigue), stomach upset or abdominal pain. Less commonly, semaglutide can cause low blood sugar (symptoms: shaky, dizzy, sweaty, agitation). Please reach out to the care team should you feel like this is occurring. It is important to ensure that you are eating consistent meals and not skipping meals. Ensure you are getting at least 64 oz water daily. If any side effects become unmanageable, contact the care team immediately.    Administration:  Wash your hands.   Obtain compound semaglutide syringe, needle and alcohol swab. Remove pre-filled syringe from refrigerator. Keep all other unused syringes in the refrigerator until time of use.   Inspect the syringe to ensure medication in syringe is clear/colorless and the clear shell cap on top of red syringe cap is intact.  Unlock the cap by pulling the clear outer shell straight off and remove the red syringe cap by twisting counter clockwise.  Attach needle to syringe by twisting needle onto syringe clockwise. Do not remove needle cap.   Choose injection site. Clean injection site with alcohol swab.    Appropriate areas of injection are: abdomen (at least 2 inches away from belly button) or front middle thigh.   Remove needle cap from syringe/needle.   Hold the syringe like a pencil. Insert the needle into skin at a 90-degree angle.  Using your pointer finger, push the syringe plunger down to inject the medicine.  Count to six. Then, remove the syringe from your skin.   Immediately place the syringe in a sharps container.   You can purchase a sharps container from your local pharmacy or [employer]. If you don't have a sharps container, you can use a plastic  "detergent container with a lid. The container should seal tightly, hold objects without leaking, breaking or cracking, and clearly be labelled \"sharps\".     Compounded Semaglutide monthly (4 pre-filled syringes) cost:   0.25 mg ~$230   0.5 mg ~$260   1 mg ~$306   1.5 mg ~$342   2 mg ~$395   2.5 mg ~$438    [facility] Phone:  [phone]    Of note, you may not be able to  your medication right away. It may require a prior authorization from your insurance company. This may take a week or more.       "

## 2024-04-16 NOTE — ASSESSMENT & PLAN NOTE
Chloe will try for Wegovy due to shortages for Saxenda. Discussed common side effects, titration guidelines, and  discussed risks for pancreatitis/gallbladder problems/gastroparesis/low sugars/MTC/MEN2. Medication handout given in AVS

## 2024-04-16 NOTE — TELEPHONE ENCOUNTER
Retail Pharmacy Prior Authorization Team   Phone: [phone]      [facility] PA PORTAL APPROVAL:   ONCE THE APPROVAL IS OBTAINED, THE RX ORDER IS THEN RELEASED TO THE FILLING PHARMACY THAT HAS BEEN ATTACHED TO THE RX ORDER.  ADDITIONAL FOLLOW UP CALLS ARE NOT MADE ON EPA APPROVALS.

## 2024-04-19 ENCOUNTER — MYC MEDICAL ADVICE (OUTPATIENT)
Dept: SURGERY | Facility: CLINIC | Age: 44
End: 2024-04-19

## 2024-04-19 ENCOUNTER — VIRTUAL VISIT (OUTPATIENT)
Dept: FAMILY MEDICINE | Facility: CLINIC | Age: 44
End: 2024-04-19
Payer: COMMERCIAL

## 2024-04-19 DIAGNOSIS — F40.10 SOCIAL ANXIETY DISORDER: ICD-10-CM

## 2024-04-19 DIAGNOSIS — E78.00 ELEVATED CHOLESTEROL: ICD-10-CM

## 2024-04-19 DIAGNOSIS — E66.09 CLASS 1 OBESITY DUE TO EXCESS CALORIES WITH SERIOUS COMORBIDITY AND BODY MASS INDEX (BMI) OF 30.0 TO 30.9 IN ADULT: ICD-10-CM

## 2024-04-19 DIAGNOSIS — E66.811 CLASS 1 OBESITY DUE TO EXCESS CALORIES WITH SERIOUS COMORBIDITY AND BODY MASS INDEX (BMI) OF 30.0 TO 30.9 IN ADULT: ICD-10-CM

## 2024-04-19 PROCEDURE — 99213 OFFICE O/P EST LOW 20 MIN: CPT | Mod: 95 | Performed by: PHYSICIAN ASSISTANT

## 2024-04-19 RX ORDER — SEMAGLUTIDE 0.5 MG/.5ML
0.5 INJECTION, SOLUTION SUBCUTANEOUS WEEKLY
Qty: 2 ML | Refills: 1 | Status: SHIPPED | OUTPATIENT
Start: 2024-04-19

## 2024-04-19 RX ORDER — SEMAGLUTIDE 1 MG/.5ML
1 INJECTION, SOLUTION SUBCUTANEOUS WEEKLY
Qty: 2 ML | Refills: 1 | Status: SHIPPED | OUTPATIENT
Start: 2024-04-19 | End: 2024-04-19

## 2024-04-19 RX ORDER — SEMAGLUTIDE 0.5 MG/.5ML
0.5 INJECTION, SOLUTION SUBCUTANEOUS WEEKLY
Qty: 2 ML | Refills: 1 | Status: SHIPPED | OUTPATIENT
Start: 2024-04-19 | End: 2024-04-19

## 2024-04-19 RX ORDER — LORAZEPAM 1 MG/1
.5-1 TABLET ORAL DAILY PRN
Qty: 15 TABLET | Refills: 1 | Status: SHIPPED | OUTPATIENT
Start: 2024-04-19 | End: 2024-08-22

## 2024-04-19 RX ORDER — PROPRANOLOL HYDROCHLORIDE 10 MG/1
10-20 TABLET ORAL PRN
Qty: 30 TABLET | Refills: 1 | Status: SHIPPED | OUTPATIENT
Start: 2024-04-19 | End: 2024-08-22

## 2024-04-19 RX ORDER — SEMAGLUTIDE 1 MG/.5ML
1 INJECTION, SOLUTION SUBCUTANEOUS WEEKLY
Qty: 2 ML | Refills: 1 | Status: SHIPPED | OUTPATIENT
Start: 2024-04-19 | End: 2024-08-02

## 2024-04-19 ASSESSMENT — ANXIETY QUESTIONNAIRES
8. IF YOU CHECKED OFF ANY PROBLEMS, HOW DIFFICULT HAVE THESE MADE IT FOR YOU TO DO YOUR WORK, TAKE CARE OF THINGS AT HOME, OR GET ALONG WITH OTHER PEOPLE?: NOT DIFFICULT AT ALL
2. NOT BEING ABLE TO STOP OR CONTROL WORRYING: SEVERAL DAYS
4. TROUBLE RELAXING: NOT AT ALL
GAD7 TOTAL SCORE: 4
5. BEING SO RESTLESS THAT IT IS HARD TO SIT STILL: NOT AT ALL
3. WORRYING TOO MUCH ABOUT DIFFERENT THINGS: SEVERAL DAYS
IF YOU CHECKED OFF ANY PROBLEMS ON THIS QUESTIONNAIRE, HOW DIFFICULT HAVE THESE PROBLEMS MADE IT FOR YOU TO DO YOUR WORK, TAKE CARE OF THINGS AT HOME, OR GET ALONG WITH OTHER PEOPLE: NOT DIFFICULT AT ALL
1. FEELING NERVOUS, ANXIOUS, OR ON EDGE: SEVERAL DAYS
6. BECOMING EASILY ANNOYED OR IRRITABLE: SEVERAL DAYS
7. FEELING AFRAID AS IF SOMETHING AWFUL MIGHT HAPPEN: NOT AT ALL
GAD7 TOTAL SCORE: 4
7. FEELING AFRAID AS IF SOMETHING AWFUL MIGHT HAPPEN: NOT AT ALL
GAD7 TOTAL SCORE: 4

## 2024-04-19 ASSESSMENT — PATIENT HEALTH QUESTIONNAIRE - PHQ9
SUM OF ALL RESPONSES TO PHQ QUESTIONS 1-9: 5
10. IF YOU CHECKED OFF ANY PROBLEMS, HOW DIFFICULT HAVE THESE PROBLEMS MADE IT FOR YOU TO DO YOUR WORK, TAKE CARE OF THINGS AT HOME, OR GET ALONG WITH OTHER PEOPLE: NOT DIFFICULT AT ALL
SUM OF ALL RESPONSES TO PHQ QUESTIONS 1-9: 5

## 2024-04-19 NOTE — PROGRESS NOTES
Transferred 0.25 and 0.5 mg doses to [facility] on [address] per pt request and per clinic protocol.Zulay Landon MS, RD, RN

## 2024-04-19 NOTE — PROGRESS NOTES
0.5 mg Wegovy sent to [facility] and the 1 mg to [facility] in [place] per pt request and clinic protocol.  Will watch for PA approval for the 0.5 and 1 mg dose.    Zulay Landon, MS, RD, RN

## 2024-04-19 NOTE — PROGRESS NOTES
"Chloe is a 43 year old who is being evaluated via a billable video visit.    How would you like to obtain your AVS? MyChart  If the video visit is dropped, the invitation should be resent by: Text to cell phone: [phone]  Will anyone else be joining your video visit? No      Assessment & Plan     Social anxiety disorder  Prescription for #15 lasts ~ 3 months with 1 refill to get to every 6 month appointments for further refills; symptoms controlled at this time. Proper use and risk for abuse discussed with patient at length. Patient to return to clinic when refills needed. Return to clinic with any worsening or changes in symptoms and follow up with PCP for routine care.   - LORazepam (ATIVAN) 1 MG tablet; Take 0.5-1 tablets (0.5-1 mg) by mouth daily as needed for anxiety or sleep (severe anxiety/panic)  - propranolol (INDERAL) 10 MG tablet; Take 1-2 tablets (10-20 mg) by mouth as needed (anxiety (take 30-60 minutes prior to meetings))    Review of prior external note(s) from - previous routine notes  15 minutes spent by me on the date of the encounter doing chart review, history and exam, documentation and further activities per the note      BMI  Estimated body mass index is 30.18 kg/m  as calculated from the following:    Height as of 4/16/24: 1.676 m (5' 6\").    Weight as of 4/16/24: 84.8 kg (187 lb).   Weight management plan: Discussed healthy diet and exercise guidelines      See Patient Instructions    Subjective   Chloe is a 43 year old, presenting for the following health issues:  Anxiety        4/19/2024     7:09 AM   Additional Questions   Roomed by Nicole     History of Present Illness       Mental Health Follow-up:  Patient presents to follow-up on Anxiety.    Patient's anxiety since last visit has been:  No change  The patient is not having other symptoms associated with anxiety.  Any significant life events: No  Patient is feeling anxious or having panic attacks.  Patient has no concerns " about alcohol or drug use.    She eats 2-3 servings of fruits and vegetables daily.She consumes 0 sweetened beverage(s) daily.She exercises with enough effort to increase her heart rate 30 to 60 minutes per day.  She exercises with enough effort to increase her heart rate 6 days per week.   She is taking medications regularly.     Routine refills #15 ativan lasts 3 months, last prescription from 10/3/23.  Propranolol helpful as well as needed.      Review of Systems  Constitutional, neuro, ENT, endocrine, pulmonary, cardiac, gastrointestinal, genitourinary, musculoskeletal, integument and psychiatric systems are negative, except as otherwise noted.      Objective           Vitals:  No vitals were obtained today due to virtual visit.    Physical Exam   GENERAL: alert and no distress  EYES: Eyes grossly normal to inspection.  No discharge or erythema, or obvious scleral/conjunctival abnormalities.  RESP: No audible wheeze, cough, or visible cyanosis.    SKIN: Visible skin clear. No significant rash, abnormal pigmentation or lesions.  NEURO: Cranial nerves grossly intact.  Mentation and speech appropriate for age.  PSYCH: Appropriate affect, tone, and pace of words        Video-Visit Details    Type of service:  Video Visit   Originating Location (pt. Location): Home    Distant Location (provider location):  Off-site  Platform used for Video Visit: Rafal  Signed Electronically by: Jose Miguel Ferro PA-C

## 2024-05-13 ENCOUNTER — HOSPITAL ENCOUNTER (OUTPATIENT)
Dept: ULTRASOUND IMAGING | Facility: CLINIC | Age: 44
Discharge: HOME | End: 2024-05-13
Attending: PHYSICIAN ASSISTANT | Admitting: PHYSICIAN ASSISTANT
Payer: COMMERCIAL

## 2024-05-13 ENCOUNTER — VIRTUAL VISIT (OUTPATIENT)
Dept: FAMILY MEDICINE | Facility: CLINIC | Age: 44
End: 2024-05-13
Payer: COMMERCIAL

## 2024-05-13 DIAGNOSIS — M79.661 RIGHT CALF PAIN: ICD-10-CM

## 2024-05-13 DIAGNOSIS — Z86.73 HISTORY OF CARDIOEMBOLIC CEREBROVASCULAR ACCIDENT (CVA): ICD-10-CM

## 2024-05-13 DIAGNOSIS — M79.661 RIGHT CALF PAIN: Primary | ICD-10-CM

## 2024-05-13 PROCEDURE — 93971 EXTREMITY STUDY: CPT | Mod: RT

## 2024-05-13 PROCEDURE — 99213 OFFICE O/P EST LOW 20 MIN: CPT | Mod: 95 | Performed by: PHYSICIAN ASSISTANT

## 2024-05-13 NOTE — PROGRESS NOTES
Chloe is a 43 year old who is being evaluated via a billable video visit.    How would you like to obtain your AVS? MyChart  If the video visit is dropped, the invitation should be resent by: Text to cell phone: [phone]  Will anyone else be joining your video visit? No      Assessment & Plan     Right calf pain  History of cardioembolic cerebrovascular accident (CVA)  Unclear etiology so sonogram ordered; if positive for DVT, patient aware to go to ED to get complete workup/further treatment; over the counter and supportive care with RICE and topical Voltaren gel discussed with patient in mean time as well. Return to clinic with any worsening or changes in symptoms and follow up with PCP for routine care.   - US Lower Extremity Venous Duplex Right; Future    Review of prior external note(s) from - previous routine PCP notes  20 minutes spent by me on the date of the encounter doing chart review, history and exam, documentation and further activities per the note        See Patient Instructions    Subjective   Chloe is a 43 year old, presenting for the following health issues:  Pain        5/13/2024    12:51 PM   Additional Questions   Roomed by JUAN CARLOS Sparks   Accompanied by n/a     History of Present Illness       Reason for visit:  Calf pain    She eats 4 or more servings of fruits and vegetables daily.She consumes 0 sweetened beverage(s) daily.She exercises with enough effort to increase her heart rate 30 to 60 minutes per day.  She exercises with enough effort to increase her heart rate 6 days per week.   She is taking medications regularly.       Pain History:  When did you first notice your pain? A few weeks  - has history of stroke and is worried it may be a clot or something related (two in 2015)  Have you seen anyone else for your pain? No  How has your pain affected your ability to work? Not applicable  Where in your body do you have pain? Right  Calf    - Intermittent, does not hurt  until it is walked on  - No radiation, every time step down gets a sharp pain.  - some days are better than others  - Average 5 on pain scale when walking around    No shortness of breath, chest pain, lightheadeness, dizziness.  No fever, chills, night sweats.  No known trauma to area.    History of being on baby asa but has not been good about taking it regularly lately.      Review of Systems  Constitutional, neuro, ENT, endocrine, pulmonary, cardiac, gastrointestinal, genitourinary, musculoskeletal, integument and psychiatric systems are negative, except as otherwise noted.      Objective    Vitals - Patient Reported  Pain Score: Mild Pain (3)  Pain Loc:  (calf pain)      Vitals:  No vitals were obtained today due to virtual visit.    Physical Exam   GENERAL: alert and no distress  EYES: Eyes grossly normal to inspection.  No discharge or erythema, or obvious scleral/conjunctival abnormalities.  RESP: No audible wheeze, cough, or visible cyanosis.    SKIN: Visible skin clear. No significant rash, abnormal pigmentation or lesions.  NEURO: Cranial nerves grossly intact.  Mentation and speech appropriate for age.  PSYCH: Appropriate affect, tone, and pace of words          Video-Visit Details    Type of service:  Video Visit   Originating Location (pt. Location): Home    Distant Location (provider location):  Off-site  Platform used for Video Visit: Rafal  Signed Electronically by: Jose Miguel Ferro PA-C

## 2024-05-14 NOTE — RESULT ENCOUNTER NOTE
"Saqib Corona  Your attached sonogram is reassuringly negative for a DVT/blood clot.  Continue with ice, heat, topical Voltaren type gel and rest.   Consider some stretching and foam rolling as well.    Please contact the office with any questions or concerns.    Shelby Orta \"Jose Miguel\" ELOISE Ferro    "

## 2024-07-17 ENCOUNTER — MYC MEDICAL ADVICE (OUTPATIENT)
Dept: FAMILY MEDICINE | Facility: CLINIC | Age: 44
End: 2024-07-17
Payer: COMMERCIAL

## 2024-07-17 DIAGNOSIS — Z12.83 SCREENING FOR SKIN CANCER: Primary | ICD-10-CM

## 2024-07-17 DIAGNOSIS — L98.9 SKIN LESION: ICD-10-CM

## 2024-07-17 NOTE — TELEPHONE ENCOUNTER
Referral placed to [facility] [place]- hopefully they are not booked out too far.  Thanks- CW

## 2024-07-17 NOTE — TELEPHONE ENCOUNTER
CW,      Please see [employer] message.  Patient last saw you 12/2022.    Has seen MP a few times since then.    Patient scheduled to see you 8/23 for her physical.    Derm referral T'd up.    Thank you,  Ondina aPrr RN

## 2024-07-29 ENCOUNTER — TELEPHONE (OUTPATIENT)
Dept: SURGERY | Facility: CLINIC | Age: 44
End: 2024-07-29
Payer: COMMERCIAL

## 2024-07-29 DIAGNOSIS — Z86.73 HISTORY OF CARDIOEMBOLIC CEREBROVASCULAR ACCIDENT (CVA): ICD-10-CM

## 2024-07-29 DIAGNOSIS — E78.00 ELEVATED CHOLESTEROL: ICD-10-CM

## 2024-07-29 DIAGNOSIS — E66.811 CLASS 1 OBESITY DUE TO EXCESS CALORIES WITH SERIOUS COMORBIDITY AND BODY MASS INDEX (BMI) OF 30.0 TO 30.9 IN ADULT: Primary | ICD-10-CM

## 2024-07-29 DIAGNOSIS — E66.09 CLASS 1 OBESITY DUE TO EXCESS CALORIES WITH SERIOUS COMORBIDITY AND BODY MASS INDEX (BMI) OF 30.0 TO 30.9 IN ADULT: Primary | ICD-10-CM

## 2024-07-29 NOTE — TELEPHONE ENCOUNTER
NEW INSURANCE  Prior Authorization Retail Medication Request    Medication/Dose: PA:  Semaglutide-Weight Management (WEGOVY) 1 MG/0.5ML pen 2 mL 1 4/19/2024 - No  Sig - Route: Inject 1 mg Subcutaneous once a week - Subcutaneous  Sent to pharmacy as: Wegovy 1 MG/0.5ML Subcutaneous Solution Auto-injector (Semaglutide-Weight Management)    Diagnosis and ICD code (if different than what is on RX):  [E66.09, Z68.30] [E78.00]   New/renewal/insurance change PA/secondary ins. PA: Insurance change  Previously Tried and Failed:  Currently on Wegovy and doing well.  Rationale:  Weight management    Insurance   Primary: [employer] OUT OF STATE   Insurance ID:  [id number]       Pharmacy Information (if different than what is on RX)  Name:    [id number] [place] [id number] AT [facility] [address]     Phone:  [phone]   Fax:     [phone]

## 2024-07-30 NOTE — TELEPHONE ENCOUNTER
PA Initiation    Medication: WEGOVY 1 MG/0.5ML SC SOAJ  Insurance Company: [facility] - Phone [phone] Fax [phone]  Pharmacy Filling the Rx: [id number] [place], [place] - [address] AT [facility] [address]  Filling Pharmacy Phone: [phone]   Start Date: 7/30/2024

## 2024-08-01 NOTE — TELEPHONE ENCOUNTER
Pt's Wegovy 1 mg dose is not covered.  Has it at home anyway.  Looks like the 1.7 mg is already approved per PA Team. Just needs to titrate up to the 1.7.  Are you ok ordering the 1.7 and seeing if that will be covered?  I could do it to is you let me know how many refills.  Obed Landon, MS, RD, RN

## 2024-08-01 NOTE — TELEPHONE ENCOUNTER
PRIOR AUTHORIZATION DENIED    Medication: WEGOVY 1 MG/0.5ML SC SOAJ  Insurance Company: [employer] [place] - Phone [phone] Fax [phone]  Denial Date: 8/1/2024  Denial Reason(s): Criteria not met      Appeal Information:   Patient Notified: No

## 2024-08-02 ENCOUNTER — MYC MEDICAL ADVICE (OUTPATIENT)
Dept: SURGERY | Facility: CLINIC | Age: 44
End: 2024-08-02
Payer: COMMERCIAL

## 2024-08-02 DIAGNOSIS — E66.09 CLASS 1 OBESITY DUE TO EXCESS CALORIES WITH SERIOUS COMORBIDITY AND BODY MASS INDEX (BMI) OF 30.0 TO 30.9 IN ADULT: ICD-10-CM

## 2024-08-02 DIAGNOSIS — E78.00 ELEVATED CHOLESTEROL: ICD-10-CM

## 2024-08-02 DIAGNOSIS — E66.811 CLASS 1 OBESITY DUE TO EXCESS CALORIES WITH SERIOUS COMORBIDITY AND BODY MASS INDEX (BMI) OF 30.0 TO 30.9 IN ADULT: ICD-10-CM

## 2024-08-02 RX ORDER — SEMAGLUTIDE 1 MG/.5ML
1 INJECTION, SOLUTION SUBCUTANEOUS WEEKLY
Qty: 2 ML | Refills: 1 | Status: SHIPPED | OUTPATIENT
Start: 2024-08-02

## 2024-08-02 NOTE — TELEPHONE ENCOUNTER
Transferred to patient's preferred pharmacy.    1 mg also sent to see if covered by new insurance or not.    Genevieve PATTERSON RN     (1) More than 48 hours/None

## 2024-08-02 NOTE — TELEPHONE ENCOUNTER
Per VERONICA Glover PA-C Yes - send for the 1.7 mg dose with 4 refills (I believe that should get her till our Dec visit)     Ordered Wegovy 1.7 with 4 RF and pt notified via [facility] of situation.  Zulay Landon, MS, RD, RN

## 2024-08-14 DIAGNOSIS — E78.00 ELEVATED CHOLESTEROL: ICD-10-CM

## 2024-08-14 DIAGNOSIS — Z86.73 HISTORY OF CARDIOEMBOLIC CEREBROVASCULAR ACCIDENT (CVA): ICD-10-CM

## 2024-08-14 DIAGNOSIS — E66.811 CLASS 1 OBESITY DUE TO EXCESS CALORIES WITH SERIOUS COMORBIDITY AND BODY MASS INDEX (BMI) OF 30.0 TO 30.9 IN ADULT: Primary | ICD-10-CM

## 2024-08-14 DIAGNOSIS — E66.09 CLASS 1 OBESITY DUE TO EXCESS CALORIES WITH SERIOUS COMORBIDITY AND BODY MASS INDEX (BMI) OF 30.0 TO 30.9 IN ADULT: Primary | ICD-10-CM

## 2024-08-16 ENCOUNTER — TELEPHONE (OUTPATIENT)
Dept: SURGERY | Facility: CLINIC | Age: 44
End: 2024-08-16
Payer: COMMERCIAL

## 2024-08-16 NOTE — TELEPHONE ENCOUNTER
Prior Authorization Retail Medication Request    Medication/Dose: tirzepatide-Weight Management (ZEPBOUND) 5 MG/0.5ML prefilled pen  Diagnosis and ICD code (if different than what is on RX):  [E66.09, Z68.30] [E78.00]  [Z86.73]    New/renewal/insurance change PA/secondary ins. PA:  Previously Tried and Failed:  Diets, ecercise life style changes.  Rationale:  AOM Considerations:  Phentermine:   Avoid, Wellbutrin caused anxiety and tachycardia at higher doses  Topiramate:     Avoid since taste changes can occur and pt is  and tastes wine for living  GLP-1:             Ozempic covered if DM, Shortage, Pt has sig constipation,  Saxenda apparoved, but on shortage so trying for Zepbound 1/23/24 - denied, wegovy tried for 4/24     Naltrexone:      Candidate, no cravings  Wellbutrin:       Tried but became anxious and pulse increased - utilized previously may restart pending availability of GLP/GIP  Metformin:       N DM, Pre DM  Contrave:        No AOM coverage,  Qsymia:           No AOM coverage,      Insurance   Primary: BCBS [3] - BCBS OUT OF STATE [[id number]]   Insurance ID:    [id number]     Secondary (if applicable):PA  Insurance ID:  If applicable    Pharmacy Information (if different than what is on RX)  [facility]  Phone:  [phone]  Fax:[phone]

## 2024-08-19 NOTE — TELEPHONE ENCOUNTER
PA Initiation    Medication: ZEPBOUND 5 MG/0.5ML SC SOAJ  Insurance Company: [employer] [place] - Phone [phone] Fax [phone]  Pharmacy Filling the Rx: [id number] - [place], [place] - [address]  Filling Pharmacy Phone: [phone]   Start Date: 8/19/2024

## 2024-08-20 NOTE — TELEPHONE ENCOUNTER
Prior Authorization Approval    Medication: ZEPBOUND 5 MG/0.5ML SC SOAJ  Authorization Effective Date: 7/21/2024  Authorization Expiration Date: 8/20/2025  Approved Dose/Quantity: 2ml/28ds  Reference #: [id number]   Insurance Company: [employer] [place] - Phone [phone] Fax [phone]  Expected CoPay: $    CoPay Card Available:      Financial Assistance Needed: NA  Which Pharmacy is filling the prescription: [id number] - [place], [place] - [address] [facility]  Pharmacy Notified: Yes  [facility] Not[facility]: Yes, [facility]

## 2024-08-22 ENCOUNTER — VIRTUAL VISIT (OUTPATIENT)
Dept: FAMILY MEDICINE | Facility: CLINIC | Age: 44
End: 2024-08-22
Payer: COMMERCIAL

## 2024-08-22 DIAGNOSIS — F40.10 SOCIAL ANXIETY DISORDER: ICD-10-CM

## 2024-08-22 PROCEDURE — 99213 OFFICE O/P EST LOW 20 MIN: CPT | Mod: 95 | Performed by: PHYSICIAN ASSISTANT

## 2024-08-22 RX ORDER — PROPRANOLOL HYDROCHLORIDE 10 MG/1
10-40 TABLET ORAL PRN
Qty: 60 TABLET | Refills: 1 | Status: SHIPPED | OUTPATIENT
Start: 2024-08-22

## 2024-08-22 RX ORDER — LORAZEPAM 1 MG/1
.5-1 TABLET ORAL DAILY PRN
Qty: 30 TABLET | Refills: 1 | Status: SHIPPED | OUTPATIENT
Start: 2024-08-22

## 2024-08-22 ASSESSMENT — ANXIETY QUESTIONNAIRES: GAD7 TOTAL SCORE: INCOMPLETE

## 2024-08-22 ASSESSMENT — PATIENT HEALTH QUESTIONNAIRE - PHQ9
10. IF YOU CHECKED OFF ANY PROBLEMS, HOW DIFFICULT HAVE THESE PROBLEMS MADE IT FOR YOU TO DO YOUR WORK, TAKE CARE OF THINGS AT HOME, OR GET ALONG WITH OTHER PEOPLE: SOMEWHAT DIFFICULT
SUM OF ALL RESPONSES TO PHQ QUESTIONS 1-9: 4
SUM OF ALL RESPONSES TO PHQ QUESTIONS 1-9: 4

## 2024-08-22 NOTE — PROGRESS NOTES
Chloe is a 44 year old who is being evaluated via a billable video visit.    How would you like to obtain your AVS? MyChart  If the video visit is dropped, the invitation should be resent by: Text to cell phone: [phone]  Will anyone else be joining your video visit? No      Assessment & Plan     Social anxiety disorder  Long standing, chronic, temporary worsening-  slight increase in ativan use so prescription for #30 lasts ~ 3 months with 1 refill to get to every 6 month appointments for further refills; Proper use and risk for abuse discussed with patient at length. Patient encouraged to further optimize propranolol with refills sent to pharmacy for this as well; Patient to return to clinic when refills needed. Return to clinic with any worsening or changes in symptoms and follow up with PCP for routine care.   - LORazepam (ATIVAN) 1 MG tablet; Take 0.5-1 tablets (0.5-1 mg) by mouth daily as needed for anxiety or sleep (severe anxiety/panic).  - propranolol (INDERAL) 10 MG tablet; Take 1-4 tablets (10-40 mg) by mouth as needed (anxiety (take 30-60 minutes prior to meetings)).    See Patient Instructions    Subjective   Chloe is a 44 year old, presenting for the following health issues:  Anxiety and Refill Request (Ativan PRN)        8/22/2024    10:02 AM   Additional Questions   Roomed by DEVORA Chahal     History of Present Illness       Mental Health Follow-up:  Patient presents to follow-up on Anxiety.    Patient's anxiety since last visit has been:  Worse  The patient is not having other symptoms associated with anxiety.  Any significant life events: job concerns  Patient is feeling anxious or having panic attacks.  Patient has no concerns about alcohol or drug use.    She eats 4 or more servings of fruits and vegetables daily.She consumes 0 sweetened beverage(s) daily.She exercises with enough effort to increase her heart rate 30 to 60 minutes per day.  She exercises with enough effort to increase her  heart rate 5 days per week.   She is taking medications regularly.    Patient started a new job [employer] - has been using ativan more frequently during that time.  Pt requesting refill for PRN ativan prescription.   Routine refills #30 ativan lasts 3 months, last prescription from 4/19/24.  Propranolol helpful as well as needed - takes 1 -2 prior to meetings with good results but wondering about further use  History of wellbutrin, trazodone and naltrexone in the past.    Review of Systems  Constitutional, HEENT, cardiovascular, pulmonary, gi and gu systems are negative, except as otherwise noted.      Objective    Vitals - Patient Reported  Systolic (Patient Reported):  (Pt denies VS to report at this time)  Pain Score: No Pain (0)      Vitals:  No vitals were obtained today due to virtual visit.    Physical Exam   GENERAL: alert and no distress  EYES: Eyes grossly normal to inspection.  No discharge or erythema, or obvious scleral/conjunctival abnormalities.  RESP: No audible wheeze, cough, or visible cyanosis.    SKIN: Visible skin clear. No significant rash, abnormal pigmentation or lesions.  NEURO: Cranial nerves grossly intact.  Mentation and speech appropriate for age.  PSYCH: Appropriate affect, tone, and pace of words          Video-Visit Details    Type of service:  Video Visit   Originating Location (pt. Location): Home    Distant Location (provider location):  On-site  Platform used for Video Visit: Rafal  Signed Electronically by: Jose Miguel Ferro PA-C

## 2024-09-06 ENCOUNTER — MYC MEDICAL ADVICE (OUTPATIENT)
Dept: SURGERY | Facility: CLINIC | Age: 44
End: 2024-09-06
Payer: COMMERCIAL

## 2024-09-06 DIAGNOSIS — E78.00 ELEVATED CHOLESTEROL: Primary | ICD-10-CM

## 2024-09-06 DIAGNOSIS — E66.811 CLASS 1 OBESITY DUE TO EXCESS CALORIES WITH SERIOUS COMORBIDITY AND BODY MASS INDEX (BMI) OF 30.0 TO 30.9 IN ADULT: ICD-10-CM

## 2024-09-06 DIAGNOSIS — E66.09 CLASS 1 OBESITY DUE TO EXCESS CALORIES WITH SERIOUS COMORBIDITY AND BODY MASS INDEX (BMI) OF 30.0 TO 30.9 IN ADULT: ICD-10-CM

## 2024-10-03 ENCOUNTER — TELEPHONE (OUTPATIENT)
Dept: FAMILY MEDICINE | Facility: CLINIC | Age: 44
End: 2024-10-03
Payer: COMMERCIAL

## 2024-10-03 NOTE — TELEPHONE ENCOUNTER
Forms/Letter Request    Type of form/letter:   RX request    Propranolol 10mg tab    Do we have the form/letter: Yes    Who is the form from?   [facility]    Where did/will the form come from? form was faxed in    When is form/letter needed by: n/a    How would you like the form/letter returned:   Fax: [phone]    Patient Notified form requests are processed in 5-7 business days:No    Could we send this information to you in [employer] or would you prefer to receive a phone call?:   n/a    Placed on Jose Miguel Ferro's desk.

## 2024-10-04 NOTE — TELEPHONE ENCOUNTER
Forms faxed to [email or web address] ([employer]) fax # [phone] and copy sent to stat scan.     Yoselin SALGADO

## 2024-10-07 ENCOUNTER — TELEPHONE (OUTPATIENT)
Dept: NEUROLOGY | Facility: CLINIC | Age: 44
End: 2024-10-07
Payer: COMMERCIAL

## 2024-10-07 NOTE — TELEPHONE ENCOUNTER
[facility] Call Center    Phone Message    May a detailed message be left on voicemail: yes     Reason for Call:  pt is return call regarding held appt, pt unable to make it to the in clinic appt. Wondering if this can be a virtual? Please call back to discuss or send a mychart message     Action Taken: Message routed to:  [facility] neurology    Travel Screening: Not Applicable     Date of Service:

## 2024-10-07 NOTE — TELEPHONE ENCOUNTER
"LVM as reply to patient offering \"return visit on Friday, 1/3 at 2:45PM in-person with Dr. Post \" per Tiff La RN    Slot on hold with pt MRN in description. Please schedule if pt can make it    Nkechi Daly on 10/7/2024 at 3:02 PM    "

## 2024-10-07 NOTE — TELEPHONE ENCOUNTER
[facility] Call Center    Phone Message    May a detailed message be left on voicemail: yes     Reason for Call: Other: Pt requesting to schedule follow up visit with provider. LOV 1/11/2022. Providers next available appt wouldn't be until 02/2025 in which patient would not have been seen for 3 years by just a month.  Would it be okay to schedule next avail with provider or is pt going to need a new referral?    Pt is open to virtual or in person appt.    Please advise    Action Taken: Other: Neurology    Travel Screening: Not Applicable

## 2024-10-10 ENCOUNTER — APPOINTMENT (OUTPATIENT)
Dept: CT IMAGING | Facility: CLINIC | Age: 44
End: 2024-10-10
Attending: STUDENT IN AN ORGANIZED HEALTH CARE EDUCATION/TRAINING PROGRAM
Payer: COMMERCIAL

## 2024-10-10 ENCOUNTER — APPOINTMENT (OUTPATIENT)
Dept: CT IMAGING | Facility: CLINIC | Age: 44
End: 2024-10-10
Attending: EMERGENCY MEDICINE
Payer: COMMERCIAL

## 2024-10-10 ENCOUNTER — APPOINTMENT (OUTPATIENT)
Dept: CARDIOLOGY | Facility: CLINIC | Age: 44
End: 2024-10-10
Attending: STUDENT IN AN ORGANIZED HEALTH CARE EDUCATION/TRAINING PROGRAM
Payer: COMMERCIAL

## 2024-10-10 ENCOUNTER — APPOINTMENT (OUTPATIENT)
Dept: MRI IMAGING | Facility: CLINIC | Age: 44
End: 2024-10-10
Attending: STUDENT IN AN ORGANIZED HEALTH CARE EDUCATION/TRAINING PROGRAM
Payer: COMMERCIAL

## 2024-10-10 ENCOUNTER — HOSPITAL ENCOUNTER (INPATIENT)
Facility: CLINIC | Age: 44
LOS: 1 days | Discharge: HOME | End: 2024-10-11
Attending: EMERGENCY MEDICINE | Admitting: INTERNAL MEDICINE
Payer: COMMERCIAL

## 2024-10-10 DIAGNOSIS — Z15.89 MTHFR MUTATION: ICD-10-CM

## 2024-10-10 DIAGNOSIS — G45.9 TIA (TRANSIENT ISCHEMIC ATTACK): ICD-10-CM

## 2024-10-10 DIAGNOSIS — I63.411 CEREBROVASCULAR ACCIDENT (CVA) DUE TO EMBOLISM OF RIGHT MIDDLE CEREBRAL ARTERY (H): Primary | ICD-10-CM

## 2024-10-10 LAB
ANION GAP SERPL CALCULATED.3IONS-SCNC: 13 MMOL/L (ref 7–15)
APTT PPP: 24 SECONDS (ref 22–38)
ATRIAL RATE - MUSE: 101 BPM
BASOPHILS # BLD AUTO: 0 10E3/UL (ref 0–0.2)
BASOPHILS NFR BLD AUTO: 0 %
BUN SERPL-MCNC: 8.2 MG/DL (ref 6–20)
CALCIUM SERPL-MCNC: 8.9 MG/DL (ref 8.8–10.4)
CHLORIDE SERPL-SCNC: 102 MMOL/L (ref 98–107)
CHOLEST SERPL-MCNC: 173 MG/DL
CREAT SERPL-MCNC: 0.77 MG/DL (ref 0.51–0.95)
DIASTOLIC BLOOD PRESSURE - MUSE: NORMAL MMHG
EGFRCR SERPLBLD CKD-EPI 2021: >90 ML/MIN/1.73M2
EOSINOPHIL # BLD AUTO: 0 10E3/UL (ref 0–0.7)
EOSINOPHIL NFR BLD AUTO: 1 %
ERYTHROCYTE [DISTWIDTH] IN BLOOD BY AUTOMATED COUNT: 12.4 % (ref 10–15)
EST. AVERAGE GLUCOSE BLD GHB EST-MCNC: 94 MG/DL
GLUCOSE BLDC GLUCOMTR-MCNC: 113 MG/DL (ref 70–99)
GLUCOSE BLDC GLUCOMTR-MCNC: 99 MG/DL (ref 70–99)
GLUCOSE SERPL-MCNC: 87 MG/DL (ref 70–99)
HBA1C MFR BLD: 4.9 %
HCG INTACT+B SERPL-ACNC: <1 MIU/ML
HCO3 SERPL-SCNC: 21 MMOL/L (ref 22–29)
HCT VFR BLD AUTO: 41.1 % (ref 35–47)
HDLC SERPL-MCNC: 66 MG/DL
HGB BLD-MCNC: 13.9 G/DL (ref 11.7–15.7)
HOLD SPECIMEN: NORMAL
IMM GRANULOCYTES # BLD: 0 10E3/UL
IMM GRANULOCYTES NFR BLD: 0 %
INR PPP: 1.02 (ref 0.85–1.15)
INTERPRETATION ECG - MUSE: NORMAL
LDLC SERPL CALC-MCNC: 97 MG/DL
LVEF ECHO: NORMAL
LYMPHOCYTES # BLD AUTO: 2.8 10E3/UL (ref 0.8–5.3)
LYMPHOCYTES NFR BLD AUTO: 32 %
MCH RBC QN AUTO: 30.1 PG (ref 26.5–33)
MCHC RBC AUTO-ENTMCNC: 33.8 G/DL (ref 31.5–36.5)
MCV RBC AUTO: 89 FL (ref 78–100)
MONOCYTES # BLD AUTO: 0.8 10E3/UL (ref 0–1.3)
MONOCYTES NFR BLD AUTO: 10 %
NEUTROPHILS # BLD AUTO: 5 10E3/UL (ref 1.6–8.3)
NEUTROPHILS NFR BLD AUTO: 58 %
NONHDLC SERPL-MCNC: 107 MG/DL
NRBC # BLD AUTO: 0 10E3/UL
NRBC BLD AUTO-RTO: 0 /100
P AXIS - MUSE: 57 DEGREES
PLATELET # BLD AUTO: 268 10E3/UL (ref 150–450)
POTASSIUM SERPL-SCNC: 3.5 MMOL/L (ref 3.4–5.3)
PR INTERVAL - MUSE: 156 MS
QRS DURATION - MUSE: 80 MS
QT - MUSE: 356 MS
QTC - MUSE: 461 MS
R AXIS - MUSE: 35 DEGREES
RBC # BLD AUTO: 4.62 10E6/UL (ref 3.8–5.2)
SODIUM SERPL-SCNC: 136 MMOL/L (ref 135–145)
SYSTOLIC BLOOD PRESSURE - MUSE: NORMAL MMHG
T AXIS - MUSE: 53 DEGREES
TRIGL SERPL-MCNC: 52 MG/DL
TROPONIN T SERPL HS-MCNC: <6 NG/L
VENTRICULAR RATE- MUSE: 101 BPM
WBC # BLD AUTO: 8.6 10E3/UL (ref 4–11)

## 2024-10-10 PROCEDURE — 84702 CHORIONIC GONADOTROPIN TEST: CPT | Performed by: EMERGENCY MEDICINE

## 2024-10-10 PROCEDURE — 36415 COLL VENOUS BLD VENIPUNCTURE: CPT | Performed by: INTERNAL MEDICINE

## 2024-10-10 PROCEDURE — 250N000011 HC RX IP 250 OP 636: Performed by: EMERGENCY MEDICINE

## 2024-10-10 PROCEDURE — 93005 ELECTROCARDIOGRAM TRACING: CPT

## 2024-10-10 PROCEDURE — 96374 THER/PROPH/DIAG INJ IV PUSH: CPT

## 2024-10-10 PROCEDURE — 85610 PROTHROMBIN TIME: CPT | Performed by: EMERGENCY MEDICINE

## 2024-10-10 PROCEDURE — 250N000013 HC RX MED GY IP 250 OP 250 PS 637: Performed by: STUDENT IN AN ORGANIZED HEALTH CARE EDUCATION/TRAINING PROGRAM

## 2024-10-10 PROCEDURE — 99255 IP/OBS CONSLTJ NEW/EST HI 80: CPT | Mod: 25 | Performed by: INTERNAL MEDICINE

## 2024-10-10 PROCEDURE — 999N000208 ECHOCARDIOGRAM COMPLETE

## 2024-10-10 PROCEDURE — 250N000009 HC RX 250: Performed by: STUDENT IN AN ORGANIZED HEALTH CARE EDUCATION/TRAINING PROGRAM

## 2024-10-10 PROCEDURE — 85025 COMPLETE CBC W/AUTO DIFF WBC: CPT | Performed by: EMERGENCY MEDICINE

## 2024-10-10 PROCEDURE — 84484 ASSAY OF TROPONIN QUANT: CPT | Performed by: EMERGENCY MEDICINE

## 2024-10-10 PROCEDURE — 85014 HEMATOCRIT: CPT | Performed by: EMERGENCY MEDICINE

## 2024-10-10 PROCEDURE — G0378 HOSPITAL OBSERVATION PER HR: HCPCS

## 2024-10-10 PROCEDURE — 80061 LIPID PANEL: CPT | Performed by: STUDENT IN AN ORGANIZED HEALTH CARE EDUCATION/TRAINING PROGRAM

## 2024-10-10 PROCEDURE — A9585 GADOBUTROL INJECTION: HCPCS | Performed by: STUDENT IN AN ORGANIZED HEALTH CARE EDUCATION/TRAINING PROGRAM

## 2024-10-10 PROCEDURE — 70450 CT HEAD/BRAIN W/O DYE: CPT

## 2024-10-10 PROCEDURE — 82947 ASSAY GLUCOSE BLOOD QUANT: CPT | Performed by: EMERGENCY MEDICINE

## 2024-10-10 PROCEDURE — 93306 TTE W/DOPPLER COMPLETE: CPT | Mod: 26 | Performed by: INTERNAL MEDICINE

## 2024-10-10 PROCEDURE — 83036 HEMOGLOBIN GLYCOSYLATED A1C: CPT | Performed by: STUDENT IN AN ORGANIZED HEALTH CARE EDUCATION/TRAINING PROGRAM

## 2024-10-10 PROCEDURE — 93306 TTE W/DOPPLER COMPLETE: CPT

## 2024-10-10 PROCEDURE — 250N000013 HC RX MED GY IP 250 OP 250 PS 637: Performed by: INTERNAL MEDICINE

## 2024-10-10 PROCEDURE — 74177 CT ABD & PELVIS W/CONTRAST: CPT

## 2024-10-10 PROCEDURE — 36415 COLL VENOUS BLD VENIPUNCTURE: CPT | Performed by: EMERGENCY MEDICINE

## 2024-10-10 PROCEDURE — 83695 ASSAY OF LIPOPROTEIN(A): CPT | Performed by: INTERNAL MEDICINE

## 2024-10-10 PROCEDURE — 250N000013 HC RX MED GY IP 250 OP 250 PS 637: Performed by: PSYCHIATRY & NEUROLOGY

## 2024-10-10 PROCEDURE — 255N000002 HC RX 255 OP 636: Performed by: STUDENT IN AN ORGANIZED HEALTH CARE EDUCATION/TRAINING PROGRAM

## 2024-10-10 PROCEDURE — 250N000011 HC RX IP 250 OP 636: Performed by: STUDENT IN AN ORGANIZED HEALTH CARE EDUCATION/TRAINING PROGRAM

## 2024-10-10 PROCEDURE — 85730 THROMBOPLASTIN TIME PARTIAL: CPT | Performed by: EMERGENCY MEDICINE

## 2024-10-10 PROCEDURE — 99221 1ST HOSP IP/OBS SF/LOW 40: CPT | Mod: GC | Performed by: STUDENT IN AN ORGANIZED HEALTH CARE EDUCATION/TRAINING PROGRAM

## 2024-10-10 PROCEDURE — 82962 GLUCOSE BLOOD TEST: CPT

## 2024-10-10 PROCEDURE — 250N000009 HC RX 250: Performed by: EMERGENCY MEDICINE

## 2024-10-10 PROCEDURE — 99222 1ST HOSP IP/OBS MODERATE 55: CPT | Performed by: INTERNAL MEDICINE

## 2024-10-10 PROCEDURE — 70496 CT ANGIOGRAPHY HEAD: CPT

## 2024-10-10 PROCEDURE — 70553 MRI BRAIN STEM W/O & W/DYE: CPT

## 2024-10-10 PROCEDURE — 99285 EMERGENCY DEPT VISIT HI MDM: CPT | Mod: 25

## 2024-10-10 PROCEDURE — 80048 BASIC METABOLIC PNL TOTAL CA: CPT | Performed by: EMERGENCY MEDICINE

## 2024-10-10 RX ORDER — ACETAMINOPHEN 325 MG/1
325-650 TABLET ORAL EVERY 6 HOURS PRN
Status: DISCONTINUED | OUTPATIENT
Start: 2024-10-10 | End: 2024-10-10

## 2024-10-10 RX ORDER — ACETAMINOPHEN 325 MG/1
325-650 TABLET ORAL EVERY 4 HOURS PRN
Status: DISCONTINUED | OUTPATIENT
Start: 2024-10-10 | End: 2024-10-11 | Stop reason: HOSPADM

## 2024-10-10 RX ORDER — ASPIRIN 325 MG
325 TABLET ORAL ONCE
Status: COMPLETED | OUTPATIENT
Start: 2024-10-10 | End: 2024-10-10

## 2024-10-10 RX ORDER — CLOPIDOGREL 300 MG/1
300 TABLET, FILM COATED ORAL ONCE
Status: COMPLETED | OUTPATIENT
Start: 2024-10-10 | End: 2024-10-10

## 2024-10-10 RX ORDER — LORAZEPAM 2 MG/ML
1 INJECTION INTRAMUSCULAR ONCE
Status: COMPLETED | OUTPATIENT
Start: 2024-10-10 | End: 2024-10-10

## 2024-10-10 RX ORDER — GADOBUTROL 604.72 MG/ML
7 INJECTION INTRAVENOUS ONCE
Status: COMPLETED | OUTPATIENT
Start: 2024-10-10 | End: 2024-10-10

## 2024-10-10 RX ORDER — LIDOCAINE 40 MG/G
CREAM TOPICAL
Status: CANCELLED | OUTPATIENT
Start: 2024-10-10

## 2024-10-10 RX ORDER — ASPIRIN 81 MG/1
81 TABLET, CHEWABLE ORAL DAILY
Status: DISCONTINUED | OUTPATIENT
Start: 2024-10-11 | End: 2024-10-11 | Stop reason: HOSPADM

## 2024-10-10 RX ORDER — LORAZEPAM 1 MG/1
1 TABLET ORAL ONCE
Status: COMPLETED | OUTPATIENT
Start: 2024-10-10 | End: 2024-10-10

## 2024-10-10 RX ORDER — IOPAMIDOL 755 MG/ML
67 INJECTION, SOLUTION INTRAVASCULAR ONCE
Status: COMPLETED | OUTPATIENT
Start: 2024-10-10 | End: 2024-10-10

## 2024-10-10 RX ORDER — CLOPIDOGREL BISULFATE 75 MG/1
75 TABLET ORAL DAILY
Status: DISCONTINUED | OUTPATIENT
Start: 2024-10-11 | End: 2024-10-11 | Stop reason: HOSPADM

## 2024-10-10 RX ORDER — IOPAMIDOL 755 MG/ML
94 INJECTION, SOLUTION INTRAVASCULAR ONCE
Status: COMPLETED | OUTPATIENT
Start: 2024-10-10 | End: 2024-10-10

## 2024-10-10 RX ADMIN — GADOBUTROL 7 ML: 604.72 INJECTION INTRAVENOUS at 15:49

## 2024-10-10 RX ADMIN — IOPAMIDOL 67 ML: 755 INJECTION, SOLUTION INTRAVENOUS at 12:25

## 2024-10-10 RX ADMIN — SODIUM CHLORIDE 69 ML: 9 INJECTION, SOLUTION INTRAVENOUS at 18:41

## 2024-10-10 RX ADMIN — IOPAMIDOL 94 ML: 755 INJECTION, SOLUTION INTRAVENOUS at 18:35

## 2024-10-10 RX ADMIN — ASPIRIN 325 MG ORAL TABLET 325 MG: 325 PILL ORAL at 13:02

## 2024-10-10 RX ADMIN — LORAZEPAM 1 MG: 2 INJECTION INTRAMUSCULAR; INTRAVENOUS at 14:55

## 2024-10-10 RX ADMIN — ACETAMINOPHEN 650 MG: 325 TABLET ORAL at 16:43

## 2024-10-10 RX ADMIN — SODIUM CHLORIDE 100 ML: 9 INJECTION, SOLUTION INTRAVENOUS at 12:25

## 2024-10-10 RX ADMIN — ACETAMINOPHEN 325 MG: 325 TABLET ORAL at 22:51

## 2024-10-10 RX ADMIN — CLOPIDOGREL BISULFATE 300 MG: 300 TABLET, FILM COATED ORAL at 13:02

## 2024-10-10 RX ADMIN — LORAZEPAM 1 MG: 1 TABLET ORAL at 22:52

## 2024-10-10 ASSESSMENT — ACTIVITIES OF DAILY LIVING (ADL)
ADLS_ACUITY_SCORE: 35

## 2024-10-10 NOTE — ED TRIAGE NOTES
"Pt presents via [facility] for evaluation of left sided mouth numbness.     Per EMS : 2x strokes in 2015 while patient was pregnant had a PFO closure, last night 1700 left sided mouth numbnes , today speak to  at 1130, left facial droop and aphasia lasting 30 seconds at 1130, resolved PTA, still having left sided mouth numbness, pt speaks about \"aura\" before this, hx of migraines on propanolol, pre hospital blood sugar: 91     Triage Assessment (Adult)       Row Name 10/10/24 1213          Triage Assessment    Airway WDL WDL        Cognitive/Neuro/Behavioral WDL    Cognitive/Neuro/Behavioral WDL WDL   Left sided mouth numbness                     "

## 2024-10-10 NOTE — ED NOTES
Bed: ED05  Expected date:   Expected time:   Means of arrival:   Comments:  [address]ems [address] 44 F face numb started yesterday stroke hx eta 1208

## 2024-10-10 NOTE — ED NOTES
"[facility]  ED Nurse Handoff Report    ED Chief complaint: Numbness      ED Diagnosis:   Final diagnoses:   TIA (transient ischemic attack)       Code Status: To be addressed by admitting MD.     Allergies:   Allergies   Allergen Reactions    Compazine [Prochlorperazine] Muscle Pain (Myalgia)       Patient Story: Pt presents via [facility] for evaluation of left sided mouth numbness.      Per EMS : 2x strokes in 2015 while patient was pregnant had a PFO closure, last night 1700 left sided mouth numbnes , today speak to  at 1130, left facial droop and aphasia lasting 30 seconds at 1130, resolved PTA, still having left sided mouth numbness, pt speaks about \"aura\" before this, hx of migraines on propanolol, pre hospital blood sugar: 91  Focused Assessment:    Alert and oriented x4, no neuro deficits in ED currently, NSR - continuous cardiac monitor, room air, respirations even and unlabored.     Treatments and/or interventions provided:   CTA Head Neck with Contrast   Final Result   IMPRESSION:    1.  No large vessel arterial occlusion or high-grade stenosis in the   head or neck. No arterial dissection.    2.  Bilateral thyroid nodules. Recommend dedicated follow-up   nonemergent ultrasound for further evaluation.      Findings discussed with Dr. Salgado at 1241 hours on 10/10/2024.      BROWN SIMONS DO            SYSTEM ID:  [id number]      CT Head w/o Contrast   Final Result   IMPRESSION: Negative for acute intracranial hemorrhage, hydrocephalus   or transcortical infarct.       Findings discussed with Dr. Salgado at 1241 hours on 10/10/2024.      BROWN SIMONS DO            SYSTEM ID:  [id number]      Echocardiogram Complete    (Results Pending)   MR Brain w/o & w Contrast    (Results Pending)       Patient's response to treatments and/or interventions: Tolerated appropriately.     To be done/followed up on inpatient unit:  MRI results, neurology consult     Does this patient have any cognitive " concerns?:  NA     Activity level - Baseline/Home:  Independent  Activity Level - Current:   Independent    Patient's Preferred language: English   Needed?: No    Isolation: None  Infection: Not Applicable  Patient tested for COVID 19 prior to admission: NO  Bariatric?: No    Vital Signs:   Vitals:    10/10/24 1212 10/10/24 1223 10/10/24 1238 10/10/24 1253   BP: (!) 140/80      Pulse:  105 103 103   Resp:  14  17   Temp:       TempSrc:       SpO2:  98% 98% 95%     \  Family Comments:  at bedside in ED.   OBS brochure/video discussed/provided to patient/family: N/A              Name of person given brochure if not patient: NA               Relationship to patient: NA     For the majority of the shift this patient's behavior was Green.   Behavioral interventions performed were frequent rounding in ED.    ED NURSE PHONE NUMBER: [phone]

## 2024-10-10 NOTE — PHARMACY-ADMISSION MEDICATION HISTORY
"Pharmacist Admission Medication History    Admission medication history is complete. The information provided in this note is only as accurate as the sources available at the time of the update.    Information Source(s): Patient and CareEverywhere/SureScripts via in-person    Pertinent Information: none    Changes made to PTA medication list:  Added: None  Deleted: aspirin, bupropion, Multivitamin, naltrexone, B-Right vitamin B complex, Nurtec, Wegovy (marked \"not taking as has refills left\"), duplicate 5mg Zepbound (marked \"not taking\" as has refills, but pt currently using 7.5mg dose), trazodone.  Changed: magnesium to prn, miralax to prn    Allergies reviewed with patient and updates made in EHR: yes    Medication History Completed By: Cheyenne López RP 10/10/2024 1:25 PM    PTA Med List   Medication Sig Last Dose    acetaminophen (TYLENOL) 325 MG tablet Take 325-650 mg by mouth every 6 hours as needed for mild pain prn    aspirin-acetaminophen-caffeine (EXCEDRIN MIGRAINE) 250-250-65 MG tablet Take 1 tablet by mouth every 6 hours as needed for headaches prn    LORazepam (ATIVAN) 1 MG tablet Take 0.5-1 tablets (0.5-1 mg) by mouth daily as needed for anxiety or sleep (severe anxiety/panic). prn    magnesium oxide (MAG-OX) 400 MG tablet Take 400 mg by mouth nightly as needed (leg cramps). prn    Omega-3 Fatty Acids (FISH OIL PO) Take 1 capsule by mouth At Bedtime  10/9/2024 at pm    polyethylene glycol (MIRALAX) 17 GM/Dose powder Take 1 capful (17g) of Miralax mixed with 8 oz of a clear liquid twice daily for 7 days prior to your scheduled procedure. (Patient taking differently: Take 17 g by mouth 2 times daily as needed. Take 1 capful (17g) of Miralax mixed with 8 oz of a clear liquid twice daily for 7 days prior to your scheduled procedure.) prn    propranolol (INDERAL) 10 MG tablet Take 1-4 tablets (10-40 mg) by mouth as needed (anxiety (take 30-60 minutes prior to meetings)). prn    tirzepatide-Weight Management " (ZEPBOUND) 7.5 MG/0.5ML prefilled pen Inject 0.5 mLs (7.5 mg) subcutaneously every 7 days. 10/4/2024    VITAMIN D, CHOLECALCIFEROL, PO Take 1,000 Units by mouth every evening. 10/9/2024 at pm

## 2024-10-10 NOTE — H&P
"[facility]    History and Physical - Hospitalist Service       Date of Admission:  10/10/2024    Assessment & Plan      Chloe Foster is a 44 year old female history of CVA with PFO closure, anxiety/depression, elevated lipoprotein a, who presented to the ER with acute onset of left facial numbness, left sided numbness and speech difficulty this am. She was talking and started to feel the inside of her mouth on the left go numb. Also noted speech slurring. This was similar to prior stroke in 2015. She got out the words \"stroke\" to her  and brought to the ER.   Symptoms already reasolving by arrival. CT head/CTA head/neck negative for acute stroke     TIA (MRI confirmed CVA)   History of cardioembolic CVA  S/p PFO closure   ADDENDUM: Later MRI + small infarct right middle/inferior frontal cortex.   - History of Migraines/CVA   - previous stroke during 3rd trimester of pregnancy. 5/2015 with subsequent PFO closure  - Extensive stroke evaluation/history as outlined in H and P from prior admission.   - Seen by stroke neuro in ER. No thrombolytics as already improving.   - Loaded with plavix 300/ASA and continued daily dose  - MRI returned after neurology in ER visit showing a small focal infarct in the right middle/inferior frontal gyral cortex.  - Permissive hypertension   - neuro checks.   - PT/OT/speech.   - DAPT for now.   - Neuro checks  - glucose checks, lipids.     PFO closure 4/2016   - Had cardiology and neurology consult with PFO closure.   - 7/2016 follow up ECHO showed complete closure.   - Plavix for 6 months then ASA   - Will have cardiology consult with recurrent TIA symptoms in the setting of closure of her PFO.   - ECHO ordered with normal EF 55% and ASD closure  - GODWIN ordered in am.     Protein S evaluation   - After stroke 5/2015   - Had CVA 5/2015 diagnosed on MRI and subsequent CVA 2 weeks later.   - Workup revealed low protein free S level (5/2/2015) 29% and " Protein S activity level 22%.   - Additional thrombophilia testing at that time included testing for antiphospholipid antibodies (including lupus anticoagulant, cardiolipin, and beta-2 glycoprotein antibodies, all of which were negative).  She was not found to have the factor V Leiden or prothrombin gene mutation.    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   - Ultimate plan for plavix 6 months then ASA after PFO closure.   - Heme consult for re evaluation of status of her hyper coagulatable history and if additional testing or change in tx warranted    History of migraines.   - Hold on migraine med >> constriction meds       Social Anxiety disorder  Moderate recurrent depression   - On no daily meds per list  - prn inderal and ativan     Weight management drug  - hold this admit    Other past history   IBS   Disc herniation   Hirsutism   Insomnia         Diet: NPO for Medical/Clinical Reasons Except for: No Exceptions  Pass swallow eval and NPO after midnigth for GODWIN   DVT Prophylaxis: Pneumatic Compression Devices: on ASA and plavix load in ER   Parker Catheter: Not present  Lines: None     Cardiac Monitoring: None  Code Status:  full     Clinically Significant Risk Factors Present on Admission                 # Drug Induced Platelet Defect: home medication list includes an antiplatelet medication                      Disposition Plan     Medically Ready for Discharge: Anticipated Tomorrow           HORACIO RAMIREZ MD  Hospitalist Service  [facility]  Securely message with [employer] (more info)  Text page via [employer] Paging/Directory     ______________________________________________________________________    Chief Complaint   Left facial/left sided numbness, slurred speech    History is obtained from the patient, EPIC and family     History of Present Illness   Chloe Foster is  "a 44 year old female history of CVA with PFO closure, anxiety/depression, elevated lipoprotein a, who presented to the ER with acute onset of left facial numbness, left sided numbness and speech difficulty this am. She was talking and started to feel the inside of her mouth on the left go numb. Also noted speech slurring. This was similar to prior stroke in 2015. She got out the words \"stroke\" to her  and brought to the ER.   Symptoms lasted about 1 minute with only mild residual numbness on the inside of her mouth on arrival to ER. On no blood thinners.   Stroke team initiated. /80. Symptoms rapidly resolving. Seen by stroke team in the ER and diagnosed with high risk TIA. CT negative and CTA no LVO. Started on ASA and plavix loaded     Complex stroke history   She had a CVA in 5/2015 at the time of her pregnancy. She was 36 weeks pregnant. Presented initially to ER with headache with potential migraine. Discharged home   Presented to ER 5/1 - 5/7/2015 to [facility] and diagnosed with CVA (embolic with PFO) Left sided paresthesias/weaness. Left face and tongue. MRI/MRA head and neck showed multiple foci about the right insular cortex and posterior right frontal lobe. Not TPA candidate as symptoms resolved. GODWIN no thrombus. Patient with PFO. Normal EF and negative venous dopplers. ASA then second episode so started on heparin. Thrombophilia work up. ESUS. Discharged and recommended therapeutic heparin as not delivered. Venogram negative as well.   5/14-5/20 readmit to [facility]. Symptoms recurred. Started on IV heparin and stroke neurology. MRI pelvis unequivocal for thrombus. Throughout stay had intermittent episodes of neuro symptoms (vision loss, tinnitus, loss speech, hand numbness) resolved spontaneously. Seen by neurology, cardiology, hematology and OB. + hypercoagulatable state. Decrease protein S (normal in pregnancy) changed to lovenox.   5/26/2024 outpatient MRI through neurology: " "subacute enhancing infarction corresponding to acute infarct right insular/subinsular cortex and deep white matter. New ischemia posterior portion right centrum semiovale  Evaluation with cardiology for PFO   8/25/2015 Neurology stroke follow up/neurointervention. R MCA distribution stroke. Crytogenic but presumed \"R ICA dissection\" healed spontaneously. CTA head/neck confirmed no dissection 8/31/2015   Discussion with neurology: changed to ASA 13 weeks post partum. From lovenox.   9/2015 Cardiology follow up for crytogentic CVA.   4/2016 PFO closure  Stopped ASA about 3 years ago.    She has clinically been doing well since her PFO closure. Had been on ASA until about 3 years ago.   Presentation as above.       Past Medical History    Past Medical History:   Diagnosis Date    Acute stress reaction     propranolol helps prior to interviews, stressful situations    Allergic rhinitis, cause unspecified     no meds except prn flonase, tests generally positive, decided against shots    Anxiety state, unspecified     citalopram started in 7/06, stopped after couple wks, felt sluggish/tired    CVA (cerebral vascular accident) (H)     May 1, May 14th 2015    Depressive disorder, not elsewhere classified     dx, but not sure this is correct, weaned off wellbutrin (4/05-6/06, 12/06-1/08), restarted 3/08    Dizziness     Elevated lipoprotein A level     Hirsutism     saw endo, inconclusive, started on spironolactone (helped a little), stopped in 6/06, elevated DHEA- sees new endo 4/08    History of stroke with residual effects     PFO (patent foramen ovale)     Post PFO/ASD closure with 30mm Amplatzer device 4/21/16    Protein S deficiency (H)     Per Dr. Casarez, prengnacy related.  Protein S levels normalized afer pregnancy.    Urinary tract infection, site not specified     recurrent, start nitrofur in 5/08, 6mo txt       Past Surgical History   Past Surgical History:   Procedure Laterality Date    ARTHROSCOPY KNEE RT/LT  " 1995    Rt knee    COLONOSCOPY N/A 9/25/2023    Procedure: Colonoscopy;  Surgeon: Alex Gross MD;  Location:  GI    ESOPHAGOSCOPY, GASTROSCOPY, DUODENOSCOPY (EGD), COMBINED N/A 9/25/2023    Procedure: ESOPHAGOGASTRODUODENOSCOPY, WITH BIOPSY;  Surgeon: Alex Gross MD;  Location:  GI    GYN SURGERY      HC REPAIR OF NASAL SEPTUM  12/2005    REPAIR PATENT FORAMEN OVALE  04/21/2016    SEPTORHINOPLASTY N/A 03/15/2021    Procedure: Revision Septorhinoplasty, Repair of Nasal Valve Collapse, Cadaveric Rib Graft + 45 min Cosmetic Tip Rhinoplasty;  Surgeon: Nikki Schwartz MD;  [place] [facility] [place]       Prior to Admission Medications   Prior to Admission Medications   Prescriptions Last Dose Informant Patient Reported? Taking?   LORazepam (ATIVAN) 1 MG tablet   No No   Sig: Take 0.5-1 tablets (0.5-1 mg) by mouth daily as needed for anxiety or sleep (severe anxiety/panic).   Multiple Vitamins-Minerals (MULTIVITAMIN ADULT EXTRA C PO)   Yes No   Sig: Take by mouth every evening   NONFORMULARY  Self Yes No   Sig: Take 1 tablet by mouth every evening OTC: B-Right Vitamin (Optimized B Complex)   Omega-3 Fatty Acids (FISH OIL PO)  Self Yes No   Sig: Take 1 capsule by mouth At Bedtime    Rimegepant Sulfate (NURTEC) 75 MG TBDP   No No   Sig: Take 75 mg by mouth daily as needed (migraine)   Patient not taking: Reported on 8/22/2024   STATIN NOT PRESCRIBED, INTENTIONAL,   No No   Sig: Statin not prescribed intentionally due to Woman of childbearing age not actively taking birth control   Semaglutide-Weight Management (WEGOVY) 0.25 MG/0.5ML pen   No No   Sig: Inject 0.25 mg Subcutaneous once a week   Patient not taking: Reported on 8/22/2024   Semaglutide-Weight Management (WEGOVY) 0.5 MG/0.5ML pen   No No   Sig: Inject 0.5 mg Subcutaneous once a week   Patient not taking: Reported on 8/22/2024   Semaglutide-Weight Management (WEGOVY) 1 MG/0.5ML pen   No No   Sig: Inject 1 mg subcutaneously once a week   Semaglutide-Weight  Management (WEGOVY) 1.7 MG/0.75ML pen   No No   Sig: Inject 1.7 mg subcutaneously once a week   Patient not taking: Reported on 8/22/2024   VITAMIN D, CHOLECALCIFEROL, PO  Self Yes No   Sig: Take 1,000 Units by mouth every evening (takes 2 x 100 unit capsule)    acetaminophen (TYLENOL) 325 MG tablet   Yes No   Sig: Take 325-650 mg by mouth every 6 hours as needed for mild pain   aspirin EC 81 MG EC tablet   Yes No   Sig: Take 81 mg by mouth every evening    aspirin-acetaminophen-caffeine (EXCEDRIN MIGRAINE) 250-250-65 MG tablet   Yes No   Sig: Take 1 tablet by mouth every 6 hours as needed for headaches   buPROPion (WELLBUTRIN) 75 MG tablet   No No   Sig: Take 0.5 tablets (37.5 mg) by mouth 2 times daily   Patient not taking: Reported on 8/22/2024   insulin pen needle (31G X 5 MM) 31G X 5 MM miscellaneous   No No   Sig: Use 1 pen needles daily or as directed.   magnesium oxide (MAG-OX) 400 MG tablet  Self Yes No   Sig: Take 400 mg by mouth every evening   naltrexone (DEPADE/REVIA) 50 MG tablet   No No   Sig: Take 1/2 tablet 1-2 hours before biggest craving time for 7 days, then increase to 1/2 tablet twice daily.   Patient not taking: Reported on 8/22/2024   polyethylene glycol (MIRALAX) 17 GM/Dose powder   No No   Sig: Take 1 capful (17g) of Miralax mixed with 8 oz of a clear liquid twice daily for 7 days prior to your scheduled procedure.   propranolol (INDERAL) 10 MG tablet   No No   Sig: Take 1-4 tablets (10-40 mg) by mouth as needed (anxiety (take 30-60 minutes prior to meetings)).   tirzepatide-Weight Management (ZEPBOUND) 5 MG/0.5ML prefilled pen   No No   Sig: Inject 0.5 mLs (5 mg) subcutaneously every 7 days   tirzepatide-Weight Management (ZEPBOUND) 7.5 MG/0.5ML prefilled pen   No No   Sig: Inject 0.5 mLs (7.5 mg) subcutaneously every 7 days.   traZODone (DESYREL) 50 MG tablet   No No   Sig: Take 0.5-1 tablets (25-50 mg) by mouth At Bedtime   Patient not taking: Reported on 8/22/2024       Facility-Administered Medications: None        Review of Systems    The 10 point Review of Systems is negative other than noted in the HPI or here.     Social History   I have reviewed this patient's social history and updated it with pertinent information if needed.  Social History     Tobacco Use    Smoking status: Former     Current packs/day: 0.00     Average packs/day: 0.5 packs/day for 5.0 years (2.5 ttl pk-yrs)     Types: Cigarettes     Start date: 2009     Quit date: 2014     Years since quitting: 10.4    Smokeless tobacco: Never   Vaping Use    Vaping status: Never Used   Substance Use Topics    Alcohol use: Yes     Comment: SOCIAL    Drug use: No         Family History   I have reviewed this patient's family history and updated it with pertinent information if needed.  Family History   Problem Relation Age of Onset    Family History Negative Mother     Cancer Father         throat, possibly asbestos exposure    Hypertension Father     Allergies Father     Arrhythmia Father     Other Cancer Father     Family History Negative Sister     Family History Negative Brother     Diabetes Maternal Grandmother         on insulin    C.A.D. Maternal Grandmother         triple bypass in her 60s    C.A.D. Maternal Grandfather         MI in 50s    Diabetes Paternal Grandmother     Neurologic Disorder Paternal Grandfather          of brain aneurysm in early 60s    Family History Negative Son     Diabetes Maternal Aunt          in her early 40s of DM complications, type 2    Coronary Artery Disease Early Onset Maternal Aunt 41    Obesity Maternal Aunt          Allergies   Allergies   Allergen Reactions    Compazine [Prochlorperazine] Muscle Pain (Myalgia)        Physical Exam   Vital Signs: Temp: 98  F (36.7  C) Temp src: Oral BP: (!) 140/80 Pulse: 103   Resp: 17 SpO2: 95 % O2 Device: None (Room air)    Weight: 0 lbs 0 oz    General Appearance: Patient is awake and alert   Respiratory: Lungs: clear to  auscultation bilaterally   Cardiovascular: Regular rate with no gallop or rub  GI: + BS, soft, non tender  Skin: No edema   4/4 strength in all extremities.   No facial droop and CN intact with speech intact     Medical Decision Making       50 MINUTES SPENT BY ME on the date of service doing chart review, history, exam, documentation & further activities per the note.      Data     I have personally reviewed the following data over the past 24 hrs:    8.6  \   13.9   / 268     136 102 8.2 /  87   3.5 21 (L) 0.77 \     Trop: <6 BNP: N/A     TSH: N/A T4: N/A A1C: 4.9     INR:  1.02 PTT:  24   D-dimer:  N/A Fibrinogen:  N/A       Imaging results reviewed over the past 24 hrs:   Recent Results (from the past 24 hour(s))   CT Head w/o Contrast    Narrative    EXAM: CT HEAD W/O CONTRAST  10/10/2024 12:28 PM     HISTORY: Code Stroke to evaluate for potential thrombolysis and  thrombectomy. PLEASE READ IMMEDIATELY.       COMPARISON: None    TECHNIQUE: Using multidetector thin collimation helical acquisition  technique, axial, coronal and sagittal CT images from the skull base  to the vertex were obtained without intravenous contrast.   (topogram) image(s) also obtained and reviewed. Dose reduction  techniques were used.    FINDINGS:  No acute intracranial hemorrhage, mass effect, or midline shift.  Preserved gray-white matter differentiation and subarachnoid spaces.    Atraumatic calvarium. No substantial paranasal sinus mucosal disease.  Clear mastoid air cells. Nonfocal orbits.       Impression    IMPRESSION: Negative for acute intracranial hemorrhage, hydrocephalus  or transcortical infarct.     Findings discussed with Dr. Salgado at 1241 hours on 10/10/2024.    BROWN SIMONS DO         SYSTEM ID:  [id number]   CTA Head Neck with Contrast    Narrative    CT ANGIOGRAM OF THE HEAD AND NECK WITH CONTRAST  10/10/2024 12:28 PM     HISTORY: Code Stroke to evaluate for potential thrombolysis and  thrombectomy. PLEASE READ  IMMEDIATELY.    TECHNIQUE:  CT angiography with an injection of 67 mL [id number]-370 IV  with scans through the head and neck. Images were transferred to a  separate 3-D workstation where multiplanar reformations and 3-D images  were created. Estimates of carotid stenoses are made relative to the  distal internal carotid artery diameters except as noted. Radiation  dose for this scan was reduced using automated exposure control,  adjustment of the mA and/or kV according to patient size, or iterative  reconstruction technique.    COMPARISON: None.     CT HEAD FINDINGS: No contrast enhancing lesions. Cerebral blood flow  is grossly normal.     CT ANGIOGRAM HEAD FINDINGS:  The major intracranial arteries including  the proximal branches of the anterior cerebral, middle cerebral, and  posterior cerebral arteries appear patent without vascular cutoff. No  aneurysm identified. No significant stenosis. Venous circulation is  unremarkable.     CT ANGIOGRAM NECK FINDINGS:   Normal origin of the great vessels from the aortic arch.     Right carotid artery: The right common and internal carotid arteries  are patent. No significant stenosis or atherosclerotic disease in the  carotid artery.     Left carotid artery: The left common and internal carotid arteries are  patent. No significant stenosis or atherosclerotic disease in the  carotid artery.     Vertebral arteries: Vertebral arteries are patent without evidence of  dissection. No significant stenosis.     Other findings: Bilateral thyroid nodules measuring up to 9 mm.       Impression    IMPRESSION:   1.  No large vessel arterial occlusion or high-grade stenosis in the  head or neck. No arterial dissection.   2.  Bilateral thyroid nodules. Recommend dedicated follow-up  nonemergent ultrasound for further evaluation.    Findings discussed with Dr. Salgado at 1241 hours on 10/10/2024.    BROWN SIMONS DO         SYSTEM ID:  [id number]   Echocardiogram Complete   Result Value    LVEF   55%    [facility]    [id number]  [id number]  [id number]^JORGE^BONILLA     [facility]  Echocardiography Laboratory  [address] [place] [address]     Name: CARMEN OLSEN  MRN: [id number]  : 1980  Study Date: 10/10/2024 01:10 PM  Age: 44 yrs  Gender: Female  Patient Location: [facility]  Reason For Study: Cerebrovascular Incident  Ordering Physician: BONILLA PATEL  Performed By: Catie Amaya     BSA: 1.9 m2  Height: 66 in  Weight: 187 lb  BP: 140/80 mmHg  ______________________________________________________________________________  Procedure  Complete Portable Bubble Echo Adult.  ______________________________________________________________________________  Interpretation Summary     1. The left ventricle is normal in structure, function and size. The visual  ejection fraction is estimated at 55%.  2. The right ventricle is normal in structure, function and size.  3. ASD closure device in place. Negative bubble study.  4. No valve disease.     No changes from echo .  ______________________________________________________________________________  Left Ventricle  The left ventricle is normal in structure, function and size. There is normal  left ventricular wall thickness. The visual ejection fraction is estimated at  55%. Left ventricular diastolic function is normal. Normal left ventricular  wall motion.     Right Ventricle  The right ventricle is normal in structure, function and size.     Atria  Normal left atrial size. Right atrial size is normal. ASD closure device in  place. Negative bubble study.     Mitral Valve  The mitral valve is normal in structure and function.     Tricuspid Valve  The tricuspid valve is normal in structure and function.     Aortic Valve  The aortic valve is normal in structure and function.     Pulmonic Valve  The pulmonic valve is normal in structure and function.     Vessels  Normal ascending, transverse (arch), and descending aorta. The  inferior vena  cava was normal in size with preserved respiratory variability.     Pericardium  There is no pericardial effusion.     Rhythm  Sinus rhythm was noted.  ______________________________________________________________________________  MMode/2D Measurements & Calculations  IVSd: 0.70 cm  LVIDd: 4.4 cm  LVIDs: 3.0 cm  LVPWd: 0.70 cm  FS: 31.0 %  LV mass(C)d: 92.1 grams  LV mass(C)dI: 47.4 grams/m2  Ao root diam: 3.2 cm  LA dimension: 3.6 cm  asc Aorta Diam: 3.6 cm  LA/Ao: 1.1  LVOT diam: 2.1 cm  LVOT area: 3.4 cm2  Ao root diam index Ht(cm/m): 1.9  Ao root diam index BSA (cm/m2): 1.7  Asc Ao diam index BSA (cm/m2): 1.9  Asc Ao diam index Ht(cm/m): 2.2  LA Volume (BP): 46.7 ml     LA Volume Index (BP): 24.1 ml/m2  RV Base: 3.5 cm  RWT: 0.32  TAPSE: 2.1 cm     Doppler Measurements & Calculations  MV E max jon: 67.6 cm/sec  MV dec time: 0.11 sec  E/E' av.1  Lateral E/e': 3.8  Medial E/e': 6.4  RV S Jon: 12.3 cm/sec     ______________________________________________________________________________  Report approved by: Steph Wasserman 10/10/2024 02:50 PM

## 2024-10-10 NOTE — CONSULTS
[facility]    Cardiology Consultation     Date of Admission:  10/10/2024    Assessment & Plan   Chloe Foster is a 44 year old female who was admitted on 10/10/2024.    Episode of TIA yesterday and today with increased episodes of migraine  Prior CVA in 2015 associated with PFO and PFO closure at that time, this occurred with pregnancy.  Protein S deficiency  Question of elevated lipoprotein a but not confirmed  Hyperlipidemia    Discussion  This time patient appears to have a clear episode of TIA this morning.  Neurology has seen and they also confirm TIA although possibility of migraine attacks is not entirely excluded.  I reviewed the echo findings with her.  Fortunately, the bubble study is negative but GODWIN would be more sensitive.  I suggested that we could consider GODWIN for improving sensitivity although it was not absolutely necessary.  After much discussion, patient prefers to have GODWIN and be sure that the closure device is in place and that is not associate with any clot or residual shunt.  With that in mind we will arrange a GODWIN tomorrow.  The risks and benefits of GODWIN has been discussed with the patient and/or relatives in detail include approximate 1 in 5000 risk of serious complications including rare risk of death, esophageal tear, pharyngeal hematoma, methemoglobinemia, GI bleed etc. The patient is willing to proceed with it.    In addition, she has been started on dual antiplatelet therapy.  I will also check a lipoprotein a level which apparently has been increasing her based on her chart although I could not find the actual level.  This might be a factor in having premature atherosclerosis as well as TIA/CVA and will have to be addressed.  Depending on the lipoprotein a level, we may also have to treat her LDL cholesterol.    Agree with prothrombotic workup.    Finally, I also discussed with her that patients who have had PFO/ASD closure device, could have increased  risk of atrial arrhythmias and therefore we should consider doing Zio patch at discharge.    Sincerely,    Gerald Reyes MD    At today's visit, reviewed the admission MRI and CTs, echo, labs hospitalist notes and neurology notes.  Previous cardiology notes were reviewed.  Today's medical decision making was of high complexity.              Primary Care Physician   Izabel Reyes    Reason for Consult   Reason for consult: I was asked by hospitalist to evaluate this patient for TIA.    History of Present Illness   Chloe Foster is a(n) 44 year old female with history of significant for CVA x2 in 2015 during pregnancy s/p PFO/ASD closure in April 2016, protein S deficiency, dyslipidemia, and anxiety.  Her family history is significant for multiple family members with coronary disease in their 50s-70s, though none in first-degree relatives.  She is a former smoker, having quit in 2014. She quit asp in 2020. She has had zero coronary calcium score in 2023.     Also Hx of migraines.  Yesterday at 5 PM the patient had a an episode of numbness in her tongue that felt funny.  This was short lasting but I had tingling all over her body after that.  Another episode this morning with  left face arm and leg numbness and weakness with slurred speech so into the hospital.  This lasted for a minute and then subsided. CT head revealed no bleeding. CTA no LVO.  MRI brain was done which revealed small focal infarct in the right middle/inferior frontal gyral cortex.  Unclear if this is new or old.  Will await neurology opinion.    Patient was has increased migraine episodes about 4 this week with aura.  This is unusual for her.    Echo done revealed ASD closure device was not placed.  The bubble study was negative.  Ejection fraction was normal.    Patient has history of protein S deficiency in the past.  Also question of increased lipoprotein a level.  However I could not find the abnormal value.  Patient also is not on  statins presumably because she is of childbearing age.  She has had elevated LDL in the past.  She was on aspirin in the past but none for last 3 years.  Past Medical History   Past Medical History:   Diagnosis Date    Acute stress reaction     propranolol helps prior to interviews, stressful situations    Allergic rhinitis, cause unspecified     no meds except prn flonase, tests generally positive, decided against shots    Anxiety state, unspecified     citalopram started in 7/06, stopped after couple wks, felt sluggish/tired    CVA (cerebral vascular accident) (H)     May 1, May 14th 2015    Depressive disorder, not elsewhere classified     dx, but not sure this is correct, weaned off wellbutrin (4/05-6/06, 12/06-1/08), restarted 3/08    Dizziness     Elevated lipoprotein A level     Hirsutism     saw endo, inconclusive, started on spironolactone (helped a little), stopped in 6/06, elevated DHEA- sees new endo 4/08    History of stroke with residual effects     PFO (patent foramen ovale)     Post PFO/ASD closure with 30mm Amplatzer device 4/21/16    Protein S deficiency (H)     Per Dr. Casarez, prengnacy related.  Protein S levels normalized afer pregnancy.    Urinary tract infection, site not specified     recurrent, start nitrofur in 5/08, 6mo txt         Past Surgical History   Past Surgical History:   Procedure Laterality Date    ARTHROSCOPY KNEE RT/LT  1995    Rt knee    COLONOSCOPY N/A 9/25/2023    Procedure: Colonoscopy;  Surgeon: Alex Gross MD;  Location:  GI    ESOPHAGOSCOPY, GASTROSCOPY, DUODENOSCOPY (EGD), COMBINED N/A 9/25/2023    Procedure: ESOPHAGOGASTRODUODENOSCOPY, WITH BIOPSY;  Surgeon: Alex Gross MD;  Location:  GI    GYN SURGERY      HC REPAIR OF NASAL SEPTUM  12/2005    REPAIR PATENT FORAMEN OVALE  04/21/2016    SEPTORHINOPLASTY N/A 03/15/2021    Procedure: Revision Septorhinoplasty, Repair of Nasal Valve Collapse, Cadaveric Rib Graft + 45 min Cosmetic Tip Rhinoplasty;  Surgeon:  Nikki Schwartz MD;  Location: [facility] OR         Prior to Admission Medications   Prior to Admission Medications   Prescriptions Last Dose Informant Patient Reported? Taking?   LORazepam (ATIVAN) 1 MG tablet prn Self No Yes   Sig: Take 0.5-1 tablets (0.5-1 mg) by mouth daily as needed for anxiety or sleep (severe anxiety/panic).   Omega-3 Fatty Acids (FISH OIL PO) 10/9/2024 at pm Self Yes Yes   Sig: Take 1 capsule by mouth At Bedtime    STATIN NOT PRESCRIBED, INTENTIONAL,   No No   Sig: Statin not prescribed intentionally due to Woman of childbearing age not actively taking birth control   Semaglutide-Weight Management (WEGOVY) 0.25 MG/0.5ML pen   No No   Sig: Inject 0.25 mg Subcutaneous once a week   Patient not taking: Reported on 8/22/2024   Semaglutide-Weight Management (WEGOVY) 0.5 MG/0.5ML pen   No No   Sig: Inject 0.5 mg Subcutaneous once a week   Patient not taking: Reported on 8/22/2024   Semaglutide-Weight Management (WEGOVY) 1 MG/0.5ML pen Not Taking  No No   Sig: Inject 1 mg subcutaneously once a week   Patient not taking: Reported on 10/10/2024   Semaglutide-Weight Management (WEGOVY) 1.7 MG/0.75ML pen   No No   Sig: Inject 1.7 mg subcutaneously once a week   Patient not taking: Reported on 8/22/2024   VITAMIN D, CHOLECALCIFEROL, PO 10/9/2024 at pm Self Yes Yes   Sig: Take 1,000 Units by mouth every evening.   acetaminophen (TYLENOL) 325 MG tablet prn Self Yes Yes   Sig: Take 325-650 mg by mouth every 6 hours as needed for mild pain   aspirin-acetaminophen-caffeine (EXCEDRIN MIGRAINE) 250-250-65 MG tablet prn Self Yes Yes   Sig: Take 1 tablet by mouth every 6 hours as needed for headaches   insulin pen needle (31G X 5 MM) 31G X 5 MM miscellaneous   No No   Sig: Use 1 pen needles daily or as directed.   magnesium oxide (MAG-OX) 400 MG tablet prn Self Yes Yes   Sig: Take 400 mg by mouth nightly as needed (leg cramps).   polyethylene glycol (MIRALAX) 17 GM/Dose powder prn Self No Yes   Sig: Take 1 capful  (17g) of Miralax mixed with 8 oz of a clear liquid twice daily for 7 days prior to your scheduled procedure.   Patient taking differently: Take 17 g by mouth 2 times daily as needed. Take 1 capful (17g) of Miralax mixed with 8 oz of a clear liquid twice daily for 7 days prior to your scheduled procedure.   propranolol (INDERAL) 10 MG tablet prn Self No Yes   Sig: Take 1-4 tablets (10-40 mg) by mouth as needed (anxiety (take 30-60 minutes prior to meetings)).   tirzepatide-Weight Management (ZEPBOUND) 5 MG/0.5ML prefilled pen Not Taking  No No   Sig: Inject 0.5 mLs (5 mg) subcutaneously every 7 days   Patient not taking: Reported on 10/10/2024   tirzepatide-Weight Management (ZEPBOUND) 7.5 MG/0.5ML prefilled pen 10/4/2024 Self No Yes   Sig: Inject 0.5 mLs (7.5 mg) subcutaneously every 7 days.      Facility-Administered Medications: None     Current Facility-Administered Medications   Medication Dose Route Frequency Provider Last Rate Last Admin    acetaminophen (TYLENOL) tablet 325-650 mg  325-650 mg Oral Q6H PRN Jacinta Telles MD        [START ON 10/11/2024] aspirin (ASA) chewable tablet 81 mg  81 mg Oral or Feeding Tube Daily Robert Lockett MD        [START ON 10/11/2024] clopidogrel (PLAVIX) tablet 75 mg  75 mg Oral or Feeding Tube Daily Robert Lockett MD         Current Outpatient Medications   Medication Sig Dispense Refill    acetaminophen (TYLENOL) 325 MG tablet Take 325-650 mg by mouth every 6 hours as needed for mild pain      aspirin-acetaminophen-caffeine (EXCEDRIN MIGRAINE) 250-250-65 MG tablet Take 1 tablet by mouth every 6 hours as needed for headaches      LORazepam (ATIVAN) 1 MG tablet Take 0.5-1 tablets (0.5-1 mg) by mouth daily as needed for anxiety or sleep (severe anxiety/panic). 30 tablet 1    magnesium oxide (MAG-OX) 400 MG tablet Take 400 mg by mouth nightly as needed (leg cramps).      Omega-3 Fatty Acids (FISH OIL PO) Take 1 capsule by mouth At Bedtime       polyethylene glycol (MIRALAX) 17  GM/Dose powder Take 1 capful (17g) of Miralax mixed with 8 oz of a clear liquid twice daily for 7 days prior to your scheduled procedure. (Patient taking differently: Take 17 g by mouth 2 times daily as needed. Take 1 capful (17g) of Miralax mixed with 8 oz of a clear liquid twice daily for 7 days prior to your scheduled procedure.) 238 g 0    propranolol (INDERAL) 10 MG tablet Take 1-4 tablets (10-40 mg) by mouth as needed (anxiety (take 30-60 minutes prior to meetings)). 60 tablet 1    tirzepatide-Weight Management (ZEPBOUND) 7.5 MG/0.5ML prefilled pen Inject 0.5 mLs (7.5 mg) subcutaneously every 7 days. 2 mL 3    VITAMIN D, CHOLECALCIFEROL, PO Take 1,000 Units by mouth every evening.      insulin pen needle (31G X 5 MM) 31G X 5 MM miscellaneous Use 1 pen needles daily or as directed. 100 each 11    Semaglutide-Weight Management (WEGOVY) 0.25 MG/0.5ML pen Inject 0.25 mg Subcutaneous once a week (Patient not taking: Reported on 8/22/2024) 2 mL 0    Semaglutide-Weight Management (WEGOVY) 0.5 MG/0.5ML pen Inject 0.5 mg Subcutaneous once a week (Patient not taking: Reported on 8/22/2024) 2 mL 1    Semaglutide-Weight Management (WEGOVY) 1 MG/0.5ML pen Inject 1 mg subcutaneously once a week (Patient not taking: Reported on 10/10/2024) 2 mL 1    Semaglutide-Weight Management (WEGOVY) 1.7 MG/0.75ML pen Inject 1.7 mg subcutaneously once a week (Patient not taking: Reported on 8/22/2024) 3 mL 4    STATIN NOT PRESCRIBED, INTENTIONAL, Statin not prescribed intentionally due to Woman of childbearing age not actively taking birth control 0 each 0    tirzepatide-Weight Management (ZEPBOUND) 5 MG/0.5ML prefilled pen Inject 0.5 mLs (5 mg) subcutaneously every 7 days (Patient not taking: Reported on 10/10/2024) 2 mL 3     Current Facility-Administered Medications   Medication Dose Route Frequency Provider Last Rate Last Admin    acetaminophen (TYLENOL) tablet 325-650 mg  325-650 mg Oral Q6H PRN Jacinta Telles MD        [START ON  10/11/2024] aspirin (ASA) chewable tablet 81 mg  81 mg Oral or Feeding Tube Daily Robert Lockett MD        [START ON 10/11/2024] clopidogrel (PLAVIX) tablet 75 mg  75 mg Oral or Feeding Tube Daily Robert Lockett MD         Current Outpatient Medications   Medication Sig Dispense Refill    acetaminophen (TYLENOL) 325 MG tablet Take 325-650 mg by mouth every 6 hours as needed for mild pain      aspirin-acetaminophen-caffeine (EXCEDRIN MIGRAINE) 250-250-65 MG tablet Take 1 tablet by mouth every 6 hours as needed for headaches      LORazepam (ATIVAN) 1 MG tablet Take 0.5-1 tablets (0.5-1 mg) by mouth daily as needed for anxiety or sleep (severe anxiety/panic). 30 tablet 1    magnesium oxide (MAG-OX) 400 MG tablet Take 400 mg by mouth nightly as needed (leg cramps).      Omega-3 Fatty Acids (FISH OIL PO) Take 1 capsule by mouth At Bedtime       polyethylene glycol (MIRALAX) 17 GM/Dose powder Take 1 capful (17g) of Miralax mixed with 8 oz of a clear liquid twice daily for 7 days prior to your scheduled procedure. (Patient taking differently: Take 17 g by mouth 2 times daily as needed. Take 1 capful (17g) of Miralax mixed with 8 oz of a clear liquid twice daily for 7 days prior to your scheduled procedure.) 238 g 0    propranolol (INDERAL) 10 MG tablet Take 1-4 tablets (10-40 mg) by mouth as needed (anxiety (take 30-60 minutes prior to meetings)). 60 tablet 1    tirzepatide-Weight Management (ZEPBOUND) 7.5 MG/0.5ML prefilled pen Inject 0.5 mLs (7.5 mg) subcutaneously every 7 days. 2 mL 3    VITAMIN D, CHOLECALCIFEROL, PO Take 1,000 Units by mouth every evening.      insulin pen needle (31G X 5 MM) 31G X 5 MM miscellaneous Use 1 pen needles daily or as directed. 100 each 11    Semaglutide-Weight Management (WEGOVY) 0.25 MG/0.5ML pen Inject 0.25 mg Subcutaneous once a week (Patient not taking: Reported on 8/22/2024) 2 mL 0    Semaglutide-Weight Management (WEGOVY) 0.5 MG/0.5ML pen Inject 0.5 mg Subcutaneous once a week (Patient  not taking: Reported on 2024) 2 mL 1    Semaglutide-Weight Management (WEGOVY) 1 MG/0.5ML pen Inject 1 mg subcutaneously once a week (Patient not taking: Reported on 10/10/2024) 2 mL 1    Semaglutide-Weight Management (WEGOVY) 1.7 MG/0.75ML pen Inject 1.7 mg subcutaneously once a week (Patient not taking: Reported on 2024) 3 mL 4    STATIN NOT PRESCRIBED, INTENTIONAL, Statin not prescribed intentionally due to Woman of childbearing age not actively taking birth control 0 each 0    tirzepatide-Weight Management (ZEPBOUND) 5 MG/0.5ML prefilled pen Inject 0.5 mLs (5 mg) subcutaneously every 7 days (Patient not taking: Reported on 10/10/2024) 2 mL 3     Allergies   Allergies   Allergen Reactions    Compazine [Prochlorperazine] Muscle Pain (Myalgia)       Social History    reports that she quit smoking about 10 years ago. Her smoking use included cigarettes. She started smoking about 15 years ago. She has a 2.5 pack-year smoking history. She has never used smokeless tobacco. She reports current alcohol use. She reports that she does not use drugs.      Family History   I have reviewed this patient's family history and updated it with pertinent information if needed.  Family History   Problem Relation Age of Onset    Family History Negative Mother     Cancer Father         throat, possibly asbestos exposure    Hypertension Father     Allergies Father     Arrhythmia Father     Other Cancer Father     Family History Negative Sister     Family History Negative Brother     Diabetes Maternal Grandmother         on insulin    C.A.D. Maternal Grandmother         triple bypass in her 60s    C.A.D. Maternal Grandfather         MI in 50s    Diabetes Paternal Grandmother     Neurologic Disorder Paternal Grandfather          of brain aneurysm in early 60s    Family History Negative Son     Diabetes Maternal Aunt          in her early 40s of DM complications, type 2    Coronary Artery Disease Early Onset Maternal  "Aunt 41    Obesity Maternal Aunt           Review of Systems   A comprehensive review of system was performed and is negative other than that noted in the HPI or here.     Physical Exam   Vital Signs with Ranges  Temp:  [98  F (36.7  C)] 98  F (36.7  C)  Pulse:  [102-112] 104  Resp:  [7-26] 26  BP: (108-140)/(77-88) 108/77  SpO2:  [93 %-99 %] 93 %  Wt Readings from Last 4 Encounters:   04/16/24 84.8 kg (187 lb)   01/24/24 85.3 kg (188 lb)   01/23/24 85.3 kg (188 lb)   01/09/24 81.2 kg (179 lb)     No intake/output data recorded.      Vitals: /77   Pulse 104   Temp 98  F (36.7  C) (Oral)   Resp 26   LMP 08/16/2024 (Exact Date)   SpO2 93%     Physical Exam:   General - Alert and oriented to time place and person in no acute distress  Eyes - No scleral icterus  HEENT - Neck supple, moist mucous membranes  Cardiovascular - reg rate and rhythm, no murmurs  Extremities - There is no edema  Respiratory - clear  Skin - No pallor or cyanosis  Gastrointestinal - Non tender and non distended without rebound or guarding  Psych - Appropriate affect   Neurological - No gross motor neurological focal deficits    No lab results found in last 7 days.    Invalid input(s): \"TROPONINIES\"    Recent Labs   Lab 10/10/24  1216   WBC 8.6   HGB 13.9   MCV 89      INR 1.02      POTASSIUM 3.5   CHLORIDE 102   CO2 21*   BUN 8.2   CR 0.77   GFRESTIMATED >90   ANIONGAP 13   STEPH 8.9   GLC 87     Recent Labs   Lab Test 10/10/24  1216 10/11/23  1410   CHOL 173 229*   HDL 66 93   LDL 97 115*   TRIG 52 106     Recent Labs   Lab 10/10/24  1216   WBC 8.6   HGB 13.9   HCT 41.1   MCV 89        No results for input(s): \"PH\", \"PHV\", \"PO2\", \"PO2V\", \"SAT\", \"PCO2\", \"PCO2V\", \"HCO3\", \"HCO3V\" in the last 168 hours.  No results for input(s): \"NTBNPI\", \"NTBNP\" in the last 168 hours.  No results for input(s): \"DD\" in the last 168 hours.  No results for input(s): \"SED\", \"CRP\" in the last 168 hours.  Recent Labs   Lab 10/10/24  1216 " "       No results for input(s): \"TSH\" in the last 168 hours.  No results for input(s): \"COLOR\", \"APPEARANCE\", \"URINEGLC\", \"URINEBILI\", \"URINEKETONE\", \"SG\", \"UBLD\", \"URINEPH\", \"PROTEIN\", \"UROBILINOGEN\", \"NITRITE\", \"LEUKEST\", \"RBCU\", \"WBCU\" in the last 168 hours.    Imaging:  Recent Results (from the past 48 hour(s))   CT Head w/o Contrast    Narrative    EXAM: CT HEAD W/O CONTRAST  10/10/2024 12:28 PM     HISTORY: Code Stroke to evaluate for potential thrombolysis and  thrombectomy. PLEASE READ IMMEDIATELY.       COMPARISON: None    TECHNIQUE: Using multidetector thin collimation helical acquisition  technique, axial, coronal and sagittal CT images from the skull base  to the vertex were obtained without intravenous contrast.   (topogram) image(s) also obtained and reviewed. Dose reduction  techniques were used.    FINDINGS:  No acute intracranial hemorrhage, mass effect, or midline shift.  Preserved gray-white matter differentiation and subarachnoid spaces.    Atraumatic calvarium. No substantial paranasal sinus mucosal disease.  Clear mastoid air cells. Nonfocal orbits.       Impression    IMPRESSION: Negative for acute intracranial hemorrhage, hydrocephalus  or transcortical infarct.     Findings discussed with Dr. Salgado at 1241 hours on 10/10/2024.    BROWN SIMONS DO         SYSTEM ID:  [id number]   CTA Head Neck with Contrast    Narrative    CT ANGIOGRAM OF THE HEAD AND NECK WITH CONTRAST  10/10/2024 12:28 PM     HISTORY: Code Stroke to evaluate for potential thrombolysis and  thrombectomy. PLEASE READ IMMEDIATELY.    TECHNIQUE:  CT angiography with an injection of 67 mL [id number]-[id number] IV  with scans through the head and neck. Images were transferred to a  separate 3-D workstation where multiplanar reformations and 3-D images  were created. Estimates of carotid stenoses are made relative to the  distal internal carotid artery diameters except as noted. Radiation  dose for this scan was reduced using " automated exposure control,  adjustment of the mA and/or kV according to patient size, or iterative  reconstruction technique.    COMPARISON: None.     CT HEAD FINDINGS: No contrast enhancing lesions. Cerebral blood flow  is grossly normal.     CT ANGIOGRAM HEAD FINDINGS:  The major intracranial arteries including  the proximal branches of the anterior cerebral, middle cerebral, and  posterior cerebral arteries appear patent without vascular cutoff. No  aneurysm identified. No significant stenosis. Venous circulation is  unremarkable.     CT ANGIOGRAM NECK FINDINGS:   Normal origin of the great vessels from the aortic arch.     Right carotid artery: The right common and internal carotid arteries  are patent. No significant stenosis or atherosclerotic disease in the  carotid artery.     Left carotid artery: The left common and internal carotid arteries are  patent. No significant stenosis or atherosclerotic disease in the  carotid artery.     Vertebral arteries: Vertebral arteries are patent without evidence of  dissection. No significant stenosis.     Other findings: Bilateral thyroid nodules measuring up to 9 mm.       Impression    IMPRESSION:   1.  No large vessel arterial occlusion or high-grade stenosis in the  head or neck. No arterial dissection.   2.  Bilateral thyroid nodules. Recommend dedicated follow-up  nonemergent ultrasound for further evaluation.    Findings discussed with Dr. Salgado at 1241 hours on 10/10/2024.    BROWN SIMONS DO         SYSTEM ID:  [id number]   Echocardiogram Complete   Result Value    LVEF  55%    Narrative    [id number]  [id number]  [id number]  [id number]^JORGE^[facility]  Echocardiography Laboratory  [address] [place] [id number]     Name: CARMEN OLSEN  MRN: [id number]  : 1980  Study Date: 10/10/2024 01:10 PM  Age: 44 yrs  Gender: Female  Patient Location: [facility]  Reason For Study: Cerebrovascular Incident  Ordering Physician: JORGE  RIICHI  Performed By: Catie Amaya     BSA: 1.9 m2  Height: 66 in  Weight: 187 lb  BP: 140/80 mmHg  ______________________________________________________________________________  Procedure  Complete Portable Bubble Echo Adult.  ______________________________________________________________________________  Interpretation Summary     1. The left ventricle is normal in structure, function and size. The visual  ejection fraction is estimated at 55%.  2. The right ventricle is normal in structure, function and size.  3. ASD closure device in place. Negative bubble study.  4. No valve disease.     No changes from echo 2021.  ______________________________________________________________________________  Left Ventricle  The left ventricle is normal in structure, function and size. There is normal  left ventricular wall thickness. The visual ejection fraction is estimated at  55%. Left ventricular diastolic function is normal. Normal left ventricular  wall motion.     Right Ventricle  The right ventricle is normal in structure, function and size.     Atria  Normal left atrial size. Right atrial size is normal. ASD closure device in  place. Negative bubble study.     Mitral Valve  The mitral valve is normal in structure and function.     Tricuspid Valve  The tricuspid valve is normal in structure and function.     Aortic Valve  The aortic valve is normal in structure and function.     Pulmonic Valve  The pulmonic valve is normal in structure and function.     Vessels  Normal ascending, transverse (arch), and descending aorta. The inferior vena  cava was normal in size with preserved respiratory variability.     Pericardium  There is no pericardial effusion.     Rhythm  Sinus rhythm was noted.  ______________________________________________________________________________  MMode/2D Measurements & Calculations  IVSd: 0.70 cm  LVIDd: 4.4 cm  LVIDs: 3.0 cm  LVPWd: 0.70 cm  FS: 31.0 %  LV mass(C)d: 92.1 grams  LV mass(C)dI:  47.4 grams/m2  Ao root diam: 3.2 cm  LA dimension: 3.6 cm  asc Aorta Diam: 3.6 cm  LA/Ao: 1.1  LVOT diam: 2.1 cm  LVOT area: 3.4 cm2  Ao root diam index Ht(cm/m): 1.9  Ao root diam index BSA (cm/m2): 1.7  Asc Ao diam index BSA (cm/m2): 1.9  Asc Ao diam index Ht(cm/m): 2.2  LA Volume (BP): 46.7 ml     LA Volume Index (BP): 24.1 ml/m2  RV Base: 3.5 cm  RWT: 0.32  TAPSE: 2.1 cm     Doppler Measurements & Calculations  MV E max jon: 67.6 cm/sec  MV dec time: 0.11 sec  E/E' av.1  Lateral E/e': 3.8  Medial E/e': 6.4  RV S Jon: 12.3 cm/sec     ______________________________________________________________________________  Report approved by: Steph Wasserman 10/10/2024 02:50 PM         MR Brain w/o & w Contrast    Narrative    EXAM: MR BRAIN W/O & W CONTRAST  10/10/2024 3:50 PM     HISTORY: Stroke       COMPARISON: Same day CT/CTA, MRI 2021    TECHNIQUE: Multiplanar, multisequence MR imaging of the head obtained  prior to, and after, intravenous contrast administration    CONTRAST: 7mL Gadavist.    FINDINGS:  Punctate focus of abnormal diffusion signal within a region of the  right middle/inferior frontal gyral cortex with corresponding abnormal  T2 FLAIR hyperintense signal (axial series 602, image 23; axial series  801, image 18). Other T2 hyperintense signal changes within the  cerebral parenchyma on T2 FLAIR images do not demonstrate  corresponding abnormal diffusion signal in are also similar to prior.  No mass effect, midline shift, or intracranial hemorrhage. No acute  hydrocephalus. Preserved intravascular flow voids. No pathologic  postcontrast enhancement.    Normal skull marrow signal. No substantial paranasal sinus mucosal  disease. Clear mastoid air cells. Nonfocal pituitary gland, sella,  skull base and upper cervical spinal structures. The orbits are  normal.      Impression    IMPRESSION: Small focal infarct in the right middle/inferior frontal  gyral cortex.    BROWN SIMONS, DO          SYSTEM ID:  [id number]       Echo:  No results found for this or any previous visit (from the past 4320 hour(s)).    Clinically Significant Risk Factors Present on Admission                               Prior CVA

## 2024-10-10 NOTE — ED PROVIDER NOTES
Emergency Department Note      History of Present Illness     Chief Complaint   Numbness    HPI   Chloe Foster is a 44 year old female with a history of stroke and PFO closure presenting with left sided facial numbness. Chloe reported experiencing slurred speech, along with the left sided numbness which lasted 30 seconds to 1 minute at 1130. At the ED she endorses persistent numbness in the inside of the left area of her mouth. She denies being on blood thinners. She also denies taking any over the counter medication. She does not have a history of hypercoagulation disorders. Chloe not pregnant, however, she did experience 2 two strokes during her pregnancy in 2015 and is status post PFO closure.  She is not on aspirin or Plavix currently.  Recent illnesses.  Symptoms are similar to her previous strokes in 2015.    Independent Historian   None    Review of External Notes   Reviewed cardiology note to Dr. Parker on 11/2/2023.  Patient had previously been seen by Dr. Almeida for palpitations and is noted to have a history of CVA x 2 in 2015 during pregnancy status post PFO/ASD closure in April 2016.  Also has a history of protein S deficiency, dyslipidemia and anxiety.  Multiple family members with coronary artery disease.  She is a former smoker having quit in 2014.    Past Medical History     Medical History and Problem List   Acute stress reaction  Allergic rhinitis, cause unspecified  Anxiety state, unspecified  CVA  Depressive disorder, not elsewhere classified  Dizziness  Elevated lipoprotein A level  Hirsutism  History of stroke with residual effects  Migraine   PFO   Protein S deficiency   Social anxiety   Urinary tract infection, site not specified    Medications    aspirin EC 81   buPROPion   insulin pen needle   LORazepam   Naltrexone  traZODone     Surgical History   Bilateral knee arthroscopy   Colonoscopy   EGD  Gyn surgery   Repair nasal septum   PFO repair   Septorhinoplasty   C section      Physical Exam     Patient Vitals for the past 24 hrs:   BP Temp Temp src Pulse Resp SpO2   10/10/24 1253 -- -- -- 103 17 95 %   10/10/24 1238 -- -- -- 103 -- 98 %   10/10/24 1223 -- -- -- 105 14 98 %   10/10/24 1212 (!) 140/80 -- -- -- -- --   10/10/24 1211 (!) 140/88 98  F (36.7  C) Oral 106 22 99 %     Physical Exam  General: Well-nourished  Eyes: PERRL, conjunctivae pink no scleral icterus or conjunctival injection  ENT:  Moist mucus membranes, posterior oropharynx clear without erythema or exudates  Respiratory:  Lungs clear to auscultation bilaterally, no crackles/rubs/wheezes.  Good air movement  CV: Normal rate and rhythm, no murmurs/rubs/gallops  GI:  Abdomen soft and non-distended.  Normoactive BS.  No tenderness, guarding or rebound  Skin: Warm, dry.  No rashes or petechiae  Musculoskeletal: No peripheral edema or calf tenderness  Neuro: Alert and oriented to person/place/time. PERRL, EOMI no nystagmus, no aphasia/facial droop/dysarthria, tongue midline, symmetric palatal elevation, normal strength at SCM/trapezius/BUE/BLE, normal coordination to FNF at BUE, gait deferred, negative romberg, sensation intact to LT over face/BUE/BLE  Psychiatric: Tearful affect    Diagnostics     Lab Results   Labs Ordered and Resulted from Time of ED Arrival to Time of ED Departure   BASIC METABOLIC PANEL - Abnormal       Result Value    Sodium 136      Potassium 3.5      Chloride 102      Carbon Dioxide (CO2) 21 (*)     Anion Gap 13      Urea Nitrogen 8.2      Creatinine 0.77      GFR Estimate >90      Calcium 8.9      Glucose 87     INR - Normal    INR 1.02     PARTIAL THROMBOPLASTIN TIME - Normal    aPTT 24     TROPONIN T, HIGH SENSITIVITY - Normal    Troponin T, High Sensitivity <6     HCG QUANTITATIVE PREGNANCY - Normal    hCG Quantitative <1     HEMOGLOBIN A1C - Normal    Estimated Average Glucose 94      Hemoglobin A1C 4.9     CBC WITH PLATELETS AND DIFFERENTIAL    WBC Count 8.6      RBC Count 4.62       Hemoglobin 13.9      Hematocrit 41.1      MCV 89      MCH 30.1      MCHC 33.8      RDW 12.4      Platelet Count 268      % Neutrophils 58      % Lymphocytes 32      % Monocytes 10      % Eosinophils 1      % Basophils 0      % Immature Granulocytes 0      NRBCs per 100 WBC 0      Absolute Neutrophils 5.0      Absolute Lymphocytes 2.8      Absolute Monocytes 0.8      Absolute Eosinophils 0.0      Absolute Basophils 0.0      Absolute Immature Granulocytes 0.0      Absolute NRBCs 0.0     GLUCOSE MONITOR NURSING POCT   LIPID REFLEX TO DIRECT LDL PANEL     Imaging   CTA Head Neck with Contrast   Final Result   IMPRESSION:    1.  No large vessel arterial occlusion or high-grade stenosis in the   head or neck. No arterial dissection.    2.  Bilateral thyroid nodules. Recommend dedicated follow-up   nonemergent ultrasound for further evaluation.      Findings discussed with Dr. Salgado at 1241 hours on 10/10/2024.      BROWN SIMONS DO            SYSTEM ID:  [id number]      CT Head w/o Contrast   Final Result   IMPRESSION: Negative for acute intracranial hemorrhage, hydrocephalus   or transcortical infarct.       Findings discussed with Dr. Salgado at 1241 hours on 10/10/2024.      BROWN SIMONS DO            SYSTEM ID:  [id number]      Echocardiogram Complete    (Results Pending)   MR Brain w/o & w Contrast    (Results Pending)     EKG   ECG results from 10/10/24   EKG 12-lead, tracing only     Value    Systolic Blood Pressure     Diastolic Blood Pressure     Ventricular Rate 101    Atrial Rate 101    NE Interval 156    QRS Duration 80        QTc 461    P Axis 57    R AXIS 35    T Axis 53    Interpretation ECG      Sinus tachycardia  Otherwise normal ECG  When compared with ECG of 26-Dec-2021 09:26,  No significant change was found  Confirmed by GENERATED REPORT, COMPUTER ([id number]),  Rd Steele ([id number]) on 10/10/2024 2:04:23 PM     Read by myself at 1419    Independent Interpretation   CT Head: No intracranial  hemorrhage.    ED Course      Medications Administered   Medications   aspirin (ASA) chewable tablet 81 mg (has no administration in time range)   clopidogrel (PLAVIX) tablet 75 mg (has no administration in time range)   iopamidol (ISOVUE-370) solution 67 mL (67 mLs Intravenous $Given 10/10/24 1225)     And   sodium chloride 0.9 % bag for CT scan flush use (100 mLs As instructed $Given 10/10/24 1225)   aspirin (ASA) tablet 325 mg (325 mg Oral $Given 10/10/24 1302)   clopidogrel (PLAVIX) tablet 300 mg (300 mg Oral $Given 10/10/24 1302)     Procedures   None     Discussion of Management   Admitting Hospitalist, Dr. Telles  Radiologist, Dr. Bay  Neurology, Dr. Lockett    ED Course   ED Course as of 10/10/24 1535   Thu Oct 10, 2024   1217 I obtained the history and examined the patient as noted above.      1218 Tier 1 code stroke.   1221 I spoke with Dr. Lockett from stroke neurology regarding the patient's presentation and plan of care.      1243 I spoke with the radiologist, Dr. Bay regarding the imaging results.    1302 I spoke with Dr. Telles from the hospitalist service regarding the patient's presentation, findings here in the ED, and plan of care.       Additional Documentation  None    Medical Decision Making / Diagnosis     CMS Diagnoses: The patient has stroke symptoms:         ED Stroke specific documentation           NIHSS PDF     Patient last known well time: 1130a  ED Provider first to bedside at: 1346      Thrombolytics:   Not given due to:   - minor/isolated/quickly resolving symptoms    If treating with thrombolytics: Ensure SBP<180 and DBP<105 prior to treatment with thrombolytics.  Administering thrombolytics after treatment with IV labetalol, hydralazine, or nicardipine is reasonable once BP control is established.    Endovascular Retrieval:  Not initiated due to absence of proximal vessel occlusion    National Institutes of Health Stroke Scale (Baseline)  Time Performed: 1346     Score    Level of  consciousness: (0)   Alert, keenly responsive    LOC questions: (0)   Answers both questions correctly    LOC commands: (0)   Performs both tasks correctly    Best gaze: (0)   Normal    Visual: (0)   No visual loss    Facial palsy: (0)   Normal symmetrical movements    Motor arm (left): (0)   No drift    Motor arm (right): (0)   No drift    Motor leg (left): (0)   No drift    Motor leg (right): (0)   No drift    Limb ataxia: (0)   Absent    Sensory: (0)   Normal- no sensory loss    Best language: (0)   Normal- no aphasia    Dysarthria: (0)   Normal    Extinction and inattention: (0)   No abnormality        Total Score:  0      Stroke Mimics were considered (including migraine headache, seizure disorder, hypoglycemia (or hyperglycemia), head or spinal trauma, CNS infection, Toxin ingestion and shock state (e.g. sepsis) .    MIPS       None    [facility]   Chloe Foster is a 44 year old female who presents for evaluation of left sided numbness, weakness and speech abnormality.  Thank you the symptoms resolved fairly quickly with the exception of the tongue numbness.  NIH stroke score was 0 at presentation.  A code stroke was called based on her symptom complex and her history.  And rapid resolution and lack of any large vessel occlusion, thrombolytics or angiography was not indicated.  Stroke neurology consulted and given that she is very high risk TIA, they recommended admission for additional workup.  They wrote for the loading with aspirin and Plavix and this was administered.  I consulted with hospitalist team graciously agreed to admit the patient.    Disposition   The patient was admitted to the hospital.     Diagnosis     ICD-10-CM    1. TIA (transient ischemic attack)  G45.9          Discharge Medications   New Prescriptions    No medications on file     Scribe Disclosure:  Lexus TYLER, am serving as a scribe at 1:46 PM on 10/10/2024 to document services personally performed by Kasie Salgado MD based on my  observations and the provider's statements to me.     Scribe Disclosure:  I, Lou Batista, am serving as the scribe  for Lexus Herrera, the scribe trainee, at 1:46 PM on 10/10/2024 to document services personally performed by Kasie Salgado MD based on our observations and the provider's statements to us.         Kasie Salgado MD  10/10/24 1547

## 2024-10-10 NOTE — CONSULTS
"[facility]     Stroke Code Note          History of Present Illness     Chief Complaint: Numbness      Chloe Foster is a 44 year old right-handed female who has past medical history of ischemic stroke in 2015 when she was pregnant at 36 weeks, she did not have DVT however PFO was found so the patient had PFO closure, after that the patient was on aspirin however stopped taking in 2020 because she was asymptomatic.  Yesterday at 5 PM the patient had a an episode of numbness on the left side which was similar to previous ischemic stroke in 2015, that resolved however around 11 AM this morning she again started to experience left face arm and leg numbness and weakness with slurred speech so into the hospital.  The numbness of left face arm and leg and dysarthria lasted for 1 minute, however left facial numbness persisted.     Hx of migraine with visual aura.         Past Medical History     Stroke risk factors: Previous ischemic stroke    Preadmission antithrombotic regimen: None    Modified Gilson Score (Pre-morbid)  0-No deficits                   Assessment and Plan       1.  Transient episode of left face arm and leg numbness and dysarthria  - Previous ischemic stroke in 2015   - Status post PFO closure  - Hx of migraine with visual aura     Intravenous Thrombolysis  Not given due to:   - minor/isolated/quickly resolving symptoms     Endovascular Treatment  Not initiated due to absence of proximal vessel occlusion     Plan:  - Use orderset: \"Ischemic Stroke Routine Admission\" or \"Ischemic Stroke No Thrombolytics/No Thrombectomy ICU Admission\"  - Place Neurology IP Stroke Consult order   - Neurochecks and Vital Signs every 4 hours   - Permissive HTN; goal SBP < 220 mmHg  - Daily aspirin 81 mg for secondary stroke prevention  - Plavix (clopidogrel) 300 mg PO loading dose x 1  - Plavix (clopidogrel) 75 mg PO Daily  - Statin: will determine after lipid panel  - MRI Brain with and " "without contrast    - TTE (with Bubble Study if age 60 yrs or less)  - Telemetry, EKG  - Bedside Glucose Monitoring  - A1c, Lipid Panel, Troponin x 3  - PT/OT/SLP  - Stroke Education  - Euthermia, Euglycemia  - Load with aspirin 324 mg once  ___________________________________________________________________    The Stroke Staff is Dr. Brumfield.    Robert Lockett MD  Vascular Neurology Fellow    To page me or covering stroke neurology team member, click here: [email or web address]  Choose \"On Call\" tab at top, then select \"NEUROLOGY/ALL SITES\" from middle drop-down box, press Enter, then look for \"stroke\" or \"telestroke\" for your site.  ___________________________________________________________________        Imaging/Labs   (personally reviewed)    CT head: No acute intracranial pathology  CTA head/neck: No large vessel occlusion  CT Perfusion head: Not applicable         Physical Examination     BP: (!) 140/80   Pulse: 106   Resp: 22   Temp: 98  F (36.7  C)   Temp src: Oral   SpO2: 99 %   O2 Device: None (Room air)        Wt Readings from Last 2 Encounters:   04/16/24 84.8 kg (187 lb)   01/24/24 85.3 kg (188 lb)       Neurologic  Mental Status:  alert, oriented x 3, follows commands, speech clear and fluent, naming and repetition normal  Cranial Nerves:  visual fields intact, PERRL, EOMI with normal smooth pursuit, left facial decreased sensation, facial movements symmetric, hearing not formally tested but intact to conversation, palate elevation symmetric and uvula midline, no dysarthria, shoulder shrug strong bilaterally, tongue protrusion midline  Motor:  normal muscle tone and bulk, no abnormal movements, able to move all limbs spontaneously, strength 5/5 throughout upper and lower extremities, no pronator drift  Reflexes:  toes down-going  Sensory:  light touch sensation intact and symmetric throughout upper and lower extremities, no extinction on double simultaneous stimulation   Coordination:  normal finger-to-nose and " heel-to-shin bilaterally without dysmetria, rapid alternating movements symmetric  Station/Gait:  deferred        Stroke Scales       NIHSS  1a. Level of Consciousness 0-->Alert, keenly responsive   1b. LOC Questions 0-->Answers both questions correctly   1c. LOC Commands 0-->Performs both tasks correctly   2.   Best Gaze 0-->Normal   3.   Visual 0-->No visual loss   4.   Facial Palsy 0-->Normal symmetrical movements   5a. Motor Arm, Left 0-->No drift, limb holds 90 (or 45) degrees for full 10 secs   5b. Motor Arm, Right 0-->No drift, limb holds 90 (or 45) degrees for full 10 secs   6a. Motor Leg, Left 0-->No drift, leg holds 30 degree position for full 5 secs   6b. Motor Leg, right 0-->No drift, leg holds 30 degree position for full 5 secs   7.   Limb Ataxia 0-->Absent   8.   Sensory 1-->Mild-to-moderate sensory loss, patient feels pinprick is less sharp or is dull on the affected side, or there is a loss of superficial pain with pinprick, but patient is aware of being touched   9.   Best Language 0-->No aphasia, normal   10. Dysarthria 0-->Normal   11. Extinction and Inattention  0-->No abnormality   Total 1 (10/10/24 1248)            Labs     CBC  Lab Results   Component Value Date    HGB 14.9 12/02/2022    HCT 45.4 12/02/2022    WBC 6.0 12/02/2022     12/02/2022       BMP  Lab Results   Component Value Date     10/11/2023    POTASSIUM 4.0 10/11/2023    CHLORIDE 103 10/11/2023    CO2 22 10/11/2023    BUN 13.3 10/11/2023    CR 0.66 10/11/2023    GLC 94 10/11/2023    STEPH 9.4 10/11/2023       INR  INR   Date Value Ref Range Status   04/21/2016 0.90 0.86 - 1.14 Final   04/29/2015 0.87 0.86 - 1.14 Final       Data   Stroke Code Data  (for stroke code without tele)  Stroke code activated 10/10/24  1220   First stroke provider response 10/10/24  1222   Last known normal 10/10/24  1100   Time of discovery (or onset of symptoms) 10/10/24  1100   Head CT read by Stroke Neuro Provider 10/10/24  7743   Was  stroke code de-escalated? Yes  10/10/24  1245        Clinically Significant Risk Factors Present on Admission                 # Drug Induced Platelet Defect: home medication list includes an antiplatelet medication

## 2024-10-11 ENCOUNTER — APPOINTMENT (OUTPATIENT)
Dept: SPEECH THERAPY | Facility: CLINIC | Age: 44
End: 2024-10-11
Attending: INTERNAL MEDICINE
Payer: COMMERCIAL

## 2024-10-11 ENCOUNTER — ORDERS ONLY (AUTO-RELEASED) (OUTPATIENT)
Dept: MEDSURG UNIT | Facility: CLINIC | Age: 44
End: 2024-10-11
Payer: COMMERCIAL

## 2024-10-11 ENCOUNTER — APPOINTMENT (OUTPATIENT)
Dept: CARDIOLOGY | Facility: CLINIC | Age: 44
End: 2024-10-11
Attending: INTERNAL MEDICINE
Payer: COMMERCIAL

## 2024-10-11 ENCOUNTER — APPOINTMENT (OUTPATIENT)
Dept: PHYSICAL THERAPY | Facility: CLINIC | Age: 44
End: 2024-10-11
Attending: INTERNAL MEDICINE
Payer: COMMERCIAL

## 2024-10-11 VITALS
WEIGHT: 157 LBS | DIASTOLIC BLOOD PRESSURE: 75 MMHG | OXYGEN SATURATION: 94 % | HEIGHT: 66 IN | HEART RATE: 90 BPM | SYSTOLIC BLOOD PRESSURE: 105 MMHG | BODY MASS INDEX: 25.23 KG/M2 | TEMPERATURE: 97.7 F | RESPIRATION RATE: 22 BRPM

## 2024-10-11 DIAGNOSIS — I63.411 CEREBROVASCULAR ACCIDENT (CVA) DUE TO EMBOLISM OF RIGHT MIDDLE CEREBRAL ARTERY (H): ICD-10-CM

## 2024-10-11 LAB
ANION GAP SERPL CALCULATED.3IONS-SCNC: 12 MMOL/L (ref 7–15)
APO A-I SERPL-MCNC: 59 MG/DL
BUN SERPL-MCNC: 5.8 MG/DL (ref 6–20)
CALCIUM SERPL-MCNC: 8.9 MG/DL (ref 8.8–10.4)
CHLORIDE SERPL-SCNC: 106 MMOL/L (ref 98–107)
CHOLEST SERPL-MCNC: 162 MG/DL
CREAT SERPL-MCNC: 0.68 MG/DL (ref 0.51–0.95)
EGFRCR SERPLBLD CKD-EPI 2021: >90 ML/MIN/1.73M2
FACTOR 2 INTERPRETATION: NORMAL
FACTOR V INTERPRETATION: NORMAL
FASTING STATUS PATIENT QL REPORTED: YES
GLUCOSE BLDC GLUCOMTR-MCNC: 85 MG/DL (ref 70–99)
GLUCOSE SERPL-MCNC: 89 MG/DL (ref 70–99)
HCO3 SERPL-SCNC: 21 MMOL/L (ref 22–29)
HDLC SERPL-MCNC: 60 MG/DL
LAB DIRECTOR COMMENTS: NORMAL
LAB DIRECTOR DISCLAIMER: NORMAL
LAB DIRECTOR INTERPRETATION: NORMAL
LAB DIRECTOR METHODOLOGY: NORMAL
LAB DIRECTOR RESULTS: NORMAL
LDLC SERPL CALC-MCNC: 90 MG/DL
MAGNESIUM SERPL-MCNC: 2.1 MG/DL (ref 1.7–2.3)
NONHDLC SERPL-MCNC: 102 MG/DL
POTASSIUM SERPL-SCNC: 3.7 MMOL/L (ref 3.4–5.3)
POTASSIUM SERPL-SCNC: 3.8 MMOL/L (ref 3.4–5.3)
SODIUM SERPL-SCNC: 139 MMOL/L (ref 135–145)
SPECIMEN DESCRIPTION: NORMAL
TRIGL SERPL-MCNC: 62 MG/DL

## 2024-10-11 PROCEDURE — G0378 HOSPITAL OBSERVATION PER HR: HCPCS

## 2024-10-11 PROCEDURE — 83090 ASSAY OF HOMOCYSTEINE: CPT | Performed by: STUDENT IN AN ORGANIZED HEALTH CARE EDUCATION/TRAINING PROGRAM

## 2024-10-11 PROCEDURE — 85300 ANTITHROMBIN III ACTIVITY: CPT | Performed by: STUDENT IN AN ORGANIZED HEALTH CARE EDUCATION/TRAINING PROGRAM

## 2024-10-11 PROCEDURE — 250N000009 HC RX 250: Performed by: INTERNAL MEDICINE

## 2024-10-11 PROCEDURE — 999N000111 HC STATISTIC OT IP EVAL DEFER

## 2024-10-11 PROCEDURE — 120N000001 HC R&B MED SURG/OB

## 2024-10-11 PROCEDURE — 999N000128 HC STATISTIC PERIPHERAL IV START W/O US GUIDANCE

## 2024-10-11 PROCEDURE — 93325 DOPPLER ECHO COLOR FLOW MAPG: CPT

## 2024-10-11 PROCEDURE — 250N000011 HC RX IP 250 OP 636: Performed by: INTERNAL MEDICINE

## 2024-10-11 PROCEDURE — 82962 GLUCOSE BLOOD TEST: CPT

## 2024-10-11 PROCEDURE — 99232 SBSQ HOSP IP/OBS MODERATE 35: CPT | Performed by: INTERNAL MEDICINE

## 2024-10-11 PROCEDURE — 36415 COLL VENOUS BLD VENIPUNCTURE: CPT | Performed by: INTERNAL MEDICINE

## 2024-10-11 PROCEDURE — 85306 CLOT INHIBIT PROT S FREE: CPT | Performed by: STUDENT IN AN ORGANIZED HEALTH CARE EDUCATION/TRAINING PROGRAM

## 2024-10-11 PROCEDURE — 80061 LIPID PANEL: CPT | Performed by: INTERNAL MEDICINE

## 2024-10-11 PROCEDURE — 85303 CLOT INHIBIT PROT C ACTIVITY: CPT | Performed by: STUDENT IN AN ORGANIZED HEALTH CARE EDUCATION/TRAINING PROGRAM

## 2024-10-11 PROCEDURE — 36415 COLL VENOUS BLD VENIPUNCTURE: CPT | Performed by: STUDENT IN AN ORGANIZED HEALTH CARE EDUCATION/TRAINING PROGRAM

## 2024-10-11 PROCEDURE — 86146 BETA-2 GLYCOPROTEIN ANTIBODY: CPT | Performed by: STUDENT IN AN ORGANIZED HEALTH CARE EDUCATION/TRAINING PROGRAM

## 2024-10-11 PROCEDURE — 93312 ECHO TRANSESOPHAGEAL: CPT | Mod: 26 | Performed by: INTERNAL MEDICINE

## 2024-10-11 PROCEDURE — 80048 BASIC METABOLIC PNL TOTAL CA: CPT | Performed by: INTERNAL MEDICINE

## 2024-10-11 PROCEDURE — 99152 MOD SED SAME PHYS/QHP 5/>YRS: CPT | Performed by: INTERNAL MEDICINE

## 2024-10-11 PROCEDURE — 85730 THROMBOPLASTIN TIME PARTIAL: CPT | Performed by: STUDENT IN AN ORGANIZED HEALTH CARE EDUCATION/TRAINING PROGRAM

## 2024-10-11 PROCEDURE — 81240 F2 GENE: CPT | Performed by: STUDENT IN AN ORGANIZED HEALTH CARE EDUCATION/TRAINING PROGRAM

## 2024-10-11 PROCEDURE — 250N000013 HC RX MED GY IP 250 OP 250 PS 637: Performed by: PSYCHIATRY & NEUROLOGY

## 2024-10-11 PROCEDURE — 81241 F5 GENE: CPT | Performed by: STUDENT IN AN ORGANIZED HEALTH CARE EDUCATION/TRAINING PROGRAM

## 2024-10-11 PROCEDURE — 93325 DOPPLER ECHO COLOR FLOW MAPG: CPT | Mod: 26 | Performed by: INTERNAL MEDICINE

## 2024-10-11 PROCEDURE — 999N000183 HC STATISTIC TEE INCLUDES SEDATION

## 2024-10-11 PROCEDURE — 99239 HOSP IP/OBS DSCHRG MGMT >30: CPT | Performed by: INTERNAL MEDICINE

## 2024-10-11 PROCEDURE — 93227 XTRNL ECG REC<48 HR R&I: CPT | Performed by: INTERNAL MEDICINE

## 2024-10-11 PROCEDURE — 250N000013 HC RX MED GY IP 250 OP 250 PS 637: Performed by: STUDENT IN AN ORGANIZED HEALTH CARE EDUCATION/TRAINING PROGRAM

## 2024-10-11 PROCEDURE — 85390 FIBRINOLYSINS SCREEN I&R: CPT | Mod: 26 | Performed by: PATHOLOGY

## 2024-10-11 PROCEDURE — 83735 ASSAY OF MAGNESIUM: CPT | Performed by: INTERNAL MEDICINE

## 2024-10-11 PROCEDURE — G0452 MOLECULAR PATHOLOGY INTERPR: HCPCS | Mod: 26 | Performed by: PATHOLOGY

## 2024-10-11 PROCEDURE — 92610 EVALUATE SWALLOWING FUNCTION: CPT | Mod: GN

## 2024-10-11 PROCEDURE — 97161 PT EVAL LOW COMPLEX 20 MIN: CPT | Mod: GP | Performed by: PHYSICAL THERAPIST

## 2024-10-11 PROCEDURE — 93320 DOPPLER ECHO COMPLETE: CPT | Mod: 26 | Performed by: INTERNAL MEDICINE

## 2024-10-11 PROCEDURE — 86147 CARDIOLIPIN ANTIBODY EA IG: CPT | Performed by: STUDENT IN AN ORGANIZED HEALTH CARE EDUCATION/TRAINING PROGRAM

## 2024-10-11 PROCEDURE — 82947 ASSAY GLUCOSE BLOOD QUANT: CPT | Performed by: INTERNAL MEDICINE

## 2024-10-11 PROCEDURE — 99255 IP/OBS CONSLTJ NEW/EST HI 80: CPT | Performed by: INTERNAL MEDICINE

## 2024-10-11 RX ORDER — ROSUVASTATIN CALCIUM 40 MG/1
40 TABLET, COATED ORAL DAILY
Qty: 30 TABLET | Refills: 1 | Status: SHIPPED | OUTPATIENT
Start: 2024-10-11 | End: 2024-10-15

## 2024-10-11 RX ORDER — LIDOCAINE 50 MG/G
OINTMENT TOPICAL ONCE
Status: DISCONTINUED | OUTPATIENT
Start: 2024-10-11 | End: 2024-10-11

## 2024-10-11 RX ORDER — NALOXONE HYDROCHLORIDE 0.4 MG/ML
0.2 INJECTION, SOLUTION INTRAMUSCULAR; INTRAVENOUS; SUBCUTANEOUS
Status: DISCONTINUED | OUTPATIENT
Start: 2024-10-11 | End: 2024-10-11 | Stop reason: HOSPADM

## 2024-10-11 RX ORDER — ASPIRIN 81 MG/1
81 TABLET, CHEWABLE ORAL DAILY
Qty: 100 TABLET | Refills: 1 | Status: SHIPPED | OUTPATIENT
Start: 2024-10-12

## 2024-10-11 RX ORDER — ACETAMINOPHEN 325 MG/1
650 TABLET ORAL EVERY 4 HOURS PRN
Status: DISCONTINUED | OUTPATIENT
Start: 2024-10-11 | End: 2024-10-11

## 2024-10-11 RX ORDER — POTASSIUM CHLORIDE 1500 MG/1
40 TABLET, EXTENDED RELEASE ORAL
Status: DISCONTINUED | OUTPATIENT
Start: 2024-10-11 | End: 2024-10-11 | Stop reason: HOSPADM

## 2024-10-11 RX ORDER — NALOXONE HYDROCHLORIDE 0.4 MG/ML
0.4 INJECTION, SOLUTION INTRAMUSCULAR; INTRAVENOUS; SUBCUTANEOUS
Status: DISCONTINUED | OUTPATIENT
Start: 2024-10-11 | End: 2024-10-11 | Stop reason: HOSPADM

## 2024-10-11 RX ORDER — CLOPIDOGREL BISULFATE 75 MG/1
75 TABLET ORAL DAILY
Qty: 20 TABLET | Refills: 0 | Status: SHIPPED | OUTPATIENT
Start: 2024-10-12 | End: 2024-10-11

## 2024-10-11 RX ORDER — FLUMAZENIL 0.1 MG/ML
0.2 INJECTION, SOLUTION INTRAVENOUS
Status: DISCONTINUED | OUTPATIENT
Start: 2024-10-11 | End: 2024-10-11 | Stop reason: HOSPADM

## 2024-10-11 RX ORDER — CLOPIDOGREL BISULFATE 75 MG/1
75 TABLET ORAL DAILY
Qty: 20 TABLET | Refills: 0 | Status: SHIPPED | OUTPATIENT
Start: 2024-10-12

## 2024-10-11 RX ORDER — POTASSIUM CHLORIDE 1500 MG/1
20 TABLET, EXTENDED RELEASE ORAL
Status: DISCONTINUED | OUTPATIENT
Start: 2024-10-11 | End: 2024-10-11 | Stop reason: HOSPADM

## 2024-10-11 RX ORDER — FENTANYL CITRATE 50 UG/ML
25 INJECTION, SOLUTION INTRAMUSCULAR; INTRAVENOUS
Status: DISCONTINUED | OUTPATIENT
Start: 2024-10-11 | End: 2024-10-11

## 2024-10-11 RX ORDER — BENZOCAINE/MENTHOL 6 MG-10 MG
1 LOZENGE MUCOUS MEMBRANE 3 TIMES DAILY PRN
Status: DISCONTINUED | OUTPATIENT
Start: 2024-10-11 | End: 2024-10-11 | Stop reason: HOSPADM

## 2024-10-11 RX ORDER — ASPIRIN 81 MG/1
81 TABLET, CHEWABLE ORAL DAILY
Qty: 100 TABLET | Refills: 1 | Status: SHIPPED | OUTPATIENT
Start: 2024-10-12 | End: 2024-10-11

## 2024-10-11 RX ORDER — MAGNESIUM SULFATE HEPTAHYDRATE 40 MG/ML
2 INJECTION, SOLUTION INTRAVENOUS
Status: DISCONTINUED | OUTPATIENT
Start: 2024-10-11 | End: 2024-10-11 | Stop reason: HOSPADM

## 2024-10-11 RX ORDER — FENTANYL CITRATE 50 UG/ML
50 INJECTION, SOLUTION INTRAMUSCULAR; INTRAVENOUS
Status: COMPLETED | OUTPATIENT
Start: 2024-10-11 | End: 2024-10-11

## 2024-10-11 RX ORDER — DEXTROSE MONOHYDRATE 25 G/50ML
9.5 INJECTION, SOLUTION INTRAVENOUS
Status: DISCONTINUED | OUTPATIENT
Start: 2024-10-11 | End: 2024-10-11

## 2024-10-11 RX ORDER — GLYCOPYRROLATE 0.2 MG/ML
0.1 INJECTION, SOLUTION INTRAMUSCULAR; INTRAVENOUS ONCE
Status: COMPLETED | OUTPATIENT
Start: 2024-10-11 | End: 2024-10-11

## 2024-10-11 RX ORDER — LIDOCAINE HYDROCHLORIDE 40 MG/ML
1.5 SOLUTION TOPICAL ONCE
Status: DISCONTINUED | OUTPATIENT
Start: 2024-10-11 | End: 2024-10-11

## 2024-10-11 RX ORDER — LIDOCAINE 40 MG/G
CREAM TOPICAL
Status: DISCONTINUED | OUTPATIENT
Start: 2024-10-11 | End: 2024-10-11 | Stop reason: HOSPADM

## 2024-10-11 RX ORDER — ROSUVASTATIN CALCIUM 40 MG/1
40 TABLET, COATED ORAL DAILY
Qty: 30 TABLET | Refills: 1 | Status: SHIPPED | OUTPATIENT
Start: 2024-10-11 | End: 2024-10-11

## 2024-10-11 RX ORDER — ROSUVASTATIN CALCIUM 20 MG/1
40 TABLET, COATED ORAL DAILY
Status: DISCONTINUED | OUTPATIENT
Start: 2024-10-11 | End: 2024-10-11 | Stop reason: HOSPADM

## 2024-10-11 RX ORDER — MAGNESIUM HYDROXIDE/ALUMINUM HYDROXICE/SIMETHICONE 120; 1200; 1200 MG/30ML; MG/30ML; MG/30ML
30 SUSPENSION ORAL EVERY 8 HOURS PRN
Status: DISCONTINUED | OUTPATIENT
Start: 2024-10-11 | End: 2024-10-11 | Stop reason: HOSPADM

## 2024-10-11 RX ADMIN — ASPIRIN 81 MG CHEWABLE TABLET 81 MG: 81 TABLET CHEWABLE at 08:25

## 2024-10-11 RX ADMIN — CLOPIDOGREL BISULFATE 75 MG: 75 TABLET ORAL at 08:26

## 2024-10-11 RX ADMIN — MIDAZOLAM 1 MG: 1 INJECTION INTRAMUSCULAR; INTRAVENOUS at 11:35

## 2024-10-11 RX ADMIN — MIDAZOLAM 1 MG: 1 INJECTION INTRAMUSCULAR; INTRAVENOUS at 11:06

## 2024-10-11 RX ADMIN — GLYCOPYRROLATE 0.1 MG: 0.2 INJECTION, SOLUTION INTRAMUSCULAR; INTRAVENOUS at 10:33

## 2024-10-11 RX ADMIN — MIDAZOLAM 1 MG: 1 INJECTION INTRAMUSCULAR; INTRAVENOUS at 11:26

## 2024-10-11 RX ADMIN — TOPICAL ANESTHETIC 1 ML: 200 SPRAY DENTAL; PERIODONTAL at 10:50

## 2024-10-11 RX ADMIN — FENTANYL CITRATE 50 MCG: 50 INJECTION, SOLUTION INTRAMUSCULAR; INTRAVENOUS at 10:55

## 2024-10-11 RX ADMIN — MIDAZOLAM 1 MG: 1 INJECTION INTRAMUSCULAR; INTRAVENOUS at 11:14

## 2024-10-11 RX ADMIN — MIDAZOLAM 1 MG: 1 INJECTION INTRAMUSCULAR; INTRAVENOUS at 10:59

## 2024-10-11 RX ADMIN — MIDAZOLAM 2 MG: 1 INJECTION INTRAMUSCULAR; INTRAVENOUS at 11:18

## 2024-10-11 RX ADMIN — MIDAZOLAM 1 MG: 1 INJECTION INTRAMUSCULAR; INTRAVENOUS at 10:56

## 2024-10-11 RX ADMIN — MIDAZOLAM 1 MG: 1 INJECTION INTRAMUSCULAR; INTRAVENOUS at 11:10

## 2024-10-11 RX ADMIN — ACETAMINOPHEN 650 MG: 325 TABLET ORAL at 08:25

## 2024-10-11 RX ADMIN — FENTANYL CITRATE 50 MCG: 50 INJECTION, SOLUTION INTRAMUSCULAR; INTRAVENOUS at 11:21

## 2024-10-11 RX ADMIN — FENTANYL CITRATE 50 MCG: 50 INJECTION, SOLUTION INTRAMUSCULAR; INTRAVENOUS at 11:26

## 2024-10-11 RX ADMIN — MIDAZOLAM 1 MG: 1 INJECTION INTRAMUSCULAR; INTRAVENOUS at 11:08

## 2024-10-11 RX ADMIN — MIDAZOLAM 1 MG: 1 INJECTION INTRAMUSCULAR; INTRAVENOUS at 11:22

## 2024-10-11 RX ADMIN — FENTANYL CITRATE 50 MCG: 50 INJECTION, SOLUTION INTRAMUSCULAR; INTRAVENOUS at 11:15

## 2024-10-11 RX ADMIN — MIDAZOLAM 1 MG: 1 INJECTION INTRAMUSCULAR; INTRAVENOUS at 11:03

## 2024-10-11 ASSESSMENT — ACTIVITIES OF DAILY LIVING (ADL)
ADLS_ACUITY_SCORE: 35

## 2024-10-11 NOTE — PLAN OF CARE
Goal Outcome Evaluation:      Plan of Care Reviewed With: patient, spouse    Overall Patient Progress: improvingOverall Patient Progress: improving    Outcome Evaluation: progressing    Pt here with R frontal cortex embolic stroke, had GODWIN today. A&O. Neuros intact. VSS. Tele NSR. Reg diet, thin liquids. Takes pills whole. Up independently. Headache this AM, tylenol given. Pt scoring green on the Aggression Stop Light Tool. Plan Cards, Neuro, and Hem consults. Discharge pending finalization of plan--cards singed off, neurology following, hematology pending, hospitalist admitting.

## 2024-10-11 NOTE — CONSULTS
[facility]    Hematology/Oncology Consult Note      Date of Admission:  10/10/2024  Date of Consult:  10/11/24  Reason for Consult: hypercoagulable state evaluation      ASSESSMENT/PLAN:  Chloe Foster is a 44 year old female with  ? Hypercoagulable state     She does not have Protein S deficiency .  We will wait for hypercoag work up and see her in clinic in 2-3 weeks for follow up. Continue aspirin and plavix. I cannot do any PFA studies (plus usually they are not helpful) in setting of aspirin and plavix and it would not change our management.     Will sign off , office will call to arrange outpatient follow up thank you for the consult    Arturo Leigh MD         HISTORY OF PRESENT ILLNESS: Chloe is a 44-year-old female with history of strokes x 2 in 2015 during pregnancy status post PFO/ASD closure in 2016, dyslipidemia.  Family members multiple had coronary disease in their 50s.  She also has a history of migraines.  She was admitted to the hospital on 10/10/2024 for an episode of numbness in her tongue which felt funny.  Lasted a short duration CT of the head showed no bleeding MRI of the brain was done which showed small focal infarct in the right inferior frontal gyral cortex.  Unclear if it was new or old.  Bubble study was negative.  GODWIN study showed intact PFO closure.  Lipoprotein a elevated at 59   Lupus anticoagulant testing and other extensive hypercoagulable workup is currently pending.  CT of the chest abdomen pelvis completed yesterday did not show any evidence of primary malignancy.  Neurology saw the patient and they recommended continuing aspirin indefinitely along with continuing Plavix. No evidence of thrombus on GODWIN    REVIEW OF SYSTEMS:   14 point ROS was reviewed and is negative other than as noted above in HPI.       MEDICATIONS:  Current Facility-Administered Medications   Medication Dose Route Frequency Provider Last Rate Last Admin    acetaminophen  (TYLENOL) tablet 325-650 mg  325-650 mg Oral Q4H PRN Vinnie Lion MD   650 mg at 10/11/24 0825    aspirin (ASA) chewable tablet 81 mg  81 mg Oral or Feeding Tube Daily Robert Lockett MD   81 mg at 10/11/24 0825    clopidogrel (PLAVIX) tablet 75 mg  75 mg Oral or Feeding Tube Daily Robert Lockett MD   75 mg at 10/11/24 0826    flumazenil (ROMAZICON) injection 0.2 mg  0.2 mg Intravenous q1 min prn Akira Craft MD        lidocaine (LMX4) cream   Topical Q1H PRN Akira Craft MD        lidocaine 1 % 0.1-1 mL  0.1-1 mL Other Q1H PRN Akira Craft MD        magnesium sulfate 2 g in 50 mL sterile water intermittent infusion  2 g Intravenous Once PRN Akira Craft MD        naloxone (NARCAN) injection 0.2 mg  0.2 mg Intravenous Q2 Min PRN Akira Craft MD        naloxone (NARCAN) injection 0.2 mg  0.2 mg Intramuscular Q2 Min PRN Akira Craft MD        naloxone (NARCAN) injection 0.4 mg  0.4 mg Intravenous Q2 Min PRN Akira Craft MD        naloxone (NARCAN) injection 0.4 mg  0.4 mg Intramuscular Q2 Min PRN Akira Craft MD        potassium chloride kash ER (KLOR-CON M20) CR tablet 20 mEq  20 mEq Oral Once PRN Akira Craft MD        potassium chloride kash ER (KLOR-CON M20) CR tablet 40 mEq  40 mEq Oral Once PRN Akira Craft MD        rosuvastatin (CRESTOR) tablet 40 mg  40 mg Oral Daily Robert Lockett MD        sodium chloride (PF) 0.9% PF flush 3 mL  3 mL Intracatheter Q8H Akira Craft MD   10 mL at 10/11/24 1030    sodium chloride (PF) 0.9% PF flush 3 mL  3 mL Intracatheter q1 min prn Akira Craft MD   3 mL at 10/11/24 1252         ALLERGIES:  Allergies   Allergen Reactions    Compazine [Prochlorperazine] Muscle Pain (Myalgia)         PAST MEDICAL HISTORY:  Past Medical History:   Diagnosis Date    Acute stress reaction     propranolol helps prior to interviews, stressful situations    Allergic rhinitis, cause unspecified     no meds except prn flonase, tests  generally positive, decided against shots    Anxiety state, unspecified     citalopram started in 7/06, stopped after couple wks, felt sluggish/tired    CVA (cerebral vascular accident) (H)     May 1, May 14th 2015    Depressive disorder, not elsewhere classified     dx, but not sure this is correct, weaned off wellbutrin (4/05-6/06, 12/06-1/08), restarted 3/08    Dizziness     Elevated lipoprotein A level     Hirsutism     saw endo, inconclusive, started on spironolactone (helped a little), stopped in 6/06, elevated DHEA- sees new endo 4/08    History of stroke with residual effects     PFO (patent foramen ovale)     Post PFO/ASD closure with 30mm Amplatzer device 4/21/16    Protein S deficiency (H)     Per Dr. Casarez, prengnacy related.  Protein S levels normalized afer pregnancy.    Urinary tract infection, site not specified     recurrent, start nitrofur in 5/08, 6mo txt         PAST SURGICAL HISTORY:  Past Surgical History:   Procedure Laterality Date    ARTHROSCOPY KNEE RT/LT  1995    Rt knee    COLONOSCOPY N/A 9/25/2023    Procedure: Colonoscopy;  Surgeon: Alex Gross MD;  Location:  GI    ESOPHAGOSCOPY, GASTROSCOPY, DUODENOSCOPY (EGD), COMBINED N/A 9/25/2023    Procedure: ESOPHAGOGASTRODUODENOSCOPY, WITH BIOPSY;  Surgeon: Alex Gross MD;  Location:  GI    GYN SURGERY      HC REPAIR OF NASAL SEPTUM  12/2005    REPAIR PATENT FORAMEN OVALE  04/21/2016    SEPTORHINOPLASTY N/A 03/15/2021    Procedure: Revision Septorhinoplasty, Repair of Nasal Valve Collapse, Cadaveric Rib Graft + 45 min Cosmetic Tip Rhinoplasty;  Surgeon: Nikki Schwartz MD;  [place] [facility] [place]         SOCIAL HISTORY:  Social History     Socioeconomic History    Marital status:      Spouse name: Not on file    Number of children: 0    Years of education: 15    Highest education level: Not on file   Occupational History    Occupation:      Comment: [employer]   Tobacco Use    Smoking status: Former      Current packs/day: 0.00     Average packs/day: 0.5 packs/day for 5.0 years (2.5 ttl pk-yrs)     Types: Cigarettes     Start date: 4/21/2009     Quit date: 4/21/2014     Years since quitting: 10.4    Smokeless tobacco: Never   Vaping Use    Vaping status: Never Used   Substance and Sexual Activity    Alcohol use: Yes     Comment: SOCIAL    Drug use: No    Sexual activity: Yes     Partners: Male     Birth control/protection: Pull-out method   Other Topics Concern     Service Not Asked    Blood Transfusions Not Asked    Caffeine Concern No     Comment: 1 cup coffee per day    Occupational Exposure Not Asked    Hobby Hazards Not Asked    Sleep Concern No    Stress Concern No    Weight Concern No    Special Diet No    Back Care Not Asked    Exercise Yes     Comment: 4-5 days week    Bike Helmet Not Asked    Seat Belt Not Asked    Self-Exams Not Asked    Parent/sibling w/ CABG, MI or angioplasty before 65F 55M? No   Social History Narrative    Social Documentation:        Balanced Diet: YES    Calcium intake: 3 per day    Caffeine: 0 per day    Exercise:  type of activity ellipitcal, pilates;  5 times per week    Sunscreen: Yes    Seatbelts:  Yes    Self Breast Exam:  Yes    Physical/Emotional/Sexual Abuse: No    Do you feel safe in your environment? Yes        Cholesterol screen up to date: Yes- checking today 07/23/09, non fasting    Eye Exam up to date: Yes    Dental Exam up to date: Yes    Pap smear up to date: Yes: checking today 07/23/09, last was 04/08 NIL    Mammogram up to date: Does Not Apply    Dexa Scan up to date: Does Not Apply    Colonoscopy up to date: Does Not Apply    Immunizations up to date: Yes, had one a month ago.    Glucose screen if over 40:  No        Julieth Matthews MA    07/23/09     Social Determinants of Health     Financial Resource Strain: Low Risk  (10/9/2023)    Financial Resource Strain     Within the past 12 months, have you or your family members you live with been unable to  "get utilities (heat, electricity) when it was really needed?: No   Food Insecurity: Low Risk  (10/9/2023)    Food Insecurity     Within the past 12 months, did you worry that your food would run out before you got money to buy more?: No     Within the past 12 months, did the food you bought just not last and you didn t have money to get more?: No   Transportation Needs: Low Risk  (10/9/2023)    Transportation Needs     Within the past 12 months, has lack of transportation kept you from medical appointments, getting your medicines, non-medical meetings or appointments, work, or from getting things that you need?: No   Physical Activity: Not on file   Stress: Not on file   Social Connections: Not on file   Interpersonal Safety: Not on file   Housing Stability: Low Risk  (10/9/2023)    Housing Stability     Do you have housing? : Yes     Are you worried about losing your housing?: No         FAMILY HISTORY:  Family History   Problem Relation Age of Onset    Family History Negative Mother     Cancer Father         throat, possibly asbestos exposure    Hypertension Father     Allergies Father     Arrhythmia Father     Other Cancer Father     Family History Negative Sister     Family History Negative Brother     Diabetes Maternal Grandmother         on insulin    C.A.D. Maternal Grandmother         triple bypass in her 60s    C.A.D. Maternal Grandfather         MI in 50s    Diabetes Paternal Grandmother     Neurologic Disorder Paternal Grandfather          of brain aneurysm in early 60s    Family History Negative Son     Diabetes Maternal Aunt          in her early 40s of DM complications, type 2    Coronary Artery Disease Early Onset Maternal Aunt 41    Obesity Maternal Aunt          PHYSICAL EXAM:  Vital signs:  Temp: 98  F (36.7  C) Temp src: Oral BP: 116/75 Pulse: 103   Resp: 16 SpO2: 95 % O2 Device: None (Room air) Oxygen Delivery: 4 LPM Height: 167.6 cm (5' 6\") Weight: 71.2 kg (157 lb)  Estimated body mass " "index is 25.34 kg/m  as calculated from the following:    Height as of this encounter: 1.676 m (5' 6\").    Weight as of this encounter: 71.2 kg (157 lb).    ECO  GENERAL/CONSTITUTIONAL: No acute distress.  EYES: Pupils are equal, round, and react to light and accommodation. Extraocular movements intact.  No scleral icterus.  ENT/MOUTH: Neck supple. Oropharynx clear, no mucositis.  LYMPH: No anterior cervical, posterior cervical, supraclavicular, axillary or inguinal adenopathy.   RESPIRATORY: Clear to auscultation bilaterally. No crackles or wheezing.   CARDIOVASCULAR: Regular rate and rhythm without murmurs, gallops, or rubs.  GASTROINTESTINAL: No hepatosplenomegaly, masses, or tenderness. The patient has normal bowel sounds. No guarding.  No distention.  MUSCULOSKELETAL: Warm and well-perfused, no cyanosis, clubbing, or edema.  NEUROLOGIC: Cranial nerves II-XII are intact. Alert, oriented, answers questions appropriately.  INTEGUMENTARY: No rashes or jaundice.  GAIT: Steady, does not use assistive device      LABS:  CBC RESULTS:   Recent Labs   Lab Test 10/10/24  1216   WBC 8.6   RBC 4.62   HGB 13.9   HCT 41.1   MCV 89   MCH 30.1   MCHC 33.8   RDW 12.4          Recent Labs   Lab Test 10/11/24  0858 10/11/24  0830 10/10/24  2053 10/10/24  1902 10/10/24  1216 10/11/23  1410   NA  --   --   --   --  136 138   POTASSIUM 3.7  --   --   --  3.5 4.0   CHLORIDE  --   --   --   --  102 103   CO2  --   --   --   --  21* 22   ANIONGAP  --   --   --   --  13 13   GLC  --  85 99   < > 87 94   BUN  --   --   --   --  8.2 13.3   CR  --   --   --   --  0.77 0.66   STEPH  --   --   --   --  8.9 9.4    < > = values in this interval not displayed.         PATHOLOGY:  noted    IMAGING:      noted    Total time spent on day of visit, including review of tests, obtaining/reviewing separately obtained history, ordering medications/tests/procedures, communicating with PCP/consultants, and documenting in electronic medical " record: 85 minutes   Thank you for the opportunity to participate in this patient's care.  Please call with any questions.    Arturo Leigh MD  Hematology/Oncology  [facility]

## 2024-10-11 NOTE — PLAN OF CARE
"OT: Order received, chart reviewed and discussed with care team. OT not indicated due to per discussion w/ patient and evaluating physical therapist pt only having some residual deficit in thumb reporting feeling \"fat fingered\", PT had pt type on computer on rehab space, education provided to patient to ask for OP OT orders if it is impacting daily or work activities, pt completely understood and verbalized understanding. Defer discharge recommendations to medical team. Will complete orders.    "

## 2024-10-11 NOTE — PROGRESS NOTES
Care Suites Procedure Nursing Note    Patient Information  Name: Chloe Foster  Age: 44 year old    Procedure- GODWIN w /Dr. Craft     Procedure start time: 1145  *See Procedurla Sedation flowsheet   Procedure complete time: 1145  Concerns/abnormal assessment:   Pt.  Required a large  amount of sedation   Sedation time 50 minutes   Versed 12 mg  Fentanyl 200mcgs  If abnormal assessment, provider notified: Yes    Plan/Other:   GODWIN with Dr. Venancio Craft spoke on phone with  regarding GODWIN results.   Heriberto  [phone] 1225- Report called to Moni LOZOYA   9465- Return to #[id number] on cart with Cristy Yoder, RN

## 2024-10-11 NOTE — PROGRESS NOTES
Reason for Admission: small infract  Cognitive/Mentation: A/Ox 4  Neuros/CMS: Stable   VS: VSS on RA.permissive HTN   Tele: sinus tachy.  GI/: BS clear, Continent.  Pulmonary: LS clear.  Pain: headache PRN tylenol given.   Drains/Lines: PIV SL  Skin: intact  Activity: independent  Diet: NPO except for meds with sips of water.   Therapies recs: pending  Discharge: pending  Aggression Stoplight Tool: green  End of shift summary: GODWIN at 10:30 want her down at special echo @ 9:30, EEG, PT/OT/speech.

## 2024-10-11 NOTE — PROGRESS NOTES
10/11/24 6547   Appointment Info   Signing Clinician's Name / Credentials (PT) Skylar Herrera, PT   Living Environment   People in Home spouse   Current Living Arrangements house   Home Accessibility stairs within home   Number of Stairs, Within Home, Primary   (Flight to bedroom and to basement.)   Stair Railings, Within Home, Primary railings safe and in good condition   Transportation Anticipated car, drives self   Self-Care   Usual Activity Tolerance good   Current Activity Tolerance good   Equipment Currently Used at Home none   General Information   Onset of Illness/Injury or Date of Surgery 10/10/24   Referring Physician Jacinta Telles MD   Patient/Family Therapy Goals Statement (PT) To go home   Pertinent History of Current Problem (include personal factors and/or comorbidities that impact the POC) Ms. Chloe Foster is a very pleasant 44 year old female with a past medical history of CVA with PFO closure, anxiety/depression,  who was admitted on 10/10/2024 for TIA symptoms. Cardiology was consulted for CVA with history of PFO closure. Per chart, MRI showed Small focal infarct in the right middle/inferior frontal  gyral cortex.   Existing Precautions/Restrictions no known precautions/restrictions   General Observations Pateints only complaint was that her left fingers felt fat and she was having to concentrate more on texting with left hand.   Cognition   Affect/Mental Status (Cognition) WNL   Orientation Status (Cognition) oriented x 4   Follows Commands (Cognition) WNL   Pain Assessment   Patient Currently in Pain No   Integumentary/Edema   Integumentary/Edema no deficits were identifed   Posture    Posture Not impaired   Range of Motion (ROM)   Range of Motion ROM is WNL   Strength (Manual Muscle Testing)   Strength (Manual Muscle Testing) strength is WNL   Bed Mobility   Comment, (Bed Mobility) Independent   Transfers   Comment, (Transfers) Independent   Gait/Stairs (Locomotion)   Comment,  (Gait/Stairs) Independent   Balance   Balance Comments No deficits noted.   Sensory Examination   Sensory Perception WNL   Coordination   Coordination Comments Pateint reports that her left fingers felt fat and that she was having to concentrate more on texting with left hand but able to do thumb to finger and alternating hand movement as will with the left as the right. Able to type on a laptop without difficulty.   Muscle Tone   Muscle Tone no deficits were identified   Clinical Impression   Criteria for Skilled Therapeutic Intervention No problems identified which require skilled intervention   Clinical Impression Comments Screened for OT and no OT needs noted. Did educate patient that if the mild difficulty with texting doesn't get better to ask her PCP for an OT referral.   PT Total Evaluation Time   PT Ruel, Low Complexity Minutes ([id number]) 8   PT Discharge Planning   PT Plan DC   PT Discharge Recommendation (DC Rec) home   PT Rationale for DC Rec Patient is able to demonstrate independence with all functional mobility. No further PT needed and no OT needs indicated.   PT Brief overview of current status  Goals of therapy will be to address safe mobility and make recs for d/c to next level of care. Pt and RN will continue to follow all falls risk precautions as documented by RN staff while hospitalized    Total Session Time   Total Session Time (sum of timed and untimed services) 8

## 2024-10-11 NOTE — PROVIDER NOTIFICATION
"Noted cardiology has seen pt this AM already and added pre-procedure PRN Mag+ and K+ meds if labs meet criteria - these labs not ordered; RN added lab orders \"stat\" per scope and, as pt is observation status, sent message to hospitalist and hematology teams to inquire if any other labs needed today.      Also updated ST & PT that pt has GODWIN  scheduled for 1030, they will be calling for pt at 0930 (therapies were tentatively scheduled for 1000 and 1100).   "

## 2024-10-11 NOTE — PROVIDER NOTIFICATION
MD Notification    Notified Person: MD    Notified Person Name:  sen     Notification Date/Time: 10/10 9:23    Notification Interaction: page and vorcera     Purpose of Notification: please review patient's MRI results, patient also has a headache and wanting meds. Also, patient wants something for anxiety, she has PTA lorazepam. please advise. thank you.     Orders Received: once lorazepam ordered, tylenol changed to Q4 with verbal read back     Comments: day team to review MRI results with patient.

## 2024-10-11 NOTE — PLAN OF CARE
Goal Outcome Evaluation:    Okay to discharge per provider. Discharge medications and instructions reviewed. Pt spouse to transport home. Pt left with all belongings. Provider up on floor to answer all questions. Pt left with her home PTA meds.

## 2024-10-11 NOTE — CONSULTS
[facility]    Stroke Consult Note    Reason for Consult: Ischemic stroke    Chief Complaint: Numbness    HPI  Chloe Foster is a 44 year old female with previous ischemic stroke in 2015 while she was pregnant, came in with acute onset of left face arm and leg numbness, weakness and dysarthria, and follow-up MRI showed acute ischemic stroke in the right frontal cortical area.  When she had ischemic stroke in 2015 during pregnancy she went through extensive workup and PFO was noted which was later closed, and also had extensive hematological workup, there was a question about protein S deficiency however follow-up hematology note said that protein S abnormality is secondary to pregnancy and the patient does not have mutation of the gene.  Clinically the patient progressed well and became asymptomatic on the following day.  Lipoprotein a found to be high.    Stroke Evaluation Summarized    MRI/Head CT Acute ischemic stroke of the right frontal cortical area   Intracranial Vasculature Unremarkable   Cervical Vasculature No stenosis   CT chest abdomen and pelvis with contrast No evidence of underlie malignancy     Echocardiogram Transthoracic echocardiogram: Left ventricle normal structure, function and size, ejection fraction 55%, ASD closure device in place, negative bubble study, no valvular disease  Transesophageal echocardiogram: Pending  Transesophageal echocardiogram: unremarkable, PFO device in place, no intracardiac thrombus   EKG/Telemetry Sinus rhythm   Other Testing Not Applicable      LDL  10/11/2024: 90 mg/dL   A1C  10/10/2024: 4.9 %   Troponin 10/10/2024: <6 ng/L   Lipoprotein a 10/10/2024: 59, normal value below 30       Impression  Acute ischemic stroke of right frontal cortex due to embolic stroke of undetermined source (ESUS)  Elevated lipoprotein A  MTHFR C677T homozygous mutation    -- The patient is interested in knowing the possibility of the association  "between migraine and stroke, CADASIL is a condition that sometimes can cause stroke in the young female with migraine however the MRI pattern is not typical    Recommendations   Continue aspirin 81 mg indefinitely  Continue clopidogrel 75 mg daily for 21 days  Rosuvastatin 40 mg daily, goal of LDL between 40 and 70.  PT OT and ST evaluation  Hypercoagulable work up  Ziopatch for 14 days (ordered)  Consider NOTCH3 gene test as outpatient to rule out CADASIL  Long-term blood pressure goal below 130/80  Stroke education provided  Daily exercise, Mediterranean diet    Patient Follow-up    - In 6-8 weeks with stroke clinic (order is placed)    No further recommendations from stroke neurology, will sign off.    The Stroke Staff is Dr. Brumfield.    Robert Locektt MD  Vascular Neurology Fellow    To page me or covering stroke neurology team member, click here: [facility]  Choose \"On Call\" tab at top, then select \"NEUROLOGY/ALL SITES\" from middle drop-down box, press Enter, then look for \"stroke\" or \"telestroke\" for your site.  _____________________________________________________    Clinically Significant Risk Factors Present on Admission                           # Overweight: Estimated body mass index is 25.34 kg/m  as calculated from the following:    Height as of this encounter: 1.676 m (5' 6\").    Weight as of this encounter: 71.2 kg (157 lb).                 Past Medical History    Past Medical History:   Diagnosis Date    Acute stress reaction     propranolol helps prior to interviews, stressful situations    Allergic rhinitis, cause unspecified     no meds except prn flonase, tests generally positive, decided against shots    Anxiety state, unspecified     citalopram started in 7/06, stopped after couple wks, felt sluggish/tired    CVA (cerebral vascular accident) (H)     May 1, May 14th 2015    Depressive disorder, not elsewhere classified     dx, but not sure this is correct, weaned off wellbutrin (4/05-6/06, " 12/06-1/08), restarted 3/08    Dizziness     Elevated lipoprotein A level     Hirsutism     saw endo, inconclusive, started on spironolactone (helped a little), stopped in 6/06, elevated DHEA- sees new endo 4/08    History of stroke with residual effects     PFO (patent foramen ovale)     Post PFO/ASD closure with 30mm Amplatzer device 4/21/16    Protein S deficiency (H)     Per Dr. Casraez, prengnacy related.  Protein S levels normalized afer pregnancy.    Urinary tract infection, site not specified     recurrent, start nitrofur in 5/08, 6mo txt     Medications   Home Meds  Prior to Admission medications    Medication Sig Start Date End Date Taking? Authorizing Provider   acetaminophen (TYLENOL) 325 MG tablet Take 325-650 mg by mouth every 6 hours as needed for mild pain   Yes Reported, Patient   aspirin-acetaminophen-caffeine (EXCEDRIN MIGRAINE) 250-250-65 MG tablet Take 1 tablet by mouth every 6 hours as needed for headaches   Yes Reported, Patient   LORazepam (ATIVAN) 1 MG tablet Take 0.5-1 tablets (0.5-1 mg) by mouth daily as needed for anxiety or sleep (severe anxiety/panic). 8/22/24  Yes Shelby Ferro PA-C   magnesium oxide (MAG-OX) 400 MG tablet Take 400 mg by mouth nightly as needed (leg cramps). 5/20/15  Yes Reported, Patient   Omega-3 Fatty Acids (FISH OIL PO) Take 1 capsule by mouth At Bedtime    Yes Reported, Patient   polyethylene glycol (MIRALAX) 17 GM/Dose powder Take 1 capful (17g) of Miralax mixed with 8 oz of a clear liquid twice daily for 7 days prior to your scheduled procedure.  Patient taking differently: Take 17 g by mouth 2 times daily as needed. Take 1 capful (17g) of Miralax mixed with 8 oz of a clear liquid twice daily for 7 days prior to your scheduled procedure. 9/15/23  Yes Alex Gross MD   propranolol (INDERAL) 10 MG tablet Take 1-4 tablets (10-40 mg) by mouth as needed (anxiety (take 30-60 minutes prior to meetings)). 8/22/24  Yes Shelby Ferro PA-C    tirzepatide-Weight Management (ZEPBOUND) 7.5 MG/0.5ML prefilled pen Inject 0.5 mLs (7.5 mg) subcutaneously every 7 days. 9/6/24  Yes Belen Wong PA-C   VITAMIN D, CHOLECALCIFEROL, PO Take 1,000 Units by mouth every evening.   Yes Reported, Patient   insulin pen needle (31G X 5 MM) 31G X 5 MM miscellaneous Use 1 pen needles daily or as directed. 1/9/24   Nichole Miller PA-C   Semaglutide-Weight Management (WEGOVY) 0.25 MG/0.5ML pen Inject 0.25 mg Subcutaneous once a week  Patient not taking: Reported on 8/22/2024 4/19/24   Belen Wong PA-C   Semaglutide-Weight Management (WEGOVY) 0.5 MG/0.5ML pen Inject 0.5 mg Subcutaneous once a week  Patient not taking: Reported on 8/22/2024 4/19/24   Belen Wong PA-C   Semaglutide-Weight Management (WEGOVY) 1 MG/0.5ML pen Inject 1 mg subcutaneously once a week  Patient not taking: Reported on 10/10/2024 8/2/24   Belen Wong PA-C   Semaglutide-Weight Management (WEGOVY) 1.7 MG/0.75ML pen Inject 1.7 mg subcutaneously once a week  Patient not taking: Reported on 8/22/2024 8/2/24   Belen Wong PA-C   STATIN NOT PRESCRIBED, INTENTIONAL, Statin not prescribed intentionally due to Woman of childbearing age not actively taking birth control 12/21/16   Izabel Reyes MD   tirzepatide-Weight Management (ZEPBOUND) 5 MG/0.5ML prefilled pen Inject 0.5 mLs (5 mg) subcutaneously every 7 days  Patient not taking: Reported on 10/10/2024 8/14/24   Belen Wong PA-C       Scheduled Meds  Current Facility-Administered Medications   Medication Dose Route Frequency Provider Last Rate Last Admin    aspirin (ASA) chewable tablet 81 mg  81 mg Oral or Feeding Tube Daily Robert Lockett MD   81 mg at 10/11/24 0825    clopidogrel (PLAVIX) tablet 75 mg  75 mg Oral or Feeding Tube Daily Robert Lockett MD   75 mg at 10/11/24 0826    rosuvastatin (CRESTOR) tablet 40 mg  40 mg Oral Daily Robert Lockett MD        sodium chloride (PF) 0.9% PF flush 3 mL  3 mL Intracatheter Q8H Akira Craft  MD Tirso   10 mL at 10/11/24 1030       Infusion Meds  Current Facility-Administered Medications   Medication Dose Route Frequency Provider Last Rate Last Admin       Allergies   Allergies   Allergen Reactions    Compazine [Prochlorperazine] Muscle Pain (Myalgia)          PHYSICAL EXAMINATION   Temp:  [97.6  F (36.4  C)-98.7  F (37.1  C)] 98  F (36.7  C)  Pulse:  [] 98  Resp:  [7-26] 16  BP: ()/(55-86) 110/77  SpO2:  [92 %-99 %] 95 %    Neurologic  Mental Status:  alert, oriented x 3, follows commands, speech clear and fluent, naming and repetition normal  Cranial Nerves:  visual fields intact, PERRL, EOMI with normal smooth pursuit, facial sensation intact and symmetric, facial movements symmetric, hearing not formally tested but intact to conversation, palate elevation symmetric and uvula midline, no dysarthria, shoulder shrug strong bilaterally, tongue protrusion midline  Motor:  normal muscle tone and bulk, no abnormal movements, able to move all limbs spontaneously, strength 5/5 throughout upper and lower extremities, no pronator drift  Reflexes:  toes down-going  Sensory:  light touch sensation intact and symmetric throughout upper and lower extremities, no extinction on double simultaneous stimulation   Coordination:  normal finger-to-nose and heel-to-shin bilaterally without dysmetria, rapid alternating movements symmetric  Station/Gait:  deferred    Stroke Scales    NIHSS  1a. Level of Consciousness 0-->Alert, keenly responsive   1b. LOC Questions 0-->Answers both questions correctly   1c. LOC Commands 0-->Performs both tasks correctly   2.   Best Gaze 0-->Normal   3.   Visual 0-->No visual loss   4.   Facial Palsy 0-->Normal symmetrical movements   5a. Motor Arm, Left 0-->No drift, limb holds 90 (or 45) degrees for full 10 secs   5b. Motor Arm, Right 0-->No drift, limb holds 90 (or 45) degrees for full 10 secs   6a. Motor Leg, Left 0-->No drift, leg holds 30 degree position for full 5 secs  "  6b. Motor Leg, right 0-->No drift, leg holds 30 degree position for full 5 secs   7.   Limb Ataxia 0-->Absent   8.   Sensory 0-->Normal, no sensory loss   9.   Best Language 0-->No aphasia, normal   10. Dysarthria 0-->Normal   11. Extinction and Inattention  0-->No abnormality   Total 0 (10/11/24 1119)       Imaging  I personally reviewed all imaging; relevant findings per HPI.    Labs Data   CBC  Recent Labs   Lab 10/10/24  1216   WBC 8.6   RBC 4.62   HGB 13.9   HCT 41.1        Basic Metabolic Panel   Recent Labs   Lab 10/11/24  0858 10/11/24  0830 10/10/24  2053 10/10/24  1902 10/10/24  1216   NA  --   --   --   --  136   POTASSIUM 3.7  --   --   --  3.5   CHLORIDE  --   --   --   --  102   CO2  --   --   --   --  21*   BUN  --   --   --   --  8.2   CR  --   --   --   --  0.77   GLC  --  85 99 113* 87   STEPH  --   --   --   --  8.9     Liver Panel  No results for input(s): \"PROTTOTAL\", \"ALBUMIN\", \"BILITOTAL\", \"ALKPHOS\", \"AST\", \"ALT\", \"BILIDIRECT\" in the last 168 hours.  INR    Recent Labs   Lab Test 10/10/24  1216   INR 1.02        "

## 2024-10-11 NOTE — PROGRESS NOTES
[facility]    Cardiology Progress Note    Primary Cardiologist: Dr. Parker     Date of Admission: 10/10/2024  Service Date: 10/11/24    Summary:  Ms. Chloe Foster is a very pleasant 44 year old female with a past medical history of CVA with PFO closure, anxiety/depression,  who was admitted on 10/10/2024 for TIA symptoms. Cardiology was consulted for CVA with history of PFO closure.    Interval History   Patient underwent a GODWIN today which showed intact PFO closure device, negative bubble study. LpA positive.  Denies any acute symptoms today    Telemetry: Sinus rhythm, rate 80-90's    Assessment & Plan   1.  Acute ischemic CVA of right frontal cortex due to embolic stroke of unknown source       History of CVA in 2015 during third trimester of pregnancy  -Initial head CT was negative for acute hemorrhage or transcortical infarct, however subsequent MRI showed a small focal infarct in the right middle/inferior frontal gyral cord  -Hematology and neurology following     2.  S/p PFO closure (4/2016)  -Transthoracic echo with negative bubble study and ASD closure device in place  -GODWIN done today again with negative bubble study  -PTA maintained on aspirin     3.  Elevated lipoprotein a  -LpA 59, lipid panel with tchol 162, triglycerides 62, HDL 60, LDL 90  -Discussed recommendation for statin therapy, rosuvastatin initiated by neurology, goal of LDL 40-70    Plan:   1.  Started on Rosuvastatin 40mg daily by neurology, patient would like to consider this as she is still childbearing age   2.  Screening of lipoprotein a level for first-degree relatives  3.  Will mail out 14-day Zio patch monitor for continued assessment for arrhythmias  4.  Agree with dual antiplatelet therapy, however will defer to neurology and hematology in this regard  5.  Cardiology will sign off, will arrange outpatient cardiology follow-up in 6 weeks    Thank you for the opportunity to participate in this  pleasant patient's care.     LUCY Peñaloza, CNP   Nurse Practitioner  [facility]  (8am - 5pm, M-F)    Patient Active Problem List   Diagnosis    Vitamin D deficiency    CARDIOVASCULAR SCREENING; LDL GOAL LESS THAN 160    Lumbar herniated disc    Hirsutism    Insomnia    IBS (irritable bowel syndrome)    Moderate recurrent major depression (H)    History of cardioembolic cerebrovascular accident (CVA)    PFO with atrial septal aneurysm    Protein S deficiency (H)    Migraine with aura and without status migrainosus, not intractable    Pre-conception counseling    Elevated lipoprotein A level    Social anxiety disorder    MTHFR mutation    Cerebrovascular accident (CVA) due to embolism of right middle cerebral artery (H)    Deviated nasal septum    Overweight    Weight gain    Class 1 obesity due to excess calories with serious comorbidity and body mass index (BMI) of 30.0 to 30.9 in adult    Elevated cholesterol    S/P patent foramen ovale closure    TIA (transient ischemic attack)       Physical Exam   Temp: 97.9  F (36.6  C) Temp src: Oral BP: 99/70 Pulse: 101   Resp: 14 SpO2: 95 % O2 Device: Nasal cannula Oxygen Delivery: 4 LPM  Vitals:    10/11/24 1034   Weight: 71.2 kg (157 lb)     Vital Signs with Ranges  Temp:  [97.6  F (36.4  C)-98.7  F (37.1  C)] 97.9  F (36.6  C)  Pulse:  [] 101  Resp:  [7-26] 14  BP: ()/(55-88) 99/70  SpO2:  [92 %-99 %] 95 %  No intake/output data recorded.    Constitutional:  Appears her stated age, well nourished, and in no acute distress.  Eyes: Pupils equal, round. Sclerae anicteric.   HEENT: Normocephalic, atraumatic.   Neck: Supple. No JVD appreciated.  Respiratory: Breathing non-labored. Lungs clear to auscultation bilaterally. No crackles, wheezes, rhonchi, or rales.  Cardiovascular: Regular rate and rhythm, normal S1 and S2. No murmur, rub, or gallop.  GI: Soft  Skin: Warm, dry.   Musculoskeletal/Extremities: Moves all extremities well and  symmetrically. No edema.  Neurologic: No gross focal deficits. Alert, awake, and oriented to person, place and time.  Psychiatric: Affect appropriate. Mentation normal.    Medications   Current Facility-Administered Medications   Medication Dose Route Frequency Provider Last Rate Last Admin     Current Facility-Administered Medications   Medication Dose Route Frequency Provider Last Rate Last Admin    aspirin (ASA) chewable tablet 81 mg  81 mg Oral or Feeding Tube Daily Robert Lockett MD   81 mg at 10/11/24 0825    clopidogrel (PLAVIX) tablet 75 mg  75 mg Oral or Feeding Tube Daily Robert Lockett MD   75 mg at 10/11/24 0826    rosuvastatin (CRESTOR) tablet 40 mg  40 mg Oral Daily Robert Lockett MD        sodium chloride (PF) 0.9% PF flush 3 mL  3 mL Intracatheter Q8H Akira Craft MD   10 mL at 10/11/24 1030       Data   Recent Results (from the past 24 hour(s))   CT Head w/o Contrast    Narrative    EXAM: CT HEAD W/O CONTRAST  10/10/2024 12:28 PM     HISTORY: Code Stroke to evaluate for potential thrombolysis and  thrombectomy. PLEASE READ IMMEDIATELY.       COMPARISON: None    TECHNIQUE: Using multidetector thin collimation helical acquisition  technique, axial, coronal and sagittal CT images from the skull base  to the vertex were obtained without intravenous contrast.   (topogram) image(s) also obtained and reviewed. Dose reduction  techniques were used.    FINDINGS:  No acute intracranial hemorrhage, mass effect, or midline shift.  Preserved gray-white matter differentiation and subarachnoid spaces.    Atraumatic calvarium. No substantial paranasal sinus mucosal disease.  Clear mastoid air cells. Nonfocal orbits.       Impression    IMPRESSION: Negative for acute intracranial hemorrhage, hydrocephalus  or transcortical infarct.     Findings discussed with Dr. Salgado at 1241 hours on 10/10/2024.    BROWN SIMONS DO         SYSTEM ID:  [id number]   CTA Head Neck with Contrast    Narrative    CT ANGIOGRAM OF THE  HEAD AND NECK WITH CONTRAST  10/10/2024 12:28 PM     HISTORY: Code Stroke to evaluate for potential thrombolysis and  thrombectomy. PLEASE READ IMMEDIATELY.    TECHNIQUE:  CT angiography with an injection of 67 mL Isovue-370 IV  with scans through the head and neck. Images were transferred to a  separate 3-D workstation where multiplanar reformations and 3-D images  were created. Estimates of carotid stenoses are made relative to the  distal internal carotid artery diameters except as noted. Radiation  dose for this scan was reduced using automated exposure control,  adjustment of the mA and/or kV according to patient size, or iterative  reconstruction technique.    COMPARISON: None.     CT HEAD FINDINGS: No contrast enhancing lesions. Cerebral blood flow  is grossly normal.     CT ANGIOGRAM HEAD FINDINGS:  The major intracranial arteries including  the proximal branches of the anterior cerebral, middle cerebral, and  posterior cerebral arteries appear patent without vascular cutoff. No  aneurysm identified. No significant stenosis. Venous circulation is  unremarkable.     CT ANGIOGRAM NECK FINDINGS:   Normal origin of the great vessels from the aortic arch.     Right carotid artery: The right common and internal carotid arteries  are patent. No significant stenosis or atherosclerotic disease in the  carotid artery.     Left carotid artery: The left common and internal carotid arteries are  patent. No significant stenosis or atherosclerotic disease in the  carotid artery.     Vertebral arteries: Vertebral arteries are patent without evidence of  dissection. No significant stenosis.     Other findings: Bilateral thyroid nodules measuring up to 9 mm.       Impression    IMPRESSION:   1.  No large vessel arterial occlusion or high-grade stenosis in the  head or neck. No arterial dissection.   2.  Bilateral thyroid nodules. Recommend dedicated follow-up  nonemergent ultrasound for further evaluation.    Findings  discussed with Dr. Salgado at 1241 hours on 10/10/2024.    BROWN SIMONS DO         SYSTEM ID:  [id number]   Echocardiogram Complete   Result Value    LVEF  55%    Narrative    [id number]  [facility]  [id number]  [id number]^JORGE^BONILLA     [facility]  Echocardiography Laboratory  [address] [place] [address]     Name: CARMEN OLSEN  MRN: [id number]  : 1980  Study Date: 10/10/2024 01:10 PM  Age: 44 yrs  Gender: Female  Patient Location: [facility]  Reason For Study: Cerebrovascular Incident  Ordering Physician: BONILLA PATEL  Performed By: Catie Amaya     BSA: 1.9 m2  Height: 66 in  Weight: 187 lb  BP: 140/80 mmHg  ______________________________________________________________________________  Procedure  Complete Portable Bubble Echo Adult.  ______________________________________________________________________________  Interpretation Summary     1. The left ventricle is normal in structure, function and size. The visual  ejection fraction is estimated at 55%.  2. The right ventricle is normal in structure, function and size.  3. ASD closure device in place. Negative bubble study.  4. No valve disease.     No changes from echo .  ______________________________________________________________________________  Left Ventricle  The left ventricle is normal in structure, function and size. There is normal  left ventricular wall thickness. The visual ejection fraction is estimated at  55%. Left ventricular diastolic function is normal. Normal left ventricular  wall motion.     Right Ventricle  The right ventricle is normal in structure, function and size.     Atria  Normal left atrial size. Right atrial size is normal. ASD closure device in  place. Negative bubble study.     Mitral Valve  The mitral valve is normal in structure and function.     Tricuspid Valve  The tricuspid valve is normal in structure and function.     Aortic Valve  The aortic valve is normal in structure and function.      Pulmonic Valve  The pulmonic valve is normal in structure and function.     Vessels  Normal ascending, transverse (arch), and descending aorta. The inferior vena  cava was normal in size with preserved respiratory variability.     Pericardium  There is no pericardial effusion.     Rhythm  Sinus rhythm was noted.  ______________________________________________________________________________  MMode/2D Measurements & Calculations  IVSd: 0.70 cm  LVIDd: 4.4 cm  LVIDs: 3.0 cm  LVPWd: 0.70 cm  FS: 31.0 %  LV mass(C)d: 92.1 grams  LV mass(C)dI: 47.4 grams/m2  Ao root diam: 3.2 cm  LA dimension: 3.6 cm  asc Aorta Diam: 3.6 cm  LA/Ao: 1.1  LVOT diam: 2.1 cm  LVOT area: 3.4 cm2  Ao root diam index Ht(cm/m): 1.9  Ao root diam index BSA (cm/m2): 1.7  Asc Ao diam index BSA (cm/m2): 1.9  Asc Ao diam index Ht(cm/m): 2.2  LA Volume (BP): 46.7 ml     LA Volume Index (BP): 24.1 ml/m2  RV Base: 3.5 cm  RWT: 0.32  TAPSE: 2.1 cm     Doppler Measurements & Calculations  MV E max jon: 67.6 cm/sec  MV dec time: 0.11 sec  E/E' av.1  Lateral E/e': 3.8  Medial E/e': 6.4  RV S Jon: 12.3 cm/sec     ______________________________________________________________________________  Report approved by: Steph Wasserman 10/10/2024 02:50 PM         MR Brain w/o & w Contrast    Narrative    EXAM: MR BRAIN W/O & W CONTRAST  10/10/2024 3:50 PM     HISTORY: Stroke       COMPARISON: Same day CT/CTA, MRI 2021    TECHNIQUE: Multiplanar, multisequence MR imaging of the head obtained  prior to, and after, intravenous contrast administration    CONTRAST: 7mL Gadavist.    FINDINGS:  Punctate focus of abnormal diffusion signal within a region of the  right middle/inferior frontal gyral cortex with corresponding abnormal  T2 FLAIR hyperintense signal (axial series 602, image 23; axial series  801, image 18). Other T2 hyperintense signal changes within the  cerebral parenchyma on T2 FLAIR images do not demonstrate  corresponding abnormal  diffusion signal in are also similar to prior.  No mass effect, midline shift, or intracranial hemorrhage. No acute  hydrocephalus. Preserved intravascular flow voids. No pathologic  postcontrast enhancement.    Normal skull marrow signal. No substantial paranasal sinus mucosal  disease. Clear mastoid air cells. Nonfocal pituitary gland, sella,  skull base and upper cervical spinal structures. The orbits are  normal.      Impression    IMPRESSION: Small focal infarct in the right middle/inferior frontal  gyral cortex.    BROWN SIMONS DO         SYSTEM ID:  [id number]   CT Chest/Abdomen/Pelvis w Contrast    Narrative    EXAM: CT CHEST/ABDOMEN/PELVIS W CONTRAST  LOCATION: [facility]  DATE: 10/10/2024    INDICATION: Cryptogenic stroke, rule out underlying malignancy  COMPARISON: None.  TECHNIQUE: CT scan of the chest, abdomen, and pelvis was performed following injection of IV contrast. Multiplanar reformats were obtained. Dose reduction techniques were used.   CONTRAST: 94 mL Isovue 370    FINDINGS:   LUNGS AND PLEURA: Lungs are clear, minimal dependent atelectasis.    MEDIASTINUM/AXILLAE: No lymphadenopathy. ASD occluder in place. Normal-sized heart.    CORONARY ARTERY CALCIFICATION: None.    HEPATOBILIARY: Benign cyst within right lobe of liver.    PANCREAS: Normal.    SPLEEN: Normal.    ADRENAL GLANDS: Normal.    KIDNEYS/BLADDER: Normal.    BOWEL: No obstruction or inflammatory change.    LYMPH NODES: Normal.    VASCULATURE: Normal.    PELVIC ORGANS: Normal. Tampon present.    MUSCULOSKELETAL: Normal.      Impression    IMPRESSION:  1.  No evidence for primary malignancy. No adenopathy.  2.  ASD occluder in place.       Recent Labs   Lab 10/10/24  1216   WBC 8.6   HGB 13.9   HCT 41.1   MCV 89        Recent Labs   Lab 10/11/24  0858 10/11/24  0830 10/10/24  2053 10/10/24  1902 10/10/24  1216   NA  --   --   --   --  136   POTASSIUM 3.7  --   --   --  3.5   CHLORIDE  --   --   --   --   102   CO2  --   --   --   --  21*   ANIONGAP  --   --   --   --  13   GLC  --  85 99 113* 87   BUN  --   --   --   --  8.2   CR  --   --   --   --  0.77   GFRESTIMATED  --   --   --   --  >90   STEPH  --   --   --   --  8.9        This note was completed in part using Dragon voice recognition software. Although reviewed after completion, some word and grammatical errors may occur.

## 2024-10-11 NOTE — PRE-PROCEDURE
GENERAL PRE-PROCEDURE:   Procedure:  GODWIN  Date/Time:  10/11/2024 10:54 AM    Verbal consent obtained?: Yes    Written consent obtained?: Yes    Risks and benefits: Risks, benefits and alternatives were discussed    DC Plan: Appropriate discharge home plan in place for patients who are going home after procedure   Consent given by:  Patient  Patient states understanding of procedure being performed: Yes    Patient's understanding of procedure matches consent: Yes    Procedure consent matches procedure scheduled: Yes    Expected level of sedation:  Moderate  Appropriately NPO:  Yes  ASA Class:  1  Mallampati  :  Grade 1- soft palate, uvula, tonsillar pillars, and posterior pharyngeal wall visible  Lungs:  Lungs clear with good breath sounds bilaterally  Heart:  Normal heart sounds and rate  History & Physical reviewed:  History and physical reviewed and no updates needed  Statement of review:  I have reviewed the lab findings, diagnostic data, medications, and the plan for sedation

## 2024-10-11 NOTE — PROGRESS NOTES
"   10/11/24 9111   Appointment Info   Signing Clinician's Name / Credentials (SLP) Graciela Wynn MS CCC-SLP   General Information   Onset of Illness/Injury or Date of Surgery 10/10/24   Referring Physician Jacinta Telles MD   Patient/Family Therapy Goal Statement (SLP) To eat/drink   Pertinent History of Current Problem The pt is a 44 year old female history of CVA with PFO closure, anxiety/depression, elevated lipoprotein a, who presented to the ER with acute onset of left facial numbness, left sided numbness and speech difficulty this am. She was talking and started to feel the inside of her mouth on the left go numb. Also noted speech slurring. This was similar to prior stroke in 2015. She got out the words \"stroke\" to her  and brought to the ER.   Symptoms already reasolving by arrival. CT head/CTA head/neck negative for acute stroke   MRI confirms CVA with a small focal infarct in the right middle/inferior frontal gyrus cortex. Swallow evaluation completed per MD order.   General Observations Pt's spouse present and supportive. Pt alert and agreeable to evaluation. Left facial numbness and dysarthria resolved.   Pain Assessment   Patient Currently in Pain No   Type of Evaluation   Type of Evaluation Swallow Evaluation   Oral Motor   Oral Musculature generally intact   Structural Abnormalities none present   Mucosal Quality good   Dentition (Oral Motor)   Dentition (Oral Motor) natural dentition   Facial Symmetry (Oral Motor)   Facial Symmetry (Oral Motor) WNL   Lip Function (Oral Motor)   Lip Range of Motion (Oral Motor) WNL   Tongue Function (Oral Motor)   Tongue ROM (Oral Motor) WNL   Jaw Function (Oral Motor)   Jaw Function (Oral Motor) WNL   Facial Sensation   Facial Sensation WNL   Cough/Swallow/Gag Reflex (Oral Motor)   Volitional Throat Clear/Cough (Oral Motor) WNL   Volitional Swallow (Oral Motor) WNL   Vocal Quality/Secretion Management (Oral Motor)   Vocal Quality (Oral Motor) WFL "   General Swallowing Observations   Past History of Dysphagia no hx of dysphagia   Respiratory Support room air   Current Diet/Method of Nutritional Intake (General Swallowing Observations, NIS) NPO   Swallowing Evaluation Clinical swallow evaluation   Clinical Swallow Evaluation   Feeding Assistance no assistance needed   Clinical Swallow Evaluation Textures Trialed thin liquids;solid foods   Clinical Swallow Eval: Thin Liquid Texture Trial   Mode of Presentation, Thin Liquids straw;cup;self-fed   Volume of Liquid or Food Presented 4 oz   Oral Phase of Swallow WFL   Pharyngeal Phase of Swallow throat clearing   Diagnostic Statement Throat clearing following 2/7 sips, however suspect r/t dryness from GODWIN vs s/s of aspiration   Clinical Swallow Evaluation: Solid Food Texture Trial   Mode of Presentation self-fed   Volume Presented cracker, sandwich   Oral Phase WFL   Pharyngeal Phase intact   Diagnostic Statement No s/s of aspiration or difficulty with swallowing. No oral residuals   Esophageal Phase of Swallow   Patient reports or presents with symptoms of esophageal dysphagia No   Swallowing Recommendations   Diet Consistency Recommendations thin liquids (level 0);regular diet   Supervision Level for Intake patient independent   Medication Administration Recommendations, Swallowing (SLP) per pt preference   Instrumental Assessment Recommendations instrumental evaluation not recommended at this time   General Therapy Interventions   Planned Therapy Interventions Dysphagia Treatment   Dysphagia treatment Instruction of safe swallow strategies   Clinical Impression   Criteria for Skilled Therapeutic Interventions Met (SLP Eval) Yes, treatment indicated   SLP Diagnosis 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 The pt present with suspect functional oropharyngeal swallow mechnaism. Oral mech was WFL. Pt consumed thin liquids and regular solids with no s/s of aspiration or difficulty with swallowing. No oral residuals present. Pt denied globus sensation. Pt did have delayed throat clearing intermittently, but suspect this was r/t irritation s/p GODWIN per pt report. Recommend regular diet and thin liquids with general safe swallow precautions. Upright with PO and slow rate. SLP will follow for short course to determine diet tolerance, but suspect no pharyngeal dysphaiga.   SLP Total Evaluation Time   Eval: oral/pharyngeal swallow function, clinical swallow Minutes (52785) 15   SLP Discharge Planning   SLP Plan Diet tolerance x1   SLP Discharge Recommendation home   SLP Rationale for DC Rec Anticipate pt will meet goals before d/c   SLP Brief overview of current status  Recommend regular diet and thin liquids with general safe swallow precautions. Upright with PO and slow rate. SLP will follow for short course to determine diet tolerance, but suspect no pharyngeal dysphaiga.

## 2024-10-11 NOTE — DISCHARGE INSTRUCTIONS
Your risk factors for stroke or TIA (transient ischemic attack):     Your Risk Factors Your Results Goals   [] High blood pressure BP: 106/75 (10/11/24 0821) Less than 120/80   [x] Cholesterol          Total 10/10/2024: 173 mg/dL   Less than 150    Triglycerides   10/10/2024: 52 mg/dL Less than 150    LDL 10/10/2024: 97 mg/dL    Less than 70    HDL 10/10/2024: 66 mg/dL         Greater than 40 (men)  Greater than 50 (women)   [] Diabetes                A1C 10/10/2024: 4.9 % Less than 5.7   [] Atrial fibrillation Atrial fibrillation noted on cardiac monitoring Manage per physician orders   [] Smoking/tobacco use   Tobacco Use      Smoking status: Former        Packs/day: 0.00        Years: 0.5 packs/day for 5.0 years (2.5 ttl pk-yrs)        Types: Cigarettes        Start date: 4/21/2009        Quit date: 4/21/2014        Years since quitting: 10.4      Smokeless tobacco: Never   Quit smoking and tobacco   [] Overweight There is no height or weight on file to calculate BMI.  Less than 25     Other risk factors include: carotid (neck) artery disease, other heart diseases, prior stroke or TIA, poor diet, lack of exercise, and excessive alcohol consumption.     [] Written stroke educational materials given to {provided to whom:432956} including:   {written material:109912}       Know the warning signs and symptoms of stroke: BE FAST     B = Balance loss   E = Eyesight changes   F = Facial droop or numbness   A = Arm or leg weakness   S = Speech difficulty, slurred speech   T = Time to call 911 for help

## 2024-10-11 NOTE — PROGRESS NOTES
"[facility]    Medicine Progress Note - Hospitalist Service    Date of Admission:  10/10/2024    Interval History   Patient is comfortable this am. Feels her headache is better from overnight. Did not sleep well. Has GODWIN this am and awaiting results.   Transitioned to inpatient with stroke diagnosis on MRI.  Discussion today about her prior assessment evaluation.  She did get a second opinion at [facility] after her last hospital stay.   Remains on plavix and ASA       Assessment & Plan   Chloe Foster is a 44 year old female history of CVA with PFO closure, anxiety/depression, elevated lipoprotein a, who presented to the ER with acute onset of left facial numbness, left sided numbness and speech difficulty this am. She was talking and started to feel the inside of her mouth on the left go numb. Also noted speech slurring. This was similar to prior stroke in 2015. She got out the words \"stroke\" to her  and brought to the ER.   Symptoms already reasolving by arrival. CT head/CTA head/neck negative for acute stroke   MRI confirms CVA with a small focal infarct in the right middle/inferior frontal gyrus cortex     TIA (MRI confirmed CVA)   History of cardioembolic CVA  S/p PFO closure   ADDENDUM: Later MRI + small infarct right middle/inferior frontal cortex.   - History of Migraines/CVA   - Previous stroke during 3rd trimester of pregnancy. 5/2015 with subsequent PFO closure  - Extensive stroke evaluation/history as outlined in H and P from prior admission.   - Seen by stroke neuro in ER. No thrombolytics as already improving.   - Loaded with plavix 300/ASA and continued daily dose  - MRI returned after neurology in ER visit showing a small focal infarct in the right middle/inferior frontal gyral cortex.  - Permissive hypertension   - neuro checks.   - PT/OT/speech.   - DAPT for now.   - Neuro checks  - glucose checks, lipids.   - Currently on plavix 75 and ASA 81 mg po daily   - " Ongoing evaluation with GODWIN this am, neurology follow up and hematology consult as well.      PFO closure 4/2016   - Had cardiology and neurology consult with PFO closure.   - 7/2016 follow up ECHO showed complete closure.   - Plavix for 6 months then ASA   - Will have cardiology consult with recurrent TIA symptoms in the setting of closure of her PFO.   - ECHO ordered with normal EF 55% and ASD closure  - GODWIN this am pending eval      Protein S evaluation   - After stroke 5/2015   - Had CVA 5/2015 diagnosed on MRI and subsequent CVA 2 weeks later.   - Workup revealed low protein free S level (5/2/2015) 29% and Protein S activity level 22%.   - Additional thrombophilia testing at that time included testing for antiphospholipid antibodies (including lupus anticoagulant, cardiolipin, and beta-2 glycoprotein antibodies, all of which were negative).  She was not found to have the factor V Leiden or prothrombin gene mutation.    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   - Ultimate previous plan for plavix 6 months then ASA after PFO closure.   - Heme consult for re evaluation of status of her hyper coagulatable history and if additional testing or change in tx warranted  - Hematology consult pending      History of migraines.   - Hold on migraine med >> avoid vasoconstriction meds >> defer to neurology         Social Anxiety disorder  Moderate recurrent depression   - On no daily meds per list  - prn inderal and ativan PTA      Weight management drug  - hold this admit     Other past history   IBS   Disc herniation   Hirsutism   Insomnia          Diet: NPO for Medical/Clinical Reasons Except for: Meds    DVT Prophylaxis: Pneumatic Compression Devices  Parker Catheter: Not present  Lines: None     Cardiac Monitoring: ACTIVE order. Indication: Procedural area  Code Status: Full Code      Clinically Significant Risk  "Factors Present on Admission                           # Overweight: Estimated body mass index is 25.34 kg/m  as calculated from the following:    Height as of this encounter: 1.676 m (5' 6\").    Weight as of this encounter: 71.2 kg (157 lb).              Disposition Plan     Medically Ready for Discharge: Anticipated Tomorrow             HORACIO RAMIREZ MD  Hospitalist Service  [facility]  Securely message with [employer]era[employer] (more info)  Text page via [employer] Paging/Directory   ______________________________________________________________________  Physical Exam   Vital Signs: Temp: 97.9  F (36.6  C) Temp src: Oral BP: 96/58 Pulse: 98   Resp: 16 SpO2: 97 % O2 Device: Nasal cannula Oxygen Delivery: 4 LPM  Weight: 157 lbs 0 oz    General Appearance: Patient is awake and alert   Neuro exam on face not focal.   Respiratory: Clear to auscultation bilaterally with no wheezes or rhonchi  Cardiovascular: Regular rate with no gallop or rub  GI: + BS, soft, non tender   Skin: Extremities no edema or redness       Medical Decision Making       45 MINUTES SPENT BY ME on the date of service doing chart review, history, exam, documentation & further activities per the note.      Data     I have personally reviewed the following data over the past 24 hrs:    N/A  \   N/A   / N/A     N/A N/A N/A /  85   3.7 N/A N/A \     Trop: N/A BNP: N/A     TSH: N/A T4: N/A A1C: N/A       Imaging results reviewed over the past 24 hrs:   Recent Results (from the past 24 hour(s))   Echocardiogram Complete   Result Value    LVEF  55%    Narrative    [id number]  [id number]  [id number]  [id number]^JORGE^[facility]  Echocardiography Laboratory  [address] [place], [place] [address]     Name: CARMEN OLSEN  MRN: [id number]  : 1980  Study Date: 10/10/2024 01:10 PM  Age: 44 yrs  Gender: Female  Patient Location: [facility]  Reason For Study: Cerebrovascular Incident  Ordering Physician: JORGE" RIICHI  Performed By: Catie Amaya     BSA: 1.9 m2  Height: 66 in  Weight: 187 lb  BP: 140/80 mmHg  ______________________________________________________________________________  Procedure  Complete Portable Bubble Echo Adult.  ______________________________________________________________________________  Interpretation Summary     1. The left ventricle is normal in structure, function and size. The visual  ejection fraction is estimated at 55%.  2. The right ventricle is normal in structure, function and size.  3. ASD closure device in place. Negative bubble study.  4. No valve disease.     No changes from echo 2021.  ______________________________________________________________________________  Left Ventricle  The left ventricle is normal in structure, function and size. There is normal  left ventricular wall thickness. The visual ejection fraction is estimated at  55%. Left ventricular diastolic function is normal. Normal left ventricular  wall motion.     Right Ventricle  The right ventricle is normal in structure, function and size.     Atria  Normal left atrial size. Right atrial size is normal. ASD closure device in  place. Negative bubble study.     Mitral Valve  The mitral valve is normal in structure and function.     Tricuspid Valve  The tricuspid valve is normal in structure and function.     Aortic Valve  The aortic valve is normal in structure and function.     Pulmonic Valve  The pulmonic valve is normal in structure and function.     Vessels  Normal ascending, transverse (arch), and descending aorta. The inferior vena  cava was normal in size with preserved respiratory variability.     Pericardium  There is no pericardial effusion.     Rhythm  Sinus rhythm was noted.  ______________________________________________________________________________  MMode/2D Measurements & Calculations  IVSd: 0.70 cm  LVIDd: 4.4 cm  LVIDs: 3.0 cm  LVPWd: 0.70 cm  FS: 31.0 %  LV mass(C)d: 92.1 grams  LV mass(C)dI:  47.4 grams/m2  Ao root diam: 3.2 cm  LA dimension: 3.6 cm  asc Aorta Diam: 3.6 cm  LA/Ao: 1.1  LVOT diam: 2.1 cm  LVOT area: 3.4 cm2  Ao root diam index Ht(cm/m): 1.9  Ao root diam index BSA (cm/m2): 1.7  Asc Ao diam index BSA (cm/m2): 1.9  Asc Ao diam index Ht(cm/m): 2.2  LA Volume (BP): 46.7 ml     LA Volume Index (BP): 24.1 ml/m2  RV Base: 3.5 cm  RWT: 0.32  TAPSE: 2.1 cm     Doppler Measurements & Calculations  MV E max jon: 67.6 cm/sec  MV dec time: 0.11 sec  E/E' av.1  Lateral E/e': 3.8  Medial E/e': 6.4  RV S Jon: 12.3 cm/sec     ______________________________________________________________________________  Report approved by: Steph Wasserman 10/10/2024 02:50 PM         MR Brain w/o & w Contrast    Narrative    EXAM: MR BRAIN W/O & W CONTRAST  10/10/2024 3:50 PM     HISTORY: Stroke       COMPARISON: Same day CT/CTA, MRI 2021    TECHNIQUE: Multiplanar, multisequence MR imaging of the head obtained  prior to, and after, intravenous contrast administration    CONTRAST: 7mL Gadavist.    FINDINGS:  Punctate focus of abnormal diffusion signal within a region of the  right middle/inferior frontal gyral cortex with corresponding abnormal  T2 FLAIR hyperintense signal (axial series 602, image 23; axial series  801, image 18). Other T2 hyperintense signal changes within the  cerebral parenchyma on T2 FLAIR images do not demonstrate  corresponding abnormal diffusion signal in are also similar to prior.  No mass effect, midline shift, or intracranial hemorrhage. No acute  hydrocephalus. Preserved intravascular flow voids. No pathologic  postcontrast enhancement.    Normal skull marrow signal. No substantial paranasal sinus mucosal  disease. Clear mastoid air cells. Nonfocal pituitary gland, sella,  skull base and upper cervical spinal structures. The orbits are  normal.      Impression    IMPRESSION: Small focal infarct in the right middle/inferior frontal  gyral cortex.    BROWN SIMONS, DO          SYSTEM ID:  [id number]   CT Chest/Abdomen/Pelvis w Contrast    Narrative    EXAM: CT CHEST/ABDOMEN/PELVIS W CONTRAST  LOCATION: [facility]  DATE: 10/10/2024    INDICATION: Cryptogenic stroke, rule out underlying malignancy  COMPARISON: None.  TECHNIQUE: CT scan of the chest, abdomen, and pelvis was performed following injection of IV contrast. Multiplanar reformats were obtained. Dose reduction techniques were used.   CONTRAST: 94 mL Isovue 370    FINDINGS:   LUNGS AND PLEURA: Lungs are clear, minimal dependent atelectasis.    MEDIASTINUM/AXILLAE: No lymphadenopathy. ASD occluder in place. Normal-sized heart.    CORONARY ARTERY CALCIFICATION: None.    HEPATOBILIARY: Benign cyst within right lobe of liver.    PANCREAS: Normal.    SPLEEN: Normal.    ADRENAL GLANDS: Normal.    KIDNEYS/BLADDER: Normal.    BOWEL: No obstruction or inflammatory change.    LYMPH NODES: Normal.    VASCULATURE: Normal.    PELVIC ORGANS: Normal. Tampon present.    MUSCULOSKELETAL: Normal.      Impression    IMPRESSION:  1.  No evidence for primary malignancy. No adenopathy.  2.  ASD occluder in place.

## 2024-10-12 ENCOUNTER — ORDERS ONLY (AUTO-RELEASED) (OUTPATIENT)
Dept: MEDSURG UNIT | Facility: CLINIC | Age: 44
End: 2024-10-12
Payer: COMMERCIAL

## 2024-10-12 DIAGNOSIS — I63.411 CEREBROVASCULAR ACCIDENT (CVA) DUE TO EMBOLISM OF RIGHT MIDDLE CEREBRAL ARTERY (H): ICD-10-CM

## 2024-10-12 LAB — HCYS SERPL-SCNC: 13.3 UMOL/L (ref 0–15)

## 2024-10-12 NOTE — PLAN OF CARE
Speech Language Therapy Discharge Summary    Reason for therapy discharge:    Discharged to home.    Progress towards therapy goal(s). See goals on Care Plan in [facility] electronic health record for goal details.  Goals met    Therapy recommendation(s):    No further therapy is recommended.

## 2024-10-13 ENCOUNTER — PATIENT OUTREACH (OUTPATIENT)
Dept: CARE COORDINATION | Facility: CLINIC | Age: 44
End: 2024-10-13
Payer: COMMERCIAL

## 2024-10-13 NOTE — DISCHARGE SUMMARY
"[facility]  Hospitalist Discharge Summary      Date of Admission:  10/10/2024  Date of Discharge:  10/11/2024  4:51 PM  Discharging Provider: HORACIO RAMIREZ MD  Discharge Service: Hospitalist Service    Discharge Diagnoses   New CVA: CVA right middle/inferior frontal gyral cortex.   History of PFO closure   Cardiology consult   Neurology consult   Hematology consult   Discharged on plavix/ASA   CT chest/abdomen/pelvis with no evidence for primary malignancy and ASD occluder in place   ECHO with PFO closure device in place.     Clinically Significant Risk Factors     # Overweight: Estimated body mass index is 25.34 kg/m  as calculated from the following:    Height as of this encounter: 1.676 m (5' 6\").    Weight as of this encounter: 71.2 kg (157 lb).       Follow-ups Needed After Discharge   Follow-up Appointments     Follow-up and recommended labs and tests       Follow up with primary care provider, Izabel Reyes, within 7   days for hospital follow- up.  The following labs/tests are recommended:   basic metabolic profile and cbc/platelet .        Follow up with primary care provider  Follow up neurology   Follow up cardiology  Follow up hematology     Unresulted Labs Ordered in the Past 30 Days of this Admission       Date and Time Order Name Status Description    10/11/2024 12:06 PM Protein S Antigen Free In process     10/11/2024 12:06 PM Protein C chromogenic In process     10/11/2024 12:06 PM Antithrombin III In process     10/11/2024 12:06 PM Lupus Anticoagulant Panel In process     10/11/2024 12:06 PM Cardiolipin Jeanie IgG and IgM In process     10/11/2024 12:06 PM Beta 2 Glycoprotein 1 Antibody IgM In process     10/11/2024 12:06 PM Beta 2 Glycoprotein 1 Antibody IgG In process         These results will be followed up by neurology     Discharge Disposition   Discharged to home  Condition at discharge: Stable    Hospital Course   Chloe Foster is a 44 year old female " "history of CVA with PFO closure, anxiety/depression, elevated lipoprotein a, who presented to the ER with acute onset of left facial numbness, left sided numbness and speech difficulty. She was talking and started to feel the inside of her mouth on the left go numb. Also noted speech slurring. This was similar to prior stroke in 2015. She got out the words \"stroke\" to her  and brought to the ER.   Symptoms already reasolving by arrival. CT head/CTA head/neck negative for acute stroke. Admitted for further evaluation. Loaded with plavix and ASA on admission.    She had clinically been doing well since her PFO closure.   Presentation as above.   Refer to H and P for further details.         TIA (MRI confirmed CVA)   History of cardioembolic CVA  S/p PFO closure   ADDENDUM: Later MRI + small infarct right middle/inferior frontal cortex.  History of Migraines/CVA Previous stroke during 3rd trimester of pregnancy in 5/2015 with subsequent PFO closure. Extensive stroke evaluation/history as outlined in H and P from prior admission.  Seen by stroke neuro in ER. No thrombolytics as already improving. Loaded with plavix 300/ASA and continued daily dose. MRI in ER visit showing a small focal infarct in the right middle/inferior frontal gyral cortex. Permissive hypertension initially on admission to the hospital. Evaluation by cardiology given prior PFO and evaluation by hematology. Discharged on plavix 75 mg and ASA 81 mg po daily for total of 21 days then aspirin 81 mg po daily indefinitely.      PFO closure 4/2016   Had cardiology and neurology consult with PFO closure in 7/2016. Follow up ECHO after PFO closure showed complete closure. Cardiology consult. ECHO with normal EF. ASD closure device in place. Negative bubble study. No valve disease. GODWIN also negative. No thrombus. No left to right shunt. Ordered 14 days zio patch on discharge. Recommend start of rosuvastatin 40 mg po daily with patient deciding at time " of discharge.     Protein S evaluation   After stroke 5/2015.  Had CVA 5/2015 diagnosed on MRI and subsequent CVA 2 weeks later. Workup revealed low protein free S level (5/2/2015) 29% and Protein S activity level 22%.  Additional thrombophilia testing at that time included testing for antiphospholipid antibodies (including lupus anticoagulant, cardiolipin, and beta-2 glycoprotein antibodies, all of which were negative).  She was not found to have the factor V Leiden or prothrombin gene mutation. 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 Subsequent follow up protein S levels low normal range. Heme consult for re evaluation of status of her hyper coagulatable state and if additional testing or change in tx warranted  Hematology consult with follow up labs ordered and follow up after discharge.      History of migraines. frc  Recommended holding excedrin on discharge as additional ASA and already on ASA/plavix      Social Anxiety disorder  Moderate recurrent depression   On no daily meds per list  prn inderal and ativan PTA. Recommend hold propranolol on discharge to avoid hypotension      Weight management drug  Held this admit and on discharge.   Recommended discussion with primary      Other past history   IBS   Disc herniation   Hirsutism   Insomnia          Consultations This Hospital Stay   SPEECH LANGUAGE PATH ADULT IP CONSULT  SMOKING CESSATION PROGRAM IP CONSULT  CARDIOLOGY IP CONSULT  OCCUPATIONAL THERAPY ADULT IP CONSULT  PHYSICAL THERAPY ADULT IP CONSULT  SPEECH LANGUAGE PATH ADULT IP CONSULT  HEMATOLOGY & ONCOLOGY IP CONSULT  SPEECH LANGUAGE PATH ADULT IP CONSULT  SMOKING CESSATION PROGRAM IP CONSULT  VASCULAR ACCESS ADULT IP CONSULT    Code Status   Prior    Time Spent on this Encounter   I, HORACIO RAMIREZ MD, personally saw the patient today and spent greater than 30 minutes discharging this patient.       HORACIO RAMIREZ MD  [facility]  NEUROSCIENCE UNIT  [address] [place] [address]  Phone: [phone]  ______________________________________________________________________    Physical Exam   Vital Signs:                    Weight: 157 lbs 0 oz  General Appearance: Patient is awake and alert  Respiratory: Clear to auscultation bilaterally with no wheezes or rhonchi  Cardiovascular: Regular rate with no gallop or rub  GI: + BS, soft, non tender  Skin: No edema or redness          Primary Care Physician   Izabel Reyes    Discharge Orders      Follow-Up with Cardiology [facility]      Adult Oncology/Hematology  Referral      Reason for your hospital stay    Stroke     Follow-up and recommended labs and tests     Follow up with primary care provider, Izabel Reyes, within 7 days for hospital follow- up.  The following labs/tests are recommended: basic metabolic profile and cbc/platelet .     Activity    Your activity upon discharge: activity as tolerated     Diet    Follow this diet upon discharge: Current Diet:Orders Placed This Encounter      Regular Diet Adult     Stroke Hospital Follow Up (for neurologist use only)    [email or web address] will call you to coordinate care as prescribed by your provider. If you don t hear from a representative within 2 business days, please call [phone].       ZIO PATCH MAIL OUT     Zio Patch Mail Out       Significant Results and Procedures   Most Recent 3 CBC's:  Recent Labs   Lab Test 10/10/24  1216 12/02/22  0822 12/26/21  0939   WBC 8.6 6.0 5.5   HGB 13.9 14.9 13.7   MCV 89 91 90    276 299     Most Recent 3 BMP's:  Recent Labs   Lab Test 10/11/24  0858 10/11/24  0830 10/10/24  2053 10/10/24  1902 10/10/24  1216 10/11/23  1410     --   --   --  136 138   POTASSIUM 3.8  3.7  --   --   --  3.5 4.0   CHLORIDE 106  --   --   --  102 103   CO2 21*  --   --   --  21* 22   BUN 5.8*  --   --   --  8.2 13.3   CR 0.68  --   --   --  0.77 0.66   ANIONGAP 12  --   --   --  13  13   STEPH 8.9  --   --   --  8.9 9.4   GLC 89 85 99   < > 87 94    < > = values in this interval not displayed.     Most Recent 2 LFT's:  Recent Labs   Lab Test 10/11/23  1410 12/02/22  0822   AST 19 24   ALT 13 14   ALKPHOS 74 78   BILITOTAL 0.4 0.5     Most Recent 3 INR's:  Recent Labs   Lab Test 10/10/24  1216   INR 1.02     Most Recent INR's and Anticoagulation Dosing History:  Anticoagulation Dose History          Latest Ref Rng & Units 4/29/2015 4/21/2016 10/10/2024   Recent Dosing and Labs   INR 0.85 - 1.15 0.87  0.90  1.02       Details                 Most Recent 3 Creatinines:  Recent Labs   Lab Test 10/11/24  0858 10/10/24  1216 10/11/23  1410   CR 0.68 0.77 0.66     Most Recent 3 Hemoglobins:  Recent Labs   Lab Test 10/10/24  1216 12/02/22  0822 12/26/21  0939   HGB 13.9 14.9 13.7     Most Recent 3 Troponin's:  Recent Labs   Lab Test 05/13/19  1623   TROPI <0.015     Most Recent 3 BNP's:No lab results found.  Most Recent D-dimer:  Recent Labs   Lab Test 05/13/19  1623   DD 0.3     Most Recent Cholesterol Panel:  Recent Labs   Lab Test 10/11/24  0611   CHOL 162   LDL 90   HDL 60   TRIG 62     7-Day Micro Results       No results found for the last 168 hours.          Most Recent TSH and T4:  Recent Labs   Lab Test 10/11/23  1410   TSH 0.70     Most Recent Hemoglobin A1c:  Recent Labs   Lab Test 10/10/24  1216   A1C 4.9     Most Recent 6 glucoses:  Recent Labs   Lab Test 10/11/24  0858 10/11/24  0830 10/10/24  2053 10/10/24  1902 10/10/24  1216 10/11/23  1410   GLC 89 85 99 113* 87 94     Most Recent Urinalysis:  Recent Labs   Lab Test 05/13/19  1525   COLOR Yellow   APPEARANCE Slightly Cloudy   URINEGLC Negative   URINEBILI Negative   URINEKETONE 5*   SG 1.009   UBLD Moderate*   URINEPH 5.5   PROTEIN Negative   NITRITE Negative   LEUKEST Moderate*   RBCU 2   WBCU 3     Most Recent ABG:No lab results found.  Most Recent ESR & CRP:No lab results found.  Most Recent Anemia Panel:  Recent Labs   Lab Test  10/10/24  1216 12/02/22  0822   WBC 8.6 6.0   HGB 13.9 14.9   HCT 41.1 45.4   MCV 89 91    276   B12  --  469   FOLIC  --  10.6     Most Recent CPK:No lab results found.,   Results for orders placed or performed during the hospital encounter of 10/10/24   CT Head w/o Contrast    Narrative    EXAM: CT HEAD W/O CONTRAST  10/10/2024 12:28 PM     HISTORY: Code Stroke to evaluate for potential thrombolysis and  thrombectomy. PLEASE READ IMMEDIATELY.       COMPARISON: None    TECHNIQUE: Using multidetector thin collimation helical acquisition  technique, axial, coronal and sagittal CT images from the skull base  to the vertex were obtained without intravenous contrast.   (topogram) image(s) also obtained and reviewed. Dose reduction  techniques were used.    FINDINGS:  No acute intracranial hemorrhage, mass effect, or midline shift.  Preserved gray-white matter differentiation and subarachnoid spaces.    Atraumatic calvarium. No substantial paranasal sinus mucosal disease.  Clear mastoid air cells. Nonfocal orbits.       Impression    IMPRESSION: Negative for acute intracranial hemorrhage, hydrocephalus  or transcortical infarct.     Findings discussed with Dr. Salgado at 1241 hours on 10/10/2024.    BROWN SIMONS DO         SYSTEM ID:  [id number]   CTA Head Neck with Contrast    Narrative    CT ANGIOGRAM OF THE HEAD AND NECK WITH CONTRAST  10/10/2024 12:28 PM     HISTORY: Code Stroke to evaluate for potential thrombolysis and  thrombectomy. PLEASE READ IMMEDIATELY.    TECHNIQUE:  CT angiography with an injection of 67 mL Isovue-370 IV  with scans through the head and neck. Images were transferred to a  separate 3-D workstation where multiplanar reformations and 3-D images  were created. Estimates of carotid stenoses are made relative to the  distal internal carotid artery diameters except as noted. Radiation  dose for this scan was reduced using automated exposure control,  adjustment of the mA and/or kV according  to patient size, or iterative  reconstruction technique.    COMPARISON: None.     CT HEAD FINDINGS: No contrast enhancing lesions. Cerebral blood flow  is grossly normal.     CT ANGIOGRAM HEAD FINDINGS:  The major intracranial arteries including  the proximal branches of the anterior cerebral, middle cerebral, and  posterior cerebral arteries appear patent without vascular cutoff. No  aneurysm identified. No significant stenosis. Venous circulation is  unremarkable.     CT ANGIOGRAM NECK FINDINGS:   Normal origin of the great vessels from the aortic arch.     Right carotid artery: The right common and internal carotid arteries  are patent. No significant stenosis or atherosclerotic disease in the  carotid artery.     Left carotid artery: The left common and internal carotid arteries are  patent. No significant stenosis or atherosclerotic disease in the  carotid artery.     Vertebral arteries: Vertebral arteries are patent without evidence of  dissection. No significant stenosis.     Other findings: Bilateral thyroid nodules measuring up to 9 mm.       Impression    IMPRESSION:   1.  No large vessel arterial occlusion or high-grade stenosis in the  head or neck. No arterial dissection.   2.  Bilateral thyroid nodules. Recommend dedicated follow-up  nonemergent ultrasound for further evaluation.    Findings discussed with Dr. Salgado at 1241 hours on 10/10/2024.    BROWN SIMONS DO         SYSTEM ID:  [id number]   MR Brain w/o & w Contrast    Narrative    EXAM: MR BRAIN W/O & W CONTRAST  10/10/2024 3:50 PM     HISTORY: Stroke       COMPARISON: Same day CT/CTA, MRI 12/26/2021    TECHNIQUE: Multiplanar, multisequence MR imaging of the head obtained  prior to, and after, intravenous contrast administration    CONTRAST: 7mL Gadavist.    FINDINGS:  Punctate focus of abnormal diffusion signal within a region of the  right middle/inferior frontal gyral cortex with corresponding abnormal  T2 FLAIR hyperintense signal (axial series  602, image 23; axial series  801, image 18). Other T2 hyperintense signal changes within the  cerebral parenchyma on T2 FLAIR images do not demonstrate  corresponding abnormal diffusion signal in are also similar to prior.  No mass effect, midline shift, or intracranial hemorrhage. No acute  hydrocephalus. Preserved intravascular flow voids. No pathologic  postcontrast enhancement.    Normal skull marrow signal. No substantial paranasal sinus mucosal  disease. Clear mastoid air cells. Nonfocal pituitary gland, sella,  skull base and upper cervical spinal structures. The orbits are  normal.      Impression    IMPRESSION: Small focal infarct in the right middle/inferior frontal  gyral cortex.    BROWN SIMONS DO         SYSTEM ID:  [id number]   CT Chest/Abdomen/Pelvis w Contrast    Narrative    EXAM: CT CHEST/ABDOMEN/PELVIS W CONTRAST  LOCATION: [facility]  DATE: 10/10/2024    INDICATION: Cryptogenic stroke, rule out underlying malignancy  COMPARISON: None.  TECHNIQUE: CT scan of the chest, abdomen, and pelvis was performed following injection of IV contrast. Multiplanar reformats were obtained. Dose reduction techniques were used.   CONTRAST: 94 mL Isovue 370    FINDINGS:   LUNGS AND PLEURA: Lungs are clear, minimal dependent atelectasis.    MEDIASTINUM/AXILLAE: No lymphadenopathy. ASD occluder in place. Normal-sized heart.    CORONARY ARTERY CALCIFICATION: None.    HEPATOBILIARY: Benign cyst within right lobe of liver.    PANCREAS: Normal.    SPLEEN: Normal.    ADRENAL GLANDS: Normal.    KIDNEYS/BLADDER: Normal.    BOWEL: No obstruction or inflammatory change.    LYMPH NODES: Normal.    VASCULATURE: Normal.    PELVIC ORGANS: Normal. Tampon present.    MUSCULOSKELETAL: Normal.      Impression    IMPRESSION:  1.  No evidence for primary malignancy. No adenopathy.  2.  ASD occluder in place.   Echocardiogram Complete     Value    LVEF  55%    Narrative     [id number]  [facility]  [id number]  [id number]^JORGE^BONILLA     [facility]  Echocardiography Laboratory  [address] [place] [address]     Name: CARMEN OLSEN  MRN: [id number]  : 1980  Study Date: 10/10/2024 01:10 PM  Age: 44 yrs  Gender: Female  Patient Location: [facility]  Reason For Study: Cerebrovascular Incident  Ordering Physician: BONILLA PATEL  Performed By: Catie Amaya     BSA: 1.9 m2  Height: 66 in  Weight: 187 lb  BP: 140/80 mmHg  ______________________________________________________________________________  Procedure  Complete Portable Bubble Echo Adult.  ______________________________________________________________________________  Interpretation Summary     1. The left ventricle is normal in structure, function and size. The visual  ejection fraction is estimated at 55%.  2. The right ventricle is normal in structure, function and size.  3. ASD closure device in place. Negative bubble study.  4. No valve disease.     No changes from echo .  ______________________________________________________________________________  Left Ventricle  The left ventricle is normal in structure, function and size. There is normal  left ventricular wall thickness. The visual ejection fraction is estimated at  55%. Left ventricular diastolic function is normal. Normal left ventricular  wall motion.     Right Ventricle  The right ventricle is normal in structure, function and size.     Atria  Normal left atrial size. Right atrial size is normal. ASD closure device in  place. Negative bubble study.     Mitral Valve  The mitral valve is normal in structure and function.     Tricuspid Valve  The tricuspid valve is normal in structure and function.     Aortic Valve  The aortic valve is normal in structure and function.     Pulmonic Valve  The pulmonic valve is normal in structure and function.     Vessels  Normal ascending, transverse (arch), and descending aorta. The inferior vena  cava was  "normal in size with preserved respiratory variability.     Pericardium  There is no pericardial effusion.     Rhythm  Sinus rhythm was noted.  ______________________________________________________________________________  MMode/2D Measurements & Calculations  IVSd: 0.70 cm  LVIDd: 4.4 cm  LVIDs: 3.0 cm  LVPWd: 0.70 cm  FS: 31.0 %  LV mass(C)d: 92.1 grams  LV mass(C)dI: 47.4 grams/m2  Ao root diam: 3.2 cm  LA dimension: 3.6 cm  asc Aorta Diam: 3.6 cm  LA/Ao: 1.1  LVOT diam: 2.1 cm  LVOT area: 3.4 cm2  Ao root diam index Ht(cm/m): 1.9  Ao root diam index BSA (cm/m2): 1.7  Asc Ao diam index BSA (cm/m2): 1.9  Asc Ao diam index Ht(cm/m): 2.2  LA Volume (BP): 46.7 ml     LA Volume Index (BP): 24.1 ml/m2  RV Base: 3.5 cm  RWT: 0.32  TAPSE: 2.1 cm     Doppler Measurements & Calculations  MV E max jon: 67.6 cm/sec  MV dec time: 0.11 sec  E/E' av.1  Lateral E/e': 3.8  Medial E/e': 6.4  RV S Jon: 12.3 cm/sec     ______________________________________________________________________________  Report approved by: Steph Wasserman 10/10/2024 02:50 PM         Transesophageal Echocardiogram    Narrative    [id number]  [facility]  [id number]  [id number]^EVAN^JUSTINA^ELINLAL     [facility]  Echocardiography Laboratory  [address] [place] [address]     Name: CARMEN OLSEN  MRN: [id number]  : 1980  Study Date: 10/11/2024 10:33 AM  Age: 44 yrs  Gender: Female  Patient Location: [facility]  Ordering Physician: JUSTINA GODRON  Referring Physician: Izabel Reyes  Performed By: Julio César Robert     BSA: 1.8 m2  Height: 66 in  Weight: 157 lb  HR: 96  BP: 108/77 mmHg  ______________________________________________________________________________  Procedure  Complete GODWIN Adult. GODWIN Probe #[id number] was also used during the procedure.  ______________________________________________________________________________  Interpretation Summary     A \"clam shell\" type of ASD/PFO closure device is seen on " "the atrial septum. No  thrombus is identified on the device. There is no evidence of left to right  shunting around the device by color doppler. There is no evidence of right to  left intracardiac or pulmonary level shunt by bubble study.     Otherwise normal study,  ______________________________________________________________________________  GODWIN  I determined this patient to be an appropriate candidate for the planned  sedation and procedure and have reassessed the patient immediately prior to  sedation and procedure. Total sedation time: 50 minutes of continuous bedside  1:1 monitoring. Versed (12mg) was given intravenously. Fentanyl (200mcg) was  given intravenously. Prior to the exam, the oral cavity was checked and no  overcrowding was noted. Consent to the procedure was obtained prior to  sedation. The transesophageal probe was passed without difficulty. There were  no complications associated with this procedure.     Left Ventricle  The left ventricle is normal in size. Left ventricular systolic function is  normal. Normal left ventricular wall motion.     Right Ventricle  Normal right ventricle structure and size. The right ventricular systolic  function is normal.     Atria  Normal left atrial size. Right atrial size is normal. Intact atrial septum.  There is no color Doppler evidence of an atrial shunt. A contrast injection  (Bubble Study) was performed that was negative for flow across the interatrial  septum. A \"clam shell\" type of ASD/PFO closure device is seen on the atrial  septum. No thrombus is identified on the device. There is no evidence of left  to right shunting around the device by color doppler. There is no evidence of  right to left intracardiac or pulmonary level shunt by bubble study. No  thrombus is detected in the left atrial appendage.     Mitral Valve  The mitral valve is normal in structure and function. There is no mitral  regurgitation noted.     Tricuspid Valve  No tricuspid " regurgitation.     Aortic Valve  Normal tricuspid aortic valve. No aortic regurgitation is present. No aortic  stenosis is present.     Pulmonic Valve  The pulmonic valve is not well seen, but is grossly normal. There is trace  pulmonic valvular regurgitation.     Vessels  The aortic root is normal size. Normal ascending, transverse (arch), and  descending aorta. Normal pulmonary venous drainage. Normal pulmonary vein  velocity.     Pericardial/Pleural  There is no pericardial effusion.     Rhythm  Sinus rhythm was noted.  ______________________________________________________________________________  Report approved by: Steph Chauhan 10/11/2024 02:02 PM     ______________________________________________________________________________          Discharge Medications   Discharge Medication List as of 10/11/2024  4:13 PM        CONTINUE these medications which have CHANGED    Details   aspirin (ASA) 81 MG chewable tablet Take 1 tablet (81 mg) by mouth daily., Disp-100 tablet, R-1, E-Prescribe      clopidogrel (PLAVIX) 75 MG tablet Take 1 tablet (75 mg) by mouth daily., Disp-20 tablet, R-0, E-Prescribe      rosuvastatin (CRESTOR) 40 MG tablet Take 1 tablet (40 mg) by mouth daily. You will need to get your cholesterol checked with your primary in the next 2 months and then further refills per primary, Disp-30 tablet, R-1, E-Prescribe           CONTINUE these medications which have NOT CHANGED    Details   acetaminophen (TYLENOL) 325 MG tablet Take 325-650 mg by mouth every 6 hours as needed for mild pain, Historical      LORazepam (ATIVAN) 1 MG tablet Take 0.5-1 tablets (0.5-1 mg) by mouth daily as needed for anxiety or sleep (severe anxiety/panic)., Disp-30 tablet, R-1, E-Prescribe      magnesium oxide (MAG-OX) 400 MG tablet Take 400 mg by mouth nightly as needed (leg cramps)., Historical      Omega-3 Fatty Acids (FISH OIL PO) Take 1 capsule by mouth At Bedtime , Historical      polyethylene glycol (MIRALAX) 17  GM/Dose powder Take 1 capful (17g) of Miralax mixed with 8 oz of a clear liquid twice daily for 7 days prior to your scheduled procedure., Disp-238 g, R-0, E-Prescribe      tirzepatide-Weight Management (ZEPBOUND) 7.5 MG/0.5ML prefilled pen Inject 0.5 mLs (7.5 mg) subcutaneously every 7 days., Disp-2 mL, R-3, E-Prescribe      VITAMIN D, CHOLECALCIFEROL, PO Take 1,000 Units by mouth every evening., Historical      insulin pen needle (31G X 5 MM) 31G X 5 MM miscellaneous Use 1 pen needles daily or as directed.Disp-100 each, [id number] not fill until we know if Saxenda is covered      STATIN NOT PRESCRIBED, INTENTIONAL, Statin not prescribed intentionally due to Woman of childbearing age not actively taking birth control, Disp-0 each, R-0, No Print Out           STOP taking these medications       aspirin-acetaminophen-caffeine (EXCEDRIN MIGRAINE) 250-250-65 MG tablet Comments:   Reason for Stopping:         propranolol (INDERAL) 10 MG tablet Comments:   Reason for Stopping:         Semaglutide-Weight Management (WEGOVY) 0.25 MG/0.5ML pen Comments:   Reason for Stopping:         Semaglutide-Weight Management (WEGOVY) 0.5 MG/0.5ML pen Comments:   Reason for Stopping:         Semaglutide-Weight Management (WEGOVY) 1 MG/0.5ML pen Comments:   Reason for Stopping:         Semaglutide-Weight Management (WEGOVY) 1.7 MG/0.75ML pen Comments:   Reason for Stopping:         tirzepatide-Weight Management (ZEPBOUND) 5 MG/0.5ML prefilled pen Comments:   Reason for Stopping:             Allergies   Allergies   Allergen Reactions    Compazine [Prochlorperazine] Muscle Pain (Myalgia)

## 2024-10-13 NOTE — PROGRESS NOTES
Connected Care Resource Center:   [facility] Contact  [facility]/Voicemail     Clinical Data: Post-Discharge Outreach     Outreach attempted x 2.  Left message on patient's voicemail, providing [facility]'s central phone number of [phone] ([phone]) for questions/concerns and/or to schedule an appt with an [facility] provider, if they do not have a PCP.      Plan:  [facility] will do no further outreaches at this time.       Cyndie Isbell MA  Connected [facility] Resource Center, [facility]    *Connected Care Resource Team does NOT follow patient ongoing. Referrals are identified based on internal discharge reports and the outreach is to ensure patient has an understanding of their discharge instructions.

## 2024-10-14 ENCOUNTER — TELEPHONE (OUTPATIENT)
Dept: NEUROLOGY | Facility: CLINIC | Age: 44
End: 2024-10-14
Payer: COMMERCIAL

## 2024-10-14 LAB
AT III ACT/NOR PPP CHRO: 88 % (ref 85–135)
B2 GLYCOPROT1 IGG SERPL IA-ACNC: <0.8 U/ML
B2 GLYCOPROT1 IGM SERPL IA-ACNC: <2.4 U/ML
CARDIOLIPIN IGG SER IA-ACNC: <2 GPL-U/ML
CARDIOLIPIN IGG SER IA-ACNC: NEGATIVE
CARDIOLIPIN IGM SER IA-ACNC: <2 MPL-U/ML
CARDIOLIPIN IGM SER IA-ACNC: NEGATIVE
DRVVT SCREEN RATIO: 0.82
INR PPP: 0.96 (ref 0.85–1.15)
LA PPP-IMP: NEGATIVE
LUPUS INTERPRETATION: NORMAL
PTT RATIO: 0.97
THROMBIN TIME: 16.6 SECONDS (ref 13–19)

## 2024-10-15 ENCOUNTER — OFFICE VISIT (OUTPATIENT)
Dept: NEUROLOGY | Facility: CLINIC | Age: 44
End: 2024-10-15
Payer: COMMERCIAL

## 2024-10-15 ENCOUNTER — TELEPHONE (OUTPATIENT)
Dept: CARDIOLOGY | Facility: CLINIC | Age: 44
End: 2024-10-15

## 2024-10-15 VITALS — HEART RATE: 79 BPM | DIASTOLIC BLOOD PRESSURE: 82 MMHG | SYSTOLIC BLOOD PRESSURE: 116 MMHG | OXYGEN SATURATION: 96 %

## 2024-10-15 DIAGNOSIS — G43.109 MIGRAINE WITH AURA AND WITHOUT STATUS MIGRAINOSUS, NOT INTRACTABLE: Primary | ICD-10-CM

## 2024-10-15 DIAGNOSIS — I63.411 CEREBROVASCULAR ACCIDENT (CVA) DUE TO EMBOLISM OF RIGHT MIDDLE CEREBRAL ARTERY (H): ICD-10-CM

## 2024-10-15 LAB
PROT C ACT/NOR PPP CHRO: 111 % (ref 70–170)
PROT S FREE AG ACT/NOR PPP IA: 66 % (ref 55–125)

## 2024-10-15 PROCEDURE — 99417 PROLNG OP E/M EACH 15 MIN: CPT | Performed by: PSYCHIATRY & NEUROLOGY

## 2024-10-15 PROCEDURE — 99215 OFFICE O/P EST HI 40 MIN: CPT | Performed by: PSYCHIATRY & NEUROLOGY

## 2024-10-15 RX ORDER — ROSUVASTATIN CALCIUM 40 MG/1
40 TABLET, COATED ORAL DAILY
Qty: 30 TABLET | Refills: 1 | Status: SHIPPED | OUTPATIENT
Start: 2024-10-15

## 2024-10-15 RX ORDER — VERAPAMIL HYDROCHLORIDE 100 MG/1
100 CAPSULE, EXTENDED RELEASE ORAL DAILY
Qty: 30 CAPSULE | Refills: 11 | Status: SHIPPED | OUTPATIENT
Start: 2024-10-15 | End: 2024-10-18

## 2024-10-15 NOTE — TELEPHONE ENCOUNTER
Patient was admitted to [facility] on 10/10/24 with acute onset of left facial numbness, left sided numbness and speech difficulty this am. She was talking and started to feel the inside of her mouth on the left go numb. Also noted speech slurring. Neurology and cardiology consulted.    PMH: CVA during 3rd trimester of pregnancy in 2015 with subsequent PFO closure, anxiety/depression.      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    10/10/24: Echo showed EF of 55%. The right ventricle is normal in structure, function and size. ASD closure device in place. Negative bubble study.  No valve disease.    10/11/24: GODWIN showed intact PFO closure device, negative bubble study. LpA positive.     Pt was continued on ASA, Plavix and Crestor at time of discharge. 14 day Zio Patch monitor to be mailed out.    Called patient to discuss any post hospital d/c questions she may have and confirm f/u appts.     Patient denied any SOB, chest pain, or light headedness. States neurologically changes as above have mostly resolved.    RN confirmed with patient that she is scheduled for an OV on 11/19/24 at 0750 with LOUANN Josefina Diego at our [facility].    Patient advised to call clinic with any cardiac related questions or concerns prior to this louann't. Patient verbalized understanding and agreed with plan. DANIELLE Merino RN.

## 2024-10-15 NOTE — LETTER
10/15/2024       RE: Chloe Foster  [address] [address]     Dear Colleague,    Thank you for referring your patient, Chloe Foster, to the [facility] [place] at [facility]. Please see a copy of my visit note below.    Neurology Progress Note    [facility]    Chloe Foster MRN# [id number]   Age: 44 year old YOB: 1980     PCP: Izabel Reyes            Assessment and Plan:     (G43.109) Migraine with aura and without status migrainosus, not intractable  (primary encounter diagnosis)  Comment: At this time appears her migraines are the trigger for these strokelike events.  I think we should pursue better migraine prevention.  I will have started verapamil low-dose for migraine prevention and blood vessel stabilization as well as Ubrelvy to be used as needed for rescue.  She cannot use triptan medications because they are contraindicated in the setting of having a stroke.  Plan:   1.  Acute: ubrogepant (UBRELVY) 100 MG tablet  2.  Preventive: Verapamil extended release to 100 mg.  Low dose was chosen in an attempt not to lower blood pressure too much after recent stroke            (I63.411) Cerebrovascular accident (CVA) due to embolism of right middle cerebral artery (H)  Comment: On imaging the patient has a right middle cerebral artery the narrowing.  Do wonder if she is having vasoconstriction related to migraines.  She will complete the hypercoagulable workup.  I did encourage her to take the high-dose rosuvastatin and we will recheck lipids in 8 weeks with the goal of having an LDL between 40 and 70.  The patient will be on 21 days of dual antiplatelet therapy switching then to monotherapy with aspirin 81 mg daily.  If the patient has any new recurrent symptoms she will present to the closest emergency department.    Both the patient and her  will need FMLA  forms filled out.  Have requested they send those through and I am happy to complete them.    Follow-up with me in 3 months.  75 minutes spent caring for the patient on the day of the visit that included chart review, visit time and documentation time.    Latia Post MD           History of Present Illness:   CC: Urgent recheck    Chloe is a 44-year-old woman who I last saw in 2022.  The patient has a past medical history significant for stroke in May 2015 when she was pregnant.  There were 4 lesions of restricted diffusion on that MRI that she had.  She did have a PFO but no DVT.  The PFO was closed in 2016.  She consulted with stroke specialist following the pregnancy who felt she should continue on an aspirin a day but the stroke was more likely related to her pregnancy state and PFO which had been managed.  She was referred to general neurology to manage her migraine with aura headaches.    The patient would have migraine headaches with aura.  The aura was typically wavy lines in her vision.  Sometimes these would occur followed by headache and sometimes the vision changes would come on without a headache.  In addition to the vision changes she could have dizziness heaviness in her limbs and word finding difficulties.  With several of these complicated auras she had MRI imaging showing no further stroke.    Last week on October 10 the patient was admitted to [facility] with a new stroke.  She reports that about a month ago her migraines were becoming worse and she was having more migraines with more auras.  Last week she ended up having several episodes of left-sided facial numbness but then had an episode of left-sided facial weakness where her mouth was drooping and her speech was affected she also had left arm weakness at the same time.  Symptoms lasted only briefly.    In the emergency department the patient initially had a CT head and CT angiography in the head and neck that was  negative for acute stroke.  She then went on to have an MRI scan of the brain that did show diffusion weighted changes in the right middle inferior frontal gyral cortex as well as perhaps a faint area of diffusion weighted image changes slightly more posterior.  The stroke team did assess the patient and recommended dual antiplatelet therapy for 21 days followed by aspirin monotherapy for the time being.  She has MTHFR homozygous mutation.  Her PFO has been closed.  Lipoprotein a elevation is increased.  They were recommending repeat hypercoagulable workup with hematology.  Hematology, Dr. Leigh, did see the patient in the hospital.  They ordered hypercoagulable workup and will follow-up with the patient 2 to 3 weeks after discharge.    The patient has not started statin medication that was prescribed due to wanting to discuss risks and benefits first.    The patient has been using Zepbound for weight management and questions whether she should continue this.       Physical Exam:     /82 (BP Location: Right arm, Patient Position: Sitting, Cuff Size: Adult Regular)   Pulse 79   LMP 08/16/2024 (Exact Date)   SpO2 96%            Pertinent History/Data for appointment:     Chart from hospital stay reviewed.    Lipid Profile  Component      Latest Ref Rng 10/11/2024  6:11 AM   Cholesterol      <200 mg/dL 162    Triglycerides      <150 mg/dL 62    HDL Cholesterol      >=50 mg/dL 60    LDL Cholesterol Calculated      <100 mg/dL 90    Non HDL Cholesterol      <130 mg/dL 102    Patient Fasting? Yes      Echo 10/10/24:  Interpretation Summary     1. The left ventricle is normal in structure, function and size. The visual  ejection fraction is estimated at 55%.  2. The right ventricle is normal in structure, function and size.  3. ASD closure device in place. Negative bubble study.  4. No valve disease.     No changes from echo 2021.    EXAM: MR BRAIN W/O & W CONTRAST 10/10/2024 3:50 PM   IMPRESSION: Small focal  infarct in the right middle/inferior frontal  gyral cortex.     BROWN SIMONS, DO          Again, thank you for allowing me to participate in the care of your patient.      Sincerely,    Laita Post MD

## 2024-10-15 NOTE — PROGRESS NOTES
Neurology Progress Note    [facility]    Chloe Foster MRN# [id number]   Age: 44 year old YOB: 1980     PCP: Izabel Reyes            Assessment and Plan:     (G43.109) Migraine with aura and without status migrainosus, not intractable  (primary encounter diagnosis)  Comment: At this time appears her migraines are the trigger for these strokelike events.  I think we should pursue better migraine prevention.  I will have started verapamil low-dose for migraine prevention and blood vessel stabilization as well as Ubrelvy to be used as needed for rescue.  She cannot use triptan medications because they are contraindicated in the setting of having a stroke.  Plan:   1.  Acute: ubrogepant (UBRELVY) 100 MG tablet  2.  Preventive: Verapamil extended release to 100 mg.  Low dose was chosen in an attempt not to lower blood pressure too much after recent stroke            (I63.411) Cerebrovascular accident (CVA) due to embolism of right middle cerebral artery (H)  Comment: On imaging the patient has a right middle cerebral artery the narrowing.  Do wonder if she is having vasoconstriction related to migraines.  She will complete the hypercoagulable workup.  I did encourage her to take the high-dose rosuvastatin and we will recheck lipids in 8 weeks with the goal of having an LDL between 40 and 70.  The patient will be on 21 days of dual antiplatelet therapy switching then to monotherapy with aspirin 81 mg daily.  If the patient has any new recurrent symptoms she will present to the closest emergency department.    Both the patient and her  will need FMLA forms filled out.  Have requested they send those through and I am happy to complete them.    Follow-up with me in 3 months.  75 minutes spent caring for the patient on the day of the visit that included chart review, visit time and documentation time.    Latia Post MD           History of Present Illness:   CC: Urgent  lynne Corona is a 44-year-old woman who I last saw in 2022.  The patient has a past medical history significant for stroke in May 2015 when she was pregnant.  There were 4 lesions of restricted diffusion on that MRI that she had.  She did have a PFO but no DVT.  The PFO was closed in 2016.  She consulted with stroke specialist following the pregnancy who felt she should continue on an aspirin a day but the stroke was more likely related to her pregnancy state and PFO which had been managed.  She was referred to general neurology to manage her migraine with aura headaches.    The patient would have migraine headaches with aura.  The aura was typically wavy lines in her vision.  Sometimes these would occur followed by headache and sometimes the vision changes would come on without a headache.  In addition to the vision changes she could have dizziness heaviness in her limbs and word finding difficulties.  With several of these complicated auras she had MRI imaging showing no further stroke.    Last week on October 10 the patient was admitted to [facility] with a new stroke.  She reports that about a month ago her migraines were becoming worse and she was having more migraines with more auras.  Last week she ended up having several episodes of left-sided facial numbness but then had an episode of left-sided facial weakness where her mouth was drooping and her speech was affected she also had left arm weakness at the same time.  Symptoms lasted only briefly.    In the emergency department the patient initially had a CT head and CT angiography in the head and neck that was negative for acute stroke.  She then went on to have an MRI scan of the brain that did show diffusion weighted changes in the right middle inferior frontal gyral cortex as well as perhaps a faint area of diffusion weighted image changes slightly more posterior.  The stroke team did assess the patient and recommended dual  antiplatelet therapy for 21 days followed by aspirin monotherapy for the time being.  She has MTHFR homozygous mutation.  Her PFO has been closed.  Lipoprotein a elevation is increased.  They were recommending repeat hypercoagulable workup with hematology.  Hematology, Dr. Leigh, did see the patient in the hospital.  They ordered hypercoagulable workup and will follow-up with the patient 2 to 3 weeks after discharge.    The patient has not started statin medication that was prescribed due to wanting to discuss risks and benefits first.    The patient has been using Zepbound for weight management and questions whether she should continue this.       Physical Exam:     /82 (BP Location: Right arm, Patient Position: Sitting, Cuff Size: Adult Regular)   Pulse 79   LMP 08/16/2024 (Exact Date)   SpO2 96%            Pertinent History/Data for appointment:     Chart from hospital stay reviewed.    Lipid Profile  Component      Latest Ref Rng 10/11/2024  6:11 AM   Cholesterol      <200 mg/dL 162    Triglycerides      <150 mg/dL 62    HDL Cholesterol      >=50 mg/dL 60    LDL Cholesterol Calculated      <100 mg/dL 90    Non HDL Cholesterol      <130 mg/dL 102    Patient Fasting? Yes      Echo 10/10/24:  Interpretation Summary     1. The left ventricle is normal in structure, function and size. The visual  ejection fraction is estimated at 55%.  2. The right ventricle is normal in structure, function and size.  3. ASD closure device in place. Negative bubble study.  4. No valve disease.     No changes from echo 2021.    EXAM: MR BRAIN W/O & W CONTRAST 10/10/2024 3:50 PM   IMPRESSION: Small focal infarct in the right middle/inferior frontal  gyral cortex.     BROWN SIMONS,

## 2024-10-16 ENCOUNTER — MYC MEDICAL ADVICE (OUTPATIENT)
Dept: SURGERY | Facility: CLINIC | Age: 44
End: 2024-10-16
Payer: COMMERCIAL

## 2024-10-16 ENCOUNTER — TELEPHONE (OUTPATIENT)
Dept: NEUROLOGY | Facility: CLINIC | Age: 44
End: 2024-10-16

## 2024-10-16 DIAGNOSIS — E66.811 CLASS 1 OBESITY DUE TO EXCESS CALORIES WITH SERIOUS COMORBIDITY AND BODY MASS INDEX (BMI) OF 30.0 TO 30.9 IN ADULT: ICD-10-CM

## 2024-10-16 DIAGNOSIS — E66.09 CLASS 1 OBESITY DUE TO EXCESS CALORIES WITH SERIOUS COMORBIDITY AND BODY MASS INDEX (BMI) OF 30.0 TO 30.9 IN ADULT: ICD-10-CM

## 2024-10-16 NOTE — TELEPHONE ENCOUNTER
Pt last seen 4/16 and next 12/6/24.  Is sending msg that had CVA yesterday. (Not pt's first).  Is asking if ok to be on Zepbound - states neuro yesterday said ok and enc it.  Is wondering if can do rx through [employer].  Plz advise - thx Zulay Landon, MS, RD, RN

## 2024-10-16 NOTE — TELEPHONE ENCOUNTER
Fine to stay on it from my understanding! Just rechecked on UpToDate as well and no noted listed side effect risk for stroke. Glad that neurology agrees!    Thanks for checking - Zulay, are you able to send it to [facility]?    This was done - pt given enough to get to next appt for Zepbound rx.  Pt notified.  Zulay Landon, MS, RD, RN

## 2024-10-17 NOTE — TELEPHONE ENCOUNTER
Retail Pharmacy Prior Authorization Team   Phone: [phone]    PRIOR AUTHORIZATION DENIED    Medication: VERAPAMIL HCL  MG PO CP24  Insurance Company: [employer] [place] - Phone [phone] Fax [phone]  Denial Date: 10/16/2024  Denial Reason(s): PRODUCT IS NOT COVERED UNDER PATIENT'S RX BENEFIT PLAN - PLAN EXCLUSION      Appeal Information: IF THE PROVIDER WOULD LIKE TO APPEAL THIS DECISION PLEASE PROVIDE THE PA TEAM WITH A LETTER OF MEDICAL NECESSITY      Patient Notified: NO

## 2024-10-18 ENCOUNTER — MYC MEDICAL ADVICE (OUTPATIENT)
Dept: NEUROLOGY | Facility: CLINIC | Age: 44
End: 2024-10-18
Payer: COMMERCIAL

## 2024-10-18 DIAGNOSIS — G43.109 MIGRAINE WITH AURA AND WITHOUT STATUS MIGRAINOSUS, NOT INTRACTABLE: Primary | ICD-10-CM

## 2024-10-18 RX ORDER — VERAPAMIL HYDROCHLORIDE 40 MG/1
TABLET ORAL
Qty: 60 TABLET | Refills: 11 | Status: SHIPPED | OUTPATIENT
Start: 2024-10-18

## 2024-10-22 ENCOUNTER — APPOINTMENT (OUTPATIENT)
Dept: CT IMAGING | Facility: CLINIC | Age: 44
End: 2024-10-22
Attending: EMERGENCY MEDICINE
Payer: COMMERCIAL

## 2024-10-22 ENCOUNTER — APPOINTMENT (OUTPATIENT)
Dept: MRI IMAGING | Facility: CLINIC | Age: 44
End: 2024-10-22
Attending: STUDENT IN AN ORGANIZED HEALTH CARE EDUCATION/TRAINING PROGRAM
Payer: COMMERCIAL

## 2024-10-22 ENCOUNTER — HOSPITAL ENCOUNTER (EMERGENCY)
Facility: CLINIC | Age: 44
Discharge: HOME | End: 2024-10-22
Attending: EMERGENCY MEDICINE | Admitting: EMERGENCY MEDICINE
Payer: COMMERCIAL

## 2024-10-22 VITALS
RESPIRATION RATE: 13 BRPM | HEART RATE: 108 BPM | BODY MASS INDEX: 25.39 KG/M2 | DIASTOLIC BLOOD PRESSURE: 86 MMHG | WEIGHT: 158 LBS | OXYGEN SATURATION: 95 % | TEMPERATURE: 97 F | HEIGHT: 66 IN | SYSTOLIC BLOOD PRESSURE: 116 MMHG

## 2024-10-22 DIAGNOSIS — R20.0 LEFT ARM NUMBNESS: ICD-10-CM

## 2024-10-22 DIAGNOSIS — R20.0 LEFT FACIAL NUMBNESS: ICD-10-CM

## 2024-10-22 LAB
ANION GAP SERPL CALCULATED.3IONS-SCNC: 13 MMOL/L (ref 7–15)
APTT PPP: 24 SECONDS (ref 22–38)
ATRIAL RATE - MUSE: 109 BPM
BASOPHILS # BLD AUTO: 0 10E3/UL (ref 0–0.2)
BASOPHILS NFR BLD AUTO: 1 %
BUN SERPL-MCNC: 10.2 MG/DL (ref 6–20)
CALCIUM SERPL-MCNC: 9.5 MG/DL (ref 8.8–10.4)
CHLORIDE SERPL-SCNC: 103 MMOL/L (ref 98–107)
CREAT SERPL-MCNC: 0.75 MG/DL (ref 0.51–0.95)
DIASTOLIC BLOOD PRESSURE - MUSE: NORMAL MMHG
EGFRCR SERPLBLD CKD-EPI 2021: >90 ML/MIN/1.73M2
EOSINOPHIL # BLD AUTO: 0.1 10E3/UL (ref 0–0.7)
EOSINOPHIL NFR BLD AUTO: 1 %
ERYTHROCYTE [DISTWIDTH] IN BLOOD BY AUTOMATED COUNT: 12.8 % (ref 10–15)
GLUCOSE SERPL-MCNC: 98 MG/DL (ref 70–99)
HCG SERPL QL: NEGATIVE
HCO3 SERPL-SCNC: 22 MMOL/L (ref 22–29)
HCT VFR BLD AUTO: 37.7 % (ref 35–47)
HGB BLD-MCNC: 12.5 G/DL (ref 11.7–15.7)
IMM GRANULOCYTES # BLD: 0 10E3/UL
IMM GRANULOCYTES NFR BLD: 0 %
INR PPP: 0.93 (ref 0.85–1.15)
INTERPRETATION ECG - MUSE: NORMAL
LYMPHOCYTES # BLD AUTO: 3.1 10E3/UL (ref 0.8–5.3)
LYMPHOCYTES NFR BLD AUTO: 37 %
MCH RBC QN AUTO: 29.6 PG (ref 26.5–33)
MCHC RBC AUTO-ENTMCNC: 33.2 G/DL (ref 31.5–36.5)
MCV RBC AUTO: 89 FL (ref 78–100)
MONOCYTES # BLD AUTO: 0.7 10E3/UL (ref 0–1.3)
MONOCYTES NFR BLD AUTO: 8 %
NEUTROPHILS # BLD AUTO: 4.5 10E3/UL (ref 1.6–8.3)
NEUTROPHILS NFR BLD AUTO: 54 %
NRBC # BLD AUTO: 0 10E3/UL
NRBC BLD AUTO-RTO: 0 /100
P AXIS - MUSE: 53 DEGREES
PLATELET # BLD AUTO: 260 10E3/UL (ref 150–450)
POTASSIUM SERPL-SCNC: 4 MMOL/L (ref 3.4–5.3)
PR INTERVAL - MUSE: 172 MS
QRS DURATION - MUSE: 76 MS
QT - MUSE: 354 MS
QTC - MUSE: 476 MS
R AXIS - MUSE: -7 DEGREES
RBC # BLD AUTO: 4.23 10E6/UL (ref 3.8–5.2)
SODIUM SERPL-SCNC: 138 MMOL/L (ref 135–145)
SYSTOLIC BLOOD PRESSURE - MUSE: NORMAL MMHG
T AXIS - MUSE: 35 DEGREES
TROPONIN T SERPL HS-MCNC: <6 NG/L
VENTRICULAR RATE- MUSE: 109 BPM
WBC # BLD AUTO: 8.4 10E3/UL (ref 4–11)

## 2024-10-22 PROCEDURE — 84703 CHORIONIC GONADOTROPIN ASSAY: CPT | Performed by: EMERGENCY MEDICINE

## 2024-10-22 PROCEDURE — 99291 CRITICAL CARE FIRST HOUR: CPT | Mod: GC | Performed by: PSYCHIATRY & NEUROLOGY

## 2024-10-22 PROCEDURE — 99285 EMERGENCY DEPT VISIT HI MDM: CPT | Mod: 25

## 2024-10-22 PROCEDURE — 255N000002 HC RX 255 OP 636: Performed by: STUDENT IN AN ORGANIZED HEALTH CARE EDUCATION/TRAINING PROGRAM

## 2024-10-22 PROCEDURE — 36415 COLL VENOUS BLD VENIPUNCTURE: CPT | Performed by: EMERGENCY MEDICINE

## 2024-10-22 PROCEDURE — 85730 THROMBOPLASTIN TIME PARTIAL: CPT | Performed by: EMERGENCY MEDICINE

## 2024-10-22 PROCEDURE — 85025 COMPLETE CBC W/AUTO DIFF WBC: CPT | Performed by: EMERGENCY MEDICINE

## 2024-10-22 PROCEDURE — 85610 PROTHROMBIN TIME: CPT | Performed by: EMERGENCY MEDICINE

## 2024-10-22 PROCEDURE — 250N000011 HC RX IP 250 OP 636: Performed by: EMERGENCY MEDICINE

## 2024-10-22 PROCEDURE — 250N000009 HC RX 250: Performed by: EMERGENCY MEDICINE

## 2024-10-22 PROCEDURE — A9585 GADOBUTROL INJECTION: HCPCS | Performed by: STUDENT IN AN ORGANIZED HEALTH CARE EDUCATION/TRAINING PROGRAM

## 2024-10-22 PROCEDURE — 84484 ASSAY OF TROPONIN QUANT: CPT | Performed by: EMERGENCY MEDICINE

## 2024-10-22 PROCEDURE — 70450 CT HEAD/BRAIN W/O DYE: CPT

## 2024-10-22 PROCEDURE — 80048 BASIC METABOLIC PNL TOTAL CA: CPT | Performed by: EMERGENCY MEDICINE

## 2024-10-22 PROCEDURE — 93005 ELECTROCARDIOGRAM TRACING: CPT

## 2024-10-22 PROCEDURE — 70553 MRI BRAIN STEM W/O & W/DYE: CPT

## 2024-10-22 PROCEDURE — 70498 CT ANGIOGRAPHY NECK: CPT

## 2024-10-22 PROCEDURE — 96374 THER/PROPH/DIAG INJ IV PUSH: CPT | Mod: 59

## 2024-10-22 PROCEDURE — 70496 CT ANGIOGRAPHY HEAD: CPT

## 2024-10-22 RX ORDER — IOPAMIDOL 755 MG/ML
67 INJECTION, SOLUTION INTRAVASCULAR ONCE
Status: COMPLETED | OUTPATIENT
Start: 2024-10-22 | End: 2024-10-22

## 2024-10-22 RX ORDER — GADOBUTROL 604.72 MG/ML
7 INJECTION INTRAVENOUS ONCE
Status: COMPLETED | OUTPATIENT
Start: 2024-10-22 | End: 2024-10-22

## 2024-10-22 RX ADMIN — IOPAMIDOL 67 ML: 755 INJECTION, SOLUTION INTRAVENOUS at 12:54

## 2024-10-22 RX ADMIN — SODIUM CHLORIDE 100 ML: 9 INJECTION, SOLUTION INTRAVENOUS at 12:54

## 2024-10-22 RX ADMIN — MIDAZOLAM 2.5 MG: 1 INJECTION INTRAMUSCULAR; INTRAVENOUS at 15:07

## 2024-10-22 RX ADMIN — GADOBUTROL 7 ML: 604.72 INJECTION INTRAVENOUS at 15:58

## 2024-10-22 ASSESSMENT — COLUMBIA-SUICIDE SEVERITY RATING SCALE - C-SSRS
1. IN THE PAST MONTH, HAVE YOU WISHED YOU WERE DEAD OR WISHED YOU COULD GO TO SLEEP AND NOT WAKE UP?: NO
6. HAVE YOU EVER DONE ANYTHING, STARTED TO DO ANYTHING, OR PREPARED TO DO ANYTHING TO END YOUR LIFE?: NO
2. HAVE YOU ACTUALLY HAD ANY THOUGHTS OF KILLING YOURSELF IN THE PAST MONTH?: NO

## 2024-10-22 ASSESSMENT — ACTIVITIES OF DAILY LIVING (ADL)
ADLS_ACUITY_SCORE: 35

## 2024-10-22 NOTE — ED PROVIDER NOTES
Emergency Department Note      History of Present Illness     Chief Complaint  Numbness (/)    HPI  Chloe Foster is a 44 year old female who presents to the emergency room with numbness and decreased sensation to light touch in her left face, arm and leg starting about 25 minutes ago.  She had a recent stroke as recent as 12 days ago, and does not seem like an obvious cause has been found for why she had the stroke on MRI.  She states that she was completely fine this morning and then noted significant numbness and tingling predominantly in her left face and left arm.  Denies any motor symptoms, came immediately to the ER.  Takes Plavix at home, states that she is compliant with this medication does note that she has a history of headaches and migraine with aura, but does not have an aura today, however did have a headache.            Independent Historian  Yes patient's  is at the bedside and confirms the above history.    Review of External Notes  Yes I have reviewed the patient's discharge summary from 11 October of this year with the patient was discharged after having a stroke.      Past Medical History   Medical History and Problem List  Past Medical History:   Diagnosis Date     Acute stress reaction      Allergic rhinitis, cause unspecified      Anxiety state, unspecified      CVA (cerebral vascular accident) (H)      Depressive disorder, not elsewhere classified      Dizziness      Elevated lipoprotein A level      Hirsutism      History of stroke with residual effects      PFO (patent foramen ovale)      Protein S deficiency (H)      Urinary tract infection, site not specified        Medications  acetaminophen (TYLENOL) 325 MG tablet  aspirin (ASA) 81 MG chewable tablet  clopidogrel (PLAVIX) 75 MG tablet  insulin pen needle (31G X 5 MM) 31G X 5 MM miscellaneous  LORazepam (ATIVAN) 1 MG tablet  magnesium oxide (MAG-OX) 400 MG tablet  Omega-3 Fatty Acids (FISH OIL PO)  polyethylene glycol  "(MIRALAX) 17 GM/Dose powder  rosuvastatin (CRESTOR) 40 MG tablet  tirzepatide-Weight Management (ZEPBOUND) 5 MG/0.5ML prefilled pen  tirzepatide-Weight Management (ZEPBOUND) 7.5 MG/0.5ML prefilled pen  ubrogepant (UBRELVY) 100 MG tablet  verapamil (CALAN) 40 MG tablet  VITAMIN D, CHOLECALCIFEROL, PO        Surgical History   Past Surgical History:   Procedure Laterality Date     ARTHROSCOPY KNEE RT/LT  1995    Rt knee     COLONOSCOPY N/A 9/25/2023    Procedure: Colonoscopy;  Surgeon: Alex Gross MD;  Location:  GI     ESOPHAGOSCOPY, GASTROSCOPY, DUODENOSCOPY (EGD), COMBINED N/A 9/25/2023    Procedure: ESOPHAGOGASTRODUODENOSCOPY, WITH BIOPSY;  Surgeon: Alex Gross MD;  Location:  GI     GYN SURGERY       HC REPAIR OF NASAL SEPTUM  12/2005     REPAIR PATENT FORAMEN OVALE  04/21/2016     SEPTORHINOPLASTY N/A 03/15/2021    Procedure: Revision Septorhinoplasty, Repair of Nasal Valve Collapse, Cadaveric Rib Graft + 45 min Cosmetic Tip Rhinoplasty;  Surgeon: Nikki Schwartz MD;  Location: [facility] OR         Physical Exam   Patient Vitals for the past 24 hrs:   BP Temp Temp src Pulse Resp SpO2 Height Weight   10/22/24 1243 (!) 163/100 97  F (36.1  C) Temporal 119 16 99 % 1.676 m (5' 6\") 71.7 kg (158 lb)       Physical Exam  Vitals: reviewed by me  General: Pt seen on [facility], cooperative, and alert to conversation  Eyes: Tracking well, clear conjunctiva BL  ENT: MMM, midline trachea.   Lungs: No tachypnea, no accessory muscle use. No respiratory distress.   CV: Rate as above  MSK: no joint effusion.  No evidence of trauma  Skin: No rash  Neuro: Clear speech and no facial droop.  Moving all extremity spontaneously and following all commands.  Can puff cheeks out, close eyelids, and raise eyebrows symmetrically throughout the face.  5 out of 5  strength in the left upper extremity, 5 out of 5 flexion and extension as well.  Able to lift left foot off the bed.  Sensation is grossly intact " throughout as well.  Psych: Not RIS, no e/o AH/      Diagnostics   Lab Results   Labs Ordered and Resulted from Time of ED Arrival to Time of ED Departure   BASIC METABOLIC PANEL - Normal       Result Value    Sodium 138      Potassium 4.0      Chloride 103      Carbon Dioxide (CO2) 22      Anion Gap 13      Urea Nitrogen 10.2      Creatinine 0.75      GFR Estimate >90      Calcium 9.5      Glucose 98     INR - Normal    INR 0.93     PARTIAL THROMBOPLASTIN TIME - Normal    aPTT 24     TROPONIN T, HIGH SENSITIVITY - Normal    Troponin T, High Sensitivity <6     HCG QUALITATIVE PREGNANCY - Normal    hCG Serum Qualitative Negative     CBC WITH PLATELETS AND DIFFERENTIAL    WBC Count 8.4      RBC Count 4.23      Hemoglobin 12.5      Hematocrit 37.7      MCV 89      MCH 29.6      MCHC 33.2      RDW 12.8      Platelet Count 260      % Neutrophils 54      % Lymphocytes 37      % Monocytes 8      % Eosinophils 1      % Basophils 1      % Immature Granulocytes 0      NRBCs per 100 WBC 0      Absolute Neutrophils 4.5      Absolute Lymphocytes 3.1      Absolute Monocytes 0.7      Absolute Eosinophils 0.1      Absolute Basophils 0.0      Absolute Immature Granulocytes 0.0      Absolute NRBCs 0.0     GLUCOSE MONITOR NURSING POCT       Imaging  CTA Head Neck with Contrast   Final Result   IMPRESSION: No large vessel occlusion findings intracranially. No   hemodynamically significant stenosis in the neck.      Case discussed with Dr. Cannon at 1:14 PM CST.        MAURY YOUNG MD            SYSTEM ID:  [id number]      CT Head w/o Contrast   Final Result   IMPRESSION:   No acute intracranial findings. No acute intracranial   hemorrhage. Previously noted small region of acute ischemia on the   10/10/2024 examination is not well seen on the current CT assessment.         MAURY YOUNG MD            SYSTEM ID:  [id number]      MR Brain w/o & w Contrast    (Results Pending)       EKG   ECG results from 10/22/24   EKG 12-lead, tracing  only     Value    Systolic Blood Pressure     Diastolic Blood Pressure     Ventricular Rate 109    Atrial Rate 109    DE Interval 172    QRS Duration 76        QTc 476    P Axis 53    R AXIS -7    T Axis 35    Interpretation ECG      Sinus tachycardia  Otherwise normal ECG  When compared with ECG of 10-Oct-2024 13:53,  Reviewed by Davis PRECIADO             Independent Interpretation  Yes I have independently reviewed the patient's CT scan of the head, no obvious hemorrhage noted.      ED Course      Medications Administered   Medications   iopamidol (ISOVUE-370) solution 67 mL (67 mLs Intravenous $Given 10/22/24 1254)     And   sodium chloride 0.9 % bag for CT scan flush use (100 mLs As instructed $Given 10/22/24 1254)   midazolam (VERSED) injection 2.5 mg (2.5 mg Intravenous $Given 10/22/24 1507)            Discussion of Management   Yes I have spoken with the neurology stroke fellow, Dr. Lockett who recommended MRI and if the MRI is normal patient can discharge home, however if abnormal MRI with new strokes noted patient should come back into the hospital.  Patient is not a TNK candidate given recent stroke.          Optional/Additional Documentation  None      Medical Decision Making / Diagnosis         MDM  This is a very pleasant 44-year-old female who presents to the emergency room with what appears to be acute onset decreased in sensation and numbness along the left side of her body.  She did have a small headache earlier today but did not have an aura which she typically does have if her migraines have a neurologic component.  She has good muscle strength throughout, and we did call a code stroke since she got here so quickly.  The CT scans do not show any obvious stroke at this time nor large vessel occlusion.  I did speak to Dr. Lockett of the stroke neurology team and we agreed TNK is not indicated especially since she had a recent stroke.  I was told to get an MRI and if the MRI shows a new stroke the  patient should come back into the hospital, however if the MRI is negative we can treat this like a possible complex migraine and discharge home with supportive cares.  Patient is okay with this plan, pending MRI right now, will sign out to oncoming provider with the above plan.    4:37 PM interval update, I spoke with the patient after MRI came back, she feels comfortable going home, and is relieved by a reassuring workup here.  Red flags for when to come back to the ER were discussed in detail and have also asked her to follow-up with her previously scheduled neurology follow-up.      ICD-10 Codes:    ICD-10-CM    1. Left arm numbness  R20.0       2. Left facial numbness  R20.0              Discharge Medications  New Prescriptions    No medications on file                  Heriberto Cannon MD  10/22/24 7533

## 2024-10-22 NOTE — DISCHARGE INSTRUCTIONS
As we discussed, there is no clear cause noted for your numbness or tingling, and no evidence of a new stroke in the MRI.  Please come back to the ER immediately with any other concerns you have, and do take over-the-counter medications to help with your headaches and do follow with any neurology appointments you already had.  Come back with any other concerns.

## 2024-10-22 NOTE — CONSULTS
"[facility]     Stroke Code Note          History of Present Illness     Chief Complaint: Numbness (/)      Chloe Foster is a 44 year old righ-handed female who presented to the hospital because of acute onset of left cheek and left arm paresthesia.  The patient has history of recurrent stroke since 2015 when she was pregnant she had 2 ischemic stroke in the right frontal area, and her last stroke was 2 weeks ago when she was initially admitted at the beginning of October 2024.  All strokes presented similarly as left side paresthesia.  She also has history of migraine headache and she sees a headache specialist.         Past Medical History     Stroke risk factors: Previous stroke, and migraine    Preadmission antithrombotic regimen: Aspirin and Plavix    Modified [place] Score (Pre-morbid)  0-No deficits                   Assessment and Plan       1.  Acute onset of paresthesia of left cheek, left arm  Multiple strokes in the past, cardiac monitoring has been negative for A-fib.     Intravenous Thrombolysis  Not given due to:   - minor/isolated/quickly resolving symptoms     Endovascular Treatment  Not initiated due to absence of proximal vessel occlusion     Plan:  Stroke code de-escalated  MRI of the brain with and without contrast  If MRI of the brain shows ischemic stroke, please page stroke neurology and admit the patient     ___________________________________________________________________    The Stroke Staff is Dr. Blanco.    Robert Lockett MD  Vascular Neurology Fellow    To page me or covering stroke neurology team member, click here: AMCOM  Choose \"On Call\" tab at top, then select \"NEUROLOGY/ALL SITES\" from middle drop-down box, press Enter, then look for \"stroke\" or \"telestroke\" for your site.  ___________________________________________________________________        Imaging/Labs   (personally reviewed )    CT head: No acute intracranial pathology  CTA head/neck: " No large vessel occlusion  CT Perfusion head: Not applicable         Physical Examination     BP: 119/73   Pulse: 112   Resp: 14   Temp: 97  F (36.1  C)   Temp src: Temporal   SpO2: 96 %   O2 Device: None (Room air)   Weight: 71.7 kg (158 lb)    Wt Readings from Last 2 Encounters:   10/22/24 71.7 kg (158 lb)   10/11/24 71.2 kg (157 lb)       Neurologic  Mental Status:  alert, oriented x 3, follows commands, speech clear and fluent, naming and repetition normal  Cranial Nerves: Paresthesia of the left cheek, left arm  Motor:  normal muscle tone and bulk, no abnormal movements, able to move all limbs spontaneously, strength 5/5 throughout upper and lower extremities, no pronator drift  Reflexes:  toes down-going  Sensory: Paresthesia over left cheek, and left arm  Coordination:  normal finger-to-nose and heel-to-shin bilaterally without dysmetria, rapid alternating movements symmetric  Station/Gait:  deferred        Stroke Scales       NIHSS  1a. Level of Consciousness 0-->Alert, keenly responsive   1b. LOC Questions 0-->Answers both questions correctly   1c. LOC Commands 0-->Performs both tasks correctly   2.   Best Gaze 0-->Normal   3.   Visual 0-->No visual loss   4.   Facial Palsy 0-->Normal symmetrical movements   5a. Motor Arm, Left 0-->No drift, limb holds 90 (or 45) degrees for full 10 secs   5b. Motor Arm, Right 0-->No drift, limb holds 90 (or 45) degrees for full 10 secs   6a. Motor Leg, Left 0-->No drift, leg holds 30 degree position for full 5 secs   6b. Motor Leg, right 0-->No drift, leg holds 30 degree position for full 5 secs   7.   Limb Ataxia 0-->Absent   8.   Sensory 1-->Mild-to-moderate sensory loss, patient feels pinprick is less sharp or is dull on the affected side, or there is a loss of superficial pain with pinprick, but patient is aware of being touched   9.   Best Language 0-->No aphasia, normal   10. Dysarthria 0-->Normal   11. Extinction and Inattention  0-->No abnormality   Total 1  "(10/22/24 1330)            Labs     CBC  Lab Results   Component Value Date    HGB 12.5 10/22/2024    HCT 37.7 10/22/2024    WBC 8.4 10/22/2024     10/22/2024       BMP  Lab Results   Component Value Date     10/22/2024    POTASSIUM 4.0 10/22/2024    CHLORIDE 103 10/22/2024    CO2 22 10/22/2024    BUN 10.2 10/22/2024    CR 0.75 10/22/2024    GLC 98 10/22/2024    STEPH 9.5 10/22/2024       INR  INR   Date Value Ref Range Status   10/22/2024 0.93 0.85 - 1.15 Final   10/11/2024 0.96 0.85 - 1.15 Final   10/10/2024 1.02 0.85 - 1.15 Final       Data   Stroke Code Data  (for stroke code without tele)  Stroke code activated 10/22/24  1252   First stroke provider response 10/22/24  1253   Last known normal 10/22/24  1220   Time of discovery (or onset of symptoms) 10/22/24  1220   Head CT read by Stroke Neuro Provider 10/22/24  1320   Was stroke code de-escalated? Yes  10/22/24           Clinically Significant Risk Factors Present on Admission                 # Drug Induced Platelet Defect: home medication list includes an antiplatelet medication           # Overweight: Estimated body mass index is 25.5 kg/m  as calculated from the following:    Height as of this encounter: 1.676 m (5' 6\").    Weight as of this encounter: 71.7 kg (158 lb).           "

## 2024-10-23 ENCOUNTER — VIRTUAL VISIT (OUTPATIENT)
Dept: FAMILY MEDICINE | Facility: CLINIC | Age: 44
End: 2024-10-23
Payer: COMMERCIAL

## 2024-10-23 DIAGNOSIS — E78.41 ELEVATED LIPOPROTEIN A LEVEL: ICD-10-CM

## 2024-10-23 DIAGNOSIS — G43.109 MIGRAINE WITH AURA AND WITHOUT STATUS MIGRAINOSUS, NOT INTRACTABLE: ICD-10-CM

## 2024-10-23 DIAGNOSIS — G45.9 TIA (TRANSIENT ISCHEMIC ATTACK): ICD-10-CM

## 2024-10-23 DIAGNOSIS — I63.411 CEREBROVASCULAR ACCIDENT (CVA) DUE TO EMBOLISM OF RIGHT MIDDLE CEREBRAL ARTERY (H): Primary | ICD-10-CM

## 2024-10-23 PROCEDURE — 99495 TRANSJ CARE MGMT MOD F2F 14D: CPT | Mod: 95 | Performed by: FAMILY MEDICINE

## 2024-10-23 NOTE — PROGRESS NOTES
"Chloe is a 44 year old who is being evaluated via a billable video visit.    How would you like to obtain your AVS? MyChart  If the video visit is dropped, the invitation should be resent by: Text to cell phone: [phone]  Will anyone else be joining your video visit? No      Assessment & Plan       ICD-10-CM    1. Cerebrovascular accident (CVA) due to embolism of right middle cerebral artery (H)  I63.411       2. TIA (transient ischemic attack)  G45.9       3. Migraine with aura and without status migrainosus, not intractable  G43.109       4. Elevated lipoprotein A level  E78.41          45yo with h/o CVA in 2015, s/p PFO closure, now with evidence of a second stroke, s/p hospitalization 10/10/224, and ER visit again yesterday with recurrent symptoms, but no signs of new stroke on scan.  Has had increase in auras for the past month or so (visual sx's), but sx's that are consistent with CVA/TIAs for her are the mouth/lip numbness and inability to speak well.    She was started on plavix and asa during first hospitalization, and has rx to start rosuvastatin and verapamil from Dr. Post (appt 10/15/24) - intends to start soon, though was a bit wary. She is doing [employer] time off work, forms completed through Dr. Post.  She will also see cardiology and hematology in the next 1-2 months.    Discussed medications/plans, do agree with full court press given sx's are not clearing completely with the plavix/asa, and returned despite the PFO closure.      MED REC REQUIRED  Post Medication Reconciliation Status: discharge medications reconciled, continue medications without change  BMI  Estimated body mass index is 25.5 kg/m  as calculated from the following:    Height as of 10/22/24: 1.676 m (5' 6\").    Weight as of 10/22/24: 71.7 kg (158 lb).   Weight management plan: Specific weight management program called [facility] discussed                  Subjective   Chloe is a 44 year old, presenting for the " following health issues:  Hospital F/U        10/23/2024     1:04 PM   Additional Questions   Roomed by Nicole     [facility]       Hospital Follow-up Visit:    Hospital/Nursing Home/IP Rehab Facility: [facility]  Date of Admission: 10/10/24  Date of Discharge: 10/11/24, and ER visit 10/22/24.  Reason(s) for Admission: TIA (Transient ischemic attack)  Was the patient in the ICU or did the patient experience delirium during hospitalization?  No  Do you have any other stressors you would like to discuss with your provider? No    Problems taking medications regularly:  None  Medication changes since discharge: Added Verapamil 40 mg  Problems adhering to non-medication therapy:  None     Had CVA in 2015, then s/p PFO closure afterwards - stable with just migraine/aura sx's for the last 9 years.    Pt noticed an increase in auras in Sept, and the beginning of the month (10/5/24).  Couple times a day.  Often takes tylenol just for auras, and then takes excedrin if migraine sx's. Sometimes will have an aura, and then may leads to a migraine or HA. Auras, visual, can see forms and can walk,but can't make out faces, can't do team meetings.  Often lasts 30-60 minutes.  Her boss, has known her from the past.    -Mon- bad avril, boss told her to go home on Mon due to sx's, and she notes she had another aura that night.    -Tues- another aura.  At work, felt something funny in her mouth.  Usual auras- has learned to live with them.  This felt different- numbness.  Called and tried to get an appt w/ Dr. Post.    -Wed sina- with her son, dropped of  to   [place] inside mouth- similar to 2015 sx's, but not as bad.    -Thur am- with , they were dropping someone off.  Came on- in middle of sentence, couldn't speak, drooping face.  Could say stroke to her , who called 911.  Pt was getting better by the time they came, and though they felt she may be having a panic attack, they eventually brought  her to [facility] ER.  MRI showed small ischemic stroke.    Following week- Dr. Post.  Showed her the MRI pictures.  Saw two spots.    Since discharge, she's still been getting intermittent numbness/tingling.    Yesterday- 50% of the 10/10/24 sx's came back.  Dr. Post, said to go in to the ER if those sx's returned, so she did.  Yesterday scans at ER showed no new strokes.    Wonders about her hormones-- first CVA was during 3rd trimester of pregnancy, and this one came on prior to her period.    In the hospital, they started her on plavix x 21 days (10/10/24 start), and asa.  Plan it to stay on plavix and asa x 21 days.  Dr. Post follow-up on 10/15/24- she sent in rx for verapamil 40mg/d and a statin (rosuvastatin 40mg/d). Goal LDL <40.  Now at 70.  Ubrelvy- to take if she has a migraine.    Other updates...  --She's been on Zepbound for the last few months  Working well, minimal/tolerable se's, and has had significant wt loss.  Recently increased the dose to 7.5mg/d, but plans to call them and go back to the 5mg/d dose with all of this.  Wants to be able to make sure she's nourishing her body well with all of this.  Dr. Post was supportive of staying on the zepbound.      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  Lots going on.  Is stressful, but doing okay.  3d/wk in office, lots more screen time.      --Taking short term disability while she's on these meds.  Dr. Post did the paperwork for it- 4 wks away completely, then back part time.  They will reconnect in 4 wks to discuss.  Thinking about working from home.  Having fluorescent lighting dimmed.          Summary of hospitalization:  [facility] discharge summary reviewed  Diagnostic Tests/Treatments reviewed.  Follow up needed: BMP and CBC, but these were done yesterday at ER, and were normal.  Other Healthcare Providers Involved in Patient s  Care:         Specialist appointment - Neurology- Dr. Post , Cardiology, Hematology  Update since discharge: stable.         Plan of care communicated with patient           Patient Active Problem List   Diagnosis    Vitamin D deficiency    CARDIOVASCULAR SCREENING; LDL GOAL LESS THAN 160    Lumbar herniated disc    Hirsutism    Insomnia    IBS (irritable bowel syndrome)    Moderate recurrent major depression (H)    History of cardioembolic cerebrovascular accident (CVA)    PFO with atrial septal aneurysm    Protein S deficiency (H)    Migraine with aura and without status migrainosus, not intractable    Pre-conception counseling    Elevated lipoprotein A level    Social anxiety disorder    MTHFR mutation    Cerebrovascular accident (CVA) due to embolism of right middle cerebral artery (H)    Deviated nasal septum    Overweight    Weight gain    Class 1 obesity due to excess calories with serious comorbidity and body mass index (BMI) of 30.0 to 30.9 in adult    Elevated cholesterol    S/P patent foramen ovale closure    TIA (transient ischemic attack)      Current Outpatient Medications   Medication Sig Dispense Refill    acetaminophen (TYLENOL) 325 MG tablet Take 325-650 mg by mouth every 6 hours as needed for mild pain      aspirin (ASA) 81 MG chewable tablet Take 1 tablet (81 mg) by mouth daily. 100 tablet 1    clopidogrel (PLAVIX) 75 MG tablet Take 1 tablet (75 mg) by mouth daily. 20 tablet 0    insulin pen needle (31G X 5 MM) 31G X 5 MM miscellaneous Use 1 pen needles daily or as directed. 100 each 11    LORazepam (ATIVAN) 1 MG tablet Take 0.5-1 tablets (0.5-1 mg) by mouth daily as needed for anxiety or sleep (severe anxiety/panic). 30 tablet 1    magnesium oxide (MAG-OX) 400 MG tablet Take 400 mg by mouth nightly as needed (leg cramps).      Omega-3 Fatty Acids (FISH OIL PO) Take 1 capsule by mouth At Bedtime       polyethylene glycol (MIRALAX) 17 GM/Dose powder Take 1 capful (17g) of Miralax mixed with 8 oz  of a clear liquid twice daily for 7 days prior to your scheduled procedure. (Patient taking differently: Take 17 g by mouth 2 times daily as needed. Take 1 capful (17g) of Miralax mixed with 8 oz of a clear liquid twice daily for 7 days prior to your scheduled procedure.) 238 g 0    rosuvastatin (CRESTOR) 40 MG tablet Take 1 tablet (40 mg) by mouth daily. You will need to get your cholesterol checked with your primary in the next 2 months and then further refills per primary 30 tablet 1    tirzepatide-Weight Management (ZEPBOUND) 5 MG/0.5ML prefilled pen Inject 0.5 mLs (5 mg) subcutaneously every 7 days. 2 mL 3    tirzepatide-Weight Management (ZEPBOUND) 7.5 MG/0.5ML prefilled pen Inject 0.5 mLs (7.5 mg) subcutaneously every 7 days. 2 mL 2    ubrogepant (UBRELVY) 100 MG tablet Take 1 tablet (100 mg) by mouth at onset of headache. 10 tablet 11    verapamil (CALAN) 40 MG tablet One ill by mouth once a day in the morning x 1 week then one pill in the morning and one pill in the evening 60 tablet 11    VITAMIN D, CHOLECALCIFEROL, PO Take 1,000 Units by mouth every evening.           Allergies   Allergen Reactions    Compazine [Prochlorperazine] Muscle Pain (Myalgia)          Review of Systems  Constitutional, neuro, ENT, endocrine, pulmonary, cardiac, gastrointestinal, genitourinary, musculoskeletal, integument and psychiatric systems are negative, except as otherwise noted.      Objective           Vitals:  No vitals were obtained today due to virtual visit.    Physical Exam   GENERAL: alert and no distress  EYES: Eyes grossly normal to inspection.  No discharge or erythema, or obvious scleral/conjunctival abnormalities.  RESP: No audible wheeze, cough, or visible cyanosis.    SKIN: Visible skin clear. No significant rash, abnormal pigmentation or lesions.  NEURO: Cranial nerves grossly intact.  Mentation and speech appropriate for age.  PSYCH: Appropriate affect, tone, and pace of words      Admission on 10/22/2024,  Discharged on 10/22/2024   Component Date Value Ref Range Status    Sodium 10/22/2024 138  135 - 145 mmol/L Final    Potassium 10/22/2024 4.0  3.4 - 5.3 mmol/L Final    Chloride 10/22/2024 103  98 - 107 mmol/L Final    Carbon Dioxide (CO2) 10/22/2024 22  22 - 29 mmol/L Final    Anion Gap 10/22/2024 13  7 - 15 mmol/L Final    Urea Nitrogen 10/22/2024 10.2  6.0 - 20.0 mg/dL Final    Creatinine 10/22/2024 0.75  0.51 - 0.95 mg/dL Final    GFR Estimate 10/22/2024 >90  >60 mL/min/1.73m2 Final    eGFR calculated using 2021 CKD-EPI equation.    Calcium 10/22/2024 9.5  8.8 - 10.4 mg/dL Final    Reference intervals for this test were updated on 7/16/2024 to reflect our healthy population more accurately. There may be differences in the flagging of prior results with similar values performed with this method. Those prior results can be interpreted in the context of the updated reference intervals.    Glucose 10/22/2024 98  70 - 99 mg/dL Final    INR 10/22/2024 0.93  0.85 - 1.15 Final    aPTT 10/22/2024 24  22 - 38 Seconds Final    Troponin T, High Sensitivity 10/22/2024 <6  <=14 ng/L Final    Either a High Sensitivity Troponin T baseline (0 hours) value = 100 ng/L, or an increase in High Sensitivity Troponin T = 7 ng/L at 2 hours compared to 0 hours (2-0 hours), suggests myocardial injury, and urgent clinical attention is required.    If the 2-0 hours increase is <7 ng/L, a High Sensitivity Troponin T result above gender-specific reference ranges warrants further evaluation.   Recommendations for further evaluation include correlation with clinical decision-making tool (e.g., HEART), a 3rd High Sensitivity Troponin T test 2 hours after the 2nd (a 20% change from baseline would represent concern), admission for observation, close PCC/cardiology follow-up, or urgent outpatient provocative testing.    hCG Serum Qualitative 10/22/2024 Negative  Negative Final    This test is for screening purposes.  Results should be interpreted  along with the clinical picture.  Confirmation testing is available if warranted by ordering [id number], HCG Quantitative Pregnancy.    Ventricular Rate 10/22/2024 109  BPM Final    Atrial Rate 10/22/2024 109  BPM Final    AL Interval 10/22/2024 172  ms Final    QRS Duration 10/22/2024 76  ms Final    QT 10/22/2024 354  ms Final    QTc 10/22/2024 476  ms Final    P Axis 10/22/2024 53  degrees Final    R AXIS 10/22/2024 -7  degrees Final    T Axis 10/22/2024 35  degrees Final    Interpretation ECG 10/22/2024    Final                    Value:Sinus tachycardia  Otherwise normal ECG  When compared with ECG of 10-Oct-2024 13:53,  No significant change was found  Confirmed by GENERATED REPORT, COMPUTER ([id number]),  KYRA LARSEN ([id number]) on 10/22/2024 2:11:41 PM      WBC Count 10/22/2024 8.4  4.0 - 11.0 10e3/uL Final    RBC Count 10/22/2024 4.23  3.80 - 5.20 10e6/uL Final    Hemoglobin 10/22/2024 12.5  11.7 - 15.7 g/dL Final    Hematocrit 10/22/2024 37.7  35.0 - 47.0 % Final    MCV 10/22/2024 89  78 - 100 fL Final    MCH 10/22/2024 29.6  26.5 - 33.0 pg Final    MCHC 10/22/2024 33.2  31.5 - 36.5 g/dL Final    RDW 10/22/2024 12.8  10.0 - 15.0 % Final    Platelet Count 10/22/2024 260  150 - 450 10e3/uL Final    % Neutrophils 10/22/2024 54  % Final    % Lymphocytes 10/22/2024 37  % Final    % Monocytes 10/22/2024 8  % Final    % Eosinophils 10/22/2024 1  % Final    % Basophils 10/22/2024 1  % Final    % Immature Granulocytes 10/22/2024 0  % Final    NRBCs per 100 WBC 10/22/2024 0  <1 /100 Final    Absolute Neutrophils 10/22/2024 4.5  1.6 - 8.3 10e3/uL Final    Absolute Lymphocytes 10/22/2024 3.1  0.8 - 5.3 10e3/uL Final    Absolute Monocytes 10/22/2024 0.7  0.0 - 1.3 10e3/uL Final    Absolute Eosinophils 10/22/2024 0.1  0.0 - 0.7 10e3/uL Final    Absolute Basophils 10/22/2024 0.0  0.0 - 0.2 10e3/uL Final    Absolute Immature Granulocytes 10/22/2024 0.0  <=0.4 10e3/uL Final    Absolute NRBCs 10/22/2024 0.0  10e3/uL Final        Component      Latest Ref Rng 10/10/2024  8:53 PM 10/11/2024  6:11 AM 10/11/2024  8:30 AM   WBC      4.0 - 11.0 10e3/uL      RBC Count      3.80 - 5.20 10e6/uL      Hemoglobin      11.7 - 15.7 g/dL      Hematocrit      35.0 - 47.0 %      MCV      78 - 100 fL      MCH      26.5 - 33.0 pg      MCHC      31.5 - 36.5 g/dL      RDW      10.0 - 15.0 %      Platelet Count      150 - 450 10e3/uL      % Neutrophils      %      % Lymphocytes      %      % Monocytes      %      % Eosinophils      %      % Basophils      %      % Immature Granulocytes      %      NRBCs per 100 WBC      <1 /100      Absolute Neutrophils      1.6 - 8.3 10e3/uL      Absolute Lymphocytes      0.8 - 5.3 10e3/uL      Absolute Monocytes      0.0 - 1.3 10e3/uL      Absolute Eosinophils      0.0 - 0.7 10e3/uL      Absolute Basophils      0.0 - 0.2 10e3/uL      Absolute Immature Granulocytes      <=0.4 10e3/uL      Absolute NRBCs      10e3/uL      Sodium      135 - 145 mmol/L      Potassium      3.4 - 5.3 mmol/L      Potassium      3.4 - 5.3 mmol/L      Chloride      98 - 107 mmol/L      Carbon Dioxide (CO2)      22 - 29 mmol/L      Anion Gap      7 - 15 mmol/L      Urea Nitrogen      6.0 - 20.0 mg/dL      Creatinine      0.51 - 0.95 mg/dL      GFR Estimate      >60 mL/min/1.73m2      Calcium      8.8 - 10.4 mg/dL      Glucose      70 - 99 mg/dL      METHODOLOGY      RESULTS      INTERPRETATION      COMMENTS      DISCLAIMER      FACTOR 2 INTERPRETATION      FACTOR V INTERPRETATION      Specimen Description      Cholesterol      <200 mg/dL  162     Triglycerides      <150 mg/dL  62     HDL Cholesterol      >=50 mg/dL  60     LDL Cholesterol Calculated      <100 mg/dL  90     Non HDL Cholesterol      <130 mg/dL  102     Patient Fasting?  Yes     INR      0.85 - 1.15       Thrombin Time      13.0 - 19.0 Seconds      PTT Ratio      <1.30       DRVVT Screen Ratio      <1.08       Lupus Result      Negative       Lupus Interpretation      Cardiolipin Jeanie  IgG Instrument Value      <10.0 GPL-U/mL      Cardiolipin IgG Jeanie      Negative       Cardiolipin Jeanie IgM Instrument Value      <10.0 MPL-U/mL      Cardiolipin IgM Jeanie      Negative       Estimated Average Glucose      <117 mg/dL      Hemoglobin A1C      <5.7 %      PTT      22 - 38 Seconds      Troponin T, High Sensitivity      <=14 ng/L      hCG Quantitative      <5 mIU/mL      Lipoprotein (a)      <30 mg/dL      GLUCOSE BY METER POCT      70 - 99 mg/dL 99   85    Magnesium      1.7 - 2.3 mg/dL      Beta 2 Glycoprotein 1 Antibody IgG      <7.0 U/mL      Beta 2 Glycoprotein 1 Antibody IgM      <7.0 U/mL      Antithrombin III Chromogenic      85 - 135 %      Prot C Chromogenic      70 - 170 %      Protein S Antigen Free      55 - 125 %      Homocysteine umol/L      0.0 - 15.0 umol/L        Component      Latest Ref Rn 10/11/2024  8:58 AM 10/11/2024  4:27 PM   WBC      4.0 - 11.0 10e3/uL     RBC Count      3.80 - 5.20 10e6/uL     Hemoglobin      11.7 - 15.7 g/dL     Hematocrit      35.0 - 47.0 %     MCV      78 - 100 fL     MCH      26.5 - 33.0 pg     MCHC      31.5 - 36.5 g/dL     RDW      10.0 - 15.0 %     Platelet Count      150 - 450 10e3/uL     % Neutrophils      %     % Lymphocytes      %     % Monocytes      %     % Eosinophils      %     % Basophils      %     % Immature Granulocytes      %     NRBCs per 100 WBC      <1 /100     Absolute Neutrophils      1.6 - 8.3 10e3/uL     Absolute Lymphocytes      0.8 - 5.3 10e3/uL     Absolute Monocytes      0.0 - 1.3 10e3/uL     Absolute Eosinophils      0.0 - 0.7 10e3/uL     Absolute Basophils      0.0 - 0.2 10e3/uL     Absolute Immature Granulocytes      <=0.4 10e3/uL     Absolute NRBCs      10e3/uL     Sodium      135 - 145 mmol/L 139     Potassium      3.4 - 5.3 mmol/L 3.7     Potassium      3.4 - 5.3 mmol/L 3.8     Chloride      98 - 107 mmol/L 106     Carbon Dioxide (CO2)      22 - 29 mmol/L 21 (L)     Anion Gap      7 - 15 mmol/L 12     Urea Nitrogen      6.0 -  20.0 mg/dL 5.8 (L)     Creatinine      0.51 - 0.95 mg/dL 0.68     GFR Estimate      >60 mL/min/1.73m2 >90     Calcium      8.8 - 10.4 mg/dL 8.9     Glucose      70 - 99 mg/dL 89     METHODOLOGY  This result contains rich text formatting which cannot be displayed here.    RESULTS  This result contains rich text formatting which cannot be displayed here.    INTERPRETATION  This result contains rich text formatting which cannot be displayed here.    COMMENTS  This result contains rich text formatting which cannot be displayed here.    DISCLAIMER  This result contains rich text formatting which cannot be displayed here.    FACTOR 2 INTERPRETATION  This result contains rich text formatting which cannot be displayed here.    FACTOR V INTERPRETATION  This result contains rich text formatting which cannot be displayed here.    Specimen Description  This result contains rich text formatting which cannot be displayed here.    Cholesterol      <200 mg/dL     Triglycerides      <150 mg/dL     HDL Cholesterol      >=50 mg/dL     LDL Cholesterol Calculated      <100 mg/dL     Non HDL Cholesterol      <130 mg/dL     Patient Fasting?     INR      0.85 - 1.15   0.96    Thrombin Time      13.0 - 19.0 Seconds  16.6    PTT Ratio      <1.30   0.97    DRVVT Screen Ratio      <1.08   0.82    Lupus Result      Negative   Negative    Lupus Interpretation  The INR is normal.     Cardiolipin Jeanie IgG Instrument Value      <10.0 GPL-U/mL  <2.0    Cardiolipin IgG Jeanie      Negative   Negative    Cardiolipin Jeanie IgM Instrument Value      <10.0 MPL-U/mL  <2.0    Cardiolipin IgM Jeanie      Negative   Negative    Estimated Average Glucose      <117 mg/dL     Hemoglobin A1C      <5.7 %     PTT      22 - 38 Seconds     Troponin T, High Sensitivity      <=14 ng/L     hCG Quantitative      <5 mIU/mL     Lipoprotein (a)      <30 mg/dL     GLUCOSE BY METER POCT      70 - 99 mg/dL     Magnesium      1.7 - 2.3 mg/dL 2.1     Beta 2 Glycoprotein 1 Antibody IgG       <7.0 U/mL  <0.8    Beta 2 Glycoprotein 1 Antibody IgM      <7.0 U/mL  <2.4    Antithrombin III Chromogenic      85 - 135 %  88    Prot C Chromogenic      70 - 170 %  111    Protein S Antigen Free      55 - 125 %  66    Homocysteine umol/L      0.0 - 15.0 umol/L  13.3       Legend:  (L) Low        MR Brain w/o & w Contrast    Result Date: 10/22/2024  MRI BRAIN WITHOUT AND WITH CONTRAST  10/22/2024 3:59 PM HISTORY:  Stroke TECHNIQUE:  Multiplanar, multisequence MRI of the brain without and with 7mL Gadavist COMPARISON: MRI 10/10/2024 FINDINGS: Evolutionary changes of acute ischemic findings noted on the prior study with persistent diffusion abnormality and corresponding T2 and FLAIR signal changes in the high right frontal lobe. No new areas of restricted diffusion to suggest new areas of acute ischemia. No acute intracranial hemorrhage. No hydrocephalus or extra-axial hemorrhage. Midline structures are unremarkable. Major vascular flow voids are unremarkable. Susceptibility imaging is negative. Postcontrast imaging demonstrates no clear findings of significant abnormal enhancement.     IMPRESSION:  Evolutionary changes of acute ischemic findings noted on the prior MRI from 10/10/2024 with persistent diffusion signal changes in the high right frontal lobe with corresponding FLAIR signal change. No new restricted diffusion. MAURY YOUNG MD   SYSTEM ID:  [id number]    CTA Head Neck with Contrast    Result Date: 10/22/2024  CT ANGIOGRAM OF THE HEAD AND NECK WITH CONTRAST  10/22/2024 1:08 PM HISTORY: Code Stroke to evaluate for potential thrombolysis and thrombectomy. PLEASE READ IMMEDIATELY. TECHNIQUE:  CT angiography with an injection of 67mL [id number] IV with scans through the head and neck. Images were transferred to a separate 3-D workstation where multiplanar reformations and 3-D images were created. Estimates of carotid stenoses are made relative to the distal internal carotid artery diameters except as noted.  Radiation dose for this scan was reduced using automated exposure control, adjustment of the mA and/or kV according to patient size, or iterative reconstruction technique. COMPARISON: CT brain from the same day. MRI brain 10/10/2024 CT ANGIOGRAM HEAD FINDINGS:  The major intracranial arteries including the proximal branches of the anterior cerebral, middle cerebral, and posterior cerebral arteries appear patent without vascular cutoff. No aneurysm identified. No significant stenosis. Venous circulation is unremarkable. CT ANGIOGRAM NECK FINDINGS: Normal origin of the great vessels from the aortic arch. Right carotid artery: The right common and internal carotid arteries are patent. No significant stenosis or atherosclerotic disease in the carotid artery. Left carotid artery: The left common and internal carotid arteries are patent. No significant stenosis or atherosclerotic disease in the carotid artery. Vertebral arteries: Vertebral arteries are patent without evidence of dissection. No significant stenosis. Other findings: Thyroid findings are stable..     IMPRESSION: No large vessel occlusion findings intracranially. No hemodynamically significant stenosis in the neck. Case discussed with Dr. Cannon at 1:14 PM CST.  MAURY YOUNG MD   SYSTEM ID:  [id number]    CT Head w/o Contrast    Result Date: 10/22/2024  CT SCAN OF THE HEAD WITHOUT CONTRAST   10/22/2024 1:00 PM HISTORY: Code Stroke to evaluate for potential thrombolysis and thrombectomy. PLEASE READ IMMEDIATELY. TECHNIQUE:  Axial images of the head and coronal reformations without IV contrast material. Radiation dose for this scan was reduced using automated exposure control, adjustment of the mA and/or kV according to patient size, or iterative reconstruction technique. COMPARISON: MRI 10/10/2024. FINDINGS: No acute intracranial hemorrhage. No hydrocephalus or extra-axial hemorrhage. Previously noted acute ischemic findings on the 10/10/2024 examination are  not well seen on the current CT assessment. No acute loss of gray-white differentiation identified. No hydrocephalus or extra-axial hemorrhage. Osseous calvarium is intact. Paranasal sinuses are clear. Mastoids are clear.     IMPRESSION:   No acute intracranial findings. No acute intracranial hemorrhage. Previously noted small region of acute ischemia on the 10/10/2024 examination is not well seen on the current CT assessment. MAURY YOUNG MD   SYSTEM ID:  [id number]       Video-Visit Details    Type of service:  Video Visit   Originating Location (pt. Location): Home    [place] (provider location):  On-site  Platform used for Video Visit: Rafal  Signed Electronically by: Izabel Reyes MD

## 2024-10-29 ENCOUNTER — TELEPHONE (OUTPATIENT)
Dept: CARDIOLOGY | Facility: CLINIC | Age: 44
End: 2024-10-29

## 2024-10-29 ENCOUNTER — VIRTUAL VISIT (OUTPATIENT)
Dept: FAMILY MEDICINE | Facility: CLINIC | Age: 44
End: 2024-10-29
Payer: COMMERCIAL

## 2024-10-29 DIAGNOSIS — J06.9 VIRAL URI WITH COUGH: Primary | ICD-10-CM

## 2024-10-29 PROCEDURE — G2211 COMPLEX E/M VISIT ADD ON: HCPCS | Mod: 95

## 2024-10-29 PROCEDURE — 99213 OFFICE O/P EST LOW 20 MIN: CPT | Mod: 95

## 2024-10-29 NOTE — TELEPHONE ENCOUNTER
[facility] Call Center    Phone Message    May a detailed message be left on voicemail: yes     Reason for Call: Other: Pt would like a call back to discuss the zio patch as she got 2 and was having trouble after 1 day as it was irritating her skin      Action Taken: Other: Cardio    Travel Screening: Not Applicable     Date of Service:

## 2024-10-29 NOTE — PROGRESS NOTES
Chloe is a 44 year old who is being evaluated via a billable video visit.    How would you like to obtain your AVS? MyChart  If the video visit is dropped, the invitation should be resent by: Text to cell phone: [phone]  Will anyone else be joining your video visit? No      Assessment & Plan     Viral URI with cough  Limited given unable to perform physical exam to listen to lungs, check vital signs, perform diagnostic testing etc.   Cough is most bothersome and affecting her ability to sleep. With chest congestion, could take plain mucinex OTC without any pseudoephedrine-- avoid this ingredient in any of the medications OTC given recent stroke.   Offered benzonatate prescription to use up to three times daily as needed, patient was concerned about potential side effect of headache given her recent migraines and stroke. Advised she could also run it by her neurology team.  Pt asks about cough syrup with codeine, will not prescribe given her recent CVA.  If symptoms persist or worsen should be seen in person for more thorough evaluation.          Subjective   Chloe is a 44 year old, presenting for the following health issues:  Cough    History of Present Illness       Reason for visit:  Cough  Symptom onset:  1-2 weeks ago  Symptoms include:  Coughing mucus ,runny nose and sore throat  Symptom intensity:  Moderate  Symptom progression:  Worsening  Had these symptoms before:  No  What makes it worse:  No  What makes it better:  No   She is taking medications regularly.     Cough going on for a week now, thinks she may have gotten it from her son.    Worse at night, up all night with coughing attacks   In the morning will cough up dark brown mucous   Sore throat which has improved  Runny nose  Cough is most bothersome as she is not getting sleep     She just had a stroke on Oct 10 so worried about OTC decongestants   Not able to take ibuprofen due to this.     Tested negative for covid via at home antigen  tests    Currently taking cough drops and applying vicks vapor rub    No shortness of breath or labored breathing  No fevers.         Review of Systems  Constitutional, HEENT, cardiovascular, pulmonary, gi and gu systems are negative, except as otherwise noted.      Objective           Vitals:  No vitals were obtained today due to virtual visit.    Physical Exam   GENERAL: alert and no distress  EYES: Eyes grossly normal to inspection.  No discharge or erythema, or obvious scleral/conjunctival abnormalities.  RESP: No audible wheeze, cough, or visible cyanosis.    SKIN: Visible skin clear. No significant rash, abnormal pigmentation or lesions.  NEURO: Cranial nerves grossly intact.  Mentation and speech appropriate for age.  PSYCH: Appropriate affect, tone, and pace of words        Video-Visit Details    Type of service:  Video Visit   Originating Location (pt. Location): Home    [place] (provider location):  On-site  Platform used for Video Visit: Rafal  Signed Electronically by: LUCY Shields CNP

## 2024-10-30 ENCOUNTER — TRANSFERRED RECORDS (OUTPATIENT)
Dept: HEALTH INFORMATION MANAGEMENT | Facility: CLINIC | Age: 44
End: 2024-10-30
Payer: COMMERCIAL

## 2024-10-30 NOTE — TELEPHONE ENCOUNTER
I left message for pt to call back to discuss her ziopatch issues. Abril LOZOYA October 30, 2024, 2:22 PM

## 2024-11-05 NOTE — TELEPHONE ENCOUNTER
Pt called back returned Erna's  asking for call back. Returned pt call. Pt states she received two zio monitors in the mail. She wore the first one but after one day of wear her skin became too irritated so she removed the unit. She called [employer] and informed them it was removed early. She was told to mail it back to process the limited data. I told pt that [employer] has told us that as long as pt's call them to notify them of the unit falling off early they don't charge for that unit. She has a second monitor she can wear. Pt concerned she won't tolerate another monitor. Advised she can try OTC oral and/or topical allergy meds if she is willing to reattempt the monitor. Pt will call Neurology for approval to take any oral allergy meds. She wants to let her skin heal for a few days before reattempting to wear another monitor. She will call scheduling to push back her ROOPA OV since the monitor results won't be back by 11/19. Pt had no other concerns or symptoms today. Vita Ramirez RN on 11/5/2024 at 1:30 PM

## 2024-11-14 ENCOUNTER — VIRTUAL VISIT (OUTPATIENT)
Dept: PSYCHOLOGY | Facility: CLINIC | Age: 44
End: 2024-11-14
Payer: COMMERCIAL

## 2024-11-14 DIAGNOSIS — F40.10 SOCIAL ANXIETY DISORDER: ICD-10-CM

## 2024-11-14 DIAGNOSIS — F43.10 PTSD (POST-TRAUMATIC STRESS DISORDER): Primary | ICD-10-CM

## 2024-11-14 PROCEDURE — 90791 PSYCH DIAGNOSTIC EVALUATION: CPT | Mod: 95

## 2024-11-14 ASSESSMENT — ANXIETY QUESTIONNAIRES
5. BEING SO RESTLESS THAT IT IS HARD TO SIT STILL: NOT AT ALL
7. FEELING AFRAID AS IF SOMETHING AWFUL MIGHT HAPPEN: NEARLY EVERY DAY
GAD7 TOTAL SCORE: 11
7. FEELING AFRAID AS IF SOMETHING AWFUL MIGHT HAPPEN: NEARLY EVERY DAY
3. WORRYING TOO MUCH ABOUT DIFFERENT THINGS: SEVERAL DAYS
GAD7 TOTAL SCORE: 11
8. IF YOU CHECKED OFF ANY PROBLEMS, HOW DIFFICULT HAVE THESE MADE IT FOR YOU TO DO YOUR WORK, TAKE CARE OF THINGS AT HOME, OR GET ALONG WITH OTHER PEOPLE?: VERY DIFFICULT
4. TROUBLE RELAXING: SEVERAL DAYS
2. NOT BEING ABLE TO STOP OR CONTROL WORRYING: MORE THAN HALF THE DAYS
IF YOU CHECKED OFF ANY PROBLEMS ON THIS QUESTIONNAIRE, HOW DIFFICULT HAVE THESE PROBLEMS MADE IT FOR YOU TO DO YOUR WORK, TAKE CARE OF THINGS AT HOME, OR GET ALONG WITH OTHER PEOPLE: VERY DIFFICULT
GAD7 TOTAL SCORE: 11
1. FEELING NERVOUS, ANXIOUS, OR ON EDGE: NEARLY EVERY DAY
6. BECOMING EASILY ANNOYED OR IRRITABLE: SEVERAL DAYS

## 2024-11-14 ASSESSMENT — PATIENT HEALTH QUESTIONNAIRE - PHQ9
SUM OF ALL RESPONSES TO PHQ QUESTIONS 1-9: 4
SUM OF ALL RESPONSES TO PHQ QUESTIONS 1-9: 4
10. IF YOU CHECKED OFF ANY PROBLEMS, HOW DIFFICULT HAVE THESE PROBLEMS MADE IT FOR YOU TO DO YOUR WORK, TAKE CARE OF THINGS AT HOME, OR GET ALONG WITH OTHER PEOPLE: SOMEWHAT DIFFICULT

## 2024-11-14 NOTE — PROGRESS NOTES
"[facility]         PATIENT'S NAME: Chloe Foster  PREFERRED NAME: Chloe  PRONOUNS:    she her   MRN: [id number]  : 1980  ADDRESS: [address] [facility] [address]  ACCT. NUMBER:  [id number]  DATE OF SERVICE: 24  START TIME: 12:30  END TIME: 1:30  PREFERRED PHONE: [phone]  May we leave a program related message: Yes  EMERGENCY CONTACT: was obtained Heriberto Foster (Spouse)  [phone]   SERVICE MODALITY:  Video Visit:      Provider verified identity through the following two step process.  Patient provided:  Patient photo, Patient , Patient address, and Patient was verified at admission/transfer    Telemedicine Visit: The patient's condition can be safely assessed and treated via synchronous audio and visual telemedicine encounter.      Reason for Telemedicine Visit: Patient has requested telehealth visit    Originating Site (Patient Location): Patient's home    Distant Site (Provider Location): Provider Remote Setting- Home Office    Consent:  The patient/guardian has verbally consented to: the potential risks and benefits of telemedicine (video visit) versus in person care; bill my insurance or make self-payment for services provided; and responsibility for payment of non-covered services.     Patient would like the video invitation sent by:  My Chart    Mode of Communication:  Video Conference via Amwell    Distant Location (Provider):  Off-site    As the provider I attest to compliance with applicable laws and regulations related to telemedicine.    UNIVERSAL ADULT Mental Health DIAGNOSTIC ASSESSMENT    Identifying Information:  Patient is a 44 year old,   individual.  Patient was referred for an assessment by self.  Patient attended the session alone.    Chief Complaint:   The reason for seeking services at this time is: \"General Therapy\".  The problem(s) began 24.  The patient reported that she previously attended therapy " regularly to help manage day-to-day. She shared that after nine years without experiencing any strokes, she had two strokes in October 2024, which has caused significant mental stress, as she believed she was no longer at risk. She is currently on medical leave.    Patient has attempted to resolve these concerns in the past through therapy and medications .    Social/Family History:  Patient reported they grew up in [facility]  .  They were raised by biological parents  .  Parents one or both remarried.  Patient reported that their childhood was the patient shared that their father struggled with alcoholism periodically during their childhood, leading to ups and downs and frequent arguments between their parents. They moved out at 15 to begin college. The patient described their current family relationships as follows:  I don t feel as close with my mother as I did before she remarried, but my relationship with my father and sister is good.     The patient describes their cultural background as .  Cultural influences and impact on patient's life structure, values, norms, and healthcare: N/A.  Contextual influences on patient's health include: denied impact.    These factors will be addressed in the Preliminary Treatment plan. Patient identified their preferred language to be English. Patient reported they does not need the assistance of an  or other support involved in therapy.     Patient reported had no significant delays in developmental tasks.   Patient's highest education level was college graduate  .  Patient identified the following learning problems: none reported.  Modifications will not be used to assist communication in therapy. Patient reports they are  able to understand written materials.    Patient reported the following relationship history .  Patient's current relationship status is  for 11.   Patient identified their sexual orientation as heterosexual.  Patient  reported having 1 child(fiona) age 9. Patient identified siblings; spouse as part of their support system.  Patient identified the quality of these relationships as good.      Patient's current living/housing situation involves staying in own home/apartment.  The immediate members of family and household include Heriberto, Elizabeth,spouse and they report that housing is stable.    Patient is currently employed fulltime.  Patient reports their finances are obtained through employment. Patient does identify finances as a current stressor.  Patient reported she bought a house to renovate     Patient reported that they have not been involved with the legal system. Patient does not report being under probation/ parole/ jurisdiction. They are not under any current court jurisdiction. .    Patient's Strengths and Limitations:  Patient identified the following strengths or resources that will help them succeed in treatment: commitment to health and well being, community involvement, ellen / spirituality, friends / good social support, family support, positive work environment, and work ethic. Things that may interfere with the patient's success in treatment include: financial hardship.     Assessments:  The following assessments were completed by patient for this visit:  PHQ9:       1/7/2022     8:55 AM 12/2/2022     5:46 AM 10/9/2023     9:15 AM 4/19/2024     7:03 AM 8/22/2024    10:08 AM 11/14/2024    11:57 AM 11/14/2024    11:58 AM   PHQ-9 SCORE   PHQ-9 Total Score MyChart  6 (Mild depression) 5 (Mild depression) 5 (Mild depression) 4 (Minimal depression)  4 (Minimal depression)   PHQ-9 Total Score 8 6 5 5 4 4         Patient-reported     GAD7:       4/14/2021     1:25 PM 1/7/2022     8:55 AM 12/2/2022     8:25 AM 10/9/2023     9:17 AM 4/19/2024     7:04 AM 8/22/2024    10:06 AM 11/14/2024    11:58 AM   AYLA-7 SCORE   Total Score 8 (mild anxiety)   6 (mild anxiety) 4 (minimal anxiety) Incomplete 11 (moderate anxiety)   Total Score  8 6 6 6 4  11        Patient-reported     CAGE-AID:       11/14/2024    12:09 PM   CAGE-AID Total Score   Total Score 0    Total Score MyChart 0 (A total score of 2 or greater is considered clinically significant)       Patient-reported     PROMIS 10-Global Health (only subscores and total score):       9/22/2023     8:01 AM 1/9/2024     8:32 AM 4/16/2024     9:35 AM 11/14/2024    12:09 PM   PROMIS-10 Scores Only   Global Mental Health Score 10 13 12 10    Global Physical Health Score 15 16 16 16    PROMIS TOTAL - SUBSCORES 25 29 28 26        Patient-reported     [place] Suicide Severity Rating Scale (Lifetime/Recent)      5/13/2019     3:24 PM 10/10/2024    12:23 PM 10/22/2024    12:44 PM   [place] Suicide Severity Rating (Lifetime/Recent)   Q1 Wished to be Dead (Past Month) no 0-->no 0-->no   Q2 Suicidal Thoughts (Past Month) no 0-->no 0-->no   Q6 Suicide Behavior (Lifetime) no 0-->no 0-->no   Level of Risk per Screen  no risks indicated no risks indicated     PCL-5 PTSD Checklist:       11/14/2024   PCL-5 PTSD Checklist   1. Repeated, disturbing, and unwanted memories of the stressful experience? 3   2. Repeated, disturbing dreams of the stressful experience? 1   3. Suddenly feeling or acting as if the stressful experience were actually happening again (as if you were actually back there reliving it)? 3   4. Feeling very upset when something reminded you of the stressful experience? 4   5. Having strong physical reactions when something reminded you of the stressful experience (for example, heart pounding, trouble breathing, sweating)? 4   6. Avoiding memories, thoughts, or feelings related to the stressful experience? 2   7. Avoiding external reminders of the stressful experience (for example, people, places, conversations, activities, objects, or situations)? 2   8. Trouble remembering important parts of the stressful experience? 0   9. Having strong negative beliefs about yourself, other people, or the world  (for example, having thoughts such as: I am bad, there is something seriously wrong with me, no one can be trusted, the world is completely dangerous)? 0   10. Blaming yourself or someone else for the stressful experience or what happened after it? 0   11. Having strong negative feelings such as fear, horror, anger, guilt, or shame? 3   12. Loss of interest in activities that you used to enjoy? 1   13. Feeling distant or cut off from other people? 0   14. Trouble experiencing positive feelings (for example, being unable to feel happiness or have loving feelings for people close to you)? 0   15. Irritable behavior, angry outbursts, or acting aggressively? 1   16. Taking too many risks or doing things that could cause you harm? 0   17. Being superalert or watchful or on guard? 3   18. Feeling jumpy or easily startled? 1   19. Having difficulty concentrating? 0   20. Trouble falling or staying asleep? 0   PCL-5 Total Score 28          Personal and Family Medical History:  Patient does not report a family history of mental health concerns.  Patient reports family history includes Allergies in her father; Arrhythmia in her father; C.A.D. in her maternal grandfather and maternal grandmother; Cancer in her father; Coronary Artery Disease Early Onset (age of onset: 41) in her maternal aunt; Diabetes in her maternal aunt, maternal grandmother, and paternal grandmother; Family History Negative in her brother, mother, sister, and son; Hypertension in her father; Neurologic Disorder in her paternal grandfather; Obesity in her maternal aunt; Other Cancer in her father..     Patient does report Mental Health Diagnosis and/or Treatment.  Patient reported the following previous diagnoses which include(s): Social Anxiety, MDD  Patient reported symptoms began 2019.  Patient has received mental health services in the past:    therapy and medication .   Patient denies a history of civil commitment.      Currently, patient is receiving  other mental health services.  These include primary care provider at [facility].  For follow-up on TBD .       Patient has had a physical exam to rule out medical causes for current symptoms.  Date of last physical exam was within the past year. Client was encouraged to follow up with PCP if symptoms were to develop. The patient has a [facility] Primary Care Provider, who is named No Ref-Primary, Physician..  Patient reports the following current medical concerns: see medical history .  Patient denies any issues with pain..   There are not significant appetite / nutritional concerns / weight changes.   Patient does report a history of head injury / trauma / cognitive impairment.     Patient reports current meds as:   Current Outpatient Medications   Medication Sig Dispense Refill    acetaminophen (TYLENOL) 325 MG tablet Take 325-650 mg by mouth every 6 hours as needed for mild pain      aspirin (ASA) 81 MG chewable tablet Take 1 tablet (81 mg) by mouth daily. 100 tablet 1    clopidogrel (PLAVIX) 75 MG tablet Take 1 tablet (75 mg) by mouth daily. 20 tablet 0    insulin pen needle (31G X 5 MM) 31G X 5 MM miscellaneous Use 1 pen needles daily or as directed. 100 each 11    LORazepam (ATIVAN) 1 MG tablet Take 0.5-1 tablets (0.5-1 mg) by mouth daily as needed for anxiety or sleep (severe anxiety/panic). 30 tablet 1    magnesium oxide (MAG-OX) 400 MG tablet Take 400 mg by mouth nightly as needed (leg cramps).      Omega-3 Fatty Acids (FISH OIL PO) Take 1 capsule by mouth At Bedtime       polyethylene glycol (MIRALAX) 17 GM/Dose powder Take 1 capful (17g) of Miralax mixed with 8 oz of a clear liquid twice daily for 7 days prior to your scheduled procedure. 238 g 0    rosuvastatin (CRESTOR) 40 MG tablet Take 1 tablet (40 mg) by mouth daily. You will need to get your cholesterol checked with your primary in the next 2 months and then further refills per primary 30 tablet 1    tirzepatide-Weight Management (ZEPBOUND) 5 MG/0.5ML  prefilled pen Inject 0.5 mLs (5 mg) subcutaneously every 7 days. 2 mL 3    tirzepatide-Weight Management (ZEPBOUND) 7.5 MG/0.5ML prefilled pen Inject 0.5 mLs (7.5 mg) subcutaneously every 7 days. 2 mL 2    ubrogepant (UBRELVY) 100 MG tablet Take 1 tablet (100 mg) by mouth at onset of headache. 10 tablet 11    verapamil (CALAN) 40 MG tablet One ill by mouth once a day in the morning x 1 week then one pill in the morning and one pill in the evening 60 tablet 11    VITAMIN D, CHOLECALCIFEROL, PO Take 1,000 Units by mouth every evening.       No current facility-administered medications for this visit.       Medication Adherence:  Patient reports taking.  taking psychiatric medications as prescribed.    Patient Allergies:    Allergies   Allergen Reactions    Compazine [Prochlorperazine] Muscle Pain (Myalgia)       Medical History:    Past Medical History:   Diagnosis Date    Acute stress reaction     propranolol helps prior to interviews, stressful situations    Allergic rhinitis, cause unspecified     no meds except prn flonase, tests generally positive, decided against shots    Anxiety state, unspecified     citalopram started in 7/06, stopped after couple wks, felt sluggish/tired    CVA (cerebral vascular accident) (H)     May 1, May 14th 2015    Depressive disorder, not elsewhere classified     dx, but not sure this is correct, weaned off wellbutrin (4/05-6/06, 12/06-1/08), restarted 3/08    Dizziness     Elevated lipoprotein A level     Hirsutism     saw endo, inconclusive, started on spironolactone (helped a little), stopped in 6/06, elevated DHEA- sees new endo 4/08    History of stroke with residual effects     PFO (patent foramen ovale)     Post PFO/ASD closure with 30mm Amplatzer device 4/21/16    Protein S deficiency (H)     Per Dr. Casarez, prengnacy related.  Protein S levels normalized afer pregnancy.    Urinary tract infection, site not specified     recurrent, start nitrofur in 5/08, 6mo txt     Therapist  inquired about allergies. Patient to follow up with medication provider if allergies develop or persist.     Current Mental Status Exam:   Appearance:  Appropriate    Eye Contact:  Good   Psychomotor:  Normal       Gait / station:  Unable to assess due to video visit   Attitude / Demeanor: Cooperative   Speech      Rate / Production: Normal/ Responsive      Volume:  Normal  volume      Language:  intact  Mood:   Normal  Affect:   Appropriate    Thought Content: Clear   Thought Process: Logical       Associations: No loosening of associations  Insight:   Good   Judgment:  Intact   Orientation:  All  Attention/concentration: Good    Substance Use:   Patient did report a family history of substance use concerns; see medical history section for details.  Patient has not received chemical dependency treatment in the past.  Patient has not ever been to detox.      Patient is not currently receiving any chemical dependency treatment.           Substance History of use Age of first use Date of last use     Pattern and duration of use (include amounts and frequency)   Alcohol used in the past   19 10/05/24 Wine x3 nights a week   Cannabis   never used     REPORTS SUBSTANCE USE: N/A     Amphetamines   never used     REPORTS SUBSTANCE USE: N/A   Cocaine/crack    never used       REPORTS SUBSTANCE USE: N/A   Hallucinogens never used         REPORTS SUBSTANCE USE: N/A   Inhalants never used         REPORTS SUBSTANCE USE: N/A   Heroin never used         REPORTS SUBSTANCE USE: N/A   Other Opiates never used     REPORTS SUBSTANCE USE: N/A   Benzodiazepine   never used     REPORTS SUBSTANCE USE: N/A   Barbiturates never used     REPORTS SUBSTANCE USE: N/A   Over the counter meds never used     REPORTS SUBSTANCE USE: N/A   Caffeine never used     REPORTS SUBSTANCE USE: N/A   Nicotine  used in the past 17 01/01/11 REPORTS SUBSTANCE USE: N/A   Other substances not listed above:  Identify:  never used     REPORTS SUBSTANCE USE: N/A      Patient reported the following problems as a result of their substance use: no problems, not applicable.    Substance Use: No symptoms    Based on the CAGE score of 0 and clinical interview there  are not indications of drug or alcohol abuse.    Significant Losses / Trauma / Abuse / Neglect Issues:   Patient did not  serve in the .  There are indications or report of significant loss, trauma, abuse or neglect issues related to: are indications or report of significant loss, trauma, abuse or neglect issues related to past stroke and   Concerns for possible neglect are not present.     Safety Assessment:   Patient denies current homicidal ideation and behaviors.  Patient denies current self-injurious ideation and behaviors.    Patient denied risk behaviors associated with substance use.   Patient reported high risk sexual behaviors  associated with mental health symptoms.  Patient reports the following current concerns for their personal safety: None.  Patient reports there are not firearms in the house.         History of Safety Concerns:  Patient denied a history of homicidal ideation.     Patient denied a history of personal safety concerns.    Patient denied a history of assaultive behaviors.    Patient denied a history of sexual assault behaviors.     Patient denied a history of risk behaviors associated with substance use.  Patient denies any history of high risk behaviors associated with mental health symptoms.  Patient reports the following protective factors: hopeful, identifies reason for living, access to and engagement with healthcare, current engagement in treatment and/or motivation to establish therapeutic relationship, strong bond to family unit, community, job, school, etc, supportive social network or family, lives in a responsibly safe environment, responsibilities to others, and reality testing ability forward or future oriented thinking; dedication to family or friends; safe and stable  environment; regular sleep; effectively controls impulses; regular physical activity; sense of belonging; secure attachment; help seeking behaviors when distressed; abstinence from substances; living with other people; daily obligations; commitment to well being; sense of meaning; positive social skills; healthy fear of risky behaviors or pain; sense of personal control or determination; access to a variety of clinical interventions and pets    Risk Plan:  See Recommendations for Safety and Risk Management Plan    Review of Symptoms per patient report:   Depression: Lack of interest or pleasure in doing things, Feeling sad, down, or depressed, Change in energy level, and Change in sleep  Malia:  No Symptoms  Psychosis: No Symptoms  Anxiety: Excessive worry, Nervousness, Physical complaints, such as headaches, stomachaches, muscle tension, Social anxiety, and Irritability  Panic:  No symptoms  Post Traumatic Stress Disorder:  Experienced traumatic event stroke, Reexperiencing of trauma, Hypervigilance, and Impaired functioning   Eating Disorder: No Symptoms  ADD / ADHD:  Did not assess  Conduct Disorder: No symptoms  Autism Spectrum Disorder: No symptoms  Obsessive Compulsive Disorder: No Symptoms    Patient reports the following compulsive behaviors and treatment history:  denied .      Diagnostic Criteria:   Social Anxiety Disorder, Marked fear or anxiety about one or more social situations in which the individual is exposed to possible scrutiny by others. Examples include social interactions (e.g., having a conversation, meeting unfamiliar people), being observed (e.g., eating or drinking), and performing in front of others (e.g., giving a speech)., The individual fears that he or she will act in a way or show anxiety symptoms that will be negatively evaluated, The social situations almost always provoke fear or anxiety., The social situations are avoided or endured with intense fear or anxiety., The fear or  anxiety is out of proportion to the actual threat posed by the social situation and to the sociocultural context., The fear, anxiety, or avoidance is persistent, typically lasting for 6 months or mo, The fear, anxiety, or avoidance causes clinically significant distress or impairment in social, occupational, or other important areas of functioning., The fear, anxiety, or avoidance is not attributable to the physiological effects of a substance (e.g., a drug of abuse, a medication) or another medical condition., and The fear, anxiety, or avoidance is not better explained by the symptoms of another mental disorder Post- Traumatic Stress Disorder  A. The person has been exposed to a traumatic event in which both of the following were present:     (1) the person experienced, witnessed, or was confronted with an event or events that involved actual or threatened death or serious injury, or a threat to the physical integrity of self or others     (2) the person's response involved intense fear, helplessness, or horror. Note: In children, this may be expressed instead by disorganized or agitated behavior  B. The traumatic event is persistently reexperienced in one (or more) of the following ways:     - Recurrent and intrusive distressing recollections of the event, including images, thoughts, or perceptions. Note: In young children, repetitive play may occur in which themes or aspects of the trauma are expressed.      - Acting or feeling as if the traumatic event were recurring (includes a sense of reliving the experience, illusions, hallucinations, and dissociative flashback episodes, including those that occur on awakening or when intoxicated). Note: In young children, trauma-specific reenactment may occur.      - Intense psychological distress at exposure to internal or external cues that symbolize or resemble an aspect of the traumatic event.      - Physiological reactivity on exposure to internal or external cues that  symbolize or resemble an aspect of the traumatic event.   C. Persistent avoidance of stimuli associated with the trauma and numbing of general responsiveness (not present before the trauma), as indicated by three (or more) of the following:     - Efforts to avoid thoughts, feelings, or conversations associated with the trauma.      - Efforts to avoid activities, places, or people that arouse recollections of the trauma.      - Markedly diminished interest or participation in significant activities.   D. Persistent symptoms of increased arousal (not present before the trauma), as indicated by two (or more) of the following:     - Irritability or outbursts of anger.      - Difficulty concentrating.      - Hypervigilance.   E. Duration of the disturbance is more than 1 month.  F. The disturbance causes clinically significant distress or impairment in social, occupational, or other important areas of functioning.      Functional Status:  Patient reports the following functional impairments:  home life with family and work / vocational responsibilities.     Nonprogrammatic care:  Patient is requesting basic services to address current mental health concerns.    Clinical Summary:  1. Psychosocial, Cultural and Contextual Factors: Hx of 2 known CVA's.  2. Principal DSM5 Diagnoses  (Sustained by DSM5 Criteria Listed Above):   300.23 (F40.10) Social Anxiety Disorder  309.81 (F43.10) Posttraumatic Stress Disorder (includes Posttraumatic Stress Disorder for Children 6 Years and Younger)  Without dissociative symptoms.  3. Other Diagnoses that is relevant to services:   deferred   4. Provisional Diagnosis: deferred   5. Prognosis: Expect Improvement and Relieve Acute Symptoms.  6. Likely consequences of symptoms if not treated: Likely exacerbation of symptoms requiring higher level of care.  7. Client strengths include:  caring, creative, educated, empathetic, employed, goal-focused, good listener, has a previous history of  therapy, insightful, intelligent, motivated, open to learning, open to suggestions / feedback, responsible parent, support of family, friends and providers, supportive, wants to learn, willing to ask questions, willing to relate to others, and work history .     Recommendations:     1. Plan for Safety and Risk Management:   Safety and Risk: Recommended that patient call 911 or go to the local ED should there be a change in any of these risk factors.          Report to child / adult protection services was NA.     2. Patient's identified no cultural concerns to be addressed within treatment.    3. Initial Treatment will focus on: Regulating emotional and physiological responses, decrease symptoms      4. Resources/Service Plan:    services are not indicated.   Modifications to assist communication are not indicated.   Additional disability accommodations are not indicated.      5. Collaboration:   Collaboration / coordination of treatment will be initiated with the following  support professionals: primary care physician.      6.  Referrals:   The following referral(s) will be initiated: [facility]patient [facility].       A Release of Information has been obtained for the following:  none at this time .     Clinical Substantiation/medical necessity for the above recommendations:  Patient meets clinical necessity  - without treatment symptoms may become more severe and further affect functionality. Current functional impairments include home life with family and vocational work.    7. ANU:    ANU:  Discussed the general effects of drugs and alcohol on health and well-being. Provider gave patient printed information about the  effects of chemical use on their health and well being. Recommendations:  Refrain from mood altering substances.      8. Records:   These were reviewed at time of assessment.   Information in this assessment was obtained from the medical record and  provided by patient who is a  good historian.    Patient will have open access to their mental health medical record.    9.   Interactive Complexity: No    10. Safety Plan:       Provider Name/ Credentials:  Tiffanie Goncalves, [facility], [place]    November 14, 2024

## 2024-11-26 ENCOUNTER — MYC MEDICAL ADVICE (OUTPATIENT)
Dept: NEUROLOGY | Facility: CLINIC | Age: 44
End: 2024-11-26
Payer: COMMERCIAL

## 2024-12-05 NOTE — TELEPHONE ENCOUNTER
Called pt and left a VM. Please call the office regarding results. Office phone number given.     ----- Message from Shu Llamas MD sent at 12/4/2024 11:43 AM CST -----  Worsening anemia.  Please advise patient to come for a repeat CBC next week.  Please order CBC  Follow up as recommended at the last visit. Will discuss results in detail at the next visit.   Sent MyChart to pt  Bernadine LOWRY RN

## 2024-12-06 PROBLEM — E66.09 CLASS 1 OBESITY DUE TO EXCESS CALORIES WITH SERIOUS COMORBIDITY AND BODY MASS INDEX (BMI) OF 30.0 TO 30.9 IN ADULT: Status: RESOLVED | Noted: 2024-01-24 | Resolved: 2024-12-06

## 2024-12-06 PROBLEM — Z86.39 HX OF OBESITY: Status: ACTIVE | Noted: 2024-12-06

## 2024-12-06 PROBLEM — E66.811 CLASS 1 OBESITY DUE TO EXCESS CALORIES WITH SERIOUS COMORBIDITY AND BODY MASS INDEX (BMI) OF 30.0 TO 30.9 IN ADULT: Status: RESOLVED | Noted: 2024-01-24 | Resolved: 2024-12-06

## 2024-12-09 ENCOUNTER — VIRTUAL VISIT (OUTPATIENT)
Dept: PSYCHOLOGY | Facility: CLINIC | Age: 44
End: 2024-12-09
Payer: COMMERCIAL

## 2024-12-09 DIAGNOSIS — F40.10 SOCIAL ANXIETY DISORDER: ICD-10-CM

## 2024-12-09 DIAGNOSIS — F43.10 PTSD (POST-TRAUMATIC STRESS DISORDER): Primary | ICD-10-CM

## 2024-12-09 PROCEDURE — 90837 PSYTX W PT 60 MINUTES: CPT | Mod: 95

## 2024-12-09 ASSESSMENT — ANXIETY QUESTIONNAIRES
GAD7 TOTAL SCORE: 13
IF YOU CHECKED OFF ANY PROBLEMS ON THIS QUESTIONNAIRE, HOW DIFFICULT HAVE THESE PROBLEMS MADE IT FOR YOU TO DO YOUR WORK, TAKE CARE OF THINGS AT HOME, OR GET ALONG WITH OTHER PEOPLE: VERY DIFFICULT
5. BEING SO RESTLESS THAT IT IS HARD TO SIT STILL: NOT AT ALL
GAD7 TOTAL SCORE: 13
3. WORRYING TOO MUCH ABOUT DIFFERENT THINGS: MORE THAN HALF THE DAYS
4. TROUBLE RELAXING: NEARLY EVERY DAY
7. FEELING AFRAID AS IF SOMETHING AWFUL MIGHT HAPPEN: NEARLY EVERY DAY
6. BECOMING EASILY ANNOYED OR IRRITABLE: SEVERAL DAYS
8. IF YOU CHECKED OFF ANY PROBLEMS, HOW DIFFICULT HAVE THESE MADE IT FOR YOU TO DO YOUR WORK, TAKE CARE OF THINGS AT HOME, OR GET ALONG WITH OTHER PEOPLE?: VERY DIFFICULT
1. FEELING NERVOUS, ANXIOUS, OR ON EDGE: NEARLY EVERY DAY
2. NOT BEING ABLE TO STOP OR CONTROL WORRYING: SEVERAL DAYS

## 2024-12-09 NOTE — PROGRESS NOTES
[facility]                                     Progress Note    Patient Name: Chloe Foster  Date: 12/09/2024         Service Type: Individual      Session Start Time: 3:35  Session End Time: 4:30     Session Length: 55    Session #: 2    Attendees: Client attended alone    Service Modality:  Video Visit:      Provider verified identity through the following two step process.  Patient provided:  Patient photo, Patient is known previously to provider, and Patient was verified at admission/transfer    Telemedicine Visit: The patient's condition can be safely assessed and treated via synchronous audio and visual telemedicine encounter.      Reason for Telemedicine Visit: Patient has requested telehealth visit    Originating Site (Patient Location): Patient's home    Distant Site (Provider Location): Provider Remote Setting- Home Office    Consent:  The patient/guardian has verbally consented to: the potential risks and benefits of telemedicine (video visit) versus in person care; bill my insurance or make self-payment for services provided; and responsibility for payment of non-covered services.     Patient would like the video invitation sent by:  My Chart    Mode of Communication:  Video Conference via [facility]    Distant Location (Provider):  Off-site    As the provider I attest to compliance with applicable laws and regulations related to telemedicine.    DATA  Extended Session (53+ minutes): PROLONGED SERVICE IN THE OUTPATIENT SETTING REQUIRING DIRECT (FACE-TO-FACE) PATIENT CONTACT BEYOND THE USUAL SERVICE:    - Patient's presenting concerns require more intensive intervention than could be completed within the usual service  Provider utilized clinical judgment in determining that, given topics discussed in session, it was most clinically beneficial to the patient to have a lengthier session to adequately provide closure to the session    Interactive Complexity: No  Crisis: No          Progress Since Last Session (Related to Symptoms / Goals / Homework):   Symptoms:  stable    Homework: Completed in session      Episode of Care Goals: No improvement - PREPARATION (Decided to change - considering how); Intervened by negotiating a change plan and determining options / strategies for behavior change, identifying triggers, exploring social supports, and working towards setting a date to begin behavior change     Current / Ongoing Stressors and Concerns:   The patient reported that she previously attended therapy regularly to help manage day-to-day. She shared that after nine years without experiencing any strokes, she had two strokes in October 2024, which has caused significant mental stress, as she believed she was no longer at risk. She is currently on medical leave.     Treatment Objective(s) Addressed in This Session:   Treatment planning   Continue Rapport building      Intervention:   Collaboratively set specific, measurable, achievable, relevant, and time-bound (SMART) goals with the client.     Psychoeducation, administer and explain screeners, validation, active listening, answered questions and other rapport building skills    Assessments completed prior to visit:  The following assessments were completed by patient for this visit:  PHQ9:       1/7/2022     8:55 AM 12/2/2022     5:46 AM 10/9/2023     9:15 AM 4/19/2024     7:03 AM 8/22/2024    10:08 AM 11/14/2024    11:57 AM 11/14/2024    11:58 AM   PHQ-9 SCORE   PHQ-9 Total Score MyChart  6 (Mild depression) 5 (Mild depression) 5 (Mild depression) 4 (Minimal depression)  4 (Minimal depression)   PHQ-9 Total Score 8 6 5 5 4 4         Patient-reported     GAD7:       1/7/2022     8:55 AM 12/2/2022     8:25 AM 10/9/2023     9:17 AM 4/19/2024     7:04 AM 8/22/2024    10:06 AM 11/14/2024    11:58 AM 12/9/2024     3:18 PM   AYLA-7 SCORE   Total Score   6 (mild anxiety) 4 (minimal anxiety) Incomplete 11 (moderate anxiety) 13 (moderate  anxiety)   Total Score 6 6 6 4  11  13        Patient-reported     PCL-5 PTSD Checklist:       11/14/2024   PCL-5 PTSD Checklist   1. Repeated, disturbing, and unwanted memories of the stressful experience? 3   2. Repeated, disturbing dreams of the stressful experience? 1   3. Suddenly feeling or acting as if the stressful experience were actually happening again (as if you were actually back there reliving it)? 3   4. Feeling very upset when something reminded you of the stressful experience? 4   5. Having strong physical reactions when something reminded you of the stressful experience (for example, heart pounding, trouble breathing, sweating)? 4   6. Avoiding memories, thoughts, or feelings related to the stressful experience? 2   7. Avoiding external reminders of the stressful experience (for example, people, places, conversations, activities, objects, or situations)? 2   8. Trouble remembering important parts of the stressful experience? 0   9. Having strong negative beliefs about yourself, other people, or the world (for example, having thoughts such as: I am bad, there is something seriously wrong with me, no one can be trusted, the world is completely dangerous)? 0   10. Blaming yourself or someone else for the stressful experience or what happened after it? 0   11. Having strong negative feelings such as fear, horror, anger, guilt, or shame? 3   12. Loss of interest in activities that you used to enjoy? 1   13. Feeling distant or cut off from other people? 0   14. Trouble experiencing positive feelings (for example, being unable to feel happiness or have loving feelings for people close to you)? 0   15. Irritable behavior, angry outbursts, or acting aggressively? 1   16. Taking too many risks or doing things that could cause you harm? 0   17. Being superalert or watchful or on guard? 3   18. Feeling jumpy or easily startled? 1   19. Having difficulty concentrating? 0   20. Trouble falling or staying asleep?  0   PCL-5 Total Score 28            ASSESSMENT: Current Emotional / Mental Status (status of significant symptoms):   Risk status (Self / Other harm or suicidal ideation)   Patient denies current fears or concerns for personal safety.   Patient denies current or recent suicidal ideation or behaviors.   Patient denies current or recent homicidal ideation or behaviors.   Patient denies current or recent self injurious behavior or ideation.   Patient denies other safety concerns.   Patient reports there has been no change in risk factors since their last session.     Patient reports there has been no change in protective factors since their last session.     Recommended that patient call 911 or go to the local ED should there be a change in any of these risk factors     Appearance:   Appropriate    Eye Contact:   Good    Psychomotor Behavior: Normal    Attitude:   Cooperative    Orientation:   All   Speech    Rate / Production: Normal     Volume:  Normal    Mood:    Normal   Affect:    Appropriate    Thought Content:  Clear    Thought Form:  Coherent  Logical    Insight:    Good      Medication Review:   No changes to current psychiatric medication(s)     Medication Compliance:   Yes     Changes in Health Issues:   None reported     Chemical Use Review:   Substance Use: Chemical use reviewed, no active concerns identified      Tobacco Use: No current tobacco use.      Diagnosis:  1. PTSD (post-traumatic stress disorder)    2. Social anxiety disorder        Collateral Reports Completed:   Not Applicable    PLAN: (Patient Tasks / Therapist Tasks / Other)  EMDR - Review Referral package [email or web address]         BETO Solorzano                                                         ______________________________________________________________________    Individual Treatment Plan    Patient's Name: Chloe HIDALGO Damirog  YOB: 1980    Date of Creation: 12/09/2024  Date Treatment Plan Last  Reviewed/Revised: n/a    DSM5 Diagnoses: 300.23 (F40.10) Social Anxiety Disorder or 309.81 (F43.10) Posttraumatic Stress Disorder (includes Posttraumatic Stress Disorder for Children 6 Years and Younger)  Without dissociative symptoms  Psychosocial / Contextual Factors: Hx of 2 known CVA's.  PROMIS (reviewed every 90 days):   PROMIS-10 Scores        4/16/2024     9:35 AM 11/14/2024    12:09 PM 12/6/2024     7:54 AM   PROMIS-10 Total Score w/o Sub Scores   PROMIS TOTAL - SUBSCORES 28 26  29        Patient-reported      Referral / Collaboration:  The following referral(s) will be initiated: EMDR .    Anticipated number of session for this episode of care: less then 3 sessions  Anticipation frequency of session: Biweekly  Anticipated Duration of each session: 38-52 minutes  Treatment plan will be reviewed in 90 days or when goals have been changed.       MeasurableTreatment Goal(s) related to diagnosis / functional impairment(s)  Goal 1: Patient will initiate EMDR therapy to address and process unresolved trauma.    I will know I've met my goal when I start.      Objective #A (Patient Action)    Patient will therapy techniques to reduce emotional distress and improve functioning.  Status: New - Date: 12/09/2024      Intervention(s)  Therapist will provide psychoeducation and referral to EMDR services.       Patient has reviewed and agreed to the above plan.      Tiffanie Goncalves, [facility]  December 9, 2024

## 2024-12-10 ENCOUNTER — OFFICE VISIT (OUTPATIENT)
Dept: NEUROLOGY | Facility: CLINIC | Age: 44
End: 2024-12-10
Payer: COMMERCIAL

## 2024-12-10 VITALS
BODY MASS INDEX: 24.59 KG/M2 | DIASTOLIC BLOOD PRESSURE: 86 MMHG | WEIGHT: 157 LBS | SYSTOLIC BLOOD PRESSURE: 127 MMHG | OXYGEN SATURATION: 99 % | HEART RATE: 104 BPM

## 2024-12-10 DIAGNOSIS — G43.109 MIGRAINE WITH AURA AND WITHOUT STATUS MIGRAINOSUS, NOT INTRACTABLE: Primary | ICD-10-CM

## 2024-12-10 DIAGNOSIS — I63.411 CEREBROVASCULAR ACCIDENT (CVA) DUE TO EMBOLISM OF RIGHT MIDDLE CEREBRAL ARTERY (H): ICD-10-CM

## 2024-12-10 DIAGNOSIS — M54.2 NECK PAIN: ICD-10-CM

## 2024-12-10 RX ORDER — ONDANSETRON 4 MG/1
4 TABLET, ORALLY DISINTEGRATING ORAL EVERY 8 HOURS PRN
Qty: 21 TABLET | Refills: 11 | Status: SHIPPED | OUTPATIENT
Start: 2024-12-10

## 2024-12-10 RX ORDER — CLOPIDOGREL BISULFATE 75 MG/1
75 TABLET ORAL DAILY
Qty: 90 TABLET | Refills: 3 | Status: SHIPPED | OUTPATIENT
Start: 2024-12-10 | End: 2024-12-10

## 2024-12-10 RX ORDER — CLOPIDOGREL BISULFATE 75 MG/1
75 TABLET ORAL DAILY
Qty: 30 TABLET | Refills: 0 | Status: SHIPPED | OUTPATIENT
Start: 2024-12-10

## 2024-12-10 NOTE — LETTER
12/10/2024       RE: Chloe Foster  [address] [address]     Dear Colleague,    Thank you for referring your patient, Chloe Foster, to the [facility] [place] at [facility]. Please see a copy of my visit note below.    Neurology Progress Note    [facility]    Chloe Foster MRN# [id number]   Age: 44 year old YOB: 1980     PCP: No Ref-Primary, Physician            Assessment and Plan:     (G43.109) Migraine with aura and without status migrainosus, not intractable  (primary encounter diagnosis)  Comment: I suspect the patient's migraines have changed in semiology because of the verapamil.  I am going to stop the verapamil and have her observe over the next week trying the Ubrelvy for rescue medicine.  I have asked for her to send an update next week.  We certainly could consider trying propranolol as a preventative if we need to.  In light of the nausea I have prescribed Zofran.  Plan:  Ubrelvy 100 mg as needed  Zofran ODT 4 mg as needed  Stop verapamil and observe    (I63.411) Cerebrovascular accident (CVA) due to embolism of right middle cerebral artery (H)  Comment: The patient will be referred to stroke team for a physician appointment in regards to whether she needs further stroke workup  Plan: In light of the tinnitus I am stopping aspirin and recommending she be on Plavix 75 mg daily for stroke prevention    (M54.2) Neck pain  Comment: We will send the patient for physical therapy to help with her neck pain and neck tightness  Plan: Physical Therapy  Referral    51 minutes spent caring for the patient on the day of the visit including chart review, visit time and documentation.           Latia Post MD           History of Present Illness:   CC: Urgent recheck    Last seen October 2024  She had an acute stroke earlier that month. Small no sequelae other than  anxiety surrounding stroke. She was given verapamil for migraine prevention and Ubrelvy for rescue.     She has gone back to ED once for concern of new stroke symptoms left arm and leg numbness (MRI showed stable evolving stroke 10/22/24) and has sent several my chart messages including last week reporting clenching episodes of tightness at the base of her skull. She did not try Ubrelvy (prescribed for migraines) and felt that after review on google her symptoms were possibly occipital neuralgia. She was offered this appointment today for a check in and exam due to escalating and changing symptoms.  She has not yet tried Ubrelvy.  She reports she was concerned after reading the side effect list of sedation and she was home with her son alone.  She was reassured that she can take this medicine and still be able to care for her child.    The patient was having infrequent migraine auras previously.  We started verapamil in the middle of October with an increased dose towards the end of October for migraine prevention.  In November it seems as though her headaches have changed she has a feeling of tightness or clenching in the back of the neck rising up into the head along with that she can feel light sensitive as well as nauseous but is not having the typical aura.  This is happening multiple times a day and can last hours at a time.  She also feels that it is happening when she is sleeping and is causing her to awaken with the symptoms.  On top of all the symptoms she is also having ringing in her ears that is quite bothersome.    She also raises concerns about whether there is been something missed on the stroke workup whether she needs more testing for autoimmune disease.         Physical Exam:     /86 (BP Location: Right arm, Patient Position: Sitting, Cuff Size: Adult Regular)   Pulse 104   Wt 71.2 kg (157 lb)   LMP 09/10/2024   SpO2 99%   BMI 24.59 kg/m    The patient is awake and alert.  She is in no  acute distress  Neck range of motion is normal.  There is no involuntary muscle spasm in the neck.  No nuchal rigidity  Cranial nerves II through XII intact         Pertinent History/Data for appointment:   Small stroke in right MCA. 10/22/24        Again, thank you for allowing me to participate in the care of your patient.      Sincerely,    Latia Post MD

## 2024-12-10 NOTE — PROGRESS NOTES
Neurology Progress Note    [facility]    Chloe Foster MRN# [id number]   Age: 44 year old YOB: 1980     PCP: No Ref-Primary, Physician            Assessment and Plan:     (G43.109) Migraine with aura and without status migrainosus, not intractable  (primary encounter diagnosis)  Comment: I suspect the patient's migraines have changed in semiology because of the verapamil.  I am going to stop the verapamil and have her observe over the next week trying the Ubrelvy for rescue medicine.  I have asked for her to send an update next week.  We certainly could consider trying propranolol as a preventative if we need to.  In light of the nausea I have prescribed Zofran.  Plan:  Ubrelvy 100 mg as needed  Zofran ODT 4 mg as needed  Stop verapamil and observe    (I63.411) Cerebrovascular accident (CVA) due to embolism of right middle cerebral artery (H)  Comment: The patient will be referred to stroke team for a physician appointment in regards to whether she needs further stroke workup  Plan: In light of the tinnitus I am stopping aspirin and recommending she be on Plavix 75 mg daily for stroke prevention    (M54.2) Neck pain  Comment: We will send the patient for physical therapy to help with her neck pain and neck tightness  Plan: Physical Therapy  Referral    51 minutes spent caring for the patient on the day of the visit including chart review, visit time and documentation.           Latia Post MD           History of Present Illness:   CC: Urgent recheck    Last seen October 2024  She had an acute stroke earlier that month. Small no sequelae other than anxiety surrounding stroke. She was given verapamil for migraine prevention and Ubrelvy for rescue.     She has gone back to ED once for concern of new stroke symptoms left arm and leg numbness (MRI showed stable evolving stroke 10/22/24) and has sent several my chart messages including last week reporting clenching episodes of tightness  at the base of her skull. She did not try Ubrelvy (prescribed for migraines) and felt that after review on google her symptoms were possibly occipital neuralgia. She was offered this appointment today for a check in and exam due to escalating and changing symptoms.  She has not yet tried Ubrelvy.  She reports she was concerned after reading the side effect list of sedation and she was home with her son alone.  She was reassured that she can take this medicine and still be able to care for her child.    The patient was having infrequent migraine auras previously.  We started verapamil in the middle of October with an increased dose towards the end of October for migraine prevention.  In November it seems as though her headaches have changed she has a feeling of tightness or clenching in the back of the neck rising up into the head along with that she can feel light sensitive as well as nauseous but is not having the typical aura.  This is happening multiple times a day and can last hours at a time.  She also feels that it is happening when she is sleeping and is causing her to awaken with the symptoms.  On top of all the symptoms she is also having ringing in her ears that is quite bothersome.    She also raises concerns about whether there is been something missed on the stroke workup whether she needs more testing for autoimmune disease.         Physical Exam:     /86 (BP Location: Right arm, Patient Position: Sitting, Cuff Size: Adult Regular)   Pulse 104   Wt 71.2 kg (157 lb)   LMP 09/10/2024   SpO2 99%   BMI 24.59 kg/m    The patient is awake and alert.  She is in no acute distress  Neck range of motion is normal.  There is no involuntary muscle spasm in the neck.  No nuchal rigidity  Cranial nerves II through XII intact         Pertinent History/Data for appointment:   Small stroke in right MCA. 10/22/24

## 2024-12-10 NOTE — PATIENT INSTRUCTIONS
Stop aspirin  Start Plavix again 75 mg daily    Stop verapamil  Try Ubrelvy  Ok to use Zofran (ondansetron) for nausea    Send up date in one week

## 2024-12-16 ENCOUNTER — TELEPHONE (OUTPATIENT)
Dept: NEUROLOGY | Facility: CLINIC | Age: 44
End: 2024-12-16
Payer: COMMERCIAL

## 2024-12-16 ENCOUNTER — TELEPHONE (OUTPATIENT)
Dept: FAMILY MEDICINE | Facility: CLINIC | Age: 44
End: 2024-12-16
Payer: COMMERCIAL

## 2024-12-16 NOTE — TELEPHONE ENCOUNTER
Appears propanolol discontinued at discharge 10/11/24  Left non detailed voicemail to inquire if patient is still taking this medication  Or if request was an error  Thanks,  Lin BUCKNER RN

## 2024-12-16 NOTE — TELEPHONE ENCOUNTER
Incoming fax requesting Propranolol 10mg    Rx is not on meds list.     [facility] - [place], [place] - [address]

## 2024-12-16 NOTE — TELEPHONE ENCOUNTER
Called and LVM to pt asking where she would like her PT referral sent to. It is written for [facility] but I am not sure what location she would like to go to.   Asked for pt to return call. PT referral will be scanned into pt chart.

## 2024-12-17 NOTE — TELEPHONE ENCOUNTER
Patient does currently have medication  Plans to discuss future refills at upcoming appointment with PCP  Thanks,  Lin BUCKNER RN

## 2024-12-24 ENCOUNTER — TELEPHONE (OUTPATIENT)
Dept: NEUROLOGY | Facility: CLINIC | Age: 44
End: 2024-12-24
Payer: COMMERCIAL

## 2024-12-24 NOTE — TELEPHONE ENCOUNTER
Left Voicemail (1st Attempt) and Sent Mychart (1st Attempt) for the patient to call back and schedule the following:    Appointment type: New Stroke  Provider: [place]  Return date: 12/31/2024 at 12:00pm  Specialty phone number: [phone]  Additional appointment(s) needed: NA  Additonal Notes:     Pt on wait list to be seen sooner. Will need to manually schedule. Let patient know we can not guarantee spot will still be available when calling in      Jillian [facility] on 12/24/2024 at 8:44 AM

## 2024-12-24 NOTE — TELEPHONE ENCOUNTER
RECORDS STATUS - ALL OTHER DIAGNOSIS      RECORDS RECEIVED FROM: [facility]   NOTES STATUS DETAILS   OFFICE NOTE from referring provider Epic 10/29/24: Dr. Arturo Leigh   MEDICATION LIST [facility]    LABS     ANYTHING RELATED TO DIAGNOSIS Epic Most recent 10/22/24

## 2024-12-26 ENCOUNTER — LAB (OUTPATIENT)
Dept: LAB | Facility: CLINIC | Age: 44
End: 2024-12-26
Payer: COMMERCIAL

## 2024-12-26 DIAGNOSIS — Z86.39 HX OF OBESITY: ICD-10-CM

## 2024-12-26 DIAGNOSIS — L65.9 LOSS OF HAIR: ICD-10-CM

## 2024-12-26 LAB
ERYTHROCYTE [DISTWIDTH] IN BLOOD BY AUTOMATED COUNT: 13 % (ref 10–15)
FERRITIN SERPL-MCNC: 87 NG/ML (ref 6–175)
HCT VFR BLD AUTO: 45.6 % (ref 35–47)
HGB BLD-MCNC: 14.9 G/DL (ref 11.7–15.7)
IRON BINDING CAPACITY (ROCHE): 329 UG/DL (ref 240–430)
IRON SATN MFR SERPL: 32 % (ref 15–46)
IRON SERPL-MCNC: 104 UG/DL (ref 37–145)
MCH RBC QN AUTO: 29.7 PG (ref 26.5–33)
MCHC RBC AUTO-ENTMCNC: 32.7 G/DL (ref 31.5–36.5)
MCV RBC AUTO: 91 FL (ref 78–100)
PLATELET # BLD AUTO: 281 10E3/UL (ref 150–450)
RBC # BLD AUTO: 5.01 10E6/UL (ref 3.8–5.2)
TSH SERPL DL<=0.005 MIU/L-ACNC: 0.74 UIU/ML (ref 0.3–4.2)
WBC # BLD AUTO: 6.1 10E3/UL (ref 4–11)

## 2024-12-29 NOTE — PROGRESS NOTES
~Cardiology Clinic Visit~    Primary Cardiologist: Dr. 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 Foster for cardiology follow-up today.  She is a patient of Dr. Parker and was last seen by him on 11/2/2023.    In review, Ms. Foster has a notable history for CVA x 2 in 2015 during pregnancy.  She subsequently underwent PFO/ASD closure in 4/2016.  History also notable for protein S deficiency, dyslipidemia, and anxiety.  She has a family history of multiple family members with coronary artery disease in their 50s-70s, though none are first-degree relatives.  She is a former smoker having quit in 2014.    She was first seen in consultation by Dr. Raza for palpitations.  They had recurred again around the fall 2023 and returned for cardiology evaluation.  A 7-day Zio monitor showed no evidence of atrial fibrillation however it did not capture any of her symptoms while wearing the monitor.  At that time the etiology of her palpitations were unclear.     At the time of her last visit, she was concerned about coronary artery disease given her family history of several second-degree relatives with CAD.  A coronary calcium score was done which came back at 0.    Since her last visit, she was hospitalized on 10/10/2024 with TIA symptoms.  She was found to have an acute ischemic CVA of the right frontal cortex due to embolic stroke of unknown source.  Her initial head CT was negative for acute hemorrhage or transcortical infarct however subsequent MRI showed small focal infarct in the right middle/inferior  "frontal gyral cord.  Cardiology was consulted due to history of PFO closure.  A transthoracic echo was done with negative bubble study.  A transesophageal echocardiogram was also completed today with negative bubble study.  She was followed by hematology and neurology and placed on dual antiplatelet therapy.  She was also started on rosuvastatin 40 mg by neurology.    She was discharged with a 14-day ZIO monitor which showed no evidence of atrial fibrillation or atrial flutter.  Rare SVE/VE were seen.  Symptoms reported related to sinus rhythm with rare PACs.    Ms. Foster is seen today for posthospital follow-up.  Since her discharge, she has been making steady progress in her neurological recovery and feels that she is close to her baseline.  She has no chest pain, pressure or tightness.  No shortness of breath or dyspnea on exertion.  No lower extremity edema, orthopnea, or PND.  Rare palpitation that have improved since virtually eliminating caffeine from her diet.  No lightheadedness, dizziness, or syncope.  No bleeding issues with Plavix.    Diagnotic studies:  Zio monitor 12/6/2024:  Patient had a min HR of 62 bpm, max HR of 153 bpm, and avg HR of 99 bpm. Predominant underlying rhythm was Sinus Rhythm. Isolated SVEs were rare (<1.0%), and no SVE Couplets or SVE Triplets were present. Isolated VEs were rare (<1.0%), and no VE Couplets or VE Triplets were present.   Agreed with findings  Symptoms reported (6 episodes) were mostly related to sinus rhythm with rare PACs.  No AF/AFl  Monitoring period: 14-day   Transesophageal echocardiogram 10/11/2024:  A \"clam shell\" type of ASD/PFO closure device is seen on the atrial septum. No thrombus is identified on the device. There is no evidence of left to right  shunting around the device by color doppler. There is no evidence of right to  left intracardiac or pulmonary level shunt by bubble study.     Otherwise normal study,  Echocardiogram 10/10/2024:  1. The " left ventricle is normal in structure, function and size. The visual  ejection fraction is estimated at 55%.  2. The right ventricle is normal in structure, function and size.  3. ASD closure device in place. Negative bubble study.  4. No valve disease.     No changes from echo 2021.        Assessment:  CVA in 2015 with subsequent PFO closure 4/2016  Transthoracic echocardiogram 10/10/2024 negative bubble study and ASD closure device in place  Transesophageal echocardiogram 10 11/2024 shows ASD/PFO closure device, no thrombus is identified.  There is no evidence of left to right shunting and no evidence of right-to-left intracardiac or pulmonary level shunt by bubble study.  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 10/2024  Initial head CT was negative for acute hemorrhage or transcortical infarct however subsequent MRI showed small focal infarct in the right middle/inferior frontal gyral cord  14-day ZIO monitor shows no evidence of atrial fibrillation  GODWIN 10/11/24: as noted above  Started on dual antiplatelet therapy, however aspirin discontinued due tinnitus   Elevated lipoprotein a  Started on rosuvastatin 40 by neurology  CT calcium screening 11/2023 shows calcium score 0  Lipid panel 10/11/2024: cholesterol 162, HDL, 60, LDL 90, TG 62      Plan:   It was my pleasure to to see Chloe for cardiology follow-up today.  We reviewed the results of her transthoracic echo, transesophageal echo, and 14-day ZIO monitor.  At this time there is no clear cardiac source of her ischemic CVA.  She is working with neurology and vasospasm is currently on the differential.   As for her palpitations, these have been longstanding however greatly improved since she has essentially eliminated caffeine from her diet.  ZIO monitor showed rare PACs/PVCs.  No atrial fibrillation or flutter.  We discussed if palpitations become worse we could consider a loop monitor implant.   Lipoprotein a level was  found to be elevated during hospital admission.  She was started on rosuvastatin by neurology with plans to recheck in the near future.  No change to current plan at this time.  Follow-up with Dr. Parker in 1 year for routine cardiology care, sooner if any new concerns    Thank you for the opportunity to participate in this pleasant patient's care.    We would be happy to see this patient sooner for any concerns in the meantime.    LUCY Wolfe, CNP   Nurse Practitioner  [facility]      A total of 40 minutes was spent with patient, on chart review and documentation today.     The longitudinal plan of care for the diagnosis(es)/condition(s) as documented were addressed during this visit. Due to the added complexity in care, I will continue to support Chloe in the subsequent management and with ongoing continuity of care.        Orders Placed This Encounter   Procedures    Follow-Up with Cardiology     No orders of the defined types were placed in this encounter.    Medications Discontinued During This Encounter   Medication Reason    insulin pen needle (31G X 5 MM) 31G X 5 MM miscellaneous          Encounter Diagnoses   Name Primary?    Cerebrovascular accident (CVA) due to embolism of right middle cerebral artery (H)     Palpitations Yes       CURRENT MEDICATIONS:  Current Outpatient Medications   Medication Sig Dispense Refill    acetaminophen (TYLENOL) 325 MG tablet Take 325-650 mg by mouth every 6 hours as needed for mild pain      clopidogrel (PLAVIX) 75 MG tablet Take 1 tablet (75 mg) by mouth daily. 30 tablet 0    LORazepam (ATIVAN) 1 MG tablet Take 0.5-1 tablets (0.5-1 mg) by mouth daily as needed for anxiety or sleep (severe anxiety/panic). 30 tablet 1    magnesium oxide (MAG-OX) 400 MG tablet Take 400 mg by mouth nightly as needed (leg cramps).      Omega-3 Fatty Acids (FISH OIL PO) Take 1 capsule by mouth At Bedtime       ondansetron (ZOFRAN ODT) 4 MG ODT tab Take 1 tablet  (4 mg) by mouth every 8 hours as needed for nausea. 21 tablet 11    rosuvastatin (CRESTOR) 40 MG tablet Take 1 tablet (40 mg) by mouth daily. You will need to get your cholesterol checked with your primary in the next 2 months and then further refills per primary 30 tablet 1    Semaglutide-Weight Management (WEGOVY) 0.5 MG/0.5ML pen Inject 0.5 mg subcutaneously once a week. 2 mL 0    Semaglutide-Weight Management (WEGOVY) 1.7 MG/0.75ML pen Inject 1.7 mg subcutaneously once a week. 9 mL 1    ubrogepant (UBRELVY) 100 MG tablet Take 1 tablet (100 mg) by mouth at onset of headache. 10 tablet 11    VITAMIN D, CHOLECALCIFEROL, PO Take 1,000 Units by mouth every evening.      tirzepatide-Weight Management (ZEPBOUND) 5 MG/0.5ML prefilled pen Inject 0.5 mLs (5 mg) subcutaneously every 7 days. (Patient not taking: Reported on 12/30/2024) 2 mL 3    tirzepatide-Weight Management (ZEPBOUND) 7.5 MG/0.5ML prefilled pen Inject 0.5 mLs (7.5 mg) subcutaneously every 7 days. (Patient not taking: Reported on 12/30/2024) 2 mL 2       ALLERGIES     Allergies   Allergen Reactions    Compazine [Prochlorperazine] Muscle Pain (Myalgia)       PAST MEDICAL HISTORY:  Past Medical History:   Diagnosis Date    Acute stress reaction     propranolol helps prior to interviews, stressful situations    Allergic rhinitis, cause unspecified     no meds except prn flonase, tests generally positive, decided against shots    Anxiety state, unspecified     citalopram started in 7/06, stopped after couple wks, felt sluggish/tired    CVA (cerebral vascular accident) (H)     May 1, May 14th 2015    Depressive disorder, not elsewhere classified     dx, but not sure this is correct, weaned off wellbutrin (4/05-6/06, 12/06-1/08), restarted 3/08    Dizziness     Elevated lipoprotein A level     Hirsutism     saw endo, inconclusive, started on spironolactone (helped a little), stopped in 6/06, elevated DHEA- sees new endo 4/08    History of stroke with residual  effects     PFO (patent foramen ovale)     Post PFO/ASD closure with 30mm Amplatzer device 16    Protein S deficiency (H)     Per Dr. Casarez, prengnacy related.  Protein S levels normalized afer pregnancy.    Urinary tract infection, site not specified     recurrent, start nitrofur in , 6mo txt       PAST SURGICAL HISTORY:  Past Surgical History:   Procedure Laterality Date    ARTHROSCOPY KNEE RT/LT      Rt knee    COLONOSCOPY N/A 2023    Procedure: Colonoscopy;  Surgeon: Alex Gross MD;  Location:  GI    ESOPHAGOSCOPY, GASTROSCOPY, DUODENOSCOPY (EGD), COMBINED N/A 2023    Procedure: ESOPHAGOGASTRODUODENOSCOPY, WITH BIOPSY;  Surgeon: Alex Gross MD;  Location:  GI    GYN SURGERY      HC REPAIR OF NASAL SEPTUM  2005    REPAIR PATENT FORAMEN OVALE  2016    SEPTORHINOPLASTY N/A 03/15/2021    Procedure: Revision Septorhinoplasty, Repair of Nasal Valve Collapse, Cadaveric Rib Graft + 45 min Cosmetic Tip Rhinoplasty;  Surgeon: Nikki Schwartz MD;  Location: [facility] OR       FAMILY HISTORY:  Family History   Problem Relation Age of Onset    Family History Negative Mother     Cancer Father         throat, possibly asbestos exposure    Hypertension Father     Allergies Father     Arrhythmia Father     Other Cancer Father     Family History Negative Sister     Family History Negative Brother     Diabetes Maternal Grandmother         on insulin    C.A.D. Maternal Grandmother         triple bypass in her 60s    C.A.D. Maternal Grandfather         MI in 50s    Diabetes Paternal Grandmother     Neurologic Disorder Paternal Grandfather          of brain aneurysm in early 60s    Family History Negative Son     Diabetes Maternal Aunt          in her early 40s of DM complications, type 2    Coronary Artery Disease Early Onset Maternal Aunt 41    Obesity Maternal Aunt        SOCIAL HISTORY:  Social History     Socioeconomic History    Marital status:      Spouse name: None     Number of children: 0    Years of education: 15    Highest education level: None   Occupational History    Occupation:      Comment: [employer]   Tobacco Use    Smoking status: Former     Current packs/day: 0.00     Average packs/day: 0.5 packs/day for 5.0 years (2.5 ttl pk-yrs)     Types: Cigarettes     Start date: 4/21/2009     Quit date: 4/21/2014     Years since quitting: 10.7    Smokeless tobacco: Never   Vaping Use    Vaping status: Never Used   Substance and Sexual Activity    Alcohol use: Yes     Comment: SOCIAL    Drug use: No    Sexual activity: Yes     Partners: Male     Birth control/protection: Pull-out method   Other Topics Concern    Caffeine Concern No     Comment: 1 cup coffee per day    Sleep Concern No    Stress Concern No    Weight Concern No    Special Diet No    Exercise Yes     Comment: 4-5 days week    Parent/sibling w/ CABG, MI or angioplasty before 65F 55M? No   Social History Narrative    Social Documentation:        Balanced Diet: YES    Calcium intake: 3 per day    Caffeine: 0 per day    Exercise:  type of activity ellipitcal, pilates;  5 times per week    Sunscreen: Yes    Seatbelts:  Yes    Self Breast Exam:  Yes    Physical/Emotional/Sexual Abuse: No    Do you feel safe in your environment? Yes        Cholesterol screen up to date: Yes- checking today 07/23/09, non fasting    Eye Exam up to date: Yes    Dental Exam up to date: Yes    Pap smear up to date: Yes: checking today 07/23/09, last was 04/08 NIL    Mammogram up to date: Does Not Apply    Dexa Scan up to date: Does Not Apply    Colonoscopy up to date: Does Not Apply    Immunizations up to date: Yes, had one a month ago.    Glucose screen if over 40:  No        Julieth Matthews MA    07/23/09     Social Drivers of Health     Financial Resource Strain: Low Risk  (10/9/2023)    Financial Resource Strain     Within the past 12 months, have you or your family members you live with been unable to get utilities  "(heat, electricity) when it was really needed?: No   Food Insecurity: Low Risk  (10/9/2023)    Food Insecurity     Within the past 12 months, did you worry that your food would run out before you got money to buy more?: No     Within the past 12 months, did the food you bought just not last and you didn t have money to get more?: No   Transportation Needs: Low Risk  (10/9/2023)    Transportation Needs     Within the past 12 months, has lack of transportation kept you from medical appointments, getting your medicines, non-medical meetings or appointments, work, or from getting things that you need?: No   Housing Stability: Low Risk  (10/9/2023)    Housing Stability     Do you have housing? : Yes     Are you worried about losing your housing?: No       Review of Systems:  Skin:  Negative bruising   Eyes:  Negative    ENT:  Negative    Respiratory:  Negative    Cardiovascular:  chest pain, Negative, lightheadedness, dizziness, edema, fatigue, palpitations    Gastroenterology: Negative    Genitourinary:  Negative    Musculoskeletal:  Negative    Neurologic:  Positive for stroke, headaches, migraine headaches, numbness or tingling of hands  Psychiatric:  Negative    Heme/Lymph/Imm:  Negative    Endocrine:  Negative      Physical Exam:    Vitals: /75 (BP Location: Left arm, Patient Position: Sitting)   Pulse 54   Ht 1.689 m (5' 6.5\")   Wt 73.5 kg (162 lb)   SpO2 100%   BMI 25.76 kg/m    Constitutional: Well nourished and in no apparent distress.  Eyes: Pupils equal, round. Sclerae anicteric.   HEENT: Normocephalic, atraumatic.   Neck: Supple.   Respiratory: Breathing non-labored. Lungs clear to auscultation bilaterally. No crackles, wheezes, rhonchi, or rales.  Cardiovascular:  Regular rate and rhythm, normal S1 and S2. No murmur, rub, or gallop.  Skin: Warm, dry.   Extremities: No lower extremity edema.  Neurologic: No gross motor deficits. Alert, awake, and oriented to person, place and time.  Psychiatric: " Affect appropriate.        Recent Lab Results:  LIPID RESULTS:  Lab Results   Component Value Date    CHOL 162 10/11/2024    CHOL 235 (H) 07/09/2021    HDL 60 10/11/2024     07/09/2021    LDL 90 10/11/2024     (H) 07/09/2021    TRIG 62 10/11/2024    TRIG 46 07/09/2021    CHOLHDLRATIO 2.2 03/30/2012       LIVER ENZYME RESULTS:  Lab Results   Component Value Date    AST 19 10/11/2023    AST 13 02/19/2021    ALT 13 10/11/2023    ALT 19 02/19/2021       CBC RESULTS:  Lab Results   Component Value Date    WBC 6.1 12/26/2024    WBC 4.3 11/04/2020    RBC 5.01 12/26/2024    RBC 4.81 11/04/2020    HGB 14.9 12/26/2024    HGB 14.3 11/04/2020    HCT 45.6 12/26/2024    HCT 44.3 11/04/2020    MCV 91 12/26/2024    MCV 92 11/04/2020    MCH 29.7 12/26/2024    MCH 29.7 11/04/2020    MCHC 32.7 12/26/2024    MCHC 32.3 11/04/2020    RDW 13.0 12/26/2024    RDW 12.6 11/04/2020     12/26/2024     11/04/2020       BMP RESULTS:  Lab Results   Component Value Date     10/22/2024     07/09/2021    POTASSIUM 4.0 10/22/2024    POTASSIUM 3.9 12/26/2021    POTASSIUM 3.8 07/09/2021    CHLORIDE 103 10/22/2024    CHLORIDE 109 12/26/2021    CHLORIDE 107 07/09/2021    CO2 22 10/22/2024    CO2 27 12/26/2021    CO2 26 07/09/2021    ANIONGAP 13 10/22/2024    ANIONGAP 3 12/26/2021    ANIONGAP 5 07/09/2021    GLC 98 10/22/2024    GLC 85 10/11/2024     (H) 12/26/2021    GLC 94 07/09/2021    BUN 10.2 10/22/2024    BUN 15 12/26/2021    BUN 8 07/09/2021    CR 0.75 10/22/2024    CR 0.75 07/09/2021    GFRESTIMATED >90 10/22/2024    GFRESTIMATED >90 07/09/2021    GFRESTBLACK >90 07/09/2021    STEPH 9.5 10/22/2024    STEPH 9.8 07/09/2021        A1C RESULTS:  Lab Results   Component Value Date    A1C 4.9 10/10/2024       INR RESULTS:  Lab Results   Component Value Date    INR 0.93 10/22/2024    INR 0.96 10/11/2024    INR 0.90 04/21/2016    INR 0.87 04/29/2015           CC  Lou Avila, NP  [address] [place]  [id number]

## 2024-12-30 ENCOUNTER — OFFICE VISIT (OUTPATIENT)
Dept: CARDIOLOGY | Facility: CLINIC | Age: 44
End: 2024-12-30
Attending: NURSE PRACTITIONER
Payer: COMMERCIAL

## 2024-12-30 VITALS
HEART RATE: 54 BPM | DIASTOLIC BLOOD PRESSURE: 75 MMHG | BODY MASS INDEX: 25.43 KG/M2 | OXYGEN SATURATION: 100 % | WEIGHT: 162 LBS | HEIGHT: 67 IN | SYSTOLIC BLOOD PRESSURE: 121 MMHG

## 2024-12-30 DIAGNOSIS — R00.2 PALPITATIONS: Primary | ICD-10-CM

## 2024-12-30 DIAGNOSIS — I63.411 CEREBROVASCULAR ACCIDENT (CVA) DUE TO EMBOLISM OF RIGHT MIDDLE CEREBRAL ARTERY (H): ICD-10-CM

## 2024-12-30 NOTE — PATIENT INSTRUCTIONS
Thank you for your visit with the [facility] today.    Today's Summary:    Results of cardiac testing are reassuring. At this time continue to monitor for any worsening palpitations  Continue rosuvastatin given elevated lipoprotein (a) 59    Follow-up:  Cardiology follow up at [facility]: Follow up with Dr. Parker in 1 year, sooner if any new concerns.     Cardiology Scheduling ~[phone]  Cardiology Clinic RN ~ [phone]    It was a pleasure seeing you today.     LUCY Wolfe, CNP  Certified Nurse Practitioner  [facility]  December 30, 2024  ________________________________________________________

## 2024-12-30 NOTE — TELEPHONE ENCOUNTER
RECORDS RECEIVED FROM: internal   REASON FOR VISIT: Cerebrovascular accident (CVA) due to embolism of right middle cerebral artery    PROVIDER: Dr. Ontiveros   DATE OF APPT: 2/11/25   NOTES (FOR ALL VISITS) STATUS DETAILS   OFFICE NOTE from referring provider Internal Dr Post @ [facility] Neuro:  12/10/24  10/15/24   DISCHARGE SUMMARY from hospital [facility]:  10/10/24-10/11/24   DISCHARGE REPORT from the ER [facility]:  10/22/24   MEDICATION LIST Internal    IMAGING  (FOR ALL VISITS)     MRI (HEAD, NECK, SPINE) Internal [facility]:  MRI Brain 10/22/24  MRI Brain 10/10/24   CT (HEAD, NECK, SPINE) Internal [facility]:  CTA Head Neck 10/22/24  CT Head 10/22/24  CTA Head Neck 10/10/24  CT Head 10/10/24

## 2024-12-30 NOTE — LETTER
12/30/2024    Jose Miguel Ferro PA-C  [address]  [facility] [address]    RE: Chloe Foster       Dear Colleague,     I had the pleasure of seeing Chloe Foster in the [facility].          ~Cardiology Clinic Visit~    Primary Cardiologist: Dr. 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 Foster for cardiology follow-up today.  She is a patient of Dr. Parker and was last seen by him on 11/2/2023.    In review, Ms. Foster has a notable history for CVA x 2 in 2015 during pregnancy.  She subsequently underwent PFO/ASD closure in 4/2016.  History also notable for protein S deficiency, dyslipidemia, and anxiety.  She has a family history of multiple family members with coronary artery disease in their 50s-70s, though none are first-degree relatives.  She is a former smoker having quit in 2014.    She was first seen in consultation by Dr. Raza for palpitations.  They had recurred again around the fall 2023 and returned for cardiology evaluation.  A 7-day Zio monitor showed no evidence of atrial fibrillation however it did not capture any of her symptoms while wearing the monitor.  At that time the etiology of her palpitations were unclear.     At the time of her last visit, she was concerned about coronary artery disease given her family history of several second-degree relatives with CAD.  A coronary calcium score was done which came back at 0.    Since her last visit, she was hospitalized on 10/10/2024 with TIA symptoms.  She was found to have an acute ischemic CVA of  "the right frontal cortex due to embolic stroke of unknown source.  Her initial head CT was negative for acute hemorrhage or transcortical infarct however subsequent MRI showed small focal infarct in the right middle/inferior frontal gyral cord.  Cardiology was consulted due to history of PFO closure.  A transthoracic echo was done with negative bubble study.  A transesophageal echocardiogram was also completed today with negative bubble study.  She was followed by hematology and neurology and placed on dual antiplatelet therapy.  She was also started on rosuvastatin 40 mg by neurology.    She was discharged with a 14-day ZIO monitor which showed no evidence of atrial fibrillation or atrial flutter.  Rare SVE/VE were seen.  Symptoms reported related to sinus rhythm with rare PACs.    Ms. Foster is seen today for posthospital follow-up.  Since her discharge, she has been making steady progress in her neurological recovery and feels that she is close to her baseline.  She has no chest pain, pressure or tightness.  No shortness of breath or dyspnea on exertion.  No lower extremity edema, orthopnea, or PND.  Rare palpitation that have improved since virtually eliminating caffeine from her diet.  No lightheadedness, dizziness, or syncope.  No bleeding issues with Plavix.    Diagnotic studies:  Zio monitor 12/6/2024:  Patient had a min HR of 62 bpm, max HR of 153 bpm, and avg HR of 99 bpm. Predominant underlying rhythm was Sinus Rhythm. Isolated SVEs were rare (<1.0%), and no SVE Couplets or SVE Triplets were present. Isolated VEs were rare (<1.0%), and no VE Couplets or VE Triplets were present.   Agreed with findings  Symptoms reported (6 episodes) were mostly related to sinus rhythm with rare PACs.  No AF/AFl  Monitoring period: 14-day   Transesophageal echocardiogram 10/11/2024:  A \"clam shell\" type of ASD/PFO closure device is seen on the atrial septum. No thrombus is identified on the device. There is no evidence " of left to right  shunting around the device by color doppler. There is no evidence of right to  left intracardiac or pulmonary level shunt by bubble study.     Otherwise normal study,  Echocardiogram 10/10/2024:  1. The left ventricle is normal in structure, function and size. The visual  ejection fraction is estimated at 55%.  2. The right ventricle is normal in structure, function and size.  3. ASD closure device in place. Negative bubble study.  4. No valve disease.     No changes from echo 2021.        Assessment:  CVA in 2015 with subsequent PFO closure 4/2016  Transthoracic echocardiogram 10/10/2024 negative bubble study and ASD closure device in place  Transesophageal echocardiogram 10 11/2024 shows ASD/PFO closure device, no thrombus is identified.  There is no evidence of left to right shunting and no evidence of right-to-left intracardiac or pulmonary level shunt by bubble study.  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 10/2024  Initial head CT was negative for acute hemorrhage or transcortical infarct however subsequent MRI showed small focal infarct in the right middle/inferior frontal gyral cord  14-day ZIO monitor shows no evidence of atrial fibrillation  GODWIN 10/11/24: as noted above  Started on dual antiplatelet therapy, however aspirin discontinued due tinnitus   Elevated lipoprotein a  Started on rosuvastatin 40 by neurology  CT calcium screening 11/2023 shows calcium score 0  Lipid panel 10/11/2024: cholesterol 162, HDL, 60, LDL 90, TG 62      Plan:   It was my pleasure to to see Chloe for cardiology follow-up today.  We reviewed the results of her transthoracic echo, transesophageal echo, and 14-day ZIO monitor.  At this time there is no clear cardiac source of her ischemic CVA.  She is working with neurology and vasospasm is currently on the differential.   As for her palpitations, these have been longstanding however greatly improved since she has  essentially eliminated caffeine from her diet.  ZIO monitor showed rare PACs/PVCs.  No atrial fibrillation or flutter.  We discussed if palpitations become worse we could consider a loop monitor implant.   Lipoprotein a level was found to be elevated during hospital admission.  She was started on rosuvastatin by neurology with plans to recheck in the near future.  No change to current plan at this time.  Follow-up with Dr. Parker in 1 year for routine cardiology care, sooner if any new concerns    Thank you for the opportunity to participate in this pleasant patient's care.    We would be happy to see this patient sooner for any concerns in the meantime.    LUCY Wolfe, CNP   Nurse Practitioner  [facility]      A total of 40 minutes was spent with patient, on chart review and documentation today.     The longitudinal plan of care for the diagnosis(es)/condition(s) as documented were addressed during this visit. Due to the added complexity in care, I will continue to support Chloe in the subsequent management and with ongoing continuity of care.        Orders Placed This Encounter   Procedures     Follow-Up with Cardiology     No orders of the defined types were placed in this encounter.    Medications Discontinued During This Encounter   Medication Reason     insulin pen needle (31G X 5 MM) 31G X 5 MM miscellaneous          Encounter Diagnoses   Name Primary?     Cerebrovascular accident (CVA) due to embolism of right middle cerebral artery (H)      Palpitations Yes       CURRENT MEDICATIONS:  Current Outpatient Medications   Medication Sig Dispense Refill     acetaminophen (TYLENOL) 325 MG tablet Take 325-650 mg by mouth every 6 hours as needed for mild pain       clopidogrel (PLAVIX) 75 MG tablet Take 1 tablet (75 mg) by mouth daily. 30 tablet 0     LORazepam (ATIVAN) 1 MG tablet Take 0.5-1 tablets (0.5-1 mg) by mouth daily as needed for anxiety or sleep (severe anxiety/panic). 30  tablet 1     magnesium oxide (MAG-OX) 400 MG tablet Take 400 mg by mouth nightly as needed (leg cramps).       Omega-3 Fatty Acids (FISH OIL PO) Take 1 capsule by mouth At Bedtime        ondansetron (ZOFRAN ODT) 4 MG ODT tab Take 1 tablet (4 mg) by mouth every 8 hours as needed for nausea. 21 tablet 11     rosuvastatin (CRESTOR) 40 MG tablet Take 1 tablet (40 mg) by mouth daily. You will need to get your cholesterol checked with your primary in the next 2 months and then further refills per primary 30 tablet 1     Semaglutide-Weight Management (WEGOVY) 0.5 MG/0.5ML pen Inject 0.5 mg subcutaneously once a week. 2 mL 0     Semaglutide-Weight Management (WEGOVY) 1.7 MG/0.75ML pen Inject 1.7 mg subcutaneously once a week. 9 mL 1     ubrogepant (UBRELVY) 100 MG tablet Take 1 tablet (100 mg) by mouth at onset of headache. 10 tablet 11     VITAMIN D, CHOLECALCIFEROL, PO Take 1,000 Units by mouth every evening.       tirzepatide-Weight Management (ZEPBOUND) 5 MG/0.5ML prefilled pen Inject 0.5 mLs (5 mg) subcutaneously every 7 days. (Patient not taking: Reported on 12/30/2024) 2 mL 3     tirzepatide-Weight Management (ZEPBOUND) 7.5 MG/0.5ML prefilled pen Inject 0.5 mLs (7.5 mg) subcutaneously every 7 days. (Patient not taking: Reported on 12/30/2024) 2 mL 2       ALLERGIES     Allergies   Allergen Reactions     Compazine [Prochlorperazine] Muscle Pain (Myalgia)       PAST MEDICAL HISTORY:  Past Medical History:   Diagnosis Date     Acute stress reaction     propranolol helps prior to interviews, stressful situations     Allergic rhinitis, cause unspecified     no meds except prn flonase, tests generally positive, decided against shots     Anxiety state, unspecified     citalopram started in 7/06, stopped after couple wks, felt sluggish/tired     CVA (cerebral vascular accident) (H)     May 1, May 14th 2015     Depressive disorder, not elsewhere classified     dx, but not sure this is correct, weaned off wellbutrin  (-, -), restarted 3/08     Dizziness      Elevated lipoprotein A level      Hirsutism     saw endo, inconclusive, started on spironolactone (helped a little), stopped in , elevated DHEA- sees new endo      History of stroke with residual effects      PFO (patent foramen ovale)     Post PFO/ASD closure with 30mm Amplatzer device 16     Protein S deficiency (H)     Per Dr. Casarez, prengnacy related.  Protein S levels normalized afer pregnancy.     Urinary tract infection, site not specified     recurrent, start nitrofur in , 6mo txt       PAST SURGICAL HISTORY:  Past Surgical History:   Procedure Laterality Date     ARTHROSCOPY KNEE RT/LT      Rt knee     COLONOSCOPY N/A 2023    Procedure: Colonoscopy;  Surgeon: Alex Gross MD;  Location:  GI     ESOPHAGOSCOPY, GASTROSCOPY, DUODENOSCOPY (EGD), COMBINED N/A 2023    Procedure: ESOPHAGOGASTRODUODENOSCOPY, WITH BIOPSY;  Surgeon: Alex Gross MD;  Location:  GI     GYN SURGERY       HC REPAIR OF NASAL SEPTUM  2005     REPAIR PATENT FORAMEN OVALE  2016     SEPTORHINOPLASTY N/A 03/15/2021    Procedure: Revision Septorhinoplasty, Repair of Nasal Valve Collapse, Cadaveric Rib Graft + 45 min Cosmetic Tip Rhinoplasty;  Surgeon: Nikki Schwartz MD;  Location: [facility] OR       FAMILY HISTORY:  Family History   Problem Relation Age of Onset     Family History Negative Mother      Cancer Father         throat, possibly asbestos exposure     Hypertension Father      Allergies Father      Arrhythmia Father      Other Cancer Father      Family History Negative Sister      Family History Negative Brother      Diabetes Maternal Grandmother         on insulin     C.A.D. Maternal Grandmother         triple bypass in her 60s     C.A.D. Maternal Grandfather         MI in 50s     Diabetes Paternal Grandmother      Neurologic Disorder Paternal Grandfather          of brain aneurysm in early 60s     Family History Negative  Son      Diabetes Maternal Aunt          in her early 40s of DM complications, type 2     Coronary Artery Disease Early Onset Maternal Aunt 41     Obesity Maternal Aunt        SOCIAL HISTORY:  Social History     Socioeconomic History     Marital status:      Spouse name: None     Number of children: 0     Years of education: 15     Highest education level: None   Occupational History     Occupation:      Comment: [employer]   Tobacco Use     Smoking status: Former     Current packs/day: 0.00     Average packs/day: 0.5 packs/day for 5.0 years (2.5 ttl pk-yrs)     Types: Cigarettes     Start date: 2009     Quit date: 2014     Years since quitting: 10.7     Smokeless tobacco: Never   Vaping Use     Vaping status: Never Used   Substance and Sexual Activity     Alcohol use: Yes     Comment: SOCIAL     Drug use: No     Sexual activity: Yes     Partners: Male     Birth control/protection: Pull-out method   Other Topics Concern     Caffeine Concern No     Comment: 1 cup coffee per day     Sleep Concern No     Stress Concern No     Weight Concern No     Special Diet No     Exercise Yes     Comment: 4-5 days week     Parent/sibling w/ CABG, MI or angioplasty before 65F 55M? No   Social History Narrative    Social Documentation:        Balanced Diet: YES    Calcium intake: 3 per day    Caffeine: 0 per day    Exercise:  type of activity ellipitcal, pilates;  5 times per week    Sunscreen: Yes    Seatbelts:  Yes    Self Breast Exam:  Yes    Physical/Emotional/Sexual Abuse: No    Do you feel safe in your environment? Yes        Cholesterol screen up to date: Yes- checking today 09, non fasting    Eye Exam up to date: Yes    Dental Exam up to date: Yes    Pap smear up to date: Yes: checking today 09, last was  NIL    Mammogram up to date: Does Not Apply    Dexa Scan up to date: Does Not Apply    Colonoscopy up to date: Does Not Apply    Immunizations up to date: Yes, had  "one a month ago.    Glucose screen if over 40:  No        Julieth Matthews MA    07/23/09     Social Drivers of Health     Financial Resource Strain: Low Risk  (10/9/2023)    Financial Resource Strain      Within the past 12 months, have you or your family members you live with been unable to get utilities (heat, electricity) when it was really needed?: No   Food Insecurity: Low Risk  (10/9/2023)    Food Insecurity      Within the past 12 months, did you worry that your food would run out before you got money to buy more?: No      Within the past 12 months, did the food you bought just not last and you didn t have money to get more?: No   Transportation Needs: Low Risk  (10/9/2023)    Transportation Needs      Within the past 12 months, has lack of transportation kept you from medical appointments, getting your medicines, non-medical meetings or appointments, work, or from getting things that you need?: No   Housing Stability: Low Risk  (10/9/2023)    Housing Stability      Do you have housing? : Yes      Are you worried about losing your housing?: No       Review of Systems:  Skin:  Negative bruising   Eyes:  Negative    ENT:  Negative    Respiratory:  Negative    Cardiovascular:  chest pain, Negative, lightheadedness, dizziness, edema, fatigue, palpitations    Gastroenterology: Negative    Genitourinary:  Negative    Musculoskeletal:  Negative    Neurologic:  Positive for stroke, headaches, migraine headaches, numbness or tingling of hands  Psychiatric:  Negative    Heme/Lymph/Imm:  Negative    Endocrine:  Negative      Physical Exam:    Vitals: /75 (BP Location: Left arm, Patient Position: Sitting)   Pulse 54   Ht 1.689 m (5' 6.5\")   Wt 73.5 kg (162 lb)   SpO2 100%   BMI 25.76 kg/m    Constitutional: Well nourished and in no apparent distress.  Eyes: Pupils equal, round. Sclerae anicteric.   HEENT: Normocephalic, atraumatic.   Neck: Supple.   Respiratory: Breathing non-labored. Lungs clear to " auscultation bilaterally. No crackles, wheezes, rhonchi, or rales.  Cardiovascular:  Regular rate and rhythm, normal S1 and S2. No murmur, rub, or gallop.  Skin: Warm, dry.   Extremities: No lower extremity edema.  Neurologic: No gross motor deficits. Alert, awake, and oriented to person, place and time.  Psychiatric: Affect appropriate.        Recent Lab Results:  LIPID RESULTS:  Lab Results   Component Value Date    CHOL 162 10/11/2024    CHOL 235 (H) 07/09/2021    HDL 60 10/11/2024     07/09/2021    LDL 90 10/11/2024     (H) 07/09/2021    TRIG 62 10/11/2024    TRIG 46 07/09/2021    CHOLHDLRATIO 2.2 03/30/2012       LIVER ENZYME RESULTS:  Lab Results   Component Value Date    AST 19 10/11/2023    AST 13 02/19/2021    ALT 13 10/11/2023    ALT 19 02/19/2021       CBC RESULTS:  Lab Results   Component Value Date    WBC 6.1 12/26/2024    WBC 4.3 11/04/2020    RBC 5.01 12/26/2024    RBC 4.81 11/04/2020    HGB 14.9 12/26/2024    HGB 14.3 11/04/2020    HCT 45.6 12/26/2024    HCT 44.3 11/04/2020    MCV 91 12/26/2024    MCV 92 11/04/2020    MCH 29.7 12/26/2024    MCH 29.7 11/04/2020    MCHC 32.7 12/26/2024    MCHC 32.3 11/04/2020    RDW 13.0 12/26/2024    RDW 12.6 11/04/2020     12/26/2024     11/04/2020       BMP RESULTS:  Lab Results   Component Value Date     10/22/2024     07/09/2021    POTASSIUM 4.0 10/22/2024    POTASSIUM 3.9 12/26/2021    POTASSIUM 3.8 07/09/2021    CHLORIDE 103 10/22/2024    CHLORIDE 109 12/26/2021    CHLORIDE 107 07/09/2021    CO2 22 10/22/2024    CO2 27 12/26/2021    CO2 26 07/09/2021    ANIONGAP 13 10/22/2024    ANIONGAP 3 12/26/2021    ANIONGAP 5 07/09/2021    GLC 98 10/22/2024    GLC 85 10/11/2024     (H) 12/26/2021    GLC 94 07/09/2021    BUN 10.2 10/22/2024    BUN 15 12/26/2021    BUN 8 07/09/2021    CR 0.75 10/22/2024    CR 0.75 07/09/2021    GFRESTIMATED >90 10/22/2024    GFRESTIMATED >90 07/09/2021    GFRESTBLACK >90 07/09/2021    STEPH 9.5  10/22/2024    STEPH 9.8 07/09/2021        A1C RESULTS:  Lab Results   Component Value Date    A1C 4.9 10/10/2024       INR RESULTS:  Lab Results   Component Value Date    INR 0.93 10/22/2024    INR 0.96 10/11/2024    INR 0.90 04/21/2016    INR 0.87 04/29/2015           CC  Lou Avila NP  [address] [address]                  Thank you for allowing me to participate in the care of your patient.      Sincerely,     LUCY Wolfe [facility]  cc:   Lou Avila NP  [address] [place] [address]

## 2025-01-06 DIAGNOSIS — E66.09 CLASS 1 OBESITY DUE TO EXCESS CALORIES WITH SERIOUS COMORBIDITY AND BODY MASS INDEX (BMI) OF 30.0 TO 30.9 IN ADULT: ICD-10-CM

## 2025-01-06 DIAGNOSIS — E66.811 CLASS 1 OBESITY DUE TO EXCESS CALORIES WITH SERIOUS COMORBIDITY AND BODY MASS INDEX (BMI) OF 30.0 TO 30.9 IN ADULT: ICD-10-CM

## 2025-01-06 RX ORDER — TIRZEPATIDE 7.5 MG/.5ML
INJECTION, SOLUTION SUBCUTANEOUS
Qty: 2 ML | Refills: 1 | OUTPATIENT
Start: 2025-01-06

## 2025-01-07 ENCOUNTER — TELEPHONE (OUTPATIENT)
Dept: SURGERY | Facility: CLINIC | Age: 45
End: 2025-01-07

## 2025-01-07 NOTE — TELEPHONE ENCOUNTER
"Prior Authorization Retail Medication Request    Medication/Dose: Semaglutide-Weight Management (WEGOVY) 0.5 MG/0.5ML pen  Semaglutide-Weight Management (WEGOVY) 1.7 MG/0.75ML pen  Diagnosis and ICD code (if different than what is on RX):  [Z86.39]  E78.00]   New/renewal/insurance change PA/secondary ins. PA:  Previously Tried and Failed:  Diets, exercise, life style changes  Rationale:  Hx of obesity   Elevated cholesterol [    12/30/2024  9:23 AM   Vital Signs    Weight (LB) 162 lb    Height 5' 6.5\"    BMI (Calculated) 25.76    Pain Score      AOM Considerations:  Phentermine:   Avoid, Wellbutrin caused anxiety and tachycardia at higher doses  Topiramate:     Avoid since taste changes can occur and pt is  and tastes wine for living  GLP-1:             Ozempic covered if DM, Shortage, Pt has sig constipation,  Saxenda apparoved, but on shortage so trying for Zepbound 1/23/24 - denied, wegovy tried for 4/24     Naltrexone:      Candidate, no cravings  Wellbutrin:       Tried but became anxious and pulse increased - utilized previously may restart pending availability of GLP/GIP  Metformin:       N DM, Pre DM  Contrave:        No AOM coverage,  Qsymia:           No AOM coverage,      Insurance   Primary: BCBS [3] - BCBS OUT OF STATE [[id number]]   Insurance ID:  [id number]      Secondary (if applicable):PA  Insurance ID:  If applicable    Pharmacy Information (if different than what is on RX)  [facility]  [facility]  Phone:  [phone]  Fax:[phone]    Clinic Information  Preferred routing pool for dept communication:     "

## 2025-01-08 NOTE — TELEPHONE ENCOUNTER
Retail Pharmacy Prior Authorization Team   Phone: [phone]    PA Initiation    Medication: WEGOVY 0.5 MG/0.5ML SC SOAJ  Insurance Company: [facility] - Phone [phone] Fax [phone]  Pharmacy Filling the Rx: [id number] - [place], [place] - [address]  Filling Pharmacy Phone: [phone]  Filling Pharmacy Fax:    Start Date: 1/8/2025    APPROVAL ALREADY ON FILE THROUGH INSURANCE - SUBMITTED REQUEST VIA [employer] PORTAL FOR QTY LIMITS

## 2025-01-09 NOTE — TELEPHONE ENCOUNTER
Prior Authorization Not Needed per Insurance    Medication: WEGOVY 0.5 MG/0.5ML SC SOAJ  Insurance Company: [employer] [place] - Phone [phone] Fax [phone]  Expected CoPay: $    Pharmacy Filling the Rx: [id number] - [place], [place] - [address]  Pharmacy Notified: YES  Patient Notified: YES     Received another fax that there is an approval on file. Called [facility] pharmacy to confirm what exactly is being requested. Pharmacy staff states they have two rx on file for 1mg dose - one has already processed through insurance for $1000 copay which could be due to a high deductible. No PA is needed at this time. They will send new request if PA is needed.

## 2025-01-13 ENCOUNTER — PRE VISIT (OUTPATIENT)
Dept: ONCOLOGY | Facility: CLINIC | Age: 45
End: 2025-01-13
Payer: COMMERCIAL

## 2025-01-13 ENCOUNTER — MYC MEDICAL ADVICE (OUTPATIENT)
Dept: CARDIOLOGY | Facility: CLINIC | Age: 45
End: 2025-01-13
Payer: COMMERCIAL

## 2025-01-14 ENCOUNTER — VIRTUAL VISIT (OUTPATIENT)
Dept: FAMILY MEDICINE | Facility: CLINIC | Age: 45
End: 2025-01-14
Payer: COMMERCIAL

## 2025-01-14 DIAGNOSIS — F40.10 SOCIAL ANXIETY DISORDER: Primary | ICD-10-CM

## 2025-01-14 DIAGNOSIS — G43.109 MIGRAINE WITH AURA AND WITHOUT STATUS MIGRAINOSUS, NOT INTRACTABLE: ICD-10-CM

## 2025-01-14 DIAGNOSIS — Z86.73 HISTORY OF TIA (TRANSIENT ISCHEMIC ATTACK) AND STROKE: ICD-10-CM

## 2025-01-14 DIAGNOSIS — M25.551 HIP PAIN, RIGHT: ICD-10-CM

## 2025-01-14 PROCEDURE — 98006 SYNCH AUDIO-VIDEO EST MOD 30: CPT | Performed by: PHYSICIAN ASSISTANT

## 2025-01-14 RX ORDER — CLONAZEPAM 0.5 MG/1
.25-.5 TABLET ORAL 2 TIMES DAILY PRN
Qty: 30 TABLET | Refills: 1 | Status: SHIPPED | OUTPATIENT
Start: 2025-01-14

## 2025-01-14 RX ORDER — PROPRANOLOL HYDROCHLORIDE 10 MG/1
10 TABLET ORAL 2 TIMES DAILY
COMMUNITY
Start: 2024-12-06

## 2025-01-14 ASSESSMENT — ANXIETY QUESTIONNAIRES
GAD7 TOTAL SCORE: 9
GAD7 TOTAL SCORE: 9
1. FEELING NERVOUS, ANXIOUS, OR ON EDGE: MORE THAN HALF THE DAYS
4. TROUBLE RELAXING: SEVERAL DAYS
2. NOT BEING ABLE TO STOP OR CONTROL WORRYING: SEVERAL DAYS
7. FEELING AFRAID AS IF SOMETHING AWFUL MIGHT HAPPEN: MORE THAN HALF THE DAYS
7. FEELING AFRAID AS IF SOMETHING AWFUL MIGHT HAPPEN: MORE THAN HALF THE DAYS
IF YOU CHECKED OFF ANY PROBLEMS ON THIS QUESTIONNAIRE, HOW DIFFICULT HAVE THESE PROBLEMS MADE IT FOR YOU TO DO YOUR WORK, TAKE CARE OF THINGS AT HOME, OR GET ALONG WITH OTHER PEOPLE: VERY DIFFICULT
8. IF YOU CHECKED OFF ANY PROBLEMS, HOW DIFFICULT HAVE THESE MADE IT FOR YOU TO DO YOUR WORK, TAKE CARE OF THINGS AT HOME, OR GET ALONG WITH OTHER PEOPLE?: VERY DIFFICULT
6. BECOMING EASILY ANNOYED OR IRRITABLE: SEVERAL DAYS
GAD7 TOTAL SCORE: 9
5. BEING SO RESTLESS THAT IT IS HARD TO SIT STILL: NOT AT ALL
3. WORRYING TOO MUCH ABOUT DIFFERENT THINGS: MORE THAN HALF THE DAYS

## 2025-01-14 NOTE — TELEPHONE ENCOUNTER
[employer] message reviewed by LUIS M Rocha:     Svitlana Diego APRN CNP  Carolin Newell, RN  Phone Number: [phone]     Hi there- sorry to hear to hear about the palpitations today. Thank you for sending in your [email or web address] mobile strips. Overall, the strips look reassuring. I did see an early beat in one and maybe another one on another although could have been artifact (picked up motion). Any more recurrence of the lightheadedness? Josefina    Sent pt [employer] message with recommendations.

## 2025-01-14 NOTE — PROGRESS NOTES
Chloe is a 44 year old who is being evaluated via a billable video visit.    What phone number would you like to be contacted at? [phone]  How would you like to obtain your AVS? MyChart      Assessment & Plan     Social anxiety disorder  Trial of alternative as needed option that may be slower acting than previous ativan prescription (may prevent headaches); Proper use and risk for abuse discussed with patient at length; patient to further consider daily option of more traditional selective serotonin reuptake inhibitor (celexa/citalopram may be a good choice) vs daily propranolol/other BB to help prevent migraines and help with anxiety as well (history of low dose making patient pretty tired though). Will continue to discuss over MyChart.  - clonazePAM (KLONOPIN) 0.5 MG tablet; Take 0.5-1 tablets (0.25-0.5 mg) by mouth 2 times daily as needed for anxiety.    Migraine with aura and without status migrainosus, not intractable  Patient working with neurology but will update labs with lab only appointment as well.  - TSH with free T4 reflex; Future  - Vitamin B12; Future  - Vitamin D Deficiency; Future  - Magnesium; Future  - Folate; Future  - Prolactin; Future  - Anti-Mullerian hormone; Future  - Estradiol; Future  - Follicle stimulating hormone; Future  - Progesterone; Future    History of TIA (transient ischemic attack) and stroke  Working with neurology but will update labs with lab only appointment as patient able.  - TSH with free T4 reflex; Future  - Vitamin B12; Future  - Vitamin D Deficiency; Future  - Magnesium; Future  - Folate; Future  - Prolactin; Future  - Anti-Mullerian hormone; Future  - Estradiol; Future  - Follicle stimulating hormone; Future  - Progesterone; Future    Hip pain, right  - Physical Therapy  Referral; Future    See Patient Instructions    Subjective   Chloe is a 44 year old, presenting for the following health issues:  Anxiety and Recheck Medication    HPI     Anxiety meds  "and check in on neurologist medicine  Patient working with neurology regularly; was seen by cardiology as well.  Patient working from home but it still has been helpful to have ativan and propranolol as needed.  Patient does note ativan can give her headaches - makes her more worried given neurologic/stroke history at preventing this at all times; patient getting more traditional migraines at this time.  - wondering about trial of slower acting benzo type option that may prevent chance of headaches   - patient wondering about maybe being on something more traditional low dose daily option too; History of wellbutrin (heart racing side effect), trazodone and naltrexone in the past.    Patient still wondering about hormones and how much it can be related;     History of PRN ativan prescription.   Routine refills #30 ativan lasts 3 months, last prescription from 8/22/24.  Propranolol helpful as well as needed - takes 1 -2 prior to meetings with good results but wondering about further use       Neurology note from 12/10/24:  \"(G43.109) Migraine with aura and without status migrainosus, not intractable  (primary encounter diagnosis)  Comment: I suspect the patient's migraines have changed in semiology because of the verapamil.  I am going to stop the verapamil and have her observe over the next week trying the Ubrelvy for rescue medicine.  I have asked for her to send an update next week.  We certainly could consider trying propranolol as a preventative if we need to.  In light of the nausea I have prescribed Zofran.  Plan:  Ubrelvy 100 mg as needed  Zofran ODT 4 mg as needed  Stop verapamil and observe     (I63.411) Cerebrovascular accident (CVA) due to embolism of right middle cerebral artery (H)  Comment: The patient will be referred to stroke team for a physician appointment in regards to whether she needs further stroke workup  Plan: In light of the tinnitus I am stopping aspirin and recommending she be on Plavix 75 " "mg daily for stroke prevention     (M54.2) Neck pain  Comment: We will send the patient for physical therapy to help with her neck pain and neck tightness  Plan: Physical Therapy  Referral     51 minutes spent caring for the patient on the day of the visit including chart review, visit time and documentation.\"      Review of Systems  Constitutional, HEENT, cardiovascular, pulmonary, gi and gu systems are negative, except as otherwise noted.      Objective    Vitals - Patient Reported  Weight (Patient Reported): 71.2 kg (157 lb)  Pain Score: No Pain (0)        Physical Exam   GENERAL: alert and no distress  EYES: Eyes grossly normal to inspection.  No discharge or erythema, or obvious scleral/conjunctival abnormalities.  RESP: No audible wheeze, cough, or visible cyanosis.    SKIN: Visible skin clear. No significant rash, abnormal pigmentation or lesions.  NEURO: Cranial nerves grossly intact.  Mentation and speech appropriate for age.  PSYCH: Appropriate affect, tone, and pace of words          Video-Visit Details    Type of service:  Video Visit   Originating Location (pt. Location): Home    Distant Location (provider location):  On-site  Platform used for Video Visit: Rafal  Signed Electronically by: Jose Miguel Ferro PA-C    "

## 2025-01-20 ENCOUNTER — TELEPHONE (OUTPATIENT)
Dept: NEUROLOGY | Facility: CLINIC | Age: 45
End: 2025-01-20
Payer: COMMERCIAL

## 2025-01-20 NOTE — TELEPHONE ENCOUNTER
Left Voicemail (1st Attempt) and Sent Mychart (1st Attempt) for the patient to call back and schedule the following:    Appointment type: Return Headache  Provider: Dr. Post  Return date: April 2025  Specialty phone number: [phone]  Additional appointment(s) needed: MRV, MRI x 2  Additonal Notes:

## 2025-01-21 ENCOUNTER — VIRTUAL VISIT (OUTPATIENT)
Dept: FAMILY MEDICINE | Facility: CLINIC | Age: 45
End: 2025-01-21
Payer: COMMERCIAL

## 2025-01-21 ENCOUNTER — TELEPHONE (OUTPATIENT)
Dept: FAMILY MEDICINE | Facility: CLINIC | Age: 45
End: 2025-01-21

## 2025-01-21 DIAGNOSIS — F40.10 SOCIAL ANXIETY DISORDER: ICD-10-CM

## 2025-01-21 PROCEDURE — 99207 PR NO CHARGE LOS: CPT | Mod: 95 | Performed by: FAMILY MEDICINE

## 2025-01-21 NOTE — PROGRESS NOTES
Chloe is a 44 year old who is being evaluated via a billable video visit.    How would you like to obtain your AVS? MyChart  If the video visit is dropped, the invitation should be resent by: Text to cell phone: [phone]  Will anyone else be joining your video visit? No      Assessment & Plan     Social anxiety disorder  I waited online at  for 17 mins  We were not able to connect.  Patient has an appointment on 1/25  Will close the chart  Patient not seen by me on video today     I have reviewed her neurology consultation from 1/17/25  And seem like Work restrictions rewritten for 3 months.               Subjective   Chloe is a 44 year old, presenting for the following health issues:  No chief complaint on file.        1/21/2025     2:28 PM   Additional Questions   Roomed by chyna in     HPI   Answers submitted by the patient for this visit:  Patient Health Questionnaire (Submitted on 1/21/2025)  If you checked off any problems, how difficult have these problems made it for you to do your work, take care of things at home, or get along with other people?: Somewhat difficult  PHQ9 TOTAL SCORE: 5  Follow up from VV on 1/14/25  Requesting work restriction letter for Headaches   Had a Virtual visit with neurology and labs on 1/17/25          Objective           Vitals:  No vitals were obtained today due to virtual visit.    Physical Exam   Not seen           Video-Visit Details    Not seen by me on VVV today  Waited for 17 mins  And closing the chart

## 2025-01-21 NOTE — TELEPHONE ENCOUNTER
Patient calling to request work restriction letter   Had VV with PCP today, cancelled by [facility] provider   Rescheduled today with covering provider at [facility]   Due to - patient requesting letter today if possible  Thanks,  Lin BUCKNER RN

## 2025-01-22 ENCOUNTER — TELEPHONE (OUTPATIENT)
Dept: NEUROLOGY | Facility: CLINIC | Age: 45
End: 2025-01-22
Payer: COMMERCIAL

## 2025-01-22 NOTE — TELEPHONE ENCOUNTER
Left Voicemail (1st Attempt) for the patient to call back and schedule the following:    Appointment type: New Stroke-Virtual  Provider: Philipp  Return date: 1/28/2025  Specialty phone number: [phone]  Additional appointment(s) needed: NA  Additonal Notes:     1/28 at 10:00 am or 12:00 pm. Available, will need to manually schedule. Let patient know we can not guarantee these spots will still be open when they call back      Jillian Douglas on 1/22/2025 at 8:08 AM

## 2025-01-27 ENCOUNTER — VIRTUAL VISIT (OUTPATIENT)
Dept: FAMILY MEDICINE | Facility: CLINIC | Age: 45
End: 2025-01-27
Payer: COMMERCIAL

## 2025-01-27 DIAGNOSIS — I63.411 CEREBROVASCULAR ACCIDENT (CVA) DUE TO EMBOLISM OF RIGHT MIDDLE CEREBRAL ARTERY (H): Primary | ICD-10-CM

## 2025-01-27 DIAGNOSIS — H93.13 TINNITUS, BILATERAL: ICD-10-CM

## 2025-01-27 PROBLEM — G45.9 TIA (TRANSIENT ISCHEMIC ATTACK): Status: RESOLVED | Noted: 2024-10-10 | Resolved: 2025-01-27

## 2025-01-27 PROCEDURE — 98004 SYNCH AUDIO-VIDEO EST SF 10: CPT | Performed by: PHYSICIAN ASSISTANT

## 2025-01-27 ASSESSMENT — PATIENT HEALTH QUESTIONNAIRE - PHQ9
SUM OF ALL RESPONSES TO PHQ QUESTIONS 1-9: 3
10. IF YOU CHECKED OFF ANY PROBLEMS, HOW DIFFICULT HAVE THESE PROBLEMS MADE IT FOR YOU TO DO YOUR WORK, TAKE CARE OF THINGS AT HOME, OR GET ALONG WITH OTHER PEOPLE: SOMEWHAT DIFFICULT
SUM OF ALL RESPONSES TO PHQ QUESTIONS 1-9: 3

## 2025-01-27 NOTE — LETTER
"2025    Chloe Foster   1980        To Whom it May Concern;    Chloe Foster is a long time patient under my care for various chronic medical diagnoses. She is currently needing to take breaks from the screen during the workday to control symptoms. Making the accommodation to allow working from home at this time will further allow her to accomplish a more productive day with more control over her current symptoms.    Accommodations should remain in place for the next 30 days pending upcoming speciality appointment.     Please contact me for questions or concerns.      Sincerely,    Shelby \"Jose Miguel\" ELOISE Ferro   "

## 2025-01-27 NOTE — PROGRESS NOTES
"Chloe is a 44 year old who is being evaluated via a billable video visit.    How would you like to obtain your AVS? MyChart  If the video visit is dropped, the invitation should be resent by: Text to cell phone: [phone]  Will anyone else be joining your video visit? No      Assessment & Plan     Cerebrovascular accident (CVA) due to embolism of right middle cerebral artery (H)  Tinnitus, bilateral  Working with specialists with upcoming imaging and stroke neurologist appointment; patient needs work accommodations adjusted and letters have been enough most recently. Options discussed with patient and letter written. Return to clinic with any worsening or changes in symptoms or go to ER Urgent care in off hours.      See Patient Instructions    Subjective   Chloe is a 44 year old, presenting for the following health issues:  Follow Up    HPI     Follow up.     Patient seen by neurology 1/17/25:  \"(H93.11) Tinnitus, right  (primary encounter diagnosis)  Comment: To workup the tinnitus further we will send for MR angiography of the head and neck as well as MRV of the head.  She will need sedation for the scan.  I also have written on the ENT referral.     If all this is normal I suspect it is a component of her migraines and we will need to further assess her migraine prevention.  It could also be a component of bruxism or TMD but the patient denies any clenching or jaw tightness at this time     (I63.411) Cerebrovascular accident (CVA) due to embolism of right middle cerebral artery (H)  Comment: The patient requested discontinue Plavix and go back to aspirin now that we know aspirin is not causing any tinnitus.  I agree  Plan: Start aspirin 81 MG EC tablet daily, stop Plavix             (G43.109) Migraine with aura and without status migrainosus, not intractable  Comment: Continue to use Ubrelvy as needed she can try 50 mg dose to see if she has less side effects with the sedation.  There is no risk of " "medication overuse.  At this time she is not on migraine preventative medicine and did not tolerate verapamil.  We could work on increasing propranolol dose or trying a CGRP antagonist in the future if needed        Work restrictions rewritten for 3 months.\"    Patient has follow up scheduled with stroke neurologist specifically as well.    Patient looking for work from home restrictions for maybe less than 3 months.   Hoping for restrictions with work from home and frequent breaks from screens.    Patient working from home but it still has been helpful to have ativan and propranolol as needed.  Patient does note ativan can give her headaches - makes her more worried given neurologic/stroke history at preventing this at all times; patient getting more traditional migraines at this time.  - wondering about trial of slower acting benzo type option that may prevent chance of headaches   - patient wondering about maybe being on something more traditional low dose daily option too; History of wellbutrin (heart racing side effect), trazodone and naltrexone in the past.       History of PRN ativan prescription. Recent trial of clonazepam as needed to have a slower acting option which may prevent headaches associated with ativan; #30 given 1/14/25 - patient has not tried it yet  History of #30 ativan lasts 3 months, last prescription from 8/22/24.  Propranolol helpful as well as needed - takes 1 -2 prior to meetings with good results but wondering about further use    Review of Systems  Constitutional, HEENT, cardiovascular, pulmonary, gi and gu systems are negative, except as otherwise noted.      Objective           Vitals:  No vitals were obtained today due to virtual visit.    Physical Exam   GENERAL: alert and no distress  EYES: Eyes grossly normal to inspection.  No discharge or erythema, or obvious scleral/conjunctival abnormalities.  RESP: No audible wheeze, cough, or visible cyanosis.    SKIN: Visible skin clear. " No significant rash, abnormal pigmentation or lesions.  NEURO: Cranial nerves grossly intact.  Mentation and speech appropriate for age.  PSYCH: Appropriate affect, tone, and pace of words          Video-Visit Details    Type of service:  Video Visit   Originating Location (pt. Location): Home    Distant Location (provider location):  Off-site  Platform used for Video Visit: Rafal  Signed Electronically by: Jose Miguel Ferro PA-C    Answers submitted by the patient for this visit:  Patient Health Questionnaire (Submitted on 1/27/2025)  If you checked off any problems, how difficult have these problems made it for you to do your work, take care of things at home, or get along with other people?: Somewhat difficult  PHQ9 TOTAL SCORE: 3

## 2025-01-27 NOTE — TELEPHONE ENCOUNTER
"FUTURE VISIT INFORMATION      FUTURE VISIT INFORMATION:  Date: 5/7/25  Time: 2:50pm  Location: [facility] [place]  REFERRAL INFORMATION:  Referring provider:  Latia Post MD   Referring providers clinic:  [facility]   Reason for visit/diagnosis  Tinnitus, right, apt per Pt, [place] verified     RECORDS REQUESTED FROM:       Clinic name Comments Records Status Imaging Status   [facility] [place] 1/17/25- Ov wLatia Solomon MD  epiv    Imaging  10/22/24- MR Brain +  CTA Head Neck  Epic  PACS            \"Please notify/message CSS if patient completed outside imaging prior to scheduled appointment and/or any outside records that might have been missed at pre visit -Thanks\"  "

## 2025-01-29 ENCOUNTER — MYC MEDICAL ADVICE (OUTPATIENT)
Dept: FAMILY MEDICINE | Facility: CLINIC | Age: 45
End: 2025-01-29
Payer: COMMERCIAL

## 2025-01-29 ENCOUNTER — TELEPHONE (OUTPATIENT)
Dept: NEUROLOGY | Facility: CLINIC | Age: 45
End: 2025-01-29
Payer: COMMERCIAL

## 2025-01-29 NOTE — LETTER
"January 29, 2025      Chloe Foster  [address] [facility] [address]        To Whom It May Concern:    Chloe Foster is a long time patient under my care for a recent neurological diagnoses. She is currently needing to take breaks from the screen during the workday to control symptoms. Making the accommodation to allow working from home at this time will further allow her to accomplish a more productive day with more control over her current symptoms.     Accommodations should remain in place for the next 30 days pending upcoming speciality appointment.     Please contact me for questions or concerns.      Sincerely,    Shelby \"Jose Miguel\" ELOISE Ferro        Electronically signed      "

## 2025-01-29 NOTE — TELEPHONE ENCOUNTER
Left Voicemail (1st Attempt) for the patient to call back and schedule the following:    Appointment type: New Stroke (virtual only)  Provider: Dr. Ontiveros  Return date: 2/4/25 12:00 pm  Specialty phone number: [phone]  Additional appointment(s) needed: NA  Additonal Notes:      Manually sched if still avail

## 2025-01-29 NOTE — TELEPHONE ENCOUNTER
Jose Miguel,    Please see below Lumenz message and advise.   Pt seen virtually on 1/27/25 - letter sent same date  Letter pended in Communications (copied from previous). Please edit as appropriate    Thanks,   Talya HILLS RN

## 2025-02-03 ENCOUNTER — MYC MEDICAL ADVICE (OUTPATIENT)
Dept: CARDIOLOGY | Facility: CLINIC | Age: 45
End: 2025-02-03
Payer: COMMERCIAL

## 2025-02-03 DIAGNOSIS — R00.2 PALPITATIONS: Primary | ICD-10-CM

## 2025-02-03 NOTE — TELEPHONE ENCOUNTER
Josefina Diego, APRN CNP  You5 minutes ago (3:57 PM)     AM  Hello. Thanks for the update. For now, let's repeat a 14 day Zio monitor so we know for sure what's going on and then set up an appointment with me (1st available) to discuss findings and reassess symptoms. Of course, if any concerning findings on the heart monitor we'll be in touch to discuss plan. Thanks, Josefina     14 day ziopatch ordered and OV. Pt updated via Moodswing

## 2025-02-04 ENCOUNTER — VIRTUAL VISIT (OUTPATIENT)
Dept: NEUROLOGY | Facility: CLINIC | Age: 45
End: 2025-02-04
Attending: PSYCHIATRY & NEUROLOGY
Payer: COMMERCIAL

## 2025-02-04 VITALS — BODY MASS INDEX: 24.64 KG/M2 | HEIGHT: 67 IN | WEIGHT: 157 LBS

## 2025-02-04 DIAGNOSIS — I63.411 CEREBROVASCULAR ACCIDENT (CVA) DUE TO EMBOLISM OF RIGHT MIDDLE CEREBRAL ARTERY (H): Primary | ICD-10-CM

## 2025-02-04 DIAGNOSIS — G43.109 MIGRAINE WITH AURA AND WITHOUT STATUS MIGRAINOSUS, NOT INTRACTABLE: ICD-10-CM

## 2025-02-04 DIAGNOSIS — Z87.74 S/P PATENT FORAMEN OVALE CLOSURE: ICD-10-CM

## 2025-02-04 DIAGNOSIS — H93.11 TINNITUS, RIGHT: ICD-10-CM

## 2025-02-04 ASSESSMENT — PAIN SCALES - GENERAL: PAINLEVEL_OUTOF10: MODERATE PAIN (5)

## 2025-02-04 NOTE — NURSING NOTE
Current patient location: [address] [facility] [address]    Is the patient currently in the state of [place]? YES    Visit mode: VIDEO    If the visit is dropped, the patient can be reconnected by:VIDEO VISIT: Text to cell phone:   Telephone Information:   Mobile [phone]       Will anyone else be joining the visit? NO  (If patient encounters technical issues they should call [phone]    Are changes needed to the allergy or medication list? Pt stated no changes to allergies and Pt stated no med changes    Are refills needed on medications prescribed by this physician? NO    Rooming Documentation:  Not applicable    Reason for visit: Consult    Cristy HUNTER

## 2025-02-04 NOTE — LETTER
2/4/2025       RE: Chloe Foster  [address]  [facility] [address]     Dear Colleague,    Thank you for referring your patient, Chloe Foster, to the [facility] NEUROLOGY [facility] [place] at [facility]. Please see a copy of my visit note below.    Virtual Visit Details    Type of service:  Video Visit     Originating Location (pt. Location): Home    Distant Location (provider location):  Off-site  Platform used for Video Visit: AmWell  and Eitan          [facility] NEUROLOG[facility] CLIN[facility] [place] [id number] [facility]  3RD FLOOR  [facility] [id number] [phone]          February 4, 2025    Chloe Foster                                                                                                                     [address]  [facility] [address]    Alicia and eitan 42 minutes  Documentation, review, coordination: 30 (on day of visit) . More time was spent later in review of old imaging and discussions with neuroradiology and this is not included,    Ms. Foster is a 44 year old lady who I am seeing regarding a history of stroke. This young lady had two strokes in 2015 - this was in the context of pregnancy. She subsequently had an atrial septal occluder placed without complications. She was on aspirin and stopped taking this in 2020.    Most recently on 10/10/24 she presented with L sided symptoms including L face numbness as well as other left sided symptoms. 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 CTA did not show any explanatory large vessel stenosis. A GODWIN was done and this did not show any device related thrombus and no residual shunt. There was no pulmonary shunt either. A CT CAP was unremarkable. I reviewed the chest CT with radiology and there was no pulmonary shunt on chest CT.  The patient presented again on 10/22/24  with L sided symptoms - a repeat brain MRI did not show any extension of the stroke.    The patient was seen by Hematology and hematological causes were ruled out.     The patient has had one ziopatch which did not show AFIB. Another one has been ordered by cardiology.    I reviewed all the imaging dating back to 2015. There were acute strokes - with  discrete foci of DWI abnormality and subsequent FLAIR changes in 2015. These old strokes are less prominent in 2024 scans and appear as non-specific FLAIR changes. The location of the most recent infarct is separate and different from the prior strokes though they are all in R MCA territory.     No significant family history. No h/o drug use.    Patient has a significant h/o migraine - especially migraine aura which started after her pregnancy related strokes. She notes that there was an increase in frequency of aura - starting in September 2024. She had 3-4 days of very frequent aura prior to her stroke on 10/10/24. The aura are visual - she has zig-zag lines and then a scotoma area - where she is unable to see the details of someone's face but can see the outlines. It was hard to pinpoint whether the aura were in her L or R visual field and she could not recollect.     The patient also developed R ear tinnitus recently and this is being worked up by Dr. Post.         ASSESSMENT / PLAN  Encounter Diagnoses   Name Primary?     Cerebrovascular accident (CVA) due to embolism of right middle cerebral artery (H) Yes     Migraine with aura and without status migrainosus, not intractable      S/P patent foramen ovale closure      Tinnitus, right    This 44 year old lady has had a recent stroke on 10/10/24 as well as prior h/o strokes in the context of pregnancy. She has had an inter-atrial shunt closed in 2016 and there was no thrombus and no residual shunt on a GODWIN. No afib on ziopatch. CT chest was reviewed and there was no pulmonary AVM. Hematology has ruled out a  "hypercoagulable state.    Possible stroke mechanism is vasospasm related to migraine since she did report an increased frequency of aura prior to the stroke. It is not clear if she had a headache at that time (there is no mention in the neurology notes) and usually stroke in the context of migraine is observed with a severe headache. Migraine with aura carry an increased risk of stroke.     She has also developed R ear tinnitus.    I am reviewing her vascular imaging with neuroradiology and will discuss with them if an angio will be helpful. No scheduled follow up for now - but will be in touch re: further imaging. She has MRI/MRA/MRV ordered for tinnitus work up.     The patient reports ongoing memory and cognitive issues. Her memory was 2/3 today. I have ordered a neuropsychology evaluation and a rehab consult for functional work capacity assessment.    Orders Placed This Encounter   Procedures     Adult Neuropsychology  Referral     Adult Physical Medicine and Rehab  Referral        GODWIN 10/10  A \"clam shell\" type of ASD/PFO closure device is seen on the atrial septum. No  thrombus is identified on the device. There is no evidence of left to right  shunting around the device by color doppler. There is no evidence of right to  left intracardiac or pulmonary level shunt by bubble study.      10/22/24  MPRESSION:  Evolutionary changes of acute ischemic findings noted on  the prior MRI from 10/10/2024 with persistent diffusion signal changes  in the high right frontal lobe with corresponding FLAIR signal change.  No new restricted diffusion.     10/10/24                             IMPRESSION: Small focal infarct in the right middle/inferior frontal  gyral cortex.    10/10/24 CTA    IMPRESSION:   1.  No large vessel arterial occlusion or high-grade stenosis in the  head or neck. No arterial dissection.   2.  Bilateral thyroid nodules. Recommend dedicated follow-up  nonemergent ultrasound for further " evaluation.    Thyroid was discussed with patient.     Current Outpatient Medications   Medication Sig Dispense Refill     acetaminophen (TYLENOL) 325 MG tablet Take 325-650 mg by mouth every 6 hours as needed for mild pain       aspirin 81 MG EC tablet Take 1 tablet (81 mg) by mouth daily. 30 tablet 11     clonazePAM (KLONOPIN) 0.5 MG tablet Take 0.5-1 tablets (0.25-0.5 mg) by mouth 2 times daily as needed for anxiety. 30 tablet 1     diazepam (VALIUM) 5 MG tablet Take 1 tablet (5 mg) by mouth once as needed for anxiety (claustrophobia). 1 tablet 0     magnesium oxide (MAG-OX) 400 MG tablet Take 400 mg by mouth nightly as needed (leg cramps).       Omega-3 Fatty Acids (FISH OIL PO) Take 1 capsule by mouth At Bedtime        ondansetron (ZOFRAN ODT) 4 MG ODT tab Take 1 tablet (4 mg) by mouth every 8 hours as needed for nausea. 21 tablet 11     propranolol (INDERAL) 10 MG tablet Take 10 mg by mouth 2 times daily.       rosuvastatin (CRESTOR) 40 MG tablet Take 1 tablet (40 mg) by mouth daily. You will need to get your cholesterol checked with your primary in the next 2 months and then further refills per primary 30 tablet 1     Semaglutide-Weight Management (WEGOVY) 0.5 MG/0.5ML pen Inject 0.5 mg subcutaneously once a week. 2 mL 0     Semaglutide-Weight Management (WEGOVY) 1.7 MG/0.75ML pen Inject 1.7 mg subcutaneously once a week. 9 mL 1     ubrogepant (UBRELVY) 100 MG tablet Take 1 tablet (100 mg) by mouth at onset of headache. 10 tablet 11     VITAMIN D, CHOLECALCIFEROL, PO Take 1,000 Units by mouth every evening.       No current facility-administered medications for this visit.         EXAM  Orientation: Normal except did not know exact date; Language normal; Attention: Serial 7 5/5; normal Memory 3 -> 2  Cranial nerves: EOMI face symmetric tongue ML shoulder shrug normal tongue ML    Motor: FFM normal R and decreased L; No drift; Strength reported normal in both UE and LE;   Antigravity or better in both UE and  LE    Sensory: no deficits to LT reported  Co-ordination normal in both UE   Gait: normal base steady can walk on heels toes and tandem without difficulty              Anirudh Ontiveros MD      Again, thank you for allowing me to participate in the care of your patient.      Sincerely,    Anirudh Ontiveros MD

## 2025-02-04 NOTE — PROGRESS NOTES
[facility] NEUROLOGY CLINIC [address]  3RD FLOOR  [facility] [address] [phone]          February 4, 2025    Chloe Foster                                                                                                                     [address] [facility] [address]    Amwell and doximity 42 minutes  Documentation, review, coordination: 30 (on day of visit) . More time was spent later in review of old imaging and discussions with neuroradiology and this is not included,    Ms. Foster is a 44 year old lady who I am seeing regarding a history of stroke. This young lady had two strokes in 2015 - this was in the context of pregnancy. She subsequently had an atrial septal occluder placed without complications. She was on aspirin and stopped taking this in 2020.    Most recently on 10/10/24 she presented with L sided symptoms including L face numbness as well as other left sided symptoms. 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 CTA did not show any explanatory large vessel stenosis. A GODWIN was done and this did not show any device related thrombus and no residual shunt. There was no pulmonary shunt either. A CT CAP was unremarkable. I reviewed the chest CT with radiology and there was no pulmonary shunt on chest CT.  The patient presented again on 10/22/24 with L sided symptoms - a repeat brain MRI did not show any extension of the stroke.    The patient was seen by Hematology and hematological causes were ruled out.     The patient has had one ziopatch which did not show AFIB. Another one has been ordered by cardiology.    I reviewed all the imaging dating back to 2015. There were acute strokes - with  discrete foci of DWI abnormality and subsequent FLAIR changes in 2015. These old strokes are less prominent in 2024 scans and appear as non-specific FLAIR changes. The location of the most  recent infarct is separate and different from the prior strokes though they are all in R MCA territory.     No significant family history. No h/o drug use.    Patient has a significant h/o migraine - especially migraine aura which started after her pregnancy related strokes. She notes that there was an increase in frequency of aura - starting in September 2024. She had 3-4 days of very frequent aura prior to her stroke on 10/10/24. The aura are visual - she has zig-zag lines and then a scotoma area - where she is unable to see the details of someone's face but can see the outlines. It was hard to pinpoint whether the aura were in her L or R visual field and she could not recollect.     The patient also developed R ear tinnitus recently and this is being worked up by Dr. Post.         ASSESSMENT / PLAN  Encounter Diagnoses   Name Primary?    Cerebrovascular accident (CVA) due to embolism of right middle cerebral artery (H) Yes    Migraine with aura and without status migrainosus, not intractable     S/P patent foramen ovale closure     Tinnitus, right    This 44 year old lady has had a recent stroke on 10/10/24 as well as prior h/o strokes in the context of pregnancy. She has had an inter-atrial shunt closed in 2016 and there was no thrombus and no residual shunt on a GODWIN. No afib on ziopatch. CT chest was reviewed and there was no pulmonary AVM. Hematology has ruled out a hypercoagulable state.    Possible stroke mechanism is vasospasm related to migraine since she did report an increased frequency of aura prior to the stroke. It is not clear if she had a headache at that time (there is no mention in the neurology notes) and usually stroke in the context of migraine is observed with a severe headache. Migraine with aura carry an increased risk of stroke.     She has also developed R ear tinnitus.    I am reviewing her vascular imaging with neuroradiology and will discuss with them if an angio will be helpful. No  "scheduled follow up for now - but will be in touch re: further imaging. She has MRI/MRA/MRV ordered for tinnitus work up.     The patient reports ongoing memory and cognitive issues. Her memory was 2/3 today. I have ordered a neuropsychology evaluation and a rehab consult for functional work capacity assessment.    Orders Placed This Encounter   Procedures    Adult Neuropsychology  Referral    Adult Physical Medicine and Rehab  Referral        GODWIN 10/10  A \"clam shell\" type of ASD/PFO closure device is seen on the atrial septum. No  thrombus is identified on the device. There is no evidence of left to right  shunting around the device by color doppler. There is no evidence of right to  left intracardiac or pulmonary level shunt by bubble study.      10/22/24  MPRESSION:  Evolutionary changes of acute ischemic findings noted on  the prior MRI from 10/10/2024 with persistent diffusion signal changes  in the high right frontal lobe with corresponding FLAIR signal change.  No new restricted diffusion.     10/10/24                             IMPRESSION: Small focal infarct in the right middle/inferior frontal  gyral cortex.    10/10/24 CTA    IMPRESSION:   1.  No large vessel arterial occlusion or high-grade stenosis in the  head or neck. No arterial dissection.   2.  Bilateral thyroid nodules. Recommend dedicated follow-up  nonemergent ultrasound for further evaluation.    Thyroid was discussed with patient.     Current Outpatient Medications   Medication Sig Dispense Refill    acetaminophen (TYLENOL) 325 MG tablet Take 325-650 mg by mouth every 6 hours as needed for mild pain      aspirin 81 MG EC tablet Take 1 tablet (81 mg) by mouth daily. 30 tablet 11    clonazePAM (KLONOPIN) 0.5 MG tablet Take 0.5-1 tablets (0.25-0.5 mg) by mouth 2 times daily as needed for anxiety. 30 tablet 1    diazepam (VALIUM) 5 MG tablet Take 1 tablet (5 mg) by mouth once as needed for anxiety (claustrophobia). 1 tablet 0 "    magnesium oxide (MAG-OX) 400 MG tablet Take 400 mg by mouth nightly as needed (leg cramps).      Omega-3 Fatty Acids (FISH OIL PO) Take 1 capsule by mouth At Bedtime       ondansetron (ZOFRAN ODT) 4 MG ODT tab Take 1 tablet (4 mg) by mouth every 8 hours as needed for nausea. 21 tablet 11    propranolol (INDERAL) 10 MG tablet Take 10 mg by mouth 2 times daily.      rosuvastatin (CRESTOR) 40 MG tablet Take 1 tablet (40 mg) by mouth daily. You will need to get your cholesterol checked with your primary in the next 2 months and then further refills per primary 30 tablet 1    Semaglutide-Weight Management (WEGOVY) 0.5 MG/0.5ML pen Inject 0.5 mg subcutaneously once a week. 2 mL 0    Semaglutide-Weight Management (WEGOVY) 1.7 MG/0.75ML pen Inject 1.7 mg subcutaneously once a week. 9 mL 1    ubrogepant (UBRELVY) 100 MG tablet Take 1 tablet (100 mg) by mouth at onset of headache. 10 tablet 11    VITAMIN D, CHOLECALCIFEROL, PO Take 1,000 Units by mouth every evening.       No current facility-administered medications for this visit.         EXAM  Orientation: Normal except did not know exact date; Language normal; Attention: Serial 7 5/5; normal Memory 3 -> 2  Cranial nerves: EOMI face symmetric tongue ML shoulder shrug normal tongue ML    Motor: FFM normal R and decreased L; No drift; Strength reported normal in both UE and LE;   Antigravity or better in both UE and LE    Sensory: no deficits to LT reported  Co-ordination normal in both UE   Gait: normal base steady can walk on heels toes and tandem without difficulty              Anirudh Ontiveros MD

## 2025-02-04 NOTE — PATIENT INSTRUCTIONS
Orders Placed This Encounter   Procedures    Adult Neuropsychology  Referral    Adult Physical Medicine and Rehab  Referral      RTC PRN    Stroke & Endovascular RN Care Coordinators:    Nidia Monroy, RN, BSN  Zara Stewart, RN, CNRN, SCRN    If you have any questions please contact the RN Care Coordinators at [phone], option [phone].     Thank you for choosing [facility] for your health care needs.

## 2025-02-04 NOTE — PROGRESS NOTES
Virtual Visit Details    Type of service:  Video Visit     Originating Location (pt. Location): Home    Distant Location (provider location):  Off-site  Platform used for Video Visit: AmWell  and Doximity

## 2025-02-06 ENCOUNTER — TELEPHONE (OUTPATIENT)
Dept: NEUROSURGERY | Facility: CLINIC | Age: 45
End: 2025-02-06
Payer: COMMERCIAL

## 2025-02-06 DIAGNOSIS — I87.1 JUGULAR VEIN STENOSIS: Primary | ICD-10-CM

## 2025-02-06 NOTE — TELEPHONE ENCOUNTER
RN received incoming call from patient. Patient wants to schedule angiogram for shortly after the visit with Dr. Linton on 2/25/25. Dr. Linton did agree to go through with angio on message thread.   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 We could do an an angio to evaluate this and the right MCA   BDJ   RN to enter orders and patient to expect call from scheduling. Patient has no further questions at this time and will await call from scheduling. This procedure to be scheduled after she sees Dr. Linton on 2/25/25.  Nidia Monroy, DEVORA 2/6/2025 3:09 PM

## 2025-02-10 ENCOUNTER — HOSPITAL ENCOUNTER (OUTPATIENT)
Dept: MRI IMAGING | Facility: CLINIC | Age: 45
Discharge: HOME | End: 2025-02-10
Attending: PSYCHIATRY & NEUROLOGY
Payer: COMMERCIAL

## 2025-02-10 DIAGNOSIS — H93.11 TINNITUS, RIGHT: ICD-10-CM

## 2025-02-10 PROCEDURE — 70544 MR ANGIOGRAPHY HEAD W/O DYE: CPT

## 2025-02-10 PROCEDURE — A9585 GADOBUTROL INJECTION: HCPCS | Performed by: PSYCHIATRY & NEUROLOGY

## 2025-02-10 PROCEDURE — 70549 MR ANGIOGRAPH NECK W/O&W/DYE: CPT

## 2025-02-10 PROCEDURE — 70546 MR ANGIOGRAPH HEAD W/O&W/DYE: CPT

## 2025-02-10 PROCEDURE — 255N000002 HC RX 255 OP 636: Performed by: PSYCHIATRY & NEUROLOGY

## 2025-02-10 RX ORDER — GADOBUTROL 604.72 MG/ML
10 INJECTION INTRAVENOUS ONCE
Status: COMPLETED | OUTPATIENT
Start: 2025-02-10 | End: 2025-02-10

## 2025-02-10 RX ADMIN — GADOBUTROL 10 ML: 604.72 INJECTION INTRAVENOUS at 06:15

## 2025-02-11 ENCOUNTER — PRE VISIT (OUTPATIENT)
Dept: NEUROLOGY | Facility: CLINIC | Age: 45
End: 2025-02-11

## 2025-02-11 ENCOUNTER — TELEPHONE (OUTPATIENT)
Dept: RADIOLOGY | Facility: CLINIC | Age: 45
End: 2025-02-11

## 2025-02-11 NOTE — TELEPHONE ENCOUNTER
Patient called in regards to IR procedure with Dr. Julio Linton    Procedure Date: 3/11/2025    Arrival: 1130    Loction: [facility]    Anesthesia Type: IV Sedation    Patient was informed that they will be at the hospital roughly 5-6 hours and that they will need a . Patient verbalized understanding.    Notes:         Rosalino Alfaro on 2/11/2025 at 2:16 PM

## 2025-02-20 ENCOUNTER — VIRTUAL VISIT (OUTPATIENT)
Dept: FAMILY MEDICINE | Facility: CLINIC | Age: 45
End: 2025-02-20
Payer: COMMERCIAL

## 2025-02-20 DIAGNOSIS — Z86.73 HISTORY OF CARDIOEMBOLIC CEREBROVASCULAR ACCIDENT (CVA): Primary | ICD-10-CM

## 2025-02-20 DIAGNOSIS — F40.10 SOCIAL ANXIETY DISORDER: ICD-10-CM

## 2025-02-20 PROBLEM — Z86.39 HX OF OBESITY: Status: RESOLVED | Noted: 2024-12-06 | Resolved: 2025-02-20

## 2025-02-20 PROBLEM — R63.5 WEIGHT GAIN: Status: RESOLVED | Noted: 2023-09-22 | Resolved: 2025-02-20

## 2025-02-20 RX ORDER — LORAZEPAM 1 MG/1
.5-1 TABLET ORAL DAILY PRN
Qty: 30 TABLET | Refills: 1 | Status: SHIPPED | OUTPATIENT
Start: 2025-02-20

## 2025-02-20 NOTE — PROGRESS NOTES
Chloe is a 44 year old who is being evaluated via a billable video visit.    How would you like to obtain your AVS? MyChart  If the video visit is dropped, the invitation should be resent by: Text to cell phone: [phone]  Will anyone else be joining your video visit? No      Assessment & Plan     History of cardioembolic cerebrovascular accident (CVA)  Working with specialist with a few important upcoming appointments and procedures; will work on disability paperwork pending these appointment outcomes. Return to clinic with any worsening or changes in symptoms or go to ER Urgent care in off hours.    Social anxiety disorder  Ativan as needed for rare as needed use preferred as far as results/effectiveness goes. Proper use and risk for abuse discussed with patient at length.   Patient needs Evisit or virtual visit every 3 months and in person visit ever 6 months (ok for virtual visit if patient unable to be seen in timely manner).  Patient to return to clinic when refills needed.   - LORazepam (ATIVAN) 1 MG tablet; Take 0.5-1 tablets (0.5-1 mg) by mouth daily as needed for anxiety or sleep (severe anxiety/panic).      See Patient Instructions    Subjective   Chloe is a 44 year old, presenting for the following health issues:  Neurologic Problem        2/20/2025    11:19 AM   Additional Questions   Roomed by Zaida CRUZ         2/20/2025    11:19 AM   Patient Reported Additional Medications   Patient reports taking the following new medications None     History of Present Illness       Reason for visit:  Work Restrictions    She eats 4 or more servings of fruits and vegetables daily.She consumes 0 sweetened beverage(s) daily.She exercises with enough effort to increase her heart rate 30 to 60 minutes per day.  She exercises with enough effort to increase her heart rate 5 days per week.   She is taking medications regularly.     Workability notes written 1/29/25 x 30 days with goal to continue to work from  home  Patient seen by neurology 2/4/25 with further imaging showing right jugular diverticulum and correction procedure upcoming March 11, 2025  Patient also referred to rehab facility (work capacity) but unable to be seen until May 13, 2025  Plans for neuropsych eval as well (not scheduled until Jan 2026 though).  Patient does have disability paperwork to officially fill out. Still determining best requests though.  Patient getting pressure to return to work in office, rather than working from home.  - patient could take a medical leave though  - patient needs ability for trigger avoidance (light changes/stress) and ability to tolerate side effects from migraine medications  - neurology notes if patient having 15+ headaches per month, should qualify for further assistance  - patient is not driving at this time  - vision changes, migraines/headaches, sensitivity to light and sound    History of trying klonopin for sleep/anxiety to potentially decrease risk for headaches; but did not help with sleep if anything if aggravated sleep. History of ativan being more helpful, typically only used 1-3x per week.      Review of Systems  Constitutional, HEENT, cardiovascular, pulmonary, gi and gu systems are negative, except as otherwise noted.      Objective    Vitals - Patient Reported  Systolic (Patient Reported):  (n/a)  Diastolic (Patient Reported):  (n/a)  Weight (Patient Reported):  (n/a)  Height (Patient Reported):  (n/a)  SpO2 (Patient Reported):  (n/a)  Temperature (Patient Reported):  (n/a)  Pulse (Patient Reported):  (n/a)  Pain Score: No Pain (0)      Vitals:  No vitals were obtained today due to virtual visit.    Physical Exam   GENERAL: alert and no distress  EYES: Eyes grossly normal to inspection.  No discharge or erythema, or obvious scleral/conjunctival abnormalities.  RESP: No audible wheeze, cough, or visible cyanosis.    SKIN: Visible skin clear. No significant rash, abnormal pigmentation or  lesions.  NEURO: Cranial nerves grossly intact.  Mentation and speech appropriate for age.  PSYCH: Appropriate affect, tone, and pace of words          Video-Visit Details    Type of service:  Video Visit   Originating Location (pt. Location): Home    Distant Location (provider location):  On-site  Platform used for Video Visit: Rafal  Signed Electronically by: Jose Miguel Ferro PA-C

## 2025-02-25 ENCOUNTER — VIRTUAL VISIT (OUTPATIENT)
Dept: NEUROSURGERY | Facility: CLINIC | Age: 45
End: 2025-02-25
Payer: COMMERCIAL

## 2025-02-25 VITALS — WEIGHT: 160 LBS | BODY MASS INDEX: 25.11 KG/M2 | HEIGHT: 67 IN

## 2025-02-25 DIAGNOSIS — G43.109 MIGRAINE WITH AURA AND WITHOUT STATUS MIGRAINOSUS, NOT INTRACTABLE: Primary | ICD-10-CM

## 2025-02-25 PROCEDURE — 1126F AMNT PAIN NOTED NONE PRSNT: CPT | Performed by: RADIOLOGY

## 2025-02-25 PROCEDURE — 98001 SYNCH AUDIO-VIDEO NEW LOW 30: CPT | Mod: 4UV | Performed by: RADIOLOGY

## 2025-02-25 ASSESSMENT — PAIN SCALES - GENERAL: PAINLEVEL_OUTOF10: NO PAIN (0)

## 2025-02-25 NOTE — LETTER
2/25/2025       RE: Chloe Foster  [address] [facility] [address]     Dear Colleague,    Thank you for referring your patient, Chloe Foster, to the [facility] NEUROSURGERY CLINIC [place] at [facility]. Please see a copy of my visit note below.    Neuro IR clinic visit-virtual    44-year-old female with history of 2 strokes in 2015, s/p atrial septal occluder, presented with left-sided symptoms on 10/10/2024, with brain MRI showing right middle frontal gyrus DWI changes.  Her CTA head and neck, GODWIN, CT chest/abdomen/pelvis were all unremarkable.  She then presented again with similar left-sided symptoms on 10/22/2024 and a repeat MRI did not show any extension of her stroke.  She was also seen by parish/tyrese and any hematological causes were ruled out.  She has not had A-fib on Zio patch.  She does have significant history of migraines with aura and there has been an increase in frequency of the aura since 9/2024.  She also recalled having 3 to 4 days of very frequent auras prior to her stroke on 10/10/2024.  She gets zigzag lines and then a scotoma where she is unable to see the details of someone's face but can see outlines as her aura.  She also reports having developed right ear tinnitus.  She reports that she had tinnitus like this before in 2015 after her strokes as well. She presents to neuro IR clinic for a possible diagnostic angiogram to evaluate for vasospasm like changes related to migraine. She also had MRI, MRA, MRV be done in 2/2025 which were all unremarkable.  She is scheduled for a diagnostic cerebral angiogram on 3/11/2025.    Today she reports no auras, but has had worsening headaches. She reports being on verapamil which was stopped. She is on aspirin 81mg. She reports worsening tinnitus, bilateral but worse on the right. No over the counter medications, herbal supplements except for vitamin D and mag ox, no  marijuana use.      Current Outpatient Medications:      acetaminophen (TYLENOL) 325 MG tablet, Take 325-650 mg by mouth every 6 hours as needed for mild pain, Disp: , Rfl:      aspirin 81 MG EC tablet, Take 1 tablet (81 mg) by mouth daily., Disp: 30 tablet, Rfl: 11     LORazepam (ATIVAN) 1 MG tablet, Take 0.5-1 tablets (0.5-1 mg) by mouth daily as needed for anxiety or sleep (severe anxiety/panic)., Disp: 30 tablet, Rfl: 1     magnesium oxide (MAG-OX) 400 MG tablet, Take 400 mg by mouth nightly as needed (leg cramps)., Disp: , Rfl:      Omega-3 Fatty Acids (FISH OIL PO), Take 1 capsule by mouth At Bedtime , Disp: , Rfl:      ondansetron (ZOFRAN ODT) 4 MG ODT tab, Take 1 tablet (4 mg) by mouth every 8 hours as needed for nausea., Disp: 21 tablet, Rfl: 11     propranolol (INDERAL) 10 MG tablet, Take 10 mg by mouth 2 times daily., Disp: , Rfl:      rosuvastatin (CRESTOR) 40 MG tablet, Take 1 tablet (40 mg) by mouth daily. You will need to get your cholesterol checked with your primary in the next 2 months and then further refills per primary, Disp: 30 tablet, Rfl: 1     Semaglutide-Weight Management (WEGOVY) 0.5 MG/0.5ML pen, Inject 0.5 mg subcutaneously once a week., Disp: 2 mL, Rfl: 0     Semaglutide-Weight Management (WEGOVY) 1.7 MG/0.75ML pen, Inject 1.7 mg subcutaneously once a week., Disp: 9 mL, Rfl: 1     ubrogepant (UBRELVY) 100 MG tablet, Take 1 tablet (100 mg) by mouth at onset of headache., Disp: 10 tablet, Rfl: 11     VITAMIN D, CHOLECALCIFEROL, PO, Take 1,000 Units by mouth every evening., Disp: , Rfl:      Exam limited due to virtual visit  A&o x4  Speech without aphasia or dysarthria  CN- EOMI, face symmetric, hearing intact  Antigravity in UE    Assessment/ Plan-  44-year-old female with history of 2 strokes in 2015, s/p atrial septal occluder, stroke in 10/2024 and multiple auras preceding the events referred to neuroIR for a diagnostic cerebral angiogram to evaluate for a vasospasm related etiology.  She also has right > left tinnitus. She is scheduled for a DSA on 3/11/25. We discussed the <1% complication rate including ischemic stroke, hematoma/bruising, vessel injury, infection, death; conscious sedation and with wrist/groin access. She will have transportation for after the procedure as well.  All questions were answered.    Chika Schmid MD  Fellow, Endovascular Surgical Neuroradiology    Attestation signed by Julio Linton MD at 3/4/2025 12:45 PM:  I agree with the above note by Dr. Schmid        on  2/25/25      Again, thank you for allowing me to participate in the care of your patient.      Sincerely,    Julio Linton MD

## 2025-02-25 NOTE — PROGRESS NOTES
"Virtual Visit Details    Type of service:  Video Visit     Originating Location (pt. Location): {video visit patient location:809804::\"Home\"}  {PROVIDER LOCATION On-site should be selected for visits conducted from your clinic location or adjoining [facility] hospital, academic office, or other nearby [facility] building. Off-site should be selected for all other provider locations, including home:075[address]}  Distant Location (provider location):  {virtual location provider:135502}  Platform used for Video Visit: {Virtual Visit Platforms:012185::\"Anesco\"}  "

## 2025-02-25 NOTE — PATIENT INSTRUCTIONS
Please proceed with angiogram as scheduled on 3/11/25.    Stroke & Endovascular RN Care Coordinators:    Nidia Monroy, RN, BSN  Zara Stewart, RN, CNRN, SCRN    If you have any questions please contact the RN Care Coordinators at [phone], option 1.    After business hours call the  at [phone] and have the Neuro-Interventional Fellow paged.    Thank you for choosing [facility] for your health care needs.

## 2025-02-25 NOTE — NURSING NOTE
Current patient location: [address] [facility] [address]    Is the patient currently in the state of [place]? YES    Visit mode: VIDEO    If the visit is dropped, the patient can be reconnected by:VIDEO VISIT: Text to cell phone:   Telephone Information:   Mobile [phone]       Will anyone else be joining the visit? NO  (If patient encounters technical issues they should call [phone])    Are changes needed to the allergy or medication list? Pt stated no changes to allergies and Pt stated no med changes    Are refills needed on medications prescribed by this physician? NO    Rooming Documentation:  Not applicable    Reason for visit: Consult    Cristy HUNTER

## 2025-02-25 NOTE — PROGRESS NOTES
Neuro IR clinic visit-virtual    44-year-old female with history of 2 strokes in 2015, s/p atrial septal occluder, presented with left-sided symptoms on 10/10/2024, with brain MRI showing right middle frontal gyrus DWI changes.  Her CTA head and neck, GODWIN, CT chest/abdomen/pelvis were all unremarkable.  She then presented again with similar left-sided symptoms on 10/22/2024 and a repeat MRI did not show any extension of her stroke.  She was also seen by parish/tigistk and any hematological causes were ruled out.  She has not had A-fib on Zio patch.  She does have significant history of migraines with aura and there has been an increase in frequency of the aura since 9/2024.  She also recalled having 3 to 4 days of very frequent auras prior to her stroke on 10/10/2024.  She gets zigzag lines and then a scotoma where she is unable to see the details of someone's face but can see outlines as her aura.  She also reports having developed right ear tinnitus.  She reports that she had tinnitus like this before in 2015 after her strokes as well. She presents to neuro IR clinic for a possible diagnostic angiogram to evaluate for vasospasm like changes related to migraine. She also had MRI, MRA, MRV be done in 2/2025 which were all unremarkable.  She is scheduled for a diagnostic cerebral angiogram on 3/11/2025.    Today she reports no auras, but has had worsening headaches. She reports being on verapamil which was stopped. She is on aspirin 81mg. She reports worsening tinnitus, bilateral but worse on the right. No over the counter medications, herbal supplements except for vitamin D and mag ox, no marijuana use.      Current Outpatient Medications:     acetaminophen (TYLENOL) 325 MG tablet, Take 325-650 mg by mouth every 6 hours as needed for mild pain, Disp: , Rfl:     aspirin 81 MG EC tablet, Take 1 tablet (81 mg) by mouth daily., Disp: 30 tablet, Rfl: 11    LORazepam (ATIVAN) 1 MG tablet, Take 0.5-1 tablets (0.5-1 mg) by mouth  daily as needed for anxiety or sleep (severe anxiety/panic)., Disp: 30 tablet, Rfl: 1    magnesium oxide (MAG-OX) 400 MG tablet, Take 400 mg by mouth nightly as needed (leg cramps)., Disp: , Rfl:     Omega-3 Fatty Acids (FISH OIL PO), Take 1 capsule by mouth At Bedtime , Disp: , Rfl:     ondansetron (ZOFRAN ODT) 4 MG ODT tab, Take 1 tablet (4 mg) by mouth every 8 hours as needed for nausea., Disp: 21 tablet, Rfl: 11    propranolol (INDERAL) 10 MG tablet, Take 10 mg by mouth 2 times daily., Disp: , Rfl:     rosuvastatin (CRESTOR) 40 MG tablet, Take 1 tablet (40 mg) by mouth daily. You will need to get your cholesterol checked with your primary in the next 2 months and then further refills per primary, Disp: 30 tablet, Rfl: 1    Semaglutide-Weight Management (WEGOVY) 0.5 MG/0.5ML pen, Inject 0.5 mg subcutaneously once a week., Disp: 2 mL, Rfl: 0    Semaglutide-Weight Management (WEGOVY) 1.7 MG/0.75ML pen, Inject 1.7 mg subcutaneously once a week., Disp: 9 mL, Rfl: 1    ubrogepant (UBRELVY) 100 MG tablet, Take 1 tablet (100 mg) by mouth at onset of headache., Disp: 10 tablet, Rfl: 11    VITAMIN D, CHOLECALCIFEROL, PO, Take 1,000 Units by mouth every evening., Disp: , Rfl:      Exam limited due to virtual visit  A&o x4  Speech without aphasia or dysarthria  CN- EOMI, face symmetric, hearing intact  Antigravity in UE    Assessment/ Plan-  44-year-old female with history of 2 strokes in 2015, s/p atrial septal occluder, stroke in 10/2024 and multiple auras preceding the events referred to neuroIR for a diagnostic cerebral angiogram to evaluate for a vasospasm related etiology. She also has right > left tinnitus. She is scheduled for a DSA on 3/11/25. We discussed the <1% complication rate including ischemic stroke, hematoma/bruising, vessel injury, infection, death; conscious sedation and with wrist/groin access. She will have transportation for after the procedure as well.  All questions were answered.    Chika  MD Sharmaine  Fellow, Endovascular Surgical Neuroradiology

## 2025-02-26 ENCOUNTER — MYC MEDICAL ADVICE (OUTPATIENT)
Dept: FAMILY MEDICINE | Facility: CLINIC | Age: 45
End: 2025-02-26

## 2025-02-26 ENCOUNTER — OFFICE VISIT (OUTPATIENT)
Dept: DERMATOLOGY | Facility: CLINIC | Age: 45
End: 2025-02-26
Payer: COMMERCIAL

## 2025-02-26 ENCOUNTER — OFFICE VISIT (OUTPATIENT)
Dept: PSYCHOLOGY | Facility: CLINIC | Age: 45
End: 2025-02-26
Payer: COMMERCIAL

## 2025-02-26 DIAGNOSIS — Z12.83 ENCOUNTER FOR SCREENING FOR MALIGNANT NEOPLASM OF SKIN: ICD-10-CM

## 2025-02-26 DIAGNOSIS — L82.1 SEBORRHEIC KERATOSES: ICD-10-CM

## 2025-02-26 DIAGNOSIS — L81.4 LENTIGINES: ICD-10-CM

## 2025-02-26 DIAGNOSIS — D18.01 CHERRY ANGIOMA: ICD-10-CM

## 2025-02-26 DIAGNOSIS — Z12.83 SCREENING FOR SKIN CANCER: ICD-10-CM

## 2025-02-26 DIAGNOSIS — F43.10 PTSD (POST-TRAUMATIC STRESS DISORDER): Primary | ICD-10-CM

## 2025-02-26 DIAGNOSIS — D22.9 MULTIPLE BENIGN NEVI: Primary | ICD-10-CM

## 2025-02-26 PROCEDURE — 90837 PSYTX W PT 60 MINUTES: CPT | Performed by: SOCIAL WORKER

## 2025-02-26 NOTE — LETTER
"2/26/2025      Chloe Foster  [address] [facility] [address]      Dear Colleague,    Thank you for referring your patient, Chloe Foster, to the [facility]. Please see a copy of my visit note below.    [facility] Dermatology Note  Encounter Date: Feb 26, 2025  Office Visit     Reviewed patients past medical history and pertinent chart review prior to patients visit today.     Dermatology Problem List:  Patient denies personal history of skin cancer or dysplastic nevi.    Patient denies family history of skin cancer or dysplastic nevi.      ____________________________________________    Assessment & Plan:     # Benign skin findings including: seborrheic keratoses, cherry angioma, lentigines and benign nevi.   - No further intervention required. Patient to report changes.   - Patient reassured of the benign nature of these lesions.    #Signs and Symptoms of non-melanoma skin cancer and ABCDEs of melanoma reviewed with patient. Patient encouraged to perform monthly self skin exams and educated on how to perform them. UV precautions reviewed with patient. Patient was asked about new or changing moles/lesions on body.     #Reviewed Sunscreen: Apply 20 minutes prior to going outdoors and reapply every two hours, when wet or sweating. We recommend using an SPF 30 or higher, and to use one that is water resistant.       Follow-up:  1-2 years for follow up full body skin exam, prn for new or changing lesions or new concerns    Dayanara Alvarez CNP  Dermatology     ____________________________________________    CC: Consult ([facility] [place] [facility]/Concerns: New and changing moles & hairloss.)    HPI:  Ms. Chloe Foster is a(n) 44 year old female who presents today as a new patient for a full body skin cancer screening. Patient has concerns today about a few new \"moles\" on her left cheek and legs. She feels like she is always getting new brown spots now. "     Patient is otherwise feeling well, without additional skin concerns.     Physical Exam:  SKIN: Total skin excluding the genitalia areas was performed. The exam included the head/face, neck, both arms, chest, back, abdomen, both legs, digits, mons pubis, buttock and nails.   -left cheek and legs, tan homogenous cerebriform papules c/w Seborrheic keratosis   -several 1-2mm red dome shaped symmetric papules scattered on the trunk  -multiple tan/brown flat round macules and raised papules scattered throughout trunk, extremities and head. No worrisome features for malignancy noted on examination.  -scattered tan, homogenous macules scattered on sun exposed areas of trunk, extremities and face.     - No other lesions of concern on areas examined.     Medications:  Current Outpatient Medications   Medication Sig Dispense Refill     acetaminophen (TYLENOL) 325 MG tablet Take 325-650 mg by mouth every 6 hours as needed for mild pain       aspirin 81 MG EC tablet Take 1 tablet (81 mg) by mouth daily. 30 tablet 11     LORazepam (ATIVAN) 1 MG tablet Take 0.5-1 tablets (0.5-1 mg) by mouth daily as needed for anxiety or sleep (severe anxiety/panic). 30 tablet 1     magnesium oxide (MAG-OX) 400 MG tablet Take 400 mg by mouth nightly as needed (leg cramps).       Omega-3 Fatty Acids (FISH OIL PO) Take 1 capsule by mouth At Bedtime        ondansetron (ZOFRAN ODT) 4 MG ODT tab Take 1 tablet (4 mg) by mouth every 8 hours as needed for nausea. 21 tablet 11     propranolol (INDERAL) 10 MG tablet Take 10 mg by mouth 2 times daily.       rosuvastatin (CRESTOR) 40 MG tablet Take 1 tablet (40 mg) by mouth daily. You will need to get your cholesterol checked with your primary in the next 2 months and then further refills per primary 30 tablet 1     Semaglutide-Weight Management (WEGOVY) 1.7 MG/0.75ML pen Inject 1.7 mg subcutaneously once a week. 9 mL 1     ubrogepant (UBRELVY) 100 MG tablet Take 1 tablet (100 mg) by mouth at onset of  headache. 10 tablet 11     VITAMIN D, CHOLECALCIFEROL, PO Take 1,000 Units by mouth every evening.       Semaglutide-Weight Management (WEGOVY) 0.5 MG/0.5ML pen Inject 0.5 mg subcutaneously once a week. (Patient not taking: Reported on 2/26/2025) 2 mL 0     No current facility-administered medications for this visit.      Past Medical History:   Patient Active Problem List   Diagnosis     Vitamin D deficiency     CARDIOVASCULAR SCREENING; LDL GOAL LESS THAN 160     Lumbar herniated disc     Hirsutism     Insomnia     IBS (irritable bowel syndrome)     Moderate recurrent major depression (H)     History of cardioembolic cerebrovascular accident (CVA)     PFO with atrial septal aneurysm     Protein S deficiency     Migraine with aura and without status migrainosus, not intractable     Pre-conception counseling     Elevated lipoprotein A level     Social anxiety disorder     MTHFR mutation     Cerebrovascular accident (CVA) due to embolism of right middle cerebral artery (H)     Deviated nasal septum     Overweight     Elevated cholesterol     S/P patent foramen ovale closure     Past Medical History:   Diagnosis Date     Acute stress reaction     propranolol helps prior to interviews, stressful situations     Allergic rhinitis, cause unspecified     no meds except prn flonase, tests generally positive, decided against shots     Anxiety state, unspecified     citalopram started in 7/06, stopped after couple wks, felt sluggish/tired     CVA (cerebral vascular accident) (H)     May 1, May 14th 2015     Depressive disorder, not elsewhere classified     dx, but not sure this is correct, weaned off wellbutrin (4/05-6/06, 12/06-1/08), restarted 3/08     Dizziness      Elevated lipoprotein A level      Hirsutism     saw endo, inconclusive, started on spironolactone (helped a little), stopped in 6/06, elevated DHEA- sees new endo 4/08     History of stroke with residual effects      PFO (patent foramen ovale)     Post PFO/ASD  closure with 30mm Amplatzer device 4/21/16     Protein S deficiency     Per Dr. Casarez, prengnacy related.  Protein S levels normalized afer pregnancy.     Urinary tract infection, site not specified     recurrent, start nitrofur in 5/08, 6mo txt       CC Referred Self, MD  No address on file on close of this encounter.      Again, thank you for allowing me to participate in the care of your patient.        Sincerely,        Lou Alvarez, LUCY CNP    Electronically signed

## 2025-02-26 NOTE — TELEPHONE ENCOUNTER
Rooming staff,   Form printed and left in the south pod box to review.   Thanks!  Yoselin SALGADO

## 2025-02-26 NOTE — PROGRESS NOTES
[facility]                                     Progress Note    Patient Name: Chloe Foster  Date: 2/26/2025         Service Type: Individual      Session Start Time: 11:58 AM  Session End Time: 12:53 PM     Session Length: 53+ minutes    Extended Session (53+ minutes): PROLONGED SERVICE IN THE OUTPATIENT SETTING REQUIRING DIRECT (FACE-TO-FACE) PATIENT CONTACT BEYOND THE USUAL SERVICE:    - Longer session due to limited access to mental health appointments and necessity to address patient's distress / complexity    Session #: 1 with this provider    Attendees: Client attended alone    Service Modality:  In-person    DATA  Interactive Complexity: No  Crisis: No        Progress Since Last Session (Related to Symptoms / Goals / Homework):   Symptoms:  anxiety symptoms    Homework:  initial appointment      Episode of Care Goals: No improvement - PREPARATION (Decided to change - considering how); Intervened by negotiating a change plan and determining options / strategies for behavior change, identifying triggers, exploring social supports, and working towards setting a date to begin behavior change     Current / Ongoing Stressors and Concerns:   Speaking in front of groups    History of strokes, most recent in October 2024     Treatment Objective(s) Addressed in This Session:   identify 2 initial signs or symptoms of anxiety  EMDR: phases 1 & 2     Intervention:   Co-developed treatment plan  EMDR: provided psychoeducation, identified potential targets, introduced methods of BLS, engaged in completion of PCL-5    Assessments completed prior to visit:  The following assessments were completed by patient for this visit:  PCL-5 PTSD Checklist:       11/14/2024 2/26/2025   PCL-5 PTSD Checklist   1. Repeated, disturbing, and unwanted memories of the stressful experience? 3 3   2. Repeated, disturbing dreams of the stressful experience? 1 1   3. Suddenly feeling or acting as if the stressful  experience were actually happening again (as if you were actually back there reliving it)? 3 3   4. Feeling very upset when something reminded you of the stressful experience? 4 3   5. Having strong physical reactions when something reminded you of the stressful experience (for example, heart pounding, trouble breathing, sweating)? 4 0   6. Avoiding memories, thoughts, or feelings related to the stressful experience? 2 2   7. Avoiding external reminders of the stressful experience (for example, people, places, conversations, activities, objects, or situations)? 2 4   8. Trouble remembering important parts of the stressful experience? 0 0   9. Having strong negative beliefs about yourself, other people, or the world (for example, having thoughts such as: I am bad, there is something seriously wrong with me, no one can be trusted, the world is completely dangerous)? 0 0   10. Blaming yourself or someone else for the stressful experience or what happened after it? 0 0   11. Having strong negative feelings such as fear, horror, anger, guilt, or shame? 3 2   12. Loss of interest in activities that you used to enjoy? 1 4   13. Feeling distant or cut off from other people? 0 3   14. Trouble experiencing positive feelings (for example, being unable to feel happiness or have loving feelings for people close to you)? 0 3   15. Irritable behavior, angry outbursts, or acting aggressively? 1 1   16. Taking too many risks or doing things that could cause you harm? 0 0   17. Being superalert or watchful or on guard? 3 4   18. Feeling jumpy or easily startled? 1 3   19. Having difficulty concentrating? 0 2   20. Trouble falling or staying asleep? 0 2   PCL-5 Total Score 28 40         ASSESSMENT: Current Emotional / Mental Status (status of significant symptoms):   Risk status (Self / Other harm or suicidal ideation)   Patient denies current fears or concerns for personal safety.   Patient denies current or recent suicidal ideation  or behaviors.   Patient denies current or recent homicidal ideation or behaviors.   Patient denies current or recent self injurious behavior or ideation.   Patient denies other safety concerns.   Patient reports there has been no change in risk factors since their last session.     Patient reports there has been no change in protective factors since their last session.     Recommended that patient call 911 or go to the local ED should there be a change in any of these risk factors     Appearance:   Appropriate    Eye Contact:   Good    Psychomotor Behavior: Normal    Attitude:   Cooperative    Orientation:   All   Speech    Rate / Production: Talkative Normal     Volume:  Normal    Mood:    Anxious  Sad    Affect:    Mood congruent, minimally tearful    Thought Content:  Clear    Thought Form:  Coherent  Goal Directed  Logical    Insight:    Good      Medication Review:   No changes to current psychiatric medication(s)    Propranolol  Ativan     Medication Compliance:   Yes     Changes in Health Issues:   None reported     Chemical Use Review:   Substance Use: Chemical use reviewed, no active concerns identified      Tobacco Use: No current tobacco use.      Diagnosis:  309.81 (F43.10) Posttraumatic Stress Disorder (includes Posttraumatic Stress Disorder for Children 6 Years and Younger)  Without dissociative symptoms.   300.23 (F40.10) Social Anxiety Disorder by history  Major Depressive Disorder, single episode, in full remission    Collateral Reports Completed:   Reviewed medical record , collaboration with Dr. Post    PLAN: (Patient Tasks / Therapist Tasks / Other)  EMDR: complete timeline, engage in completion of JAZMIN II, safe/calm state and container exercises        Talya Donohue, [facility]  2/26/2025                                                         ______________________________________________________________________    Individual Treatment Plan    Patient's Name: Chloe ROCKY Foster  Date Of  Birth: 1980    Date of Creation: 2/26/2025  Date Treatment Plan Last Reviewed/Revised: 2/26/2025    DSM5 Diagnoses: 309.81 (F43.10) Posttraumatic Stress Disorder (includes Posttraumatic Stress Disorder for Children 6 Years and Younger)  Without dissociative symptoms.   300.23 (F40.10) Social Anxiety Disorder by history  Major Depressive Disorder, single episode, in full remission  Psychosocial / Contextual Factors: resides with son and , employed full-time, history of strokes  PROMIS (reviewed every 90 days):     Assessments completed prior to visit:  The following assessments were completed by patient for this visit:  PROMIS 10-Global Health (only subscores and total score):       9/22/2023     8:01 AM 1/9/2024     8:32 AM 4/16/2024     9:35 AM 11/14/2024    12:09 PM 12/6/2024     7:54 AM   PROMIS-10 Scores Only   Global Mental Health Score 10 13 12 10  12    Global Physical Health Score 15 16 16 16  17    PROMIS TOTAL - SUBSCORES 25 29 28 26  29        Patient-reported        Referral / Collaboration:  Referral to another professional/service is not indicated at this time..    Anticipated number of session for this episode of care:  will assess in 90 days  Anticipation frequency of session: Every other week  Anticipated Duration of each session: 38-52 minutes  Treatment plan will be reviewed in 90 days or when goals have been changed.       MeasurableTreatment Goal(s) related to diagnosis / functional impairment(s)  Goal 1: Patient will be able to recall the traumatic events without becoming overwhelmed with negative thoughts, feelings, or urges.    Objective #A (Patient Action)    Status: New - Date: 2/26/2025    Patient will describe the history of and nature of PTSD and anxiety symptoms    Intervention(s)  Therapist will assess the client's frequency, intensity, duration, and history of PTSD and anxiety symptoms and their impact on functioning.    Objective #B (Patient Action)  Status: New Date:  2/26/2025    Patient will cooperate with eye movement desensitization and reprocessing (EMDR) to reduce emotional reaction to traumatic event(s)    Intervention(s)  Therapist will utilize EMDR to reduce the client's emotional reactivity to the traumatic event(s).    Objective #C (Patient Action)  Status: New Date:2/26/2025    Patient will learn and implement calming and coping strategies to manage PTSD symptoms.    Intervention(s)  Therapist will teach grounding techniques, distress tolerance, and emotion regulation techniques.     Patient has reviewed and agreed to the above plan.      Talya Donohue, [facility]  February 26, 2025

## 2025-02-26 NOTE — PATIENT INSTRUCTIONS
Proper skin care from [facility]:    -Eliminate harsh soaps as they strip the natural oils from the skin, often resulting in dry itchy skin ( i.e. Dial, Zest, Bangl[place] Spring)  -Use mild soaps such as Cetaphil or Dove Sensitive Skin in the shower. You do not need to use soap on arms, legs, and trunk every time you shower unless visibly soiled.   -Avoid hot or cold showers.  -After showering, lightly dry off and apply moisturizing within 2-3 minutes. This will help trap moisture in the skin.   -Aggressive use of a moisturizer at least 1-2 times a day to the entire body (including -Vanicream, Cetaphil, Aquaphor or Cerave) and moisturize hands after every washing.  -We recommend using moisturizers that come in a tub that needs to be scooped out, not a pump. This has more of an oil base. It will hold moisture in your skin much better than a water base moisturizer. The above recommended are non-pore clogging.      Wear a sunscreen with at least SPF 30 on your face, ears, neck and V of the chest daily. Wear sunscreen on other areas of the body if those areas are exposed to the sun throughout the day. Sunscreens can contain physical and/or chemical blockers. Physical blockers are less likely to clog pores, these include zinc oxide and titanium dioxide. Reapply every two hour and after swimming.     Sunscreen examples: https://www.ewg.org/sunscreen/    UV radiation  UVA radiation remains constant throughout the day and throughout the year. It is a longer wavelength than UVB and therefore penetrates deeper into the skin leading to immediate and delayed tanning, photoaging, and skin cancer. 70-80% of UVA and UVB radiation occurs between the hours of 10am-2pm.  UVB radiation  UVB radiation causes the most harmful effects and is more significant during the summer months. However, snow and ice can reflect UVB radiation leading to skin damage during the winter months as well. UVB radiation is responsible for tanning,  burning, inflammation, delayed erythema (pinkness), pigmentation (brown spots), and skin cancer.     I recommend self monthly full body exams and yearly full body exams with a dermatology provider. If you develop a new or changing lesion please follow up for examination. Most skin cancers are pink and scaly or pink and pearly. However, we do see blue/brown/black skin cancers.  Consider the ABCDEs of melanoma when giving yourself your monthly full body exam ( don't forget the groin, buttocks, feet, toes, etc). A-asymmetry, B-borders, C-color, D-diameter, E-elevation or evolving. If you see any of these changes please follow up in clinic. If you cannot see your back I recommend purchasing a hand held mirror to use with a larger wall mirror.       Checking for Skin Cancer  You can find cancer early by checking your skin each month. There are 3 kinds of skin cancer. They are melanoma, basal cell carcinoma, and squamous cell carcinoma. Doing monthly skin checks is the best way to find new marks or skin changes. Follow the instructions below for checking your skin.   The ABCDEs of checking moles for melanoma   Check your moles or growths for signs of melanoma using ABCDE:   Asymmetry: the sides of the mole or growth don t match  Border: the edges are ragged, notched, or blurred  Color: the color within the mole or growth varies  Diameter: the mole or growth is larger than 6 mm (size of a pencil eraser)  Evolving: the size, shape, or color of the mole or growth is changing (evolving is not shown in the images below)    Checking for other types of skin cancer  Basal cell carcinoma or squamous cell carcinoma have symptoms such as:     A spot or mole that looks different from all other marks on your skin  Changes in how an area feels, such as itching, tenderness, or pain  Changes in the skin's surface, such as oozing, bleeding, or scaliness  A sore that does not heal  New swelling or redness beyond the border of a  mole    Who s at risk?  Anyone can get skin cancer. But you are at greater risk if you have:   Fair skin, light-colored hair, or light-colored eyes  Many moles or abnormal moles on your skin  A history of sunburns from sunlight or tanning beds  A family history of skin cancer  A history of exposure to radiation or chemicals  A weakened immune system  If you have had skin cancer in the past, you are at risk for recurring skin cancer.   How to check your skin  Do your monthly skin checkups in front of a full-length mirror. Check all parts of your body, including your:   Head (ears, face, neck, and scalp)  Torso (front, back, and sides)  Arms (tops, undersides, upper, and lower armpits)  Hands (palms, backs, and fingers, including under the nails)  Buttocks and genitals  Legs (front, back, and sides)  Feet (tops, soles, toes, including under the nails, and between toes)  If you have a lot of moles, take digital photos of them each month. Make sure to take photos both up close and from a distance. These can help you see if any moles change over time.   Most skin changes are not cancer. But if you see any changes in your skin, call your doctor right away. Only he or she can diagnose a problem. If you have skin cancer, seeing your doctor can be the first step toward getting the treatment that could save your life.   [employer] last reviewed this educational content on 4/1/2019 2000-2020 [employer]. [address] [place] [address], [place] [address]. All rights reserved. This information is not intended as a substitute for professional medical care. Always follow your healthcare professional's instructions.       When should I call my doctor?  If you are worsening or not improving, please, contact us or seek urgent care as noted below.     Who should I call with questions (adults)?    [facility] [phone]  For urgent needs outside of business hours call the [facility] at  [phone] and ask for the dermatology resident on call to be paged  If this is a medical emergency and you are unable to reach an ER, Call 911      If you need a prescription refill, please contact your pharmacy. Refills are approved or denied by our Physicians during normal business hours, Monday through Friday.  Per office policy, refills will not be granted if you have not been seen within the past year (or sooner depending on the condition).

## 2025-02-26 NOTE — LETTER
"February 27, 2025      Chloe Foster  [address] [facility] [facility] [address]        To Whom It May Concern,     Chloe Foster is a long time patient under my care for a recent neurological diagnoses. She is currently needing to take breaks from the screen during the workday to control symptoms. Making the accommodation to allow working from home at this time will further allow her to accomplish a more productive day with more control over her current symptoms.     Accommodations should remain in place until treatment plan updated after additional testing on March 11, 2025 with potential need for further extension through functional assessment visit on May 13, 2025.    Official ADA accomodation forms have been completed and are currently in transit.      Please contact me for questions or concerns.      Sincerely,    Shelby \"Jose Miguel\" ELOISE Ferro     "

## 2025-02-26 NOTE — PROGRESS NOTES
"[facility] Dermatology Note  Encounter Date: Feb 26, 2025  Office Visit     Reviewed patients past medical history and pertinent chart review prior to patients visit today.     Dermatology Problem List:  Patient denies personal history of skin cancer or dysplastic nevi.    Patient denies family history of skin cancer or dysplastic nevi.      ____________________________________________    Assessment & Plan:     # Benign skin findings including: seborrheic keratoses, cherry angioma, lentigines and benign nevi.   - No further intervention required. Patient to report changes.   - Patient reassured of the benign nature of these lesions.    #Signs and Symptoms of non-melanoma skin cancer and ABCDEs of melanoma reviewed with patient. Patient encouraged to perform monthly self skin exams and educated on how to perform them. UV precautions reviewed with patient. Patient was asked about new or changing moles/lesions on body.     #Reviewed Sunscreen: Apply 20 minutes prior to going outdoors and reapply every two hours, when wet or sweating. We recommend using an SPF 30 or higher, and to use one that is water resistant.       Follow-up:  1-2 years for follow up full body skin exam, prn for new or changing lesions or new concerns    Dayanara Alvarez CNP  Dermatology     ____________________________________________    CC: Consult ([facility]/Concerns: New and changing moles & hairloss.)    HPI:  Ms. Chloe Foster is a(n) 44 year old female who presents today as a new patient for a full body skin cancer screening. Patient has concerns today about a few new \"moles\" on her left cheek and legs. She feels like she is always getting new brown spots now.     Patient is otherwise feeling well, without additional skin concerns.     Physical Exam:  SKIN: Total skin excluding the genitalia areas was performed. The exam included the head/face, neck, both arms, chest, back, abdomen, both legs, digits, mons pubis, buttock and " nails.   -left cheek and legs, tan homogenous cerebriform papules c/w Seborrheic keratosis   -several 1-2mm red dome shaped symmetric papules scattered on the trunk  -multiple tan/brown flat round macules and raised papules scattered throughout trunk, extremities and head. No worrisome features for malignancy noted on examination.  -scattered tan, homogenous macules scattered on sun exposed areas of trunk, extremities and face.     - No other lesions of concern on areas examined.     Medications:  Current Outpatient Medications   Medication Sig Dispense Refill    acetaminophen (TYLENOL) 325 MG tablet Take 325-650 mg by mouth every 6 hours as needed for mild pain      aspirin 81 MG EC tablet Take 1 tablet (81 mg) by mouth daily. 30 tablet 11    LORazepam (ATIVAN) 1 MG tablet Take 0.5-1 tablets (0.5-1 mg) by mouth daily as needed for anxiety or sleep (severe anxiety/panic). 30 tablet 1    magnesium oxide (MAG-OX) 400 MG tablet Take 400 mg by mouth nightly as needed (leg cramps).      Omega-3 Fatty Acids (FISH OIL PO) Take 1 capsule by mouth At Bedtime       ondansetron (ZOFRAN ODT) 4 MG ODT tab Take 1 tablet (4 mg) by mouth every 8 hours as needed for nausea. 21 tablet 11    propranolol (INDERAL) 10 MG tablet Take 10 mg by mouth 2 times daily.      rosuvastatin (CRESTOR) 40 MG tablet Take 1 tablet (40 mg) by mouth daily. You will need to get your cholesterol checked with your primary in the next 2 months and then further refills per primary 30 tablet 1    Semaglutide-Weight Management (WEGOVY) 1.7 MG/0.75ML pen Inject 1.7 mg subcutaneously once a week. 9 mL 1    ubrogepant (UBRELVY) 100 MG tablet Take 1 tablet (100 mg) by mouth at onset of headache. 10 tablet 11    VITAMIN D, CHOLECALCIFEROL, PO Take 1,000 Units by mouth every evening.      Semaglutide-Weight Management (WEGOVY) 0.5 MG/0.5ML pen Inject 0.5 mg subcutaneously once a week. (Patient not taking: Reported on 2/26/2025) 2 mL 0     No current  facility-administered medications for this visit.      Past Medical History:   Patient Active Problem List   Diagnosis    Vitamin D deficiency    CARDIOVASCULAR SCREENING; LDL GOAL LESS THAN 160    Lumbar herniated disc    Hirsutism    Insomnia    IBS (irritable bowel syndrome)    Moderate recurrent major depression (H)    History of cardioembolic cerebrovascular accident (CVA)    PFO with atrial septal aneurysm    Protein S deficiency    Migraine with aura and without status migrainosus, not intractable    Pre-conception counseling    Elevated lipoprotein A level    Social anxiety disorder    MTHFR mutation    Cerebrovascular accident (CVA) due to embolism of right middle cerebral artery (H)    Deviated nasal septum    Overweight    Elevated cholesterol    S/P patent foramen ovale closure     Past Medical History:   Diagnosis Date    Acute stress reaction     propranolol helps prior to interviews, stressful situations    Allergic rhinitis, cause unspecified     no meds except prn flonase, tests generally positive, decided against shots    Anxiety state, unspecified     citalopram started in 7/06, stopped after couple wks, felt sluggish/tired    CVA (cerebral vascular accident) (H)     May 1, May 14th 2015    Depressive disorder, not elsewhere classified     dx, but not sure this is correct, weaned off wellbutrin (4/05-6/06, 12/06-1/08), restarted 3/08    Dizziness     Elevated lipoprotein A level     Hirsutism     saw endo, inconclusive, started on spironolactone (helped a little), stopped in 6/06, elevated DHEA- sees new endo 4/08    History of stroke with residual effects     PFO (patent foramen ovale)     Post PFO/ASD closure with 30mm Amplatzer device 4/21/16    Protein S deficiency     Per Dr. Casarez, prengnacy related.  Protein S levels normalized afer pregnancy.    Urinary tract infection, site not specified     recurrent, start nitrofur in 5/08, 6mo txt       CC Referred Self, MD  No address on file on  close of this encounter.

## 2025-02-27 NOTE — TELEPHONE ENCOUNTER
"Forms completed.  Please make copy to scan into chart and get mailed to where they need to go.  Letter completed today and sent to patient via [employer].    Thanks  Shelby \"Jose Miguel\" ELOISE Ferro   "

## 2025-02-27 NOTE — TELEPHONE ENCOUNTER
Form copied for clinical records and sent to stat scan.   Original copy mailed to patient's home address per patient request.    Yoselin SALGADO

## 2025-03-02 ENCOUNTER — HEALTH MAINTENANCE LETTER (OUTPATIENT)
Age: 45
End: 2025-03-02

## 2025-03-07 RX ORDER — FLUMAZENIL 0.1 MG/ML
0.2 INJECTION, SOLUTION INTRAVENOUS
Status: CANCELLED | OUTPATIENT
Start: 2025-03-07

## 2025-03-07 RX ORDER — NALOXONE HYDROCHLORIDE 0.4 MG/ML
0.2 INJECTION, SOLUTION INTRAMUSCULAR; INTRAVENOUS; SUBCUTANEOUS
Status: CANCELLED | OUTPATIENT
Start: 2025-03-07

## 2025-03-07 RX ORDER — NALOXONE HYDROCHLORIDE 0.4 MG/ML
0.4 INJECTION, SOLUTION INTRAMUSCULAR; INTRAVENOUS; SUBCUTANEOUS
Status: CANCELLED | OUTPATIENT
Start: 2025-03-07

## 2025-03-07 RX ORDER — HEPARIN SODIUM 200 [USP'U]/100ML
1 INJECTION, SOLUTION INTRAVENOUS EVERY 5 MIN PRN
Status: CANCELLED | OUTPATIENT
Start: 2025-03-07

## 2025-03-07 RX ORDER — FENTANYL CITRATE 50 UG/ML
25-50 INJECTION, SOLUTION INTRAMUSCULAR; INTRAVENOUS EVERY 5 MIN PRN
Status: CANCELLED | OUTPATIENT
Start: 2025-03-07

## 2025-03-11 ENCOUNTER — HOSPITAL ENCOUNTER (OUTPATIENT)
Facility: CLINIC | Age: 45
Discharge: HOME | End: 2025-03-11
Attending: RADIOLOGY | Admitting: RADIOLOGY
Payer: COMMERCIAL

## 2025-03-11 VITALS
SYSTOLIC BLOOD PRESSURE: 135 MMHG | HEART RATE: 89 BPM | WEIGHT: 162 LBS | DIASTOLIC BLOOD PRESSURE: 96 MMHG | OXYGEN SATURATION: 98 % | BODY MASS INDEX: 25.43 KG/M2 | TEMPERATURE: 97.6 F | RESPIRATION RATE: 18 BRPM | HEIGHT: 67 IN

## 2025-03-11 DIAGNOSIS — I87.1 JUGULAR VEIN STENOSIS: ICD-10-CM

## 2025-03-11 LAB
ANION GAP SERPL CALCULATED.3IONS-SCNC: 9 MMOL/L (ref 7–15)
APTT PPP: 24 SECONDS (ref 22–38)
BUN SERPL-MCNC: 9 MG/DL (ref 6–20)
CALCIUM SERPL-MCNC: 9.3 MG/DL (ref 8.8–10.4)
CHLORIDE SERPL-SCNC: 104 MMOL/L (ref 98–107)
CREAT SERPL-MCNC: 0.62 MG/DL (ref 0.51–0.95)
EGFRCR SERPLBLD CKD-EPI 2021: >90 ML/MIN/1.73M2
ERYTHROCYTE [DISTWIDTH] IN BLOOD BY AUTOMATED COUNT: 12.2 % (ref 10–15)
GLUCOSE SERPL-MCNC: 91 MG/DL (ref 70–99)
HCG INTACT+B SERPL-ACNC: <1 MIU/ML
HCG UR QL: NEGATIVE
HCG UR QL: NEGATIVE
HCO3 SERPL-SCNC: 26 MMOL/L (ref 22–29)
HCT VFR BLD AUTO: 43.3 % (ref 35–47)
HGB BLD-MCNC: 14.3 G/DL (ref 11.7–15.7)
INR PPP: 0.9 (ref 0.85–1.15)
MCH RBC QN AUTO: 30.2 PG (ref 26.5–33)
MCHC RBC AUTO-ENTMCNC: 33 G/DL (ref 31.5–36.5)
MCV RBC AUTO: 91 FL (ref 78–100)
PLATELET # BLD AUTO: 289 10E3/UL (ref 150–450)
POTASSIUM SERPL-SCNC: 3.9 MMOL/L (ref 3.4–5.3)
RBC # BLD AUTO: 4.74 10E6/UL (ref 3.8–5.2)
SODIUM SERPL-SCNC: 139 MMOL/L (ref 135–145)
WBC # BLD AUTO: 5.9 10E3/UL (ref 4–11)

## 2025-03-11 PROCEDURE — 85730 THROMBOPLASTIN TIME PARTIAL: CPT | Performed by: STUDENT IN AN ORGANIZED HEALTH CARE EDUCATION/TRAINING PROGRAM

## 2025-03-11 PROCEDURE — 81025 URINE PREGNANCY TEST: CPT | Performed by: STUDENT IN AN ORGANIZED HEALTH CARE EDUCATION/TRAINING PROGRAM

## 2025-03-11 PROCEDURE — 85027 COMPLETE CBC AUTOMATED: CPT | Performed by: STUDENT IN AN ORGANIZED HEALTH CARE EDUCATION/TRAINING PROGRAM

## 2025-03-11 PROCEDURE — 84702 CHORIONIC GONADOTROPIN TEST: CPT | Performed by: RADIOLOGY

## 2025-03-11 PROCEDURE — 80048 BASIC METABOLIC PNL TOTAL CA: CPT | Performed by: STUDENT IN AN ORGANIZED HEALTH CARE EDUCATION/TRAINING PROGRAM

## 2025-03-11 PROCEDURE — 85610 PROTHROMBIN TIME: CPT | Performed by: STUDENT IN AN ORGANIZED HEALTH CARE EDUCATION/TRAINING PROGRAM

## 2025-03-11 RX ORDER — LIDOCAINE 40 MG/G
CREAM TOPICAL
Status: DISCONTINUED | OUTPATIENT
Start: 2025-03-11 | End: 2025-03-11 | Stop reason: HOSPADM

## 2025-03-11 ASSESSMENT — ACTIVITIES OF DAILY LIVING (ADL)
ADLS_ACUITY_SCORE: 43
ADLS_ACUITY_SCORE: 41
ADLS_ACUITY_SCORE: 43
ADLS_ACUITY_SCORE: 43

## 2025-03-11 NOTE — PROGRESS NOTES
[facility]     Endovascular Surgical Neuroradiology Pre-Procedure Note      HPI:  44-year-old female with history of 2 strokes in 2015, s/p atrial septal occluder, stroke in 10/2024 and multiple auras preceding the events referred to neuroIR for a diagnostic cerebral angiogram to evaluate for a vasospasm related etiology. She also has right > left tinnitus.    Medical History:  Reviewed    Surgical History:  Reviewed    Family History:  Reviewed    Social History:  Reviewed    Allergies:  Reviewed    Is there a contrast allergy?  No    Medications:  I have reviewed this patient's current medications  Medications Prior to Admission   Medication Sig Dispense Refill Last Dose/Taking    acetaminophen (TYLENOL) 325 MG tablet Take 325-650 mg by mouth every 6 hours as needed for mild pain       aspirin 81 MG EC tablet Take 1 tablet (81 mg) by mouth daily. 30 tablet 11     LORazepam (ATIVAN) 1 MG tablet Take 0.5-1 tablets (0.5-1 mg) by mouth daily as needed for anxiety or sleep (severe anxiety/panic). 30 tablet 1     magnesium oxide (MAG-OX) 400 MG tablet Take 400 mg by mouth nightly as needed (leg cramps).       Omega-3 Fatty Acids (FISH OIL PO) Take 1 capsule by mouth At Bedtime        ondansetron (ZOFRAN ODT) 4 MG ODT tab Take 1 tablet (4 mg) by mouth every 8 hours as needed for nausea. 21 tablet 11     propranolol (INDERAL) 10 MG tablet Take 10 mg by mouth 2 times daily.       rosuvastatin (CRESTOR) 40 MG tablet Take 1 tablet (40 mg) by mouth daily. You will need to get your cholesterol checked with your primary in the next 2 months and then further refills per primary 30 tablet 1     Semaglutide-Weight Management (WEGOVY) 0.5 MG/0.5ML pen Inject 0.5 mg subcutaneously once a week. 2 mL 0     Semaglutide-Weight Management (WEGOVY) 1 MG/0.5ML pen Inject 1 mg subcutaneously once a week. 2 mL 1     Semaglutide-Weight Management (WEGOVY) 1.7 MG/0.75ML pen Inject 1.7 mg  subcutaneously once a week. 9 mL 1     ubrogepant (UBRELVY) 100 MG tablet Take 1 tablet (100 mg) by mouth at onset of headache. 10 tablet 11     VITAMIN D, CHOLECALCIFEROL, PO Take 1,000 Units by mouth every evening.      .    ROS:  The 5 point Review of Systems is negative other than noted in the HPI or here.     PHYSICAL EXAMINATION  Vital Signs:  B/P: Data Unavailable,  T: Data Unavailable,  P: Data Unavailable,  R: Data Unavailable    Cardio:  RRR  Pulmonary:  no respiratory distress  Abdomen:  soft    Neurologic  Mental Status:  fully alert, attentive and oriented, follows commands, speech clear and fluent  Cranial Nerves:  visual fields intact, EOMI with normal smooth pursuit, facial sensation intact and symmetric, facial movements symmetric, hearing not formally tested but intact to conversation, palate elevation symmetric and uvula midline, no dysarthria, shoulder shrug strong bilaterally, tongue protrusion midline  Motor:  no abnormal movements, no pronator drift, able to move all limbs spontaneously  Sensory:   intact to light touch  Coordination:   finger to nose without ataxia    Pre-procedure National Institutes of Health Stroke Scale:   Not applicable    LABS  (most recent Cr, BUN, GFR, PLT, INR, PTT within the past 7 days):  No lab results found in last 7 days.     Platelet Function P2Y12 (PRU):  Not applicable      ASSESSMENT: 44-year-old female with history of 2 strokes in 2015, s/p atrial septal occluder, stroke in 10/2024 and multiple auras preceding the events referred to neuroIR for a diagnostic cerebral angiogram to evaluate for a vasospasm related etiology. She also has right > left tinnitus.    PLAN: diagnostic cerebral angiogram         PRE-PROCEDURE SEDATION ASSESSMENT     Pre-Procedure Sedation Assessment done at 1200.    Expected Level:  Moderate Sedation    Indication:  Sedation is required to allow for neurointerventional procedure.    Consent obtained from patient after discussing the  risks, benefits and alternatives.     PO Intake:  Appropriately NPO for procedure    ASA Class:  Class 2 - MILD SYSTEMIC DISEASE, NO ACUTE PROBLEMS, NO FUNCTIONAL LIMITATIONS.    Mallampati:  Grade 1:  Soft palate, uvula, tonsillar pillars, and posterior pharyngeal wall visible    History and physical reviewed and no updates needed. I have reviewed the lab findings, diagnostic data, medications, and the plan for sedation. I have determined this patient to be an appropriate candidate for the planned sedation and procedure and have reassessed the patient IMMEDIATELY PRIOR to sedation and procedure.    Patient was discussed with the Attending, Dr. Grider, who agrees with the plan.    Chika Schmid MD   Fellow, Endovascular Surgical Neuroradiology       Addendum-  Patient's first urine qualitative HCG test returned positive and therefore a serum quantitative HCG test was ordered, which resulted approximately 1 hour later as <1. A urine HCG test was then repeated which was negative.  DEVORA Camarena, contacted lab regarding the discrepancy between the HCG tests at which time it was noted that the first test was marked as positive due to a clerical error, which was later corrected to negative.  This was around 1400-1430h or so, and patient elected to reschedule the procedure for another day as the repeat pregnancy testing resulted in delay.

## 2025-03-11 NOTE — PROGRESS NOTES
2nd HCG qualitative came back negative. Rn called lab to clarify the discrepancy and lab stated the first result was entered incorrectly by the  and should have been negative. Discussed with MD and MD came to talk to pt. Pt self cancelled her procedure as she feels it's been a roller coaster day. PIV removed. Pt will call IR scheduling to reschedule her procedure, number given. Pt escorted to the lobby accompanied by  Heriberto.

## 2025-03-11 NOTE — PROGRESS NOTES
Pt arrived on 2A, room 6 for Diagnostic Cerebral angiogram. Vitals stable. Denies any pain. Urine HCG positive, MD aware. Awaiting serum result. Groin prepped, pulses marked. Neuro intact.  Heriberto at bedside: [phone].

## 2025-03-12 ENCOUNTER — TELEPHONE (OUTPATIENT)
Dept: RADIOLOGY | Facility: CLINIC | Age: 45
End: 2025-03-12

## 2025-03-12 NOTE — TELEPHONE ENCOUNTER
Patient called to get patient rescheduled. Patient and I found a potential date that would work for her schedule. Patient asked if she could call back once dates were ran past family members.    Rosalino Alfaro on 3/12/2025 at 11:21 AM

## 2025-03-14 ENCOUNTER — OFFICE VISIT (OUTPATIENT)
Dept: PLASTIC SURGERY | Facility: CLINIC | Age: 45
End: 2025-03-14
Payer: COMMERCIAL

## 2025-03-20 ENCOUNTER — MYC MEDICAL ADVICE (OUTPATIENT)
Dept: FAMILY MEDICINE | Facility: CLINIC | Age: 45
End: 2025-03-20
Payer: COMMERCIAL

## 2025-03-27 ENCOUNTER — VIRTUAL VISIT (OUTPATIENT)
Dept: PHARMACY | Facility: CLINIC | Age: 45
End: 2025-03-27
Attending: PHYSICIAN ASSISTANT
Payer: COMMERCIAL

## 2025-03-27 VITALS — WEIGHT: 164 LBS | HEIGHT: 67 IN | BODY MASS INDEX: 25.74 KG/M2

## 2025-03-27 DIAGNOSIS — F41.0 PANIC ATTACK: ICD-10-CM

## 2025-03-27 DIAGNOSIS — R00.2 PALPITATIONS: ICD-10-CM

## 2025-03-27 DIAGNOSIS — Z78.9 TAKES DIETARY SUPPLEMENTS: ICD-10-CM

## 2025-03-27 DIAGNOSIS — F40.10 SOCIAL ANXIETY DISORDER: ICD-10-CM

## 2025-03-27 DIAGNOSIS — E66.3 OVERWEIGHT: Primary | ICD-10-CM

## 2025-03-27 DIAGNOSIS — G43.109 MIGRAINE WITH AURA AND WITHOUT STATUS MIGRAINOSUS, NOT INTRACTABLE: ICD-10-CM

## 2025-03-27 ASSESSMENT — PAIN SCALES - GENERAL: PAINLEVEL_OUTOF10: NO PAIN (0)

## 2025-03-27 NOTE — PATIENT INSTRUCTIONS
"Recommendations from today's MTM visit:                                                      Continue Wegovy 1.7 mg once weekly until after you see cardiology for palpitations   If things are stable from cardiology stand point, can increase Wegovy to 2.4 mg once weekly   Try to have a small meal or snack every 4-6 hours. Stop eating when 80% full. Limit high fat foods. Aim for lean protein and fiber (fruits, vegetables, whole grains) with meals  Will let neurology know you stopped rosuvastatin to discuss more at next appointment     Quick/Easy Protein Sources:  Hard boiled eggs  Part-skim cheese sticks  Baby Bell cheese rounds  Low-fat/low-sugar Greek yogurt  Low-fat cottage cheese  Lean deli meat (chicken/turkey/ham)  Roasted chickpeas or lentils  Nuts   Turkey meat stick  Protein shake/bar  \"P3\" snack (cheese, nuts, deli meat)  [employer] \"Protein Bread\"   \"Egglife\" egg white wrap    Tuna/salmon can/packet     Follow-up: 3 months with Belen Wong PA-C, 5 months with medication therapy management     It was great speaking with you today.  I value your experience and would be very thankful for your time in providing feedback in our clinic survey. In the next few days, you may receive an email or text message from [employer] with a link to a survey related to your  clinical pharmacist.\"     To schedule another MTM appointment, please call the clinic directly or you may call the MTM scheduling line at [phone].    My Clinical Pharmacist's contact information:                                                      Please feel free to contact me with any questions or concerns you have.      Monica Sarabia, PharmD, AAHIVP  Medication Therapy Management Pharmacist      "

## 2025-03-27 NOTE — PROGRESS NOTES
Medication Therapy Management (MTM) Encounter    ASSESSMENT:                            Medication Adherence/Access: No issues identified.    Weight Management   Weight loss not progressing. Discussed small/frequent meals/snacks throughout the day and reducing fat with  meals to help reduce side effects. She will continue Wegovy 1.7 mg/week until she meets with cardiology to discuss palpitations. If stable, can try increasing to Wegovy 2.4 mg/week. Option to switch back to Zepbound in the future and taper up to a higher dose if needed for efficacy/tolerability. Doesn't anticipate cost issues the rest of the year.       Mental Health   Anxiety/panic attacks:   stable    Hx CVA:   Per chart review, neurology recommends statin due to history of CVA and to target LDL <40 per neurology. Cardiology agreeable with statin due to high Lipoprotein A. Will let neurology know she stopped statin to discuss more at upcoming appointment    Palpitations:   Follow cardiology plan of care- get input on palpitations triggered by meals before increasing Wegovy dose    Headaches  Migraine  Follow neurology plan of care    Supplements   Agree with higher dose due to low vitamin D dose a few months ago. Likely okay to reduced in the summer.     PLAN:                            Continue Wegovy 1.7 mg once weekly until after you see cardiology for palpitations   If things are stable from cardiology stand point, can increase Wegovy to 2.4 mg once weekly   Try to have a small meal or snack every 4-6 hours. Stop eating when 80% full. Limit high fat foods. Aim for lean protein and fiber (fruits, vegetables, whole grains) with meals  Will let neurology know you stopped rosuvastatin to discuss more at next appointment     Quick/Easy Protein Sources:  Hard boiled eggs  Part-skim cheese sticks  Baby Bell cheese rounds  Low-fat/low-sugar Greek yogurt  Low-fat cottage cheese  Lean deli meat (chicken/turkey/ham)  Roasted chickpeas or lentils  Nuts  "  Turkey meat stick  Protein shake/bar  \"P3\" snack (cheese, nuts, deli meat)  [employer]'s \"Protein Bread\"   \"Egglife\" egg white wrap    Tuna/salmon can/packet     Follow-up: 3 months with Belen Wong PA-C, 5 months with medication therapy management     SUBJECTIVE/OBJECTIVE:                          Chloe Foster is a 44 year old female seen for an initial visit. She was referred to me from Belen Wong PA-C.      Reason for visit: Wegovy follow-up.    Allergies/ADRs: Reviewed in chart  Past Medical History: Reviewed in chart  Tobacco: She reports that she quit smoking about 10 years ago. Her smoking use included cigarettes. She started smoking about 15 years ago. She has a 2.5 pack-year smoking history. She has never used smokeless tobacco.  Alcohol: yes, socially   Medication Adherence/Access: preferred pharmacy: [facility] mail order. Has met deductible for the year so GLP-1 RA cost is lower.     Weight Management   Wegovy 1.7 mg once weekly     Following with Belen Wong PA-C for medical weight management. Had to hold for Wegovy for a month recently. Has now been on 1 mg for 4 weeks and 1.7 mg for 2 weeks so far. It hasn't been helpful for weight loss and reducing appetite as much. Has constipation which she treats with psyllium husk. Has more nausea on Wegovy now than she did in the past. It occurs more towards end of the week when she feels hungrier. Thinks it could be triggered by fatty foods like schaffer in chicken salad. Also notices palpitations after eating which went away when she recently held Wegovy for a month. Was told this could be caused due to vagus nerve stimulation after eating. She's wearing a heart monitor and follows up with cardiology in a few weeks.      Nutrition/Eating Habits: three meals per day. Increased hunger towards end of week. Avoids caffeine and high sugar foods.   Exercise/Activity: sitting 10 hours per day. Tries to work out 30 min in the morning. Trying to incorporate more walks " "throughout the day.   Medications Tried/Failed:  Switched from Wegovy 1.7 mg to Zepbound 7.5 due to cost. Switched back to Wegovy because she thought it was more effective.   Stopped Zepbound because was also having more nausea and headaches on this compared to Wegovy     Initial Consult Weight: 176 lbs     Current weight today: 164 lbs 0 oz  Cumulative Weight Loss: -12 lb, -6.8% from baseline    Wt Readings from Last 4 Encounters:   03/27/25 164 lb (74.4 kg)   03/11/25 162 lb (73.5 kg)   03/05/25 165 lb (74.8 kg)   02/25/25 160 lb (72.6 kg)     Estimated body mass index is 26.07 kg/m  as calculated from the following:    Height as of this encounter: 5' 6.5\" (1.689 m).    Weight as of this encounter: 164 lb (74.4 kg).      Mental Health   Anxiety/panic attacks:   Lorazepam 0.5-1 mg daily as needed   Propranolol 10 mg 2 times daily as needed   Patient reports she uses lorazepam the night before she has a presentation at work and propranolol the day she has a presentation. These are helpful. No side effects.        Hx CVA:   Aspirin 81 mg once daily   Following with neurology. Last CVA  was 10/2025. Reports stopping rosuvastatin since she was never told she has to continue it and doesn't think her strokes are being caused by a cholesterol issue. Briefly switched from aspirin to clopidogrel due to tinnitus but switched back to aspirin due to tinnitus on clopidogrel as well.       Palpitations:   No current medications   Undergoing work-up with cardiology. Palpitations often occur after eating.     Headaches  Migraine  Preventive medications  Magnesium - not taking specifically for migraine prevention   Acute medications  Acetaminophen as needed   Ubrogepant (Ubrelvy) 100 mg at onset of migraine  Ondansetron 4 mg ODT every 8 hours as needed   Mild headache 20 days out of the month. More severe migraine 1-2 days per month which last 2-3 days at a time. Uses Ublrelvy sparingly since it makes her feel super tired. " "      Supplements   Vitamin D 5000 units once daily - usually reduces dose in the spring/summer  Magnesium oxide 400 mg once daily   Fish oil 1g daily   No reported issues at this time.        Today's Vitals: Ht 5' 6.5\" (1.689 m)   Wt 164 lb (74.4 kg)   BMI 26.07 kg/m    ----------------  Post Discharge Medication Reconciliation Status: discharge medications reconciled, continue medications without change.    I spent 31 minutes with this patient today. All changes were made via collaborative practice agreement with Belen Wong PA-C.     A summary of these recommendations was sent via [employer].    Telemedicine Visit Details  The patient's medications can be safely assessed via a telemedicine encounter.  Type of service:  Telephone visit  Originating Location (pt. Location): Home    Distant Location (provider location):  Off-site  Start Time: 8:06 AM  End Time: 8:37 AM     Medication Therapy Recommendations  Overweight   1 Current Medication: Semaglutide-Weight Management (WEGOVY) 1.7 MG/0.75ML pen   Current Medication Sig: Inject 1.7 mg subcutaneously once a week.   Rationale: Dose too low - Dosage too low - Effectiveness   Recommendation: Increase Dose - Wegovy 2.4 MG/0.75ML Soaj   Status: Accepted per CPA   Identified Date: 3/27/2025 Completed Date: 3/27/2025              "

## 2025-03-27 NOTE — Clinical Note
Richar Post- patient self-stopped rosuvastatin because she said she wasn't told she needed to continue it and is unclear if she really needs it because she doesn't think strokes are related to high cholesterol. I saw your previous notes about targeting LDL <40 with CVA and has elevated lipoprotein A from cardiology perspective. I just want to confirm with you if she should restart the statin? You see her in 3 months. Thanks! Monica Sarabia, PharmD, AAHIVP Medication Therapy Management Pharmacist

## 2025-03-27 NOTE — NURSING NOTE
Current patient location: [address] [facility] [address]    Is the patient currently in the state of [place]? YES    Visit mode: TELEPHONE    If the visit is dropped, the patient can be reconnected by:TELEPHONE VISIT: Phone number:   Telephone Information:   Mobile [phone]       Will anyone else be joining the visit? NO  (If patient encounters technical issues they should call [phone]    Are changes needed to the allergy or medication list? No    Are refills needed on medications prescribed by this physician? NO    Rooming Documentation:  Questionnaire(s) not pre-assigned    Reason for visit: Medication Therapy Management    Mauro HUNTER

## 2025-03-27 NOTE — Clinical Note
Katty Glover! Patient is back on wegovy 1.7 mg/week. It hasn't been has helpful for weight loss/appetite suppression as it was previously. Has some nausea after certain foods. She's noticing palpitations after eating and is following up with cardiology in a few weeks. Discussed small frequent meals and avoiding high fat foods. If she is stable from cardiology perspective, she'd like to go up to wegovy 2.4 mg/week to try that. Let me know if any concerns with this plan. Thank you! Monica Sarabia, PharmD, [facility] Medication Therapy Management Pharmacist

## 2025-04-01 ENCOUNTER — MYC MEDICAL ADVICE (OUTPATIENT)
Dept: PHARMACY | Facility: CLINIC | Age: 45
End: 2025-04-01
Payer: COMMERCIAL

## 2025-04-03 ENCOUNTER — APPOINTMENT (OUTPATIENT)
Dept: MEDSURG UNIT | Facility: CLINIC | Age: 45
End: 2025-04-03
Attending: RADIOLOGY
Payer: COMMERCIAL

## 2025-04-03 ENCOUNTER — APPOINTMENT (OUTPATIENT)
Dept: INTERVENTIONAL RADIOLOGY/VASCULAR | Facility: CLINIC | Age: 45
End: 2025-04-03
Attending: RADIOLOGY
Payer: COMMERCIAL

## 2025-04-03 ENCOUNTER — HOSPITAL ENCOUNTER (OUTPATIENT)
Facility: CLINIC | Age: 45
Discharge: HOME | End: 2025-04-03
Attending: RADIOLOGY | Admitting: RADIOLOGY
Payer: COMMERCIAL

## 2025-04-03 VITALS
HEART RATE: 85 BPM | OXYGEN SATURATION: 95 % | WEIGHT: 160 LBS | RESPIRATION RATE: 14 BRPM | BODY MASS INDEX: 25.44 KG/M2 | DIASTOLIC BLOOD PRESSURE: 72 MMHG | TEMPERATURE: 97.3 F | SYSTOLIC BLOOD PRESSURE: 93 MMHG

## 2025-04-03 LAB
ANION GAP SERPL CALCULATED.3IONS-SCNC: 12 MMOL/L (ref 7–15)
APTT PPP: 25 SECONDS (ref 22–38)
BUN SERPL-MCNC: 9.1 MG/DL (ref 6–20)
CALCIUM SERPL-MCNC: 9.1 MG/DL (ref 8.8–10.4)
CHLORIDE SERPL-SCNC: 104 MMOL/L (ref 98–107)
CREAT SERPL-MCNC: 0.67 MG/DL (ref 0.51–0.95)
EGFRCR SERPLBLD CKD-EPI 2021: >90 ML/MIN/1.73M2
ERYTHROCYTE [DISTWIDTH] IN BLOOD BY AUTOMATED COUNT: 12.5 % (ref 10–15)
GLUCOSE SERPL-MCNC: 91 MG/DL (ref 70–99)
HCG UR QL: NEGATIVE
HCO3 SERPL-SCNC: 24 MMOL/L (ref 22–29)
HCT VFR BLD AUTO: 43.2 % (ref 35–47)
HGB BLD-MCNC: 14.3 G/DL (ref 11.7–15.7)
INR PPP: 0.93 (ref 0.85–1.15)
MCH RBC QN AUTO: 29.8 PG (ref 26.5–33)
MCHC RBC AUTO-ENTMCNC: 33.1 G/DL (ref 31.5–36.5)
MCV RBC AUTO: 90 FL (ref 78–100)
PLATELET # BLD AUTO: 284 10E3/UL (ref 150–450)
POTASSIUM SERPL-SCNC: 3.9 MMOL/L (ref 3.4–5.3)
RBC # BLD AUTO: 4.8 10E6/UL (ref 3.8–5.2)
SODIUM SERPL-SCNC: 140 MMOL/L (ref 135–145)
WBC # BLD AUTO: 5.5 10E3/UL (ref 4–11)

## 2025-04-03 PROCEDURE — 272N000566 HC SHEATH CR3

## 2025-04-03 PROCEDURE — 36223 PLACE CATH CAROTID/INOM ART: CPT | Mod: 50

## 2025-04-03 PROCEDURE — 36223 PLACE CATH CAROTID/INOM ART: CPT | Mod: 50 | Performed by: RADIOLOGY

## 2025-04-03 PROCEDURE — 272N000570 HC SHEATH CR7

## 2025-04-03 PROCEDURE — 82435 ASSAY OF BLOOD CHLORIDE: CPT | Performed by: INTERNAL MEDICINE

## 2025-04-03 PROCEDURE — 250N000011 HC RX IP 250 OP 636: Mod: JZ | Performed by: INTERNAL MEDICINE

## 2025-04-03 PROCEDURE — 85730 THROMBOPLASTIN TIME PARTIAL: CPT | Performed by: INTERNAL MEDICINE

## 2025-04-03 PROCEDURE — 96375 TX/PRO/DX INJ NEW DRUG ADDON: CPT

## 2025-04-03 PROCEDURE — 250N000013 HC RX MED GY IP 250 OP 250 PS 637: Performed by: STUDENT IN AN ORGANIZED HEALTH CARE EDUCATION/TRAINING PROGRAM

## 2025-04-03 PROCEDURE — 76937 US GUIDE VASCULAR ACCESS: CPT

## 2025-04-03 PROCEDURE — 76937 US GUIDE VASCULAR ACCESS: CPT | Mod: 26 | Performed by: RADIOLOGY

## 2025-04-03 PROCEDURE — 80048 BASIC METABOLIC PNL TOTAL CA: CPT | Performed by: INTERNAL MEDICINE

## 2025-04-03 PROCEDURE — 272N000280 HC DEVICE COMPRESSION CR5

## 2025-04-03 PROCEDURE — C1769 GUIDE WIRE: HCPCS

## 2025-04-03 PROCEDURE — 36415 COLL VENOUS BLD VENIPUNCTURE: CPT | Performed by: INTERNAL MEDICINE

## 2025-04-03 PROCEDURE — 85610 PROTHROMBIN TIME: CPT | Performed by: INTERNAL MEDICINE

## 2025-04-03 PROCEDURE — C1887 CATHETER, GUIDING: HCPCS

## 2025-04-03 PROCEDURE — 96360 HYDRATION IV INFUSION INIT: CPT

## 2025-04-03 PROCEDURE — 250N000011 HC RX IP 250 OP 636: Performed by: STUDENT IN AN ORGANIZED HEALTH CARE EDUCATION/TRAINING PROGRAM

## 2025-04-03 PROCEDURE — 272N000192 HC ACCESSORY CR2

## 2025-04-03 PROCEDURE — 85027 COMPLETE CBC AUTOMATED: CPT | Performed by: INTERNAL MEDICINE

## 2025-04-03 PROCEDURE — 255N000002 HC RX 255 OP 636: Performed by: RADIOLOGY

## 2025-04-03 PROCEDURE — 99152 MOD SED SAME PHYS/QHP 5/>YRS: CPT | Mod: GC | Performed by: RADIOLOGY

## 2025-04-03 PROCEDURE — 258N000003 HC RX IP 258 OP 636: Performed by: INTERNAL MEDICINE

## 2025-04-03 PROCEDURE — 250N000009 HC RX 250: Performed by: STUDENT IN AN ORGANIZED HEALTH CARE EDUCATION/TRAINING PROGRAM

## 2025-04-03 PROCEDURE — 99153 MOD SED SAME PHYS/QHP EA: CPT

## 2025-04-03 PROCEDURE — 36226 PLACE CATH VERTEBRAL ART: CPT | Mod: RT | Performed by: RADIOLOGY

## 2025-04-03 PROCEDURE — 999N000142 HC STATISTIC PROCEDURE PREP ONLY

## 2025-04-03 PROCEDURE — 99152 MOD SED SAME PHYS/QHP 5/>YRS: CPT

## 2025-04-03 PROCEDURE — 36226 PLACE CATH VERTEBRAL ART: CPT

## 2025-04-03 PROCEDURE — 96374 THER/PROPH/DIAG INJ IV PUSH: CPT

## 2025-04-03 PROCEDURE — 999N000134 HC STATISTIC PP CARE STAGE 3

## 2025-04-03 PROCEDURE — 81025 URINE PREGNANCY TEST: CPT | Performed by: INTERNAL MEDICINE

## 2025-04-03 RX ORDER — NALOXONE HYDROCHLORIDE 0.4 MG/ML
0.4 INJECTION, SOLUTION INTRAMUSCULAR; INTRAVENOUS; SUBCUTANEOUS
Status: DISCONTINUED | OUTPATIENT
Start: 2025-04-03 | End: 2025-04-03 | Stop reason: HOSPADM

## 2025-04-03 RX ORDER — FLUMAZENIL 0.1 MG/ML
0.2 INJECTION, SOLUTION INTRAVENOUS
Status: DISCONTINUED | OUTPATIENT
Start: 2025-04-03 | End: 2025-04-03 | Stop reason: HOSPADM

## 2025-04-03 RX ORDER — NALOXONE HYDROCHLORIDE 0.4 MG/ML
0.2 INJECTION, SOLUTION INTRAMUSCULAR; INTRAVENOUS; SUBCUTANEOUS
Status: DISCONTINUED | OUTPATIENT
Start: 2025-04-03 | End: 2025-04-03 | Stop reason: HOSPADM

## 2025-04-03 RX ORDER — DIPHENHYDRAMINE HCL 25 MG
25 CAPSULE ORAL EVERY 6 HOURS PRN
Status: DISCONTINUED | OUTPATIENT
Start: 2025-04-03 | End: 2025-04-03 | Stop reason: HOSPADM

## 2025-04-03 RX ORDER — DIPHENHYDRAMINE HYDROCHLORIDE 50 MG/ML
25 INJECTION, SOLUTION INTRAMUSCULAR; INTRAVENOUS EVERY 6 HOURS PRN
Status: DISCONTINUED | OUTPATIENT
Start: 2025-04-03 | End: 2025-04-03 | Stop reason: HOSPADM

## 2025-04-03 RX ORDER — IODIXANOL 320 MG/ML
150 INJECTION, SOLUTION INTRAVASCULAR ONCE
Status: COMPLETED | OUTPATIENT
Start: 2025-04-03 | End: 2025-04-03

## 2025-04-03 RX ORDER — MAGNESIUM OXIDE 400 MG/1
400 TABLET ORAL DAILY
Status: DISCONTINUED | OUTPATIENT
Start: 2025-04-03 | End: 2025-04-03 | Stop reason: HOSPADM

## 2025-04-03 RX ORDER — FENTANYL CITRATE 50 UG/ML
25-50 INJECTION, SOLUTION INTRAMUSCULAR; INTRAVENOUS EVERY 5 MIN PRN
Status: DISCONTINUED | OUTPATIENT
Start: 2025-04-03 | End: 2025-04-03 | Stop reason: HOSPADM

## 2025-04-03 RX ORDER — HEPARIN SODIUM 200 [USP'U]/100ML
1 INJECTION, SOLUTION INTRAVENOUS EVERY 5 MIN PRN
Status: DISCONTINUED | OUTPATIENT
Start: 2025-04-03 | End: 2025-04-03 | Stop reason: HOSPADM

## 2025-04-03 RX ORDER — ACETAMINOPHEN 325 MG/1
650 TABLET ORAL EVERY 4 HOURS PRN
Status: DISCONTINUED | OUTPATIENT
Start: 2025-04-03 | End: 2025-04-03 | Stop reason: HOSPADM

## 2025-04-03 RX ORDER — HEPARIN SODIUM 200 [USP'U]/100ML
1 INJECTION, SOLUTION INTRAVENOUS CONTINUOUS PRN
Status: DISCONTINUED | OUTPATIENT
Start: 2025-04-03 | End: 2025-04-03 | Stop reason: HOSPADM

## 2025-04-03 RX ORDER — ONDANSETRON 4 MG/1
4 TABLET, FILM COATED ORAL EVERY 6 HOURS PRN
Status: DISCONTINUED | OUTPATIENT
Start: 2025-04-03 | End: 2025-04-03 | Stop reason: HOSPADM

## 2025-04-03 RX ORDER — DEXAMETHASONE SODIUM PHOSPHATE 4 MG/ML
4 INJECTION, SOLUTION INTRA-ARTICULAR; INTRALESIONAL; INTRAMUSCULAR; INTRAVENOUS; SOFT TISSUE ONCE
Status: COMPLETED | OUTPATIENT
Start: 2025-04-03 | End: 2025-04-03

## 2025-04-03 RX ORDER — SODIUM CHLORIDE 9 MG/ML
INJECTION, SOLUTION INTRAVENOUS CONTINUOUS
Status: DISCONTINUED | OUTPATIENT
Start: 2025-04-03 | End: 2025-04-03 | Stop reason: HOSPADM

## 2025-04-03 RX ORDER — LIDOCAINE 40 MG/G
CREAM TOPICAL
Status: DISCONTINUED | OUTPATIENT
Start: 2025-04-03 | End: 2025-04-03 | Stop reason: HOSPADM

## 2025-04-03 RX ADMIN — ACETAMINOPHEN 650 MG: 325 TABLET, FILM COATED ORAL at 14:56

## 2025-04-03 RX ADMIN — FENTANYL CITRATE 100 MCG: 50 INJECTION, SOLUTION INTRAMUSCULAR; INTRAVENOUS at 14:07

## 2025-04-03 RX ADMIN — MIDAZOLAM 2 MG: 1 INJECTION INTRAMUSCULAR; INTRAVENOUS at 14:07

## 2025-04-03 RX ADMIN — FENTANYL CITRATE 50 MCG: 50 INJECTION, SOLUTION INTRAMUSCULAR; INTRAVENOUS at 14:16

## 2025-04-03 RX ADMIN — MAGNESIUM OXIDE TAB 400 MG (241.3 MG ELEMENTAL MG) 400 MG: 400 (241.3 MG) TAB at 14:56

## 2025-04-03 RX ADMIN — FENTANYL CITRATE 50 MCG: 50 INJECTION, SOLUTION INTRAMUSCULAR; INTRAVENOUS at 14:14

## 2025-04-03 RX ADMIN — DEXAMETHASONE SODIUM PHOSPHATE 4 MG: 4 INJECTION, SOLUTION INTRA-ARTICULAR; INTRALESIONAL; INTRAMUSCULAR; INTRAVENOUS; SOFT TISSUE at 15:02

## 2025-04-03 RX ADMIN — MIDAZOLAM 1 MG: 1 INJECTION INTRAMUSCULAR; INTRAVENOUS at 14:17

## 2025-04-03 RX ADMIN — SODIUM CHLORIDE: 0.9 INJECTION, SOLUTION INTRAVENOUS at 10:56

## 2025-04-03 RX ADMIN — LIDOCAINE HYDROCHLORIDE 6 ML: 10 INJECTION, SOLUTION EPIDURAL; INFILTRATION; INTRACAUDAL; PERINEURAL at 14:08

## 2025-04-03 RX ADMIN — MIDAZOLAM 1 MG: 1 INJECTION INTRAMUSCULAR; INTRAVENOUS at 14:14

## 2025-04-03 RX ADMIN — HEPARIN SODIUM: 1000 INJECTION INTRAVENOUS; SUBCUTANEOUS at 14:11

## 2025-04-03 RX ADMIN — IODIXANOL 35 ML: 320 INJECTION, SOLUTION INTRAVASCULAR at 14:33

## 2025-04-03 RX ADMIN — DIPHENHYDRAMINE HYDROCHLORIDE 25 MG: 50 INJECTION, SOLUTION INTRAMUSCULAR; INTRAVENOUS at 14:58

## 2025-04-03 ASSESSMENT — ACTIVITIES OF DAILY LIVING (ADL)
ADLS_ACUITY_SCORE: 43

## 2025-04-03 NOTE — PROGRESS NOTES
Patient Name: Chloe Foster  Medical Record Number: [id number]  Today's Date: 4/3/2025    Procedure: Cereberal angiogram  Proceduralist: Dr Marylou PRECIADO, Dr Morteza PRECIADO  Pathology present: N/A    Procedure Start: 1405  Procedure end: 1428  Sedation medications administered: Midazolam 4 mg, Fentanyl 200 mcg     Report given to: Migue AHN RN  : N/A    Other Notes: Pt arrived to IR room 3 from . Consent reviewed. Pt denies any questions or concerns regarding procedure. Pt positioned supine and monitored per protocol.   Right radial sheath removed at 1423     TR band Closure device deployed at 1423 with 9 mls in balloon.   Radial site appearance: Clean and Dry   Additional Puncture site(s): N/A   Bedrest for 1 hours.  Pt tolerated procedure without any noted complications. Pt transferred back to .

## 2025-04-03 NOTE — PROGRESS NOTES
[facility]     Endovascular Surgical Neuroradiology Pre-Procedure Note      HPI:  44-year-old female with history of 2 strokes in 2015, s/p atrial septal occluder, stroke in 10/2024 and multiple auras preceding the events referred to neuroIR for a diagnostic cerebral angiogram to evaluate for a vasospasm related etiology. She also has tinnitus, constant.    Medical History:  Reviewed    Surgical History:  Reviewed    Family History:  Reviewed    Social History:  Reviewed    Allergies:  Reviewed    Is there a contrast allergy?  No    Medications:  I have reviewed this patient's current medications  Medications Prior to Admission   Medication Sig Dispense Refill Last Dose/Taking    LORazepam (ATIVAN) 1 MG tablet Take 0.5-1 tablets (0.5-1 mg) by mouth daily as needed for anxiety or sleep (severe anxiety/panic). 30 tablet 1 Past Month    propranolol (INDERAL) 10 MG tablet Take 10 mg by mouth 2 times daily as needed (performance anxiety).   Past Month    acetaminophen (TYLENOL) 325 MG tablet Take 325-650 mg by mouth every 6 hours as needed for mild pain   3/31/2025    aspirin 81 MG EC tablet Take 1 tablet (81 mg) by mouth daily. 30 tablet 11 3/31/2025    magnesium oxide (MAG-OX) 400 MG tablet Take 400 mg by mouth nightly as needed (leg cramps).   3/28/2025    Omega-3 Fatty Acids (FISH OIL PO) Take 1 capsule by mouth At Bedtime    3/28/2025    ondansetron (ZOFRAN ODT) 4 MG ODT tab Take 1 tablet (4 mg) by mouth every 8 hours as needed for nausea. 21 tablet 11 Unknown    rosuvastatin (CRESTOR) 40 MG tablet Take 1 tablet (40 mg) by mouth daily. You will need to get your cholesterol checked with your primary in the next 2 months and then further refills per primary (Patient not taking: Reported on 3/27/2025) 30 tablet 1     Semaglutide-Weight Management (WEGOVY) 1.7 MG/0.75ML pen Inject 1.7 mg subcutaneously once a week. 9 mL 1 3/25/2025    Semaglutide-Weight Management (WEGOVY)  2.4 MG/0.75ML pen Inject 2.4 mg subcutaneously once a week. 3 mL 2     ubrogepant (UBRELVY) 100 MG tablet Take 1 tablet (100 mg) by mouth at onset of headache. 10 tablet 11     VITAMIN D, CHOLECALCIFEROL, PO Take 5,000 Units by mouth every evening. Reduces dose in the spring/summer   3/28/2025   .    ROS:  The 5 point Review of Systems is negative other than noted in the HPI or here.     PHYSICAL EXAMINATION  Vital Signs:  B/P: 129/91,  T: 98.3,  P: 91,  R: 16    Cardio:  RRR  Pulmonary:  no respiratory distress  Abdomen:  soft    Neurologic  Mental Status:  fully alert, attentive and oriented, follows commands, speech clear and fluent  Cranial Nerves:  visual fields intact, EOMI with normal smooth pursuit, facial sensation intact and symmetric, facial movements symmetric, hearing not formally tested but intact to conversation, palate elevation symmetric and uvula midline, no dysarthria, shoulder shrug strong bilaterally, tongue protrusion midline  Motor:  no abnormal movements, no pronator drift  Sensory:   intact to light touch  Coordination:  FNF and HS intact without dysmetria    Pre-procedure National Institutes of Health Stroke Scale:   Not applicable    LABS  (most recent Cr, BUN, GFR, PLT, INR, PTT within the past 7 days):  Recent Labs   Lab 04/03/25  1050   CR 0.67   BUN 9.1   GFRESTIMATED >90      INR 0.93   PTT 25        Platelet Function P2Y12 (PRU):  Not applicable      ASSESSMENT: 44-year-old female with history of 2 strokes in 2015, s/p atrial septal occluder, stroke in 10/2024 and multiple auras preceding the events referred to neuroIR for a diagnostic cerebral angiogram to evaluate for a vasospasm related etiology.     PLAN: diagnostic cerebral angiogram         PRE-PROCEDURE SEDATION ASSESSMENT     Pre-Procedure Sedation Assessment done at 1030.    Expected Level:  Moderate Sedation    Indication:  Sedation is required to allow for neurointerventional procedure.    Consent obtained from  patient after discussing the risks, benefits and alternatives.     PO Intake:  Appropriately NPO for procedure    ASA Class:  Class 1 - HEALTHY PATIENT    Mallampati:  Grade 1:  Soft palate, uvula, tonsillar pillars, and posterior pharyngeal wall visible    History and physical reviewed and no updates needed. I have reviewed the lab findings, diagnostic data, medications, and the plan for sedation. I have determined this patient to be an appropriate candidate for the planned sedation and procedure and have reassessed the patient IMMEDIATELY PRIOR to sedation and procedure.    Patient was discussed with the Attending, Dr. Linton, who agrees with the plan.    Chika Schmid MD   Fellow, Endovascular Surgical Neuroradiology

## 2025-04-03 NOTE — PROGRESS NOTES
Pt discharge criteria met. Pt vitally stable. Continues to have aura and headache, team cleared for discharge. Pt declined staying overnight in observation unit. Discharge instructions reviewed with pt. Right radial site covered with primapore that is C/D/I without bleeding or hematoma and right arm CMS intact. Escorted to exit where  picked up.

## 2025-04-03 NOTE — PROGRESS NOTES
Pt s/p cerebral angiogram. Vitally stable. Reports vision changes, unable to see faces and aura similar to past auras. Also reporting migraine, team notified and came to bedside to assess. Migraine cocktail and decadron given per order. Food and drink provided. Right radial with TR band, site C/D/I and right hand CMS intact. Will continue to monitor.

## 2025-04-03 NOTE — PROGRESS NOTES
Pt on 2A prep for diagnostic cerebal angiogram. Vitally stable. Reports tinnitus and slight headache (left side and posterior). Baseline neuro without deficit. PIV placed and fluids started 50ml/hr. Labs and UA collected. Consent obtained. Awaiting lab results.

## 2025-04-03 NOTE — DISCHARGE INSTRUCTIONS
[facility]         Interventional Radiology  Discharge Instructions Post Angiogram (NEURO)    AFTER YOU GO HOME  If you were given sedation, for the first 24 hours: we recommend that an adult stay with you, DO NOT drive or operate machinery at home or at work, DO NOT smoke, DO NOT make any important or legal decisions.  DO NOT drink alcoholic beverages the day of your procedure  DO relax and take it easy for 24 hours  DO drink plenty of fluids  DO resume your regular diet, unless otherwise instructed by your Primary Physician  DO NOT take a shower for at least 12 hours following your procedure. No tub bath, hot tubs, or swimming for 5 days. Do NOT scrub the procedure site vigorously for 5 days.  If you are taking a medication called Glucophage, Glucovance, or Metformin; these medications need to be held for approximately 48 hours following the procedure.     Care of wrist or arm site  It is normal to have soreness at the puncture site and mild tingling in your hand for up to 3 days.  For 2 days, do not use your hand or arm to support your weight (such as rising from a chair) or bend your wrist (such as lifting a garage door).  For 2 days, do not do any strenuous exercise, do not lift more than 5 pounds or exercise your arm (tennis, golf or bowling).  No lotion or powder to the puncture site for 3 days  If you start bleeding from the site in your arm:  Sit down and press firmly on the site with your fingers for 10 minutes. Call your doctor as soon as you can.  If the bleeding stops, sit still and keep your wrist straight for 2 hours.  Call 911 right away if you have: Heavy bleeding, bleeding that does not stop.    CALL THE PHYSICIAN IF:  You start bleeding from the procedure site.  You have numbness, coolness or change in color of the arm or leg of the puncture site  You experience changes in your vision, hearing, balance, coordination, speech, thinking or memory  You experience weakness in one  or more extremity  You experience pain or redness at the puncture site  You develop nausea or vomiting  You develop hives or a rash or unexplained itching  You develop a temperature of 101 degrees F or greater      Stroke - Call 911    Remember: BE FAST       BALANCE--Sudden loss of balance or coordination. Example: trouble walking     EYES--Sudden problem seeing out of one or both eyes     FACE--Sudden numbness or change to one side of the face. Examples: facial droop, uneven smile     ARM--Sudden numbness or weakness in one arm or leg     SPEECH--Sudden changes in speech or talking that doesn t make sense     TIME--if the person is having any of the symptoms above, CALL 911 immediately.      Symptoms may go away, then come back.     Sudden, worst headache of your life      Procedure Physician: Dr Julio Hickman MD, Dr Kamala Pro MD  Date of procedure: 4/3/2025    If you have questions or concerns, call the RN Care Coordinators: Zara Stewart or Nidia Monroy at [phone], option 1, Monday-Friday 8:00 am to 4:30 pm. After business hours, call the  at [phone] and have the Neuro-Interventional Fellow paged. Someone is on call 24 hrs/day    [facility] toll free number: [phone] Monday-Friday 8:00 am to 4:30 pm  [facility] Emergency Dept: [phone]

## 2025-04-13 NOTE — PROGRESS NOTES
Cardiology Virtual Visit Progress Note    Service Date: April 13, 2025  Primary Cardiologist: Dr. Parker  Reason for visit: Testing review     Ms. Chloe Foster is a 44 year old female who is being evaluated via a billable video visit.    Patient has given verbal consent for virtual visit?  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 Foster for cardiology follow-up today.  She is a patient of Dr. Parker and was last seen by him on 11/2/2023.     In review, Ms. Foster has a notable history for CVA x 2 in 2015 during pregnancy.  She subsequently underwent PFO/ASD closure in 4/2016.  History also notable for protein S deficiency, dyslipidemia, and anxiety.  She has a family history of multiple family members with coronary artery disease in their 50s-70s, though none are first-degree relatives.  She is a former smoker having quit in 2014.     She was first seen in consultation by Dr. Almeida for palpitations.  They had recurred again around the fall 2023 and returned for cardiology evaluation.  A 7-day Zio monitor showed no evidence of atrial fibrillation however it did not capture any of her symptoms while wearing the monitor.  At that time the etiology of her palpitations were unclear.      At the time of her last visit, she was concerned about coronary artery disease given her family history of several second-degree relatives with CAD.  A coronary calcium score was done which came back at 0.     Since her last visit, she was hospitalized on 10/10/2024 with TIA symptoms.  She was found to have an acute ischemic CVA of the right frontal cortex due to embolic stroke of unknown source.  Her initial head CT was  "negative for acute hemorrhage or transcortical infarct however subsequent MRI showed small focal infarct in the right middle/inferior frontal gyral cord.  Cardiology was consulted due to history of PFO closure.  A transthoracic echo was done with negative bubble study.  A transesophageal echocardiogram was also completed today with negative bubble study.  She was followed by hematology and neurology and placed on dual antiplatelet therapy.  She was also started on rosuvastatin 40 mg by neurology.     She was discharged with a 14-day ZIO monitor which showed no evidence of atrial fibrillation or atrial flutter.  Rare SVE/VE were seen.  Symptoms reported related to sinus rhythm with rare PACs.     Ms. Foster is seen today for posthospital follow-up.  Since her discharge, she has been making steady progress in her neurological recovery and feels that she is close to her baseline.  She has no chest pain, pressure or tightness.  No shortness of breath or dyspnea on exertion.  No lower extremity edema, orthopnea, or PND.  Rare palpitation that have improved since virtually eliminating caffeine from her diet.  No lightheadedness, dizziness, or syncope.  No bleeding issues with Plavix.    Zio monitor 12/6/2024:  Patient had a min HR of 62 bpm, max HR of 153 bpm, and avg HR of 99 bpm. Predominant underlying rhythm was Sinus Rhythm. Isolated SVEs were rare (<1.0%), and no SVE Couplets or SVE Triplets were present. Isolated VEs were rare (<1.0%), and no VE Couplets or VE Triplets were present.   Agreed with findings  Symptoms reported (6 episodes) were mostly related to sinus rhythm with rare PACs.  No AF/AFl  Monitoring period: 14-day   Transesophageal echocardiogram 10/11/2024:  A \"clam shell\" type of ASD/PFO closure device is seen on the atrial septum. No thrombus is identified on the device. There is no evidence of left to right  shunting around the device by color doppler. There is no evidence of right to  left " intracardiac or pulmonary level shunt by bubble study.  Otherwise normal study,  Echocardiogram 10/10/2024:  1. The left ventricle is normal in structure, function and size. The visual  ejection fraction is estimated at 55%.  2. The right ventricle is normal in structure, function and size.  3. ASD closure device in place. Negative bubble study.  4. No valve disease.    Patient called in January 2025 with reports of increased palpitations, captured these on her [employer] device.  She called again at the beginning of February with increased irregular heart beats and a repeat Zio patch was ordered.    Diagnostics:    4/3/25 Zio Patch: Rare PAC and PVCs recorded.  There were numerous patient triggered activations which corresponded with single PAC/PVC.  No AF.  __________________________________________________________________         Assessment and Impression:     CVA in 2015 with subsequent PFO closure 4/2016  Transthoracic echocardiogram 10/10/2024 negative bubble study and ASD closure device in place  Transesophageal echocardiogram 10 11/2024 shows ASD/PFO closure device, no thrombus is identified.  There is no evidence of left to right shunting and no evidence of right-to-left intracardiac or pulmonary level shunt by bubble study.  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  Continue  with as needed use of Propanolol for symptomatic palpitations.  Ongoing consideration for ILR given hx stroke.  Update resting echocardiogram in October 2025, followed by [facility] visit.  __________________________________________________________________    Thank you for the opportunity to participate in this pleasant patient's care.   We would be happy to see her sooner if needed for any concerns in the meantime.     I spent 29 minutes on the date of encouter of which 20 minutes were spent in medical discussion    LUCY Frey, CNP   Nurse Practitioner  [facility]  Pager: [phone]   Text Page (8am - 5pm, M-F)    Provider location: on-site  Patient location: Home  Total time on phone call: 20 minutes    Today's clinic visit entailed:  The following tests were independently interpreted by me as noted in my documentation: [email or web address]  Ordering of each unique test  Prescription drug management  The level of medical decision making during this visit was of moderate complexity.      Orders this Visit:  Orders Placed This Encounter   Procedures    Follow-Up with Cardiology- [facility]    Echocardiogram Complete     No orders of the defined types were placed in this encounter.    There are no discontinued medications.  Encounter Diagnoses   Name Primary?    Palpitations Yes    Cerebrovascular accident (CVA) due to embolism of right middle cerebral artery (H)     Mixed hyperlipidemia     Elevated lipoprotein A level     S/P patent foramen ovale closure        CURRENT MEDICATIONS:  Current Outpatient Medications   Medication Sig Dispense Refill    acetaminophen (TYLENOL) 325 MG tablet Take 325-650 mg by mouth every 6 hours as needed for mild pain      aspirin 81 MG EC tablet Take 1 tablet (81 mg) by mouth daily. 30 tablet 11    LORazepam (ATIVAN) 1 MG tablet Take 0.5-1 tablets (0.5-1 mg) by mouth daily as needed for anxiety or sleep (severe anxiety/panic). 30 tablet 1    magnesium oxide (MAG-OX) 400 MG  tablet Take 400 mg by mouth nightly as needed (leg cramps).      Omega-3 Fatty Acids (FISH OIL PO) Take 1 capsule by mouth At Bedtime       ondansetron (ZOFRAN ODT) 4 MG ODT tab Take 1 tablet (4 mg) by mouth every 8 hours as needed for nausea. 21 tablet 11    propranolol (INDERAL) 10 MG tablet Take 10 mg by mouth 2 times daily as needed (performance anxiety).      Semaglutide-Weight Management (WEGOVY) 1.7 MG/0.75ML pen Inject 1.7 mg subcutaneously once a week. 9 mL 1    ubrogepant (UBRELVY) 100 MG tablet Take 1 tablet (100 mg) by mouth at onset of headache. 10 tablet 11    VITAMIN D, CHOLECALCIFEROL, PO Take 5,000 Units by mouth every evening. Reduces dose in the spring/summer      methylPREDNISolone (MEDROL DOSEPAK) 4 MG tablet therapy pack Follow Package Directions (Patient not taking: Reported on 4/14/2025) 21 tablet 0    rosuvastatin (CRESTOR) 40 MG tablet Take 1 tablet (40 mg) by mouth daily. You will need to get your cholesterol checked with your primary in the next 2 months and then further refills per primary (Patient not taking: Reported on 3/27/2025) 30 tablet 1    Semaglutide-Weight Management (WEGOVY) 2.4 MG/0.75ML pen Inject 2.4 mg subcutaneously once a week. (Patient not taking: Reported on 4/14/2025) 3 mL 2       ALLERGIES  Allergies   Allergen Reactions    Compazine [Prochlorperazine] Muscle Pain (Myalgia)       PAST MEDICAL, SURGICAL, FAMILY HISTORY:  History was reviewed and updated as needed, see medical record.    SOCIAL HISTORY:  Social History     Socioeconomic History    Marital status:      Spouse name: Not on file    Number of children: 0    Years of education: 15    Highest education level: Not on file   Occupational History    Occupation:      Comment: [employer]   Tobacco Use    Smoking status: Former     Current packs/day: 0.00     Average packs/day: 0.5 packs/day for 5.0 years (2.5 ttl pk-yrs)     Types: Cigarettes     Start date: 4/21/2009     Quit date:  4/21/2014     Years since quitting: 10.9    Smokeless tobacco: Never   Vaping Use    Vaping status: Never Used   Substance and Sexual Activity    Alcohol use: Yes     Comment: SOCIAL    Drug use: No    Sexual activity: Yes     Partners: Male     Birth control/protection: Pull-out method   Other Topics Concern     Service Not Asked    Blood Transfusions Not Asked    Caffeine Concern No     Comment: 1 cup coffee per day    Occupational Exposure Not Asked    Hobby Hazards Not Asked    Sleep Concern No    Stress Concern No    Weight Concern No    Special Diet No    Back Care Not Asked    Exercise Yes     Comment: 4-5 days week    Bike Helmet Not Asked    Seat Belt Not Asked    Self-Exams Not Asked    Parent/sibling w/ CABG, MI or angioplasty before 65F 55M? No   Social History Narrative    Social Documentation:        Balanced Diet: YES    Calcium intake: 3 per day    Caffeine: 0 per day    Exercise:  type of activity ellipitcal, pilates;  5 times per week    Sunscreen: Yes    Seatbelts:  Yes    Self Breast Exam:  Yes    Physical/Emotional/Sexual Abuse: No    Do you feel safe in your environment? Yes        Cholesterol screen up to date: Yes- checking today 07/23/09, non fasting    Eye Exam up to date: Yes    Dental Exam up to date: Yes    Pap smear up to date: Yes: checking today 07/23/09, last was 04/08 NIL    Mammogram up to date: Does Not Apply    Dexa Scan up to date: Does Not Apply    Colonoscopy up to date: Does Not Apply    Immunizations up to date: Yes, had one a month ago.    Glucose screen if over 40:  No        Julieth Matthews MA    07/23/09     Social Drivers of Health     Financial Resource Strain: Low Risk  (10/9/2023)    Financial Resource Strain     Within the past 12 months, have you or your family members you live with been unable to get utilities (heat, electricity) when it was really needed?: No   Food Insecurity: Low Risk  (10/9/2023)    Food Insecurity     Within the past 12 months, did  you worry that your food would run out before you got money to buy more?: No     Within the past 12 months, did the food you bought just not last and you didn t have money to get more?: No   Transportation Needs: Low Risk  (10/9/2023)    Transportation Needs     Within the past 12 months, has lack of transportation kept you from medical appointments, getting your medicines, non-medical meetings or appointments, work, or from getting things that you need?: No   Physical Activity: Not on file   Stress: Not on file   Social Connections: Not on file   Interpersonal Safety: Unknown (4/3/2025)    Interpersonal Safety     Do you feel physically and emotionally safe where you currently live?: Patient unable to answer     Within the past 12 months, have you been hit, slapped, kicked or otherwise physically hurt by someone?: Patient unable to answer     Within the past 12 months, have you been humiliated or emotionally abused in other ways by your partner or ex-partner?: Patient unable to answer   Housing Stability: Low Risk  (10/9/2023)    Housing Stability     Do you have housing? : Yes     Are you worried about losing your housing?: No       Review of Systems:  Skin: negative  Eyes: negative  Ears/Nose/Throat: negative  Respiratory: No shortness of breath, dyspnea on exertion, cough, or hemoptysis  Cardiovascular: occasional palpitations  Gastrointestinal: negative  Genitourinary: negative  Musculoskeletal: negative  Neurologic: negative  Psychiatric: negative  Hematologic/Lymphatic/Immunologic: negative  Endocrine: negative  PSYCH: Alert and oriented times 3; coherent speech, normal   rate and volume, able to articulate logical thoughts, able   to abstract reason, no tangential thoughts, no hallucinations   or delusions. her affect is normal  RESP: No cough, no audible wheezing, able to talk in full sentences    Remainder of the comprehensive physical exam is unable to be completed due to today's visit being completed via  telephone call.    There were no vitals taken for this visit.   Wt Readings from Last 4 Encounters:   04/03/25 72.6 kg (160 lb)   03/27/25 74.4 kg (164 lb)   03/11/25 73.5 kg (162 lb)   03/05/25 74.8 kg (165 lb)     Recent Lab Results:  LIPID RESULTS:  Lab Results   Component Value Date    CHOL 162 10/11/2024    CHOL 235 (H) 07/09/2021    HDL 60 10/11/2024     07/09/2021    LDL 90 10/11/2024     (H) 07/09/2021    TRIG 62 10/11/2024    TRIG 46 07/09/2021    CHOLHDLRATIO 2.2 03/30/2012     LIVER ENZYME RESULTS:  Lab Results   Component Value Date    AST 19 10/11/2023    AST 13 02/19/2021    ALT 13 10/11/2023    ALT 19 02/19/2021     CBC RESULTS:  Lab Results   Component Value Date    WBC 5.5 04/03/2025    WBC 4.3 11/04/2020    RBC 4.80 04/03/2025    RBC 4.81 11/04/2020    HGB 14.3 04/03/2025    HGB 14.3 11/04/2020    HCT 43.2 04/03/2025    HCT 44.3 11/04/2020    MCV 90 04/03/2025    MCV 92 11/04/2020    MCH 29.8 04/03/2025    MCH 29.7 11/04/2020    MCHC 33.1 04/03/2025    MCHC 32.3 11/04/2020    RDW 12.5 04/03/2025    RDW 12.6 11/04/2020     04/03/2025     11/04/2020     BMP RESULTS:  Lab Results   Component Value Date     04/03/2025     07/09/2021    POTASSIUM 3.9 04/03/2025    POTASSIUM 3.9 12/26/2021    POTASSIUM 3.8 07/09/2021    CHLORIDE 104 04/03/2025    CHLORIDE 109 12/26/2021    CHLORIDE 107 07/09/2021    CO2 24 04/03/2025    CO2 27 12/26/2021    CO2 26 07/09/2021    ANIONGAP 12 04/03/2025    ANIONGAP 3 12/26/2021    ANIONGAP 5 07/09/2021    GLC 91 04/03/2025    GLC 85 10/11/2024     (H) 12/26/2021    GLC 94 07/09/2021    BUN 9.1 04/03/2025    BUN 15 12/26/2021    BUN 8 07/09/2021    CR 0.67 04/03/2025    CR 0.75 07/09/2021    GFRESTIMATED >90 04/03/2025    GFRESTIMATED >90 07/09/2021    GFRESTBLACK >90 07/09/2021    STEPH 9.1 04/03/2025    STEPH 9.8 07/09/2021      A1C RESULTS:  Lab Results   Component Value Date    A1C 4.9 10/10/2024     INR RESULTS:  Lab Results    Component Value Date    INR 0.93 04/03/2025    INR 0.90 03/11/2025    INR 0.90 04/21/2016    INR 0.87 04/29/2015       CC  Josefina Diego, LUCY CNP  [address], [id number]  [place] [address]    This note was completed in part using Dragon voice recognition software. Although reviewed after completion, some word and grammatical errors may occur.

## 2025-04-14 ENCOUNTER — VIRTUAL VISIT (OUTPATIENT)
Dept: CARDIOLOGY | Facility: CLINIC | Age: 45
End: 2025-04-14
Payer: COMMERCIAL

## 2025-04-14 DIAGNOSIS — R00.2 PALPITATIONS: Primary | ICD-10-CM

## 2025-04-14 DIAGNOSIS — Z87.74 S/P PATENT FORAMEN OVALE CLOSURE: ICD-10-CM

## 2025-04-14 DIAGNOSIS — I63.411 CEREBROVASCULAR ACCIDENT (CVA) DUE TO EMBOLISM OF RIGHT MIDDLE CEREBRAL ARTERY (H): ICD-10-CM

## 2025-04-14 DIAGNOSIS — E78.41 ELEVATED LIPOPROTEIN A LEVEL: ICD-10-CM

## 2025-04-14 DIAGNOSIS — E78.2 MIXED HYPERLIPIDEMIA: ICD-10-CM

## 2025-04-14 NOTE — LETTER
4/14/2025    Jose Miguel Ferro PA-C  [address] [facility] [address]  [facility] [address]    RE: Chloe Fotser       Dear Colleague,     I had the pleasure of seeing Chloe Foster in the [facility].      Cardiology Virtual Visit Progress Note    Service Date: April 13, 2025  Primary Cardiologist: Dr. Parker  Reason for visit: Testing review     Ms. Chloe Foster is a 44 year old female who is being evaluated via a billable video visit.    Patient has given verbal consent for virtual visit?  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 Foster for cardiology follow-up today.  She is a patient of Dr. Parker and was last seen by him on 11/2/2023.     In review, Ms. Foster has a notable history for CVA x 2 in 2015 during pregnancy.  She subsequently underwent PFO/ASD closure in 4/2016.  History also notable for protein S deficiency, dyslipidemia, and anxiety.  She has a family history of multiple family members with coronary artery disease in their 50s-70s, though none are first-degree relatives.  She is a former smoker having quit in 2014.     She was first seen in consultation by Dr. Almeida for palpitations.  They had recurred again around the fall 2023 and returned for cardiology evaluation.  A 7-day Zio monitor showed no evidence of atrial fibrillation however it did not capture any of her symptoms while wearing the monitor.  At that time the etiology of her palpitations were unclear.      At the time of her last visit, she was concerned about coronary artery disease given her family history of several second-degree relatives with CAD.  A coronary calcium score was done which came back at  "0.     Since her last visit, she was hospitalized on 10/10/2024 with TIA symptoms.  She was found to have an acute ischemic CVA of the right frontal cortex due to embolic stroke of unknown source.  Her initial head CT was negative for acute hemorrhage or transcortical infarct however subsequent MRI showed small focal infarct in the right middle/inferior frontal gyral cord.  Cardiology was consulted due to history of PFO closure.  A transthoracic echo was done with negative bubble study.  A transesophageal echocardiogram was also completed today with negative bubble study.  She was followed by hematology and neurology and placed on dual antiplatelet therapy.  She was also started on rosuvastatin 40 mg by neurology.     She was discharged with a 14-day ZIO monitor which showed no evidence of atrial fibrillation or atrial flutter.  Rare SVE/VE were seen.  Symptoms reported related to sinus rhythm with rare PACs.     Ms. Foster is seen today for posthospital follow-up.  Since her discharge, she has been making steady progress in her neurological recovery and feels that she is close to her baseline.  She has no chest pain, pressure or tightness.  No shortness of breath or dyspnea on exertion.  No lower extremity edema, orthopnea, or PND.  Rare palpitation that have improved since virtually eliminating caffeine from her diet.  No lightheadedness, dizziness, or syncope.  No bleeding issues with Plavix.    Zio monitor 12/6/2024:  Patient had a min HR of 62 bpm, max HR of 153 bpm, and avg HR of 99 bpm. Predominant underlying rhythm was Sinus Rhythm. Isolated SVEs were rare (<1.0%), and no SVE Couplets or SVE Triplets were present. Isolated VEs were rare (<1.0%), and no VE Couplets or VE Triplets were present.   Agreed with findings  Symptoms reported (6 episodes) were mostly related to sinus rhythm with rare PACs.  No AF/AFl  Monitoring period: 14-day   Transesophageal echocardiogram 10/11/2024:  A \"clam shell\" type of " ASD/PFO closure device is seen on the atrial septum. No thrombus is identified on the device. There is no evidence of left to right  shunting around the device by color doppler. There is no evidence of right to  left intracardiac or pulmonary level shunt by bubble study.  Otherwise normal study,  Echocardiogram 10/10/2024:  1. The left ventricle is normal in structure, function and size. The visual  ejection fraction is estimated at 55%.  2. The right ventricle is normal in structure, function and size.  3. ASD closure device in place. Negative bubble study.  4. No valve disease.    Patient called in January 2025 with reports of increased palpitations, captured these on her [employer] device.  She called again at the beginning of February with increased irregular heart beats and a repeat Zio patch was ordered.    Diagnostics:    4/3/25 Zio Patch: Rare PAC and PVCs recorded.  There were numerous patient triggered activations which corresponded with single PAC/PVC.  No AF.  __________________________________________________________________         Assessment and Impression:     CVA in 2015 with subsequent PFO closure 4/2016  Transthoracic echocardiogram 10/10/2024 negative bubble study and ASD closure device in place  Transesophageal echocardiogram 10 11/2024 shows ASD/PFO closure device, no thrombus is identified.  There is no evidence of left to right shunting and no evidence of right-to-left intracardiac or pulmonary level shunt by bubble study.  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  Continue with as needed use of Propanolol for symptomatic palpitations.  Ongoing consideration for ILR given hx stroke.  Update resting echocardiogram in October 2025, followed by RO[facility]A visit.  __________________________________________________________________    Thank you for the opportunity to participate in this pleasant patient's care.   We would be happy to see her sooner if needed for any concerns in the meantime.     I spent 29 minutes on the date of encouter of which 20 minutes were spent in medical discussion    LUCY Frey, CNP   Nurse Practitioner  [facility]  Pager: [phone]   Text Page (8am - 5pm, M-F)    Provider location: on-site  Patient location: Home  Total time on phone call: 20 minutes    Today's clinic visit entailed:  The following tests were independently interpreted by me as noted in my documentation: echo, zio  Ordering of each unique test  Prescription drug management  The level of medical decision making during this visit was of moderate complexity.      Orders this Visit:  Orders Placed This Encounter   Procedures     Follow-Up with Cardiology- [facility]     Echocardiogram Complete     No orders of the defined types were placed in this encounter.    There are no discontinued medications.  Encounter Diagnoses   Name Primary?     Palpitations Yes     Cerebrovascular accident (CVA) due to embolism of right middle cerebral artery (H)      Mixed hyperlipidemia      Elevated lipoprotein A level      S/P patent foramen ovale closure        CURRENT MEDICATIONS:  Current Outpatient Medications   Medication Sig Dispense Refill     acetaminophen (TYLENOL) 325 MG tablet Take 325-650 mg by mouth every 6 hours as needed for mild pain       aspirin 81 MG EC tablet  Take 1 tablet (81 mg) by mouth daily. 30 tablet 11     LORazepam (ATIVAN) 1 MG tablet Take 0.5-1 tablets (0.5-1 mg) by mouth daily as needed for anxiety or sleep (severe anxiety/panic). 30 tablet 1     magnesium oxide (MAG-OX) 400 MG tablet Take 400 mg by mouth nightly as needed (leg cramps).       Omega-3 Fatty Acids (FISH OIL PO) Take 1 capsule by mouth At Bedtime        ondansetron (ZOFRAN ODT) 4 MG ODT tab Take 1 tablet (4 mg) by mouth every 8 hours as needed for nausea. 21 tablet 11     propranolol (INDERAL) 10 MG tablet Take 10 mg by mouth 2 times daily as needed (performance anxiety).       Semaglutide-Weight Management (WEGOVY) 1.7 MG/0.75ML pen Inject 1.7 mg subcutaneously once a week. 9 mL 1     ubrogepant (UBRELVY) 100 MG tablet Take 1 tablet (100 mg) by mouth at onset of headache. 10 tablet 11     VITAMIN D, CHOLECALCIFEROL, PO Take 5,000 Units by mouth every evening. Reduces dose in the spring/summer       methylPREDNISolone (MEDROL DOSEPAK) 4 MG tablet therapy pack Follow Package Directions (Patient not taking: Reported on 4/14/2025) 21 tablet 0     rosuvastatin (CRESTOR) 40 MG tablet Take 1 tablet (40 mg) by mouth daily. You will need to get your cholesterol checked with your primary in the next 2 months and then further refills per primary (Patient not taking: Reported on 3/27/2025) 30 tablet 1     Semaglutide-Weight Management (WEGOVY) 2.4 MG/0.75ML pen Inject 2.4 mg subcutaneously once a week. (Patient not taking: Reported on 4/14/2025) 3 mL 2       ALLERGIES  Allergies   Allergen Reactions     Compazine [Prochlorperazine] Muscle Pain (Myalgia)       PAST MEDICAL, SURGICAL, FAMILY HISTORY:  History was reviewed and updated as needed, see medical record.    SOCIAL HISTORY:  Social History     Socioeconomic History     Marital status:      Spouse name: Not on file     Number of children: 0     Years of education: 15     Highest education level: Not on file   Occupational History      Occupation:      Comment: [employer]   Tobacco Use     Smoking status: Former     Current packs/day: 0.00     Average packs/day: 0.5 packs/day for 5.0 years (2.5 ttl pk-yrs)     Types: Cigarettes     Start date: 4/21/2009     Quit date: 4/21/2014     Years since quitting: 10.9     Smokeless tobacco: Never   Vaping Use     Vaping status: Never Used   Substance and Sexual Activity     Alcohol use: Yes     Comment: SOCIAL     Drug use: No     Sexual activity: Yes     Partners: Male     Birth control/protection: Pull-out method   Other Topics Concern      Service Not Asked     Blood Transfusions Not Asked     Caffeine Concern No     Comment: 1 cup coffee per day     Occupational Exposure Not Asked     Hobby Hazards Not Asked     Sleep Concern No     Stress Concern No     Weight Concern No     Special Diet No     Back Care Not Asked     Exercise Yes     Comment: 4-5 days week     Bike Helmet Not Asked     Seat Belt Not Asked     Self-Exams Not Asked     Parent/sibling w/ CABG, MI or angioplasty before 65F 55M? No   Social History Narrative    Social Documentation:        Balanced Diet: YES    Calcium intake: 3 per day    Caffeine: 0 per day    Exercise:  type of activity ellipitcal, pilates;  5 times per week    Sunscreen: Yes    Seatbelts:  Yes    Self Breast Exam:  Yes    Physical/Emotional/Sexual Abuse: No    Do you feel safe in your environment? Yes        Cholesterol screen up to date: Yes- checking today 07/23/09, non fasting    Eye Exam up to date: Yes    Dental Exam up to date: Yes    Pap smear up to date: Yes: checking today 07/23/09, last was 04/08 NIL    Mammogram up to date: Does Not Apply    Dexa Scan up to date: Does Not Apply    Colonoscopy up to date: Does Not Apply    Immunizations up to date: Yes, had one a month ago.    Glucose screen if over 40:  No        Julieth Matthews MA    07/23/09     Social Drivers of Health     Financial Resource Strain: Low Risk  (10/9/2023)     Financial Resource Strain      Within the past 12 months, have you or your family members you live with been unable to get utilities (heat, electricity) when it was really needed?: No   Food Insecurity: Low Risk  (10/9/2023)    Food Insecurity      Within the past 12 months, did you worry that your food would run out before you got money to buy more?: No      Within the past 12 months, did the food you bought just not last and you didn t have money to get more?: No   Transportation Needs: Low Risk  (10/9/2023)    Transportation Needs      Within the past 12 months, has lack of transportation kept you from medical appointments, getting your medicines, non-medical meetings or appointments, work, or from getting things that you need?: No   Physical Activity: Not on file   Stress: Not on file   Social Connections: Not on file   Interpersonal Safety: Unknown (4/3/2025)    Interpersonal Safety      Do you feel physically and emotionally safe where you currently live?: Patient unable to answer      Within the past 12 months, have you been hit, slapped, kicked or otherwise physically hurt by someone?: Patient unable to answer      Within the past 12 months, have you been humiliated or emotionally abused in other ways by your partner or ex-partner?: Patient unable to answer   Housing Stability: Low Risk  (10/9/2023)    Housing Stability      Do you have housing? : Yes      Are you worried about losing your housing?: No       Review of Systems:  Skin: negative  Eyes: negative  Ears/Nose/Throat: negative  Respiratory: No shortness of breath, dyspnea on exertion, cough, or hemoptysis  Cardiovascular: occasional palpitations  Gastrointestinal: negative  Genitourinary: negative  Musculoskeletal: negative  Neurologic: negative  Psychiatric: negative  Hematologic/Lymphatic/Immunologic: negative  Endocrine: negative  PSYCH: Alert and oriented times 3; coherent speech, normal   rate and volume, able to articulate logical thoughts,  able   to abstract reason, no tangential thoughts, no hallucinations   or delusions. her affect is normal  RESP: No cough, no audible wheezing, able to talk in full sentences    Remainder of the comprehensive physical exam is unable to be completed due to today's visit being completed via telephone call.    There were no vitals taken for this visit.   Wt Readings from Last 4 Encounters:   04/03/25 72.6 kg (160 lb)   03/27/25 74.4 kg (164 lb)   03/11/25 73.5 kg (162 lb)   03/05/25 74.8 kg (165 lb)     Recent Lab Results:  LIPID RESULTS:  Lab Results   Component Value Date    CHOL 162 10/11/2024    CHOL 235 (H) 07/09/2021    HDL 60 10/11/2024     07/09/2021    LDL 90 10/11/2024     (H) 07/09/2021    TRIG 62 10/11/2024    TRIG 46 07/09/2021    CHOLHDLRATIO 2.2 03/30/2012     LIVER ENZYME RESULTS:  Lab Results   Component Value Date    AST 19 10/11/2023    AST 13 02/19/2021    ALT 13 10/11/2023    ALT 19 02/19/2021     CBC RESULTS:  Lab Results   Component Value Date    WBC 5.5 04/03/2025    WBC 4.3 11/04/2020    RBC 4.80 04/03/2025    RBC 4.81 11/04/2020    HGB 14.3 04/03/2025    HGB 14.3 11/04/2020    HCT 43.2 04/03/2025    HCT 44.3 11/04/2020    MCV 90 04/03/2025    MCV 92 11/04/2020    MCH 29.8 04/03/2025    MCH 29.7 11/04/2020    MCHC 33.1 04/03/2025    MCHC 32.3 11/04/2020    RDW 12.5 04/03/2025    RDW 12.6 11/04/2020     04/03/2025     11/04/2020     BMP RESULTS:  Lab Results   Component Value Date     04/03/2025     07/09/2021    POTASSIUM 3.9 04/03/2025    POTASSIUM 3.9 12/26/2021    POTASSIUM 3.8 07/09/2021    CHLORIDE 104 04/03/2025    CHLORIDE 109 12/26/2021    CHLORIDE 107 07/09/2021    CO2 24 04/03/2025    CO2 27 12/26/2021    CO2 26 07/09/2021    ANIONGAP 12 04/03/2025    ANIONGAP 3 12/26/2021    ANIONGAP 5 07/09/2021    GLC 91 04/03/2025    GLC 85 10/11/2024     (H) 12/26/2021    GLC 94 07/09/2021    BUN 9.1 04/03/2025    BUN 15 12/26/2021    BUN 8  "07/09/2021    CR 0.67 04/03/2025    CR 0.75 07/09/2021    GFRESTIMATED >90 04/03/2025    GFRESTIMATED >90 07/09/2021    GFRESTBLACK >90 07/09/2021    STEPH 9.1 04/03/2025    STEPH 9.8 07/09/2021      A1C RESULTS:  Lab Results   Component Value Date    A1C 4.9 10/10/2024     INR RESULTS:  Lab Results   Component Value Date    INR 0.93 04/03/2025    INR 0.90 03/11/2025    INR 0.90 04/21/2016    INR 0.87 04/29/2015       CC  LUCY Marion CNP  [id number]  [place] [id number]    This note was completed in part using Dragon voice recognition software. Although reviewed after completion, some word and grammatical errors may occur.     Chloe is a 44 year old who is being evaluated via a billable video visit.    How would you like to obtain your AVS? MyChart  If the video visit is dropped, the invitation should be resent by: Text to cell phone: [phone]  Will anyone else be joining your video visit? No  {If patient encounters technical issues they should call [phone] :[id number]}    June SHI 04/14/25    Video-Visit Details    Type of service:  Video Visit   Originating Location (pt. Location): {video visit patient location:[id number]::\"Home\"}  {PROVIDER LOCATION On-site should be selected for visits conducted from your clinic location or adjoining [facility] hospital, academic office, or other nearby [facility] building. Off-site should be selected for all other provider locations, including home:[id number]}  Distant Location (provider location):  {virtual location provider:[id number]}  Platform used for Video Visit: Rafal      Thank you for allowing me to participate in the care of your patient.      Sincerely,     LUCY Frey CNP     [facility]  cc:   LUCY Marion CNP  [id number]  [place] [id number]      "

## 2025-04-22 ENCOUNTER — OFFICE VISIT (OUTPATIENT)
Dept: PHYSICAL MEDICINE AND REHAB | Facility: CLINIC | Age: 45
End: 2025-04-22
Payer: COMMERCIAL

## 2025-04-22 DIAGNOSIS — G89.29 OTHER CHRONIC PAIN: ICD-10-CM

## 2025-04-22 DIAGNOSIS — M47.812 CERVICAL SPONDYLOSIS WITHOUT MYELOPATHY: ICD-10-CM

## 2025-04-22 DIAGNOSIS — I63.411 CEREBROVASCULAR ACCIDENT (CVA) DUE TO EMBOLISM OF RIGHT MIDDLE CEREBRAL ARTERY (H): Primary | ICD-10-CM

## 2025-04-22 DIAGNOSIS — M79.18 MYALGIA, OTHER SITE: ICD-10-CM

## 2025-04-22 DIAGNOSIS — M47.817 LUMBOSACRAL SPONDYLOSIS WITHOUT MYELOPATHY: ICD-10-CM

## 2025-04-22 PROCEDURE — 99205 OFFICE O/P NEW HI 60 MIN: CPT | Performed by: PHYSICAL MEDICINE & REHABILITATION

## 2025-04-22 NOTE — PROGRESS NOTES
CC: I am seeing this patient with a history of stroke and also dealing with chronic headaches neck shoulder pain, migraine as well as pain in the lower back and hip.    Patient is a very pleasant 44-year-old woman who reports having had 2 strokes in 2015 in the context of pregnancy.  She underwent atrial septal occlusion with an occluder and has been doing well.  She was able to stop taking aspirin in 2020.    She developed left-sided symptoms with left facial numbness and numbness in the left hand region.  This was in October 10, 2024.  There was a MRI with DWI abnormality involving right middle frontal gyrus.  CTA did not show any large vessel issue GODWIN did not show any thrombus or residual shunt.  She had another symptom on 10/22/2024 with left-sided symptoms and repeat MRI did not show any extension of the stroke.  She has been followed by hematology and cardiology without any new concerns.    She does acknowledge working 10-hour days in a reasonably stressful job with lots of presentations etc.  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  She gets migraines and sometimes finds it difficult to focus.  She also has a history of tinnitus on the right side.  She is being followed by neurology for these and also has a neuropsychological testing planned for January 2026.    On a scale of 0-10 she rates her pain at its best a 0 at worst a 10 and an average in the 6-8 region.  She has done physical therapy but felt it actually made it worse.  She takes Tylenol Advil on the daily basis.  When she is tired she finds the symptoms are worse on the left side.    Past Medical History:   Diagnosis Date    Acute stress reaction     propranolol helps prior to interviews, stressful situations    Allergic rhinitis, cause unspecified     no meds except prn flonase, tests generally positive, decided against shots    Anxiety state, unspecified     citalopram started in 7/06, stopped after couple wks, felt  sluggish/tired    CVA (cerebral vascular accident) (H)     May 1, May 14th 2015    Depressive disorder, not elsewhere classified     dx, but not sure this is correct, weaned off wellbutrin (-, -), restarted 3/08    Dizziness     Elevated lipoprotein A level     Hirsutism     saw endo, inconclusive, started on spironolactone (helped a little), stopped in , elevated DHEA- sees new endo     History of stroke with residual effects     PFO (patent foramen ovale)     Post PFO/ASD closure with 30mm Amplatzer device 16    Protein S deficiency     Per Dr. Casarez, prengnacy related.  Protein S levels normalized afer pregnancy.    Urinary tract infection, site not specified     recurrent, start nitrofur in , 6mo txt     Past Surgical History:   Procedure Laterality Date    ARTHROSCOPY KNEE RT/LT      Rt knee    COLONOSCOPY N/A 2023    Procedure: Colonoscopy;  Surgeon: Alex Gross MD;  Location:  GI    ESOPHAGOSCOPY, GASTROSCOPY, DUODENOSCOPY (EGD), COMBINED N/A 2023    Procedure: ESOPHAGOGASTRODUODENOSCOPY, WITH BIOPSY;  Surgeon: Alex Gross MD;  Location:  GI    GYN SURGERY      HC REPAIR OF NASAL SEPTUM  2005    IR CAROTID CEREBRAL ANGIOGRAM BILATERAL  4/3/2025    REPAIR PATENT FORAMEN OVALE  2016    SEPTORHINOPLASTY N/A 03/15/2021    Procedure: Revision Septorhinoplasty, Repair of Nasal Valve Collapse, Cadaveric Rib Graft + 45 min Cosmetic Tip Rhinoplasty;  Surgeon: Nikki Schwartz MD;  Location: [facility] OR       Family History       Problem (# of Occurrences) Relation (Name,Age of Onset)    Arrhythmia (1) Father (Odell Clifton)    Allergies (1) Father (Odell Clifton)    Cancer (1) Father (Odell Clifton): throat, possibly asbestos exposure    Diabetes (3) Maternal Grandmother (Therese Ricketts): on insulin, Paternal Grandmother (Regina Clifton), Maternal Aunt:  in her early 40s of DM complications, type 2    Hypertension (1) Father (Odell Clifton)    Neurologic  Disorder (1) Paternal Grandfather:  of brain aneurysm in early 60s    Obesity (1) Maternal Aunt    Family History Negative (4) Mother, Sister, Brother, Son    C.A.D. (2) Maternal Grandmother (Therese Ricketts): triple bypass in her 60s, Maternal Grandfather: MI in 50s    Other Cancer (1) Father (Odell Clifton)    Coronary Artery Disease Early Onset (1) Maternal Aunt (41)           Social History     Socioeconomic History    Marital status:      Spouse name: Not on file    Number of children: 0    Years of education: 15    Highest education level: Not on file   Occupational History    Occupation:      Comment: [employer]   Tobacco Use    Smoking status: Former     Current packs/day: 0.00     Average packs/day: 0.5 packs/day for 5.0 years (2.5 ttl pk-yrs)     Types: Cigarettes     Start date: 2009     Quit date: 2014     Years since quittin.0    Smokeless tobacco: Never   Vaping Use    Vaping status: Never Used   Substance and Sexual Activity    Alcohol use: Yes     Comment: SOCIAL    Drug use: No    Sexual activity: Yes     Partners: Male     Birth control/protection: Pull-out method   Other Topics Concern     Service Not Asked    Blood Transfusions Not Asked    Caffeine Concern No     Comment: 1 cup coffee per day    Occupational Exposure Not Asked    Hobby Hazards Not Asked    Sleep Concern No    Stress Concern No    Weight Concern No    Special Diet No    Back Care Not Asked    Exercise Yes     Comment: 4-5 days week    Bike Helmet Not Asked    Seat Belt Not Asked    Self-Exams Not Asked    Parent/sibling w/ CABG, MI or angioplasty before 65F 55M? No   Social History Narrative    Social Documentation:        Balanced Diet: YES    Calcium intake: 3 per day    Caffeine: 0 per day    Exercise:  type of activity ellipitcal, pilates;  5 times per week    Sunscreen: Yes    Seatbelts:  Yes    Self Breast Exam:  Yes    Physical/Emotional/Sexual Abuse: No    Do you feel  safe in your environment? Yes        Cholesterol screen up to date: Yes- checking today 07/23/09, non fasting    Eye Exam up to date: Yes    Dental Exam up to date: Yes    Pap smear up to date: Yes: checking today 07/23/09, last was 04/08 NIL    Mammogram up to date: Does Not Apply    Dexa Scan up to date: Does Not Apply    Colonoscopy up to date: Does Not Apply    Immunizations up to date: Yes, had one a month ago.    Glucose screen if over 40:  No        Julieth Matthews MA    07/23/09     Social Drivers of Health     Financial Resource Strain: Low Risk  (10/9/2023)    Financial Resource Strain     Within the past 12 months, have you or your family members you live with been unable to get utilities (heat, electricity) when it was really needed?: No   Food Insecurity: Low Risk  (10/9/2023)    Food Insecurity     Within the past 12 months, did you worry that your food would run out before you got money to buy more?: No     Within the past 12 months, did the food you bought just not last and you didn t have money to get more?: No   Transportation Needs: Low Risk  (10/9/2023)    Transportation Needs     Within the past 12 months, has lack of transportation kept you from medical appointments, getting your medicines, non-medical meetings or appointments, work, or from getting things that you need?: No   Physical Activity: Not on file   Stress: Not on file   Social Connections: Not on file   Interpersonal Safety: Unknown (4/3/2025)    Interpersonal Safety     Do you feel physically and emotionally safe where you currently live?: Patient unable to answer     Within the past 12 months, have you been hit, slapped, kicked or otherwise physically hurt by someone?: Patient unable to answer     Within the past 12 months, have you been humiliated or emotionally abused in other ways by your partner or ex-partner?: Patient unable to answer   Housing Stability: Low Risk  (10/9/2023)    Housing Stability     Do you have housing? :  Yes     Are you worried about losing your housing?: No       Current Outpatient Medications   Medication Sig Dispense Refill    acetaminophen (TYLENOL) 325 MG tablet Take 325-650 mg by mouth every 6 hours as needed for mild pain      aspirin 81 MG EC tablet Take 1 tablet (81 mg) by mouth daily. 30 tablet 11    LORazepam (ATIVAN) 1 MG tablet Take 0.5-1 tablets (0.5-1 mg) by mouth daily as needed for anxiety or sleep (severe anxiety/panic). 30 tablet 1    magnesium oxide (MAG-OX) 400 MG tablet Take 400 mg by mouth nightly as needed (leg cramps).      methylPREDNISolone (MEDROL DOSEPAK) 4 MG tablet therapy pack Follow Package Directions (Patient not taking: Reported on 4/14/2025) 21 tablet 0    Omega-3 Fatty Acids (FISH OIL PO) Take 1 capsule by mouth At Bedtime       ondansetron (ZOFRAN ODT) 4 MG ODT tab Take 1 tablet (4 mg) by mouth every 8 hours as needed for nausea. 21 tablet 11    propranolol (INDERAL) 10 MG tablet Take 10 mg by mouth 2 times daily as needed (performance anxiety).      rosuvastatin (CRESTOR) 40 MG tablet Take 1 tablet (40 mg) by mouth daily. You will need to get your cholesterol checked with your primary in the next 2 months and then further refills per primary (Patient not taking: Reported on 3/27/2025) 30 tablet 1    Semaglutide-Weight Management (WEGOVY) 1.7 MG/0.75ML pen Inject 1.7 mg subcutaneously once a week. 9 mL 1    Semaglutide-Weight Management (WEGOVY) 2.4 MG/0.75ML pen Inject 2.4 mg subcutaneously once a week. (Patient not taking: Reported on 4/14/2025) 3 mL 2    ubrogepant (UBRELVY) 100 MG tablet Take 1 tablet (100 mg) by mouth at onset of headache. 10 tablet 11    VITAMIN D, CHOLECALCIFEROL, PO Take 5,000 Units by mouth every evening. Reduces dose in the spring/summer         There were no vitals taken for this visit.     On examination, patient is alert and cooperative and appears to be in no acute distress.  She is afebrile.    HEENT is unremarkable.  Extraocular movements are  intact.  Face is symmetric.  Tongue is midline.  Neck is supple.    She is able to move her upper extremities functionally.  She is able to move her lower extremities well.  She has normal strength.  Sensation is intact.  Reflexes are symmetric and plantars are downgoing.  Coordination tests are intact.  Romberg is negative.  Gait is unremarkable.    She is able to walk on her heels and toes without difficulty.    Musculoskeletal examination revealed areas of tenderness in the muscles of the neck and shoulder bilaterally.  She is also tender over the cervical facets bilaterally.  Tenderness was also elicited over the lumbar region, sacroiliac region as well as the gluteal region including the hips and the piriformis.    Impression: This is a 44-year-old woman with widespread aches and pains including headaches neck and shoulder pain.  By her report she feels pain is more in the lower half of the body but she has more areas that hurt in the upper half.    From a rehab perspective, it may be helpful to treat the pain that she is having with trigger point injections to start with and assess her response.  Due to the widespread nature of her pain, some of her difficulties may be related to this as well.    If she gets good relief with the injections, neurotoxin therapy and/or treatment of the various pain sources may be helpful.    She will ponder over these recommendations and let us know once she decides.    Thank you are much for allow me to assist in her care.  60 minutes spent for this visit, greater than 50% was for counseling on above-mentioned issues, all of the date of encounter.    Jefry Horton MD

## 2025-04-22 NOTE — LETTER
4/22/2025       RE: Chloe Foster  [address] [id number]     Dear Colleague,    Thank you for referring your patient, Chloe Foster, to the [facility] at [facility]. Please see a copy of my visit note below.    CC: I am seeing this patient with a history of stroke and also dealing with chronic headaches neck shoulder pain, migraine as well as pain in the lower back and hip.    Patient is a very pleasant 44-year-old woman who reports having had 2 strokes in 2015 in the context of pregnancy.  She underwent atrial septal occlusion with an occluder and has been doing well.  She was able to stop taking aspirin in 2020.    She developed left-sided symptoms with left facial numbness and numbness in the left hand region.  This was in October 10, 2024.  There was a MRI with DWI abnormality involving right middle frontal gyrus.  CTA did not show any large vessel issue GODWIN did not show any thrombus or residual shunt.  She had another symptom on 10/22/2024 with left-sided symptoms and repeat MRI did not show any extension of the stroke.  She has been followed by hematology and cardiology without any new concerns.    She does acknowledge working 10-hour days in a reasonably stressful job with lots of presentations etc.  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  She gets migraines and sometimes finds it difficult to focus.  She also has a history of tinnitus on the right side.  She is being followed by neurology for these and also has a neuropsychological testing planned for January 2026.    On a scale of 0-10 she rates her pain at its best a 0 at worst a 10 and an average in the 6-8 region.  She has done physical therapy but felt it actually made it worse.  She takes Tylenol Advil on the daily basis.  When she is tired she finds the symptoms are worse on the left side.    Past Medical History:    Diagnosis Date     Acute stress reaction     propranolol helps prior to interviews, stressful situations     Allergic rhinitis, cause unspecified     no meds except prn flonase, tests generally positive, decided against shots     Anxiety state, unspecified     citalopram started in 7/06, stopped after couple wks, felt sluggish/tired     CVA (cerebral vascular accident) (H)     May 1, May 14th 2015     Depressive disorder, not elsewhere classified     dx, but not sure this is correct, weaned off wellbutrin (4/05-6/06, 12/06-1/08), restarted 3/08     Dizziness      Elevated lipoprotein A level      Hirsutism     saw endo, inconclusive, started on spironolactone (helped a little), stopped in 6/06, elevated DHEA- sees new endo 4/08     History of stroke with residual effects      PFO (patent foramen ovale)     Post PFO/ASD closure with 30mm Amplatzer device 4/21/16     Protein S deficiency     Per Dr. Casarez, prengnacy related.  Protein S levels normalized afer pregnancy.     Urinary tract infection, site not specified     recurrent, start nitrofur in 5/08, 6mo txt     Past Surgical History:   Procedure Laterality Date     ARTHROSCOPY KNEE RT/LT  1995    Rt knee     COLONOSCOPY N/A 9/25/2023    Procedure: Colonoscopy;  Surgeon: Alex Gross MD;  Location:  GI     ESOPHAGOSCOPY, GASTROSCOPY, DUODENOSCOPY (EGD), COMBINED N/A 9/25/2023    Procedure: ESOPHAGOGASTRODUODENOSCOPY, WITH BIOPSY;  Surgeon: Alex Gross MD;  Location:  GI     GYN SURGERY       HC REPAIR OF NASAL SEPTUM  12/2005     IR CAROTID CEREBRAL ANGIOGRAM BILATERAL  4/3/2025     REPAIR PATENT FORAMEN OVALE  04/21/2016     SEPTORHINOPLASTY N/A 03/15/2021    Procedure: Revision Septorhinoplasty, Repair of Nasal Valve Collapse, Cadaveric Rib Graft + 45 min Cosmetic Tip Rhinoplasty;  Surgeon: Nikki Schwartz MD;  Location: [facility] OR       Family History       Problem (# of Occurrences) Relation (Name,Age of Onset)    Arrhythmia (1) Father (Don  Elodia)    Allergies (1) Father (Odell Clifton)    Cancer (1) Father (Odell Clifton): throat, possibly asbestos exposure    Diabetes (3) Maternal Grandmother (Therese Ricketts): on insulin, Paternal Grandmother (Regina Clifton), Maternal Aunt:  in her early 40s of DM complications, type 2    Hypertension (1) Father (Odell Clifton)    Neurologic Disorder (1) Paternal Grandfather:  of brain aneurysm in early 60s    Obesity (1) Maternal Aunt    Family History Negative (4) Mother, Sister, Brother, Son    C.A.D. (2) Maternal Grandmother (Therese Ricketts): triple bypass in her 60s, Maternal Grandfather: MI in 50s    Other Cancer (1) Father (Odell Clifton)    Coronary Artery Disease Early Onset (1) Maternal Aunt (41)           Social History     Socioeconomic History     Marital status:      Spouse name: Not on file     Number of children: 0     Years of education: 15     Highest education level: Not on file   Occupational History     Occupation:      Comment: [employer]   Tobacco Use     Smoking status: Former     Current packs/day: 0.00     Average packs/day: 0.5 packs/day for 5.0 years (2.5 ttl pk-yrs)     Types: Cigarettes     Start date: 2009     Quit date: 2014     Years since quittin.0     Smokeless tobacco: Never   Vaping Use     Vaping status: Never Used   Substance and Sexual Activity     Alcohol use: Yes     Comment: SOCIAL     Drug use: No     Sexual activity: Yes     Partners: Male     Birth control/protection: Pull-out method   Other Topics Concern      Service Not Asked     Blood Transfusions Not Asked     Caffeine Concern No     Comment: 1 cup coffee per day     Occupational Exposure Not Asked     Hobby Hazards Not Asked     Sleep Concern No     Stress Concern No     Weight Concern No     Special Diet No     Back Care Not Asked     Exercise Yes     Comment: 4-5 days week     Bike Helmet Not Asked     Seat Belt Not Asked     Self-Exams Not Asked      Parent/sibling w/ CABG, MI or angioplasty before 65F 55M? No   Social History Narrative    Social Documentation:        Balanced Diet: YES    Calcium intake: 3 per day    Caffeine: 0 per day    Exercise:  type of activity ellipitcal, pilates;  5 times per week    Sunscreen: Yes    Seatbelts:  Yes    Self Breast Exam:  Yes    Physical/Emotional/Sexual Abuse: No    Do you feel safe in your environment? Yes        Cholesterol screen up to date: Yes- checking today 07/23/09, non fasting    Eye Exam up to date: Yes    Dental Exam up to date: Yes    Pap smear up to date: Yes: checking today 07/23/09, last was 04/08 NIL    Mammogram up to date: Does Not Apply    Dexa Scan up to date: Does Not Apply    Colonoscopy up to date: Does Not Apply    Immunizations up to date: Yes, had one a month ago.    Glucose screen if over 40:  No        Julieth Matthews MA    07/23/09     Social Drivers of Health     Financial Resource Strain: Low Risk  (10/9/2023)    Financial Resource Strain      Within the past 12 months, have you or your family members you live with been unable to get utilities (heat, electricity) when it was really needed?: No   Food Insecurity: Low Risk  (10/9/2023)    Food Insecurity      Within the past 12 months, did you worry that your food would run out before you got money to buy more?: No      Within the past 12 months, did the food you bought just not last and you didn t have money to get more?: No   Transportation Needs: Low Risk  (10/9/2023)    Transportation Needs      Within the past 12 months, has lack of transportation kept you from medical appointments, getting your medicines, non-medical meetings or appointments, work, or from getting things that you need?: No   Physical Activity: Not on file   Stress: Not on file   Social Connections: Not on file   Interpersonal Safety: Unknown (4/3/2025)    Interpersonal Safety      Do you feel physically and emotionally safe where you currently live?: Patient unable  to answer      Within the past 12 months, have you been hit, slapped, kicked or otherwise physically hurt by someone?: Patient unable to answer      Within the past 12 months, have you been humiliated or emotionally abused in other ways by your partner or ex-partner?: Patient unable to answer   Housing Stability: Low Risk  (10/9/2023)    Housing Stability      Do you have housing? : Yes      Are you worried about losing your housing?: No       Current Outpatient Medications   Medication Sig Dispense Refill     acetaminophen (TYLENOL) 325 MG tablet Take 325-650 mg by mouth every 6 hours as needed for mild pain       aspirin 81 MG EC tablet Take 1 tablet (81 mg) by mouth daily. 30 tablet 11     LORazepam (ATIVAN) 1 MG tablet Take 0.5-1 tablets (0.5-1 mg) by mouth daily as needed for anxiety or sleep (severe anxiety/panic). 30 tablet 1     magnesium oxide (MAG-OX) 400 MG tablet Take 400 mg by mouth nightly as needed (leg cramps).       methylPREDNISolone (MEDROL DOSEPAK) 4 MG tablet therapy pack Follow Package Directions (Patient not taking: Reported on 4/14/2025) 21 tablet 0     Omega-3 Fatty Acids (FISH OIL PO) Take 1 capsule by mouth At Bedtime        ondansetron (ZOFRAN ODT) 4 MG ODT tab Take 1 tablet (4 mg) by mouth every 8 hours as needed for nausea. 21 tablet 11     propranolol (INDERAL) 10 MG tablet Take 10 mg by mouth 2 times daily as needed (performance anxiety).       rosuvastatin (CRESTOR) 40 MG tablet Take 1 tablet (40 mg) by mouth daily. You will need to get your cholesterol checked with your primary in the next 2 months and then further refills per primary (Patient not taking: Reported on 3/27/2025) 30 tablet 1     Semaglutide-Weight Management (WEGOVY) 1.7 MG/0.75ML pen Inject 1.7 mg subcutaneously once a week. 9 mL 1     Semaglutide-Weight Management (WEGOVY) 2.4 MG/0.75ML pen Inject 2.4 mg subcutaneously once a week. (Patient not taking: Reported on 4/14/2025) 3 mL 2     ubrogepant (UBRELVY) 100 MG  tablet Take 1 tablet (100 mg) by mouth at onset of headache. 10 tablet 11     VITAMIN D, CHOLECALCIFEROL, PO Take 5,000 Units by mouth every evening. Reduces dose in the spring/summer         There were no vitals taken for this visit.     On examination, patient is alert and cooperative and appears to be in no acute distress.  She is afebrile.    HEENT is unremarkable.  Extraocular movements are intact.  Face is symmetric.  Tongue is midline.  Neck is supple.    She is able to move her upper extremities functionally.  She is able to move her lower extremities well.  She has normal strength.  Sensation is intact.  Reflexes are symmetric and plantars are downgoing.  Coordination tests are intact.  Romberg is negative.  Gait is unremarkable.    She is able to walk on her heels and toes without difficulty.    Musculoskeletal examination revealed areas of tenderness in the muscles of the neck and shoulder bilaterally.  She is also tender over the cervical facets bilaterally.  Tenderness was also elicited over the lumbar region, sacroiliac region as well as the gluteal region including the hips and the piriformis.    Impression: This is a 44-year-old woman with widespread aches and pains including headaches neck and shoulder pain.  By her report she feels pain is more in the lower half of the body but she has more areas that hurt in the upper half.    From a rehab perspective, it may be helpful to treat the pain that she is having with trigger point injections to start with and assess her response.  Due to the widespread nature of her pain, some of her difficulties may be related to this as well.    If she gets good relief with the injections, neurotoxin therapy and/or treatment of the various pain sources may be helpful.    She will ponder over these recommendations and let us know once she decides.    Thank you are much for allow me to assist in her care.  60 minutes spent for this visit, greater than 50% was for  counseling on above-mentioned issues, all of the date of encounter.    Jefry Horton MD       Again, thank you for allowing me to participate in the care of your patient.      Sincerely,    Jefry Horton MD

## 2025-04-23 ENCOUNTER — OFFICE VISIT (OUTPATIENT)
Dept: DERMATOLOGY | Facility: CLINIC | Age: 45
End: 2025-04-23
Payer: COMMERCIAL

## 2025-04-23 DIAGNOSIS — L82.0 INFLAMED SEBORRHEIC KERATOSIS: Primary | ICD-10-CM

## 2025-04-23 NOTE — PROGRESS NOTES
[facility] Dermatology Note  Encounter Date: Apr 23, 2025  Office Visit     Reviewed patients past medical history and pertinent chart review prior to patients visit today.     Dermatology Problem List:  ISK Cryo 4/23/2025     - Patient denies personal history of skin cancer or dysplastic nevi.    - Patient denies family history of skin cancer or dysplastic nevi.       ____________________________________________    Assessment & Plan:     - Irritated and inflamed Seborrheic Keratosis. Discussed treatment options with patient including no treatment, cryotherapy, and shave removal. Patient prefers cryotherapy today due to irritation and itching. After verbal consent and discussion of risks and benefits including but no limited to dyspigmentation/scar, blister, and pain, 2 was(were) treated with 1-2mm freeze border for 2 cycles with liquid nitrogen. Post cryotherapy instructions were provided.         Dayanara Alvarez, [facility]  Dermatology    _______________________________________    CC: Derm Problem (Cyo spot on L cheek and theft shin and inner thigh)    HPI:  Ms. Chloe Foster is a(n) 44 year old female who presents today as a return patient for treatment of irritated Seborrheic keratosis on the left cheek and left thigh that have been irritated and bothersome.     Patient is otherwise feeling well, without additional skin concerns.      Physical Exam:  SKIN: Focused examination of left cheek and left thigh lesions was performed.  - -waxy, stuck on tan/brown papules on the left cheek and left medial thigh     - No other lesions of concern on areas examined.     Medications:  Current Outpatient Medications   Medication Sig Dispense Refill    acetaminophen (TYLENOL) 325 MG tablet Take 325-650 mg by mouth every 6 hours as needed for mild pain      aspirin 81 MG EC tablet Take 1 tablet (81 mg) by mouth daily. 30 tablet 11    LORazepam (ATIVAN) 1 MG tablet Take 0.5-1 tablets (0.5-1 mg) by mouth daily as  needed for anxiety or sleep (severe anxiety/panic). 30 tablet 1    magnesium oxide (MAG-OX) 400 MG tablet Take 400 mg by mouth nightly as needed (leg cramps).      methylPREDNISolone (MEDROL DOSEPAK) 4 MG tablet therapy pack Follow Package Directions 21 tablet 0    Omega-3 Fatty Acids (FISH OIL PO) Take 1 capsule by mouth At Bedtime       ondansetron (ZOFRAN ODT) 4 MG ODT tab Take 1 tablet (4 mg) by mouth every 8 hours as needed for nausea. 21 tablet 11    propranolol (INDERAL) 10 MG tablet Take 10 mg by mouth 2 times daily as needed (performance anxiety).      rosuvastatin (CRESTOR) 40 MG tablet Take 1 tablet (40 mg) by mouth daily. You will need to get your cholesterol checked with your primary in the next 2 months and then further refills per primary 30 tablet 1    Semaglutide-Weight Management (WEGOVY) 1.7 MG/0.75ML pen Inject 1.7 mg subcutaneously once a week. 9 mL 1    Semaglutide-Weight Management (WEGOVY) 2.4 MG/0.75ML pen Inject 2.4 mg subcutaneously once a week. 3 mL 2    ubrogepant (UBRELVY) 100 MG tablet Take 1 tablet (100 mg) by mouth at onset of headache. 10 tablet 11    VITAMIN D, CHOLECALCIFEROL, PO Take 5,000 Units by mouth every evening. Reduces dose in the spring/summer       No current facility-administered medications for this visit.      Past Medical History:   Patient Active Problem List   Diagnosis    Vitamin D deficiency    CARDIOVASCULAR SCREENING; LDL GOAL LESS THAN 160    Lumbar herniated disc    Hirsutism    Insomnia    IBS (irritable bowel syndrome)    Moderate recurrent major depression (H)    History of cardioembolic cerebrovascular accident (CVA)    PFO with atrial septal aneurysm    Protein S deficiency    Migraine with aura and without status migrainosus, not intractable    Pre-conception counseling    Elevated lipoprotein A level    Social anxiety disorder    MTHFR mutation    Cerebrovascular accident (CVA) due to embolism of right middle cerebral artery (H)    Deviated nasal  septum    Overweight    Elevated cholesterol    S/P patent foramen ovale closure     Past Medical History:   Diagnosis Date    Acute stress reaction     propranolol helps prior to interviews, stressful situations    Allergic rhinitis, cause unspecified     no meds except prn flonase, tests generally positive, decided against shots    Anxiety state, unspecified     citalopram started in 7/06, stopped after couple wks, felt sluggish/tired    CVA (cerebral vascular accident) (H)     May 1, May 14th 2015    Depressive disorder, not elsewhere classified     dx, but not sure this is correct, weaned off wellbutrin (4/05-6/06, 12/06-1/08), restarted 3/08    Dizziness     Elevated lipoprotein A level     Hirsutism     saw endo, inconclusive, started on spironolactone (helped a little), stopped in 6/06, elevated DHEA- sees new endo 4/08    History of stroke with residual effects     PFO (patent foramen ovale)     Post PFO/ASD closure with 30mm Amplatzer device 4/21/16    Protein S deficiency     Per Dr. Casarez, prengnacy related.  Protein S levels normalized afer pregnancy.    Urinary tract infection, site not specified     recurrent, start nitrofur in 5/08, 6mo txt       CC Referred Self, MD  No address on file on close of this encounter.

## 2025-04-23 NOTE — LETTER
4/23/2025      Chloe Foster  [address] [address]      Dear Colleague,    Thank you for referring your patient, Clhoe Foster, to the [facility]. Please see a copy of my visit note below.    [facility] Dermatology Note  Encounter Date: Apr 23, 2025  Office Visit     Reviewed patients past medical history and pertinent chart review prior to patients visit today.     Dermatology Problem List:  ISK Cryo 4/23/2025     - Patient denies personal history of skin cancer or dysplastic nevi.    - Patient denies family history of skin cancer or dysplastic nevi.       ____________________________________________    Assessment & Plan:     - Irritated and inflamed Seborrheic Keratosis. Discussed treatment options with patient including no treatment, cryotherapy, and shave removal. Patient prefers cryotherapy today due to irritation and itching. After verbal consent and discussion of risks and benefits including but no limited to dyspigmentation/scar, blister, and pain, 2 was(were) treated with 1-2mm freeze border for 2 cycles with liquid nitrogen. Post cryotherapy instructions were provided.         Dayanara Alvarez, [facility]  Dermatology    _______________________________________    CC: Derm Problem (Cyo spot on L cheek and theft shin and inner thigh)    HPI:  Ms. Chloe Foster is a(n) 44 year old female who presents today as a return patient for treatment of irritated Seborrheic keratosis on the left cheek and left thigh that have been irritated and bothersome.     Patient is otherwise feeling well, without additional skin concerns.      Physical Exam:  SKIN: Focused examination of left cheek and left thigh lesions was performed.  - -waxy, stuck on tan/brown papules on the left cheek and left medial thigh     - No other lesions of concern on areas examined.     Medications:  Current Outpatient Medications   Medication Sig Dispense Refill      acetaminophen (TYLENOL) 325 MG tablet Take 325-650 mg by mouth every 6 hours as needed for mild pain       aspirin 81 MG EC tablet Take 1 tablet (81 mg) by mouth daily. 30 tablet 11     LORazepam (ATIVAN) 1 MG tablet Take 0.5-1 tablets (0.5-1 mg) by mouth daily as needed for anxiety or sleep (severe anxiety/panic). 30 tablet 1     magnesium oxide (MAG-OX) 400 MG tablet Take 400 mg by mouth nightly as needed (leg cramps).       methylPREDNISolone (MEDROL DOSEPAK) 4 MG tablet therapy pack Follow Package Directions 21 tablet 0     Omega-3 Fatty Acids (FISH OIL PO) Take 1 capsule by mouth At Bedtime        ondansetron (ZOFRAN ODT) 4 MG ODT tab Take 1 tablet (4 mg) by mouth every 8 hours as needed for nausea. 21 tablet 11     propranolol (INDERAL) 10 MG tablet Take 10 mg by mouth 2 times daily as needed (performance anxiety).       rosuvastatin (CRESTOR) 40 MG tablet Take 1 tablet (40 mg) by mouth daily. You will need to get your cholesterol checked with your primary in the next 2 months and then further refills per primary 30 tablet 1     Semaglutide-Weight Management (WEGOVY) 1.7 MG/0.75ML pen Inject 1.7 mg subcutaneously once a week. 9 mL 1     Semaglutide-Weight Management (WEGOVY) 2.4 MG/0.75ML pen Inject 2.4 mg subcutaneously once a week. 3 mL 2     ubrogepant (UBRELVY) 100 MG tablet Take 1 tablet (100 mg) by mouth at onset of headache. 10 tablet 11     VITAMIN D, CHOLECALCIFEROL, PO Take 5,000 Units by mouth every evening. Reduces dose in the spring/summer       No current facility-administered medications for this visit.      Past Medical History:   Patient Active Problem List   Diagnosis     Vitamin D deficiency     CARDIOVASCULAR SCREENING; LDL GOAL LESS THAN 160     Lumbar herniated disc     Hirsutism     Insomnia     IBS (irritable bowel syndrome)     Moderate recurrent major depression (H)     History of cardioembolic cerebrovascular accident (CVA)     PFO with atrial septal aneurysm     Protein S deficiency      Migraine with aura and without status migrainosus, not intractable     Pre-conception counseling     Elevated lipoprotein A level     Social anxiety disorder     MTHFR mutation     Cerebrovascular accident (CVA) due to embolism of right middle cerebral artery (H)     Deviated nasal septum     Overweight     Elevated cholesterol     S/P patent foramen ovale closure     Past Medical History:   Diagnosis Date     Acute stress reaction     propranolol helps prior to interviews, stressful situations     Allergic rhinitis, cause unspecified     no meds except prn flonase, tests generally positive, decided against shots     Anxiety state, unspecified     citalopram started in 7/06, stopped after couple wks, felt sluggish/tired     CVA (cerebral vascular accident) (H)     May 1, May 14th 2015     Depressive disorder, not elsewhere classified     dx, but not sure this is correct, weaned off wellbutrin (4/05-6/06, 12/06-1/08), restarted 3/08     Dizziness      Elevated lipoprotein A level      Hirsutism     saw endo, inconclusive, started on spironolactone (helped a little), stopped in 6/06, elevated DHEA- sees new endo 4/08     History of stroke with residual effects      PFO (patent foramen ovale)     Post PFO/ASD closure with 30mm Amplatzer device 4/21/16     Protein S deficiency     Per Dr. Casarez, prengnacy related.  Protein S levels normalized afer pregnancy.     Urinary tract infection, site not specified     recurrent, start nitrofur in 5/08, 6mo txt       CC Referred Self, MD  No address on file on close of this encounter.       Again, thank you for allowing me to participate in the care of your patient.        Sincerely,        Lou Alvarez, APRN CNP    Electronically signed

## 2025-04-23 NOTE — PATIENT INSTRUCTIONS
Cryotherapy    What is it?  Use of a very cold liquid, such as liquid nitrogen, to freeze and destroy abnormal skin cells that need to be removed    What should I expect?  Tenderness and redness  A small blister that might grow and fill with dark purple blood. There may be crusting.  More than one treatment may be needed if the lesions do not go away.    How do I care for the treated area?  Gently wash the area with your hands when bathing.  Use a thin layer of Vaseline to help with healing. You may use a Band-Aid.   The area should heal within 7-10 days and may leave behind a pink or lighter color.   Do not use an antibiotic or Neosporin ointment.   You may take acetaminophen (Tylenol) for pain.     Call your Doctor if you have:  Severe pain  Signs of infection (warmth, redness, cloudy yellow drainage, and or a bad smell)  Questions or concerns    Who should I call with questions?      [facility] [phone]      For urgent needs outside of business hours call the [facility] at [phone] and ask for the dermatology resident on call

## 2025-04-29 DIAGNOSIS — Z01.10 ENCOUNTER FOR HEARING EXAMINATION: Primary | ICD-10-CM

## 2025-04-30 ENCOUNTER — PATIENT OUTREACH (OUTPATIENT)
Dept: CARE COORDINATION | Facility: CLINIC | Age: 45
End: 2025-04-30
Payer: COMMERCIAL

## 2025-05-07 ENCOUNTER — PRE VISIT (OUTPATIENT)
Dept: OTOLARYNGOLOGY | Facility: CLINIC | Age: 45
End: 2025-05-07

## 2025-05-07 ENCOUNTER — OFFICE VISIT (OUTPATIENT)
Dept: AUDIOLOGY | Facility: CLINIC | Age: 45
End: 2025-05-07
Attending: PSYCHIATRY & NEUROLOGY
Payer: COMMERCIAL

## 2025-05-07 DIAGNOSIS — H91.90 SUBJECTIVE HEARING CHANGE: ICD-10-CM

## 2025-05-07 DIAGNOSIS — H93.13 TINNITUS OF BOTH EARS: Primary | ICD-10-CM

## 2025-05-07 NOTE — PROGRESS NOTES
AUDIOLOGY REPORT    SUMMARY: Audiology visit completed. See audiogram for results.      RECOMMENDATIONS: Follow-up with ENT.    Escobar Brar. CCC-A  Licensed Audiologist   [place] #[id number]

## 2025-05-19 ENCOUNTER — CARE COORDINATION (OUTPATIENT)
Dept: NEUROLOGY | Facility: CLINIC | Age: 45
End: 2025-05-19

## 2025-05-19 ENCOUNTER — VIRTUAL VISIT (OUTPATIENT)
Facility: CLINIC | Age: 45
End: 2025-05-19
Payer: COMMERCIAL

## 2025-05-19 ENCOUNTER — MYC MEDICAL ADVICE (OUTPATIENT)
Dept: NEUROLOGY | Facility: CLINIC | Age: 45
End: 2025-05-19

## 2025-05-19 DIAGNOSIS — G43.109 MIGRAINE WITH AURA AND WITHOUT STATUS MIGRAINOSUS, NOT INTRACTABLE: ICD-10-CM

## 2025-05-19 DIAGNOSIS — F43.10 PTSD (POST-TRAUMATIC STRESS DISORDER): Primary | ICD-10-CM

## 2025-05-19 PROCEDURE — 90834 PSYTX W PT 45 MINUTES: CPT | Mod: 95 | Performed by: SOCIAL WORKER

## 2025-05-19 ASSESSMENT — ANXIETY QUESTIONNAIRES
8. IF YOU CHECKED OFF ANY PROBLEMS, HOW DIFFICULT HAVE THESE MADE IT FOR YOU TO DO YOUR WORK, TAKE CARE OF THINGS AT HOME, OR GET ALONG WITH OTHER PEOPLE?: VERY DIFFICULT
GAD7 TOTAL SCORE: 15
1. FEELING NERVOUS, ANXIOUS, OR ON EDGE: NEARLY EVERY DAY
3. WORRYING TOO MUCH ABOUT DIFFERENT THINGS: NEARLY EVERY DAY
GAD7 TOTAL SCORE: 15
7. FEELING AFRAID AS IF SOMETHING AWFUL MIGHT HAPPEN: NEARLY EVERY DAY
4. TROUBLE RELAXING: MORE THAN HALF THE DAYS
7. FEELING AFRAID AS IF SOMETHING AWFUL MIGHT HAPPEN: NEARLY EVERY DAY
6. BECOMING EASILY ANNOYED OR IRRITABLE: SEVERAL DAYS
2. NOT BEING ABLE TO STOP OR CONTROL WORRYING: NEARLY EVERY DAY
IF YOU CHECKED OFF ANY PROBLEMS ON THIS QUESTIONNAIRE, HOW DIFFICULT HAVE THESE PROBLEMS MADE IT FOR YOU TO DO YOUR WORK, TAKE CARE OF THINGS AT HOME, OR GET ALONG WITH OTHER PEOPLE: VERY DIFFICULT
5. BEING SO RESTLESS THAT IT IS HARD TO SIT STILL: NOT AT ALL
GAD7 TOTAL SCORE: 15

## 2025-05-19 ASSESSMENT — PATIENT HEALTH QUESTIONNAIRE - PHQ9
SUM OF ALL RESPONSES TO PHQ QUESTIONS 1-9: 7
SUM OF ALL RESPONSES TO PHQ QUESTIONS 1-9: 7
10. IF YOU CHECKED OFF ANY PROBLEMS, HOW DIFFICULT HAVE THESE PROBLEMS MADE IT FOR YOU TO DO YOUR WORK, TAKE CARE OF THINGS AT HOME, OR GET ALONG WITH OTHER PEOPLE: VERY DIFFICULT

## 2025-05-19 NOTE — PROGRESS NOTES
Headache Crisis May 19, 2025  Onset: Thursday after an Aura  Description:                Type: Daily Headache                 Location: unilateral in the both frontal area                  Duration:  5 days                  Pain scale ratin/10                 Are headaches getting more intense or more frequent: No    Is this headache similar to previous headaches? Yes  Are you experiencing any new or different symptoms? No     Accompanying Signs & Symptoms:   Fever: No  Nausea or vomiting: No  Dizziness: No  Numbness: No  Weakness: No  Visual changes: No  Medications tried and outcome:   Ubrelvy with repeat doses since Thursday with minimal improvement.  Has Zofran but has not tried this. I advised to take a zofran with Ubrelvy and try to sleep.      Will update Dr Post and call her back with any recommendations    Message sent to Dr Post

## 2025-05-19 NOTE — PROGRESS NOTES
[facility]                                     Progress Note    Patient Name: Chloe Foster  Date: 5/19/2025         Service Type: Individual      Session Start Time: 7:31 AM Session End Time: 8:22 AM     Session Length: 38-52 minutes    Session #: 2 with this provider    Attendees: Client attended alone    Service Modality:  Video Visit:      Provider verified identity through the following two step process.  Patient provided:  Patient is known previously to provider    Telemedicine Visit: The patient's condition can be safely assessed and treated via synchronous audio and visual telemedicine encounter.      Reason for Telemedicine Visit: Patient convenience (e.g. access to timely appointments / distance to available provider)    Originating Site (Patient Location): Patient's home    Distant Site (Provider Location): Provider Remote Setting- Home Office    Consent:  The patient/guardian has verbally consented to: the potential risks and benefits of telemedicine (video visit) versus in person care; bill my insurance or make self-payment for services provided; and responsibility for payment of non-covered services.     Patient would like the video invitation sent by:  My Chart    Mode of Communication:  Video Conference via [facility]    Distant Location (Provider):  Off-site    As the provider I attest to compliance with applicable laws and regulations related to telemedicine.    DATA  Interactive Complexity: No  Crisis: No        Progress Since Last Session (Related to Symptoms / Goals / Homework):   Symptoms: no change since previous appointment    Homework: completed in session      Episode of Care Goals: No improvement - PREPARATION (Decided to change - considering how); Intervened by negotiating a change plan and determining options / strategies for behavior change, identifying triggers, exploring social supports, and working towards setting a date to begin behavior change     Current /  Ongoing Stressors and Concerns:   Speaking in front of groups    History of strokes, most recent in October 2024   Continued physical symptoms     Treatment Objective(s) Addressed in This Session:   identify 1 initial signs or symptoms of anxiety  EMDR: phases 1 & 2     Intervention:   EMDR: provided psychoeducation, identified potential targets, discussed next steps   Engaged client in identifying anxiety symptoms   Reviewed flowsheets    Assessments completed prior to visit:  The following assessments were completed by patient for this visit:  PHQ9:       8/22/2024    10:08 AM 11/14/2024    11:57 AM 11/14/2024    11:58 AM 1/14/2025    11:50 AM 1/21/2025     1:51 PM 1/27/2025    12:57 PM 5/19/2025     6:52 AM   PHQ-9 SCORE   PHQ-9 Total Score MyChart 4 (Minimal depression)  4 (Minimal depression) 4 (Minimal depression) 5 (Mild depression) 3 (Minimal depression) 7 (Mild depression)   PHQ-9 Total Score 4 4   4  5  3  7        Patient-reported     GAD7:       4/19/2024     7:04 AM 8/22/2024    10:06 AM 11/14/2024    11:58 AM 12/9/2024     3:18 PM 1/14/2025    11:50 AM 5/19/2025     6:52 AM 5/19/2025     6:53 AM   AYLA-7 SCORE   Total Score 4 (minimal anxiety) Incomplete 11 (moderate anxiety) 13 (moderate anxiety) 9 (mild anxiety)  15 (severe anxiety)   Total Score 4  11  13  9  15         Patient-reported        PROMIS 10-Global Health (only subscores and total score):       9/22/2023     8:01 AM 1/9/2024     8:32 AM 4/16/2024     9:35 AM 11/14/2024    12:09 PM 12/6/2024     7:54 AM 5/19/2025     6:53 AM   PROMIS-10 Scores Only   Global Mental Health Score 10 13 12 10  12  10    Global Physical Health Score 15 16 16 16  17  15    PROMIS TOTAL - SUBSCORES 25 29 28 26  29  25        Patient-reported      ASSESSMENT: Current Emotional / Mental Status (status of significant symptoms):   Risk status (Self / Other harm or suicidal ideation)   Patient denies current fears or concerns for personal safety.   Patient denies  current or recent suicidal ideation or behaviors.   Patient denies current or recent homicidal ideation or behaviors.   Patient denies current or recent self injurious behavior or ideation.   Patient denies other safety concerns.   Patient reports there has been no change in risk factors since their last session.     Patient reports there has been no change in protective factors since their last session.     Recommended that patient call 911 or go to the local ED should there be a change in any of these risk factors     Appearance:   Appropriate    Eye Contact:   Good    Psychomotor Behavior: Normal    Attitude:   Cooperative  Interested   Orientation:   All   Speech    Rate / Production: Normal     Volume:  Normal    Mood:    Anxious    Affect:    Mood congruent    Thought Content:  Clear    Thought Form:  Coherent  Goal Directed  Logical    Insight:    Good      Medication Review:   No changes to current psychiatric medication(s)    Propranolol  Ativan     Medication Compliance:   Yes     Changes in Health Issues:   None reported     Chemical Use Review:   Substance Use: Chemical use reviewed, no active concerns identified      Tobacco Use: No current tobacco use.      Diagnosis:  309.81 (F43.10) Posttraumatic Stress Disorder (includes Posttraumatic Stress Disorder for Children 6 Years and Younger)  Without dissociative symptoms.   300.23 (F40.10) Social Anxiety Disorder by history  Major Depressive Disorder, single episode, in full remission    Collateral Reports Completed:   NA    PLAN: (Patient Tasks / Therapist Tasks / Other)  EMDR: complete timeline, engage in completion of JAZMIN II, safe/calm state and container exercises    Talya Donohue, [facility]  5/19/2025                                                   ______________________________________________________________________    Individual Treatment Plan    Patient's Name: Chloe Foster  YOB: 1980    Date of Creation: 2/26/2025  Date  Treatment Plan Last Reviewed/Revised: 5/19/2025    DSM5 Diagnoses: 309.81 (F43.10) Posttraumatic Stress Disorder (includes Posttraumatic Stress Disorder for Children 6 Years and Younger)  Without dissociative symptoms.   300.23 (F40.10) Social Anxiety Disorder by history  Major Depressive Disorder, single episode, in full remission  Psychosocial / Contextual Factors: resides with son and , employed full-time, history of strokes  PROMIS (reviewed every 90 days):     Assessments completed prior to visit:  The following assessments were completed by patient for this visit:  PROMIS 10-Global Health (only subscores and total score):       9/22/2023     8:01 AM 1/9/2024     8:32 AM 4/16/2024     9:35 AM 11/14/2024    12:09 PM 12/6/2024     7:54 AM 5/19/2025     6:53 AM   PROMIS-10 Scores Only   Global Mental Health Score 10 13 12 10  12  10    Global Physical Health Score 15 16 16 16  17  15    PROMIS TOTAL - SUBSCORES 25 29 28 26  29  25        Patient-reported        Referral / Collaboration:  Referral to another professional/service is not indicated at this time..    Anticipated number of session for this episode of care: will assess in 90 days  Anticipation frequency of session: Every other week  Anticipated Duration of each session: 38-52 minutes  Treatment plan will be reviewed in 90 days or when goals have been changed.       MeasurableTreatment Goal(s) related to diagnosis / functional impairment(s)  Goal 1: Patient will be able to recall the traumatic events without becoming overwhelmed with negative thoughts, feelings, or urges.    Objective #A (Patient Action)    Status: New - Date: 2/26/2025, continued date: 5/19/2025    Patient will describe the history of and nature of PTSD and anxiety symptoms    Intervention(s)  Therapist will assess the client's frequency, intensity, duration, and history of PTSD and anxiety symptoms and their impact on functioning.    Objective #B (Patient Action)  Status: New  Date: 2/26/2025, continued date: 5/19/2025    Patient will cooperate with eye movement desensitization and reprocessing (EMDR) to reduce emotional reaction to traumatic event(s)    Intervention(s)  Therapist will utilize EMDR to reduce the client's emotional reactivity to the traumatic event(s).    Objective #C (Patient Action)  Status: New Date:2/26/2025, continued date: 5/19/2025    Patient will learn and implement calming and coping strategies to manage PTSD symptoms.    Intervention(s)  Therapist will teach grounding techniques, distress tolerance, and emotion regulation techniques.     Patient has reviewed and agreed to the above plan.      Talya Donohue, [facility]  February 26, 2025   BETO Carr  5/19/2025

## 2025-05-20 PROBLEM — Z98.890 POSTOPERATIVE STATE: Status: ACTIVE | Noted: 2025-05-20

## 2025-05-20 PROBLEM — J34.89 NASAL OBSTRUCTION: Status: ACTIVE | Noted: 2025-05-20

## 2025-05-20 RX ORDER — METHYLPREDNISOLONE 4 MG/1
TABLET ORAL
Qty: 21 TABLET | Refills: 0 | Status: SHIPPED | OUTPATIENT
Start: 2025-05-20

## 2025-05-28 ENCOUNTER — VIRTUAL VISIT (OUTPATIENT)
Dept: PSYCHOLOGY | Facility: CLINIC | Age: 45
End: 2025-05-28
Payer: COMMERCIAL

## 2025-05-28 DIAGNOSIS — F43.10 PTSD (POST-TRAUMATIC STRESS DISORDER): Primary | ICD-10-CM

## 2025-05-28 PROCEDURE — 90834 PSYTX W PT 45 MINUTES: CPT | Mod: 95 | Performed by: SOCIAL WORKER

## 2025-05-28 ASSESSMENT — PATIENT HEALTH QUESTIONNAIRE - PHQ9
10. IF YOU CHECKED OFF ANY PROBLEMS, HOW DIFFICULT HAVE THESE PROBLEMS MADE IT FOR YOU TO DO YOUR WORK, TAKE CARE OF THINGS AT HOME, OR GET ALONG WITH OTHER PEOPLE: SOMEWHAT DIFFICULT
SUM OF ALL RESPONSES TO PHQ QUESTIONS 1-9: 7
SUM OF ALL RESPONSES TO PHQ QUESTIONS 1-9: 7

## 2025-05-28 NOTE — PROGRESS NOTES
[facility]                                     Progress Note    Patient Name: Chloe Foster  Date: 5/28/2025         Service Type: Individual      Session Start Time: 12:02 PM Session End Time: 12:46 PM     Session Length: 38-52 minutes    Session #: 3 with this provider    Attendees: Client attended alone    Service Modality:  Video Visit:      Provider verified identity through the following two step process.  Patient provided:  Patient is known previously to provider    Telemedicine Visit: The patient's condition can be safely assessed and treated via synchronous audio and visual telemedicine encounter.      Reason for Telemedicine Visit: Patient convenience (e.g. access to timely appointments / distance to available provider)    Originating Site (Patient Location): Patient's home    Distant Site (Provider Location): [facility], [facility]    Consent:  The patient/guardian has verbally consented to: the potential risks and benefits of telemedicine (video visit) versus in person care; bill my insurance or make self-payment for services provided; and responsibility for payment of non-covered services.     Patient would like the video invitation sent by:  My Chart    Mode of Communication:  Video Conference via Amwell    Distant Location (Provider):  On-site    As the provider I attest to compliance with applicable laws and regulations related to telemedicine.    DATA  Interactive Complexity: No  Crisis: No        Progress Since Last Session (Related to Symptoms / Goals / Homework):   Symptoms: continued symptoms    Homework: completed in session      Episode of Care Goals: No improvement - ACTION (Actively working towards change); Intervened by reinforcing change plan / affirming steps taken     Current / Ongoing Stressors and Concerns:   Speaking in front of groups    History of strokes, most recent in October 2024   Continued physical symptoms   Work related  stressors     Treatment Objective(s) Addressed in This Session:   Identify and establish boundaries  Identify and process loss     Intervention:   Engaged in active listening   Engaged client in identifying anxiety symptoms   Reviewed flowsheets   Engaged in increased awareness of emotions    Assessments completed prior to visit:  The following assessments were completed by patient for this visit:  PHQ9:       11/14/2024    11:57 AM 11/14/2024    11:58 AM 1/14/2025    11:50 AM 1/21/2025     1:51 PM 1/27/2025    12:57 PM 5/19/2025     6:52 AM 5/28/2025    11:42 AM   PHQ-9 SCORE   PHQ-9 Total Score MyChart  4 (Minimal depression) 4 (Minimal depression) 5 (Mild depression) 3 (Minimal depression) 7 (Mild depression) 7 (Mild depression)   PHQ-9 Total Score 4   4  5  3  7  7        Patient-reported       ASSESSMENT: Current Emotional / Mental Status (status of significant symptoms):   Risk status (Self / Other harm or suicidal ideation)   Patient denies current fears or concerns for personal safety.   Patient denies current or recent suicidal ideation or behaviors.   Patient denies current or recent homicidal ideation or behaviors.   Patient denies current or recent self injurious behavior or ideation.   Patient denies other safety concerns.   Patient reports there has been no change in risk factors since their last session.     Patient reports there has been no change in protective factors since their last session.     Recommended that patient call 911 or go to the local ED should there be a change in any of these risk factors     Appearance:   Appropriate    Eye Contact:   Good and Fair   Psychomotor Behavior: Normal    Attitude:   Cooperative  Interested   Orientation:   All   Speech    Rate / Production: Normal     Volume:  Normal    Mood:    Anxious  Sad    Affect:    Mood congruent minimally tearful   Thought Content:  Clear    Thought Form:  Coherent  Goal Directed  Logical    Insight:    Good  and Intellectual  Insight     Medication Review:   No changes to current psychiatric medication(s)    Propranolol  Ativan     Medication Compliance:   Yes     Changes in Health Issues:   None reported     Chemical Use Review:   Substance Use: Chemical use reviewed, no active concerns identified      Tobacco Use: No current tobacco use.      Diagnosis:  309.81 (F43.10) Posttraumatic Stress Disorder (includes Posttraumatic Stress Disorder for Children 6 Years and Younger)  Without dissociative symptoms.   300.23 (F40.10) Social Anxiety Disorder by history  Major Depressive Disorder, single episode, in full remission    Collateral Reports Completed:   NA    PLAN: (Patient Tasks / Therapist Tasks / Other)  EMDR: complete timeline, engage in completion of JAZMIN II, safe/calm state and container exercises  Client shared goal to work on staying in present moment, focus on areas within her control.    Talya Donohue, [facility]  5/19/2025                                                   ______________________________________________________________________    Individual Treatment Plan    Patient's Name: Chloe Foster  YOB: 1980    Date of Creation: 2/26/2025  Date Treatment Plan Last Reviewed/Revised: 5/19/2025    DSM5 Diagnoses: 309.81 (F43.10) Posttraumatic Stress Disorder (includes Posttraumatic Stress Disorder for Children 6 Years and Younger)  Without dissociative symptoms.   300.23 (F40.10) Social Anxiety Disorder by history  Major Depressive Disorder, single episode, in full remission  Psychosocial / Contextual Factors: resides with son and , employed full-time, history of strokes  PROMIS (reviewed every 90 days):     Assessments completed prior to visit:  The following assessments were completed by patient for this visit:  PROMIS 10-Global Health (only subscores and total score):       9/22/2023     8:01 AM 1/9/2024     8:32 AM 4/16/2024     9:35 AM 11/14/2024    12:09 PM 12/6/2024     7:54 AM 5/19/2025      6:53 AM 5/28/2025    11:43 AM   PROMIS-10 Scores Only   Global Mental Health Score 10 13 12 10  12  10  10    Global Physical Health Score 15 16 16 16  17  15  14    PROMIS TOTAL - SUBSCORES 25 29 28 26  29  25  24        Patient-reported        Referral / Collaboration:  Referral to another professional/service is not indicated at this time..    Anticipated number of session for this episode of care: will assess in 90 days  Anticipation frequency of session: Every other week  Anticipated Duration of each session: 38-52 minutes  Treatment plan will be reviewed in 90 days or when goals have been changed.       MeasurableTreatment Goal(s) related to diagnosis / functional impairment(s)  Goal 1: Patient will be able to recall the traumatic events without becoming overwhelmed with negative thoughts, feelings, or urges.    Objective #A (Patient Action)    Status: New - Date: 2/26/2025, continued date: 5/19/2025    Patient will describe the history of and nature of PTSD and anxiety symptoms    Intervention(s)  Therapist will assess the client's frequency, intensity, duration, and history of PTSD and anxiety symptoms and their impact on functioning.    Objective #B (Patient Action)  Status: New Date: 2/26/2025, continued date: 5/19/2025    Patient will cooperate with eye movement desensitization and reprocessing (EMDR) to reduce emotional reaction to traumatic event(s)    Intervention(s)  Therapist will utilize EMDR to reduce the client's emotional reactivity to the traumatic event(s).    Objective #C (Patient Action)  Status: New Date:2/26/2025, continued date: 5/19/2025    Patient will learn and implement calming and coping strategies to manage PTSD symptoms.    Intervention(s)  Therapist will teach grounding techniques, distress tolerance, and emotion regulation techniques.     Patient has reviewed and agreed to the above plan.      BETO Carr  February 26, 2025   BETO Carr  5/19/2025

## 2025-06-09 ENCOUNTER — VIRTUAL VISIT (OUTPATIENT)
Dept: FAMILY MEDICINE | Facility: CLINIC | Age: 45
End: 2025-06-09
Payer: COMMERCIAL

## 2025-06-09 ENCOUNTER — MYC MEDICAL ADVICE (OUTPATIENT)
Dept: FAMILY MEDICINE | Facility: CLINIC | Age: 45
End: 2025-06-09

## 2025-06-09 DIAGNOSIS — G43.109 MIGRAINE WITH AURA AND WITHOUT STATUS MIGRAINOSUS, NOT INTRACTABLE: Primary | ICD-10-CM

## 2025-06-09 PROCEDURE — 98005 SYNCH AUDIO-VIDEO EST LOW 20: CPT | Performed by: PHYSICIAN ASSISTANT

## 2025-06-09 ASSESSMENT — ANXIETY QUESTIONNAIRES
4. TROUBLE RELAXING: NEARLY EVERY DAY
GAD7 TOTAL SCORE: 14
8. IF YOU CHECKED OFF ANY PROBLEMS, HOW DIFFICULT HAVE THESE MADE IT FOR YOU TO DO YOUR WORK, TAKE CARE OF THINGS AT HOME, OR GET ALONG WITH OTHER PEOPLE?: VERY DIFFICULT
7. FEELING AFRAID AS IF SOMETHING AWFUL MIGHT HAPPEN: MORE THAN HALF THE DAYS
1. FEELING NERVOUS, ANXIOUS, OR ON EDGE: NEARLY EVERY DAY
GAD7 TOTAL SCORE: 14
3. WORRYING TOO MUCH ABOUT DIFFERENT THINGS: MORE THAN HALF THE DAYS
5. BEING SO RESTLESS THAT IT IS HARD TO SIT STILL: NOT AT ALL
IF YOU CHECKED OFF ANY PROBLEMS ON THIS QUESTIONNAIRE, HOW DIFFICULT HAVE THESE PROBLEMS MADE IT FOR YOU TO DO YOUR WORK, TAKE CARE OF THINGS AT HOME, OR GET ALONG WITH OTHER PEOPLE: VERY DIFFICULT
6. BECOMING EASILY ANNOYED OR IRRITABLE: MORE THAN HALF THE DAYS
GAD7 TOTAL SCORE: 14
2. NOT BEING ABLE TO STOP OR CONTROL WORRYING: MORE THAN HALF THE DAYS
7. FEELING AFRAID AS IF SOMETHING AWFUL MIGHT HAPPEN: MORE THAN HALF THE DAYS

## 2025-06-09 ASSESSMENT — PATIENT HEALTH QUESTIONNAIRE - PHQ9
SUM OF ALL RESPONSES TO PHQ QUESTIONS 1-9: 9
10. IF YOU CHECKED OFF ANY PROBLEMS, HOW DIFFICULT HAVE THESE PROBLEMS MADE IT FOR YOU TO DO YOUR WORK, TAKE CARE OF THINGS AT HOME, OR GET ALONG WITH OTHER PEOPLE: SOMEWHAT DIFFICULT
SUM OF ALL RESPONSES TO PHQ QUESTIONS 1-9: 9

## 2025-06-09 NOTE — LETTER
"2025    Chloe Foster   1980        To Whom it May Concern;       [facility] [place]  [address], [address]  [place] [address]  Phone: [phone]              2025        Chloe Foster  [address] [facility] [address]           To Whom It May Concern:     Chloe Foster is a long time patient under my care for a recent neurological diagnoses. She is currently needing to take an official medical leave starting 6/10/2025 with end date to be determined at this time.        Please contact me for questions or concerns.     Shelby Orta \"Jose Miguel\" ELOISE Ferro   "

## 2025-06-09 NOTE — PROGRESS NOTES
"Chloe is a 44 year old who is being evaluated via a billable video visit.    How would you like to obtain your AVS? MyChart  If the video visit is dropped, the invitation should be resent by: Text to cell phone: [phone]  Will anyone else be joining your video visit? No      Assessment & Plan     Migraine with aura and without status migrainosus, not intractable  Worsening symptoms with increased screen time through her job despite work accommodations being in place; medical leave to be competed once forms obtained; letter written for now. Continue follow up with specialistsee to further discuss next treatment options as previously scheduled; Return to clinic with any worsening or changes in symptoms or go to ER Urgent care in off hours.    BMI  Estimated body mass index is 26.07 kg/m  as calculated from the following:    Height as of 3/27/25: 1.689 m (5' 6.5\").    Weight as of 5/7/25: 74.4 kg (164 lb).       Follow-up       Subjective   Chloe is a 44 year old, presenting for the following health issues:  Neurologic Problem    History of Present Illness       Cerebrovascular Disease: She presents for follow up of CVA/TIA.       Patient history::  Ischemic CVA    Residual symptoms::  Headaches, Loss of vision and Tingling    Headache location::  All over the head, Right side of head and Right side greater than left side    Location of tingling::  Face and Lip    Worsened or new symptoms since last visit::  YES    Use of ASA daily?:  Yes       Patient notes worsening migraines with primarily debilitating auras in her work setting of increased screen time.  She feels like she needs a full medical leave (again).  Job has not been able to accommodate the needs to prevent triggers/decreased screen time.    Review of Systems  Constitutional, HEENT, cardiovascular, pulmonary, gi and gu systems are negative, except as otherwise noted.      Objective           Vitals:  No vitals were obtained today due to virtual " visit.    Physical Exam   GENERAL: alert and no distress  EYES: Eyes grossly normal to inspection.  No discharge or erythema, or obvious scleral/conjunctival abnormalities.  RESP: No audible wheeze, cough, or visible cyanosis.    SKIN: Visible skin clear. No significant rash, abnormal pigmentation or lesions.  NEURO: Cranial nerves grossly intact.  Mentation and speech appropriate for age.  PSYCH: Appropriate affect, tone, and pace of words          Video-Visit Details    Type of service:  Video Visit   Originating Location (pt. Location): Home    Distant Location (provider location):  Off-site  Platform used for Video Visit: Rafal  Signed Electronically by: Jose Miguel Ferro PA-C

## 2025-06-09 NOTE — LETTER
"June 9, 2025      Chloe Foster  [address] [address]        To Whom It May Concern:    Chloe Foster is under my care for ongoing neurological symptoms following multiple ischemic strokes, including infarcts to the right frontal lobe. She continues to experience episodic visual disturbances (scotomas), sensory sensitivity, and executive dysfunction consistent with post-stroke impairment.     Initial accommodations--including reduced screen time and remote work--were intended as temporary measures while diagnostic testing was underway. Despite these efforts, the underlying cause of her symptoms remains undetermined, and current treatment has not resolved them. She now requires a shift in care to focus on managing and mitigating symptoms through reduced neurological strain and targeted intervention.     A comprehensive neuropsychological evaluation is pending and expected to provide insight into her current functional limitations. Medical leave is necessary to support this evaluation process and reduce the cumulative neurological demands of her current role--particularly the sustained screen exposure. This change is effective 6/10/25, with 6/9/25 serving as a transition day to complete essential tasks.        Sincerely,      Shelby \"Jose Miguel\" ELOISE Ferro   "

## 2025-06-09 NOTE — TELEPHONE ENCOUNTER
MP,  Please see patient's [employer] message below for details.   Patient had appt w/ you today.   Thanks!  Yoselin SALGADO

## 2025-06-11 NOTE — PROGRESS NOTES
"Chloe is a 44 year old who is being evaluated via a billable video visit.      The patient has been notified of following:     \"This video visit will be conducted via a call between you and your physician/provider. We have found that certain health care needs can be provided without the need for an in-person physical exam.  This service lets us provide the care you need with a video conversation.  If a prescription is necessary we can send it directly to your pharmacy.  If lab work is needed we can place an order for that and you can then stop by our lab to have the test done at a later time.    Video visits are billed at different rates depending on your insurance coverage.  Please reach out to your insurance provider with any questions.    If during the course of the call the physician/provider feels a video visit is not appropriate, you will not be charged for this service.\"    Patient has given verbal consent for Video visit? Yes    How would you like to obtain your AVS? MyChart    If the video visit is dropped, the invitation should be resent by: Text to cell phone: [phone]    Will anyone else be joining your video visit? No    I    Video-Visit Details    Type of service:  Video Visit    Originating Location (pt. Location): Home in [place]     Distant Location (provider location):   [facility]    Platform used for Video Visit: WikiBrains    Video Start Time: 7:50 AM    Video End Time:around 8:10 am    2025      Return Medical Weight Management Note     Chloe Foster  MRN:  [id number]  :  1980    Assessment & Plan   Problem List Items Addressed This Visit       Overweight    Relevant Orders    Adult Nutrition  Referral    Hx of obesity - Primary    Patient was congratulated on wt loss success thus far. Healthy habits to assist with further weight loss were discussed. Chloe will continue trial of 1.7 mg Wegovy w/adding pepcid to see if better " tolerated. If still having nausea - she will message to try switching back to 5 mg Zepbound to see if that is better tolerated. We reviewed recommendations for muscle conservation including eating three meals daily, good protein intake, and strength training. RD referral and agree w/her plan to f/up with her GI team regarding SIBO.          Relevant Orders    Adult Nutrition  Referral     Other Visit Diagnoses         Nausea        Relevant Medications    famotidine (PEPCID) 20 MG tablet             AOM Considerations:  Phentermine:   Avoid, Wellbutrin caused anxiety and tachycardia at higher doses  Topiramate:     Avoid since taste changes can occur and pt is  and tastes wine for living  GLP-1:             Ozempic covered if DM, Shortage, Pt has sig constipation,  Saxenda apparoved, but on shortage so trying for Zepbound 1/23/24 - denied, wegovy tried for 4/24 and utilized.  Pivoted to Zepbound in 2024 but had some persistent nausea so switched back to Wegovy 12/2024.  Naltrexone:      Candidate, no cravings  Wellbutrin:       Tried but became anxious and pulse increased - utilized previously may restart pending availability of GLP/GIP  Metformin:       N DM, Pre DM  Contrave:        No AOM coverage,  Qsymia:           No AOM coverage,      PATIENT INSTRUCTIONS:  Pt Instructions:  GI dietitian team: You can reach our scheduling line 24/7 at [phone]. I'll also put a referral for our RD team as well if helpful. Can schedule with them at the same number as myself.   Agree with connecting with the GI team again for SIBO follow-up  Trial taking 1.7 mg consistently for 3-4 weeks with adding Pepcid as needed for nausea once to twice daily. If the nausea resolves with this - plan to continue Wegovy and be in touch as needed for refills. However, if your nausea is persistent: please send me a arviem AGhart message and I'd recommend we pivot back to 5 mg Zepbound to see if this is better tolerated  "now.     Goals:  Focus on healthy food choices - prioritizing protein and veggies in your meals. I recommend eating every 4-6 hours to reduce the risk of low blood sugars and try to minimize snacking between meals. Stay well hydrated though the day as well.  Invest in yourself with regular exercise. Continue to strive for a range for 150-300 minutes of exercise for weight maintenance and incorporating strength training twice-three times a week to help preserve muscle!      Follow up:    Call [phone] to schedule next visit in next available in September. Be in touch on Mychart for refills/concerns between visits    30 minutes spent on the date of the encounter doing chart review, history and exam, result review, counseling, developing plan of care, documentation, and further activities as noted      INTERVAL HISTORY:  Chloe returns for medical weight management follow up.  Last seen on 12/6/2024 for with myself and plan to switch back to 1.7 mg Wegovy she felt better on that dose was having persistent nausea on Zepbound.    Precharting:  She had taken a break from Wegovy (unrelated to GLP) and then self restarted at 1 mg Wegovy. Connecting on MyChart - 0.5 mg and 1 mg sent w/MTM referral given gap in seeing her and longer wait till next available w/our clinic.    3/27/25 MTM visit with Monica Sarabia  She was going to continue with 1.7 mg Wegovy until meeting up with cardiology to discuss palpitations and then if stable was going to try increasing to 2.4 mg.    \"Also notices palpitations after eating which went away when she recently held Wegovy for a month. Was told this could be caused due to vagus nerve stimulation after eating. She's wearing a heart monitor and follows up with cardiology in a few weeks.  \"    4/14/25 cardiology f/up:  4/3/25 Zio Patch: Rare PAC and PVCs recorded.  There were numerous patient triggered activations which corresponded with single PAC/PVC.  No AF.   No medication changes " today.  Continue with as needed use of Propanolol for symptomatic palpitations.  Ongoing consideration for ILR given hx stroke.  Update resting echocardiogram in October 2025, followed by [facility] visit.      Today:  She states weight is fluctuating few pounds for past couple months.  She has been oscillating between the 1.7 mg and 1 mg dose. She's been having persistent nausea making titration more difficult. Hair loss has stopped and is improving.     She states she's had oscillating diarrhea/constipation her whole life. She states she had testing with GI and had SIBO but didn't follow-up but thinks she likely should    Eating patterns: working on protein - doesn't tolerate dairy well  Morning: eggs/veggies  She's getting in three meals daily. May do apple/nuts for snacks as well.     Exercising: was doing pilates prior - some hip flexor issues from [employer] bike and going to PT now. Has a [employer] w/her  and doing training.     Still having nausea - hasn't started pepcid/famotidine but open to this.     WEIGHT METRICS:  Body mass index is 25.84 kg/m .   Current Weight: 165 lb (74.8 kg)     Initial Weight (lbs): 176 lbs  Cumulative weight loss (lbs): 11  Weight Loss Percentage: 6.25%    Wt Readings from Last 10 Encounters:   06/12/25 165 lb (74.8 kg)   05/07/25 164 lb (74.4 kg)   04/03/25 160 lb (72.6 kg)   03/27/25 164 lb (74.4 kg)   03/11/25 162 lb (73.5 kg)   03/05/25 165 lb (74.8 kg)   02/25/25 160 lb (72.6 kg)   02/04/25 157 lb (71.2 kg)   01/17/25 156 lb (70.8 kg)   12/30/24 162 lb (73.5 kg)                 6/12/2025     7:10 AM   Weight Loss Medication History Reviewed With Patient   Which weight loss medications are you currently taking on a regular basis? Wegovy   Are you having any side effects from the weight loss medication that we have prescribed you? Yes   If you are having side effects please describe: nausea           6/12/2025     7:10 AM   Changes and Difficulties   I have made the following  changes to my diet since my last visit: increased protein and fiber   I have made the following changes to my activity/exercise since my last visit: increased steps       LABS:  Hemoglobin A1C   Date Value Ref Range Status   10/10/2024 4.9 <5.7 % Final     Comment:     Normal <5.7%   Prediabetes 5.7-6.4%    Diabetes 6.5% or higher     Note: Adopted from ADA consensus guidelines.     Creatinine   Date Value Ref Range Status   04/03/2025 0.67 0.51 - 0.95 mg/dL Final   07/09/2021 0.75 0.52 - 1.04 mg/dL Final     GFR Estimate   Date Value Ref Range Status   04/03/2025 >90 >60 mL/min/1.73m2 Final     Comment:     eGFR calculated using 2021 CKD-EPI equation.   07/09/2021 >90 >60 mL/min/[1.73_m2] Final     Comment:     Non  GFR Calc  Starting 12/18/2018, serum creatinine based estimated GFR (eGFR) will be   calculated using the Chronic Kidney Disease Epidemiology Collaboration   (CKD-EPI) equation.       Carbon Dioxide   Date Value Ref Range Status   07/09/2021 26 20 - 32 mmol/L Final     Carbon Dioxide (CO2)   Date Value Ref Range Status   04/03/2025 24 22 - 29 mmol/L Final   12/26/2021 27 20 - 32 mmol/L Final     Chloride   Date Value Ref Range Status   04/03/2025 104 98 - 107 mmol/L Final   12/26/2021 109 94 - 109 mmol/L Final   07/09/2021 107 94 - 109 mmol/L Final     Urea Nitrogen   Date Value Ref Range Status   04/03/2025 9.1 6.0 - 20.0 mg/dL Final   12/26/2021 15 7 - 30 mg/dL Final   07/09/2021 8 7 - 30 mg/dL Final     ALT   Date Value Ref Range Status   10/11/2023 13 0 - 50 U/L Final     Comment:     Reference intervals for this test were updated on 6/12/2023 to more accurately reflect our healthy population. There may be differences in the flagging of prior results with similar values performed with this method. Interpretation of those prior results can be made in the context of the updated reference intervals.     02/19/2021 19 0 - 50 U/L Final     AST   Date Value Ref Range Status   10/11/2023 19  "0 - 45 U/L Final     Comment:     Reference intervals for this test were updated on 6/12/2023 to more accurately reflect our healthy population. There may be differences in the flagging of prior results with similar values performed with this method. Interpretation of those prior results can be made in the context of the updated reference intervals.   02/19/2021 13 0 - 45 U/L Final     Vitamin D Deficiency screening   Date Value Ref Range Status   02/19/2021 33 20 - 75 ug/L Final     Comment:     Season, race, dietary intake, and treatment affect the concentration of   25-hydroxy-Vitamin D. Values may decrease during winter months and increase   during summer months. Values 20-29 ug/L may indicate Vitamin D insufficiency   and values <20 ug/L may indicate Vitamin D deficiency.  Vitamin D determination is routinely performed by an immunoassay specific for   25 hydroxyvitamin D3.  If an individual is on vitamin D2 (ergocalciferol)   supplementation, please specify 25 OH vitamin D2 and D3 level determination by   LCMSMS test VITD23.       Vitamin D, Total (25-Hydroxy)   Date Value Ref Range Status   01/17/2025 17 (L) 20 - 50 ng/mL Final     Comment:     mild to moderate deficiency   12/02/2022 26 20 - 75 ug/L Final     TSH   Date Value Ref Range Status   01/17/2025 0.88 0.30 - 4.20 uIU/mL Final   10/20/2021 0.67 0.40 - 4.00 mU/L Final   12/27/2019 1.04 0.40 - 4.00 mU/L Final        BP Readings from Last 6 Encounters:   05/07/25 117/87   04/03/25 93/72   03/11/25 (!) 135/96   12/30/24 121/75   12/10/24 127/86   10/22/24 116/86       Pulse Readings from Last 6 Encounters:   05/07/25 84   04/03/25 85   03/11/25 89   12/30/24 54   12/10/24 104   10/22/24 108       PE:  Ht 5' 7\" (1.702 m)   Wt 165 lb (74.8 kg)   BMI 25.84 kg/m    GENERAL: Healthy, alert and no distress  EYES: Eyes grossly normal to inspection.    RESP: No audible wheeze, cough, or visible cyanosis.  No increased work of breathing.    SKIN: Visible skin " clear. No significant rash, abnormal pigmentation or lesions.  NEURO: Mentation and speech appropriate for age.  PSYCH: Mentation appears normal, affect normal/bright, judgement and insight intact, normal speech and appearance well-groomed.      Sincerely,      Belen Wong PA-C

## 2025-06-11 NOTE — TELEPHONE ENCOUNTER
Form prepped by HRN. Form will require further review, completion, and signature from the provider.   Patient's most recent encounter was 6/9/2025 via virtual visit.   The following items were printed and placed with the form for the provider to review: ICD-10 list  Upon chart review, unable to find previous FMLA forms for patient. FMLA form for spouse to be caregiver to patient found (10/24/2024).   Form placed on Shelby Ferro PA-C's desk and encounter routed to provider.     - Tirso AMARAL RN

## 2025-06-12 ENCOUNTER — VIRTUAL VISIT (OUTPATIENT)
Facility: CLINIC | Age: 45
End: 2025-06-12
Payer: COMMERCIAL

## 2025-06-12 ENCOUNTER — MEDICAL CORRESPONDENCE (OUTPATIENT)
Dept: HEALTH INFORMATION MANAGEMENT | Facility: CLINIC | Age: 45
End: 2025-06-12

## 2025-06-12 ENCOUNTER — VIRTUAL VISIT (OUTPATIENT)
Dept: SURGERY | Facility: CLINIC | Age: 45
End: 2025-06-12
Payer: COMMERCIAL

## 2025-06-12 VITALS — HEIGHT: 67 IN | WEIGHT: 165 LBS | BODY MASS INDEX: 25.9 KG/M2

## 2025-06-12 DIAGNOSIS — R11.0 NAUSEA: ICD-10-CM

## 2025-06-12 DIAGNOSIS — F43.10 PTSD (POST-TRAUMATIC STRESS DISORDER): Primary | ICD-10-CM

## 2025-06-12 DIAGNOSIS — E66.3 OVERWEIGHT: ICD-10-CM

## 2025-06-12 DIAGNOSIS — Z86.39 HX OF OBESITY: Primary | ICD-10-CM

## 2025-06-12 RX ORDER — FAMOTIDINE 20 MG/1
20 TABLET, FILM COATED ORAL 2 TIMES DAILY PRN
Qty: 60 TABLET | Refills: 2 | Status: SHIPPED | OUTPATIENT
Start: 2025-06-12

## 2025-06-12 ASSESSMENT — PATIENT HEALTH QUESTIONNAIRE - PHQ9
SUM OF ALL RESPONSES TO PHQ QUESTIONS 1-9: 10
10. IF YOU CHECKED OFF ANY PROBLEMS, HOW DIFFICULT HAVE THESE PROBLEMS MADE IT FOR YOU TO DO YOUR WORK, TAKE CARE OF THINGS AT HOME, OR GET ALONG WITH OTHER PEOPLE: SOMEWHAT DIFFICULT
SUM OF ALL RESPONSES TO PHQ QUESTIONS 1-9: 10

## 2025-06-12 NOTE — TELEPHONE ENCOUNTER
HRNs,   New form patient attached via Chikkahart printed and left in your box to review.   Thanks!    Original forms faxed to Kasie Motley fax # [phone].  Original forms w/ the hybrid RNs.   Please put original forms to stat scan.    Yoselin SALGADO

## 2025-06-12 NOTE — ASSESSMENT & PLAN NOTE
Patient was congratulated on wt loss success thus far. Healthy habits to assist with further weight loss were discussed. Chloe will continue trial of 1.7 mg Wegovy w/adding pepcid to see if better tolerated. If still having nausea - she will message to try switching back to 5 mg Zepbound to see if that is better tolerated. We reviewed recommendations for muscle conservation including eating three meals daily, good protein intake, and strength training. RD referral and agree w/her plan to f/up with her GI team regarding SIBO.

## 2025-06-12 NOTE — TELEPHONE ENCOUNTER
"Signed, please make a copy to scan into chart, will give to TC.  Please print previous workability letter from 6/9/25 and attach with fax of forms as directed  Thanks  Shelby \"Jose Miguel\" ELOISE Ferro   "

## 2025-06-12 NOTE — TELEPHONE ENCOUNTER
Faxed forms to Kasie Resendiz at [phone] with [employer] and sent for scanning  [facility] Unit Coordinator

## 2025-06-12 NOTE — TELEPHONE ENCOUNTER
"Signed, please make a copy to scan into chart, will give to TC to fax with additional medication list attached as well please  Thanks  Shelby \"Jose Miguel\" ELOISE Ferro   "

## 2025-06-12 NOTE — PROGRESS NOTES
[facility]                                     Progress Note    Patient Name: Chloe Foster  Date: 6/12/2025     Service Type: Individual      Session Start Time: 12:32 PM Session End Time: 1:15 PM     Session Length: 38-52 minutes    Session #: 4 with this provider    Attendees: Client attended alone    Service Modality:  Video Visit:      Provider verified identity through the following two step process.  Patient provided:  Patient is known previously to provider    Telemedicine Visit: The patient's condition can be safely assessed and treated via synchronous audio and visual telemedicine encounter.      Reason for Telemedicine Visit: Patient convenience (e.g. access to timely appointments / distance to available provider)    Originating Site (Patient Location): Patient's home    Distant Site (Provider Location): Provider's remote location    Consent:  The patient/guardian has verbally consented to: the potential risks and benefits of telemedicine (video visit) versus in person care; bill my insurance or make self-payment for services provided; and responsibility for payment of non-covered services.     Patient would like the video invitation sent by:  My Chart    Mode of Communication:  Video Conference via Amwell    Distant Location (Provider):  Off-site    As the provider I attest to compliance with applicable laws and regulations related to telemedicine.    DATA  Interactive Complexity: No  Crisis: No        Progress Since Last Session (Related to Symptoms / Goals / Homework):   Symptoms: no change since previous appointment    Homework: completed in session      Episode of Care Goals: No improvement - ACTION (Actively working towards change); Intervened by reinforcing change plan / affirming steps taken     Current / Ongoing Stressors and Concerns:   Speaking in front of groups    History of strokes, most recent in October 2024   Continued physical symptoms   Starting a medical  leave     Treatment Objective(s) Addressed in This Session:   EMDR: phases 1 & 2     Intervention:   EMDR: engaged client in completion of JAZMIN II (client scored: 1.79), continued timeline, discussed next steps, engaged in awareness of emotions, negative cognitions   Reviewed flowsheet    Assessments completed prior to visit:  The following assessments were completed by patient for this visit:  PHQ9:       1/14/2025    11:50 AM 1/21/2025     1:51 PM 1/27/2025    12:57 PM 5/19/2025     6:52 AM 5/28/2025    11:42 AM 6/9/2025     7:37 AM 6/12/2025    12:15 PM   PHQ-9 SCORE   PHQ-9 Total Score MyChart 4 (Minimal depression) 5 (Mild depression) 3 (Minimal depression) 7 (Mild depression) 7 (Mild depression) 9 (Mild depression) 10 (Moderate depression)   PHQ-9 Total Score 4  5  3  7  7  9  10        Patient-reported       ASSESSMENT: Current Emotional / Mental Status (status of significant symptoms):   Risk status (Self / Other harm or suicidal ideation)   Patient denies current fears or concerns for personal safety.   Patient denies current or recent suicidal ideation or behaviors.   Patient denies current or recent homicidal ideation or behaviors.   Patient denies current or recent self injurious behavior or ideation.   Patient denies other safety concerns.   Patient reports there has been no change in risk factors since their last session.     Patient reports there has been no change in protective factors since their last session.     Recommended that patient call 911 or go to the local ED should there be a change in any of these risk factors     Appearance:   Appropriate    Eye Contact:   Good and Fair   Psychomotor Behavior: Normal    Attitude:   Cooperative  Interested   Orientation:   All   Speech    Rate / Production: Normal     Volume:  Normal    Mood:    Anxious  Sad    Affect:    Mood congruent minimally tearful   Thought Content:  Clear    Thought Form:  Coherent  Goal Directed  Logical    Insight:    Good       Medication Review:   No changes to current psychiatric medication(s)    Propranolol  Ativan     Medication Compliance:   Yes     Changes in Health Issues:   None reported     Chemical Use Review:   Substance Use: Chemical use reviewed, no active concerns identified      Tobacco Use: No current tobacco use.      Diagnosis:  309.81 (F43.10) Posttraumatic Stress Disorder (includes Posttraumatic Stress Disorder for Children 6 Years and Younger)  Without dissociative symptoms.   300.23 (F40.10) Social Anxiety Disorder by history  Major Depressive Disorder, single episode, in full remission    Collateral Reports Completed:   NA    PLAN: (Patient Tasks / Therapist Tasks / Other)  EMDR: complete timeline, safe/calm state and container exercises  Client has neuropsych testing scheduled for January 2026.     Talya Donohue, [facility]  6/12/2025                                                   ______________________________________________________________________    Individual Treatment Plan    Patient's Name: Chloe Foster  YOB: 1980    Date of Creation: 2/26/2025  Date Treatment Plan Last Reviewed/Revised: 5/19/2025    DSM5 Diagnoses: 309.81 (F43.10) Posttraumatic Stress Disorder (includes Posttraumatic Stress Disorder for Children 6 Years and Younger)  Without dissociative symptoms.   300.23 (F40.10) Social Anxiety Disorder by history  Major Depressive Disorder, single episode, in full remission  Psychosocial / Contextual Factors: resides with son and , employed full-time, history of strokes  PROMIS (reviewed every 90 days):     Assessments completed prior to visit:  The following assessments were completed by patient for this visit:  PROMIS 10-Global Health (only subscores and total score):       1/9/2024     8:32 AM 4/16/2024     9:35 AM 11/14/2024    12:09 PM 12/6/2024     7:54 AM 5/19/2025     6:53 AM 5/28/2025    11:43 AM 6/12/2025     7:09 AM   PROMIS-10 Scores Only   Global Mental  Health Score 13 12 10  12  10  10  11    Global Physical Health Score 16 16 16  17  15  14  14    PROMIS TOTAL - SUBSCORES 29 28 26  29  25  24  25        Patient-reported        Referral / Collaboration:  Referral to another professional/service is not indicated at this time..    Anticipated number of session for this episode of care: will assess in 90 days  Anticipation frequency of session: Every other week  Anticipated Duration of each session: 38-52 minutes  Treatment plan will be reviewed in 90 days or when goals have been changed.       MeasurableTreatment Goal(s) related to diagnosis / functional impairment(s)  Goal 1: Patient will be able to recall the traumatic events without becoming overwhelmed with negative thoughts, feelings, or urges.    Objective #A (Patient Action)    Status: New - Date: 2/26/2025, continued date: 5/19/2025    Patient will describe the history of and nature of PTSD and anxiety symptoms    Intervention(s)  Therapist will assess the client's frequency, intensity, duration, and history of PTSD and anxiety symptoms and their impact on functioning.    Objective #B (Patient Action)  Status: New Date: 2/26/2025, continued date: 5/19/2025    Patient will cooperate with eye movement desensitization and reprocessing (EMDR) to reduce emotional reaction to traumatic event(s)    Intervention(s)  Therapist will utilize EMDR to reduce the client's emotional reactivity to the traumatic event(s).    Objective #C (Patient Action)  Status: New Date:2/26/2025, continued date: 5/19/2025    Patient will learn and implement calming and coping strategies to manage PTSD symptoms.    Intervention(s)  Therapist will teach grounding techniques, distress tolerance, and emotion regulation techniques.     Patient has reviewed and agreed to the above plan.      BETO Carr  February 26, 2025   BETO Carr  5/19/2025

## 2025-06-12 NOTE — TELEPHONE ENCOUNTER
New form (submitted today) was prepped by HRN. Form will require further review, completion, and signature from the provider.   Form placed on Shelby Ferro PA-C's desk and encounter routed to provider.     - Tirso AMARAL RN

## 2025-06-12 NOTE — PATIENT INSTRUCTIONS
Nice to talk with you today. Below is the plan discussed.-  Belen Wong PA-C     Pt Instructions:  GI dietitian team: You can reach our scheduling line 24/7 at [phone]. I'll also put a referral for our RD team as well if helpful. Can schedule with them at the same number as myself.   Agree with connecting with the GI team again for SIBO follow-up  Trial taking 1.7 mg consistently for 3-4 weeks with adding Pepcid as needed for nausea once to twice daily. If the nausea resolves with this - plan to continue Wegovy and be in touch as needed for refills. However, if your nausea is persistent: please send me a Auth0 message and I'd recommend we pivot back to 5 mg Zepbound to see if this is better tolerated now.   I'll put the handout for Zepbound below again for reference in case needed.    Goals:  Focus on healthy food choices - prioritizing protein and veggies in your meals. I recommend eating every 4-6 hours to reduce the risk of low blood sugars and try to minimize snacking between meals. Stay well hydrated though the day as well.  Invest in yourself with regular exercise. Continue to strive for a range for 150-300 minutes of exercise for weight maintenance and incorporating strength training twice-three times a week to help preserve muscle!      Follow up:    Call [phone] to schedule next visit in next available in September. Be in touch on Fjuul for refills/concerns between visits      LEAN PROTEIN SOURCES      Protein Source Portion Calories Grams of Protein                           Nonfat, plain Greek yogurt    (10 grams sugar or less) 3/4 cup (6 oz)  12-17   Light Yogurt (10 grams sugar or less) 3/4 cup (6 oz)  6-8   Protein Shake 1 shake 110-180 15-30   Skim/1% Milk or lactose-free milk 1 cup ( 8 oz)  8   Plain or light, flavored soymilk 1 cup  7-8   Plain or light, hemp milk 1 cup 110 6   Fat Free or 1% Cottage Cheese 1/2 cup 90 15   Part skim ricotta cheese 1/2 cup 100  14   Part skim or reduced fat cheese slices 1 ounce 65-80 8     Mozzarella String Cheese 1 80 8   Canned tuna, chicken, crab or salmon  (canned in water)  1/2 cup 100 15-20   White fish (broiled, grilled, baked) 3 ounces 100 21   [place]/Tuna (broiled, grilled, baked) 3 ounces 150-180 21   Shrimp, Scallops, Lobster, Crab 3 ounces 100 21   Pork loin, Pork Tenderloin 3 ounces 150 21   Boneless, skinless chicken /turkey breast                          (broiled, grilled, baked) 3 ounces 120 21   [place], [place], [place], and Venison 3 ounces 120 21   Lean cuts of red meat and pork (sirloin,   round, tenderloin, flank, ground 93%-96%) 3 ounces 170 21   Lean or Extra Lean Ground Turkey 1/2 cup 150 20   90-95% Lean [place] Burger 1 zahida 140-180 21   Low-fat casserole with lean meat 3/4 cup 200 17   Luncheon Meats                                                        (turkey, lean ham, roast beef, chicken) 3 ounces 100 21   Egg (boiled, poached, scrambled) 1 Egg 60 7   Egg Substitute 1/2 cup 70 10   Nuts (limit to 1 serving per day)  3 Tbsp. 150 7   Nut [place] (peanut, almond)  Limit to 1 serving or less daily 1 Tbsp. 90 4   Soy Burger (varies) 1  15   Garbanzo, Black, [place] Beans 1/2 cup 110 7   Refried Beans 1/2 cup 100 7   Kidney and Lima beans 1/2 cup 110 7   Tempeh 3 oz 175 18   Vegan crumbles 1/2 cup 100 14   Tofu 1/2 cup 110 14   Chili (beans and extra lean beef or turkey) 1 cup 200 23   Lentil Stew/Soup 1 cup 150 12   Black Bean Soup 1 cup 175 12       Zepbound (Tirzepatide)    Zepbound (Tirzepatide): is an injectable prescription medicine recently FDA approved for adults with obesity or overweight with an additional condition affected by their weight.      It is given as a shot once a week. It activates the body's receptors for GIP (glucose-dependent insulinotropic polypeptide) and GLP-1 (glucagon-like peptide-1) receptor, two naturally occurring hormones that help tell the brain that you are full. It also works  is by slowing down how quickly food leaves your stomach.     You will start to feel nichols more quickly, notice portion changes and eat less often between meals.  Patients are advised to eat slowly and purposefully. Give yourself less portions. You may find after starting this medication you have a new point of fullness. Maintain consistent eating schedule with meals +/- snacks and get in good hydration.    Dosing:  Initial dose: 2.5 mg injected subcutaneously once weekly for 4 weeks  Further dose changes can include: increase to 5 mg once weekly for 4 weeks, then 7.5 mg once weekly for 4 weeks, then 10 mg once weekly for 4 weeks then 12.5 mg once weekly for 4 weeks, then 15 mg (maximum dose) once weekly.    Dose changes are based on how you are tolerating the current dose, what benefits you are seeing at the current dose and if improvement in effectiveness of the drug is needed. The goal is to find the dose that works best for you with the least amount of side effects. You may find you reach this at a lower dose than the maximum dose.     Side effects: Common side effects include nausea, Heartburn,diarrhea, constipation. This is worse when starting the medication and with dose dose escalation.  It does improve with time. Stay adequately hydrated and eat small portions to decrease the risk of side effects.     The risk of pancreatitis (inflammation of the pancreas) has been associated with this type of medication, but is very rare.  If you have had pancreatitis in the past, this medication may not be for you. If you experience persistent severe abdominal pain (sometimes radiating to the back potentially accompanied by vomiting and have a fever), stop the medication and contact your provider immediately for assessment. They will do a blood test to check for pancreatitis.       Caution:   Tirzepatide (Zepbound, Mounjaro) May decrease effectiveness of your oral birth control while starting and with dose adjustments.   Use backup forms of birth control if necessary.      For any questions or concerns please send a PayProp message to our team or call our weight management call center at [phone] during regular business hours.     There is a small chance you may have some low blood sugar after taking the medication.   The signs of low blood sugar are:  Weakness  Shaky   Hungry  Sweating  Confusion      See below for ways to treat low blood sugar without adding in lots of extra calories.      Treating Low Blood Sugar    If you have symptoms of low blood sugar (sweating, shaking, dizzy, confused) eat 15 grams of carbs and wait 15 minutes:    Glucose Tabs are best for sugars under 70 -  Dex4 or BD Glucose tablets are good, you will need to take 3-4 of these to equal 15 grams.     One small box of raisins  4 oz fruit juice box or   cup fruit juice  1 small apple  1 small banana    cup canned fruit in water    English muffin or a slice of bread with jelly   1 low fat frozen waffle with sugar-free syrup    cup cottage cheese with   cup frozen or fresh blueberries  1 cup skim or low-fat milk    cup whole grain cereal  4-6 crackers such as Triscuits      This medication is usually not covered by insurance and can be quite expensive. Sometimes a prior authorization is required, which may take up to 1-2 weeks for an insurance company to make a decision if they will cover the medication. Please be patient, you will be notified after a decision has been made.    Contact the nurse via PayProp or call [phone] if you have any questions or concerns. (Do not stop taking it if you don't think it's working. For some people it works without them knowing it.)     In order to get refills of this or any medication we prescribe you must be seen in the medical weight mgmt clinic every 2-4 months.    Using Your Mounjaro/Zepbound Pen  Medicine (tirzepatide)    Storing your pens  Store your pens in the refrigerator until you are ready to use them.  Don't let them freeze.  Your pen may be stored at room temperature for 21 days or fewer. Just make sure the temperature doesn't get higher than 86 or lower than 46 degrees Fahrenheit.   Protect the pens from light.  Never use any pens that have .    Check your pen before use:  The liquid in the pen window should be clear or light yellow. Bubbles are okay to see.   Do not use the pen if you can see the window contains any specks, is cloudy, or has changed color.  Make sure you have the medication and dose your health care provider prescribed.    Getting ready to inject:   1. Wash and dry your hands well.  2. Make sure the counter you use to place your supplies on is clean.  3. Make sure your injection time will not be interrupted by children or pets.  4. Have alcohol wipes or alcohol and cotton balls available to clean the injection site.   5. Choose your injection site. It can be on your stomach, back of upper arms, or upper legs. Remember to change your injection site each time you inject. Try to be at least 1 inch away from the previous one. Stay at least 1 inch away from your belly button.       Inject your dose:  1. Each pen is prefilled with one dose of medicine.    2. Pull off the grey cap at the bottom of the pen. Throw it in the trash. Do not put the cap back on the pen.   3. Make sure your pen is in the locked position. You will find the lock at the top of the pen.   4. Clean the injection site with alcohol.   5. Place the clear part of the pen against your skin. Unlock the pen by turning it to the unlock  position at the top.   6. Press and hold the purple injection button at the top of the pen. Listen for 2 clicks. The last click tells you the injection has been completed.   7. This injection is given 1 day a week. If you need to change the day you inject, there needs to be at least 3 days or 72 hours between the two doses.  8. If you miss a dose, you may take the dose within 4 days or 96 hours of  the missed dose.   9. Your injection may be best tolerated if given at night.     Disposing of your pens:  Dispose of your pens in a puncture-resistant container (hard plastic bottle) or Sharps container.  Check with the county you live in on how to dispose of the container.  Do not recycle the container with used pens.       Of note, you may not be able to  your medication right away. It may require a prior authorization from your insurance company. This may take a week or more.

## 2025-06-30 ENCOUNTER — VIRTUAL VISIT (OUTPATIENT)
Dept: NEUROLOGY | Facility: CLINIC | Age: 45
End: 2025-06-30
Payer: COMMERCIAL

## 2025-06-30 VITALS — BODY MASS INDEX: 25.74 KG/M2 | HEIGHT: 67 IN | WEIGHT: 164 LBS

## 2025-06-30 DIAGNOSIS — G43.719 INTRACTABLE CHRONIC MIGRAINE WITHOUT AURA AND WITHOUT STATUS MIGRAINOSUS: Primary | ICD-10-CM

## 2025-06-30 ASSESSMENT — HEADACHE IMPACT TEST (HIT 6)
HOW OFTEN HAVE YOU FELT TOO TIRED TO WORK BECAUSE OF YOUR HEADACHES: VERY OFTEN
HOW OFTEN DID HEADACHS LIMIT CONCENTRATION ON WORK OR DAILY ACTIVITY: VERY OFTEN
HIT6 TOTAL SCORE: 70
HOW OFTEN DO HEADACHES LIMIT YOUR DAILY ACTIVITIES: ALWAYS
WHEN YOU HAVE HEADACHES HOW OFTEN IS THE PAIN SEVERE: VERY OFTEN
WHEN YOU HAVE A HEADACHE HOW OFTEN DO YOU WISH YOU COULD LIE DOWN: ALWAYS
HOW OFTEN HAVE YOU FELT FED UP OR IRRITATED BECAUSE OF YOUR HEADACHES: VERY OFTEN

## 2025-06-30 ASSESSMENT — MIGRAINE DISABILITY ASSESSMENT (MIDAS)
TOTAL SCORE: 100
HOW MANY DAYS DID YOU NOT DO HOUSEWORK BECAUSE OF HEADACHES: 15
HOW MANY DAYS WAS HOUSEWORK PRODUCTIVITY CUT IN HALF DUE TO HEADACHES: 25
ON A SCALE FROM 0-10 ON AVERAGE HOW PAINFUL WERE HEADACHES: 6
HOW MANY DAYS DID YOU MISS WORK OR SCHOOL BECAUSE OF HEADACHES: 15
HOW MANY DAYS WAS YOUR PRODUCTIVITY CUT IN HALF BECAUSE OF HEADACHES: 15
HOW MANY DAYS IN THE PAST 3 MONTHS HAVE YOU HAD A HEADACHE: 60
HOW OFTEN WERE SOCIAL ACTIVITIES MISSED DUE TO HEADACHES: 30

## 2025-06-30 ASSESSMENT — PAIN SCALES - GENERAL: PAINLEVEL_OUTOF10: NO PAIN (0)

## 2025-06-30 NOTE — PROGRESS NOTES
Virtual Visit Details    Type of service:  Video Visit     Originating Location (pt. Location): Other car-[place]    Distant Location (provider location):  Off-site  Platform used for Video Visit: Rafal

## 2025-06-30 NOTE — LETTER
6/30/2025       RE: Chloe Foster  [address] [facility] [address]     Dear Colleague,    Thank you for referring your patient, Chloe Foster, to the [facility] [place] at [facility]. Please see a copy of my visit note below.    Virtual Visit Details    Type of service:  Video Visit     Originating Location (pt. Location): Other car-[place]    Distant Location (provider location):  Off-site  Platform used for Video Visit: [facility]      Neurology Progress Note    [facility]    Chloe Foster MRN# [id number]   Age: 44 year old YOB: 1980     PCP: Shelby Ferro            Assessment and Plan:     Intractable chronic migraine without aura and without status migrainosus:  - Headaches have worsened, with increased frequency and duration of migraines. The patient experiences 60 days of migraine symptoms out of 90 days (20 days a month), with 2 to 4 auras per month. The headaches are not always full migraines and can last for 4 days at a time. The increase in symptoms may be related to increased screen time and stress from working at home.  - Recommend starting a headache preventive medication.  Schedule Botox injections for July 15, 2025, as a preventive measure, pending insurance authorization.   -Advise taking ibuprofen before the Botox appointment to manage potential headache exacerbation. Recommend having someone accompany the patient to the appointment in case a migraine is triggered.  - Risks and side effects: Discussed that Botox has no significant side effects like queasiness or drowsiness, but there is a risk of over-weakening muscles, which could result in a temporary change in eyebrow appearance.  - Continue Ubrelvy for rescue             History of Present Illness:   CC: Recheck    Chloe is a 44 year old woman with complex migraine aura and history of stroke.     - Complex  migraine aura with increased frequency and severity  - Aura symptoms occurring 2 to 4 times a month, previously went 6 months without any  - Migraines lasting up to 4 days, occurring a couple of times a month  - Headaches occurring a few times a week, often lower right side of the head  - Aura followed by migraine, previously had aura without subsequent migraine  - Increased headache frequency since October, exacerbated by prolonged screen time  - Stroke history noted, with recent cerebral angiogram causing prolonged aura  - Numbness on the left side of the mouth occurring intermittently during meetings    Past treatment trials:  Propranolol  Verapamil did not tolerate  Nurtec  Avoid topiramate due to cognitive slowing effects that are already causing issues after stroke.   Avoid tricyclic antidepressants due to weight concerns pt currently managed with semaglutide.   Gabapentin ineffective  Buproprion ineffective           Physical Exam:     Deferred           Pertinent History/Data for appointment:         1/11/2022     2:51 PM 1/17/2025     1:51 PM 6/30/2025     1:12 PM   HIT-6   When you have headaches, how often is the pain severe 8  11 11   How often do headaches limit your ability to do usual daily activities including household work, work, school, or social activities? 10  11 13   When you have a headache, how often do you wish you could lie down? 11  11 13   In the past 4 weeks, how often have you felt too tired to do work or daily activities because of your headaches 10  10 11   In the past 4 weeks, how often have you felt fed up or irritated because of your headaches 11  10 11   In the past 4 weeks, how often did headaches limit your ability to concentrate on work or daily activities 10  10 11   HIT-6 Total Score 60 63  70        Patient-reported    Proxy-reported           1/17/2025     1:54 PM 6/30/2025     1:15 PM   MIDAS - in the past three months:   On how many days did you miss work or school  because of your headaches? 10 15   How many days was your productivity at work or school reduced by half or more because of your headaches? 15 15   On how many days did you not do household work because of your headaches? 15 15   How many days was your productivity in household work reduced by half or more because of your headaches? 25 25   On how many days did you miss family, social, or leisure activities because of your headaches? 25 30   On how many days did you have a headache? 25 60   On a scale of 0-10, on average how painful were these headaches? 8 6   MIDAS Score 90 (IV - Severe Disability) 100 (IV - Severe Disability)         Again, thank you for allowing me to participate in the care of your patient.      Sincerely,    Latia Post MD

## 2025-06-30 NOTE — NURSING NOTE
Current patient location: passenger in car    Is the patient currently in the state of [place]? YES    Visit mode: VIDEO    If the visit is dropped, the patient can be reconnected by:VIDEO VISIT: Text to cell phone:   Telephone Information:   Mobile [phone]       Will anyone else be joining the visit? NO  (If patient encounters technical issues they should call [phone])    Are changes needed to the allergy or medication list? No    Are refills needed on medications prescribed by this physician? Discuss with provider    Rooming Documentation:  Questionnaire(s) completed    Reason for visit: Follow Up    Phyllis HUNTER

## 2025-07-07 ENCOUNTER — TELEPHONE (OUTPATIENT)
Dept: NEUROLOGY | Facility: CLINIC | Age: 45
End: 2025-07-07
Payer: COMMERCIAL

## 2025-07-07 DIAGNOSIS — G43.719 INTRACTABLE CHRONIC MIGRAINE WITHOUT AURA AND WITHOUT STATUS MIGRAINOSUS: Primary | ICD-10-CM

## 2025-07-07 RX ORDER — PROPRANOLOL HYDROCHLORIDE 40 MG/1
40 TABLET ORAL 2 TIMES DAILY PRN
Qty: 60 TABLET | Refills: 11 | Status: SHIPPED | OUTPATIENT
Start: 2025-07-07

## 2025-07-07 NOTE — TELEPHONE ENCOUNTER
"Per checkout notes from Dr. Post on 6/30/25..\"Pt wants 7:40 am slot on 7/15 please my chart message to confirm. Botox appt.\"    Appt was scheduled and a confirmation MyChart message was sent to the pt.      Chloe Rubi on 7/7/2025 at 7:19 AM    "

## 2025-07-07 NOTE — TELEPHONE ENCOUNTER
Left Voicemail (1st Attempt) and Sent Mychart (1st Attempt) for the patient to call back and schedule the following:    Appointment type: Return Headache (Video)  Provider: Sejal  Return date: Around October/November  Specialty phone number: [phone]  Additional appointment(s) needed: na  Additonal Notes:

## 2025-07-17 DIAGNOSIS — M95.0 ACQUIRED NASAL DEFORMITY: Primary | ICD-10-CM

## 2025-07-17 RX ORDER — CEFAZOLIN SODIUM 2 G/50ML
2 SOLUTION INTRAVENOUS SEE ADMIN INSTRUCTIONS
OUTPATIENT
Start: 2025-07-17

## 2025-07-17 RX ORDER — CEFAZOLIN SODIUM 2 G/50ML
2 SOLUTION INTRAVENOUS
OUTPATIENT
Start: 2025-07-17

## 2025-07-24 NOTE — TELEPHONE ENCOUNTER
FUTURE VISIT INFORMATION      SURGERY INFORMATION:  Date: 11/18/25  Location: [facility]  Surgeon:  Nikki Schwartz MD   Anesthesia Type:  General  Procedure: Conchal Cartilage Graft to Left Nasal Sidewall, Debulking of Right Nasal Sidewall   Consult: 3/14/25    RECORDS REQUESTED FROM:       Primary Care Provider: Shelby Ferro PA-C    Pertinent Medical History: CVA, Hirsutism, Elevated lipoprotein A level      Most recent EKG+ Tracing: 10/22/24, internal    Most recent ECHO: 10/10/24, internal

## 2025-08-20 ENCOUNTER — MYC MEDICAL ADVICE (OUTPATIENT)
Dept: FAMILY MEDICINE | Facility: CLINIC | Age: 45
End: 2025-08-20
Payer: COMMERCIAL

## 2025-08-25 ENCOUNTER — VIRTUAL VISIT (OUTPATIENT)
Dept: FAMILY MEDICINE | Facility: CLINIC | Age: 45
End: 2025-08-25
Payer: COMMERCIAL

## 2025-08-25 DIAGNOSIS — Z86.73 HISTORY OF CARDIOEMBOLIC CEREBROVASCULAR ACCIDENT (CVA): Primary | ICD-10-CM

## 2025-08-25 DIAGNOSIS — G43.109 MIGRAINE WITH AURA AND WITHOUT STATUS MIGRAINOSUS, NOT INTRACTABLE: ICD-10-CM

## 2025-08-25 PROCEDURE — 98005 SYNCH AUDIO-VIDEO EST LOW 20: CPT | Performed by: PHYSICIAN ASSISTANT

## 2025-08-25 ASSESSMENT — ANXIETY QUESTIONNAIRES
2. NOT BEING ABLE TO STOP OR CONTROL WORRYING: MORE THAN HALF THE DAYS
IF YOU CHECKED OFF ANY PROBLEMS ON THIS QUESTIONNAIRE, HOW DIFFICULT HAVE THESE PROBLEMS MADE IT FOR YOU TO DO YOUR WORK, TAKE CARE OF THINGS AT HOME, OR GET ALONG WITH OTHER PEOPLE: SOMEWHAT DIFFICULT
8. IF YOU CHECKED OFF ANY PROBLEMS, HOW DIFFICULT HAVE THESE MADE IT FOR YOU TO DO YOUR WORK, TAKE CARE OF THINGS AT HOME, OR GET ALONG WITH OTHER PEOPLE?: SOMEWHAT DIFFICULT
5. BEING SO RESTLESS THAT IT IS HARD TO SIT STILL: NOT AT ALL
GAD7 TOTAL SCORE: 11
1. FEELING NERVOUS, ANXIOUS, OR ON EDGE: MORE THAN HALF THE DAYS
3. WORRYING TOO MUCH ABOUT DIFFERENT THINGS: MORE THAN HALF THE DAYS
7. FEELING AFRAID AS IF SOMETHING AWFUL MIGHT HAPPEN: MORE THAN HALF THE DAYS
GAD7 TOTAL SCORE: 11
4. TROUBLE RELAXING: MORE THAN HALF THE DAYS
GAD7 TOTAL SCORE: 11
6. BECOMING EASILY ANNOYED OR IRRITABLE: SEVERAL DAYS
7. FEELING AFRAID AS IF SOMETHING AWFUL MIGHT HAPPEN: MORE THAN HALF THE DAYS

## 2025-08-25 ASSESSMENT — PATIENT HEALTH QUESTIONNAIRE - PHQ9
10. IF YOU CHECKED OFF ANY PROBLEMS, HOW DIFFICULT HAVE THESE PROBLEMS MADE IT FOR YOU TO DO YOUR WORK, TAKE CARE OF THINGS AT HOME, OR GET ALONG WITH OTHER PEOPLE: SOMEWHAT DIFFICULT
SUM OF ALL RESPONSES TO PHQ QUESTIONS 1-9: 6
SUM OF ALL RESPONSES TO PHQ QUESTIONS 1-9: 6

## 2025-11-04 ENCOUNTER — PRE VISIT (OUTPATIENT)
Dept: SURGERY | Facility: CLINIC | Age: 45
End: 2025-11-04

## (undated) DEVICE — DRSG STERI STRIP 1/4X4" R1546

## (undated) DEVICE — NDL 27GA 1.25" 305136

## (undated) DEVICE — SPONGE COTTONOID 1/2X3" 80-1407

## (undated) DEVICE — SUCTION MANIFOLD NEPTUNE 2 SYS 1 PORT 702-025-000

## (undated) DEVICE — ADH LIQUID MASTISOL TOPICAL VIAL 2-3ML 0523-48

## (undated) DEVICE — PEN MARKING SKIN W/PAPER RULER 31145785

## (undated) DEVICE — BLADE KNIFE SURG 10 371110

## (undated) DEVICE — SU CHROMIC 4-0 PS-2 18" 1637G

## (undated) DEVICE — SU CHROMIC 4-0 M-1DA 744G

## (undated) DEVICE — GLOVE PROTEXIS MICRO 7.0  2D73PM70

## (undated) DEVICE — SU PDS II 5-0 RB-2DA 30" Z148H

## (undated) DEVICE — LINEN TOWEL PACK X5 5464

## (undated) DEVICE — SPLINT AQUAPLAST 6X9"

## (undated) DEVICE — SU PLAIN 4-0 SC-1 18" 1824H

## (undated) DEVICE — SOL NACL 0.9% IRRIG 500ML BOTTLE 2F7123

## (undated) DEVICE — DRSG GAUZE 4X4" TRAY 6939

## (undated) DEVICE — CUP AND LID 2PK 2OZ STERILE  SSK9006A

## (undated) DEVICE — NDL 25GA 1.5" 305127

## (undated) DEVICE — SHEETING SILASTIC NON REINFORCED 0.040 4X8"

## (undated) DEVICE — ESU GROUND PAD ADULT W/CORD E7507

## (undated) DEVICE — BLADE KNIFE SURG 15 371115

## (undated) DEVICE — SOL WATER IRRIG 500ML BOTTLE 2F7113

## (undated) DEVICE — BLADE KNIFE SURG 11 371111

## (undated) DEVICE — SU ETHILON 3-0 PS-1 18" 1663H

## (undated) DEVICE — PACK ENT MINOR CUSTOM ASC

## (undated) DEVICE — SU MONOCRYL 6-0 P-1 18" UND Y489G

## (undated) DEVICE — SU PDS II 4-0 RB-1 27" Z304H

## (undated) DEVICE — SPLINT NASAL DOYLE BREATHEASY 20-10500

## (undated) DEVICE — DRAPE SHEET HALF 40X60" 9358

## (undated) DEVICE — GLOVE PROTEXIS BLUE W/NEU-THERA 7.5  2D73EB75

## (undated) DEVICE — EYE PREP BETADINE 5% SOLUTION 30ML 0065-0411-30

## (undated) DEVICE — ESU NDL COLORADO MICRO 3CM STR N103A

## (undated) DEVICE — SYR 10ML FINGER CONTROL W/O NDL 309695

## (undated) DEVICE — SU PROLENE 3-0 FS-2 18" 8665G

## (undated) RX ORDER — PROPOFOL 10 MG/ML
INJECTION, EMULSION INTRAVENOUS
Status: DISPENSED
Start: 2021-02-18

## (undated) RX ORDER — FENTANYL CITRATE 50 UG/ML
INJECTION, SOLUTION INTRAMUSCULAR; INTRAVENOUS
Status: DISPENSED
Start: 2021-03-15

## (undated) RX ORDER — DIPHENHYDRAMINE HYDROCHLORIDE 50 MG/ML
INJECTION INTRAMUSCULAR; INTRAVENOUS
Status: DISPENSED
Start: 2023-09-25

## (undated) RX ORDER — MAGNESIUM OXIDE 400 MG/1
TABLET ORAL
Status: DISPENSED
Start: 2025-04-03

## (undated) RX ORDER — FENTANYL CITRATE 50 UG/ML
INJECTION, SOLUTION INTRAMUSCULAR; INTRAVENOUS
Status: DISPENSED
Start: 2018-08-02

## (undated) RX ORDER — OXYMETAZOLINE HYDROCHLORIDE 0.05 G/100ML
SPRAY NASAL
Status: DISPENSED
Start: 2021-03-15

## (undated) RX ORDER — HEPARIN SODIUM 1000 [USP'U]/ML
INJECTION, SOLUTION INTRAVENOUS; SUBCUTANEOUS
Status: DISPENSED
Start: 2025-04-03

## (undated) RX ORDER — DIPHENHYDRAMINE HYDROCHLORIDE 50 MG/ML
INJECTION, SOLUTION INTRAMUSCULAR; INTRAVENOUS
Status: DISPENSED
Start: 2025-04-03

## (undated) RX ORDER — PROPOFOL 10 MG/ML
INJECTION, EMULSION INTRAVENOUS
Status: DISPENSED
Start: 2021-03-15

## (undated) RX ORDER — FENTANYL CITRATE 50 UG/ML
INJECTION, SOLUTION INTRAMUSCULAR; INTRAVENOUS
Status: DISPENSED
Start: 2021-02-18

## (undated) RX ORDER — OXYMETAZOLINE HYDROCHLORIDE 0.05 G/100ML
SPRAY NASAL
Status: DISPENSED
Start: 2021-02-18

## (undated) RX ORDER — SODIUM CHLORIDE 9 MG/ML
INJECTION, SOLUTION INTRAVENOUS
Status: DISPENSED
Start: 2025-04-03

## (undated) RX ORDER — ACETAMINOPHEN 325 MG/1
TABLET ORAL
Status: DISPENSED
Start: 2025-04-03

## (undated) RX ORDER — CEFAZOLIN SODIUM 1 G/3ML
INJECTION, POWDER, FOR SOLUTION INTRAMUSCULAR; INTRAVENOUS
Status: DISPENSED
Start: 2021-02-18

## (undated) RX ORDER — ONDANSETRON 2 MG/ML
INJECTION INTRAMUSCULAR; INTRAVENOUS
Status: DISPENSED
Start: 2021-03-15

## (undated) RX ORDER — GABAPENTIN 300 MG/1
CAPSULE ORAL
Status: DISPENSED
Start: 2021-03-15

## (undated) RX ORDER — LIDOCAINE HYDROCHLORIDE 10 MG/ML
INJECTION, SOLUTION EPIDURAL; INFILTRATION; INTRACAUDAL; PERINEURAL
Status: DISPENSED
Start: 2025-03-11

## (undated) RX ORDER — DEXAMETHASONE SODIUM PHOSPHATE 4 MG/ML
INJECTION, SOLUTION INTRA-ARTICULAR; INTRALESIONAL; INTRAMUSCULAR; INTRAVENOUS; SOFT TISSUE
Status: DISPENSED
Start: 2021-03-15

## (undated) RX ORDER — BUPIVACAINE HYDROCHLORIDE 2.5 MG/ML
INJECTION, SOLUTION EPIDURAL; INFILTRATION; INTRACAUDAL
Status: DISPENSED
Start: 2021-02-18

## (undated) RX ORDER — LIDOCAINE HYDROCHLORIDE 20 MG/ML
INJECTION, SOLUTION EPIDURAL; INFILTRATION; INTRACAUDAL; PERINEURAL
Status: DISPENSED
Start: 2021-03-15

## (undated) RX ORDER — ACETAMINOPHEN 325 MG/1
TABLET ORAL
Status: DISPENSED
Start: 2021-02-18

## (undated) RX ORDER — OXYCODONE HYDROCHLORIDE 5 MG/1
TABLET ORAL
Status: DISPENSED
Start: 2021-03-15

## (undated) RX ORDER — CEFAZOLIN SODIUM 1 G/3ML
INJECTION, POWDER, FOR SOLUTION INTRAMUSCULAR; INTRAVENOUS
Status: DISPENSED
Start: 2021-03-15

## (undated) RX ORDER — LIDOCAINE HYDROCHLORIDE 20 MG/ML
INJECTION, SOLUTION EPIDURAL; INFILTRATION; INTRACAUDAL; PERINEURAL
Status: DISPENSED
Start: 2021-02-18

## (undated) RX ORDER — LIDOCAINE HYDROCHLORIDE 10 MG/ML
INJECTION, SOLUTION EPIDURAL; INFILTRATION; INTRACAUDAL; PERINEURAL
Status: DISPENSED
Start: 2025-04-03

## (undated) RX ORDER — ONDANSETRON 2 MG/ML
INJECTION INTRAMUSCULAR; INTRAVENOUS
Status: DISPENSED
Start: 2025-04-03

## (undated) RX ORDER — FLUMAZENIL 0.1 MG/ML
INJECTION, SOLUTION INTRAVENOUS
Status: DISPENSED
Start: 2024-10-11

## (undated) RX ORDER — NALOXONE HYDROCHLORIDE 0.4 MG/ML
INJECTION, SOLUTION INTRAMUSCULAR; INTRAVENOUS; SUBCUTANEOUS
Status: DISPENSED
Start: 2024-10-11

## (undated) RX ORDER — FENTANYL CITRATE 50 UG/ML
INJECTION, SOLUTION INTRAMUSCULAR; INTRAVENOUS
Status: DISPENSED
Start: 2025-04-03

## (undated) RX ORDER — MUPIROCIN 20 MG/G
OINTMENT TOPICAL
Status: DISPENSED
Start: 2021-02-18

## (undated) RX ORDER — LIDOCAINE HYDROCHLORIDE AND EPINEPHRINE 10; 10 MG/ML; UG/ML
INJECTION, SOLUTION INFILTRATION; PERINEURAL
Status: DISPENSED
Start: 2021-02-18

## (undated) RX ORDER — HEPARIN SODIUM 10000 [USP'U]/ML
INJECTION, SOLUTION INTRAVENOUS; SUBCUTANEOUS
Status: DISPENSED
Start: 2021-02-18

## (undated) RX ORDER — KETOROLAC TROMETHAMINE 30 MG/ML
INJECTION, SOLUTION INTRAMUSCULAR; INTRAVENOUS
Status: DISPENSED
Start: 2021-02-18

## (undated) RX ORDER — GABAPENTIN 300 MG/1
CAPSULE ORAL
Status: DISPENSED
Start: 2021-02-18

## (undated) RX ORDER — VERAPAMIL HYDROCHLORIDE 2.5 MG/ML
INJECTION, SOLUTION INTRAVENOUS
Status: DISPENSED
Start: 2025-04-03

## (undated) RX ORDER — ONDANSETRON 2 MG/ML
INJECTION INTRAMUSCULAR; INTRAVENOUS
Status: DISPENSED
Start: 2021-02-18

## (undated) RX ORDER — FENTANYL CITRATE 50 UG/ML
INJECTION, SOLUTION INTRAMUSCULAR; INTRAVENOUS
Status: DISPENSED
Start: 2023-09-25

## (undated) RX ORDER — FENTANYL CITRATE 50 UG/ML
INJECTION, SOLUTION INTRAMUSCULAR; INTRAVENOUS
Status: DISPENSED
Start: 2025-03-11

## (undated) RX ORDER — ACETAMINOPHEN 325 MG/1
TABLET ORAL
Status: DISPENSED
Start: 2021-03-15

## (undated) RX ORDER — HEPARIN SODIUM 1000 [USP'U]/ML
INJECTION, SOLUTION INTRAVENOUS; SUBCUTANEOUS
Status: DISPENSED
Start: 2025-03-11

## (undated) RX ORDER — DEXAMETHASONE SODIUM PHOSPHATE 4 MG/ML
INJECTION, SOLUTION INTRA-ARTICULAR; INTRALESIONAL; INTRAMUSCULAR; INTRAVENOUS; SOFT TISSUE
Status: DISPENSED
Start: 2025-04-03

## (undated) RX ORDER — COCAINE HYDROCHLORIDE 40 MG/ML
SOLUTION NASAL
Status: DISPENSED
Start: 2021-03-15

## (undated) RX ORDER — VERAPAMIL HYDROCHLORIDE 2.5 MG/ML
INJECTION, SOLUTION INTRAVENOUS
Status: DISPENSED
Start: 2025-03-11

## (undated) RX ORDER — HYDROMORPHONE HYDROCHLORIDE 1 MG/ML
INJECTION, SOLUTION INTRAMUSCULAR; INTRAVENOUS; SUBCUTANEOUS
Status: DISPENSED
Start: 2021-03-15

## (undated) RX ORDER — NITROGLYCERIN 5 MG/ML
VIAL (ML) INTRAVENOUS
Status: DISPENSED
Start: 2025-03-11

## (undated) RX ORDER — GLYCOPYRROLATE 0.2 MG/ML
INJECTION, SOLUTION INTRAMUSCULAR; INTRAVENOUS
Status: DISPENSED
Start: 2024-10-11

## (undated) RX ORDER — NITROGLYCERIN 5 MG/ML
VIAL (ML) INTRAVENOUS
Status: DISPENSED
Start: 2025-04-03

## (undated) RX ORDER — DEXAMETHASONE SODIUM PHOSPHATE 10 MG/ML
INJECTION, SOLUTION INTRAMUSCULAR; INTRAVENOUS
Status: DISPENSED
Start: 2021-02-18

## (undated) RX ORDER — FENTANYL CITRATE 50 UG/ML
INJECTION, SOLUTION INTRAMUSCULAR; INTRAVENOUS
Status: DISPENSED
Start: 2024-10-11

## (undated) RX ORDER — LIDOCAINE HYDROCHLORIDE 40 MG/ML
SOLUTION TOPICAL
Status: DISPENSED
Start: 2024-10-11